# Patient Record
Sex: FEMALE | Race: WHITE | Employment: OTHER | URBAN - NONMETROPOLITAN AREA
[De-identification: names, ages, dates, MRNs, and addresses within clinical notes are randomized per-mention and may not be internally consistent; named-entity substitution may affect disease eponyms.]

---

## 2017-02-15 RX ORDER — GLIPIZIDE 5 MG/1
TABLET ORAL
Qty: 180 TABLET | Refills: 1 | Status: SHIPPED | OUTPATIENT
Start: 2017-02-15 | End: 2017-08-08 | Stop reason: SDUPTHER

## 2017-04-11 ENCOUNTER — OFFICE VISIT (OUTPATIENT)
Dept: FAMILY MEDICINE CLINIC | Age: 67
End: 2017-04-11

## 2017-04-11 VITALS
SYSTOLIC BLOOD PRESSURE: 132 MMHG | BODY MASS INDEX: 33.52 KG/M2 | WEIGHT: 189.2 LBS | DIASTOLIC BLOOD PRESSURE: 68 MMHG | HEART RATE: 66 BPM

## 2017-04-11 DIAGNOSIS — E11.9 WELL CONTROLLED TYPE 2 DIABETES MELLITUS (HCC): ICD-10-CM

## 2017-04-11 DIAGNOSIS — M15.9 PRIMARY OSTEOARTHRITIS INVOLVING MULTIPLE JOINTS: Primary | ICD-10-CM

## 2017-04-11 DIAGNOSIS — E78.00 PURE HYPERCHOLESTEROLEMIA: ICD-10-CM

## 2017-04-11 PROCEDURE — 99213 OFFICE O/P EST LOW 20 MIN: CPT | Performed by: FAMILY MEDICINE

## 2017-04-11 PROCEDURE — 3017F COLORECTAL CA SCREEN DOC REV: CPT | Performed by: FAMILY MEDICINE

## 2017-04-11 PROCEDURE — 1090F PRES/ABSN URINE INCON ASSESS: CPT | Performed by: FAMILY MEDICINE

## 2017-04-11 PROCEDURE — 3046F HEMOGLOBIN A1C LEVEL >9.0%: CPT | Performed by: FAMILY MEDICINE

## 2017-04-11 PROCEDURE — 4040F PNEUMOC VAC/ADMIN/RCVD: CPT | Performed by: FAMILY MEDICINE

## 2017-04-11 PROCEDURE — G8419 CALC BMI OUT NRM PARAM NOF/U: HCPCS | Performed by: FAMILY MEDICINE

## 2017-04-11 PROCEDURE — 1036F TOBACCO NON-USER: CPT | Performed by: FAMILY MEDICINE

## 2017-04-11 PROCEDURE — 1123F ACP DISCUSS/DSCN MKR DOCD: CPT | Performed by: FAMILY MEDICINE

## 2017-04-11 PROCEDURE — 3014F SCREEN MAMMO DOC REV: CPT | Performed by: FAMILY MEDICINE

## 2017-04-11 PROCEDURE — G8400 PT W/DXA NO RESULTS DOC: HCPCS | Performed by: FAMILY MEDICINE

## 2017-04-11 PROCEDURE — G8427 DOCREV CUR MEDS BY ELIG CLIN: HCPCS | Performed by: FAMILY MEDICINE

## 2017-04-11 RX ORDER — IBUPROFEN 600 MG/1
600 TABLET ORAL 2 TIMES DAILY PRN
Qty: 90 TABLET | Refills: 1 | Status: SHIPPED | OUTPATIENT
Start: 2017-04-11 | End: 2017-11-13 | Stop reason: SDUPTHER

## 2017-04-11 ASSESSMENT — PATIENT HEALTH QUESTIONNAIRE - PHQ9
SUM OF ALL RESPONSES TO PHQ9 QUESTIONS 1 & 2: 0
2. FEELING DOWN, DEPRESSED OR HOPELESS: 0
1. LITTLE INTEREST OR PLEASURE IN DOING THINGS: 0
SUM OF ALL RESPONSES TO PHQ QUESTIONS 1-9: 0

## 2017-08-08 ENCOUNTER — OFFICE VISIT (OUTPATIENT)
Dept: FAMILY MEDICINE CLINIC | Age: 67
End: 2017-08-08
Payer: MEDICARE

## 2017-08-08 VITALS
HEART RATE: 70 BPM | SYSTOLIC BLOOD PRESSURE: 138 MMHG | BODY MASS INDEX: 32.78 KG/M2 | WEIGHT: 185 LBS | DIASTOLIC BLOOD PRESSURE: 88 MMHG | HEIGHT: 63 IN

## 2017-08-08 DIAGNOSIS — I10 ESSENTIAL HYPERTENSION: ICD-10-CM

## 2017-08-08 DIAGNOSIS — E11.65 TYPE 2 DIABETES MELLITUS WITH HYPERGLYCEMIA, WITHOUT LONG-TERM CURRENT USE OF INSULIN (HCC): Primary | ICD-10-CM

## 2017-08-08 DIAGNOSIS — Z12.31 ENCOUNTER FOR SCREENING MAMMOGRAM FOR BREAST CANCER: ICD-10-CM

## 2017-08-08 LAB
CREATININE URINE POCT: 200
HBA1C MFR BLD: 11.5 %
MICROALBUMIN/CREAT 24H UR: 30 MG/G{CREAT}
MICROALBUMIN/CREAT UR-RTO: <30

## 2017-08-08 PROCEDURE — 99214 OFFICE O/P EST MOD 30 MIN: CPT | Performed by: FAMILY MEDICINE

## 2017-08-08 PROCEDURE — 3014F SCREEN MAMMO DOC REV: CPT | Performed by: FAMILY MEDICINE

## 2017-08-08 PROCEDURE — G8400 PT W/DXA NO RESULTS DOC: HCPCS | Performed by: FAMILY MEDICINE

## 2017-08-08 PROCEDURE — 3017F COLORECTAL CA SCREEN DOC REV: CPT | Performed by: FAMILY MEDICINE

## 2017-08-08 PROCEDURE — 83036 HEMOGLOBIN GLYCOSYLATED A1C: CPT | Performed by: FAMILY MEDICINE

## 2017-08-08 PROCEDURE — 1123F ACP DISCUSS/DSCN MKR DOCD: CPT | Performed by: FAMILY MEDICINE

## 2017-08-08 PROCEDURE — 82044 UR ALBUMIN SEMIQUANTITATIVE: CPT | Performed by: FAMILY MEDICINE

## 2017-08-08 PROCEDURE — 3046F HEMOGLOBIN A1C LEVEL >9.0%: CPT | Performed by: FAMILY MEDICINE

## 2017-08-08 PROCEDURE — G8417 CALC BMI ABV UP PARAM F/U: HCPCS | Performed by: FAMILY MEDICINE

## 2017-08-08 PROCEDURE — 1036F TOBACCO NON-USER: CPT | Performed by: FAMILY MEDICINE

## 2017-08-08 PROCEDURE — 4040F PNEUMOC VAC/ADMIN/RCVD: CPT | Performed by: FAMILY MEDICINE

## 2017-08-08 PROCEDURE — 1090F PRES/ABSN URINE INCON ASSESS: CPT | Performed by: FAMILY MEDICINE

## 2017-08-08 PROCEDURE — G8427 DOCREV CUR MEDS BY ELIG CLIN: HCPCS | Performed by: FAMILY MEDICINE

## 2017-08-08 RX ORDER — GLIPIZIDE 10 MG/1
10 TABLET ORAL
Qty: 60 TABLET | Refills: 0 | Status: SHIPPED | OUTPATIENT
Start: 2017-08-08 | End: 2017-08-15 | Stop reason: SDUPTHER

## 2017-08-08 RX ORDER — GLIPIZIDE 10 MG/1
10 TABLET ORAL
Qty: 60 TABLET | Refills: 2 | Status: SHIPPED | OUTPATIENT
Start: 2017-08-08 | End: 2017-08-08 | Stop reason: SDUPTHER

## 2017-08-08 RX ORDER — BENAZEPRIL HYDROCHLORIDE 10 MG/1
10 TABLET ORAL DAILY
Qty: 30 TABLET | Refills: 3 | Status: SHIPPED | OUTPATIENT
Start: 2017-08-08 | End: 2017-08-08 | Stop reason: SDUPTHER

## 2017-08-08 RX ORDER — PIOGLITAZONEHYDROCHLORIDE 30 MG/1
30 TABLET ORAL DAILY
Qty: 30 TABLET | Refills: 0 | Status: SHIPPED | OUTPATIENT
Start: 2017-08-08 | End: 2017-08-15

## 2017-08-08 RX ORDER — BENAZEPRIL HYDROCHLORIDE 10 MG/1
10 TABLET ORAL DAILY
Qty: 30 TABLET | Refills: 0 | Status: SHIPPED | OUTPATIENT
Start: 2017-08-08 | End: 2017-08-15 | Stop reason: SDUPTHER

## 2017-08-08 ASSESSMENT — ENCOUNTER SYMPTOMS
CHEST TIGHTNESS: 0
BLURRED VISION: 0
EYE REDNESS: 0
SHORTNESS OF BREATH: 0
NAUSEA: 0
COLOR CHANGE: 0
EYE DISCHARGE: 0
VOMITING: 0

## 2017-08-09 ENCOUNTER — TELEPHONE (OUTPATIENT)
Dept: FAMILY MEDICINE CLINIC | Age: 67
End: 2017-08-09

## 2017-08-15 ENCOUNTER — OFFICE VISIT (OUTPATIENT)
Dept: FAMILY MEDICINE CLINIC | Age: 67
End: 2017-08-15
Payer: MEDICARE

## 2017-08-15 VITALS
HEART RATE: 62 BPM | BODY MASS INDEX: 32.6 KG/M2 | WEIGHT: 184 LBS | HEIGHT: 63 IN | DIASTOLIC BLOOD PRESSURE: 70 MMHG | SYSTOLIC BLOOD PRESSURE: 126 MMHG

## 2017-08-15 DIAGNOSIS — G89.29 CHRONIC INTRACTABLE HEADACHE, UNSPECIFIED HEADACHE TYPE: ICD-10-CM

## 2017-08-15 DIAGNOSIS — M54.2 CHRONIC NECK PAIN: ICD-10-CM

## 2017-08-15 DIAGNOSIS — G89.29 CHRONIC NECK PAIN: ICD-10-CM

## 2017-08-15 DIAGNOSIS — E11.65 TYPE 2 DIABETES MELLITUS WITH HYPERGLYCEMIA, WITHOUT LONG-TERM CURRENT USE OF INSULIN (HCC): ICD-10-CM

## 2017-08-15 DIAGNOSIS — E11.9 TYPE 2 DIABETES MELLITUS WITHOUT COMPLICATION, WITHOUT LONG-TERM CURRENT USE OF INSULIN (HCC): Primary | ICD-10-CM

## 2017-08-15 DIAGNOSIS — R42 DIZZINESS: ICD-10-CM

## 2017-08-15 DIAGNOSIS — I10 ESSENTIAL HYPERTENSION: ICD-10-CM

## 2017-08-15 DIAGNOSIS — R51.9 CHRONIC INTRACTABLE HEADACHE, UNSPECIFIED HEADACHE TYPE: ICD-10-CM

## 2017-08-15 DIAGNOSIS — I42.9 CARDIOMYOPATHY, UNSPECIFIED TYPE (HCC): ICD-10-CM

## 2017-08-15 PROCEDURE — 1036F TOBACCO NON-USER: CPT | Performed by: FAMILY MEDICINE

## 2017-08-15 PROCEDURE — 99214 OFFICE O/P EST MOD 30 MIN: CPT | Performed by: FAMILY MEDICINE

## 2017-08-15 PROCEDURE — G8400 PT W/DXA NO RESULTS DOC: HCPCS | Performed by: FAMILY MEDICINE

## 2017-08-15 PROCEDURE — 1090F PRES/ABSN URINE INCON ASSESS: CPT | Performed by: FAMILY MEDICINE

## 2017-08-15 PROCEDURE — 3014F SCREEN MAMMO DOC REV: CPT | Performed by: FAMILY MEDICINE

## 2017-08-15 PROCEDURE — 4040F PNEUMOC VAC/ADMIN/RCVD: CPT | Performed by: FAMILY MEDICINE

## 2017-08-15 PROCEDURE — 1123F ACP DISCUSS/DSCN MKR DOCD: CPT | Performed by: FAMILY MEDICINE

## 2017-08-15 PROCEDURE — G8417 CALC BMI ABV UP PARAM F/U: HCPCS | Performed by: FAMILY MEDICINE

## 2017-08-15 PROCEDURE — 3046F HEMOGLOBIN A1C LEVEL >9.0%: CPT | Performed by: FAMILY MEDICINE

## 2017-08-15 PROCEDURE — G8427 DOCREV CUR MEDS BY ELIG CLIN: HCPCS | Performed by: FAMILY MEDICINE

## 2017-08-15 PROCEDURE — 3017F COLORECTAL CA SCREEN DOC REV: CPT | Performed by: FAMILY MEDICINE

## 2017-08-15 RX ORDER — ACARBOSE 25 MG/1
25 TABLET ORAL
Qty: 90 TABLET | Refills: 0 | Status: SHIPPED | OUTPATIENT
Start: 2017-08-15 | End: 2017-08-31 | Stop reason: SDUPTHER

## 2017-08-15 RX ORDER — GLIPIZIDE 10 MG/1
10 TABLET ORAL
Qty: 180 TABLET | Refills: 1 | Status: SHIPPED | OUTPATIENT
Start: 2017-08-15 | End: 2017-11-13 | Stop reason: SDUPTHER

## 2017-08-15 RX ORDER — BENAZEPRIL HYDROCHLORIDE 10 MG/1
10 TABLET ORAL DAILY
Qty: 90 TABLET | Refills: 1 | Status: SHIPPED | OUTPATIENT
Start: 2017-08-15 | End: 2017-11-13 | Stop reason: SDUPTHER

## 2017-08-15 ASSESSMENT — ENCOUNTER SYMPTOMS
CHEST TIGHTNESS: 0
SHORTNESS OF BREATH: 0
EYE REDNESS: 0
EYE DISCHARGE: 0
COLOR CHANGE: 0
TROUBLE SWALLOWING: 0
NAUSEA: 0
VOMITING: 0

## 2017-08-16 ENCOUNTER — HOSPITAL ENCOUNTER (OUTPATIENT)
Dept: CT IMAGING | Age: 67
Discharge: HOME | End: 2017-08-16
Payer: MEDICARE

## 2017-08-16 ENCOUNTER — HOSPITAL ENCOUNTER (OUTPATIENT)
Dept: MAMMOGRAPHY | Age: 67
Discharge: HOME | End: 2017-08-16
Payer: MEDICARE

## 2017-08-16 DIAGNOSIS — G89.29 CHRONIC NECK PAIN: ICD-10-CM

## 2017-08-16 DIAGNOSIS — G89.29 CHRONIC INTRACTABLE HEADACHE, UNSPECIFIED HEADACHE TYPE: ICD-10-CM

## 2017-08-16 DIAGNOSIS — M54.2 CHRONIC NECK PAIN: ICD-10-CM

## 2017-08-16 DIAGNOSIS — R51.9 CHRONIC INTRACTABLE HEADACHE, UNSPECIFIED HEADACHE TYPE: ICD-10-CM

## 2017-08-16 DIAGNOSIS — Z12.31 ENCOUNTER FOR SCREENING MAMMOGRAM FOR BREAST CANCER: ICD-10-CM

## 2017-08-16 PROCEDURE — G0202 SCR MAMMO BI INCL CAD: HCPCS

## 2017-08-16 PROCEDURE — 70450 CT HEAD/BRAIN W/O DYE: CPT

## 2017-08-17 ENCOUNTER — TELEPHONE (OUTPATIENT)
Dept: FAMILY MEDICINE CLINIC | Age: 67
End: 2017-08-17

## 2017-08-18 ENCOUNTER — HOSPITAL ENCOUNTER (OUTPATIENT)
Dept: MRI IMAGING | Age: 67
Discharge: HOME | End: 2017-08-18
Payer: MEDICARE

## 2017-08-18 DIAGNOSIS — G89.29 CHRONIC INTRACTABLE HEADACHE, UNSPECIFIED HEADACHE TYPE: ICD-10-CM

## 2017-08-18 DIAGNOSIS — G89.29 CHRONIC NECK PAIN: ICD-10-CM

## 2017-08-18 DIAGNOSIS — R51.9 CHRONIC INTRACTABLE HEADACHE, UNSPECIFIED HEADACHE TYPE: ICD-10-CM

## 2017-08-18 DIAGNOSIS — R42 DIZZINESS: ICD-10-CM

## 2017-08-18 DIAGNOSIS — M54.2 CHRONIC NECK PAIN: ICD-10-CM

## 2017-08-18 PROCEDURE — 72141 MRI NECK SPINE W/O DYE: CPT

## 2017-08-21 ENCOUNTER — TELEPHONE (OUTPATIENT)
Dept: FAMILY MEDICINE CLINIC | Age: 67
End: 2017-08-21

## 2017-08-22 ENCOUNTER — NURSE ONLY (OUTPATIENT)
Dept: FAMILY MEDICINE CLINIC | Age: 67
End: 2017-08-22

## 2017-08-22 VITALS — HEART RATE: 72 BPM | DIASTOLIC BLOOD PRESSURE: 78 MMHG | SYSTOLIC BLOOD PRESSURE: 132 MMHG

## 2017-08-22 DIAGNOSIS — Z01.30 BLOOD PRESSURE CHECK: Primary | ICD-10-CM

## 2017-08-31 RX ORDER — ACARBOSE 25 MG/1
25 TABLET ORAL
Qty: 90 TABLET | Refills: 1 | Status: SHIPPED | OUTPATIENT
Start: 2017-08-31 | End: 2017-11-13 | Stop reason: SDUPTHER

## 2017-11-13 ENCOUNTER — OFFICE VISIT (OUTPATIENT)
Dept: FAMILY MEDICINE CLINIC | Age: 67
End: 2017-11-13
Payer: MEDICARE

## 2017-11-13 VITALS
DIASTOLIC BLOOD PRESSURE: 68 MMHG | SYSTOLIC BLOOD PRESSURE: 114 MMHG | WEIGHT: 182 LBS | HEIGHT: 63 IN | HEART RATE: 62 BPM | BODY MASS INDEX: 32.25 KG/M2

## 2017-11-13 DIAGNOSIS — Z12.11 SCREENING FOR COLON CANCER: ICD-10-CM

## 2017-11-13 DIAGNOSIS — I10 ESSENTIAL HYPERTENSION: ICD-10-CM

## 2017-11-13 DIAGNOSIS — E11.65 TYPE 2 DIABETES MELLITUS WITH HYPERGLYCEMIA, WITHOUT LONG-TERM CURRENT USE OF INSULIN (HCC): Primary | ICD-10-CM

## 2017-11-13 PROCEDURE — G8484 FLU IMMUNIZE NO ADMIN: HCPCS | Performed by: FAMILY MEDICINE

## 2017-11-13 PROCEDURE — G8427 DOCREV CUR MEDS BY ELIG CLIN: HCPCS | Performed by: FAMILY MEDICINE

## 2017-11-13 PROCEDURE — 3017F COLORECTAL CA SCREEN DOC REV: CPT | Performed by: FAMILY MEDICINE

## 2017-11-13 PROCEDURE — 3045F PR MOST RECENT HEMOGLOBIN A1C LEVEL 7.0-9.0%: CPT | Performed by: FAMILY MEDICINE

## 2017-11-13 PROCEDURE — 1090F PRES/ABSN URINE INCON ASSESS: CPT | Performed by: FAMILY MEDICINE

## 2017-11-13 PROCEDURE — 3014F SCREEN MAMMO DOC REV: CPT | Performed by: FAMILY MEDICINE

## 2017-11-13 PROCEDURE — 1123F ACP DISCUSS/DSCN MKR DOCD: CPT | Performed by: FAMILY MEDICINE

## 2017-11-13 PROCEDURE — 4040F PNEUMOC VAC/ADMIN/RCVD: CPT | Performed by: FAMILY MEDICINE

## 2017-11-13 PROCEDURE — G8400 PT W/DXA NO RESULTS DOC: HCPCS | Performed by: FAMILY MEDICINE

## 2017-11-13 PROCEDURE — 99214 OFFICE O/P EST MOD 30 MIN: CPT | Performed by: FAMILY MEDICINE

## 2017-11-13 PROCEDURE — 1036F TOBACCO NON-USER: CPT | Performed by: FAMILY MEDICINE

## 2017-11-13 PROCEDURE — G8417 CALC BMI ABV UP PARAM F/U: HCPCS | Performed by: FAMILY MEDICINE

## 2017-11-13 RX ORDER — IBUPROFEN 600 MG/1
600 TABLET ORAL 2 TIMES DAILY PRN
Qty: 90 TABLET | Refills: 1 | Status: SHIPPED | OUTPATIENT
Start: 2017-11-13 | End: 2018-05-14

## 2017-11-13 RX ORDER — GLIPIZIDE 10 MG/1
10 TABLET ORAL
Qty: 180 TABLET | Refills: 1 | Status: SHIPPED | OUTPATIENT
Start: 2017-11-13 | End: 2018-05-14 | Stop reason: SDUPTHER

## 2017-11-13 RX ORDER — ACARBOSE 25 MG/1
25 TABLET ORAL
Qty: 90 TABLET | Refills: 1 | Status: SHIPPED | OUTPATIENT
Start: 2017-11-13 | End: 2018-01-03 | Stop reason: SDUPTHER

## 2017-11-13 RX ORDER — BENAZEPRIL HYDROCHLORIDE 10 MG/1
10 TABLET ORAL DAILY
Qty: 90 TABLET | Refills: 1 | Status: SHIPPED | OUTPATIENT
Start: 2017-11-13 | End: 2018-05-14 | Stop reason: SDUPTHER

## 2017-11-13 ASSESSMENT — ENCOUNTER SYMPTOMS
NAUSEA: 0
COLOR CHANGE: 0
BLURRED VISION: 0
SHORTNESS OF BREATH: 0
VOMITING: 0
EYE DISCHARGE: 0
CHEST TIGHTNESS: 0
EYE REDNESS: 0

## 2017-11-13 NOTE — PROGRESS NOTES
[address]  [address] [facility], [place]  Phone:  [phone]  Fax:  [phone]       Name: Anabel Delgado  : 1950    Chief Complaint   Patient presents with    6 Month Follow-Up    Diabetes    Hypertension    Health Maintenance     needs a FIT test       HPI:     Anabel Delgado is a 69 y.o. female who presents today for follow-up of T2DM and hypertension. Her last HbA1c was 7.9% on 17 which was improved from 11.5% in August with a healthier diet and increased physical activity. No medication side effects. Diabetes   She presents for her follow-up diabetic visit. She has type 2 diabetes mellitus. Her disease course has been improving. There are no hypoglycemic associated symptoms. Pertinent negatives for hypoglycemia include no pallor. Pertinent negatives for diabetes include no blurred vision, no chest pain, no foot ulcerations, no polydipsia, no polyphagia and no polyuria. Symptoms are stable. Risk factors for coronary artery disease include diabetes mellitus and obesity. Current diabetic treatment includes oral agent (triple therapy). She is compliant with treatment all of the time. She is following a generally healthy diet. An ACE inhibitor/angiotensin II receptor blocker is being taken. Eye exam is current. Hypertension   This is a chronic problem. The current episode started more than 1 year ago. The problem is unchanged. The problem is controlled. Pertinent negatives include no blurred vision, chest pain or shortness of breath. Risk factors for coronary artery disease include diabetes mellitus. Past treatments include ACE inhibitors. The current treatment provides moderate improvement. There are no compliance problems.         Current Outpatient Prescriptions:     acarbose (PRECOSE) 25 MG tablet, Take 1 tablet by mouth 3 times daily (with meals), Disp: 90 tablet, Rfl: 1    SITagliptin (JANUVIA) 100 MG tablet, Take 1 tablet by mouth daily, (before meals)  -     benazepril (LOTENSIN) 10 MG tablet; Take 1 tablet by mouth daily  -     HM DIABETES FOOT EXAM    Essential hypertension  -     benazepril (LOTENSIN) 10 MG tablet; Take 1 tablet by mouth daily    Screening for colon cancer  -     POCT Fecal Immunochemical Test (FIT); Future    Other orders  -     ibuprofen (ADVIL;MOTRIN) 600 MG tablet; Take 1 tablet by mouth 2 times daily as needed for Pain      Price Hardy received counseling on the following healthy behaviors: medication adherence, nutrition, exercise  Reviewed prior labs and health maintenance  Continue current medications, diet and exercise. Discussed use, benefit, and side effects of prescribed medications. Barriers to medication compliance addressed. Patient given educational materials - see patient instructions    Requested Prescriptions     Signed Prescriptions Disp Refills    acarbose (PRECOSE) 25 MG tablet 90 tablet 1     Sig: Take 1 tablet by mouth 3 times daily (with meals)    SITagliptin (JANUVIA) 100 MG tablet 90 tablet 1     Sig: Take 1 tablet by mouth daily    glipiZIDE (GLUCOTROL) 10 MG tablet 180 tablet 1     Sig: Take 1 tablet by mouth 2 times daily (before meals)    benazepril (LOTENSIN) 10 MG tablet 90 tablet 1     Sig: Take 1 tablet by mouth daily    ibuprofen (ADVIL;MOTRIN) 600 MG tablet 90 tablet 1     Sig: Take 1 tablet by mouth 2 times daily as needed for Pain       All patient questions answered. Patient voiced understanding. Quality Measures    BP: 114/68. Blood pressure is normal. Treatment plan consists of No treatment change needed. Fall Risk 4/11/2017 10/13/2015   2 or more falls in past year? no no   Fall with injury in past year? no no     The patient does not have a history of falls. I did not - not indicated , complete a risk assessment for falls.  A plan of care for falls No Treatment plan indicated    Lab Results   Component Value Date    LDLCALC 133 (H) 09/30/2017    (goal LDL reduction with dx

## 2017-11-13 NOTE — PATIENT INSTRUCTIONS
11.3  © 9825-0023 Healthwise, Incorporated. Care instructions adapted under license by [facility] ([facility]). If you have questions about a medical condition or this instruction, always ask your healthcare professional. [address] any warranty or liability for your use of this information.

## 2017-11-28 ENCOUNTER — HOSPITAL ENCOUNTER (EMERGENCY)
Age: 67
Discharge: HOME | End: 2017-11-28
Payer: MEDICARE

## 2017-11-28 VITALS
HEIGHT: 63 IN | RESPIRATION RATE: 20 BRPM | TEMPERATURE: 98.7 F | BODY MASS INDEX: 31.89 KG/M2 | DIASTOLIC BLOOD PRESSURE: 79 MMHG | OXYGEN SATURATION: 95 % | SYSTOLIC BLOOD PRESSURE: 124 MMHG | WEIGHT: 180 LBS | HEART RATE: 77 BPM

## 2017-11-28 DIAGNOSIS — J40 BRONCHITIS: Primary | ICD-10-CM

## 2017-11-28 LAB
EKG ATRIAL RATE: 66 BPM
EKG P AXIS: 47 DEGREES
EKG P-R INTERVAL: 190 MS
EKG Q-T INTERVAL: 388 MS
EKG QRS DURATION: 90 MS
EKG QTC CALCULATION (BAZETT): 406 MS
EKG R AXIS: 44 DEGREES
EKG T AXIS: 29 DEGREES
EKG VENTRICULAR RATE: 66 BPM

## 2017-11-28 PROCEDURE — 99214 OFFICE O/P EST MOD 30 MIN: CPT | Performed by: NURSE PRACTITIONER

## 2017-11-28 PROCEDURE — 6360000002 HC RX W HCPCS: Performed by: NURSE PRACTITIONER

## 2017-11-28 PROCEDURE — 94640 AIRWAY INHALATION TREATMENT: CPT

## 2017-11-28 PROCEDURE — 99213 OFFICE O/P EST LOW 20 MIN: CPT

## 2017-11-28 PROCEDURE — 93005 ELECTROCARDIOGRAM TRACING: CPT

## 2017-11-28 RX ORDER — BENZONATATE 200 MG/1
200 CAPSULE ORAL 3 TIMES DAILY PRN
Qty: 21 CAPSULE | Refills: 0 | Status: SHIPPED | OUTPATIENT
Start: 2017-11-28 | End: 2017-12-05

## 2017-11-28 RX ORDER — PREDNISONE 20 MG/1
20 TABLET ORAL 2 TIMES DAILY
Qty: 10 TABLET | Refills: 0 | Status: SHIPPED | OUTPATIENT
Start: 2017-11-28 | End: 2017-12-03

## 2017-11-28 RX ORDER — ALBUTEROL SULFATE 2.5 MG/3ML
2.5 SOLUTION RESPIRATORY (INHALATION) EVERY 4 HOURS PRN
Qty: 1 PACKAGE | Refills: 1 | Status: SHIPPED | OUTPATIENT
Start: 2017-11-28 | End: 2022-04-04

## 2017-11-28 RX ORDER — FLUTICASONE PROPIONATE 50 MCG
1 SPRAY, SUSPENSION (ML) NASAL DAILY
COMMUNITY
End: 2018-01-23 | Stop reason: SDUPTHER

## 2017-11-28 RX ORDER — DOXYCYCLINE HYCLATE 100 MG
100 TABLET ORAL 2 TIMES DAILY
Qty: 20 TABLET | Refills: 0 | Status: SHIPPED | OUTPATIENT
Start: 2017-11-28 | End: 2017-12-08

## 2017-11-28 RX ORDER — ALBUTEROL SULFATE 2.5 MG/3ML
2.5 SOLUTION RESPIRATORY (INHALATION) ONCE
Status: COMPLETED | OUTPATIENT
Start: 2017-11-28 | End: 2017-11-28

## 2017-11-28 RX ADMIN — ALBUTEROL SULFATE 2.5 MG: 2.5 SOLUTION RESPIRATORY (INHALATION) at 11:56

## 2017-11-28 ASSESSMENT — PAIN SCALES - GENERAL: PAINLEVEL_OUTOF10: 6

## 2017-11-28 ASSESSMENT — ENCOUNTER SYMPTOMS
CHEST TIGHTNESS: 1
SINUS CONGESTION: 1
SINUS PAIN: 0
BACK PAIN: 0
NAUSEA: 0
ABDOMINAL PAIN: 0
COUGH: 1
VOMITING: 0
TROUBLE SWALLOWING: 0
SINUS PRESSURE: 1
DIARRHEA: 0
SHORTNESS OF BREATH: 1
RHINORRHEA: 1
SORE THROAT: 0

## 2017-11-28 ASSESSMENT — PAIN DESCRIPTION - DESCRIPTORS: DESCRIPTORS: ACHING

## 2017-11-28 ASSESSMENT — PAIN DESCRIPTION - PAIN TYPE: TYPE: ACUTE PAIN

## 2017-11-28 ASSESSMENT — PAIN DESCRIPTION - LOCATION: LOCATION: HEAD

## 2017-11-28 ASSESSMENT — PAIN DESCRIPTION - FREQUENCY: FREQUENCY: CONTINUOUS

## 2017-11-28 NOTE — ED PROVIDER NOTES
[address]  Urgent Care Encounter       CHIEF COMPLAINT       Chief Complaint   Patient presents with    Cough     causing her to gag when she coughs    Sinusitis       Nurses Notes reviewed and I agree except as noted in the HPI. HISTORY OF PRESENT ILLNESS   Antonella Dial is a 69 y.o. female who presents The urgent care center complaining of coughing and sinus congestion. The history is provided by the patient. No  was used. Cough   Cough characteristics:  Non-productive  Severity:  Moderate  Onset quality:  Gradual  Duration:  5 days  Timing:  Intermittent  Progression:  Waxing and waning  Chronicity:  New  Smoker: no    Context: not sick contacts    Relieved by:  Nothing  Worsened by:  Lying down  Ineffective treatments:  Fluids and rest (salt water gargles, otc throat lozengers)  Associated symptoms: rhinorrhea, shortness of breath and sinus congestion    Associated symptoms: no chest pain, no chills, no diaphoresis, no ear pain, no fever, no headaches, no rash and no sore throat    Rhinorrhea:     Quality:  Clear and yellow    Severity:  Mild    Duration:  5 days    Timing:  Intermittent    Progression:  Waxing and waning  Shortness of breath:     Severity:  Mild    Onset quality:  Gradual    Duration:  5 days    Timing:  Intermittent    Progression:  Waxing and waning  Sinusitis   Associated symptoms: congestion, cough, rhinorrhea and shortness of breath    Associated symptoms: no chest pain, no chills, no ear pain, no fatigue, no fever, no headaches, no nausea, no sore throat and no vomiting        REVIEW OF SYSTEMS     Review of Systems   Constitutional: Positive for activity change and appetite change. Negative for chills, diaphoresis, fatigue and fever. HENT: Positive for congestion, rhinorrhea and sinus pressure. Negative for ear discharge, ear pain, nosebleeds, postnasal drip, sinus pain, sore throat and trouble swallowing.     Respiratory: Positive for cough, chest tightness and shortness of breath. Cardiovascular: Negative for chest pain. Gastrointestinal: Negative for abdominal pain, diarrhea, nausea and vomiting. Musculoskeletal: Negative for back pain, neck pain and neck stiffness. Skin: Negative for rash. Allergic/Immunologic: Negative for environmental allergies and food allergies. Neurological: Negative for dizziness, light-headedness and headaches. Hematological: Negative for adenopathy. PAST MEDICAL HISTORY         Diagnosis Date    Abdominal aortic aneurysm (AAA) >39 mm diameter (HCC)     found in 2015    Arthritis     Asthma     Brain cyst     benign    Chronic sinus complaints     Diverticulosis of colon     Elevated TSH     Polyneuropathy (HCC)     Type 2 diabetes mellitus ([facility] [address]        SURGICAL HISTORY     Patient  has a past surgical history that includes Tonsillectomy and adenoidectomy (age 4); Dilation and curettage of uterus; sinus surgery; Mandible fracture surgery; Cholecystectomy; Hysterectomy (1995); Knee arthroscopy (1967, 2001); joint replacement (Right, 2007); skin biopsy; eye surgery; bladder suspension; and other surgical history (Right, 12/30/2015).     CURRENT MEDICATIONS       Discharge Medication List as of 11/28/2017 12:32 PM      CONTINUE these medications which have NOT CHANGED    Details   fluticasone (FLONASE) 50 MCG/ACT nasal spray 1 spray by Nasal route dailyHistorical Med      dextromethorphan-guaiFENesin (MUCINEX DM)  MG per extended release tablet Take 1 tablet by mouth every 12 hours as neededHistorical Med      acarbose (PRECOSE) 25 MG tablet Take 1 tablet by mouth 3 times daily (with meals), Disp-90 tablet, R-1Normal      SITagliptin (JANUVIA) 100 MG tablet Take 1 tablet by mouth daily, Disp-90 tablet, R-1Normal      glipiZIDE (GLUCOTROL) 10 MG tablet Take 1 tablet by mouth 2 times daily (before meals), Disp-180 tablet, R-1Normal      benazepril (LOTENSIN) 10 MG tablet Take 1 tablet by diaphoretic. Nursing note and vitals reviewed. DIAGNOSTIC RESULTS   Labs:  Results for orders placed or performed during the hospital encounter of 11/28/17   EKG 12 Lead   Result Value Ref Range    Ventricular Rate 66 BPM    Atrial Rate 66 BPM    P-R Interval 190 ms    QRS Duration 90 ms    Q-T Interval 388 ms    QTc Calculation (Bazett) 406 ms    P Axis 47 degrees    R Axis 44 degrees    T Axis 29 degrees       IMAGING:    No orders to display         EKG:      URGENT CARE COURSE:     Vitals:    11/28/17 1116 11/28/17 1236   BP: (!) 160/70 124/79   Pulse: 77 77   Resp: 22 20   Temp: 98.7 °F (37.1 °C)    TempSrc: Oral    SpO2: 95% 95%   Weight: 180 lb (81.6 kg)    Height: 5' 3\" (1.6 m)        Medications   albuterol (PROVENTIL) nebulizer solution 2.5 mg (2.5 mg Nebulization Given 11/28/17 1156)     12:30 Patient digits states she did get some relief of her chest tightness after the albuterol aerosol treatment the patient did have increased breath sounds. Patient still has a cough present. She denies any chest pain or shortness of breath at the present time. ED Course        PROCEDURES:  None    FINAL IMPRESSION      1. Bronchitis          DISPOSITION/PLAN   DISPOSITION    The patient/Patient representative was advised to rest, drink lots of fluids, take Motrin and Tylenol for any fever, chills generalized body aches. The patient was also advised to monitor urine output for signs of dehydration. If the patient develops any chest pain, shortness of breath, neck pain or stiffness or abdominal pain or any other concerns, the patient is to call 911 or go to the emergency department for further evaluation. If the patient does not experience any of the above symptoms he is to follow-up with his primary care provider in 2-3 days for reevaluation. The patient/Patient representative are agreeable to the treatment plan at this time. The patient left the urgent care center in stable condition.     PATIENT REFERRED TO:  Carla Powell MD  [address] [place] [address]  [phone]    In 1 week  For recheck and further evaluation and management, If symptoms worsen go to the Emergency Department      DISCHARGE MEDICATIONS:  Discharge Medication List as of 11/28/2017 12:32 PM      START taking these medications    Details   doxycycline hyclate (VIBRA-TABS) 100 MG tablet Take 1 tablet by mouth 2 times daily for 10 days, Disp-20 tablet, R-0Normal      predniSONE (DELTASONE) 20 MG tablet Take 1 tablet by mouth 2 times daily for 5 days, Disp-10 tablet, R-0Normal      albuterol (PROVENTIL) (2.5 MG/3ML) 0.083% nebulizer solution Take 3 mLs by nebulization every 4 hours as needed for Wheezing, Disp-1 Package, R-1Normal      benzonatate (TESSALON) 200 MG capsule Take 1 capsule by mouth 3 times daily as needed for Cough, Disp-21 capsule, R-0Normal             Discharge Medication List as of 11/28/2017 12:32 PM          Discharge Medication List as of 11/28/2017 12:32 PM          Summer Nguyễn NP    (Please note that portions of this note were completed with a voice recognition program.  Efforts were made to edit the dictations but occasionally words are mis-transcribed.)            Summer Nguyễn NP  11/28/17 1556

## 2017-11-28 NOTE — ED TRIAGE NOTES
Discharge instructions and prescriptions reviewed with pt. Pt verbalized understanding. Pt ambulated out in stable condition. Pain unchanged upon discharge.

## 2017-11-28 NOTE — ED TRIAGE NOTES
Pt to urgent care with c/o cough that is making her gag and sinus issues. New onset of symptoms started on Thursday evening. Pt states she helped her  with some yard work and the wind was blowing and she feels this is the cause of her symptoms. Pt said her her left arm was hurting her last night and she is sob, so an EKG was completed.

## 2017-11-29 ENCOUNTER — CARE COORDINATION (OUTPATIENT)
Dept: CARE COORDINATION | Age: 67
End: 2017-11-29

## 2017-11-29 NOTE — CARE COORDINATION
Ambulatory Care Coordination ED Follow up Call       Reason for ED Visit:  Sinusitis  Care Management Risk Score: CMRS 3  How are you feeling? :     not changed  Patient Reports the following:  none             Contact RNCC regarding any worsening symptoms from above. Did you call your PCP prior to going to the ED? No          Post Discharge Status:  What health concerns since you left the Emergency Room?  none    Do you have wounds that you are caring for at home? No    Do you have a follow up appt scheduled?  no - declined    Review of Instructions:                                 Do you have any questions regarding your discharge instructions?:  No  Medications:    What questions do you have about your medications? N/A  Are you taking your medications as directed? If not - why? Yes   Can you afford your medications? yes  ADLS:  Do you need assistance of any kind at home? No   What assistance is needed? N/a    I spoke with the patient re: ed visit. Patient was seen and treated for sinusitis. Patient denies chest pain, palpations or SOB. Patient continues to have a non productive cough. No wheezing. No fever or chills today. Patient was advised to drink lots of fluids, take Motrin and Tylenol for any fever, chills generalized body aches. Instructed patient on importance of early symptom recognition to prevent hospitalization and ED visits. Patient is taking all medications as prescribed. Patient denies the need to f/u with PCP at this time. I advised patient to contact PCP office if needed. No further needs at this time.           FU appts/Provider:    Future Appointments  Date Time Provider Elsie Foley   5/14/2018 1:15 PM Pita Sullivan  [facility] Maintenance Due   Topic Date Due    DTaP/Tdap/Td vaccine (1 - Tdap) 03/12/1967    Colon cancer screen colonoscopy  03/12/1998    Pneumococcal low/med risk (2 of 2 - PPSV23) 10/01/2016    Flu vaccine (1) 09/01/2017     Patient advised to contact PCP office to have HM items/records faxed to PCP Office directly?   yes

## 2018-01-02 ENCOUNTER — TELEPHONE (OUTPATIENT)
Dept: FAMILY MEDICINE CLINIC | Age: 68
End: 2018-01-02

## 2018-01-03 ENCOUNTER — OFFICE VISIT (OUTPATIENT)
Dept: FAMILY MEDICINE CLINIC | Age: 68
End: 2018-01-03
Payer: MEDICARE

## 2018-01-03 VITALS
HEART RATE: 88 BPM | BODY MASS INDEX: 32.43 KG/M2 | WEIGHT: 183 LBS | HEIGHT: 63 IN | SYSTOLIC BLOOD PRESSURE: 124 MMHG | DIASTOLIC BLOOD PRESSURE: 72 MMHG | TEMPERATURE: 98.3 F

## 2018-01-03 DIAGNOSIS — J01.90 ACUTE BACTERIAL SINUSITIS: Primary | ICD-10-CM

## 2018-01-03 DIAGNOSIS — B96.89 ACUTE BACTERIAL SINUSITIS: Primary | ICD-10-CM

## 2018-01-03 DIAGNOSIS — Z12.11 SCREENING FOR COLON CANCER: ICD-10-CM

## 2018-01-03 DIAGNOSIS — E11.65 TYPE 2 DIABETES MELLITUS WITH HYPERGLYCEMIA, WITHOUT LONG-TERM CURRENT USE OF INSULIN (HCC): ICD-10-CM

## 2018-01-03 LAB
CONTROL: PRESENT
HEMOCCULT STL QL: NEGATIVE

## 2018-01-03 PROCEDURE — G8484 FLU IMMUNIZE NO ADMIN: HCPCS | Performed by: FAMILY MEDICINE

## 2018-01-03 PROCEDURE — 1036F TOBACCO NON-USER: CPT | Performed by: FAMILY MEDICINE

## 2018-01-03 PROCEDURE — G8427 DOCREV CUR MEDS BY ELIG CLIN: HCPCS | Performed by: FAMILY MEDICINE

## 2018-01-03 PROCEDURE — 82274 ASSAY TEST FOR BLOOD FECAL: CPT | Performed by: FAMILY MEDICINE

## 2018-01-03 PROCEDURE — 99213 OFFICE O/P EST LOW 20 MIN: CPT | Performed by: FAMILY MEDICINE

## 2018-01-03 PROCEDURE — G8417 CALC BMI ABV UP PARAM F/U: HCPCS | Performed by: FAMILY MEDICINE

## 2018-01-03 PROCEDURE — 1123F ACP DISCUSS/DSCN MKR DOCD: CPT | Performed by: FAMILY MEDICINE

## 2018-01-03 PROCEDURE — 3017F COLORECTAL CA SCREEN DOC REV: CPT | Performed by: FAMILY MEDICINE

## 2018-01-03 PROCEDURE — 1090F PRES/ABSN URINE INCON ASSESS: CPT | Performed by: FAMILY MEDICINE

## 2018-01-03 PROCEDURE — 3046F HEMOGLOBIN A1C LEVEL >9.0%: CPT | Performed by: FAMILY MEDICINE

## 2018-01-03 PROCEDURE — 3014F SCREEN MAMMO DOC REV: CPT | Performed by: FAMILY MEDICINE

## 2018-01-03 PROCEDURE — 4040F PNEUMOC VAC/ADMIN/RCVD: CPT | Performed by: FAMILY MEDICINE

## 2018-01-03 PROCEDURE — G8400 PT W/DXA NO RESULTS DOC: HCPCS | Performed by: FAMILY MEDICINE

## 2018-01-03 RX ORDER — SULFAMETHOXAZOLE AND TRIMETHOPRIM 800; 160 MG/1; MG/1
1 TABLET ORAL 2 TIMES DAILY
Qty: 20 TABLET | Refills: 0 | Status: SHIPPED | OUTPATIENT
Start: 2018-01-03 | End: 2018-01-13

## 2018-01-03 RX ORDER — ACARBOSE 25 MG/1
25 TABLET ORAL
Qty: 270 TABLET | Refills: 1 | Status: ON HOLD | OUTPATIENT
Start: 2018-01-03 | End: 2018-05-21 | Stop reason: HOSPADM

## 2018-01-03 ASSESSMENT — ENCOUNTER SYMPTOMS
SINUS PAIN: 1
SINUS PRESSURE: 1
SORE THROAT: 1
COUGH: 1
VOMITING: 0
RHINORRHEA: 1
DIARRHEA: 0

## 2018-01-03 NOTE — PATIENT INSTRUCTIONS
wet towel or a warm gel pack on your face 3 or 4 times a day for 5 to 10 minutes each time. · Try a decongestant nasal spray like oxymetazoline (Afrin). Do not use it for more than 3 days in a row. Using it for more than 3 days can make your congestion worse. When should you call for help? Call your doctor now or seek immediate medical care if:  ? · You have new or worse swelling or redness in your face or around your eyes. ? · You have a new or higher fever. ? Watch closely for changes in your health, and be sure to contact your doctor if:  ? · You have new or worse facial pain. ? · The mucus from your nose becomes thicker (like pus) or has new blood in it. ? · You are not getting better as expected. Where can you learn more? Go to https://KnokpeCemaphore Systems.[employer]. org and sign in to your Asker account. Enter E715 in the WorthPoint box to learn more about \"Sinusitis: Care Instructions. \"     If you do not have an account, please click on the \"Sign Up Now\" link. Current as of: May 12, 2017  Content Version: 11.4  © 2405-0289 Healthwise, [employer]. Care instructions adapted under license by [facility] ([facility]). If you have questions about a medical condition or this instruction, always ask your healthcare professional. [address] any warranty or liability for your use of this information.

## 2018-01-11 ENCOUNTER — TELEPHONE (OUTPATIENT)
Dept: FAMILY MEDICINE CLINIC | Age: 68
End: 2018-01-11

## 2018-01-11 DIAGNOSIS — J01.90 ACUTE BACTERIAL SINUSITIS: Primary | ICD-10-CM

## 2018-01-11 DIAGNOSIS — B96.89 ACUTE BACTERIAL SINUSITIS: Primary | ICD-10-CM

## 2018-01-11 RX ORDER — AZITHROMYCIN 250 MG/1
TABLET, FILM COATED ORAL
Qty: 6 TABLET | Refills: 0 | Status: SHIPPED | OUTPATIENT
Start: 2018-01-11 | End: 2018-01-23

## 2018-01-11 NOTE — TELEPHONE ENCOUNTER
I would recommend completing the course of antibiotics and continuing with OTC treatment. The cough and congestion can still persist after treatment but should be improving.

## 2018-01-23 ENCOUNTER — OFFICE VISIT (OUTPATIENT)
Dept: FAMILY MEDICINE CLINIC | Age: 68
End: 2018-01-23
Payer: MEDICARE

## 2018-01-23 VITALS
TEMPERATURE: 97.5 F | BODY MASS INDEX: 32.43 KG/M2 | HEART RATE: 78 BPM | DIASTOLIC BLOOD PRESSURE: 66 MMHG | HEIGHT: 63 IN | SYSTOLIC BLOOD PRESSURE: 130 MMHG | WEIGHT: 183 LBS

## 2018-01-23 DIAGNOSIS — J32.9 RECURRENT SINUSITIS: Primary | ICD-10-CM

## 2018-01-23 DIAGNOSIS — I42.9 CARDIOMYOPATHY, UNSPECIFIED TYPE (HCC): ICD-10-CM

## 2018-01-23 PROCEDURE — 3014F SCREEN MAMMO DOC REV: CPT | Performed by: FAMILY MEDICINE

## 2018-01-23 PROCEDURE — 4040F PNEUMOC VAC/ADMIN/RCVD: CPT | Performed by: FAMILY MEDICINE

## 2018-01-23 PROCEDURE — G8427 DOCREV CUR MEDS BY ELIG CLIN: HCPCS | Performed by: FAMILY MEDICINE

## 2018-01-23 PROCEDURE — 1036F TOBACCO NON-USER: CPT | Performed by: FAMILY MEDICINE

## 2018-01-23 PROCEDURE — G8417 CALC BMI ABV UP PARAM F/U: HCPCS | Performed by: FAMILY MEDICINE

## 2018-01-23 PROCEDURE — 3017F COLORECTAL CA SCREEN DOC REV: CPT | Performed by: FAMILY MEDICINE

## 2018-01-23 PROCEDURE — 1090F PRES/ABSN URINE INCON ASSESS: CPT | Performed by: FAMILY MEDICINE

## 2018-01-23 PROCEDURE — G8400 PT W/DXA NO RESULTS DOC: HCPCS | Performed by: FAMILY MEDICINE

## 2018-01-23 PROCEDURE — 1123F ACP DISCUSS/DSCN MKR DOCD: CPT | Performed by: FAMILY MEDICINE

## 2018-01-23 PROCEDURE — G8484 FLU IMMUNIZE NO ADMIN: HCPCS | Performed by: FAMILY MEDICINE

## 2018-01-23 PROCEDURE — 99213 OFFICE O/P EST LOW 20 MIN: CPT | Performed by: FAMILY MEDICINE

## 2018-01-23 RX ORDER — DOXYCYCLINE HYCLATE 100 MG
100 TABLET ORAL 2 TIMES DAILY
Qty: 20 TABLET | Refills: 0 | Status: SHIPPED | OUTPATIENT
Start: 2018-01-23 | End: 2018-02-02

## 2018-01-23 RX ORDER — AZITHROMYCIN 250 MG/1
TABLET, FILM COATED ORAL
Qty: 6 TABLET | Refills: 0 | Status: CANCELLED | OUTPATIENT
Start: 2018-01-23

## 2018-01-23 RX ORDER — PREDNISONE 20 MG/1
40 TABLET ORAL DAILY
Qty: 10 TABLET | Refills: 0 | Status: SHIPPED | OUTPATIENT
Start: 2018-01-23 | End: 2018-05-14

## 2018-01-23 RX ORDER — FLUTICASONE PROPIONATE 50 MCG
1 SPRAY, SUSPENSION (ML) NASAL DAILY
Qty: 1 BOTTLE | Refills: 0 | Status: SHIPPED | OUTPATIENT
Start: 2018-01-23 | End: 2018-05-14

## 2018-01-23 ASSESSMENT — ENCOUNTER SYMPTOMS
EYE DISCHARGE: 0
SHORTNESS OF BREATH: 0
SINUS PAIN: 1
DIARRHEA: 0
EYE REDNESS: 0
VOMITING: 0
COUGH: 1
SINUS PRESSURE: 1
RHINORRHEA: 1
WHEEZING: 0
SORE THROAT: 1

## 2018-01-23 NOTE — PROGRESS NOTES
[address]  [address] [facility] [address], [facility], [place]  Phone:  [phone]  Fax:  [phone]       Name: Zayda Baumann  : 1950    Chief Complaint   Patient presents with    Head Congestion     sinus drainage       Headache    Nasal Congestion       HPI:     Vicky Randhawa is a 68 yo female who presents today for follow-up of sinusitis. She was seen in office on 1/3/18 where she complained of nasal congestion and cough that began 10 days prior while in [place]. She was treated with Bactrim for acute sinusitis, but states that her symptoms did not improve so she took a z-rui which has not helped either. She's gone through a box of Claritin D and a box of decongestants, along with saline rinses and Flonase, none of which have provided significant relief. Sinusitis   This is a recurrent problem. The current episode started more than 1 month ago. The problem has been waxing and waning since onset. There has been no fever. Associated symptoms include congestion, coughing, headaches, sinus pressure and a sore throat. Pertinent negatives include no chills, ear pain or shortness of breath. Past treatments include oral decongestants, saline sprays, spray decongestants, antibiotics and acetaminophen. The treatment provided mild relief.        Current Outpatient Prescriptions:     predniSONE (DELTASONE) 20 MG tablet, Take 2 tablets by mouth daily, Disp: 10 tablet, Rfl: 0    fluticasone (FLONASE) 50 MCG/ACT nasal spray, 1 spray by Nasal route daily, Disp: 1 Bottle, Rfl: 0    doxycycline hyclate (VIBRA-TABS) 100 MG tablet, Take 1 tablet by mouth 2 times daily for 10 days, Disp: 20 tablet, Rfl: 0    acarbose (PRECOSE) 25 MG tablet, Take 1 tablet by mouth 3 times daily (with meals), Disp: 270 tablet, Rfl: 1    albuterol (PROVENTIL) (2.5 MG/3ML) 0.083% nebulizer solution, Take 3 mLs by nebulization every 4 hours as needed for Wheezing, Disp: 1 Package, Rfl: 1    SITagliptin (Jocelyn Zaina) by Melony Rice MD on 1/23/2018 at 9:12 AM

## 2018-05-14 ENCOUNTER — OFFICE VISIT (OUTPATIENT)
Dept: FAMILY MEDICINE CLINIC | Age: 68
End: 2018-05-14
Payer: MEDICARE

## 2018-05-14 VITALS
DIASTOLIC BLOOD PRESSURE: 72 MMHG | HEIGHT: 63 IN | HEART RATE: 72 BPM | WEIGHT: 185 LBS | SYSTOLIC BLOOD PRESSURE: 124 MMHG | BODY MASS INDEX: 32.78 KG/M2

## 2018-05-14 DIAGNOSIS — E11.65 TYPE 2 DIABETES MELLITUS WITH HYPERGLYCEMIA, WITHOUT LONG-TERM CURRENT USE OF INSULIN (HCC): Primary | ICD-10-CM

## 2018-05-14 DIAGNOSIS — E78.00 PURE HYPERCHOLESTEROLEMIA: ICD-10-CM

## 2018-05-14 DIAGNOSIS — E11.65 TYPE 2 DIABETES MELLITUS WITH HYPERGLYCEMIA, WITHOUT LONG-TERM CURRENT USE OF INSULIN (HCC): ICD-10-CM

## 2018-05-14 DIAGNOSIS — I10 ESSENTIAL HYPERTENSION: ICD-10-CM

## 2018-05-14 LAB
CREATININE URINE POCT: 50
HBA1C MFR BLD: 14 %
MICROALBUMIN/CREAT 24H UR: 30 MG/G{CREAT}
MICROALBUMIN/CREAT UR-RTO: NORMAL

## 2018-05-14 PROCEDURE — G8400 PT W/DXA NO RESULTS DOC: HCPCS | Performed by: FAMILY MEDICINE

## 2018-05-14 PROCEDURE — 3046F HEMOGLOBIN A1C LEVEL >9.0%: CPT | Performed by: FAMILY MEDICINE

## 2018-05-14 PROCEDURE — 83036 HEMOGLOBIN GLYCOSYLATED A1C: CPT | Performed by: FAMILY MEDICINE

## 2018-05-14 PROCEDURE — 1036F TOBACCO NON-USER: CPT | Performed by: FAMILY MEDICINE

## 2018-05-14 PROCEDURE — G8427 DOCREV CUR MEDS BY ELIG CLIN: HCPCS | Performed by: FAMILY MEDICINE

## 2018-05-14 PROCEDURE — 1090F PRES/ABSN URINE INCON ASSESS: CPT | Performed by: FAMILY MEDICINE

## 2018-05-14 PROCEDURE — 4040F PNEUMOC VAC/ADMIN/RCVD: CPT | Performed by: FAMILY MEDICINE

## 2018-05-14 PROCEDURE — 2022F DILAT RTA XM EVC RTNOPTHY: CPT | Performed by: FAMILY MEDICINE

## 2018-05-14 PROCEDURE — 1123F ACP DISCUSS/DSCN MKR DOCD: CPT | Performed by: FAMILY MEDICINE

## 2018-05-14 PROCEDURE — G8417 CALC BMI ABV UP PARAM F/U: HCPCS | Performed by: FAMILY MEDICINE

## 2018-05-14 PROCEDURE — 3017F COLORECTAL CA SCREEN DOC REV: CPT | Performed by: FAMILY MEDICINE

## 2018-05-14 PROCEDURE — 82044 UR ALBUMIN SEMIQUANTITATIVE: CPT | Performed by: FAMILY MEDICINE

## 2018-05-14 PROCEDURE — 99214 OFFICE O/P EST MOD 30 MIN: CPT | Performed by: FAMILY MEDICINE

## 2018-05-14 RX ORDER — BENAZEPRIL HYDROCHLORIDE 10 MG/1
10 TABLET ORAL DAILY
Qty: 90 TABLET | Refills: 1 | Status: SHIPPED | OUTPATIENT
Start: 2018-05-14 | End: 2018-11-05 | Stop reason: SDUPTHER

## 2018-05-14 RX ORDER — GLIPIZIDE 10 MG/1
10 TABLET ORAL
Qty: 180 TABLET | Refills: 1 | Status: ON HOLD | OUTPATIENT
Start: 2018-05-14 | End: 2018-05-21 | Stop reason: HOSPADM

## 2018-05-14 ASSESSMENT — ENCOUNTER SYMPTOMS
VOMITING: 0
SHORTNESS OF BREATH: 0
NAUSEA: 0
EYE REDNESS: 0
CHEST TIGHTNESS: 0
EYE DISCHARGE: 0
COLOR CHANGE: 0

## 2018-05-14 ASSESSMENT — PATIENT HEALTH QUESTIONNAIRE - PHQ9
1. LITTLE INTEREST OR PLEASURE IN DOING THINGS: 0
2. FEELING DOWN, DEPRESSED OR HOPELESS: 0
SUM OF ALL RESPONSES TO PHQ QUESTIONS 1-9: 0
SUM OF ALL RESPONSES TO PHQ9 QUESTIONS 1 & 2: 0

## 2018-05-18 ENCOUNTER — OFFICE VISIT (OUTPATIENT)
Dept: FAMILY MEDICINE CLINIC | Age: 68
End: 2018-05-18
Payer: MEDICARE

## 2018-05-18 ENCOUNTER — HOSPITAL ENCOUNTER (INPATIENT)
Age: 68
LOS: 3 days | Discharge: HOME | DRG: 639 | End: 2018-05-21
Attending: INTERNAL MEDICINE | Admitting: INTERNAL MEDICINE
Payer: MEDICARE

## 2018-05-18 VITALS
DIASTOLIC BLOOD PRESSURE: 62 MMHG | OXYGEN SATURATION: 98 % | WEIGHT: 178 LBS | BODY MASS INDEX: 31.54 KG/M2 | HEIGHT: 63 IN | SYSTOLIC BLOOD PRESSURE: 100 MMHG | HEART RATE: 60 BPM

## 2018-05-18 DIAGNOSIS — E11.65 TYPE 2 DIABETES MELLITUS WITH HYPERGLYCEMIA, WITHOUT LONG-TERM CURRENT USE OF INSULIN (HCC): ICD-10-CM

## 2018-05-18 DIAGNOSIS — R51.9 NEW ONSET HEADACHE: ICD-10-CM

## 2018-05-18 DIAGNOSIS — E11.10 DIABETIC KETOACIDOSIS WITHOUT COMA ASSOCIATED WITH TYPE 2 DIABETES MELLITUS (HCC): Primary | ICD-10-CM

## 2018-05-18 DIAGNOSIS — R53.83 OTHER FATIGUE: ICD-10-CM

## 2018-05-18 DIAGNOSIS — R06.02 SHORTNESS OF BREATH: ICD-10-CM

## 2018-05-18 DIAGNOSIS — R53.1 WEAKNESS GENERALIZED: Primary | ICD-10-CM

## 2018-05-18 LAB
ALBUMIN SERPL-MCNC: 3.8 G/DL (ref 3.5–5.1)
ALP BLD-CCNC: 59 U/L (ref 38–126)
ALT SERPL-CCNC: 24 U/L (ref 11–66)
ANION GAP SERPL CALCULATED.3IONS-SCNC: 13 MEQ/L (ref 8–16)
ANION GAP SERPL CALCULATED.3IONS-SCNC: 14 MEQ/L (ref 8–16)
AST SERPL-CCNC: 23 U/L (ref 5–40)
BACTERIA: ABNORMAL /HPF
BASE EXCESS MIXED: -1 MMOL/L (ref -2–3)
BASOPHILS # BLD: 0.4 %
BASOPHILS ABSOLUTE: 0 THOU/MM3 (ref 0–0.1)
BETA-HYDROXYBUTYRATE: 14.78 MG/DL (ref 0.2–2.81)
BILIRUB SERPL-MCNC: 1.4 MG/DL (ref 0.3–1.2)
BILIRUBIN DIRECT: < 0.2 MG/DL (ref 0–0.3)
BILIRUBIN URINE: NEGATIVE
BLOOD, URINE: NEGATIVE
BUN BLDV-MCNC: 21 MG/DL (ref 7–22)
BUN BLDV-MCNC: 24 MG/DL (ref 7–22)
CALCIUM SERPL-MCNC: 8.6 MG/DL (ref 8.5–10.5)
CALCIUM SERPL-MCNC: 8.8 MG/DL (ref 8.5–10.5)
CASTS 2: ABNORMAL /LPF
CASTS UA: ABNORMAL /LPF
CHARACTER, URINE: ABNORMAL
CHLORIDE BLD-SCNC: 100 MEQ/L (ref 98–111)
CHLORIDE BLD-SCNC: 98 MEQ/L (ref 98–111)
CO2: 22 MEQ/L (ref 23–33)
CO2: 24 MEQ/L (ref 23–33)
COLLECTED BY:: NORMAL
COLOR: YELLOW
CREAT SERPL-MCNC: 0.3 MG/DL (ref 0.4–1.2)
CREAT SERPL-MCNC: 0.3 MG/DL (ref 0.4–1.2)
CRYSTALS, UA: ABNORMAL
D-DIMER QUANTITATIVE: 302 NG/ML FEU (ref 0–500)
EKG ATRIAL RATE: 71 BPM
EKG ATRIAL RATE: 75 BPM
EKG P AXIS: 17 DEGREES
EKG P AXIS: 3 DEGREES
EKG P-R INTERVAL: 176 MS
EKG P-R INTERVAL: 190 MS
EKG Q-T INTERVAL: 382 MS
EKG Q-T INTERVAL: 394 MS
EKG QRS DURATION: 88 MS
EKG QRS DURATION: 92 MS
EKG QTC CALCULATION (BAZETT): 426 MS
EKG QTC CALCULATION (BAZETT): 428 MS
EKG R AXIS: 14 DEGREES
EKG R AXIS: 21 DEGREES
EKG T AXIS: 11 DEGREES
EKG T AXIS: 13 DEGREES
EKG VENTRICULAR RATE: 71 BPM
EKG VENTRICULAR RATE: 75 BPM
EOSINOPHIL # BLD: 2.5 %
EOSINOPHILS ABSOLUTE: 0.1 THOU/MM3 (ref 0–0.4)
EPITHELIAL CELLS, UA: ABNORMAL /HPF
GFR SERPL CREATININE-BSD FRML MDRD: > 90 ML/MIN/1.73M2
GFR SERPL CREATININE-BSD FRML MDRD: > 90 ML/MIN/1.73M2
GLUCOSE BLD-MCNC: 134 MG/DL (ref 70–108)
GLUCOSE BLD-MCNC: 165 MG/DL (ref 70–108)
GLUCOSE BLD-MCNC: 224 MG/DL (ref 70–108)
GLUCOSE BLD-MCNC: 228 MG/DL (ref 70–108)
GLUCOSE BLD-MCNC: 230 MG/DL (ref 70–108)
GLUCOSE BLD-MCNC: 237 MG/DL (ref 70–108)
GLUCOSE BLD-MCNC: 248 MG/DL (ref 70–108)
GLUCOSE BLD-MCNC: 259 MG/DL (ref 70–108)
GLUCOSE BLD-MCNC: 279 MG/DL (ref 70–108)
GLUCOSE BLD-MCNC: 356 MG/DL (ref 70–108)
GLUCOSE URINE: >= 1000 MG/DL
HCO3, MIXED: 26 MMOL/L (ref 23–28)
HCT VFR BLD CALC: 46.1 % (ref 37–47)
HEMOGLOBIN: 16.1 GM/DL (ref 12–16)
KETONES, URINE: 40
LEUKOCYTE ESTERASE, URINE: NEGATIVE
LIPASE: 28.9 U/L (ref 5.6–51.3)
LYMPHOCYTES # BLD: 32.5 %
LYMPHOCYTES ABSOLUTE: 1.7 THOU/MM3 (ref 1–4.8)
MAGNESIUM: 1.8 MG/DL (ref 1.6–2.4)
MCH RBC QN AUTO: 31.3 PG (ref 27–31)
MCHC RBC AUTO-ENTMCNC: 34.9 GM/DL (ref 33–37)
MCV RBC AUTO: 89.6 FL (ref 81–99)
MISCELLANEOUS 2: ABNORMAL
MONOCYTES # BLD: 8 %
MONOCYTES ABSOLUTE: 0.4 THOU/MM3 (ref 0.4–1.3)
NITRITE, URINE: NEGATIVE
NUCLEATED RED BLOOD CELLS: 0 /100 WBC
O2 SAT, MIXED: 49 %
OSMOLALITY CALCULATION: 286 MOSMOL/KG (ref 275–300)
PCO2, MIXED VENOUS: 49 MMHG (ref 41–51)
PDW BLD-RTO: 12.3 % (ref 11.5–14.5)
PH UA: 5.5
PH, MIXED: 7.33 (ref 7.31–7.41)
PHOSPHORUS: 3 MG/DL (ref 2.4–4.7)
PLATELET # BLD: 210 THOU/MM3 (ref 130–400)
PMV BLD AUTO: 7.7 FL (ref 7.4–10.4)
PO2 MIXED: 29 MMHG (ref 25–40)
POTASSIUM SERPL-SCNC: 4.2 MEQ/L (ref 3.5–5.2)
POTASSIUM SERPL-SCNC: 4.3 MEQ/L (ref 3.5–5.2)
PROTEIN UA: NEGATIVE
RBC # BLD: 5.15 MILL/MM3 (ref 4.2–5.4)
RBC URINE: ABNORMAL /HPF
RENAL EPITHELIAL, UA: ABNORMAL
SEG NEUTROPHILS: 56.6 %
SEGMENTED NEUTROPHILS ABSOLUTE COUNT: 2.9 THOU/MM3 (ref 1.8–7.7)
SODIUM BLD-SCNC: 135 MEQ/L (ref 135–145)
SODIUM BLD-SCNC: 136 MEQ/L (ref 135–145)
SPECIFIC GRAVITY, URINE: > 1.03 (ref 1–1.03)
TOTAL PROTEIN: 6.5 G/DL (ref 6.1–8)
TROPONIN T: < 0.01 NG/ML
TSH SERPL DL<=0.05 MIU/L-ACNC: 1.94 UIU/ML (ref 0.4–4.2)
UROBILINOGEN, URINE: 0.2 EU/DL
WBC # BLD: 5.1 THOU/MM3 (ref 4.8–10.8)
WBC UA: ABNORMAL /HPF
YEAST: ABNORMAL

## 2018-05-18 PROCEDURE — G8417 CALC BMI ABV UP PARAM F/U: HCPCS | Performed by: FAMILY MEDICINE

## 2018-05-18 PROCEDURE — 83690 ASSAY OF LIPASE: CPT

## 2018-05-18 PROCEDURE — 36415 COLL VENOUS BLD VENIPUNCTURE: CPT

## 2018-05-18 PROCEDURE — 1090F PRES/ABSN URINE INCON ASSESS: CPT | Performed by: FAMILY MEDICINE

## 2018-05-18 PROCEDURE — 2580000003 HC RX 258: Performed by: INTERNAL MEDICINE

## 2018-05-18 PROCEDURE — 1123F ACP DISCUSS/DSCN MKR DOCD: CPT | Performed by: FAMILY MEDICINE

## 2018-05-18 PROCEDURE — 80053 COMPREHEN METABOLIC PANEL: CPT

## 2018-05-18 PROCEDURE — 1200000003 HC TELEMETRY R&B

## 2018-05-18 PROCEDURE — 4040F PNEUMOC VAC/ADMIN/RCVD: CPT | Performed by: FAMILY MEDICINE

## 2018-05-18 PROCEDURE — 80048 BASIC METABOLIC PNL TOTAL CA: CPT

## 2018-05-18 PROCEDURE — 85025 COMPLETE CBC W/AUTO DIFF WBC: CPT

## 2018-05-18 PROCEDURE — G8428 CUR MEDS NOT DOCUMENT: HCPCS | Performed by: FAMILY MEDICINE

## 2018-05-18 PROCEDURE — 3046F HEMOGLOBIN A1C LEVEL >9.0%: CPT | Performed by: FAMILY MEDICINE

## 2018-05-18 PROCEDURE — 82948 REAGENT STRIP/BLOOD GLUCOSE: CPT

## 2018-05-18 PROCEDURE — 6370000000 HC RX 637 (ALT 250 FOR IP): Performed by: INTERNAL MEDICINE

## 2018-05-18 PROCEDURE — 82248 BILIRUBIN DIRECT: CPT

## 2018-05-18 PROCEDURE — 82010 KETONE BODYS QUAN: CPT

## 2018-05-18 PROCEDURE — 85379 FIBRIN DEGRADATION QUANT: CPT

## 2018-05-18 PROCEDURE — 6360000002 HC RX W HCPCS: Performed by: INTERNAL MEDICINE

## 2018-05-18 PROCEDURE — 84100 ASSAY OF PHOSPHORUS: CPT

## 2018-05-18 PROCEDURE — G8400 PT W/DXA NO RESULTS DOC: HCPCS | Performed by: FAMILY MEDICINE

## 2018-05-18 PROCEDURE — 93010 ELECTROCARDIOGRAM REPORT: CPT | Performed by: INTERNAL MEDICINE

## 2018-05-18 PROCEDURE — 93005 ELECTROCARDIOGRAM TRACING: CPT | Performed by: PHYSICIAN ASSISTANT

## 2018-05-18 PROCEDURE — 99285 EMERGENCY DEPT VISIT HI MDM: CPT

## 2018-05-18 PROCEDURE — 6370000000 HC RX 637 (ALT 250 FOR IP): Performed by: PHYSICIAN ASSISTANT

## 2018-05-18 PROCEDURE — 81001 URINALYSIS AUTO W/SCOPE: CPT

## 2018-05-18 PROCEDURE — 84443 ASSAY THYROID STIM HORMONE: CPT

## 2018-05-18 PROCEDURE — 84484 ASSAY OF TROPONIN QUANT: CPT

## 2018-05-18 PROCEDURE — 3017F COLORECTAL CA SCREEN DOC REV: CPT | Performed by: FAMILY MEDICINE

## 2018-05-18 PROCEDURE — 99214 OFFICE O/P EST MOD 30 MIN: CPT | Performed by: FAMILY MEDICINE

## 2018-05-18 PROCEDURE — 2022F DILAT RTA XM EVC RTNOPTHY: CPT | Performed by: FAMILY MEDICINE

## 2018-05-18 PROCEDURE — 1036F TOBACCO NON-USER: CPT | Performed by: FAMILY MEDICINE

## 2018-05-18 PROCEDURE — 83735 ASSAY OF MAGNESIUM: CPT

## 2018-05-18 PROCEDURE — 2580000003 HC RX 258: Performed by: PHYSICIAN ASSISTANT

## 2018-05-18 RX ORDER — LISINOPRIL 10 MG/1
10 TABLET ORAL DAILY
Status: DISCONTINUED | OUTPATIENT
Start: 2018-05-19 | End: 2018-05-21 | Stop reason: HOSPADM

## 2018-05-18 RX ORDER — DEXTROSE MONOHYDRATE 25 G/50ML
12.5 INJECTION, SOLUTION INTRAVENOUS PRN
Status: DISCONTINUED | OUTPATIENT
Start: 2018-05-18 | End: 2018-05-19 | Stop reason: SDUPTHER

## 2018-05-18 RX ORDER — POTASSIUM CHLORIDE 7.45 MG/ML
10 INJECTION INTRAVENOUS PRN
Status: DISCONTINUED | OUTPATIENT
Start: 2018-05-18 | End: 2018-05-21 | Stop reason: HOSPADM

## 2018-05-18 RX ORDER — IBUPROFEN 200 MG
400 TABLET ORAL ONCE
Status: COMPLETED | OUTPATIENT
Start: 2018-05-18 | End: 2018-05-18

## 2018-05-18 RX ORDER — SODIUM CHLORIDE 0.9 % (FLUSH) 0.9 %
10 SYRINGE (ML) INJECTION PRN
Status: DISCONTINUED | OUTPATIENT
Start: 2018-05-18 | End: 2018-05-21 | Stop reason: HOSPADM

## 2018-05-18 RX ORDER — 0.9 % SODIUM CHLORIDE 0.9 %
1000 INTRAVENOUS SOLUTION INTRAVENOUS ONCE
Status: COMPLETED | OUTPATIENT
Start: 2018-05-18 | End: 2018-05-18

## 2018-05-18 RX ORDER — LISINOPRIL 20 MG/1
20 TABLET ORAL DAILY
Status: DISCONTINUED | OUTPATIENT
Start: 2018-05-18 | End: 2018-05-18 | Stop reason: DRUGHIGH

## 2018-05-18 RX ORDER — SODIUM CHLORIDE 450 MG/100ML
INJECTION, SOLUTION INTRAVENOUS CONTINUOUS
Status: DISCONTINUED | OUTPATIENT
Start: 2018-05-18 | End: 2018-05-21

## 2018-05-18 RX ORDER — 0.9 % SODIUM CHLORIDE 0.9 %
500 INTRAVENOUS SOLUTION INTRAVENOUS ONCE
Status: COMPLETED | OUTPATIENT
Start: 2018-05-18 | End: 2018-05-18

## 2018-05-18 RX ORDER — DEXTROSE AND SODIUM CHLORIDE 5; .45 G/100ML; G/100ML
INJECTION, SOLUTION INTRAVENOUS CONTINUOUS PRN
Status: DISCONTINUED | OUTPATIENT
Start: 2018-05-18 | End: 2018-05-20

## 2018-05-18 RX ORDER — ACETAMINOPHEN 325 MG/1
650 TABLET ORAL EVERY 4 HOURS PRN
Status: DISCONTINUED | OUTPATIENT
Start: 2018-05-18 | End: 2018-05-21 | Stop reason: HOSPADM

## 2018-05-18 RX ORDER — SODIUM CHLORIDE 0.9 % (FLUSH) 0.9 %
10 SYRINGE (ML) INJECTION EVERY 12 HOURS SCHEDULED
Status: DISCONTINUED | OUTPATIENT
Start: 2018-05-18 | End: 2018-05-21 | Stop reason: HOSPADM

## 2018-05-18 RX ADMIN — ENOXAPARIN SODIUM 40 MG: 40 INJECTION SUBCUTANEOUS at 21:31

## 2018-05-18 RX ADMIN — SODIUM CHLORIDE 1000 ML: 9 INJECTION, SOLUTION INTRAVENOUS at 13:45

## 2018-05-18 RX ADMIN — SODIUM CHLORIDE 3.3 UNITS/HR: 9 INJECTION, SOLUTION INTRAVENOUS at 19:41

## 2018-05-18 RX ADMIN — SODIUM CHLORIDE 500 ML: 9 INJECTION, SOLUTION INTRAVENOUS at 17:13

## 2018-05-18 RX ADMIN — IBUPROFEN 400 MG: 200 TABLET, FILM COATED ORAL at 13:44

## 2018-05-18 RX ADMIN — DEXTROSE AND SODIUM CHLORIDE: 5; 450 INJECTION, SOLUTION INTRAVENOUS at 19:40

## 2018-05-18 ASSESSMENT — ENCOUNTER SYMPTOMS
NAUSEA: 0
RHINORRHEA: 0
CHEST TIGHTNESS: 0
SHORTNESS OF BREATH: 1
EYE REDNESS: 0
BACK PAIN: 0
SHORTNESS OF BREATH: 1
VOMITING: 0
ABDOMINAL PAIN: 0
COUGH: 0
DIARRHEA: 1
SORE THROAT: 0
COLOR CHANGE: 0
EYE PAIN: 0
NAUSEA: 0
EYE DISCHARGE: 0
EYE DISCHARGE: 0
WHEEZING: 0
VOMITING: 0

## 2018-05-18 ASSESSMENT — PAIN DESCRIPTION - DESCRIPTORS
DESCRIPTORS: ACHING
DESCRIPTORS: ACHING
DESCRIPTORS: HEADACHE

## 2018-05-18 ASSESSMENT — PAIN DESCRIPTION - PAIN TYPE
TYPE: ACUTE PAIN

## 2018-05-18 ASSESSMENT — PAIN SCALES - GENERAL
PAINLEVEL_OUTOF10: 4
PAINLEVEL_OUTOF10: 3
PAINLEVEL_OUTOF10: 1
PAINLEVEL_OUTOF10: 4
PAINLEVEL_OUTOF10: 4
PAINLEVEL_OUTOF10: 2

## 2018-05-18 ASSESSMENT — PAIN DESCRIPTION - LOCATION
LOCATION: HEAD

## 2018-05-18 ASSESSMENT — PAIN DESCRIPTION - PROGRESSION: CLINICAL_PROGRESSION: NOT CHANGED

## 2018-05-18 ASSESSMENT — PAIN DESCRIPTION - FREQUENCY
FREQUENCY: CONTINUOUS
FREQUENCY: CONTINUOUS

## 2018-05-18 ASSESSMENT — PAIN DESCRIPTION - ONSET: ONSET: ON-GOING

## 2018-05-19 LAB
ANION GAP SERPL CALCULATED.3IONS-SCNC: 11 MEQ/L (ref 8–16)
ANION GAP SERPL CALCULATED.3IONS-SCNC: 11 MEQ/L (ref 8–16)
ANION GAP SERPL CALCULATED.3IONS-SCNC: 12 MEQ/L (ref 8–16)
ANION GAP SERPL CALCULATED.3IONS-SCNC: 13 MEQ/L (ref 8–16)
ANION GAP SERPL CALCULATED.3IONS-SCNC: 13 MEQ/L (ref 8–16)
ANION GAP SERPL CALCULATED.3IONS-SCNC: 9 MEQ/L (ref 8–16)
BETA-HYDROXYBUTYRATE: 1.3 MG/DL (ref 0.2–2.81)
BUN BLDV-MCNC: 11 MG/DL (ref 7–22)
BUN BLDV-MCNC: 11 MG/DL (ref 7–22)
BUN BLDV-MCNC: 12 MG/DL (ref 7–22)
BUN BLDV-MCNC: 13 MG/DL (ref 7–22)
BUN BLDV-MCNC: 15 MG/DL (ref 7–22)
BUN BLDV-MCNC: 16 MG/DL (ref 7–22)
CALCIUM SERPL-MCNC: 8.2 MG/DL (ref 8.5–10.5)
CALCIUM SERPL-MCNC: 8.3 MG/DL (ref 8.5–10.5)
CALCIUM SERPL-MCNC: 8.4 MG/DL (ref 8.5–10.5)
CALCIUM SERPL-MCNC: 8.7 MG/DL (ref 8.5–10.5)
CALCIUM SERPL-MCNC: 8.8 MG/DL (ref 8.5–10.5)
CALCIUM SERPL-MCNC: 8.9 MG/DL (ref 8.5–10.5)
CHLORIDE BLD-SCNC: 101 MEQ/L (ref 98–111)
CHLORIDE BLD-SCNC: 102 MEQ/L (ref 98–111)
CHLORIDE BLD-SCNC: 104 MEQ/L (ref 98–111)
CHLORIDE BLD-SCNC: 104 MEQ/L (ref 98–111)
CHLORIDE BLD-SCNC: 105 MEQ/L (ref 98–111)
CHLORIDE BLD-SCNC: 106 MEQ/L (ref 98–111)
CO2: 21 MEQ/L (ref 23–33)
CO2: 23 MEQ/L (ref 23–33)
CO2: 24 MEQ/L (ref 23–33)
CO2: 25 MEQ/L (ref 23–33)
CREAT SERPL-MCNC: 0.3 MG/DL (ref 0.4–1.2)
CREAT SERPL-MCNC: 0.4 MG/DL (ref 0.4–1.2)
GFR SERPL CREATININE-BSD FRML MDRD: > 90 ML/MIN/1.73M2
GLUCOSE BLD-MCNC: 113 MG/DL (ref 70–108)
GLUCOSE BLD-MCNC: 121 MG/DL (ref 70–108)
GLUCOSE BLD-MCNC: 129 MG/DL (ref 70–108)
GLUCOSE BLD-MCNC: 131 MG/DL (ref 70–108)
GLUCOSE BLD-MCNC: 133 MG/DL (ref 70–108)
GLUCOSE BLD-MCNC: 137 MG/DL (ref 70–108)
GLUCOSE BLD-MCNC: 140 MG/DL (ref 70–108)
GLUCOSE BLD-MCNC: 150 MG/DL (ref 70–108)
GLUCOSE BLD-MCNC: 151 MG/DL (ref 70–108)
GLUCOSE BLD-MCNC: 153 MG/DL (ref 70–108)
GLUCOSE BLD-MCNC: 158 MG/DL (ref 70–108)
GLUCOSE BLD-MCNC: 159 MG/DL (ref 70–108)
GLUCOSE BLD-MCNC: 163 MG/DL (ref 70–108)
GLUCOSE BLD-MCNC: 166 MG/DL (ref 70–108)
GLUCOSE BLD-MCNC: 171 MG/DL (ref 70–108)
GLUCOSE BLD-MCNC: 174 MG/DL (ref 70–108)
GLUCOSE BLD-MCNC: 176 MG/DL (ref 70–108)
GLUCOSE BLD-MCNC: 178 MG/DL (ref 70–108)
GLUCOSE BLD-MCNC: 182 MG/DL (ref 70–108)
GLUCOSE BLD-MCNC: 186 MG/DL (ref 70–108)
GLUCOSE BLD-MCNC: 187 MG/DL (ref 70–108)
GLUCOSE BLD-MCNC: 200 MG/DL (ref 70–108)
GLUCOSE BLD-MCNC: 217 MG/DL (ref 70–108)
GLUCOSE BLD-MCNC: 237 MG/DL (ref 70–108)
GLUCOSE BLD-MCNC: 264 MG/DL (ref 70–108)
GLUCOSE BLD-MCNC: 285 MG/DL (ref 70–108)
MAGNESIUM: 1.7 MG/DL (ref 1.6–2.4)
MAGNESIUM: 1.8 MG/DL (ref 1.6–2.4)
MAGNESIUM: 1.9 MG/DL (ref 1.6–2.4)
PHOSPHORUS: 2.7 MG/DL (ref 2.4–4.7)
PHOSPHORUS: 2.9 MG/DL (ref 2.4–4.7)
PHOSPHORUS: 2.9 MG/DL (ref 2.4–4.7)
PHOSPHORUS: 3.1 MG/DL (ref 2.4–4.7)
PHOSPHORUS: 3.3 MG/DL (ref 2.4–4.7)
PHOSPHORUS: 3.4 MG/DL (ref 2.4–4.7)
POTASSIUM SERPL-SCNC: 3.3 MEQ/L (ref 3.5–5.2)
POTASSIUM SERPL-SCNC: 3.6 MEQ/L (ref 3.5–5.2)
POTASSIUM SERPL-SCNC: 3.7 MEQ/L (ref 3.5–5.2)
POTASSIUM SERPL-SCNC: 4 MEQ/L (ref 3.5–5.2)
POTASSIUM SERPL-SCNC: 4.2 MEQ/L (ref 3.5–5.2)
POTASSIUM SERPL-SCNC: 4.5 MEQ/L (ref 3.5–5.2)
SODIUM BLD-SCNC: 138 MEQ/L (ref 135–145)
SODIUM BLD-SCNC: 138 MEQ/L (ref 135–145)
SODIUM BLD-SCNC: 139 MEQ/L (ref 135–145)
SODIUM BLD-SCNC: 139 MEQ/L (ref 135–145)
SODIUM BLD-SCNC: 140 MEQ/L (ref 135–145)
SODIUM BLD-SCNC: 140 MEQ/L (ref 135–145)

## 2018-05-19 PROCEDURE — 6360000002 HC RX W HCPCS: Performed by: INTERNAL MEDICINE

## 2018-05-19 PROCEDURE — 82010 KETONE BODYS QUAN: CPT

## 2018-05-19 PROCEDURE — 1200000003 HC TELEMETRY R&B

## 2018-05-19 PROCEDURE — 82948 REAGENT STRIP/BLOOD GLUCOSE: CPT

## 2018-05-19 PROCEDURE — 6370000000 HC RX 637 (ALT 250 FOR IP): Performed by: INTERNAL MEDICINE

## 2018-05-19 PROCEDURE — 80048 BASIC METABOLIC PNL TOTAL CA: CPT

## 2018-05-19 PROCEDURE — 36415 COLL VENOUS BLD VENIPUNCTURE: CPT

## 2018-05-19 PROCEDURE — 84100 ASSAY OF PHOSPHORUS: CPT

## 2018-05-19 PROCEDURE — 2580000003 HC RX 258: Performed by: INTERNAL MEDICINE

## 2018-05-19 PROCEDURE — 83735 ASSAY OF MAGNESIUM: CPT

## 2018-05-19 RX ORDER — BUTALBITAL, ACETAMINOPHEN AND CAFFEINE 50; 325; 40 MG/1; MG/1; MG/1
1 TABLET ORAL EVERY 4 HOURS PRN
Status: DISCONTINUED | OUTPATIENT
Start: 2018-05-19 | End: 2018-05-21 | Stop reason: HOSPADM

## 2018-05-19 RX ORDER — DEXTROSE MONOHYDRATE 25 G/50ML
12.5 INJECTION, SOLUTION INTRAVENOUS PRN
Status: DISCONTINUED | OUTPATIENT
Start: 2018-05-19 | End: 2018-05-21 | Stop reason: HOSPADM

## 2018-05-19 RX ORDER — POTASSIUM CHLORIDE 7.45 MG/ML
10 INJECTION INTRAVENOUS
Status: COMPLETED | OUTPATIENT
Start: 2018-05-19 | End: 2018-05-19

## 2018-05-19 RX ORDER — DEXTROSE MONOHYDRATE 50 MG/ML
100 INJECTION, SOLUTION INTRAVENOUS PRN
Status: DISCONTINUED | OUTPATIENT
Start: 2018-05-19 | End: 2018-05-21 | Stop reason: HOSPADM

## 2018-05-19 RX ORDER — NICOTINE POLACRILEX 4 MG
15 LOZENGE BUCCAL PRN
Status: DISCONTINUED | OUTPATIENT
Start: 2018-05-19 | End: 2018-05-21 | Stop reason: HOSPADM

## 2018-05-19 RX ADMIN — ACETAMINOPHEN 650 MG: 325 TABLET ORAL at 08:27

## 2018-05-19 RX ADMIN — DEXTROSE AND SODIUM CHLORIDE: 5; 450 INJECTION, SOLUTION INTRAVENOUS at 16:40

## 2018-05-19 RX ADMIN — POTASSIUM CHLORIDE 10 MEQ: 10 INJECTION, SOLUTION INTRAVENOUS at 08:27

## 2018-05-19 RX ADMIN — BUTALBITAL, ACETAMINOPHEN, AND CAFFEINE 1 TABLET: 50; 325; 40 TABLET ORAL at 15:55

## 2018-05-19 RX ADMIN — ENOXAPARIN SODIUM 40 MG: 40 INJECTION SUBCUTANEOUS at 20:27

## 2018-05-19 RX ADMIN — ACETAMINOPHEN 650 MG: 325 TABLET ORAL at 03:22

## 2018-05-19 RX ADMIN — DEXTROSE AND SODIUM CHLORIDE: 5; 450 INJECTION, SOLUTION INTRAVENOUS at 23:41

## 2018-05-19 RX ADMIN — LISINOPRIL 10 MG: 10 TABLET ORAL at 08:27

## 2018-05-19 RX ADMIN — POTASSIUM CHLORIDE 10 MEQ: 10 INJECTION, SOLUTION INTRAVENOUS at 05:58

## 2018-05-19 RX ADMIN — DEXTROSE AND SODIUM CHLORIDE: 5; 450 INJECTION, SOLUTION INTRAVENOUS at 02:18

## 2018-05-19 RX ADMIN — POTASSIUM CHLORIDE 10 MEQ: 10 INJECTION, SOLUTION INTRAVENOUS at 03:19

## 2018-05-19 RX ADMIN — POTASSIUM CHLORIDE 10 MEQ: 10 INJECTION, SOLUTION INTRAVENOUS at 02:19

## 2018-05-19 ASSESSMENT — PAIN DESCRIPTION - PAIN TYPE: TYPE: ACUTE PAIN

## 2018-05-19 ASSESSMENT — PAIN SCALES - GENERAL
PAINLEVEL_OUTOF10: 4
PAINLEVEL_OUTOF10: 4
PAINLEVEL_OUTOF10: 2
PAINLEVEL_OUTOF10: 3
PAINLEVEL_OUTOF10: 3

## 2018-05-19 ASSESSMENT — PAIN DESCRIPTION - LOCATION: LOCATION: HEAD

## 2018-05-20 LAB
ANION GAP SERPL CALCULATED.3IONS-SCNC: 10 MEQ/L (ref 8–16)
ANION GAP SERPL CALCULATED.3IONS-SCNC: 12 MEQ/L (ref 8–16)
ANION GAP SERPL CALCULATED.3IONS-SCNC: 13 MEQ/L (ref 8–16)
ANION GAP SERPL CALCULATED.3IONS-SCNC: 14 MEQ/L (ref 8–16)
BUN BLDV-MCNC: 10 MG/DL (ref 7–22)
BUN BLDV-MCNC: 6 MG/DL (ref 7–22)
BUN BLDV-MCNC: 7 MG/DL (ref 7–22)
BUN BLDV-MCNC: 8 MG/DL (ref 7–22)
CALCIUM SERPL-MCNC: 8.4 MG/DL (ref 8.5–10.5)
CALCIUM SERPL-MCNC: 8.5 MG/DL (ref 8.5–10.5)
CALCIUM SERPL-MCNC: 8.5 MG/DL (ref 8.5–10.5)
CALCIUM SERPL-MCNC: 9.1 MG/DL (ref 8.5–10.5)
CHLORIDE BLD-SCNC: 101 MEQ/L (ref 98–111)
CHLORIDE BLD-SCNC: 104 MEQ/L (ref 98–111)
CHLORIDE BLD-SCNC: 107 MEQ/L (ref 98–111)
CHLORIDE BLD-SCNC: 109 MEQ/L (ref 98–111)
CO2: 20 MEQ/L (ref 23–33)
CO2: 22 MEQ/L (ref 23–33)
CO2: 23 MEQ/L (ref 23–33)
CO2: 24 MEQ/L (ref 23–33)
CREAT SERPL-MCNC: 0.3 MG/DL (ref 0.4–1.2)
CREAT SERPL-MCNC: 0.4 MG/DL (ref 0.4–1.2)
GFR SERPL CREATININE-BSD FRML MDRD: > 90 ML/MIN/1.73M2
GLUCOSE BLD-MCNC: 113 MG/DL (ref 70–108)
GLUCOSE BLD-MCNC: 114 MG/DL (ref 70–108)
GLUCOSE BLD-MCNC: 123 MG/DL (ref 70–108)
GLUCOSE BLD-MCNC: 131 MG/DL (ref 70–108)
GLUCOSE BLD-MCNC: 134 MG/DL (ref 70–108)
GLUCOSE BLD-MCNC: 141 MG/DL (ref 70–108)
GLUCOSE BLD-MCNC: 142 MG/DL (ref 70–108)
GLUCOSE BLD-MCNC: 155 MG/DL (ref 70–108)
GLUCOSE BLD-MCNC: 165 MG/DL (ref 70–108)
GLUCOSE BLD-MCNC: 165 MG/DL (ref 70–108)
GLUCOSE BLD-MCNC: 183 MG/DL (ref 70–108)
GLUCOSE BLD-MCNC: 187 MG/DL (ref 70–108)
GLUCOSE BLD-MCNC: 202 MG/DL (ref 70–108)
GLUCOSE BLD-MCNC: 213 MG/DL (ref 70–108)
GLUCOSE BLD-MCNC: 215 MG/DL (ref 70–108)
GLUCOSE BLD-MCNC: 293 MG/DL (ref 70–108)
GLUCOSE BLD-MCNC: 98 MG/DL (ref 70–108)
MAGNESIUM: 1.6 MG/DL (ref 1.6–2.4)
MAGNESIUM: 1.7 MG/DL (ref 1.6–2.4)
MAGNESIUM: 1.8 MG/DL (ref 1.6–2.4)
PHOSPHORUS: 2.6 MG/DL (ref 2.4–4.7)
PHOSPHORUS: 3.2 MG/DL (ref 2.4–4.7)
PHOSPHORUS: 3.2 MG/DL (ref 2.4–4.7)
PHOSPHORUS: 3.4 MG/DL (ref 2.4–4.7)
PHOSPHORUS: 3.6 MG/DL (ref 2.4–4.7)
POTASSIUM SERPL-SCNC: 3.6 MEQ/L (ref 3.5–5.2)
POTASSIUM SERPL-SCNC: 3.6 MEQ/L (ref 3.5–5.2)
POTASSIUM SERPL-SCNC: 3.8 MEQ/L (ref 3.5–5.2)
POTASSIUM SERPL-SCNC: 4.3 MEQ/L (ref 3.5–5.2)
SODIUM BLD-SCNC: 138 MEQ/L (ref 135–145)
SODIUM BLD-SCNC: 140 MEQ/L (ref 135–145)
SODIUM BLD-SCNC: 140 MEQ/L (ref 135–145)
SODIUM BLD-SCNC: 141 MEQ/L (ref 135–145)

## 2018-05-20 PROCEDURE — 83735 ASSAY OF MAGNESIUM: CPT

## 2018-05-20 PROCEDURE — 6370000000 HC RX 637 (ALT 250 FOR IP): Performed by: INTERNAL MEDICINE

## 2018-05-20 PROCEDURE — 2580000003 HC RX 258: Performed by: INTERNAL MEDICINE

## 2018-05-20 PROCEDURE — 1200000003 HC TELEMETRY R&B

## 2018-05-20 PROCEDURE — 84100 ASSAY OF PHOSPHORUS: CPT

## 2018-05-20 PROCEDURE — 80048 BASIC METABOLIC PNL TOTAL CA: CPT

## 2018-05-20 PROCEDURE — 6360000002 HC RX W HCPCS: Performed by: INTERNAL MEDICINE

## 2018-05-20 PROCEDURE — 82948 REAGENT STRIP/BLOOD GLUCOSE: CPT

## 2018-05-20 PROCEDURE — 36415 COLL VENOUS BLD VENIPUNCTURE: CPT

## 2018-05-20 RX ORDER — POTASSIUM CHLORIDE 7.45 MG/ML
10 INJECTION INTRAVENOUS
Status: COMPLETED | OUTPATIENT
Start: 2018-05-20 | End: 2018-05-20

## 2018-05-20 RX ORDER — INSULIN GLARGINE 100 [IU]/ML
25 INJECTION, SOLUTION SUBCUTANEOUS ONCE
Status: COMPLETED | OUTPATIENT
Start: 2018-05-20 | End: 2018-05-20

## 2018-05-20 RX ORDER — POTASSIUM CHLORIDE 7.45 MG/ML
10 INJECTION INTRAVENOUS
Status: DISCONTINUED | OUTPATIENT
Start: 2018-05-20 | End: 2018-05-20

## 2018-05-20 RX ORDER — MAGNESIUM SULFATE IN WATER 40 MG/ML
2 INJECTION, SOLUTION INTRAVENOUS ONCE
Status: DISCONTINUED | OUTPATIENT
Start: 2018-05-20 | End: 2018-05-21 | Stop reason: HOSPADM

## 2018-05-20 RX ADMIN — Medication 4 UNITS: at 17:52

## 2018-05-20 RX ADMIN — INSULIN GLARGINE 25 UNITS: 100 INJECTION, SOLUTION SUBCUTANEOUS at 13:24

## 2018-05-20 RX ADMIN — Medication 10 ML: at 20:20

## 2018-05-20 RX ADMIN — POTASSIUM CHLORIDE 10 MEQ: 7.46 INJECTION, SOLUTION INTRAVENOUS at 08:10

## 2018-05-20 RX ADMIN — BUTALBITAL, ACETAMINOPHEN, AND CAFFEINE 1 TABLET: 50; 325; 40 TABLET ORAL at 08:37

## 2018-05-20 RX ADMIN — BUTALBITAL, ACETAMINOPHEN, AND CAFFEINE 1 TABLET: 50; 325; 40 TABLET ORAL at 12:51

## 2018-05-20 RX ADMIN — POTASSIUM CHLORIDE 10 MEQ: 7.46 INJECTION, SOLUTION INTRAVENOUS at 05:26

## 2018-05-20 RX ADMIN — INSULIN LISPRO 3 UNITS: 100 INJECTION, SOLUTION INTRAVENOUS; SUBCUTANEOUS at 20:20

## 2018-05-20 RX ADMIN — ENOXAPARIN SODIUM 40 MG: 40 INJECTION SUBCUTANEOUS at 20:20

## 2018-05-20 RX ADMIN — DEXTROSE AND SODIUM CHLORIDE: 5; 450 INJECTION, SOLUTION INTRAVENOUS at 06:27

## 2018-05-20 RX ADMIN — SODIUM CHLORIDE 4.1 UNITS/HR: 9 INJECTION, SOLUTION INTRAVENOUS at 02:53

## 2018-05-20 RX ADMIN — POTASSIUM CHLORIDE 10 MEQ: 10 INJECTION, SOLUTION INTRAVENOUS at 02:53

## 2018-05-20 RX ADMIN — LISINOPRIL 10 MG: 10 TABLET ORAL at 08:37

## 2018-05-20 ASSESSMENT — PAIN DESCRIPTION - LOCATION: LOCATION: HEAD

## 2018-05-20 ASSESSMENT — PAIN SCALES - GENERAL
PAINLEVEL_OUTOF10: 3
PAINLEVEL_OUTOF10: 3
PAINLEVEL_OUTOF10: 4

## 2018-05-20 ASSESSMENT — PAIN DESCRIPTION - DESCRIPTORS: DESCRIPTORS: HEADACHE

## 2018-05-20 ASSESSMENT — PAIN DESCRIPTION - PAIN TYPE: TYPE: ACUTE PAIN

## 2018-05-21 ENCOUNTER — TELEPHONE (OUTPATIENT)
Dept: FAMILY MEDICINE CLINIC | Age: 68
End: 2018-05-21

## 2018-05-21 VITALS
TEMPERATURE: 97.9 F | BODY MASS INDEX: 31.91 KG/M2 | HEIGHT: 63 IN | SYSTOLIC BLOOD PRESSURE: 145 MMHG | HEART RATE: 74 BPM | RESPIRATION RATE: 16 BRPM | DIASTOLIC BLOOD PRESSURE: 88 MMHG | WEIGHT: 180.1 LBS | OXYGEN SATURATION: 98 %

## 2018-05-21 LAB
ANION GAP SERPL CALCULATED.3IONS-SCNC: 11 MEQ/L (ref 8–16)
BUN BLDV-MCNC: 12 MG/DL (ref 7–22)
CALCIUM SERPL-MCNC: 8.7 MG/DL (ref 8.5–10.5)
CHLORIDE BLD-SCNC: 107 MEQ/L (ref 98–111)
CO2: 22 MEQ/L (ref 23–33)
CREAT SERPL-MCNC: 0.4 MG/DL (ref 0.4–1.2)
GFR SERPL CREATININE-BSD FRML MDRD: > 90 ML/MIN/1.73M2
GLUCOSE BLD-MCNC: 214 MG/DL (ref 70–108)
GLUCOSE BLD-MCNC: 217 MG/DL (ref 70–108)
GLUCOSE BLD-MCNC: 219 MG/DL (ref 70–108)
GLUCOSE BLD-MCNC: 230 MG/DL (ref 70–108)
MAGNESIUM: 1.8 MG/DL (ref 1.6–2.4)
PHOSPHORUS: 3.9 MG/DL (ref 2.4–4.7)
POTASSIUM SERPL-SCNC: 4.1 MEQ/L (ref 3.5–5.2)
SODIUM BLD-SCNC: 140 MEQ/L (ref 135–145)

## 2018-05-21 PROCEDURE — 83735 ASSAY OF MAGNESIUM: CPT

## 2018-05-21 PROCEDURE — 80048 BASIC METABOLIC PNL TOTAL CA: CPT

## 2018-05-21 PROCEDURE — 2580000003 HC RX 258: Performed by: INTERNAL MEDICINE

## 2018-05-21 PROCEDURE — 36415 COLL VENOUS BLD VENIPUNCTURE: CPT

## 2018-05-21 PROCEDURE — 84100 ASSAY OF PHOSPHORUS: CPT

## 2018-05-21 PROCEDURE — 6370000000 HC RX 637 (ALT 250 FOR IP): Performed by: INTERNAL MEDICINE

## 2018-05-21 PROCEDURE — 82948 REAGENT STRIP/BLOOD GLUCOSE: CPT

## 2018-05-21 RX ORDER — INSULIN GLARGINE 100 [IU]/ML
30 INJECTION, SOLUTION SUBCUTANEOUS ONCE
Status: COMPLETED | OUTPATIENT
Start: 2018-05-21 | End: 2018-05-21

## 2018-05-21 RX ADMIN — Medication 4 UNITS: at 09:40

## 2018-05-21 RX ADMIN — Medication 4 UNITS: at 16:21

## 2018-05-21 RX ADMIN — Medication 4 UNITS: at 11:38

## 2018-05-21 RX ADMIN — INSULIN GLARGINE 30 UNITS: 100 INJECTION, SOLUTION SUBCUTANEOUS at 11:37

## 2018-05-21 RX ADMIN — LISINOPRIL 10 MG: 10 TABLET ORAL at 09:45

## 2018-05-21 RX ADMIN — Medication 10 ML: at 09:46

## 2018-05-21 ASSESSMENT — PAIN SCALES - GENERAL: PAINLEVEL_OUTOF10: 0

## 2018-05-22 ENCOUNTER — TELEPHONE (OUTPATIENT)
Dept: FAMILY MEDICINE CLINIC | Age: 68
End: 2018-05-22

## 2018-05-31 ENCOUNTER — OFFICE VISIT (OUTPATIENT)
Dept: INTERNAL MEDICINE CLINIC | Age: 68
End: 2018-05-31
Payer: MEDICARE

## 2018-05-31 VITALS
WEIGHT: 184.8 LBS | HEIGHT: 63 IN | SYSTOLIC BLOOD PRESSURE: 128 MMHG | BODY MASS INDEX: 32.74 KG/M2 | DIASTOLIC BLOOD PRESSURE: 78 MMHG

## 2018-05-31 DIAGNOSIS — Z79.4 TYPE 2 DIABETES MELLITUS WITHOUT COMPLICATION, WITH LONG-TERM CURRENT USE OF INSULIN (HCC): ICD-10-CM

## 2018-05-31 DIAGNOSIS — E11.9 TYPE 2 DIABETES MELLITUS WITHOUT COMPLICATION, WITH LONG-TERM CURRENT USE OF INSULIN (HCC): ICD-10-CM

## 2018-05-31 PROCEDURE — 99999 PR OFFICE/OUTPT VISIT,PROCEDURE ONLY: CPT | Performed by: NURSE PRACTITIONER

## 2018-05-31 PROCEDURE — G0108 DIAB MANAGE TRN  PER INDIV: HCPCS | Performed by: NURSE PRACTITIONER

## 2018-06-09 RX ORDER — PEN NEEDLE, DIABETIC 31 GX5/16"
1 NEEDLE, DISPOSABLE MISCELLANEOUS DAILY
Qty: 100 EACH | Refills: 3 | Status: SHIPPED | OUTPATIENT
Start: 2018-06-09 | End: 2020-12-16

## 2018-06-11 ENCOUNTER — OFFICE VISIT (OUTPATIENT)
Dept: INTERNAL MEDICINE CLINIC | Age: 68
End: 2018-06-11
Payer: MEDICARE

## 2018-06-11 DIAGNOSIS — E11.9 TYPE 2 DIABETES MELLITUS WITHOUT COMPLICATION, WITHOUT LONG-TERM CURRENT USE OF INSULIN (HCC): ICD-10-CM

## 2018-06-11 PROCEDURE — 99999 PR OFFICE/OUTPT VISIT,PROCEDURE ONLY: CPT | Performed by: DIETITIAN, REGISTERED

## 2018-06-11 PROCEDURE — 97804 MEDICAL NUTRITION GROUP: CPT | Performed by: DIETITIAN, REGISTERED

## 2018-06-28 ENCOUNTER — HOSPITAL ENCOUNTER (OUTPATIENT)
Dept: MRI IMAGING | Age: 68
Discharge: HOME | End: 2018-06-28
Payer: MEDICARE

## 2018-06-28 DIAGNOSIS — H53.2 DIPLOPIA: ICD-10-CM

## 2018-06-28 PROCEDURE — 70553 MRI BRAIN STEM W/O & W/DYE: CPT

## 2018-06-28 PROCEDURE — 6360000004 HC RX CONTRAST MEDICATION: Performed by: OPHTHALMOLOGY

## 2018-06-28 PROCEDURE — A9579 GAD-BASE MR CONTRAST NOS,1ML: HCPCS | Performed by: OPHTHALMOLOGY

## 2018-06-28 RX ADMIN — GADOTERIDOL 15 ML: 279.3 INJECTION, SOLUTION INTRAVENOUS at 08:57

## 2018-07-03 ENCOUNTER — OFFICE VISIT (OUTPATIENT)
Dept: FAMILY MEDICINE CLINIC | Age: 68
End: 2018-07-03
Payer: MEDICARE

## 2018-07-03 VITALS
WEIGHT: 183 LBS | TEMPERATURE: 98.5 F | DIASTOLIC BLOOD PRESSURE: 68 MMHG | SYSTOLIC BLOOD PRESSURE: 124 MMHG | BODY MASS INDEX: 32.43 KG/M2 | HEART RATE: 70 BPM | HEIGHT: 63 IN

## 2018-07-03 DIAGNOSIS — N30.01 ACUTE CYSTITIS WITH HEMATURIA: Primary | ICD-10-CM

## 2018-07-03 DIAGNOSIS — R30.0 DYSURIA: ICD-10-CM

## 2018-07-03 LAB
BILIRUBIN, POC: NEGATIVE
BLOOD URINE, POC: NORMAL
CLARITY, POC: NORMAL
COLOR, POC: YELLOW
GLUCOSE URINE, POC: NEGATIVE
KETONES, POC: NORMAL
LEUKOCYTE EST, POC: NORMAL
NITRITE, POC: NEGATIVE
PH, POC: 5
PROTEIN, POC: NEGATIVE
SPECIFIC GRAVITY, POC: 1.02
UROBILINOGEN, POC: NORMAL

## 2018-07-03 PROCEDURE — 81003 URINALYSIS AUTO W/O SCOPE: CPT | Performed by: FAMILY MEDICINE

## 2018-07-03 PROCEDURE — 1123F ACP DISCUSS/DSCN MKR DOCD: CPT | Performed by: FAMILY MEDICINE

## 2018-07-03 PROCEDURE — G8417 CALC BMI ABV UP PARAM F/U: HCPCS | Performed by: FAMILY MEDICINE

## 2018-07-03 PROCEDURE — 1036F TOBACCO NON-USER: CPT | Performed by: FAMILY MEDICINE

## 2018-07-03 PROCEDURE — G8400 PT W/DXA NO RESULTS DOC: HCPCS | Performed by: FAMILY MEDICINE

## 2018-07-03 PROCEDURE — 4040F PNEUMOC VAC/ADMIN/RCVD: CPT | Performed by: FAMILY MEDICINE

## 2018-07-03 PROCEDURE — 99213 OFFICE O/P EST LOW 20 MIN: CPT | Performed by: FAMILY MEDICINE

## 2018-07-03 PROCEDURE — 3017F COLORECTAL CA SCREEN DOC REV: CPT | Performed by: FAMILY MEDICINE

## 2018-07-03 PROCEDURE — 1090F PRES/ABSN URINE INCON ASSESS: CPT | Performed by: FAMILY MEDICINE

## 2018-07-03 PROCEDURE — G8427 DOCREV CUR MEDS BY ELIG CLIN: HCPCS | Performed by: FAMILY MEDICINE

## 2018-07-03 RX ORDER — CEFDINIR 300 MG/1
300 CAPSULE ORAL 2 TIMES DAILY
Qty: 14 CAPSULE | Refills: 0 | Status: SHIPPED | OUTPATIENT
Start: 2018-07-03 | End: 2018-07-05 | Stop reason: ALTCHOICE

## 2018-07-03 ASSESSMENT — ENCOUNTER SYMPTOMS
DIARRHEA: 1
SHORTNESS OF BREATH: 0
COUGH: 0
NAUSEA: 0
ABDOMINAL PAIN: 0

## 2018-07-03 NOTE — PATIENT INSTRUCTIONS
Patient Education        Urinary Tract Infection in Women: Care Instructions  Your Care Instructions    A urinary tract infection, or UTI, is a general term for an infection anywhere between the kidneys and the urethra (where urine comes out). Most UTIs are bladder infections. They often cause pain or burning when you urinate. UTIs are caused by bacteria and can be cured with antibiotics. Be sure to complete your treatment so that the infection goes away. Follow-up care is a key part of your treatment and safety. Be sure to make and go to all appointments, and call your doctor if you are having problems. It's also a good idea to know your test results and keep a list of the medicines you take. How can you care for yourself at home? · Take your antibiotics as directed. Do not stop taking them just because you feel better. You need to take the full course of antibiotics. · Drink extra water and other fluids for the next day or two. This may help wash out the bacteria that are causing the infection. (If you have kidney, heart, or liver disease and have to limit fluids, talk with your doctor before you increase your fluid intake.)  · Avoid drinks that are carbonated or have caffeine. They can irritate the bladder. · Urinate often. Try to empty your bladder each time. · To relieve pain, take a hot bath or lay a heating pad set on low over your lower belly or genital area. Never go to sleep with a heating pad in place. To prevent UTIs  · Drink plenty of water each day. This helps you urinate often, which clears bacteria from your system. (If you have kidney, heart, or liver disease and have to limit fluids, talk with your doctor before you increase your fluid intake.)  · Urinate when you need to. · Urinate right after you have sex. · Change sanitary pads often. · Avoid douches, bubble baths, feminine hygiene sprays, and other feminine hygiene products that have deodorants.   · After going to the bathroom, wipe from front to back. When should you call for help? Call your doctor now or seek immediate medical care if:    · Symptoms such as fever, chills, nausea, or vomiting get worse or appear for the first time.     · You have new pain in your back just below your rib cage. This is called flank pain.     · There is new blood or pus in your urine.     · You have any problems with your antibiotic medicine.    Watch closely for changes in your health, and be sure to contact your doctor if:    · You are not getting better after taking an antibiotic for 2 days.     · Your symptoms go away but then come back. Where can you learn more? Go to https://HeyCrowdpeLet's Tal[employer]b.Hippo Manager Software. org and sign in to your Retail Solutions account. Enter W700 in the Virtual Fairground box to learn more about \"Urinary Tract Infection in Women: Care Instructions. \"     If you do not have an account, please click on the \"Sign Up Now\" link. Current as of: May 12, 2017  Content Version: 11.6  © 1034-1029 [employer], Incorporated. Care instructions adapted under license by [facility] ([facility]). If you have questions about a medical condition or this instruction, always ask your healthcare professional. [place] [address] any warranty or liability for your use of this information.

## 2018-07-03 NOTE — PROGRESS NOTES
[facility] PROFESSIONAL SERVS  [facility]  [address] [address]  Dept: [phone]  Dept Fax: [phone]  Loc: [phone]  PROGRESS NOTE      Visit Date: 7/3/2018    Sheree Meraz is a 77 y.o. female who presents today for:  Chief Complaint   Patient presents with    Urinary Tract Infection       Subjective:  HPI    Dysuria and urinary frequency. Has urgency or urination. Symptoms started last week and then improved and then worsened yesterday. Hx of multiple UTIs. Denies hematuria. She had recent contrast dye and subsequent diarrhea. Review of Systems   Constitutional: Negative for chills and fever. Past Medical History:   Diagnosis Date    Abdominal aortic aneurysm (AAA) >39 mm diameter (HCC)     found in 2015    Arthritis     Asthma     Brain cyst     benign    Chronic sinus complaints     Diverticulosis of colon     Elevated TSH     Polyneuropathy (HCC)     Type 2 diabetes mellitus (HCC) 1990's      Current Outpatient Prescriptions   Medication Sig Dispense Refill    Insulin Pen Needle (PEN NEEDLES 31GX5/16\") 31G X 8 MM MISC 1 each by Does not apply route daily 100 each 3    insulin glargine (LANTUS) 100 UNIT/ML injection pen Inject 30 Units into the skin nightly 5 pen 3    insulin aspart (NOVOLOG FLEXPEN) 100 UNIT/ML injection pen Inject 0-12 Units into the skin 3 times daily (before meals) Per Medium Sliding Scale (Patient taking differently: Inject 0-12 Units into the skin 3 times daily (before meals) Per group 2 Sliding Scale) 5 pen 3    Mag Aspart-Potassium Aspart (RA POTASSIUM/MAGNESIUM PO) Take by mouth 2 times daily      glucose blood VI test strips (ASCENSIA AUTODISC VI;ONE TOUCH ULTRA TEST VI) strip Advocate Redi-Code test strips. Tests once daily.  100 each 3    benazepril (LOTENSIN) 10 MG tablet Take 1 tablet by mouth daily 90 tablet 1    albuterol (PROVENTIL) (2.5 MG/3ML) 0.083% nebulizer solution Take 3 mLs by nebulization every 4 hours as needed for Wheezing 1 Package 1    NONFORMULARY Long citrate/mg/zinc      NONFORMULARY isagenix product b      NONFORMULARY Take 1 tablet by mouth daily isagenix natural accelerator dietary supplement      therapeutic multivitamin-minerals (THERAGRAN-M) tablet Take 1 tablet by mouth daily. No current facility-administered medications for this visit. Allergies   Allergen Reactions    Contrast [Iodides] Hives, Diarrhea, Nausea And Vomiting and Other (See Comments)     Had  MRI with contrast also caused headaches    Amoxicillin     Donnatal [Pb-Hyoscy-Atropine-Scopol Er] Other (See Comments)     palpitations    Lovastatin Other (See Comments)     myalgia    Metformin And Related Diarrhea    Vioxx     Dilaudid [Hydromorphone Hcl] Nausea And Vomiting    Fentanyl Nausea And Vomiting     Severe Nausea and vomitting       Objective:     /68 (Site: Left Arm, Position: Sitting, Cuff Size: Large Adult)   Pulse 70   Temp 98.5 °F (36.9 °C) (Oral)   Ht 5' 3\" (1.6 m)   Wt 183 lb (83 kg)   BMI 32.42 kg/m²   Physical Exam   Constitutional: She is oriented to person, place, and time. She appears well-developed and well-nourished. Pulmonary/Chest: Effort normal and breath sounds normal. No respiratory distress. She has no wheezes. Abdominal: Soft. There is no tenderness. There is no CVA tenderness. Neurological: She is alert and oriented to person, place, and time. Psychiatric: She has a normal mood and affect. Her behavior is normal.   Vitals reviewed. Results for Joceline DASH (MRN [id number]) as of 7/3/2018 10:04   Ref.  Range 7/3/2018 09:54   Protein, UA Unknown negative   Color, UA Unknown yellow   Clarity, UA Unknown sl cloudy   Glucose, UA POC Unknown negative   Bilirubin, UA Unknown negative   Ketones, UA Unknown trace   Spec Grav, UA Unknown 1.025   pH, UA Unknown 5.0   Urobilinogen, UA Unknown 0.2 EU/dl   Nitrite, UA Unknown negative   Leukocytes,

## 2018-07-03 NOTE — PROGRESS NOTES
Subjective:      Patient ID: Fanny Brito is a 77 y.o. female. Physician Assistant Student Note     Urinary Tract Infection     Associated symptoms include frequency and urgency. Pertinent negatives include no chills, hematuria or nausea. HPI:   Urinary: dysuria and increased frequency and urgency of urination. Reports having it last week and symptoms went away. Then yesterday the symptoms returned. She reports having multiple episodes of diarrhea after brain MRI with contrast and believes that is the cause of the symptoms. The diarrhea is resolved. Reports having headache on Friday which is now resolved. History of multiple UTIs. Review of Systems   Constitutional: Negative for chills and fever. Respiratory: Negative for cough and shortness of breath. Gastrointestinal: Positive for diarrhea. Negative for abdominal pain and nausea. Genitourinary: Positive for dysuria, frequency and urgency. Negative for hematuria and vaginal discharge. Objective:   Physical Exam   Constitutional: She is oriented to person, place, and time. She appears well-developed and well-nourished. Cardiovascular: Normal rate and regular rhythm. Abdominal: Soft. There is no tenderness. Neurological: She is alert and oriented to person, place, and time. No CVA tenderness      Results for RAEANN DASH (MRN [id number]) as of 7/3/2018 10:17   Ref.  Range 7/3/2018 09:54   Protein, UA Unknown negative   Color, UA Unknown yellow   Clarity, UA Unknown sl cloudy   Glucose, UA POC Unknown negative   Bilirubin, UA Unknown negative   Ketones, UA Unknown trace   Spec Grav, UA Unknown 1.025   pH, UA Unknown 5.0   Urobilinogen, UA Unknown 0.2 EU/dl   Nitrite, UA Unknown negative   Leukocytes, UA Unknown small   Blood, UA POC Unknown small     Assessment:      Acute cystitis with hematuria  -will culture urine and consider antibiotic change if culture shows resistance       Plan:      -Begin Omnicef 300 mg twice daily   -Obtain urine culture  -Return if symptoms worsen or do not improve

## 2018-07-05 ENCOUNTER — TELEPHONE (OUTPATIENT)
Dept: FAMILY MEDICINE CLINIC | Age: 68
End: 2018-07-05

## 2018-07-05 DIAGNOSIS — N30.01 ACUTE CYSTITIS WITH HEMATURIA: Primary | ICD-10-CM

## 2018-07-05 LAB
ORGANISM: ABNORMAL
URINE CULTURE, ROUTINE: ABNORMAL

## 2018-07-05 RX ORDER — SULFAMETHOXAZOLE AND TRIMETHOPRIM 800; 160 MG/1; MG/1
1 TABLET ORAL 2 TIMES DAILY
Qty: 14 TABLET | Refills: 0 | Status: SHIPPED | OUTPATIENT
Start: 2018-07-05 | End: 2018-07-12

## 2018-07-23 ENCOUNTER — OFFICE VISIT (OUTPATIENT)
Dept: INTERNAL MEDICINE CLINIC | Age: 68
End: 2018-07-23
Payer: MEDICARE

## 2018-07-23 VITALS — BODY MASS INDEX: 33.49 KG/M2 | WEIGHT: 189 LBS | HEIGHT: 63 IN

## 2018-07-23 DIAGNOSIS — E11.9 TYPE 2 DIABETES MELLITUS WITHOUT COMPLICATION, WITHOUT LONG-TERM CURRENT USE OF INSULIN (HCC): Primary | ICD-10-CM

## 2018-07-23 PROCEDURE — 97802 MEDICAL NUTRITION INDIV IN: CPT | Performed by: DIETITIAN, REGISTERED

## 2018-07-23 PROCEDURE — 99999 PR OFFICE/OUTPT VISIT,PROCEDURE ONLY: CPT | Performed by: DIETITIAN, REGISTERED

## 2018-07-23 NOTE — PROGRESS NOTES
verified using teachback method. Monitoring/Evaluation:   -Followup visit: 12 weeks with RN/Diabetes Educator.   -Receptiveness to education/goals: Agreeable.  -Evaluation of education: Indicates understanding.  -Readiness to change: action - ready to set action plan and implement diet and exercise changes. -Expected compliance: good. Thank you for your referral of this patient. Total time involved in direct patient education: 60 minutes for initial MNT visit.

## 2018-09-07 ENCOUNTER — HOSPITAL ENCOUNTER (OUTPATIENT)
Dept: MAMMOGRAPHY | Age: 68
Discharge: HOME | End: 2018-09-07
Payer: MEDICARE

## 2018-09-07 DIAGNOSIS — Z12.39 SCREENING BREAST EXAMINATION: ICD-10-CM

## 2018-09-07 LAB
AVERAGE GLUCOSE: 174 MG/DL (ref 70–126)
HBA1C MFR BLD: 7.8 % (ref 4.4–6.4)

## 2018-09-07 PROCEDURE — 83036 HEMOGLOBIN GLYCOSYLATED A1C: CPT

## 2018-09-07 PROCEDURE — 36415 COLL VENOUS BLD VENIPUNCTURE: CPT

## 2018-09-07 PROCEDURE — 77067 SCR MAMMO BI INCL CAD: CPT

## 2018-09-10 ENCOUNTER — HOSPITAL ENCOUNTER (OUTPATIENT)
Dept: WOMENS IMAGING | Age: 68
Discharge: HOME | End: 2018-09-10
Payer: MEDICARE

## 2018-09-10 DIAGNOSIS — R92.1 BREAST CALCIFICATION, LEFT: ICD-10-CM

## 2018-09-10 PROCEDURE — G0279 TOMOSYNTHESIS, MAMMO: HCPCS

## 2018-09-17 ENCOUNTER — HOSPITAL ENCOUNTER (OUTPATIENT)
Dept: NON INVASIVE DIAGNOSTICS | Age: 68
Discharge: HOME | End: 2018-09-17
Payer: MEDICARE

## 2018-09-17 LAB
LV EF: 60 %
LVEF MODALITY: NORMAL

## 2018-09-17 PROCEDURE — 93306 TTE W/DOPPLER COMPLETE: CPT

## 2018-09-19 ENCOUNTER — HOSPITAL ENCOUNTER (OUTPATIENT)
Dept: WOMENS IMAGING | Age: 68
Discharge: HOME | End: 2018-09-19
Payer: MEDICARE

## 2018-09-19 VITALS
RESPIRATION RATE: 18 BRPM | DIASTOLIC BLOOD PRESSURE: 70 MMHG | TEMPERATURE: 98.5 F | SYSTOLIC BLOOD PRESSURE: 141 MMHG | HEART RATE: 70 BPM

## 2018-09-19 DIAGNOSIS — R92.1 BREAST CALCIFICATIONS: ICD-10-CM

## 2018-09-19 PROCEDURE — 88360 TUMOR IMMUNOHISTOCHEM/MANUAL: CPT

## 2018-09-19 PROCEDURE — 77065 DX MAMMO INCL CAD UNI: CPT

## 2018-09-19 PROCEDURE — A4648 IMPLANTABLE TISSUE MARKER: HCPCS

## 2018-09-19 PROCEDURE — 88342 IMHCHEM/IMCYTCHM 1ST ANTB: CPT

## 2018-09-19 PROCEDURE — 2709999900 HC NON-CHARGEABLE SUPPLY

## 2018-09-19 PROCEDURE — 88305 TISSUE EXAM BY PATHOLOGIST: CPT

## 2018-09-19 PROCEDURE — 88377 M/PHMTRC ALYS ISHQUANT/SEMIQ: CPT

## 2018-09-19 PROCEDURE — 19081 BX BREAST 1ST LESION STRTCTC: CPT

## 2018-09-19 PROCEDURE — 2720000010 HC SURG SUPPLY STERILE

## 2018-09-19 RX ORDER — ASPIRIN 81 MG/1
81 TABLET, CHEWABLE ORAL DAILY
COMMUNITY

## 2018-09-19 NOTE — PROGRESS NOTES
[facility] [address]  Pre-Biopsy Assessment      Patient Education    Written information about procedure Yes   [x] Left        [] Right       [] Bilateral   Procedural steps explained Yes   [x] Stereotactic Biopsy      [] Ultrasound Biopsy   Post-op potential: bruising, hematoma, pain Yes    Self-care: activity, care of dressing Yes    Patient verbalized understanding Yes    Consent signed and witnessed Yes      Hormone Therapy Status: \"years ago\"    Recent Medication: [x] Aspirin [] Ibuprofen  [] Coumadin [] N/A   Last Dose: 9/16/18    Emotional Status: [] Calm   [x] Nervous   [] Emotionally Upset    Language or Physical Barriers: no  Comments: no        Electronically signed by Sukh Mott RN on 9/19/2018 at 8:04 AM

## 2018-09-19 NOTE — PROGRESS NOTES
Breast Biopsy Flowsheet/Post-Operative Care    Date of Procedure: 9/19/2018  Physician: Dr. Porfirio Maciel  Technologist: VERO Peck RT(R)(M)    Biopsy:stereotactic breast biopsy  Lesion type: [] Palpable     [x] Non-palpable  Breast: left    Clock face position: Site #1:  Upper outer aspect middle depth       Primary Method of Detection: [] Palpation    [x] Mammogram    [] Ultrasound   [] Mass:      [x] Microcalcifications:   [] Pleomorphic   [] Increasing Number   Distribution:  [x] Grouped [] Linear [] Regional    Asymmetry: NA    Biopsy Method:   Mammotome:    Site #1     Gauge:  10     # of Passes: 6     Clip:   [] \"Ribbon\"   [] \"O\"     [] \"M\"      [] \"X\"      [] \"Barbell\"       [] \"Bowtie\"  [x] \"Coil\"      Pre-Op Assessment: (BI-RADS)   [] 3. Probably Benign   [x] 4. Suspicious Abnormality   [] 5. Highly Suggestive of Malignancy      Patient Tolerated Procedure: good  Complications: none  Comments: none    Post Operative Care  Steri strips: Yes  Dressing: [] Pressure Dressing   [x] Gauze. Tape   Ice Applied to Site:  Yes  Evidence of Bleeding:  No    Pain Verbalized: No      Written Discharge Instructions: Yes  Condition at Discharge: good  Time of Discharge: [address]    Electronically signed by Clifford Velázquez RN on 9/19/2018 at 9:00 AM

## 2018-09-20 ENCOUNTER — CLINICAL DOCUMENTATION (OUTPATIENT)
Dept: WOMENS IMAGING | Age: 68
End: 2018-09-20

## 2018-09-21 ENCOUNTER — CLINICAL DOCUMENTATION (OUTPATIENT)
Dept: PHYSICAL THERAPY | Age: 68
End: 2018-09-21

## 2018-09-21 ENCOUNTER — CLINICAL DOCUMENTATION (OUTPATIENT)
Dept: ONCOLOGY | Age: 68
End: 2018-09-21

## 2018-09-21 NOTE — PROGRESS NOTES
Name: Annika Izquierdo  : 1950  MRN: [id number]    Oncology Navigation Follow-Up Note    Contact Type: [facility]    Interventions-   General Interventions: Patient and  arrived to breast clinic. Treatment plan discussed with breast team, Howard Stephen, Reanna Loya. Questions addressed. Emotional support given. Breast Clinic Recommendation form completed, discussed, and copy given to patient. Education/Screenings: Navigation Welcome packet given to patient and program explained. Contact information provided. Referrals: Oncology Rehab, Naun Butler, for baseline arm measurements, consult with Melly Ramirez for patient request. Patient requested referral to Dr. Stephanie Lozano, office closed today. Will make referral next week. Patient aware. Continuum of Care: Diagnosis/Active Treatment    Note: Will continue to follow through continuum of care. Refer to Recommendation form under media tab for further information.

## 2018-09-24 ENCOUNTER — TELEPHONE (OUTPATIENT)
Dept: WOMENS IMAGING | Age: 68
End: 2018-09-24

## 2018-09-24 ENCOUNTER — CLINICAL DOCUMENTATION (OUTPATIENT)
Dept: ONCOLOGY | Age: 68
End: 2018-09-24

## 2018-09-25 ENCOUNTER — OFFICE VISIT (OUTPATIENT)
Dept: SURGERY | Age: 68
End: 2018-09-25
Payer: MEDICARE

## 2018-09-25 VITALS
SYSTOLIC BLOOD PRESSURE: 130 MMHG | DIASTOLIC BLOOD PRESSURE: 74 MMHG | BODY MASS INDEX: 33.89 KG/M2 | HEART RATE: 70 BPM | OXYGEN SATURATION: 95 % | TEMPERATURE: 98.5 F | HEIGHT: 63 IN | RESPIRATION RATE: 16 BRPM | WEIGHT: 191.3 LBS

## 2018-09-25 DIAGNOSIS — Z85.3 HISTORY OF LEFT BREAST CANCER: Primary | ICD-10-CM

## 2018-09-25 DIAGNOSIS — C50.912 INVASIVE DUCTAL CARCINOMA OF BREAST, FEMALE, LEFT (HCC): ICD-10-CM

## 2018-09-25 DIAGNOSIS — Z12.11 SCREENING FOR COLON CANCER: ICD-10-CM

## 2018-09-25 PROCEDURE — G8417 CALC BMI ABV UP PARAM F/U: HCPCS | Performed by: SURGERY

## 2018-09-25 PROCEDURE — 4040F PNEUMOC VAC/ADMIN/RCVD: CPT | Performed by: SURGERY

## 2018-09-25 PROCEDURE — G8428 CUR MEDS NOT DOCUMENT: HCPCS | Performed by: SURGERY

## 2018-09-25 PROCEDURE — 99214 OFFICE O/P EST MOD 30 MIN: CPT | Performed by: SURGERY

## 2018-09-25 PROCEDURE — 1101F PT FALLS ASSESS-DOCD LE1/YR: CPT | Performed by: SURGERY

## 2018-09-25 PROCEDURE — 1090F PRES/ABSN URINE INCON ASSESS: CPT | Performed by: SURGERY

## 2018-09-25 PROCEDURE — 1123F ACP DISCUSS/DSCN MKR DOCD: CPT | Performed by: SURGERY

## 2018-09-25 PROCEDURE — 1036F TOBACCO NON-USER: CPT | Performed by: SURGERY

## 2018-09-25 PROCEDURE — 3017F COLORECTAL CA SCREEN DOC REV: CPT | Performed by: SURGERY

## 2018-09-25 PROCEDURE — G8400 PT W/DXA NO RESULTS DOC: HCPCS | Performed by: SURGERY

## 2018-09-26 ENCOUNTER — HOSPITAL ENCOUNTER (OUTPATIENT)
Age: 68
Discharge: HOME | End: 2018-09-26
Payer: MEDICARE

## 2018-09-26 ENCOUNTER — TELEPHONE (OUTPATIENT)
Dept: SURGERY | Age: 68
End: 2018-09-26

## 2018-09-26 LAB
ANION GAP SERPL CALCULATED.3IONS-SCNC: 16 MEQ/L (ref 8–16)
BUN BLDV-MCNC: 21 MG/DL (ref 7–22)
CALCIUM SERPL-MCNC: 9.2 MG/DL (ref 8.5–10.5)
CHLORIDE BLD-SCNC: 101 MEQ/L (ref 98–111)
CO2: 22 MEQ/L (ref 23–33)
CREAT SERPL-MCNC: 0.4 MG/DL (ref 0.4–1.2)
GFR SERPL CREATININE-BSD FRML MDRD: > 90 ML/MIN/1.73M2
GLUCOSE BLD-MCNC: 234 MG/DL (ref 70–108)
HCT VFR BLD CALC: 43.7 % (ref 37–47)
HEMOGLOBIN: 14.9 GM/DL (ref 12–16)
POTASSIUM SERPL-SCNC: 4.2 MEQ/L (ref 3.5–5.2)
SODIUM BLD-SCNC: 139 MEQ/L (ref 135–145)

## 2018-09-26 PROCEDURE — 80048 BASIC METABOLIC PNL TOTAL CA: CPT

## 2018-09-26 PROCEDURE — 85018 HEMOGLOBIN: CPT

## 2018-09-26 PROCEDURE — 85014 HEMATOCRIT: CPT

## 2018-09-26 PROCEDURE — 36415 COLL VENOUS BLD VENIPUNCTURE: CPT

## 2018-09-26 NOTE — TELEPHONE ENCOUNTER
Scheduled patient for a left lumpectomy with sentinel lymph node biopsy on Wed 10/10 at 12 noon. She will have a needle loc at 8am & a sentinel injection at 9:30. Ida Dillon will arrive to [facility] at 7:30. She will be npo after midnight & consent was signed. She will stop the aspirin 5 days prior to surgery. All info was given to patient over the phone & she voiced understanding. A paper with the info was also mailed to her.

## 2018-09-27 ENCOUNTER — CLINICAL DOCUMENTATION (OUTPATIENT)
Dept: CASE MANAGEMENT | Age: 68
End: 2018-09-27

## 2018-09-27 ASSESSMENT — ENCOUNTER SYMPTOMS
SHORTNESS OF BREATH: 0
COLOR CHANGE: 0
RHINORRHEA: 0
SORE THROAT: 0
EYE ITCHING: 0
ANAL BLEEDING: 0
EYE PAIN: 0
APNEA: 0
FACIAL SWELLING: 0
SINUS PRESSURE: 0
ABDOMINAL PAIN: 0
WHEEZING: 0
EYES NEGATIVE: 1
COUGH: 0
CHOKING: 0
CHEST TIGHTNESS: 0
ALLERGIC/IMMUNOLOGIC NEGATIVE: 1
RESPIRATORY NEGATIVE: 1
TROUBLE SWALLOWING: 0
ABDOMINAL DISTENTION: 0
STRIDOR: 0
SINUS PAIN: 0
EYE REDNESS: 0
EYE DISCHARGE: 0
GASTROINTESTINAL NEGATIVE: 1
BACK PAIN: 0
VOICE CHANGE: 0
PHOTOPHOBIA: 0

## 2018-09-28 NOTE — PROGRESS NOTES
[address] [place] [address] [address]  [address] [address]  Dept: [phone]  Dept Fax: [phone]  Loc: [phone]    Visit Date: 9/25/2018    Fede Hammer is a 77 y.o. female who presents today for:  Chief Complaint   Patient presents with    Surgical Consult     est Pt- Invasive ductal carcinoma left breast       HPI:     HPI 23-year-old white female who has been having yearly mammograms and on recent mammogram was found to have some suspicious calcifications of the left breast on screening mammogram she underwent further diagnostic mammograms which confirmed the findings and a stereotactic biopsy was performed pathology came back as invasive ductal carcinoma she was ER/NC positive HER-2/oleg negative.   Patient has no family history of breast cancer she was seen in the breast clinic I did perform some surgery on her in the past and she requested to see me she's had no nipple discharge denies any trauma to the breast    Past Medical History:   Diagnosis Date    Abdominal aortic aneurysm (AAA) >39 mm diameter (HCC)     found in 2015    Arthritis     Asthma     Brain cyst     benign    Chronic sinus complaints     Diverticulosis of colon     Elevated TSH     Polyneuropathy     Type 2 diabetes mellitus ([address] 1990's      Past Surgical History:   Procedure Laterality Date    BLADDER SUSPENSION      times 2    CHOLECYSTECTOMY      DILATION AND CURETTAGE OF UTERUS      x2    EYE SURGERY      HYSTERECTOMY  1995    JOINT REPLACEMENT Right 2007    Rt total knee    KNEE ARTHROSCOPY  1967, 2001    MANDIBLE FRACTURE SURGERY      OTHER SURGICAL HISTORY Right 12/30/2015    Excision of Sebaceous Cyst Right Ear     SINUS SURGERY      SKIN BIOPSY      TONSILLECTOMY AND ADENOIDECTOMY  age 4     Family History   Problem Relation Age of Onset    Diabetes Mother     Heart Disease Mother     Heart Disease Father     Diabetes Maternal Hematological: Negative. Negative for adenopathy. Does not bruise/bleed easily. Psychiatric/Behavioral: Negative. Negative for agitation, behavioral problems, confusion, decreased concentration, dysphoric mood, hallucinations, self-injury, sleep disturbance and suicidal ideas. The patient is not nervous/anxious and is not hyperactive. Objective:   /74 (Site: Right Upper Arm, Position: Sitting, Cuff Size: Medium Adult)   Pulse 70   Temp 98.5 °F (36.9 °C) (Tympanic)   Resp 16   Ht 5' 3\" (1.6 m)   Wt 191 lb 4.8 oz (86.8 kg)   SpO2 95%   Breastfeeding? No   BMI 33.89 kg/m²     Physical Exam   Constitutional: She is oriented to person, place, and time. She appears well-developed and well-nourished. HENT:   Head: Normocephalic and atraumatic. Eyes: Pupils are equal, round, and reactive to light. Conjunctivae and EOM are normal. Right eye exhibits no discharge. Left eye exhibits no discharge. No scleral icterus. Neck: Normal range of motion. Neck supple. No JVD present. No thyromegaly present. Cardiovascular: Normal rate and regular rhythm. Exam reveals no gallop and no friction rub. No murmur heard. Pulmonary/Chest: Effort normal and breath sounds normal. No stridor. No respiratory distress. She has no wheezes. She exhibits no tenderness. Abdominal: She exhibits no distension. There is no tenderness. There is no rebound. Musculoskeletal: Normal range of motion. Neurological: She is alert and oriented to person, place, and time. Skin: Skin is warm and dry. No rash noted. No erythema. No pallor. Psychiatric: She has a normal mood and affect.  Her behavior is normal. Judgment and thought content normal.       Results for orders placed or performed during the hospital encounter of 09/07/18   Hemoglobin A1C   Result Value Ref Range    Hemoglobin A1C 7.8 (H) 4.4 - 6.4 %    AVERAGE GLUCOSE 174 (H) 70 - 126 mg/dL       Assessment:     Invasive ductal carcinoma of the left

## 2018-10-04 ENCOUNTER — CARE COORDINATION (OUTPATIENT)
Dept: CASE MANAGEMENT | Age: 68
End: 2018-10-04

## 2018-10-04 ENCOUNTER — HOSPITAL ENCOUNTER (OUTPATIENT)
Dept: PHYSICAL THERAPY | Age: 68
Setting detail: THERAPIES SERIES
Discharge: HOME | End: 2018-10-04
Payer: MEDICARE

## 2018-10-04 PROCEDURE — G8984 CARRY CURRENT STATUS: HCPCS

## 2018-10-04 PROCEDURE — 97161 PT EVAL LOW COMPLEX 20 MIN: CPT

## 2018-10-04 PROCEDURE — G8985 CARRY GOAL STATUS: HCPCS

## 2018-10-04 PROCEDURE — 97530 THERAPEUTIC ACTIVITIES: CPT

## 2018-10-04 PROCEDURE — 97110 THERAPEUTIC EXERCISES: CPT

## 2018-10-04 NOTE — FLOWSHEET NOTE
** PLEASE SIGN, DATE AND TIME CERTIFICATION BELOW AND RETURN TO [facility] OUTPATIENT REHABILITATION (FAX #: [phone]). ATTEST/CO-SIGN IF ACCESSING VIA [employer]. THANK YOU.**    I certify that I have examined the patient below and determined that Physical Medicine and Rehabilitation service is necessary and that I approve the established plan of care for up to 90 days or as specifically noted. Attestation, signature or co-signature of physician indicates approval of certification requirements.    ________________________ ____________ __________  Physician Signature   Date   Time    [facility]  OUTPATIENT REHABILITATION  PHYSICAL THERAPY   ONCOLOGY REHABILITATION EVALUATION  Time In: 1500  Time Out: 1600  Total timed code minutes: 25  Total treatment minutes: 60    Date: 10/4/2018  Patient Name: Cornelio Beach        CSN: [id number]   : 1950  (77 y.o.)  Gender: female   Referring Practitioner: Dr. Lawton with Dr. Ashu Duff  Diagnosis: history of L breast cancer Z85.3, C50.912  Treatment Diagnosis: Left shoulder stiffness  Additional Pertinent Hx: Diagnosed with L IDC ER/IA +, HER 2- stage I A on 18, plan for L lumpectomy with SLNB on 10/10/18 with history of arthritis, astma, DM with peripheral neuropathy especially in R foot, R TKR , cardiomyopathy with EF of 45-50%, small vessel disease       Insurance:  Medicare - g-codes, no visit limit, no ionto, hot or cold packs, modalities and aquatics approved   Total # Visits Approved: Total # Visits to Date: 1   Certification Date:  Progress Note Counter:1/10 for PN   Date of Physician Follow-Up: 10/10/18 L breast lumpectomy Chart Reviewed: Yes     Subjective: Subjective: Pt reports has been going every other week for medical massage and states she is without pain currently as just attended last week. 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  Mild expectations for measurable functional outcomes. Evaluation Complexity: Based on the findings of patient history, examination, clinical presentation, and decision making during this evaluation, the evaluation of Aba Sawant  is of low complexity. Goals:  Patient goals : Full recovery after surgery, no lymphedema    Short term goals  Time Frame for Short term goals: 6 weeks  Short term goal 1: See LTGs    Long term goals  Time Frame for Long term goals : 12 weeks  Long term goal 1: Pt to demo left shoulder PROM flexion improved from 156 deg to 160 deg for reaching overhead. Long term goal 2: Pt to demo left shoulder PROM abduction improved from 172 deg to 178 deg for grooming tasks. Long term goal 3: Pt to demo Quick Dash score returns to 16% impaired following lumpectomy and axillary lymph node biopsy for return to previous level of functioning. Long term goal 4: Pt to demo sustained L UE lymphedema measures of 171.1 cm with progressive strengthing activities to reduce risk of lymphedema. PT G-Codes  Functional Assessment Tool Used: Quick Dash  Score: 16% impaired  Functional Limitation: Carrying, moving and handling objects  Carrying, Moving and Handling Objects Current Status (): At least 1 percent but less than 20 percent impaired, limited or restricted  Carrying, Moving and Handling Objects Goal Status ():  At least 1 percent but less than 20 percent impaired, limited or restricted    Shyam Marquez PT, DPT, CORS [id number] 10/4/2018

## 2018-10-05 ENCOUNTER — OFFICE VISIT (OUTPATIENT)
Dept: FAMILY MEDICINE CLINIC | Age: 68
End: 2018-10-05
Payer: MEDICARE

## 2018-10-05 VITALS
SYSTOLIC BLOOD PRESSURE: 132 MMHG | BODY MASS INDEX: 34.16 KG/M2 | TEMPERATURE: 97.6 F | WEIGHT: 192.8 LBS | HEART RATE: 80 BPM | HEIGHT: 63 IN | DIASTOLIC BLOOD PRESSURE: 80 MMHG

## 2018-10-05 DIAGNOSIS — R39.9 URINARY TRACT INFECTION SYMPTOMS: Primary | ICD-10-CM

## 2018-10-05 DIAGNOSIS — I35.0 NONRHEUMATIC AORTIC VALVE STENOSIS: ICD-10-CM

## 2018-10-05 LAB
BILIRUBIN, POC: NEGATIVE
BLOOD URINE, POC: NEGATIVE
CLARITY, POC: CLEAR
COLOR, POC: YELLOW
GLUCOSE URINE, POC: NEGATIVE
KETONES, POC: NEGATIVE
LEUKOCYTE EST, POC: NEGATIVE
NITRITE, POC: NEGATIVE
PH, POC: 5.5
PROTEIN, POC: NEGATIVE
SPECIFIC GRAVITY, POC: >=1.03
UROBILINOGEN, POC: 0.2

## 2018-10-05 PROCEDURE — 1123F ACP DISCUSS/DSCN MKR DOCD: CPT | Performed by: FAMILY MEDICINE

## 2018-10-05 PROCEDURE — 99213 OFFICE O/P EST LOW 20 MIN: CPT | Performed by: FAMILY MEDICINE

## 2018-10-05 PROCEDURE — 1101F PT FALLS ASSESS-DOCD LE1/YR: CPT | Performed by: FAMILY MEDICINE

## 2018-10-05 PROCEDURE — 4040F PNEUMOC VAC/ADMIN/RCVD: CPT | Performed by: FAMILY MEDICINE

## 2018-10-05 PROCEDURE — G8427 DOCREV CUR MEDS BY ELIG CLIN: HCPCS | Performed by: FAMILY MEDICINE

## 2018-10-05 PROCEDURE — 1036F TOBACCO NON-USER: CPT | Performed by: FAMILY MEDICINE

## 2018-10-05 PROCEDURE — G8417 CALC BMI ABV UP PARAM F/U: HCPCS | Performed by: FAMILY MEDICINE

## 2018-10-05 PROCEDURE — 3017F COLORECTAL CA SCREEN DOC REV: CPT | Performed by: FAMILY MEDICINE

## 2018-10-05 PROCEDURE — G8400 PT W/DXA NO RESULTS DOC: HCPCS | Performed by: FAMILY MEDICINE

## 2018-10-05 PROCEDURE — 1090F PRES/ABSN URINE INCON ASSESS: CPT | Performed by: FAMILY MEDICINE

## 2018-10-05 PROCEDURE — 81003 URINALYSIS AUTO W/O SCOPE: CPT | Performed by: FAMILY MEDICINE

## 2018-10-05 PROCEDURE — G8484 FLU IMMUNIZE NO ADMIN: HCPCS | Performed by: FAMILY MEDICINE

## 2018-10-05 RX ORDER — SULFAMETHOXAZOLE AND TRIMETHOPRIM 800; 160 MG/1; MG/1
1 TABLET ORAL 2 TIMES DAILY
Qty: 10 TABLET | Refills: 0 | Status: SHIPPED | OUTPATIENT
Start: 2018-10-05 | End: 2018-10-24 | Stop reason: ALTCHOICE

## 2018-10-05 NOTE — PROGRESS NOTES
10/5/2018     Arvind Erickson (:  1950) is a 77 y.o. female, with a known hx of breast ca scheduled for surgery next week presents with c/o of sx of a UTI x2days. Pt c/o dysuria, frequency, urgency. Pt denies hematuria, fever, chills or back pain. Pt sts sx are similar to UTIs she's had in the past and tx herself with two doses of bactrim yesterday (which she had leftover in the past). Pt sts the bactrim provided some relief for her sx. Pt does express concern over the aortic stenosis and echo done recently and would like to see a cardiologist in the future. HPI      Review of Systems    Prior to Visit Medications    Medication Sig Taking? Authorizing Provider   aspirin 81 MG chewable tablet Take 81 mg by mouth daily Yes Historical Provider, MD   Insulin Pen Needle (PEN NEEDLES 31GX5/16\") 31G X 8 MM MISC 1 each by Does not apply route daily Yes Fabienne Mcpherson MD   insulin glargine (LANTUS) 100 UNIT/ML injection pen Inject 30 Units into the skin nightly Yes Feliciano Ellington MD   insulin aspart (NOVOLOG FLEXPEN) 100 UNIT/ML injection pen Inject 0-12 Units into the skin 3 times daily (before meals) Per Medium Sliding Scale  Patient taking differently: Inject 0-12 Units into the skin 3 times daily (before meals) Per group 2 Sliding Scale Yes Feliciano Ellington MD   glucose blood VI test strips (ASCENSIA AUTODISC VI;ONE TOUCH ULTRA TEST VI) strip Advocate Redi-Code test strips. Tests once daily.  Yes Fabienne Mcpherson MD   benazepril (LOTENSIN) 10 MG tablet Take 1 tablet by mouth daily Yes Fabienne Mcpherson MD   albuterol (PROVENTIL) (2.5 MG/3ML) 0.083% nebulizer solution Take 3 mLs by nebulization every 4 hours as needed for Wheezing Yes NAV Trimble - CNP   NONFORMULARY Long citrate/mg/zinc Yes Historical Provider, MD   NONFORMULARY isagenix product b Yes Historical Provider, MD   NONFORMULARY Take 1 tablet by mouth daily isagenix natural accelerator dietary supplement Yes

## 2018-10-10 ENCOUNTER — HOSPITAL ENCOUNTER (OUTPATIENT)
Dept: WOMENS IMAGING | Age: 68
Discharge: HOME | End: 2018-10-10
Attending: SURGERY
Payer: MEDICARE

## 2018-10-10 ENCOUNTER — ANESTHESIA (OUTPATIENT)
Dept: OPERATING ROOM | Age: 68
End: 2018-10-10
Payer: MEDICARE

## 2018-10-10 ENCOUNTER — HOSPITAL ENCOUNTER (OUTPATIENT)
Dept: NUCLEAR MEDICINE | Age: 68
Discharge: HOME | End: 2018-10-10
Payer: MEDICARE

## 2018-10-10 ENCOUNTER — HOSPITAL ENCOUNTER (OUTPATIENT)
Age: 68
Setting detail: OUTPATIENT SURGERY
Discharge: HOME | End: 2018-10-10
Attending: SURGERY | Admitting: SURGERY
Payer: MEDICARE

## 2018-10-10 ENCOUNTER — ANESTHESIA EVENT (OUTPATIENT)
Dept: OPERATING ROOM | Age: 68
End: 2018-10-10
Payer: MEDICARE

## 2018-10-10 ENCOUNTER — HOSPITAL ENCOUNTER (OUTPATIENT)
Dept: WOMENS IMAGING | Age: 68
Discharge: HOME | End: 2018-10-10
Payer: MEDICARE

## 2018-10-10 VITALS
RESPIRATION RATE: 14 BRPM | TEMPERATURE: 98.6 F | DIASTOLIC BLOOD PRESSURE: 58 MMHG | SYSTOLIC BLOOD PRESSURE: 122 MMHG | OXYGEN SATURATION: 99 %

## 2018-10-10 VITALS
SYSTOLIC BLOOD PRESSURE: 114 MMHG | HEIGHT: 63 IN | BODY MASS INDEX: 33.95 KG/M2 | HEART RATE: 95 BPM | OXYGEN SATURATION: 100 % | WEIGHT: 191.6 LBS | TEMPERATURE: 97.7 F | DIASTOLIC BLOOD PRESSURE: 57 MMHG | RESPIRATION RATE: 16 BRPM

## 2018-10-10 DIAGNOSIS — C50.912 INVASIVE DUCTAL CARCINOMA OF BREAST, FEMALE, LEFT (HCC): ICD-10-CM

## 2018-10-10 DIAGNOSIS — R92.1 BREAST CALCIFICATIONS: ICD-10-CM

## 2018-10-10 DIAGNOSIS — Z85.3 HISTORY OF LEFT BREAST CANCER: ICD-10-CM

## 2018-10-10 DIAGNOSIS — D05.12 DUCTAL CARCINOMA IN SITU (DCIS) OF LEFT BREAST: ICD-10-CM

## 2018-10-10 DIAGNOSIS — Z17.0 MALIGNANT NEOPLASM OF UPPER-OUTER QUADRANT OF LEFT BREAST IN FEMALE, ESTROGEN RECEPTOR POSITIVE (HCC): Primary | ICD-10-CM

## 2018-10-10 DIAGNOSIS — C50.412 MALIGNANT NEOPLASM OF UPPER-OUTER QUADRANT OF LEFT BREAST IN FEMALE, ESTROGEN RECEPTOR POSITIVE (HCC): Primary | ICD-10-CM

## 2018-10-10 LAB
GLUCOSE BLD-MCNC: 173 MG/DL (ref 70–108)
GLUCOSE BLD-MCNC: 205 MG/DL (ref 70–108)
POTASSIUM SERPL-SCNC: 4.8 MEQ/L (ref 3.5–5.2)

## 2018-10-10 PROCEDURE — 82948 REAGENT STRIP/BLOOD GLUCOSE: CPT

## 2018-10-10 PROCEDURE — 6370000000 HC RX 637 (ALT 250 FOR IP)

## 2018-10-10 PROCEDURE — 6370000000 HC RX 637 (ALT 250 FOR IP): Performed by: ANESTHESIOLOGY

## 2018-10-10 PROCEDURE — 2500000003 HC RX 250 WO HCPCS: Performed by: SURGERY

## 2018-10-10 PROCEDURE — C1769 GUIDE WIRE: HCPCS

## 2018-10-10 PROCEDURE — 7100000011 HC PHASE II RECOVERY - ADDTL 15 MIN: Performed by: SURGERY

## 2018-10-10 PROCEDURE — 2580000003 HC RX 258: Performed by: ANESTHESIOLOGY

## 2018-10-10 PROCEDURE — 3600000013 HC SURGERY LEVEL 3 ADDTL 15MIN: Performed by: SURGERY

## 2018-10-10 PROCEDURE — 38792 RA TRACER ID OF SENTINL NODE: CPT

## 2018-10-10 PROCEDURE — 3700000000 HC ANESTHESIA ATTENDED CARE: Performed by: SURGERY

## 2018-10-10 PROCEDURE — 88341 IMHCHEM/IMCYTCHM EA ADD ANTB: CPT

## 2018-10-10 PROCEDURE — 2780000010 HC IMPLANT OTHER: Performed by: SURGERY

## 2018-10-10 PROCEDURE — 3600000003 HC SURGERY LEVEL 3 BASE: Performed by: SURGERY

## 2018-10-10 PROCEDURE — 36415 COLL VENOUS BLD VENIPUNCTURE: CPT

## 2018-10-10 PROCEDURE — 6360000002 HC RX W HCPCS: Performed by: ANESTHESIOLOGY

## 2018-10-10 PROCEDURE — 77065 DX MAMMO INCL CAD UNI: CPT

## 2018-10-10 PROCEDURE — 88305 TISSUE EXAM BY PATHOLOGIST: CPT

## 2018-10-10 PROCEDURE — 2500000003 HC RX 250 WO HCPCS: Performed by: ANESTHESIOLOGY

## 2018-10-10 PROCEDURE — 7100000001 HC PACU RECOVERY - ADDTL 15 MIN: Performed by: SURGERY

## 2018-10-10 PROCEDURE — 19285 PERQ DEV BREAST 1ST US IMAG: CPT

## 2018-10-10 PROCEDURE — 2709999900 HC NON-CHARGEABLE SUPPLY: Performed by: SURGERY

## 2018-10-10 PROCEDURE — A9541 TC99M SULFUR COLLOID: HCPCS | Performed by: SURGERY

## 2018-10-10 PROCEDURE — 2580000003 HC RX 258: Performed by: SURGERY

## 2018-10-10 PROCEDURE — 7100000000 HC PACU RECOVERY - FIRST 15 MIN: Performed by: SURGERY

## 2018-10-10 PROCEDURE — 19302 P-MASTECTOMY W/LN REMOVAL: CPT | Performed by: SURGERY

## 2018-10-10 PROCEDURE — 2709999900 HC NON-CHARGEABLE SUPPLY

## 2018-10-10 PROCEDURE — 6360000002 HC RX W HCPCS: Performed by: SURGERY

## 2018-10-10 PROCEDURE — 88307 TISSUE EXAM BY PATHOLOGIST: CPT

## 2018-10-10 PROCEDURE — 84132 ASSAY OF SERUM POTASSIUM: CPT

## 2018-10-10 PROCEDURE — 3700000001 HC ADD 15 MINUTES (ANESTHESIA): Performed by: SURGERY

## 2018-10-10 PROCEDURE — 76098 X-RAY EXAM SURGICAL SPECIMEN: CPT

## 2018-10-10 PROCEDURE — 7100000010 HC PHASE II RECOVERY - FIRST 15 MIN: Performed by: SURGERY

## 2018-10-10 PROCEDURE — 3430000000 HC RX DIAGNOSTIC RADIOPHARMACEUTICAL: Performed by: SURGERY

## 2018-10-10 PROCEDURE — 88342 IMHCHEM/IMCYTCHM 1ST ANTB: CPT

## 2018-10-10 RX ORDER — LIDOCAINE HYDROCHLORIDE 20 MG/ML
INJECTION, SOLUTION INFILTRATION; PERINEURAL PRN
Status: DISCONTINUED | OUTPATIENT
Start: 2018-10-10 | End: 2018-10-10 | Stop reason: SDUPTHER

## 2018-10-10 RX ORDER — LORAZEPAM 2 MG/ML
0.5 INJECTION INTRAMUSCULAR ONCE
Status: COMPLETED | OUTPATIENT
Start: 2018-10-10 | End: 2018-10-10

## 2018-10-10 RX ORDER — MIDAZOLAM HYDROCHLORIDE 1 MG/ML
INJECTION INTRAMUSCULAR; INTRAVENOUS PRN
Status: DISCONTINUED | OUTPATIENT
Start: 2018-10-10 | End: 2018-10-10 | Stop reason: SDUPTHER

## 2018-10-10 RX ORDER — HYDRALAZINE HYDROCHLORIDE 20 MG/ML
5 INJECTION INTRAMUSCULAR; INTRAVENOUS EVERY 10 MIN PRN
Status: DISCONTINUED | OUTPATIENT
Start: 2018-10-10 | End: 2018-10-10 | Stop reason: HOSPADM

## 2018-10-10 RX ORDER — OXYCODONE HYDROCHLORIDE AND ACETAMINOPHEN 5; 325 MG/1; MG/1
1 TABLET ORAL EVERY 6 HOURS PRN
Qty: 20 TABLET | Refills: 0 | Status: SHIPPED | OUTPATIENT
Start: 2018-10-10 | End: 2018-10-17

## 2018-10-10 RX ORDER — KETOROLAC TROMETHAMINE 30 MG/ML
INJECTION, SOLUTION INTRAMUSCULAR; INTRAVENOUS PRN
Status: DISCONTINUED | OUTPATIENT
Start: 2018-10-10 | End: 2018-10-10 | Stop reason: SDUPTHER

## 2018-10-10 RX ORDER — SCOLOPAMINE TRANSDERMAL SYSTEM 1 MG/1
1 PATCH, EXTENDED RELEASE TRANSDERMAL ONCE
Status: DISCONTINUED | OUTPATIENT
Start: 2018-10-10 | End: 2018-10-10 | Stop reason: HOSPADM

## 2018-10-10 RX ORDER — SODIUM CHLORIDE 9 MG/ML
INJECTION, SOLUTION INTRAVENOUS CONTINUOUS
Status: DISCONTINUED | OUTPATIENT
Start: 2018-10-10 | End: 2018-10-10 | Stop reason: HOSPADM

## 2018-10-10 RX ORDER — PROMETHAZINE HYDROCHLORIDE 25 MG/ML
6.25 INJECTION, SOLUTION INTRAMUSCULAR; INTRAVENOUS
Status: DISCONTINUED | OUTPATIENT
Start: 2018-10-10 | End: 2018-10-10 | Stop reason: HOSPADM

## 2018-10-10 RX ORDER — FENTANYL CITRATE 50 UG/ML
50 INJECTION, SOLUTION INTRAMUSCULAR; INTRAVENOUS EVERY 5 MIN PRN
Status: DISCONTINUED | OUTPATIENT
Start: 2018-10-10 | End: 2018-10-10 | Stop reason: HOSPADM

## 2018-10-10 RX ORDER — FENTANYL CITRATE 50 UG/ML
25 INJECTION, SOLUTION INTRAMUSCULAR; INTRAVENOUS EVERY 5 MIN PRN
Status: DISCONTINUED | OUTPATIENT
Start: 2018-10-10 | End: 2018-10-10 | Stop reason: HOSPADM

## 2018-10-10 RX ORDER — PROPOFOL 10 MG/ML
INJECTION, EMULSION INTRAVENOUS PRN
Status: DISCONTINUED | OUTPATIENT
Start: 2018-10-10 | End: 2018-10-10 | Stop reason: SDUPTHER

## 2018-10-10 RX ORDER — IPRATROPIUM BROMIDE AND ALBUTEROL SULFATE 2.5; .5 MG/3ML; MG/3ML
SOLUTION RESPIRATORY (INHALATION)
Status: COMPLETED
Start: 2018-10-10 | End: 2018-10-10

## 2018-10-10 RX ORDER — SODIUM CHLORIDE, SODIUM LACTATE, POTASSIUM CHLORIDE, CALCIUM CHLORIDE 600; 310; 30; 20 MG/100ML; MG/100ML; MG/100ML; MG/100ML
INJECTION, SOLUTION INTRAVENOUS CONTINUOUS PRN
Status: DISCONTINUED | OUTPATIENT
Start: 2018-10-10 | End: 2018-10-10 | Stop reason: SDUPTHER

## 2018-10-10 RX ORDER — DEXAMETHASONE SODIUM PHOSPHATE 4 MG/ML
INJECTION, SOLUTION INTRA-ARTICULAR; INTRALESIONAL; INTRAMUSCULAR; INTRAVENOUS; SOFT TISSUE PRN
Status: DISCONTINUED | OUTPATIENT
Start: 2018-10-10 | End: 2018-10-10 | Stop reason: SDUPTHER

## 2018-10-10 RX ORDER — MEPERIDINE HYDROCHLORIDE 25 MG/ML
12.5 INJECTION INTRAMUSCULAR; INTRAVENOUS; SUBCUTANEOUS EVERY 5 MIN PRN
Status: DISCONTINUED | OUTPATIENT
Start: 2018-10-10 | End: 2018-10-10 | Stop reason: HOSPADM

## 2018-10-10 RX ORDER — ONDANSETRON 2 MG/ML
INJECTION INTRAMUSCULAR; INTRAVENOUS PRN
Status: DISCONTINUED | OUTPATIENT
Start: 2018-10-10 | End: 2018-10-10 | Stop reason: SDUPTHER

## 2018-10-10 RX ORDER — ONDANSETRON 2 MG/ML
4 INJECTION INTRAMUSCULAR; INTRAVENOUS
Status: DISCONTINUED | OUTPATIENT
Start: 2018-10-10 | End: 2018-10-10 | Stop reason: HOSPADM

## 2018-10-10 RX ORDER — LIDOCAINE HYDROCHLORIDE AND EPINEPHRINE 10; 10 MG/ML; UG/ML
INJECTION, SOLUTION INFILTRATION; PERINEURAL PRN
Status: DISCONTINUED | OUTPATIENT
Start: 2018-10-10 | End: 2018-10-10 | Stop reason: HOSPADM

## 2018-10-10 RX ORDER — IPRATROPIUM BROMIDE AND ALBUTEROL SULFATE 2.5; .5 MG/3ML; MG/3ML
1 SOLUTION RESPIRATORY (INHALATION) ONCE
Status: COMPLETED | OUTPATIENT
Start: 2018-10-10 | End: 2018-10-10

## 2018-10-10 RX ORDER — LABETALOL HYDROCHLORIDE 5 MG/ML
5 INJECTION, SOLUTION INTRAVENOUS EVERY 10 MIN PRN
Status: DISCONTINUED | OUTPATIENT
Start: 2018-10-10 | End: 2018-10-10 | Stop reason: HOSPADM

## 2018-10-10 RX ORDER — PROPOFOL 10 MG/ML
INJECTION, EMULSION INTRAVENOUS CONTINUOUS PRN
Status: DISCONTINUED | OUTPATIENT
Start: 2018-10-10 | End: 2018-10-10 | Stop reason: SDUPTHER

## 2018-10-10 RX ORDER — ISOSULFAN BLUE 50 MG/5ML
INJECTION, SOLUTION SUBCUTANEOUS PRN
Status: DISCONTINUED | OUTPATIENT
Start: 2018-10-10 | End: 2018-10-10 | Stop reason: HOSPADM

## 2018-10-10 RX ADMIN — MEPERIDINE HYDROCHLORIDE 12.5 MG: 25 INJECTION INTRAMUSCULAR; INTRAVENOUS; SUBCUTANEOUS at 14:29

## 2018-10-10 RX ADMIN — IPRATROPIUM BROMIDE AND ALBUTEROL SULFATE 1 AMPULE: 2.5; .5 SOLUTION RESPIRATORY (INHALATION) at 15:15

## 2018-10-10 RX ADMIN — SODIUM CHLORIDE, POTASSIUM CHLORIDE, SODIUM LACTATE AND CALCIUM CHLORIDE: 600; 310; 30; 20 INJECTION, SOLUTION INTRAVENOUS at 13:19

## 2018-10-10 RX ADMIN — PROPOFOL 50 MG: 10 INJECTION, EMULSION INTRAVENOUS at 13:17

## 2018-10-10 RX ADMIN — PROPOFOL 50 MG: 10 INJECTION, EMULSION INTRAVENOUS at 13:20

## 2018-10-10 RX ADMIN — MIDAZOLAM HYDROCHLORIDE 2 MG: 1 INJECTION, SOLUTION INTRAMUSCULAR; INTRAVENOUS at 12:28

## 2018-10-10 RX ADMIN — MIDAZOLAM HYDROCHLORIDE 1 MG: 1 INJECTION, SOLUTION INTRAMUSCULAR; INTRAVENOUS at 13:09

## 2018-10-10 RX ADMIN — IPRATROPIUM BROMIDE AND ALBUTEROL SULFATE 1 AMPULE: .5; 3 SOLUTION RESPIRATORY (INHALATION) at 15:15

## 2018-10-10 RX ADMIN — Medication 1.08 MILLICURIE: at 09:25

## 2018-10-10 RX ADMIN — KETOROLAC TROMETHAMINE 30 MG: 30 INJECTION, SOLUTION INTRAMUSCULAR; INTRAVENOUS at 12:48

## 2018-10-10 RX ADMIN — MEPERIDINE HYDROCHLORIDE 12.5 MG: 25 INJECTION INTRAMUSCULAR; INTRAVENOUS; SUBCUTANEOUS at 14:44

## 2018-10-10 RX ADMIN — SODIUM CHLORIDE: 9 INJECTION, SOLUTION INTRAVENOUS at 10:40

## 2018-10-10 RX ADMIN — LIDOCAINE HYDROCHLORIDE 100 MG: 20 INJECTION, SOLUTION INFILTRATION; PERINEURAL at 12:33

## 2018-10-10 RX ADMIN — ONDANSETRON HYDROCHLORIDE 4 MG: 4 INJECTION, SOLUTION INTRAMUSCULAR; INTRAVENOUS at 12:48

## 2018-10-10 RX ADMIN — LORAZEPAM 0.5 MG: 2 INJECTION INTRAMUSCULAR; INTRAVENOUS at 15:00

## 2018-10-10 RX ADMIN — PROPOFOL 100 MG: 10 INJECTION, EMULSION INTRAVENOUS at 12:33

## 2018-10-10 RX ADMIN — PROPOFOL 100 MCG/KG/MIN: 10 INJECTION, EMULSION INTRAVENOUS at 12:33

## 2018-10-10 RX ADMIN — DEXAMETHASONE SODIUM PHOSPHATE 8 MG: 4 INJECTION, SOLUTION INTRAMUSCULAR; INTRAVENOUS at 12:48

## 2018-10-10 RX ADMIN — MEPERIDINE HYDROCHLORIDE 12.5 MG: 25 INJECTION INTRAMUSCULAR; INTRAVENOUS; SUBCUTANEOUS at 14:51

## 2018-10-10 RX ADMIN — LORAZEPAM 0.5 MG: 2 INJECTION INTRAMUSCULAR; INTRAVENOUS at 15:20

## 2018-10-10 RX ADMIN — PROPOFOL 100 MG: 10 INJECTION, EMULSION INTRAVENOUS at 12:45

## 2018-10-10 ASSESSMENT — PULMONARY FUNCTION TESTS
PIF_VALUE: 4
PIF_VALUE: 3
PIF_VALUE: 4
PIF_VALUE: 4
PIF_VALUE: 2
PIF_VALUE: 2
PIF_VALUE: 12
PIF_VALUE: 4
PIF_VALUE: 3
PIF_VALUE: 1
PIF_VALUE: 4
PIF_VALUE: 5
PIF_VALUE: 5
PIF_VALUE: 4
PIF_VALUE: 2
PIF_VALUE: 2
PIF_VALUE: 3
PIF_VALUE: 1
PIF_VALUE: 2
PIF_VALUE: 4
PIF_VALUE: 2
PIF_VALUE: 1
PIF_VALUE: 3
PIF_VALUE: 23
PIF_VALUE: 5
PIF_VALUE: 3
PIF_VALUE: 4
PIF_VALUE: 2
PIF_VALUE: 2
PIF_VALUE: 3
PIF_VALUE: 2
PIF_VALUE: 5
PIF_VALUE: 4
PIF_VALUE: 9
PIF_VALUE: 2
PIF_VALUE: 3
PIF_VALUE: 2
PIF_VALUE: 4
PIF_VALUE: 2
PIF_VALUE: 3
PIF_VALUE: 4
PIF_VALUE: 5
PIF_VALUE: 4
PIF_VALUE: 3
PIF_VALUE: 3
PIF_VALUE: 4
PIF_VALUE: 2
PIF_VALUE: 5
PIF_VALUE: 2
PIF_VALUE: 4
PIF_VALUE: 2
PIF_VALUE: 5
PIF_VALUE: 4
PIF_VALUE: 1
PIF_VALUE: 3
PIF_VALUE: 2
PIF_VALUE: 4
PIF_VALUE: 6
PIF_VALUE: 17
PIF_VALUE: 4
PIF_VALUE: 4
PIF_VALUE: 17
PIF_VALUE: 3
PIF_VALUE: 2
PIF_VALUE: 16
PIF_VALUE: 2
PIF_VALUE: 27
PIF_VALUE: 2
PIF_VALUE: 2
PIF_VALUE: 4
PIF_VALUE: 2
PIF_VALUE: 13
PIF_VALUE: 4
PIF_VALUE: 3
PIF_VALUE: 5
PIF_VALUE: 4
PIF_VALUE: 2
PIF_VALUE: 6
PIF_VALUE: 19
PIF_VALUE: 4
PIF_VALUE: 1
PIF_VALUE: 2
PIF_VALUE: 4
PIF_VALUE: 2

## 2018-10-10 ASSESSMENT — PAIN SCALES - GENERAL
PAINLEVEL_OUTOF10: 2
PAINLEVEL_OUTOF10: 2
PAINLEVEL_OUTOF10: 0

## 2018-10-10 NOTE — PROGRESS NOTES
Formulation and discussion of sedation / procedure plans, risks, benefits, side effects and alternatives with patient and/or responsible adult completed.     Electronically signed by Celestino Norris MD on 10/10/2018 at 8:28 AM

## 2018-10-10 NOTE — ANESTHESIA PRE PROCEDURE
Laterality Date    BLADDER SUSPENSION      times 2    CHOLECYSTECTOMY      DILATION AND CURETTAGE OF UTERUS      x2    EYE SURGERY      HYSTERECTOMY  1995    JOINT REPLACEMENT Right 2007    Rt total knee    KNEE ARTHROSCOPY  1967, 2001    MANDIBLE FRACTURE SURGERY      OTHER SURGICAL HISTORY Right 12/30/2015    Excision of Sebaceous Cyst Right Ear     SINUS SURGERY      SKIN BIOPSY      TONSILLECTOMY AND ADENOIDECTOMY  age 4       Social History:    Social History   Substance Use Topics    Smoking status: Never Smoker    Smokeless tobacco: Never Used    Alcohol use No                                Counseling given: Not Answered      Vital Signs (Current):   Vitals:    10/10/18 0952 10/10/18 1002   BP: 137/82    Pulse: 71    Resp: 16    Temp: 98.9 °F (37.2 °C)    TempSrc: Temporal    SpO2: 97%    Weight:  191 lb 9.6 oz (86.9 kg)   Height:  5' 3\" (1.6 m)                                              BP Readings from Last 3 Encounters:   10/10/18 137/82   10/05/18 132/80   09/25/18 130/74       NPO Status: Time of last liquid consumption: 2130                        Time of last solid consumption: 2130                        Date of last liquid consumption: 10/09/18                        Date of last solid food consumption: 10/09/18    BMI:   Wt Readings from Last 3 Encounters:   10/10/18 191 lb 9.6 oz (86.9 kg)   10/05/18 192 lb 12.8 oz (87.5 kg)   09/25/18 191 lb 4.8 oz (86.8 kg)     Body mass index is 33.94 kg/m².     CBC:   Lab Results   Component Value Date    WBC 6.1 09/29/2018    RBC 4.97 09/29/2018    RBC 4.00 05/23/2012    HGB 15.1 09/29/2018    HCT 44.9 09/29/2018    MCV 90.3 09/29/2018    RDW 12.4 09/29/2018     09/29/2018       CMP:   Lab Results   Component Value Date     09/29/2018    K 3.8 09/29/2018     09/29/2018    CO2 28 09/29/2018    BUN 20 09/29/2018    CREATININE 0.56 09/29/2018    AGRATIO 1.5 09/29/2018    LABGLOM >90 09/26/2018    GLUCOSE 188 09/29/2018    PROT 7.6 09/29/2018    CALCIUM 9.2 09/29/2018    BILITOT 1.9 09/29/2018    ALKPHOS 56 09/29/2018    AST 17 09/29/2018    ALT 22 09/29/2018       POC Tests: No results for input(s): POCGLU, POCNA, POCK, POCCL, POCBUN, POCHEMO, POCHCT in the last 72 hours. Coags:   Lab Results   Component Value Date    INR 0.95 05/21/2012       HCG (If Applicable): No results found for: PREGTESTUR, PREGSERUM, HCG, HCGQUANT     ABGs: No results found for: PHART, PO2ART, MOT6IQU, AHJ1IKZ, BEART, O8LHWZEA     Type & Screen (If Applicable):  No results found for: LABABO, [address]    Anesthesia Evaluation     history of anesthetic complications: PONV. Airway: Mallampati: II  TM distance: >3 FB   Neck ROM: full  Mouth opening: > = 3 FB Dental:          Pulmonary:normal exam    (+) asthma:           Patient did not smoke on day of surgery. Cardiovascular:Negative CV ROS  Exercise tolerance: good (>4 METS),                     Neuro/Psych:   (+) headaches:,             GI/Hepatic/Renal:             Endo/Other:    (+) Diabetes, hypothyroidism::., .          Pt had no PAT visit       Abdominal:           Vascular: negative vascular ROS. Anesthesia Plan      general and TIVA     ASA 3       Induction: intravenous. MIPS: Postoperative opioids intended and Prophylactic antiemetics administered. Anesthetic plan and risks discussed with patient. Plan discussed with CRNA.                   Adelso Bernard MD   10/10/2018

## 2018-10-10 NOTE — BRIEF OP NOTE
Brief Postoperative Note    Senthil Olivarez  YOB: 1950  [id number]    Pre-operative Diagnosis: Invasive Ductal Ca L Breast    Post-operative Diagnosis: Same    Procedure: 1. SLN BX L Breast  2. Lumpectomy L Breast    Anesthesia: General    Surgeons/Assistants:  Eliana Bean    Estimated Blood Loss: less than 50     Complications: None    Specimens: Was Obtained:     Findings: see op note    Electronically signed by Yray Florence MD on 10/10/2018 at 2:10 PM

## 2018-10-11 ENCOUNTER — TELEPHONE (OUTPATIENT)
Dept: SURGERY | Age: 68
End: 2018-10-11

## 2018-10-12 ENCOUNTER — TELEPHONE (OUTPATIENT)
Dept: SURGERY | Age: 68
End: 2018-10-12

## 2018-10-18 ENCOUNTER — OFFICE VISIT (OUTPATIENT)
Dept: SURGERY | Age: 68
End: 2018-10-18

## 2018-10-18 VITALS
OXYGEN SATURATION: 98 % | DIASTOLIC BLOOD PRESSURE: 64 MMHG | WEIGHT: 193 LBS | HEART RATE: 83 BPM | BODY MASS INDEX: 34.19 KG/M2 | TEMPERATURE: 99.9 F | RESPIRATION RATE: 18 BRPM | SYSTOLIC BLOOD PRESSURE: 138 MMHG

## 2018-10-18 DIAGNOSIS — Z98.890 S/P LUMPECTOMY, LEFT BREAST: Primary | ICD-10-CM

## 2018-10-18 PROCEDURE — 99024 POSTOP FOLLOW-UP VISIT: CPT | Performed by: SURGERY

## 2018-10-23 ENCOUNTER — HOSPITAL ENCOUNTER (OUTPATIENT)
Dept: PHYSICAL THERAPY | Age: 68
Setting detail: THERAPIES SERIES
Discharge: HOME | End: 2018-10-23
Payer: MEDICARE

## 2018-10-23 ENCOUNTER — OFFICE VISIT (OUTPATIENT)
Dept: SURGERY | Age: 68
End: 2018-10-23

## 2018-10-23 DIAGNOSIS — Z98.890 S/P LUMPECTOMY, LEFT BREAST: Primary | ICD-10-CM

## 2018-10-23 PROCEDURE — 97140 MANUAL THERAPY 1/> REGIONS: CPT

## 2018-10-23 PROCEDURE — 97110 THERAPEUTIC EXERCISES: CPT

## 2018-10-23 PROCEDURE — 97530 THERAPEUTIC ACTIVITIES: CPT

## 2018-10-24 ENCOUNTER — OFFICE VISIT (OUTPATIENT)
Dept: INTERNAL MEDICINE CLINIC | Age: 68
End: 2018-10-24
Payer: MEDICARE

## 2018-10-24 VITALS
SYSTOLIC BLOOD PRESSURE: 118 MMHG | HEIGHT: 63 IN | WEIGHT: 195.2 LBS | BODY MASS INDEX: 34.59 KG/M2 | DIASTOLIC BLOOD PRESSURE: 70 MMHG

## 2018-10-24 DIAGNOSIS — E11.9 TYPE 2 DIABETES MELLITUS WITHOUT COMPLICATION, WITHOUT LONG-TERM CURRENT USE OF INSULIN (HCC): ICD-10-CM

## 2018-10-24 PROCEDURE — G0108 DIAB MANAGE TRN  PER INDIV: HCPCS | Performed by: INTERNAL MEDICINE

## 2018-10-24 PROCEDURE — 99999 PR OFFICE/OUTPT VISIT,PROCEDURE ONLY: CPT | Performed by: REGISTERED NURSE

## 2018-10-24 NOTE — PROGRESS NOTES
[facility]  [address] [facility] Liz[facility]agaV[facility], [facility]  [phone] (phone)  [phone] (fax)    Patient ID: Michelet Mansfield [id number]  Referring Provider: Dr. Talha Noble  Cc: Dr. Kemar Leonard     Patient's name and  were verified. Subjective:    She presents for Her follow-up diabetic visit. She has type 2 diabetes mellitus. Home regimen includes: insulin She is noncompliant some of the time. Assessment:     Lab Results   Component Value Date    LABA1C 7.8 2018    BUN 20 2018    CREATININE 0.56 2018     Vitals:    10/24/18 1009   Weight: 195 lb 3.2 oz (88.5 kg)   Height: 5' 3\" (1.6 m)     Wt Readings from Last 3 Encounters:   10/24/18 195 lb 3.2 oz (88.5 kg)   10/18/18 193 lb (87.5 kg)   10/10/18 191 lb 9.6 oz (86.9 kg)     Ht Readings from Last 3 Encounters:   10/24/18 5' 3\" (1.6 m)   10/10/18 5' 3\" (1.6 m)   10/05/18 5' 3\" (1.6 m)       Current monitoring regimen: home blood tests - 4 times daily  Home blood sugar trends: FBS's 146-206. Noon 417-584. Dinner 142, 174- 289. - 272  Any episodes of hypoglycemia? no  Previous visit with dietician: yes -   Current diet: B- egg/ reza/ yogurt or milk and fruit                       L- chicken breast/ green beans/ salad/ roll                       D- grilled chicken salad                       BT 3 c. Popcorn                       Drinking water and unsw tea   Current exercise: waling dogs daily 10-20  Minutes and PT 10 mins  Eye exam current (within one year): yes 2018 Dr. Mauricio Harris for double vision. Any history of foot problems? no  Last foot exam: 2018 Dr. Chapis Chirinos  Immunizations up to date: yes - 2018  Taking ASA:  Yes   Appropriate for use of MyChart Glucose Grid:  Yes    Focus: Follow up visit. Blood sugars above goal. [facility] reports having appointment with Dr. Kemar Leonard 10/215/18 and expects him to increase her insulin dosing.  She has been through breast cancer with lumpectomy, eval for aortic valve stenosis, MRI of brain for double vision, UTI and a twisted knee, since last seen at the clinic. She has been having pain and anxiety and she reports stress eating. Her  has had health issues as well. Discussed getting meal plan back in line as well as relating the dose of Novolog with meals according to the amount of carbohydrate she is eating. Much encouragement given. Follow up visit 3 months. DSME PLAN:   Discussed general issues about diabetes pathophysiology and management. Counseling at today's visit: carb counting-matching to Novolog dosing; exercise; managing stress; BG goals; insulin action. 1. Keep checking blood sugars before meals and bedtime                 Goal for your blood sugar 100-140  2. Eat 3 meals per day--continue with approximately 30-40 grams carbohydrate per meal                *remember that if 5 units Novolog works for 30-40 grams carb--it won't work as well              For 40-50 grams carb (you may want to add units for increased carbohydrates for certain meals)  3. Good job with exercise efforts--stay active. 4. Talk with Dr. Jose Younger about insulin doses tomorrow                    And Freestyle Barron monitoring system    [employer], medications, PMH and nursing assessment reviewed. Chadwick Salcido states She is willing to participate in this plan of care and verbalized understanding of all instructions provided. Teach back used to verify comprehension. Total time involved in direct patient education: 60 minutes.

## 2018-10-25 ENCOUNTER — APPOINTMENT (OUTPATIENT)
Dept: PHYSICAL THERAPY | Age: 68
End: 2018-10-25
Payer: MEDICARE

## 2018-11-02 ENCOUNTER — HOSPITAL ENCOUNTER (OUTPATIENT)
Dept: INTERVENTIONAL RADIOLOGY/VASCULAR | Age: 68
Discharge: HOME | End: 2018-11-02
Payer: MEDICARE

## 2018-11-02 ENCOUNTER — HOSPITAL ENCOUNTER (OUTPATIENT)
Dept: WOMENS IMAGING | Age: 68
Discharge: HOME | End: 2018-11-02
Payer: MEDICARE

## 2018-11-02 DIAGNOSIS — Z78.0 POSTMENOPAUSE: ICD-10-CM

## 2018-11-02 DIAGNOSIS — R60.9 SWELLING: ICD-10-CM

## 2018-11-02 PROCEDURE — 93970 EXTREMITY STUDY: CPT

## 2018-11-02 PROCEDURE — 77080 DXA BONE DENSITY AXIAL: CPT

## 2018-11-05 ENCOUNTER — HOSPITAL ENCOUNTER (OUTPATIENT)
Age: 68
Discharge: HOME | End: 2018-11-05
Payer: MEDICARE

## 2018-11-05 ENCOUNTER — HOSPITAL ENCOUNTER (OUTPATIENT)
Dept: GENERAL RADIOLOGY | Age: 68
Discharge: HOME | End: 2018-11-05
Payer: MEDICARE

## 2018-11-05 ENCOUNTER — OFFICE VISIT (OUTPATIENT)
Dept: FAMILY MEDICINE CLINIC | Age: 68
End: 2018-11-05
Payer: MEDICARE

## 2018-11-05 VITALS
HEART RATE: 66 BPM | SYSTOLIC BLOOD PRESSURE: 126 MMHG | WEIGHT: 194.8 LBS | DIASTOLIC BLOOD PRESSURE: 62 MMHG | BODY MASS INDEX: 34.52 KG/M2 | HEIGHT: 63 IN

## 2018-11-05 DIAGNOSIS — M25.562 CHRONIC PAIN OF LEFT KNEE: ICD-10-CM

## 2018-11-05 DIAGNOSIS — E11.65 TYPE 2 DIABETES MELLITUS WITH HYPERGLYCEMIA, WITHOUT LONG-TERM CURRENT USE OF INSULIN (HCC): ICD-10-CM

## 2018-11-05 DIAGNOSIS — R39.9 UTI SYMPTOMS: ICD-10-CM

## 2018-11-05 DIAGNOSIS — G89.29 CHRONIC PAIN OF LEFT KNEE: ICD-10-CM

## 2018-11-05 DIAGNOSIS — I10 ESSENTIAL HYPERTENSION: Primary | ICD-10-CM

## 2018-11-05 PROCEDURE — G8417 CALC BMI ABV UP PARAM F/U: HCPCS | Performed by: FAMILY MEDICINE

## 2018-11-05 PROCEDURE — 1101F PT FALLS ASSESS-DOCD LE1/YR: CPT | Performed by: FAMILY MEDICINE

## 2018-11-05 PROCEDURE — 73564 X-RAY EXAM KNEE 4 OR MORE: CPT

## 2018-11-05 PROCEDURE — 4040F PNEUMOC VAC/ADMIN/RCVD: CPT | Performed by: FAMILY MEDICINE

## 2018-11-05 PROCEDURE — 3014F SCREEN MAMMO DOC REV: CPT | Performed by: FAMILY MEDICINE

## 2018-11-05 PROCEDURE — G8484 FLU IMMUNIZE NO ADMIN: HCPCS | Performed by: FAMILY MEDICINE

## 2018-11-05 PROCEDURE — 1090F PRES/ABSN URINE INCON ASSESS: CPT | Performed by: FAMILY MEDICINE

## 2018-11-05 PROCEDURE — 1036F TOBACCO NON-USER: CPT | Performed by: FAMILY MEDICINE

## 2018-11-05 PROCEDURE — 2022F DILAT RTA XM EVC RTNOPTHY: CPT | Performed by: FAMILY MEDICINE

## 2018-11-05 PROCEDURE — G8399 PT W/DXA RESULTS DOCUMENT: HCPCS | Performed by: FAMILY MEDICINE

## 2018-11-05 PROCEDURE — 3045F PR MOST RECENT HEMOGLOBIN A1C LEVEL 7.0-9.0%: CPT | Performed by: FAMILY MEDICINE

## 2018-11-05 PROCEDURE — 99214 OFFICE O/P EST MOD 30 MIN: CPT | Performed by: FAMILY MEDICINE

## 2018-11-05 PROCEDURE — 1123F ACP DISCUSS/DSCN MKR DOCD: CPT | Performed by: FAMILY MEDICINE

## 2018-11-05 PROCEDURE — 3017F COLORECTAL CA SCREEN DOC REV: CPT | Performed by: FAMILY MEDICINE

## 2018-11-05 PROCEDURE — G8427 DOCREV CUR MEDS BY ELIG CLIN: HCPCS | Performed by: FAMILY MEDICINE

## 2018-11-05 RX ORDER — BENAZEPRIL HYDROCHLORIDE 10 MG/1
10 TABLET ORAL DAILY
Qty: 90 TABLET | Refills: 1 | Status: SHIPPED | OUTPATIENT
Start: 2018-11-05 | End: 2019-05-06 | Stop reason: SDUPTHER

## 2018-11-05 RX ORDER — IBUPROFEN 600 MG/1
600 TABLET ORAL 2 TIMES DAILY PRN
Qty: 180 TABLET | Refills: 0 | Status: SHIPPED | OUTPATIENT
Start: 2018-11-05 | End: 2019-01-03 | Stop reason: SDUPTHER

## 2018-11-05 RX ORDER — SULFAMETHOXAZOLE AND TRIMETHOPRIM 800; 160 MG/1; MG/1
1 TABLET ORAL 2 TIMES DAILY
Qty: 14 TABLET | Refills: 0 | Status: SHIPPED | OUTPATIENT
Start: 2018-11-05 | End: 2019-01-03

## 2018-11-05 RX ORDER — ANASTROZOLE 1 MG/1
1 TABLET ORAL DAILY
COMMUNITY
End: 2019-10-24 | Stop reason: SDUPTHER

## 2018-11-05 ASSESSMENT — ENCOUNTER SYMPTOMS
WHEEZING: 0
SHORTNESS OF BREATH: 0

## 2018-11-05 NOTE — PROGRESS NOTES
[facility] PROFESSIONAL SERVS  [facility]  [address] [facility] [address]  Dept: [phone]  Dept Fax: [phone]  Loc: [phone]  PROGRESS NOTE      VisitDate: 11/5/2018    Kirke Hatchet is a 77 y.o. female who presents today for:  Chief Complaint   Patient presents with    6 Month Follow-Up    Hyperlipidemia    Diabetes       Subjective:  HPI    6 month f/u    HTN:  On benazepril. No chest pain. DM:  She goes to an internal med physician (dr. Clemente Montalvo) for care. On insulin. Hyperlipidemia:  Not on meds    Left Knee pain:  Intermittent. She wants refill on ibuprofen. She is asking about her med options. She is going to [place] in 2 weeks. She requests antibiotic to take with her incase she gets a UTI. Breast cancer:  She had lumpectomy about 1 month. No arm swelling. She is going to PT.  is present    Review of Systems   Constitutional: Negative for chills and fever. Respiratory: Negative for shortness of breath and wheezing. Cardiovascular: Positive for leg swelling. Negative for chest pain. Musculoskeletal: Positive for arthralgias and joint swelling.      Past Medical History:   Diagnosis Date    Abdominal aortic aneurysm (AAA) >39 mm diameter (HCC)     found in 2015    Arthritis     Asthma     Brain cyst     benign    Chronic sinus complaints     Diverticulosis of colon     Elevated TSH     Polyneuropathy     PONV (postoperative nausea and vomiting)     Spinal headache     Type 2 diabetes mellitus ([address] 1990's      Past Surgical History:   Procedure Laterality Date    BLADDER SUSPENSION      times 2    CHOLECYSTECTOMY      DILATION AND CURETTAGE OF UTERUS      x2    EYE SURGERY      HYSTERECTOMY  1995    JOINT REPLACEMENT Right 2007    Rt total knee    KNEE ARTHROSCOPY  1967, 2001    MANDIBLE FRACTURE SURGERY      OTHER SURGICAL HISTORY Right 12/30/2015    Excision of Sebaceous Cyst Right Ear

## 2018-11-07 ENCOUNTER — PROCEDURE VISIT (OUTPATIENT)
Dept: FAMILY MEDICINE CLINIC | Age: 68
End: 2018-11-07
Payer: MEDICARE

## 2018-11-07 VITALS
WEIGHT: 194 LBS | DIASTOLIC BLOOD PRESSURE: 70 MMHG | HEART RATE: 72 BPM | HEIGHT: 63 IN | SYSTOLIC BLOOD PRESSURE: 130 MMHG | BODY MASS INDEX: 34.38 KG/M2

## 2018-11-07 DIAGNOSIS — G89.29 CHRONIC PAIN OF LEFT KNEE: Primary | ICD-10-CM

## 2018-11-07 DIAGNOSIS — M70.50 PES ANSERINE BURSITIS: ICD-10-CM

## 2018-11-07 DIAGNOSIS — M25.562 CHRONIC PAIN OF LEFT KNEE: Primary | ICD-10-CM

## 2018-11-07 PROBLEM — M17.12 LOCALIZED OSTEOARTHRITIS OF LEFT KNEE: Status: ACTIVE | Noted: 2018-11-07

## 2018-11-07 PROCEDURE — 20610 DRAIN/INJ JOINT/BURSA W/O US: CPT | Performed by: FAMILY MEDICINE

## 2018-11-07 RX ORDER — ZOLEDRONIC ACID 4 MG/5ML
4 INJECTION INTRAVENOUS ONCE
COMMUNITY
End: 2021-06-14

## 2018-11-07 RX ORDER — TRIAMCINOLONE ACETONIDE 40 MG/ML
40 INJECTION, SUSPENSION INTRA-ARTICULAR; INTRAMUSCULAR ONCE
Status: COMPLETED | OUTPATIENT
Start: 2018-11-07 | End: 2018-11-07

## 2018-11-07 RX ORDER — LIDOCAINE HYDROCHLORIDE 10 MG/ML
4 INJECTION, SOLUTION EPIDURAL; INFILTRATION; INTRACAUDAL; PERINEURAL ONCE
Status: COMPLETED | OUTPATIENT
Start: 2018-11-07 | End: 2018-11-07

## 2018-11-07 RX ADMIN — LIDOCAINE HYDROCHLORIDE 4 ML: 10 INJECTION, SOLUTION EPIDURAL; INFILTRATION; INTRACAUDAL; PERINEURAL at 10:24

## 2018-11-07 RX ADMIN — TRIAMCINOLONE ACETONIDE 40 MG: 40 INJECTION, SUSPENSION INTRA-ARTICULAR; INTRAMUSCULAR at 10:25

## 2018-11-13 ENCOUNTER — OFFICE VISIT (OUTPATIENT)
Dept: SURGERY | Age: 68
End: 2018-11-13

## 2018-11-13 ENCOUNTER — HOSPITAL ENCOUNTER (OUTPATIENT)
Dept: PHYSICAL THERAPY | Age: 68
Setting detail: THERAPIES SERIES
Discharge: HOME | End: 2018-11-13
Payer: MEDICARE

## 2018-11-13 VITALS
RESPIRATION RATE: 16 BRPM | OXYGEN SATURATION: 96 % | WEIGHT: 192 LBS | DIASTOLIC BLOOD PRESSURE: 84 MMHG | BODY MASS INDEX: 34.02 KG/M2 | HEIGHT: 63 IN | HEART RATE: 71 BPM | SYSTOLIC BLOOD PRESSURE: 166 MMHG | TEMPERATURE: 98.9 F

## 2018-11-13 DIAGNOSIS — Z98.890 S/P LUMPECTOMY, LEFT BREAST: Primary | ICD-10-CM

## 2018-11-13 PROCEDURE — G8986 CARRY D/C STATUS: HCPCS

## 2018-11-13 PROCEDURE — 97140 MANUAL THERAPY 1/> REGIONS: CPT

## 2018-11-13 PROCEDURE — 97530 THERAPEUTIC ACTIVITIES: CPT

## 2018-11-13 PROCEDURE — 99024 POSTOP FOLLOW-UP VISIT: CPT | Performed by: SURGERY

## 2018-11-13 PROCEDURE — 97110 THERAPEUTIC EXERCISES: CPT

## 2018-11-13 PROCEDURE — G8985 CARRY GOAL STATUS: HCPCS

## 2018-12-11 ENCOUNTER — OFFICE VISIT (OUTPATIENT)
Dept: SURGERY | Age: 68
End: 2018-12-11

## 2018-12-11 DIAGNOSIS — Z98.890 S/P LUMPECTOMY, LEFT BREAST: Primary | ICD-10-CM

## 2018-12-28 ENCOUNTER — TELEPHONE (OUTPATIENT)
Dept: FAMILY MEDICINE CLINIC | Age: 68
End: 2018-12-28

## 2018-12-28 NOTE — TELEPHONE ENCOUNTER
We can repeat an injection. Please schedule a 30 min office visit. Please advise patient.   Chichi Grove MD

## 2019-01-03 ENCOUNTER — OFFICE VISIT (OUTPATIENT)
Dept: FAMILY MEDICINE CLINIC | Age: 69
End: 2019-01-03
Payer: MEDICARE

## 2019-01-03 VITALS
HEART RATE: 72 BPM | DIASTOLIC BLOOD PRESSURE: 70 MMHG | SYSTOLIC BLOOD PRESSURE: 148 MMHG | BODY MASS INDEX: 36.16 KG/M2 | WEIGHT: 204.1 LBS | HEIGHT: 63 IN

## 2019-01-03 DIAGNOSIS — M70.50 PES ANSERINE BURSITIS: ICD-10-CM

## 2019-01-03 DIAGNOSIS — G89.29 CHRONIC PAIN OF LEFT KNEE: Primary | ICD-10-CM

## 2019-01-03 DIAGNOSIS — M25.562 CHRONIC PAIN OF LEFT KNEE: Primary | ICD-10-CM

## 2019-01-03 PROCEDURE — 1123F ACP DISCUSS/DSCN MKR DOCD: CPT | Performed by: FAMILY MEDICINE

## 2019-01-03 PROCEDURE — 1101F PT FALLS ASSESS-DOCD LE1/YR: CPT | Performed by: FAMILY MEDICINE

## 2019-01-03 PROCEDURE — 3014F SCREEN MAMMO DOC REV: CPT | Performed by: FAMILY MEDICINE

## 2019-01-03 PROCEDURE — 1036F TOBACCO NON-USER: CPT | Performed by: FAMILY MEDICINE

## 2019-01-03 PROCEDURE — G8484 FLU IMMUNIZE NO ADMIN: HCPCS | Performed by: FAMILY MEDICINE

## 2019-01-03 PROCEDURE — 20610 DRAIN/INJ JOINT/BURSA W/O US: CPT | Performed by: FAMILY MEDICINE

## 2019-01-03 PROCEDURE — 4040F PNEUMOC VAC/ADMIN/RCVD: CPT | Performed by: FAMILY MEDICINE

## 2019-01-03 PROCEDURE — 1090F PRES/ABSN URINE INCON ASSESS: CPT | Performed by: FAMILY MEDICINE

## 2019-01-03 PROCEDURE — G8417 CALC BMI ABV UP PARAM F/U: HCPCS | Performed by: FAMILY MEDICINE

## 2019-01-03 PROCEDURE — G8427 DOCREV CUR MEDS BY ELIG CLIN: HCPCS | Performed by: FAMILY MEDICINE

## 2019-01-03 PROCEDURE — 3017F COLORECTAL CA SCREEN DOC REV: CPT | Performed by: FAMILY MEDICINE

## 2019-01-03 PROCEDURE — G8399 PT W/DXA RESULTS DOCUMENT: HCPCS | Performed by: FAMILY MEDICINE

## 2019-01-03 PROCEDURE — 99213 OFFICE O/P EST LOW 20 MIN: CPT | Performed by: FAMILY MEDICINE

## 2019-01-03 RX ORDER — LIDOCAINE HYDROCHLORIDE 10 MG/ML
2 INJECTION, SOLUTION EPIDURAL; INFILTRATION; INTRACAUDAL; PERINEURAL ONCE
Status: COMPLETED | OUTPATIENT
Start: 2019-01-03 | End: 2019-01-03

## 2019-01-03 RX ORDER — IBUPROFEN 600 MG/1
600 TABLET ORAL 2 TIMES DAILY PRN
Qty: 180 TABLET | Refills: 0 | Status: SHIPPED | OUTPATIENT
Start: 2019-01-03 | End: 2019-05-03 | Stop reason: SDUPTHER

## 2019-01-03 RX ORDER — TRIAMCINOLONE ACETONIDE 40 MG/ML
40 INJECTION, SUSPENSION INTRA-ARTICULAR; INTRAMUSCULAR ONCE
Status: COMPLETED | OUTPATIENT
Start: 2019-01-03 | End: 2019-01-03

## 2019-01-03 RX ORDER — BUPIVACAINE HYDROCHLORIDE 5 MG/ML
2 INJECTION, SOLUTION PERINEURAL ONCE
Status: COMPLETED | OUTPATIENT
Start: 2019-01-03 | End: 2019-01-03

## 2019-01-03 RX ADMIN — TRIAMCINOLONE ACETONIDE 40 MG: 40 INJECTION, SUSPENSION INTRA-ARTICULAR; INTRAMUSCULAR at 15:29

## 2019-01-03 RX ADMIN — LIDOCAINE HYDROCHLORIDE 2 ML: 10 INJECTION, SOLUTION EPIDURAL; INFILTRATION; INTRACAUDAL; PERINEURAL at 15:28

## 2019-01-03 RX ADMIN — BUPIVACAINE HYDROCHLORIDE 10 MG: 5 INJECTION, SOLUTION PERINEURAL at 15:27

## 2019-02-05 ENCOUNTER — OFFICE VISIT (OUTPATIENT)
Dept: INTERNAL MEDICINE CLINIC | Age: 69
End: 2019-02-05
Payer: MEDICARE

## 2019-02-05 VITALS
HEIGHT: 63 IN | DIASTOLIC BLOOD PRESSURE: 64 MMHG | SYSTOLIC BLOOD PRESSURE: 128 MMHG | WEIGHT: 205.2 LBS | BODY MASS INDEX: 36.36 KG/M2

## 2019-02-05 DIAGNOSIS — E11.9 TYPE 2 DIABETES MELLITUS WITHOUT COMPLICATION, WITHOUT LONG-TERM CURRENT USE OF INSULIN (HCC): ICD-10-CM

## 2019-02-05 PROCEDURE — G0108 DIAB MANAGE TRN  PER INDIV: HCPCS | Performed by: INTERNAL MEDICINE

## 2019-03-18 ENCOUNTER — HOSPITAL ENCOUNTER (OUTPATIENT)
Dept: WOMENS IMAGING | Age: 69
Discharge: HOME | End: 2019-03-18
Payer: MEDICARE

## 2019-03-18 DIAGNOSIS — C50.919 MALIGNANT NEOPLASM OF FEMALE BREAST, UNSPECIFIED ESTROGEN RECEPTOR STATUS, UNSPECIFIED LATERALITY, UNSPECIFIED SITE OF BREAST (HCC): ICD-10-CM

## 2019-03-18 PROCEDURE — G0279 TOMOSYNTHESIS, MAMMO: HCPCS

## 2019-03-27 ENCOUNTER — OFFICE VISIT (OUTPATIENT)
Dept: INTERNAL MEDICINE CLINIC | Age: 69
End: 2019-03-27
Payer: MEDICARE

## 2019-03-27 VITALS
BODY MASS INDEX: 36.41 KG/M2 | HEART RATE: 78 BPM | HEIGHT: 63 IN | DIASTOLIC BLOOD PRESSURE: 76 MMHG | SYSTOLIC BLOOD PRESSURE: 132 MMHG | WEIGHT: 205.5 LBS

## 2019-03-27 DIAGNOSIS — E11.9 TYPE 2 DIABETES MELLITUS WITHOUT COMPLICATION, WITHOUT LONG-TERM CURRENT USE OF INSULIN (HCC): Primary | ICD-10-CM

## 2019-03-27 LAB — HBA1C MFR BLD: 7.7 % (ref 4.3–5.7)

## 2019-03-27 PROCEDURE — 83036 HEMOGLOBIN GLYCOSYLATED A1C: CPT | Performed by: INTERNAL MEDICINE

## 2019-03-27 PROCEDURE — G0108 DIAB MANAGE TRN  PER INDIV: HCPCS | Performed by: INTERNAL MEDICINE

## 2019-03-27 RX ORDER — PANTOPRAZOLE SODIUM 40 MG/1
40 TABLET, DELAYED RELEASE ORAL DAILY
COMMUNITY
Start: 2019-03-20 | End: 2021-06-14

## 2019-04-04 ENCOUNTER — OFFICE VISIT (OUTPATIENT)
Dept: SURGERY | Age: 69
End: 2019-04-04
Payer: MEDICARE

## 2019-04-04 VITALS
HEART RATE: 74 BPM | WEIGHT: 200 LBS | HEIGHT: 64 IN | TEMPERATURE: 97.6 F | OXYGEN SATURATION: 96 % | BODY MASS INDEX: 34.15 KG/M2 | DIASTOLIC BLOOD PRESSURE: 78 MMHG | SYSTOLIC BLOOD PRESSURE: 130 MMHG | RESPIRATION RATE: 18 BRPM

## 2019-04-04 DIAGNOSIS — Z98.890 S/P LUMPECTOMY, LEFT BREAST: Primary | ICD-10-CM

## 2019-04-04 PROCEDURE — 1123F ACP DISCUSS/DSCN MKR DOCD: CPT | Performed by: SURGERY

## 2019-04-04 PROCEDURE — 99213 OFFICE O/P EST LOW 20 MIN: CPT | Performed by: SURGERY

## 2019-04-04 PROCEDURE — 3017F COLORECTAL CA SCREEN DOC REV: CPT | Performed by: SURGERY

## 2019-04-04 PROCEDURE — 1036F TOBACCO NON-USER: CPT | Performed by: SURGERY

## 2019-04-04 PROCEDURE — G8427 DOCREV CUR MEDS BY ELIG CLIN: HCPCS | Performed by: SURGERY

## 2019-04-04 PROCEDURE — G8417 CALC BMI ABV UP PARAM F/U: HCPCS | Performed by: SURGERY

## 2019-04-04 PROCEDURE — 3014F SCREEN MAMMO DOC REV: CPT | Performed by: SURGERY

## 2019-04-04 PROCEDURE — 4040F PNEUMOC VAC/ADMIN/RCVD: CPT | Performed by: SURGERY

## 2019-04-04 PROCEDURE — G8399 PT W/DXA RESULTS DOCUMENT: HCPCS | Performed by: SURGERY

## 2019-04-04 PROCEDURE — 1090F PRES/ABSN URINE INCON ASSESS: CPT | Performed by: SURGERY

## 2019-04-04 ASSESSMENT — ENCOUNTER SYMPTOMS
RESPIRATORY NEGATIVE: 1
STRIDOR: 0
COLOR CHANGE: 0
CHEST TIGHTNESS: 0
EYE PAIN: 0
FACIAL SWELLING: 0
EYE ITCHING: 0
SINUS PAIN: 0
BACK PAIN: 0
WHEEZING: 0
PHOTOPHOBIA: 0
VOICE CHANGE: 0
SORE THROAT: 0
TROUBLE SWALLOWING: 0
COUGH: 0
EYE DISCHARGE: 0
APNEA: 0
SHORTNESS OF BREATH: 0
CHOKING: 0
SINUS PRESSURE: 0
RHINORRHEA: 0
EYE REDNESS: 0
EYES NEGATIVE: 1
GASTROINTESTINAL NEGATIVE: 1

## 2019-04-04 NOTE — PROGRESS NOTES
[address]  [address] [address]  Dept: [phone]  Dept Fax: [phone]  Loc: [phone]    Visit Date: 4/4/2019    Emmy Marquez is a 68 y.o. female who presentstoday for:  Chief Complaint   Patient presents with    Surgical Consult     s/p left breast lumpectomy 10/10/18-has seroma       HPI:     HPI as above patient had a lumpectomy and left axillary sentinel lymph node biopsy in October of last year the patient was recently seen by her oncologist Martha Cerda who felt that she might have a persistent seroma and was referred back for my evaluation.   She states she has felt a fullness some mild discomfort but wanted my opinion wounds have apparently healed well    Past Medical History:   Diagnosis Date    Abdominal aortic aneurysm (AAA) >39 mm diameter (HCC)     found in 2015    Arthritis     Asthma     Brain cyst     benign    Chronic sinus complaints     Diverticulosis of colon     Elevated TSH     Polyneuropathy     PONV (postoperative nausea and vomiting)     Spinal headache     Type 2 diabetes mellitus ([address] 1990's      Past Surgical History:   Procedure Laterality Date    BLADDER SUSPENSION      times 2    CHOLECYSTECTOMY      DILATION AND CURETTAGE OF UTERUS      x2    EYE SURGERY      HYSTERECTOMY  1995    JOINT REPLACEMENT Right 2007    Rt total knee    KNEE ARTHROSCOPY  1967, 2001    MANDIBLE FRACTURE SURGERY      OTHER SURGICAL HISTORY Right 12/30/2015    Excision of Sebaceous Cyst Right Ear     MN OFFICE/OUTPT VISIT,PROCEDURE ONLY Left 10/10/2018    LEFT BREAST LUMPECTOMY WITH SENTINEL LYMPH NODE BIOPSY performed by Ann Marin MD at [address] [facility], [facility] SKIN BIOPSY      TONSILLECTOMY AND ADENOIDECTOMY  age 4        Family History   Problem Relation Age of Onset    Diabetes Mother     Heart Disease Mother     Heart Disease Father     Diabetes Maternal Grandmother     Heart Disease Maternal Grandfather     Cancer Paternal Aunt          multiple myeloma    Cancer Paternal Uncle         leukemia    Breast Cancer Maternal Cousin         Social History     Tobacco Use    Smoking status: Never Smoker    Smokeless tobacco: Never Used   Substance Use Topics    Alcohol use: No     Alcohol/week: 0.0 oz          Current Outpatient Medications   Medication Sig Dispense Refill    pantoprazole (PROTONIX) 40 MG tablet Take 40 mg by mouth daily      ibuprofen (ADVIL;MOTRIN) 600 MG tablet Take 1 tablet by mouth 2 times daily as needed for Pain 180 tablet 0    Calcium Carb-Cholecalciferol 600-500 MG-UNIT CAPS Take by mouth daily      zoledronic acid (ZOMETA) 4 MG/5ML injection Infuse 4 mg intravenously once      anastrozole (ARIMIDEX) 1 MG tablet Take 1 mg by mouth daily      benazepril (LOTENSIN) 10 MG tablet Take 1 tablet by mouth daily 90 tablet 1    aspirin 81 MG chewable tablet Take 81 mg by mouth daily      Insulin Pen Needle (PEN NEEDLES 31GX5/16\") 31G X 8 MM MISC 1 each by Does not apply route daily 100 each 3    insulin glargine (LANTUS) 100 UNIT/ML injection pen Inject 30 Units into the skin nightly (Patient taking differently: Inject 35 Units into the skin nightly ) 5 pen 3    insulin aspart (NOVOLOG FLEXPEN) 100 UNIT/ML injection pen Inject 0-12 Units into the skin 3 times daily (before meals) Per Medium Sliding Scale (Patient taking differently: Inject 0-12 Units into the skin 3 times daily (before meals) 5 units with each meal plus group 2 Sliding Scale) 5 pen 3    glucose blood VI test strips (ASCENSIA AUTODISC VI;ONE TOUCH ULTRA TEST VI) strip Advocate Redi-Code test strips. Tests once daily. 100 each 3    albuterol (PROVENTIL) (2.5 MG/3ML) 0.083% nebulizer solution Take 3 mLs by nebulization every 4 hours as needed for Wheezing 1 Package 1    therapeutic multivitamin-minerals (THERAGRAN-M) tablet Take 1 tablet by mouth daily.          No current wound. Allergic/Immunologic: Positive for environmental allergies. Negative for food allergies and immunocompromised state. Neurological: Negative. Negative for dizziness, tremors, seizures, syncope, facial asymmetry, speech difficulty, weakness, light-headedness, numbness and headaches. Hematological: Negative. Negative for adenopathy. Does not bruise/bleed easily. Psychiatric/Behavioral: Negative. Negative for agitation, behavioral problems, confusion, decreased concentration, dysphoric mood, hallucinations, self-injury, sleep disturbance and suicidal ideas. The patient is not nervous/anxious and is not hyperactive. Objective:   /78 (Site: Right Lower Arm, Position: Sitting, Cuff Size: Medium Adult)   Pulse 74   Temp 97.6 °F (36.4 °C) (Tympanic)   Resp 18   Ht 5' 4\" (1.626 m)   Wt 200 lb (90.7 kg)   SpO2 96%   Breastfeeding? No   BMI 34.33 kg/m²     Physical Exam   Constitutional: She is oriented to person, place, and time. She appears well-developed and well-nourished. HENT:   Head: Normocephalic and atraumatic. Eyes: Pupils are equal, round, and reactive to light. Conjunctivae and EOM are normal. Right eye exhibits no discharge. Left eye exhibits no discharge. No scleral icterus. Neck: Normal range of motion. Neck supple. No JVD present. No thyromegaly present. Cardiovascular: Normal rate and regular rhythm. Exam reveals no gallop and no friction rub. No murmur heard. Pulmonary/Chest: Effort normal and breath sounds normal. No stridor. No respiratory distress. She has no wheezes. She exhibits no tenderness. Musculoskeletal: Normal range of motion. Neurological: She is alert and oriented to person, place, and time. Skin: Skin is warm and dry. No rash noted. No erythema. No pallor. Psychiatric: She has a normal mood and affect.  Her behavior is normal. Judgment and thought content normal.          Results for orders placed or performed in visit on 03/27/19 POCT glycosylated hemoglobin (Hb A1C)   Result Value Ref Range    Hemoglobin A1C 7.7 (H) 4.3 - 5.7 %       Assessment:     No evidence of recurrent seroma no evidence of infection discussed with patient scar tissue    Plan:     Return to office as needed      Electronicallysigned by Angle Tellez MD on 4/4/2019 at 2:18 PM

## 2019-04-09 ENCOUNTER — HOSPITAL ENCOUNTER (OUTPATIENT)
Age: 69
Discharge: HOME | End: 2019-04-09
Payer: MEDICARE

## 2019-04-09 LAB — GLUCOSE BLD-MCNC: 213 MG/DL (ref 70–108)

## 2019-04-09 PROCEDURE — 36415 COLL VENOUS BLD VENIPUNCTURE: CPT

## 2019-04-09 PROCEDURE — 82947 ASSAY GLUCOSE BLOOD QUANT: CPT

## 2019-04-09 PROCEDURE — 84681 ASSAY OF C-PEPTIDE: CPT

## 2019-04-10 ENCOUNTER — OFFICE VISIT (OUTPATIENT)
Dept: ONCOLOGY | Age: 69
End: 2019-04-10
Payer: MEDICARE

## 2019-04-10 ENCOUNTER — HOSPITAL ENCOUNTER (OUTPATIENT)
Dept: INFUSION THERAPY | Age: 69
Discharge: HOME | End: 2019-04-10
Payer: MEDICARE

## 2019-04-10 VITALS
OXYGEN SATURATION: 96 % | SYSTOLIC BLOOD PRESSURE: 149 MMHG | WEIGHT: 202.4 LBS | HEART RATE: 68 BPM | BODY MASS INDEX: 34.56 KG/M2 | RESPIRATION RATE: 16 BRPM | DIASTOLIC BLOOD PRESSURE: 62 MMHG | TEMPERATURE: 98.1 F | HEIGHT: 64 IN

## 2019-04-10 DIAGNOSIS — Z79.811 PROPHYLACTIC USE OF ANASTROZOLE (ARIMIDEX): ICD-10-CM

## 2019-04-10 DIAGNOSIS — M81.8 OTHER OSTEOPOROSIS WITHOUT CURRENT PATHOLOGICAL FRACTURE: ICD-10-CM

## 2019-04-10 DIAGNOSIS — Z98.890 STATUS POST LEFT BREAST LUMPECTOMY: ICD-10-CM

## 2019-04-10 DIAGNOSIS — C50.512 MALIGNANT NEOPLASM OF LOWER-OUTER QUADRANT OF LEFT BREAST OF FEMALE, ESTROGEN RECEPTOR POSITIVE (HCC): Primary | ICD-10-CM

## 2019-04-10 DIAGNOSIS — Z17.0 MALIGNANT NEOPLASM OF LOWER-OUTER QUADRANT OF LEFT BREAST OF FEMALE, ESTROGEN RECEPTOR POSITIVE (HCC): Primary | ICD-10-CM

## 2019-04-10 LAB — C-PEPTIDE: 2.9 NG/ML (ref 1.1–4.4)

## 2019-04-10 PROCEDURE — 1090F PRES/ABSN URINE INCON ASSESS: CPT | Performed by: INTERNAL MEDICINE

## 2019-04-10 PROCEDURE — 3014F SCREEN MAMMO DOC REV: CPT | Performed by: INTERNAL MEDICINE

## 2019-04-10 PROCEDURE — G8399 PT W/DXA RESULTS DOCUMENT: HCPCS | Performed by: INTERNAL MEDICINE

## 2019-04-10 PROCEDURE — 99211 OFF/OP EST MAY X REQ PHY/QHP: CPT

## 2019-04-10 PROCEDURE — 1123F ACP DISCUSS/DSCN MKR DOCD: CPT | Performed by: INTERNAL MEDICINE

## 2019-04-10 PROCEDURE — 1036F TOBACCO NON-USER: CPT | Performed by: INTERNAL MEDICINE

## 2019-04-10 PROCEDURE — 3017F COLORECTAL CA SCREEN DOC REV: CPT | Performed by: INTERNAL MEDICINE

## 2019-04-10 PROCEDURE — 4040F PNEUMOC VAC/ADMIN/RCVD: CPT | Performed by: INTERNAL MEDICINE

## 2019-04-10 PROCEDURE — G8417 CALC BMI ABV UP PARAM F/U: HCPCS | Performed by: INTERNAL MEDICINE

## 2019-04-10 PROCEDURE — G8427 DOCREV CUR MEDS BY ELIG CLIN: HCPCS | Performed by: INTERNAL MEDICINE

## 2019-04-10 PROCEDURE — 99204 OFFICE O/P NEW MOD 45 MIN: CPT | Performed by: INTERNAL MEDICINE

## 2019-04-10 NOTE — PATIENT INSTRUCTIONS
1. Zometa infusion on May 6, 2019  2. Bilateral diagnostic mammogram in September 2019  3.   RTC in 6 months

## 2019-04-10 NOTE — PROGRESS NOTES
This is a 68-year-old patient who was diagnosed with left breast cancer in September 2018. [facility] [address] [place] [address]  [address], [address]  [address]  Dept: [phone]  Dept Fax: [phone]: [phone]    Visit Date:4/10/2019     Rahul Francis is a 68 y.o. female who presents today for:   Chief Complaint   Patient presents with    [facility]     Breast Cancer        HPI:   This is a 68-year-old patient who was diagnosed with left breast cancer in September 2018. She underwent routine screening mammogram on September 7, 2018 that revealed clustered calcifications in the left breast.  She had a needle biopsy on September 19, 2018 showing invasive ductal carcinoma, measuring 7 mm in size, grade 1, % positive, NM 70% positive and HER-2/oleg negative. The patient met with the surgeon Dr. Sesar Casas and after discussing her surgical treatment options she decided to proceed with left lumpectomy and sentinel lymph node biopsy. She had her surgery on October 10, 2018 at the lumpectomy specimen did not reveal evidence of residual malignancy. All 3 sentinel lymph nodes were cancer free. Subsequently the patient met with a radiation oncologist who based on and evidence from CALGB study I didn't recommend postlumpectomy radiation treatment. Subsequently the patient met with Dr. Roz Barrow who placed her on adjuvant hormonal therapy with Arimidex, which she started in October 2018. The patient underwent a bone density study in November 2018 that revealed osteoporosis. The lowest T score was -2.8. The patient initiated treatment with Zometa on November 7, 2018. Since Dr. Shaun Brooke is retiring the patient wants to establish care with new medical oncologist at [address] [place] [address].       HPI   Past Medical History:   Diagnosis Date    Abdominal aortic aneurysm (AAA) >39 mm diameter ([facility]     found in 2015    Arthritis     Asthma     Brain cyst     benign    Cancer ([facility] 09/1918    Left Breast    Chronic sinus complaints     Diverticulosis of colon     DKA (diabetic ketoacidoses) ([facility] 05/2018    Elevated TSH     Hypertension     Osteoarthritis     Polyneuropathy     PONV (postoperative nausea and vomiting)     Spinal headache     Type 2 diabetes mellitus ([facility] 1990's      Past Surgical History:   Procedure Laterality Date    BLADDER SUSPENSION      times 2    CHOLECYSTECTOMY      DILATION AND CURETTAGE OF UTERUS      x2    EYE SURGERY      HYSTERECTOMY  1995    JOINT REPLACEMENT Right 2007    Rt total knee    KNEE ARTHROSCOPY  1967, 2001    MANDIBLE FRACTURE SURGERY      OTHER SURGICAL HISTORY Right 12/30/2015    Excision of Sebaceous Cyst Right Ear     ND OFFICE/OUTPT VISIT,PROCEDURE ONLY Left 10/10/2018    LEFT BREAST LUMPECTOMY WITH SENTINEL LYMPH NODE BIOPSY performed by [address], MD at [facility], [facility] SKIN BIOPSY      TONSILLECTOMY AND ADENOIDECTOMY  age 4      Family History   Problem Relation Age of Onset    Diabetes Mother     Heart Disease Mother     Lupus Mother     Heart Disease Father     Cancer Father         Squamous cell - face & scalp    Diabetes Maternal Grandmother     Heart Disease Maternal Grandfather     Cancer Paternal Aunt          multiple myeloma    Cancer Paternal Uncle         Lymphatic Leukemia    Breast Cancer Maternal Cousin     Heart Disease Maternal Uncle     Lupus Maternal Uncle     Other Paternal Grandfather         Brain aneurysm      Social History     Tobacco Use    Smoking status: Never Smoker    Smokeless tobacco: Never Used   Substance Use Topics    Alcohol use:  Yes     Alcohol/week: 0.0 oz     Comment: Socially      Current Outpatient Medications   Medication Sig Dispense Refill    pantoprazole (PROTONIX) 40 MG tablet Take 40 mg by mouth daily      ibuprofen (ADVIL;MOTRIN) 600 MG tablet Take 1 tablet by mouth 2 times daily as needed for Pain 180 tablet 0    Calcium Carb-Cholecalciferol 600-500 MG-UNIT CAPS Take by mouth daily      zoledronic acid (ZOMETA) 4 MG/5ML injection Infuse 4 mg intravenously once      anastrozole (ARIMIDEX) 1 MG tablet Take 1 mg by mouth daily      benazepril (LOTENSIN) 10 MG tablet Take 1 tablet by mouth daily 90 tablet 1    aspirin 81 MG chewable tablet Take 81 mg by mouth daily      Insulin Pen Needle (PEN NEEDLES 31GX5/16\") 31G X 8 MM MISC 1 each by Does not apply route daily 100 each 3    insulin glargine (LANTUS) 100 UNIT/ML injection pen Inject 30 Units into the skin nightly (Patient taking differently: Inject 35 Units into the skin nightly ) 5 pen 3    insulin aspart (NOVOLOG FLEXPEN) 100 UNIT/ML injection pen Inject 0-12 Units into the skin 3 times daily (before meals) Per Medium Sliding Scale (Patient taking differently: Inject 0-12 Units into the skin 3 times daily (before meals) 5 units with each meal plus group 2 Sliding Scale) 5 pen 3    glucose blood VI test strips (ASCENSIA AUTODISC VI;ONE TOUCH ULTRA TEST VI) strip Advocate Redi-Code test strips. Tests once daily. 100 each 3    therapeutic multivitamin-minerals (THERAGRAN-M) tablet Take 1 tablet by mouth daily.  albuterol (PROVENTIL) (2.5 MG/3ML) 0.083% nebulizer solution Take 3 mLs by nebulization every 4 hours as needed for Wheezing 1 Package 1     No current facility-administered medications for this visit.        Allergies   Allergen Reactions    Contrast [Iodides] Hives, Diarrhea, Nausea And Vomiting and Other (See Comments)     Had  MRI with contrast also caused headaches    Amoxicillin     Donnatal [Pb-Hyoscy-Atropine-Scopol Er] Other (See Comments)     palpitations    Lovastatin Other (See Comments)     myalgia    Metformin And Related Diarrhea    Vioxx Other (See Comments)     Liver problems    Dilaudid [Hydromorphone Hcl] Nausea And Vomiting    Fentanyl Nausea And Vomiting     Severe Nausea and vomitting      Health Maintenance   Topic Date Due    DTaP/Tdap/Td vaccine (1 - Tdap) 03/12/1967    Shingles Vaccine (1 of 2) 03/12/1998    Pneumococcal 65+ years Vaccine (2 of 2 - PPSV23) 10/01/2016    Diabetic foot exam  11/13/2018    Diabetic microalbuminuria test  05/14/2019    TSH testing  05/18/2019    Diabetic retinal exam  06/21/2019    Lipid screen  09/29/2019    Creatinine monitoring  09/29/2019    Potassium monitoring  10/10/2019    Breast cancer screen  03/18/2020    A1C test (Diabetic or Prediabetic)  03/27/2020    Colon cancer screen colonoscopy  06/26/2022    Flu vaccine  Completed    Hepatitis C screen  Completed    DEXA (modify frequency per FRAX score)  Addressed        Subjective:   Review of Systems   Constitutional: Negative for activity change, appetite change, fatigue and fever. HENT: Negative for congestion, dental problem, facial swelling, hearing loss, mouth sores, nosebleeds, sore throat, tinnitus and trouble swallowing. Eyes: Negative for discharge and visual disturbance. Respiratory: Negative for cough, chest tightness, shortness of breath and wheezing. Cardiovascular: Negative for chest pain, palpitations and leg swelling. Gastrointestinal: Negative for abdominal distention, abdominal pain, blood in stool, constipation, diarrhea, nausea, rectal pain and vomiting. Endocrine: Negative for cold intolerance, polydipsia and polyuria. Genitourinary: Negative for decreased urine volume, difficulty urinating, dysuria, flank pain, hematuria and urgency. Musculoskeletal: Negative for arthralgias, back pain, gait problem, joint swelling, myalgias and neck stiffness. Skin: Negative for color change, rash and wound. Neurological: Negative for dizziness, tremors, seizures, speech difficulty, weakness, light-headedness, numbness and headaches. Hematological: Negative for adenopathy. Does not bruise/bleed easily. Psychiatric/Behavioral: Negative for confusion and sleep disturbance.  The patient is not nervous/anxious. Objective:   Physical Exam   Constitutional: She is oriented to person, place, and time. She appears well-developed and well-nourished. No distress. HENT:   Head: Normocephalic. Mouth/Throat: Oropharynx is clear and moist. No oropharyngeal exudate. Eyes: Pupils are equal, round, and reactive to light. EOM are normal. Right eye exhibits no discharge. Left eye exhibits no discharge. No scleral icterus. Neck: Normal range of motion. Neck supple. No JVD present. No tracheal deviation present. No thyromegaly present. Cardiovascular: Normal rate and normal heart sounds. Exam reveals no gallop and no friction rub. No murmur heard. Pulmonary/Chest: Effort normal and breath sounds normal. No stridor. No respiratory distress. She has no wheezes. She has no rales. She exhibits no tenderness. Left breast exhibits mass (Status post left lumpectomy. Lumpectomy incision nicely healed. No palpable masses or nodules). Abdominal: Soft. Bowel sounds are normal. She exhibits no distension and no mass. There is no tenderness. There is no rebound. Musculoskeletal: Normal range of motion. She exhibits no edema. Good range of motion in all four extremities. Lymphadenopathy:     She has no cervical adenopathy. Neurological: She is alert and oriented to person, place, and time. She has normal reflexes. No cranial nerve deficit. She exhibits normal muscle tone. Skin: Skin is warm. No rash noted. No erythema. Psychiatric: She has a normal mood and affect. Her behavior is normal. Judgment and thought content normal.   Vitals reviewed. BP (!) 149/62 (Site: Right Lower Arm, Position: Sitting, Cuff Size: Small Adult)   Pulse 68   Temp 98.1 °F (36.7 °C) (Oral)   Resp 16   Ht 5' 4\" (1.626 m)   Wt 202 lb 6.4 oz (91.8 kg)   SpO2 96%   BMI 34.74 kg/m²      ECOG status is 0. Imaging studies and labs:    [facility] W Or Wo Cad Left  Result Date: 3/18/2019  IMPRESSION: Mammogram BI-RADS: 2: Benign 1. There is no mammographic evidence of malignancy. 2. A 1 year screening mammogram is recommended. 3. The patient has been entered into our database and they will receive a notification when they are due for their next exam.  4. The patient was notified of the results. #[id number] - [facility] DIGITAL DIAGNOSTIC W OR WO CAD LEFT UNILATERAL LEFT DIGITAL DIAGNOSTIC MAMMOGRAM 3D/2D WITH CAD: 3/18/2019 CLINICAL: Tomosynthesis. Breast Cancer left breast.  Comparison is made to exams dated:  9/7/2018 mammogram, 8/16/2017  mammogram, and 12/1/2014 mammogram - [facility]. The tissue of the left breast is heterogeneously dense. This may lower the sensitivity of mammography. Current study was also evaluated with a Computer Aided Detection (CAD) system. There are benign vascular calcifications left breast.  There also  are benign scattered calcifications left breast.  Additionally, there are post operative findings left breast.  No significant masses, calcifications, or other findings are seen  in the breast.  There has been no significant interval change. Jacqueline Colón M.D.          pgk/:3/18/2019 11:08:53  copy to: Cortes Espinal M.D., [facility], ph: [phone], fax: [phone] Imaging Technologist: Emma SIMMONS)(NICKIE), [facility] letter sent: Normal-All Doctor Names         CBC: No results for input(s): WBC, HGB, HCT, MCV, PLT in the last 72 hours. BMP:  Recent Labs     04/09/19  0736   GLUCOSE 213*     PT/INR: No results for input(s): PROTIME, INR in the last 72 hours. APTT: No results for input(s): APTT in the last 72 hours. Assessment/Plan: 1. Stage IA (pT1b, pN0, cM0, G1, ER+, ID+, HER2-) left breast cancer. The patient is status post lumpectomy with sentinel lymph node biopsy. The patient did not have postlumpectomy radiation treatment. The [facility] 9343 study enrolled patients 79years of age and older.  The results showed 10% risk of local disease recurrence at 10 years in women treated with tamoxifen only ( no radiation therapy) in contrast to 2% risk of local disease recurrence at 10 years in women treated with tamoxifen and radiation therapy. However overall survival was the same for both groups. PRIME 2 study, confirmed a modest reduction in recurrence rate with RT that did not translate into a survival benefit. After a median follow-up of five years, ipsilateral breast tumor recurrences were lower in women assigned to RT (1.3 versus 4.1 percent). No differences in overall survival, regional recurrence, distant metastases, contralateral breast cancers, or new breast cancers were noted between the two groups. 2.  Adjuvant hormonal therapy with Arimidex. Adjuvant endocrine therapy reduces breast cancer recurrence and breast cancer mortality in postmenopausal women. AIs result in a more substantial reduction in recurrence rates during treatment and lower breast cancer mortality both during and after treatment. Overall the patient tolerates Arimidex well, minimal arthralgia occasional hot flashes. There is no evidence of disease recurrence on today's physical examination. She was instructed to continue Arimidex. She is due to have annual bilateral mammogram in September 2019  3. Osteoporosis. The patient received first dose of Zometa in November 2019 she is due to have her next dose in May 2019. Diagnosis Orders   1. Malignant neoplasm of lower-outer quadrant of left breast of female, estrogen receptor positive ([facility]  [facility] DIGITAL DIAGNOSTIC W OR WO CAD BILATERAL   2. Other osteoporosis without current pathological fracture     3. Prophylactic use of anastrozole (Arimidex)     4. Status post left breast lumpectomy          Plan:   Return in about 6 months (around 10/10/2019).      Orders Placed:   Orders Placed This Encounter   Procedures    RUDY DIGITAL DIAGNOSTIC W OR WO CAD BILATERAL     Standing Status: Future     Standing Expiration Date:   6/10/2020        Medications Prescribed:   No orders of the defined types were placed in this encounter. Discussed use, benefit, and side effectsof prescribed medications. All patient questions answered. Pt voiced understanding. Instructed to continue current medications, diet and exercise. Patient agreed with treatment plan. Follow up as directed.     Electronically signed by Stephanie Grider MD on 4/10/19 at 2:35 PM

## 2019-04-11 ASSESSMENT — ENCOUNTER SYMPTOMS
TROUBLE SWALLOWING: 0
FACIAL SWELLING: 0
VOMITING: 0
BACK PAIN: 0
ABDOMINAL PAIN: 0
ABDOMINAL DISTENTION: 0
CHEST TIGHTNESS: 0
BLOOD IN STOOL: 0
COLOR CHANGE: 0
WHEEZING: 0
CONSTIPATION: 0
SHORTNESS OF BREATH: 0
EYE DISCHARGE: 0
COUGH: 0
NAUSEA: 0
DIARRHEA: 0
SORE THROAT: 0
RECTAL PAIN: 0

## 2019-04-19 RX ORDER — HEPARIN SODIUM (PORCINE) LOCK FLUSH IV SOLN 100 UNIT/ML 100 UNIT/ML
500 SOLUTION INTRAVENOUS PRN
Status: CANCELLED | OUTPATIENT
Start: 2019-05-07

## 2019-04-19 RX ORDER — SODIUM CHLORIDE 0.9 % (FLUSH) 0.9 %
10 SYRINGE (ML) INJECTION PRN
Status: CANCELLED | OUTPATIENT
Start: 2019-05-07

## 2019-04-19 RX ORDER — SODIUM CHLORIDE 0.9 % (FLUSH) 0.9 %
5 SYRINGE (ML) INJECTION PRN
Status: CANCELLED | OUTPATIENT
Start: 2019-05-07

## 2019-04-19 RX ORDER — ZOLEDRONIC ACID 5 MG/100ML
5 INJECTION, SOLUTION INTRAVENOUS ONCE
Status: CANCELLED | OUTPATIENT
Start: 2019-05-07

## 2019-04-19 RX ORDER — SODIUM CHLORIDE 9 MG/ML
INJECTION, SOLUTION INTRAVENOUS CONTINUOUS
Status: CANCELLED | OUTPATIENT
Start: 2019-05-07

## 2019-04-19 RX ORDER — 0.9 % SODIUM CHLORIDE 0.9 %
10 VIAL (ML) INJECTION ONCE
Status: CANCELLED | OUTPATIENT
Start: 2019-05-07

## 2019-04-19 RX ORDER — METHYLPREDNISOLONE SODIUM SUCCINATE 125 MG/2ML
125 INJECTION, POWDER, LYOPHILIZED, FOR SOLUTION INTRAMUSCULAR; INTRAVENOUS ONCE
Status: CANCELLED | OUTPATIENT
Start: 2019-05-07

## 2019-04-19 RX ORDER — DIPHENHYDRAMINE HYDROCHLORIDE 50 MG/ML
50 INJECTION INTRAMUSCULAR; INTRAVENOUS ONCE
Status: CANCELLED | OUTPATIENT
Start: 2019-05-07

## 2019-04-25 ENCOUNTER — HOSPITAL ENCOUNTER (EMERGENCY)
Dept: GENERAL RADIOLOGY | Age: 69
Discharge: HOME | End: 2019-04-25
Payer: MEDICARE

## 2019-04-25 ENCOUNTER — HOSPITAL ENCOUNTER (EMERGENCY)
Age: 69
Discharge: HOME | End: 2019-04-25
Payer: MEDICARE

## 2019-04-25 VITALS
DIASTOLIC BLOOD PRESSURE: 70 MMHG | RESPIRATION RATE: 16 BRPM | TEMPERATURE: 98.5 F | OXYGEN SATURATION: 97 % | HEART RATE: 71 BPM | SYSTOLIC BLOOD PRESSURE: 147 MMHG

## 2019-04-25 DIAGNOSIS — S62.521B OPEN FRACTURE OF TUFT OF DISTAL PHALANX OF RIGHT THUMB: Primary | ICD-10-CM

## 2019-04-25 DIAGNOSIS — S67.01XA CRUSH INJURY TO THUMB, RIGHT, INITIAL ENCOUNTER: ICD-10-CM

## 2019-04-25 DIAGNOSIS — S60.111A SUBUNGUAL HEMATOMA OF RIGHT THUMB, INITIAL ENCOUNTER: ICD-10-CM

## 2019-04-25 PROCEDURE — 29130 APPL FINGER SPLINT STATIC: CPT | Performed by: NURSE PRACTITIONER

## 2019-04-25 PROCEDURE — 90471 IMMUNIZATION ADMIN: CPT | Performed by: NURSE PRACTITIONER

## 2019-04-25 PROCEDURE — 6370000000 HC RX 637 (ALT 250 FOR IP): Performed by: NURSE PRACTITIONER

## 2019-04-25 PROCEDURE — 99213 OFFICE O/P EST LOW 20 MIN: CPT

## 2019-04-25 PROCEDURE — L3933 FO W/O JOINTS CF: HCPCS

## 2019-04-25 PROCEDURE — 6360000002 HC RX W HCPCS: Performed by: NURSE PRACTITIONER

## 2019-04-25 PROCEDURE — 73130 X-RAY EXAM OF HAND: CPT

## 2019-04-25 PROCEDURE — 29130 APPL FINGER SPLINT STATIC: CPT

## 2019-04-25 PROCEDURE — 90715 TDAP VACCINE 7 YRS/> IM: CPT | Performed by: NURSE PRACTITIONER

## 2019-04-25 PROCEDURE — 2709999900 HC NON-CHARGEABLE SUPPLY

## 2019-04-25 PROCEDURE — 11740 EVACUATION SUBUNGUAL HMTMA: CPT

## 2019-04-25 PROCEDURE — 99214 OFFICE O/P EST MOD 30 MIN: CPT | Performed by: NURSE PRACTITIONER

## 2019-04-25 PROCEDURE — 11740 EVACUATION SUBUNGUAL HMTMA: CPT | Performed by: NURSE PRACTITIONER

## 2019-04-25 RX ORDER — CLINDAMYCIN HYDROCHLORIDE 150 MG/1
600 CAPSULE ORAL ONCE
Status: COMPLETED | OUTPATIENT
Start: 2019-04-25 | End: 2019-04-25

## 2019-04-25 RX ORDER — CLINDAMYCIN HYDROCHLORIDE 300 MG/1
300 CAPSULE ORAL 3 TIMES DAILY
Qty: 15 CAPSULE | Refills: 0 | Status: SHIPPED | OUTPATIENT
Start: 2019-04-25 | End: 2019-04-30

## 2019-04-25 RX ADMIN — CLINDAMYCIN HYDROCHLORIDE 600 MG: 150 CAPSULE ORAL at 13:56

## 2019-04-25 RX ADMIN — TETANUS TOXOID, REDUCED DIPHTHERIA TOXOID AND ACELLULAR PERTUSSIS VACCINE, ADSORBED 0.5 ML: 5; 2.5; 8; 8; 2.5 SUSPENSION INTRAMUSCULAR at 13:52

## 2019-04-25 ASSESSMENT — ENCOUNTER SYMPTOMS
NAUSEA: 0
VOMITING: 0

## 2019-04-25 ASSESSMENT — PAIN - FUNCTIONAL ASSESSMENT: PAIN_FUNCTIONAL_ASSESSMENT: PREVENTS OR INTERFERES SOME ACTIVE ACTIVITIES AND ADLS

## 2019-04-25 ASSESSMENT — PAIN SCALES - GENERAL: PAINLEVEL_OUTOF10: 1

## 2019-04-25 ASSESSMENT — PAIN DESCRIPTION - PROGRESSION: CLINICAL_PROGRESSION: NOT CHANGED

## 2019-04-25 ASSESSMENT — PAIN DESCRIPTION - ORIENTATION: ORIENTATION: RIGHT

## 2019-04-25 ASSESSMENT — PAIN DESCRIPTION - PAIN TYPE: TYPE: ACUTE PAIN

## 2019-04-25 ASSESSMENT — PAIN DESCRIPTION - DESCRIPTORS: DESCRIPTORS: ACHING

## 2019-04-25 ASSESSMENT — PAIN DESCRIPTION - FREQUENCY: FREQUENCY: CONTINUOUS

## 2019-04-25 NOTE — ED PROVIDER NOTES
[address]  Urgent Care Encounter       CHIEF COMPLAINT       Chief Complaint   Patient presents with    Hand Injury     right thumb       Nurses Notes reviewed and I agree except as noted in the HPI. HISTORY OF PRESENT ILLNESS   Chasidy Peña is a 68 y.o. female who presents with a right thumb injury. The patient accidentally closed her thumb in the hinge side of the door this morning. She had reached her hand back to hold on as well at the same time closing the door. The incident occurred around 8:00 this morning. She continues to ooze blood from her the fingernail. She states there is no throbbing pain at this time and pain is rated a one out of 10. She reports normal movement of the thumb. The history is provided by the patient. REVIEW OF SYSTEMS     Review of Systems   Constitutional: Negative for fatigue and fever. Gastrointestinal: Negative for nausea and vomiting. Musculoskeletal:        See HPI   Skin: Positive for wound. Neurological: Negative for dizziness and numbness. PAST MEDICAL HISTORY         Diagnosis Date    Abdominal aortic aneurysm (AAA) >39 mm diameter ([facility])     found in 2015    Arthritis     Asthma     Brain cyst     benign    Cancer ([facility] 09/1918    Left Breast    Chronic sinus complaints     Diverticulosis of colon     DKA (diabetic ketoacidoses) ([address] 05/2018    Elevated TSH     Hypertension     Osteoarthritis     Polyneuropathy     PONV (postoperative nausea and vomiting)     Spinal headache     Type 2 diabetes mellitus ([address] 1990's       SURGICALHISTORY     Patient  has a past surgical history that includes Tonsillectomy and adenoidectomy (age 4); Dilation and curettage of uterus; sinus surgery; Mandible fracture surgery; Cholecystectomy; Hysterectomy (1995);  Knee arthroscopy (1967, 2001); joint replacement (Right, 2007); skin biopsy; eye surgery; bladder suspension; other surgical history (Right, 12/30/2015); and pr office/outpt visit,procedure only (Left, 10/10/2018). CURRENT MEDICATIONS       Discharge Medication List as of 4/25/2019  2:02 PM      CONTINUE these medications which have NOT CHANGED    Details   pantoprazole (PROTONIX) 40 MG tablet Take 40 mg by mouth dailyHistorical Med      ibuprofen (ADVIL;MOTRIN) 600 MG tablet Take 1 tablet by mouth 2 times daily as needed for Pain, Disp-180 tablet, R-0Normal      Calcium Carb-Cholecalciferol 600-500 MG-UNIT CAPS Take by mouth dailyHistorical Med      anastrozole (ARIMIDEX) 1 MG tablet Take 1 mg by mouth dailyHistorical Med      benazepril (LOTENSIN) 10 MG tablet Take 1 tablet by mouth daily, Disp-90 tablet, R-1Normal      aspirin 81 MG chewable tablet Take 81 mg by mouth dailyHistorical Med      insulin glargine (LANTUS) 100 UNIT/ML injection pen Inject 30 Units into the skin nightly, Disp-5 pen, R-3Normal      insulin aspart (NOVOLOG FLEXPEN) 100 UNIT/ML injection pen Inject 0-12 Units into the skin 3 times daily (before meals) Per Medium Sliding Scale, Disp-5 pen, R-3Normal      therapeutic multivitamin-minerals (THERAGRAN-M) tablet Take 1 tablet by mouth daily. zoledronic acid (ZOMETA) 4 MG/5ML injection Infuse 4 mg intravenously onceHistorical Med      Insulin Pen Needle (PEN NEEDLES 31GX5/16\") 31G X 8 MM MISC DAILY Starting Sat 6/9/2018, Disp-100 each, R-3, Normal      glucose blood VI test strips (ASCENSIA AUTODISC VI;ONE TOUCH ULTRA TEST VI) strip Disp-100 each, R-3, NormalAdvocate Redi-Code test strips. Tests once daily. albuterol (PROVENTIL) (2.5 MG/3ML) 0.083% nebulizer solution Take 3 mLs by nebulization every 4 hours as needed for Wheezing, Disp-1 Package, R-1Normal             ALLERGIES     Patient is is allergic to contrast [iodides]; amoxicillin; donnatal [pb-hyoscy-atropine-scopolamine]; lovastatin; metformin and related; vioxx; dilaudid [hydromorphone hcl]; and fentanyl.     Patients   Immunization History   Administered Date(s) Administered

## 2019-04-25 NOTE — ED TRIAGE NOTES
T room with c/o right thumb injury this am in her home. She pinched it in a door early this am. Wound has continue to bleed throughout the day. She is only on ASA to thin blood.

## 2019-04-25 NOTE — ED NOTES
2 by 2 gauze with kerlix applied to thumb. 4 prong Splint on tip of finger.      Lavonne Solo RN  04/25/19 [id number]

## 2019-04-27 ENCOUNTER — HOSPITAL ENCOUNTER (OUTPATIENT)
Age: 69
Discharge: HOME | End: 2019-04-27
Payer: MEDICARE

## 2019-04-27 LAB
CREAT SERPL-MCNC: 0.6 MG/DL (ref 0.4–1.2)
GFR SERPL CREATININE-BSD FRML MDRD: > 90 ML/MIN/1.73M2

## 2019-04-27 PROCEDURE — 82565 ASSAY OF CREATININE: CPT

## 2019-04-27 PROCEDURE — 36415 COLL VENOUS BLD VENIPUNCTURE: CPT

## 2019-04-29 ENCOUNTER — OFFICE VISIT (OUTPATIENT)
Dept: INTERNAL MEDICINE CLINIC | Age: 69
End: 2019-04-29
Payer: MEDICARE

## 2019-04-29 VITALS
HEIGHT: 63 IN | WEIGHT: 203 LBS | BODY MASS INDEX: 35.97 KG/M2 | DIASTOLIC BLOOD PRESSURE: 70 MMHG | SYSTOLIC BLOOD PRESSURE: 138 MMHG

## 2019-04-29 DIAGNOSIS — E11.9 TYPE 2 DIABETES MELLITUS WITHOUT COMPLICATION, WITHOUT LONG-TERM CURRENT USE OF INSULIN (HCC): ICD-10-CM

## 2019-04-29 PROCEDURE — G0108 DIAB MANAGE TRN  PER INDIV: HCPCS | Performed by: INTERNAL MEDICINE

## 2019-04-29 NOTE — PROGRESS NOTES
[facility]  [address] [place] [address] [facility], [address]  [phone] (phone)  [phone] (fax)    Patient ID: Samuel Major [id number]  Referring Provider: Raeann Stanley CNP    Patient's name and  were verified. Subjective:    She presents for Her follow-up diabetic visit. She has type 2 diabetes mellitus. Home regimen includes: insulin She is compliant most of the time. Assessment:     Lab Results   Component Value Date    LABA1C 7.7 2019    LABA1C 7.8 2018    BUN 20 2018    CREATININE 0.6 2019     Vitals:    19 0935   Weight: 203 lb (92.1 kg)   Height: 5' 3\" (1.6 m)     Wt Readings from Last 3 Encounters:   19 203 lb (92.1 kg)   04/10/19 202 lb 6.4 oz (91.8 kg)   19 200 lb (90.7 kg)     Ht Readings from Last 3 Encounters:   19 5' 3\" (1.6 m)   04/10/19 5' 4\" (1.626 m)   19 5' 4\" (1.626 m)       Glucose at 1 hrs PPD today resulted at 219mg/dl  -less than 5 grams carb  Current monitoring regimen: home blood tests - 4 times daily  Home blood sugar trends: FBS's 187-275. Noon 716-439. Dinner 183-104. -274  Any episodes of hypoglycemia? no  Previous visit with dietician: yes -   Current diet: B-6am sc egg/ fruit- orange or pineapple                       L- single serving mac n cheese; yogurt; meat                       D- [place]-sirloin tips/ gr beans/ dinner roll                       Snacking - very rare                      Current exercise: walking a lot--walking the dog/ running errands. has a bike to ride                                Chair aerobics 10 minutes  Eye exam current (within one year): yes Dr. Homa Pack 2018  Any history of foot problems? no  Last foot exam: 2018  Immunizations up to date: yes -   Taking ASA:  Yes  Appropriate for use of MyChart Glucose Grid:  Yes    Focus: Follow up visit. Siddharth Montero is working with Raeann Stanley CNP to manage her Diabetes.  They have been increasing her Lantus dose-up to [address] has had little improvement in blood sugar control with 12 unit increase in Lantus insulin. She has priced the GLP1 and DPP4 therapy and is interested in starting soon vs further increases in Lantus dose. DDP4 has less impact on glucose improvement over GLP1 therapy and she is agreeable to the cost for [address] [facility], especially with potential to omit cost of Lantus. If you agree, please consider initiating this therapy for Pat. Thank you.

## 2019-05-03 ENCOUNTER — OFFICE VISIT (OUTPATIENT)
Dept: FAMILY MEDICINE CLINIC | Age: 69
End: 2019-05-03
Payer: MEDICARE

## 2019-05-03 VITALS
DIASTOLIC BLOOD PRESSURE: 72 MMHG | WEIGHT: 197 LBS | HEART RATE: 80 BPM | BODY MASS INDEX: 34.91 KG/M2 | HEIGHT: 63 IN | SYSTOLIC BLOOD PRESSURE: 122 MMHG

## 2019-05-03 DIAGNOSIS — S60.10XA SUBUNGUAL HEMATOMA OF DIGIT OF HAND, INITIAL ENCOUNTER: ICD-10-CM

## 2019-05-03 DIAGNOSIS — G89.29 CHRONIC PAIN OF LEFT KNEE: ICD-10-CM

## 2019-05-03 DIAGNOSIS — S62.524B OPEN NONDISPLACED FRACTURE OF DISTAL PHALANX OF RIGHT THUMB, INITIAL ENCOUNTER: Primary | ICD-10-CM

## 2019-05-03 DIAGNOSIS — M25.562 CHRONIC PAIN OF LEFT KNEE: ICD-10-CM

## 2019-05-03 PROCEDURE — 1090F PRES/ABSN URINE INCON ASSESS: CPT | Performed by: FAMILY MEDICINE

## 2019-05-03 PROCEDURE — 1123F ACP DISCUSS/DSCN MKR DOCD: CPT | Performed by: FAMILY MEDICINE

## 2019-05-03 PROCEDURE — 4040F PNEUMOC VAC/ADMIN/RCVD: CPT | Performed by: FAMILY MEDICINE

## 2019-05-03 PROCEDURE — G8417 CALC BMI ABV UP PARAM F/U: HCPCS | Performed by: FAMILY MEDICINE

## 2019-05-03 PROCEDURE — 3014F SCREEN MAMMO DOC REV: CPT | Performed by: FAMILY MEDICINE

## 2019-05-03 PROCEDURE — 99213 OFFICE O/P EST LOW 20 MIN: CPT | Performed by: FAMILY MEDICINE

## 2019-05-03 PROCEDURE — 1036F TOBACCO NON-USER: CPT | Performed by: FAMILY MEDICINE

## 2019-05-03 PROCEDURE — 3017F COLORECTAL CA SCREEN DOC REV: CPT | Performed by: FAMILY MEDICINE

## 2019-05-03 PROCEDURE — G8427 DOCREV CUR MEDS BY ELIG CLIN: HCPCS | Performed by: FAMILY MEDICINE

## 2019-05-03 PROCEDURE — G8399 PT W/DXA RESULTS DOCUMENT: HCPCS | Performed by: FAMILY MEDICINE

## 2019-05-03 RX ORDER — IBUPROFEN 600 MG/1
600 TABLET ORAL 2 TIMES DAILY PRN
Qty: 180 TABLET | Refills: 0 | Status: SHIPPED | OUTPATIENT
Start: 2019-05-03 | End: 2019-07-30 | Stop reason: SDUPTHER

## 2019-05-03 ASSESSMENT — ENCOUNTER SYMPTOMS: COLOR CHANGE: 1

## 2019-05-03 NOTE — PROGRESS NOTES
[facility] SERV[facility]  [address] [address]  Dept: [phone]  Dept Fax: [phone]  Loc: [phone]  PROGRESS NOTE      Visit Date: 5/3/2019    Edwina Michel is a 68 y.o. female who presents today for:  Chief Complaint   Patient presents with    ED Follow-up     [facility] Right Thumb Injury- X-rays taken, \"crush injury\"       Subjective:  HPI     Right thumb pain and suspected tuft fracture of the distal phalanx. Injured 1 week ago. She also had a subungual hematoma which they treated with electrocautery trephination. Patient was placed on clindamycin to treat the fracture as being open. Has been out of motrin. Having increasing joint pains. Review of Systems   Constitutional: Negative for chills and fever. Musculoskeletal: Positive for arthralgias. Skin: Positive for color change.        Past Medical History:   Diagnosis Date    Abdominal aortic aneurysm (AAA) >39 mm diameter ([facility])     found in 2015    Arthritis     Asthma     Brain cyst     benign    Cancer ([facility] [address] 09/1918    Left Breast    Chronic sinus complaints     Diverticulosis of colon     DKA (diabetic ketoacidoses) ([facility]) 05/2018    Elevated TSH     Hypertension     Osteoarthritis     Polyneuropathy     PONV (postoperative nausea and vomiting)     Spinal headache     Type 2 diabetes mellitus ([facility]) 1990's      Current Outpatient Medications   Medication Sig Dispense Refill    pantoprazole (PROTONIX) 40 MG tablet Take 40 mg by mouth daily      ibuprofen (ADVIL;MOTRIN) 600 MG tablet Take 1 tablet by mouth 2 times daily as needed for Pain 180 tablet 0    Calcium Carb-Cholecalciferol 600-500 MG-UNIT CAPS Take by mouth daily      zoledronic acid (ZOMETA) 4 MG/5ML injection Infuse 4 mg intravenously once      anastrozole (ARIMIDEX) 1 MG tablet Take 1 mg by mouth daily      benazepril (LOTENSIN) 10 MG tablet Take 1 tablet by mouth daily 90 tablet 1    place, and time. She appears well-developed and well-nourished. Neurological: She is alert and oriented to person, place, and time. Psychiatric: She has a normal mood and affect. Her behavior is normal.   Vitals reviewed. Right thumb: Ecchymosis and swelling of the distal phalanx. Subungual hematoma is present. Tenderness to palpation of the distal phalanx. She is actively able to flex and extend the IP and MCP joint of the thumb. Imaging:   Right hand x-ray from 4/25/19: Was reviewed. Soft tissue swelling distal first digit. Mild irregularity of the distal  tuft of the first digit concerning for fracture. Impression/Plan:  1. Open nondisplaced fracture of distal phalanx of right thumb, initial encounter   Status post 1 weeks since injury. No evidence of ongoing infection. She has completed clindamycin. Monitor for infection. Continue to wear the splint on her finger for the next 3-5 weeks. Ice as needed. Tylenol as needed. Obtain x-ray in 3 weeks. - XR FINGER RIGHT (MIN 2 VIEWS); Future    2. Subungual hematoma of digit of hand, initial encounter  Improved. - XR FINGER RIGHT (MIN 2 VIEWS); Future    3. Chronic pain of left knee  Chronic. Uncontrolled multiple joint pains. Restart motrin. - ibuprofen (ADVIL;MOTRIN) 600 MG tablet; Take 1 tablet by mouth 2 times daily as needed for Pain  Dispense: 180 tablet; Refill: 0      They voiced understanding. All questions answered. They agreed with treatment plan. See patient instructions for any educational materials that may have been given. Discussed use, benefit, and side effects of prescribed medications. (Please note that portions of this note may have been completed with a voice recognition program.  Efforts were made to edit the dictation but occasionally words are mis-transcribed.)    Return if symptoms worsen or fail to improve.        Electronically signed by Ayala Gray MD on 5/3/2019 at 11:13 AM

## 2019-05-03 NOTE — PATIENT INSTRUCTIONS
Thank you for trusting us with your healthcare needs. You may receive a survey regarding today's visit. It would help us out if you would take a few moments to provide your feedback. We value your input.

## 2019-05-06 ENCOUNTER — OFFICE VISIT (OUTPATIENT)
Dept: FAMILY MEDICINE CLINIC | Age: 69
End: 2019-05-06
Payer: MEDICARE

## 2019-05-06 VITALS
WEIGHT: 203 LBS | BODY MASS INDEX: 35.97 KG/M2 | HEIGHT: 63 IN | SYSTOLIC BLOOD PRESSURE: 132 MMHG | HEART RATE: 66 BPM | DIASTOLIC BLOOD PRESSURE: 78 MMHG

## 2019-05-06 DIAGNOSIS — Z00.00 ROUTINE GENERAL MEDICAL EXAMINATION AT A HEALTH CARE FACILITY: Primary | ICD-10-CM

## 2019-05-06 DIAGNOSIS — Z79.4 TYPE 2 DIABETES MELLITUS WITH DIABETIC POLYNEUROPATHY, WITH LONG-TERM CURRENT USE OF INSULIN (HCC): ICD-10-CM

## 2019-05-06 DIAGNOSIS — I10 ESSENTIAL HYPERTENSION: ICD-10-CM

## 2019-05-06 DIAGNOSIS — E11.42 TYPE 2 DIABETES MELLITUS WITH DIABETIC POLYNEUROPATHY, WITH LONG-TERM CURRENT USE OF INSULIN (HCC): ICD-10-CM

## 2019-05-06 DIAGNOSIS — E11.65 TYPE 2 DIABETES MELLITUS WITH HYPERGLYCEMIA, WITHOUT LONG-TERM CURRENT USE OF INSULIN (HCC): ICD-10-CM

## 2019-05-06 DIAGNOSIS — I35.0 NONRHEUMATIC AORTIC VALVE STENOSIS: ICD-10-CM

## 2019-05-06 DIAGNOSIS — C50.512 MALIGNANT NEOPLASM OF LOWER-OUTER QUADRANT OF LEFT BREAST OF FEMALE, ESTROGEN RECEPTOR POSITIVE (HCC): Primary | ICD-10-CM

## 2019-05-06 DIAGNOSIS — Z23 NEED FOR PNEUMOCOCCAL VACCINATION: ICD-10-CM

## 2019-05-06 DIAGNOSIS — Z17.0 MALIGNANT NEOPLASM OF LOWER-OUTER QUADRANT OF LEFT BREAST OF FEMALE, ESTROGEN RECEPTOR POSITIVE (HCC): Primary | ICD-10-CM

## 2019-05-06 PROCEDURE — G8399 PT W/DXA RESULTS DOCUMENT: HCPCS | Performed by: FAMILY MEDICINE

## 2019-05-06 PROCEDURE — 1036F TOBACCO NON-USER: CPT | Performed by: FAMILY MEDICINE

## 2019-05-06 PROCEDURE — 4040F PNEUMOC VAC/ADMIN/RCVD: CPT | Performed by: FAMILY MEDICINE

## 2019-05-06 PROCEDURE — 90732 PPSV23 VACC 2 YRS+ SUBQ/IM: CPT | Performed by: FAMILY MEDICINE

## 2019-05-06 PROCEDURE — G0009 ADMIN PNEUMOCOCCAL VACCINE: HCPCS | Performed by: FAMILY MEDICINE

## 2019-05-06 PROCEDURE — 3045F PR MOST RECENT HEMOGLOBIN A1C LEVEL 7.0-9.0%: CPT | Performed by: FAMILY MEDICINE

## 2019-05-06 PROCEDURE — 3017F COLORECTAL CA SCREEN DOC REV: CPT | Performed by: FAMILY MEDICINE

## 2019-05-06 PROCEDURE — G8427 DOCREV CUR MEDS BY ELIG CLIN: HCPCS | Performed by: FAMILY MEDICINE

## 2019-05-06 PROCEDURE — 3014F SCREEN MAMMO DOC REV: CPT | Performed by: FAMILY MEDICINE

## 2019-05-06 PROCEDURE — G0438 PPPS, INITIAL VISIT: HCPCS | Performed by: FAMILY MEDICINE

## 2019-05-06 PROCEDURE — 1090F PRES/ABSN URINE INCON ASSESS: CPT | Performed by: FAMILY MEDICINE

## 2019-05-06 PROCEDURE — 2022F DILAT RTA XM EVC RTNOPTHY: CPT | Performed by: FAMILY MEDICINE

## 2019-05-06 PROCEDURE — G8417 CALC BMI ABV UP PARAM F/U: HCPCS | Performed by: FAMILY MEDICINE

## 2019-05-06 PROCEDURE — 99213 OFFICE O/P EST LOW 20 MIN: CPT | Performed by: FAMILY MEDICINE

## 2019-05-06 PROCEDURE — 1123F ACP DISCUSS/DSCN MKR DOCD: CPT | Performed by: FAMILY MEDICINE

## 2019-05-06 RX ORDER — BENAZEPRIL HYDROCHLORIDE 10 MG/1
10 TABLET ORAL DAILY
Qty: 90 TABLET | Refills: 1 | Status: SHIPPED | OUTPATIENT
Start: 2019-05-06 | End: 2019-07-23 | Stop reason: CLARIF

## 2019-05-06 ASSESSMENT — LIFESTYLE VARIABLES
AUDIT-C TOTAL SCORE: 1
HAS A RELATIVE, FRIEND, DOCTOR, OR ANOTHER HEALTH PROFESSIONAL EXPRESSED CONCERN ABOUT YOUR DRINKING OR SUGGESTED YOU CUT DOWN: 0
HOW OFTEN DO YOU HAVE A DRINK CONTAINING ALCOHOL: 1
HAVE YOU OR SOMEONE ELSE BEEN INJURED AS A RESULT OF YOUR DRINKING: 0
HOW OFTEN DURING THE LAST YEAR HAVE YOU HAD A FEELING OF GUILT OR REMORSE AFTER DRINKING: 0
HOW OFTEN DO YOU HAVE SIX OR MORE DRINKS ON ONE OCCASION: 0
HOW OFTEN DURING THE LAST YEAR HAVE YOU FOUND THAT YOU WERE NOT ABLE TO STOP DRINKING ONCE YOU HAD STARTED: 0
HOW MANY STANDARD DRINKS CONTAINING ALCOHOL DO YOU HAVE ON A TYPICAL DAY: 0
HOW OFTEN DURING THE LAST YEAR HAVE YOU FAILED TO DO WHAT WAS NORMALLY EXPECTED FROM YOU BECAUSE OF DRINKING: 0
HOW OFTEN DURING THE LAST YEAR HAVE YOU NEEDED AN ALCOHOLIC DRINK FIRST THING IN THE MORNING TO GET YOURSELF GOING AFTER A NIGHT OF HEAVY DRINKING: 0
HOW OFTEN DURING THE LAST YEAR HAVE YOU BEEN UNABLE TO REMEMBER WHAT HAPPENED THE NIGHT BEFORE BECAUSE YOU HAD BEEN DRINKING: 0
AUDIT TOTAL SCORE: 1

## 2019-05-06 ASSESSMENT — PATIENT HEALTH QUESTIONNAIRE - PHQ9
SUM OF ALL RESPONSES TO PHQ QUESTIONS 1-9: 0
SUM OF ALL RESPONSES TO PHQ QUESTIONS 1-9: 0

## 2019-05-06 ASSESSMENT — ANXIETY QUESTIONNAIRES: GAD7 TOTAL SCORE: 0

## 2019-05-06 ASSESSMENT — ENCOUNTER SYMPTOMS
WHEEZING: 0
SHORTNESS OF BREATH: 0

## 2019-05-06 NOTE — PROGRESS NOTES
[facility]  [facility]  [facility]  [address] [id number]  Dept: [phone]  Dept Fax: [phone]  Loc: [phone]  PROGRESS NOTE      VisitDate: 5/6/2019    Rahul Francis is a 68 y.o. female who presents today for:  Chief Complaint   Patient presents with    6 Month Follow-Up    Hypertension    Medicare AWV    Health Maintenance     wants to wait until fall to get the [id number]       Subjective:  Hypertension   Pertinent negatives include no chest pain or shortness of breath. 6 month f/u    HTN:  On benazepril. No chest pain. DM:  She goes to an internal med physician (dr. Jonas Botello) for care. On insulin. Aortic stenosis:  No symptoms. No SOB      Review of Systems   Constitutional: Negative for chills and fever. Respiratory: Negative for shortness of breath and wheezing. Cardiovascular: Positive for leg swelling. Negative for chest pain. Musculoskeletal: Positive for arthralgias and joint swelling.      Past Medical History:   Diagnosis Date    Abdominal aortic aneurysm (AAA) >39 mm diameter (HCC)     found in 2015    Arthritis     Asthma     Brain cyst     benign    Cancer ([address] 09/1918    Left Breast    Chronic sinus complaints     Diverticulosis of colon     DKA (diabetic ketoacidoses) ([address] 05/2018    Elevated TSH     Hypertension     Osteoarthritis     Polyneuropathy     PONV (postoperative nausea and vomiting)     Spinal headache     Type 2 diabetes mellitus ([address] 1990's      Past Surgical History:   Procedure Laterality Date    BLADDER SUSPENSION      times 2    CHOLECYSTECTOMY      DILATION AND CURETTAGE OF UTERUS      x2    EYE SURGERY      HYSTERECTOMY  1995    JOINT REPLACEMENT Right 2007    Rt total knee    KNEE ARTHROSCOPY  1967, 2001    MANDIBLE FRACTURE SURGERY      OTHER SURGICAL HISTORY Right 12/30/2015    Excision of Sebaceous Cyst Right Ear     [place] OFFICE/OUTPT VISIT,PROCEDURE ONLY Left 10/10/2018    LEFT BREAST LUMPECTOMY WITH SENTINEL LYMPH NODE BIOPSY performed by Emerson Rubinstein, MD at [address] [facility], [facility] SKIN BIOPSY      TONSILLECTOMY AND ADENOIDECTOMY  age 4     Family History   Problem Relation Age of Onset    Diabetes Mother     Heart Disease Mother     Lupus Mother     Heart Disease Father     Cancer Father         Squamous cell - face & scalp    Diabetes Maternal Grandmother     Heart Disease Maternal Grandfather     Cancer Paternal Aunt          multiple myeloma    Cancer Paternal Uncle         Lymphatic Leukemia    Breast Cancer Maternal Cousin     Heart Disease Maternal Uncle     Lupus Maternal Uncle     Other Paternal Grandfather         Brain aneurysm     Social History     Tobacco Use    Smoking status: Never Smoker    Smokeless tobacco: Never Used   Substance Use Topics    Alcohol use:  Yes     Alcohol/week: 0.0 oz     Comment: Socially      Current Outpatient Medications   Medication Sig Dispense Refill    ibuprofen (ADVIL;MOTRIN) 600 MG tablet Take 1 tablet by mouth 2 times daily as needed for Pain 180 tablet 0    pantoprazole (PROTONIX) 40 MG tablet Take 40 mg by mouth daily      Calcium Carb-Cholecalciferol 600-500 MG-UNIT CAPS Take by mouth daily      zoledronic acid (ZOMETA) 4 MG/5ML injection Infuse 4 mg intravenously once      anastrozole (ARIMIDEX) 1 MG tablet Take 1 mg by mouth daily      benazepril (LOTENSIN) 10 MG tablet Take 1 tablet by mouth daily 90 tablet 1    aspirin 81 MG chewable tablet Take 81 mg by mouth daily      Insulin Pen Needle (PEN NEEDLES 31GX5/16\") 31G X 8 MM MISC 1 each by Does not apply route daily 100 each 3    insulin glargine (LANTUS) 100 UNIT/ML injection pen Inject 30 Units into the skin nightly (Patient taking differently: Inject 47 Units into the skin nightly ) 5 pen 3    insulin aspart (NOVOLOG FLEXPEN) 100 UNIT/ML injection pen Inject 0-12 Units into the skin 3 times daily (before meals) Per Objective:  /78 (Site: Left Upper Arm, Position: Sitting, Cuff Size: Medium Adult)   Pulse 66   Ht 5' 3\" (1.6 m)   Wt 203 lb (92.1 kg)   BMI 35.96 kg/m²   Physical Exam   Constitutional: She is oriented to person, place, and time. She appears well-developed and well-nourished. Cardiovascular: Normal rate and regular rhythm. Murmur heard. Systolic murmur is present with a grade of 1/6. Pulmonary/Chest: Effort normal and breath sounds normal. No respiratory distress. She has no wheezes. Neurological: She is alert and oriented to person, place, and time. Psychiatric: She has a normal mood and affect. Her behavior is normal.   Vitals reviewed. Visual inspection:  Deformity/amputation: absent  Skin lesions/pre-ulcerative calluses: absent  Edema: right- trace, left- trace    Sensory exam:  Monofilament sensation: abnormal - in right foot and normal in left foot  (minimum of 5 random plantar locations tested, avoiding callused areas - > 1 area with absence of sensation is + for neuropathy)    Plus at least one of the following:  Pulses: normal,     Lab Results   Component Value Date    WBC 6.1 09/29/2018    HGB 15.1 (H) 09/29/2018    HCT 44.9 (H) 09/29/2018     09/29/2018    CHOL 217 (H) 09/29/2018    TRIG 152 (H) 09/29/2018    HDL 50 09/29/2018    ALT 22 09/29/2018    AST 17 09/29/2018     09/29/2018    K 4.8 10/10/2018     09/29/2018    CREATININE 0.6 04/27/2019    BUN 20 09/29/2018    CO2 28 09/29/2018    TSH 1.940 05/18/2018    INR 0.95 05/21/2012    LABA1C 7.7 (H) 03/27/2019       Impression/Plan:  1. Essential hypertension  Well-controlled. Chronic condition. Refill medication(s). - benazepril (LOTENSIN) 10 MG tablet; Take 1 tablet by mouth daily  Dispense: 90 tablet; Refill: 1  - [place] OFFICE OUTPATIENT VISIT 15 MINUTES [00145]    2. Type 2 diabetes mellitus with hyperglycemia, without long-term current use of insulin (HCC)  Chronic.   Follows with DM clinic  - benazepril (LOTENSIN) 10 MG tablet; Take 1 tablet by mouth daily  Dispense: 90 tablet; Refill: 1  - [facility] DIABETES FOOT EXAM  - [place] OFFICE OUTPATIENT VISIT 15 MINUTES [98006]    3. Nonrheumatic aortic valve stenosis  Chronic. Monitor. Last echo in 2018 showed moderate  - [place] OFFICE OUTPATIENT VISIT 15 MINUTES [71102]    Quality & Risk Score Accuracy    Visit Dx:  E11.42, Z79.4 - Type 2 diabetes mellitus with diabetic polyneuropathy, with long-term current use of insulin ([facility])  Assessment and plan:  Stable based upon symptoms and exam. Continue current treatment plan and follow up at least yearly. Last edited 05/06/19 09:47 EDT by Audrey Pagan MD             Diet and exercise. They voiced understanding. All questions answered. They agreed with treatment plan. See patient instructions for any educational materials that may have been given. Discussed use, benefit, and side effects of prescribed medications. Reviewed health maintenance. (Please note that portions of this note may have been completed with a voice recognition program.  Efforts were made to edit the dictation but occasionally words are mis-transcribed.)    Return in about 6 months (around 11/6/2019) for HTN, DM. Marcia Mota received counseling on the following healthy behaviors: nutrition and exercise  Reviewed prior labs and health maintenance  Continue current medications, diet and exercise. Discussed use, benefit, and side effects of prescribed medications. Barriers to medication compliance addressed. Patient given educational materials - see patient instructions  Was a self-tracking handout given in paper form or via [employer]? No:     Requested Prescriptions     Signed Prescriptions Disp Refills    benazepril (LOTENSIN) 10 MG tablet 90 tablet 1     Sig: Take 1 tablet by mouth daily       All patient questions answered. Patient voiced understanding. Quality Measures    Body mass index is 35.96 kg/m². Elevated.  Weight control planned discussed Healthy diet and regular exercise. BP: 132/78. Blood pressure is normal. Treatment plan consists of No treatment change needed. Fall Risk 5/6/2019 5/14/2018 4/11/2017 10/13/2015   2 or more falls in past year? no no no no   Fall with injury in past year? no no no no     The patient does not have a history of falls. I did , complete a risk assessment for falls.  A plan of care for falls No Treatment plan indicated    Lab Results   Component Value Date    LDLCALC 137 (H) 09/29/2018    (goal LDL reduction with dx if diabetes is 50% LDL reduction)    PHQ Scores 5/6/2019 5/14/2018 4/11/2017 10/13/2015   PHQ2 Score 0 0 0 0   PHQ9 Score 0 0 0 0     Interpretation of Total Score Depression Severity: 1-4 = Minimal depression, 5-9 = Mild depression, 10-14 = Moderate depression, 15-19 = Moderately severe depression, 20-27 = Severe depression      Electronically signed by Dalbert Skiff, MD on 5/6/2019 at 9:01 AM

## 2019-05-06 NOTE — PROGRESS NOTES
8 MM MISC 1 each by Does not apply route daily Yes Chato Linn MD   insulin glargine (LANTUS) 100 UNIT/ML injection pen Inject 30 Units into the skin nightly  Patient taking differently: Inject 47 Units into the skin nightly  Yes Sona Guerin MD   insulin aspart (NOVOLOG FLEXPEN) 100 UNIT/ML injection pen Inject 0-12 Units into the skin 3 times daily (before meals) Per Medium Sliding Scale  Patient taking differently: Inject 0-12 Units into the skin 3 times daily (before meals) 3 times per day AC meals. BG less than 110 = 5 units, 110-150=7 units, 151-200=10units, 201-250 12 units, 251-300=14 unit, 301-350=16units, 351-400=18 unit Yes Sona Guerin MD   glucose blood VI test strips (ASCENSIA AUTODISC VI;ONE TOUCH ULTRA TEST VI) strip [employer]-Code test strips. Tests once daily. Yes Chato Linn MD   albuterol (PROVENTIL) (2.5 MG/3ML) 0.083% nebulizer solution Take 3 mLs by nebulization every 4 hours as needed for Wheezing Yes NAV Sarmiento - CNP   therapeutic multivitamin-minerals [facility]) tablet Take 1 tablet by mouth daily.    Yes Historical Provider, MD     Past Medical History:   Diagnosis Date    Abdominal aortic aneurysm (AAA) >39 mm diameter ([address]     found in 2015    Arthritis     Asthma     Brain cyst     benign    Cancer ([address] 09/1918    Left Breast    Chronic sinus complaints     Diverticulosis of colon     DKA (diabetic ketoacidoses) ([address] 05/2018    Elevated TSH     Hypertension     Osteoarthritis     Polyneuropathy     PONV (postoperative nausea and vomiting)     Spinal headache     Type 2 diabetes mellitus ([address] 1990's     Past Surgical History:   Procedure Laterality Date    BLADDER SUSPENSION      times 2    CHOLECYSTECTOMY      DILATION AND CURETTAGE OF UTERUS      x2    EYE SURGERY      HYSTERECTOMY  1995    JOINT REPLACEMENT Right 2007    Rt total knee    KNEE ARTHROSCOPY  1967, 2001    MANDIBLE FRACTURE SURGERY      OTHER SURGICAL HISTORY Right 12/30/2015    Excision of Sebaceous Cyst Right Ear     [place] OFFICE/OUTPT VISIT,PROCEDURE ONLY Left 10/10/2018    LEFT BREAST LUMPECTOMY WITH SENTINEL LYMPH NODE BIOPSY performed by Dhaval Mast MD at [facility] [address], [facility] SKIN BIOPSY      TONSILLECTOMY AND ADENOIDECTOMY  age 4     Family History   Problem Relation Age of Onset    Diabetes Mother     Heart Disease Mother     Lupus Mother     Heart Disease Father     Cancer Father         Squamous cell - face & scalp    Diabetes Maternal Grandmother     Heart Disease Maternal Grandfather     Cancer Paternal Aunt          multiple myeloma    Cancer Paternal Uncle         Lymphatic Leukemia    Breast Cancer Maternal Cousin     Heart Disease Maternal Uncle     Lupus Maternal Uncle     Other Paternal Grandfather         Brain aneurysm       CareTeam (Including outside providers/suppliers regularly involved in providing care):   Patient Care Team:  Radha Mix MD as PCP - General (Family Medicine)  Radha Mix MD as PCP - [facility] Attributed Provider  Lauryn Carney RN as Nurse Navigator  Stephanie Grider MD as Consulting Physician (Oncology)    Wt Readings from Last 3 Encounters:   05/06/19 203 lb (92.1 kg)   05/03/19 197 lb (89.4 kg)   04/29/19 203 lb (92.1 kg)     Vitals:    05/06/19 0849   BP: 132/78   Site: Left Upper Arm   Position: Sitting   Cuff Size: Medium Adult   Pulse: 66   Weight: 203 lb (92.1 kg)   Height: 5' 3\" (1.6 m)     Body mass index is 35.96 kg/m². Based upon direct observation of the patient, evaluation of cognition reveals recent and remote memory intact. No exam    Patient's complete Health Risk Assessment and screening values have been reviewed and are found in Flowsheets. The following problems were reviewed today and where indicated follow up appointments were made and/or referrals ordered.     Positive Risk Factor Screenings with Interventions:     General Health:  General  In general, how would you say your health is?: Good  In the past 7 days, have you experienced any of the following? New or Increased Pain, New or Increased Fatigue, Loneliness, Social Isolation, Stress or Anger?: (!) New or Increased Pain(crushed thumb 04/25/2019, hinge of door)  Do you get the social and emotional support that you need?: Yes  Do you have a Living Will?: (!) No  General Health Risk Interventions:  · Pain issues: patient declines any further evaluation/treatment for this issue  · No Living Will: additional information provided brochure on living will    Health Habits/Nutrition:  Health Habits/Nutrition  Do you exercise for at least 20 minutes 2-3 times per week?: Yes  Have you lost any weight without trying in the past 3 months?: No  Do you eat fewer than 2 meals per day?: No  Have you seen a dentist within the past year?: Yes  Body mass index is 35.96 kg/m². Health Habits/Nutrition Interventions:  · increase exercise/walking and riding bike    Safety:  Safety  Do you have working smoke detectors?: Yes  Have all throw rugs been removed or fastened?: (!) No  Do you have non-slip mats in all bathtubs?: (!) No  Do all of your stairways have a railing or banister?: (!) No  Are your doorways, halls and stairs free of clutter?: Yes  Do you always fasten your seatbelt when you are in a car?: Yes  Safety Interventions:  · Home safety tips provided    ADL:  ADLs  In the past 7 days, did you need help from others to perform any of the following everyday activities? Eating, dressing, grooming, bathing, toileting, or walking/balance?: (!) Walking/Balance(because of lt knee)  In the past 7 days, did you need help from others to take care of any of the following?  Laundry, housekeeping, banking/finances, shopping, telephone use, food preparation, transportation, or taking medications?: None  ADL Interventions:  · Patient declines any further evaluation/treatment for this issue    Personalized Preventive Plan   Current Health Maintenance Status  Immunization History   Administered Date(s) Administered    Influenza Virus Vaccine 10/01/2015, 09/28/2018    Influenza, Rosina Eisenmenger, 3 Years and older, IM (Fluzone 3 yrs and older or Afluria 5 yrs and older) 09/27/2017    Pneumococcal 13-valent Conjugate (Charlotte Medal) 10/01/2015    Tdap (Boostrix, Adacel) 04/25/2019        Health Maintenance   Topic Date Due    Shingles Vaccine (1 of 2) 03/12/1998    Pneumococcal 65+ years Vaccine (2 of 2 - PPSV23) 10/01/2016    Diabetic foot exam  11/13/2018    Diabetic microalbuminuria test  05/14/2019    TSH testing  05/18/2019    Diabetic retinal exam  06/21/2019    Lipid screen  09/29/2019    Potassium monitoring  10/10/2019    Breast cancer screen  03/18/2020    A1C test (Diabetic or Prediabetic)  03/27/2020    Creatinine monitoring  04/27/2020    Colon cancer screen colonoscopy  06/26/2022    DTaP/Tdap/Td vaccine (2 - Td) 04/25/2029    Flu vaccine  Completed    Hepatitis C screen  Completed    DEXA (modify frequency per FRAX score)  Addressed     Recommendations for Preventive Services Due: see orders and patient instructions/AVS.  .   Recommended screening schedule for the next 5-10 years is provided to the patient in written form: see Patient Lisbeth Johansen MD

## 2019-05-06 NOTE — PATIENT INSTRUCTIONS
that are labeled \"unsalted,\" \"sodium-free,\" or \"low-sodium. \" Foods labeled \"reduced-sodium\" and \"light sodium\" may still have too much sodium. ? Flavor your food with garlic, lemon juice, onion, vinegar, herbs, and spices instead of salt. Do not use soy sauce, steak sauce, onion salt, garlic salt, mustard, or ketchup on your food. ? Use less salt (or none) when recipes call for it. You can often use half the salt a recipe calls for without losing flavor. · Be physically active. Get at least 30 minutes of exercise on most days of the week. Walking is a good choice. You also may want to do other activities, such as running, swimming, cycling, or playing tennis or team sports. · Limit alcohol to 2 drinks a day for men and 1 drink a day for women. · Eat plenty of fruits, vegetables, and low-fat dairy products. Eat less saturated and total fats. How is high blood pressure treated? · Your doctor will suggest making lifestyle changes. For example, your doctor may ask you to eat healthy foods, quit smoking, lose extra weight, and be more active. · If lifestyle changes don't help enough, your doctor may recommend that you take medicine. · When blood pressure is very high, medicines are needed to lower it. Follow-up care is a key part of your treatment and safety. Be sure to make and go to all appointments, and call your doctor if you are having problems. It's also a good idea to know your test results and keep a list of the medicines you take. Where can you learn more? Go to [email or web address] org and sign in to your [employer] account. Enter P501 in the Shopography box to learn more about \"Learning About High Blood Pressure. \"     If you do not have an account, please click on the \"Sign Up Now\" link. Current as of: July 22, 2018  Content Version: 11.9  © 3407-7642 [employer]. Care instructions adapted under license by [facility] [place] [facility] [facility]  If you have questions about a medical condition or this instruction, always ask your healthcare professional. [place] [address] any warranty or liability for your use of this information. Personalized Preventive Plan for Chasidy Mariana - 5/6/2019  Medicare offers a range of preventive health benefits. Some of the tests and screenings are paid in full while other may be subject to a deductible, co-insurance, and/or copay. Some of these benefits include a comprehensive review of your medical history including lifestyle, illnesses that may run in your family, and various assessments and screenings as appropriate. After reviewing your medical record and screening and assessments performed today your provider may have ordered immunizations, labs, imaging, and/or referrals for you. A list of these orders (if applicable) as well as your Preventive Care list are included within your After Visit Summary for your review. Other Preventive Recommendations:    · A preventive eye exam performed by an eye specialist is recommended every 1-2 years to screen for glaucoma; cataracts, macular degeneration, and other eye disorders. · A preventive dental visit is recommended every 6 months. · Try to get at least 150 minutes of exercise per week or 10,000 steps per day on a pedometer . · Order or download the FREE \"Exercise & Physical Activity: Your Everyday Guide\" from The [employer]. Call 1-570.738.3754 or search [employer] online. · You need 5342-7789 mg of calcium and 5077-0223 IU of vitamin D per day. It is possible to meet your calcium requirement with diet alone, but a vitamin D supplement is usually necessary to meet this goal.  · When exposed to the sun, use a sunscreen that protects against both UVA and UVB radiation with an SPF of 30 or greater. Reapply every 2 to 3 hours or after sweating, drying off with a towel, or swimming. · Always wear a seat belt when traveling in a car.  Always wear a helmet when riding a bicycle or motorcycle. Keeping Home a [facility]       As we get older, changes in balance, gait, strength, vision, hearing, and cognition make even the most youthful senior more prone to accidents. Falls are one of the leading health risks for older people. This increased risk of falling is related to:   Aging process (eg, decreased muscle strength, slowed reflexes)   Higher incidence of chronic health problems (eg, arthritis, diabetes) that may limit mobility, agility or sensory awareness   Side effects of medicine (eg, dizziness, blurred vision)especially medicines like prescription pain medicines and drugs used to treat mental health conditions   Depending on the brittleness of your bones, the consequences of a fall can be serious and long lasting. Home Life   Research by the Association of Aging [facility]) shows that some home accidents among older adults can be prevented by making simple lifestyle changes and basic modifications and repairs to the home environment. Here are some lifestyle changes that experts recommend:   Have your hearing and vision checked regularly. Be sure to wear prescription glasses that are right for you. Speak to your doctor or pharmacist about the possible side effects of your medicines. A number of medicines can cause dizziness. If you have problems with sleep, talk to your doctor. Limit your intake of alcohol. If necessary, use a cane or walker to help maintain your balance. Wear supportive, rubber-soled shoes, even at home. If you live in a region that gets wintry weather, you may want to put special cleats on your shoes to prevent you from slipping on the snow and ice. Exercise regularly to help maintain muscle tone, agility, and balance. Always hold the banister when going up or down stairs. Also, use  bars when getting in or out of the bath or shower, or using the toilet.    To avoid dizziness, get up slowly from a lying down position. Sit up first, dangling your legs for a minute or two before rising to a standing position. Overall Home Safety Check   According to the Consumer Product Safety Commision's \"Older Consumer Home Safety Checklist,\" it is important to check for potential hazards in each room. And remember, proper lighting is an essential factor in home safety. If you cannot see clearly, you are more likely to fall. Important questions to ask yourself include:   Are lamp, electric, extension, and telephone cords placed out of the flow of traffic and maintained in good condition? Have frayed cords been replaced? Are all small rugs and runners slip resistant? If not, you can secure them to the floor with a special double-sided carpet tape. Are smoke detectors properly locatedone on every floor of your home and one outside of every sleeping area? Are they in good working order? Are batteries replaced at least once a year? Do you have a well-maintained carbon monoxide detector outside every sleeping are in your home? Does your furniture layout leave plenty of space to maneuver between and around chairs, tables, beds, and sofas? Are hallways, stairs and passages between rooms well lit? Can you reach a lamp without getting out of bed? Are floor surfaces well maintained? Shag rugs, high-pile carpeting, tile floors, and polished wood floors can be particularly slippery. Stairs should always have handrails and be carpeted or fitted with a non-skid tread. Is your telephone easily reachable. Is the cord safely tucked away? Room by Room   According to the Association of Aging, bathrooms and heena are the two most potentially hazardous rooms in your home. In the Kitchen    Be sure your stove is in proper working order and always make sure burners and the oven are off before you go out or go to sleep.     Keep pots on the back burners, turn handles away from the front of the stove, and keep stove clean and free of grease build-up. Kitchen ventilation systems and range exhausts should be working properly. Keep flammable objects such as towels and pot holders away from the cooking area except when in use. Make sure kitchen curtains are tied back. Move cords and appliances away from the sink and hot surfaces. If extension cords are needed, install wiring guides so they do not hang over the sink, range, or working areas. Look for coffee pots, kettles and toaster ovens with automatic shut-offs. Keep a mop handy in the kitchen so you can wipe up spills instantly. You should also have a small fire extinguisher. Arrange your kitchen with frequently used items on lower shelves to avoid the need to stand on a stepstool to reach them. Make sure countertops are well-lit to avoid injuries while cutting and preparing food. In the Bathroom    Use a non-slip mat or decals in the tub and shower, since wet, soapy tile or porcelain surfaces are extremely slippery. Make sure bathroom rugs are non-skid or tape them firmly to the floor. Bathtubs should have at least one, preferably two, grab bars, firmly attached to structural supports in the wall. (Do not use built-in soap holders or glass shower doors as grab bars.)    Tub seats fitted with non-slip material on the legs allow you to wash sitting down. For people with limited mobility, bathtub transfer benches allow you to slide safely into the tub. Raised toilet seats and toilet safety rails are helpful for those with knee or hip problems. In the [facility]    Make sure you use a nightlight and that the area around your bed is clear of potential obstacles. Be careful with electric blankets and never go to sleep with a heating pad, which can cause serious burns even if on a low setting. Use fire-resistant mattress covers and pillows, and NEVER smoke in bed. Keep a phone next to the bed that is programmed to dial 911 at the push of a button.       If you have a chronic condition, you may want to sign on with an automatic call-in service. Typically the system includes a small pendant that connects directly to an emergency medical voice-response system. You should also make arrangements to stay in contact with someonefriend, neighbor, family memberon a regular schedule. Fire Prevention   According to the [employer]. (Smoke Alarms for Every) [address] [place] [address], senior citizens are one of the two highest risk groups for death and serious injuries due to residential fires. When cooking, wear short-sleeved items, never a bulky long-sleeved robe. The [facility]'s Safety Checklist for Older Consumers emphasizes the importance of checking basements, garages, workshops and storage areas for fire hazards, such as volatile liquids, piles of old rags or clothing and overloaded circuits. Never smoke in bed or when lying down on a couch or recliner chair. Small portable electric or kerosene heaters are responsible for many home fires and should be used cautiously if at all. If you do use one, be sure to keep them away from flammable materials. In case of fire, make sure you have a pre-established emergency exit plan. Have a professional check your fireplace and other fuel-burning appliances yearly. Helping [employer]   Baby boomers entering the mendoza years will continue to see the development of new products to help older adults live safely and independently in spite of age-related changes. Making Life More Livable  , by [employer], lists over 1,000 products for \"living well in the mature years,\" such as bathing and mobility aids, household security devices, ergonomically designed knives and peelers, and faucet valves and knobs for temperature control. Medical supply stores and organizations are good sources of information about products that improve your quality of life and insure your safety.      Last Reviewed: November 2009 Benitez Engle MD   Updated: 3/7/2011 ·

## 2019-05-06 NOTE — PROGRESS NOTES
After obtaining consent, and per orders of Inessa Au MD, Immunization(s) given during visit:    Immunizations     Name Date Dose Route    Pneumococcal Polysaccharide ([id number]) 5/6/2019 0.5 mL Intramuscular    Site: Deltoid- Left    Lot: [id number]    NDC: 7982-3721-13

## 2019-05-07 ENCOUNTER — HOSPITAL ENCOUNTER (OUTPATIENT)
Dept: INFUSION THERAPY | Age: 69
Discharge: HOME | End: 2019-05-07
Payer: MEDICARE

## 2019-05-07 VITALS
HEART RATE: 69 BPM | BODY MASS INDEX: 30.87 KG/M2 | SYSTOLIC BLOOD PRESSURE: 157 MMHG | HEIGHT: 68 IN | OXYGEN SATURATION: 93 % | DIASTOLIC BLOOD PRESSURE: 70 MMHG | TEMPERATURE: 98.2 F | RESPIRATION RATE: 16 BRPM

## 2019-05-07 DIAGNOSIS — Z17.0 MALIGNANT NEOPLASM OF LOWER-OUTER QUADRANT OF LEFT BREAST OF FEMALE, ESTROGEN RECEPTOR POSITIVE (HCC): Primary | ICD-10-CM

## 2019-05-07 DIAGNOSIS — M81.8 OTHER OSTEOPOROSIS WITHOUT CURRENT PATHOLOGICAL FRACTURE: ICD-10-CM

## 2019-05-07 DIAGNOSIS — C50.512 MALIGNANT NEOPLASM OF LOWER-OUTER QUADRANT OF LEFT BREAST OF FEMALE, ESTROGEN RECEPTOR POSITIVE (HCC): Primary | ICD-10-CM

## 2019-05-07 LAB
ALBUMIN SERPL-MCNC: 4 G/DL (ref 3.5–5.1)
ALP BLD-CCNC: 63 U/L (ref 38–126)
ALT SERPL-CCNC: 17 U/L (ref 11–66)
AST SERPL-CCNC: 17 U/L (ref 5–40)
BILIRUB SERPL-MCNC: 0.7 MG/DL (ref 0.3–1.2)
BILIRUBIN DIRECT: < 0.2 MG/DL (ref 0–0.3)
BUN, WHOLE BLOOD: 18 MG/DL (ref 8–26)
CHLORIDE, WHOLE BLOOD: 105 MEQ/L (ref 98–109)
CREATININE, WHOLE BLOOD: 0.6 MG/DL (ref 0.5–1.2)
GFR, ESTIMATED: > 90 ML/MIN/1.73M2
GLUCOSE, WHOLE BLOOD: 162 MG/DL (ref 70–108)
HCT VFR BLD CALC: 39.9 % (ref 37–47)
HEMOGLOBIN: 13.7 GM/DL (ref 12–16)
IONIZED CALCIUM, WHOLE BLOOD: 1.19 MMOL/L (ref 1.12–1.32)
MCH RBC QN AUTO: 30.1 PG (ref 27–31)
MCHC RBC AUTO-ENTMCNC: 34.5 GM/DL (ref 33–37)
MCV RBC AUTO: 87 FL (ref 81–99)
PDW BLD-RTO: 10.6 % (ref 11.5–14.5)
PLATELET # BLD: 194 THOU/MM3 (ref 130–400)
PMV BLD AUTO: 7.3 FL (ref 7.4–10.4)
POTASSIUM, WHOLE BLOOD: 3.9 MEQ/L (ref 3.5–4.9)
RBC # BLD: 4.56 MILL/MM3 (ref 4.2–5.4)
SEG NEUTROPHILS: 60 % (ref 43–75)
SEGMENTED NEUTROPHILS ABSOLUTE COUNT: 3.3 THOU/MM3 (ref 1.8–7.7)
SODIUM, WHOLE BLOOD: 143 MEQ/L (ref 138–146)
TOTAL CO2, WHOLE BLOOD: 25 MEQ/L (ref 23–33)
TOTAL PROTEIN: 6.8 G/DL (ref 6.1–8)
WBC # BLD: 5.5 THOU/MM3 (ref 4.8–10.8)

## 2019-05-07 PROCEDURE — 2709999900 HC NON-CHARGEABLE SUPPLY

## 2019-05-07 PROCEDURE — 6360000002 HC RX W HCPCS: Performed by: INTERNAL MEDICINE

## 2019-05-07 PROCEDURE — 80047 BASIC METABLC PNL IONIZED CA: CPT

## 2019-05-07 PROCEDURE — 2580000003 HC RX 258: Performed by: INTERNAL MEDICINE

## 2019-05-07 PROCEDURE — 85027 COMPLETE CBC AUTOMATED: CPT

## 2019-05-07 PROCEDURE — 80076 HEPATIC FUNCTION PANEL: CPT

## 2019-05-07 PROCEDURE — 96365 THER/PROPH/DIAG IV INF INIT: CPT

## 2019-05-07 PROCEDURE — 36415 COLL VENOUS BLD VENIPUNCTURE: CPT

## 2019-05-07 RX ORDER — METHYLPREDNISOLONE SODIUM SUCCINATE 125 MG/2ML
125 INJECTION, POWDER, LYOPHILIZED, FOR SOLUTION INTRAMUSCULAR; INTRAVENOUS ONCE
Status: CANCELLED | OUTPATIENT
Start: 2019-05-08

## 2019-05-07 RX ORDER — SODIUM CHLORIDE 9 MG/ML
INJECTION, SOLUTION INTRAVENOUS CONTINUOUS
Status: CANCELLED | OUTPATIENT
Start: 2019-05-08

## 2019-05-07 RX ORDER — 0.9 % SODIUM CHLORIDE 0.9 %
250 INTRAVENOUS SOLUTION INTRAVENOUS ONCE
Status: COMPLETED | OUTPATIENT
Start: 2019-05-07 | End: 2019-05-07

## 2019-05-07 RX ORDER — DIPHENHYDRAMINE HYDROCHLORIDE 50 MG/ML
50 INJECTION INTRAMUSCULAR; INTRAVENOUS ONCE
Status: CANCELLED | OUTPATIENT
Start: 2019-05-08

## 2019-05-07 RX ORDER — HEPARIN SODIUM (PORCINE) LOCK FLUSH IV SOLN 100 UNIT/ML 100 UNIT/ML
500 SOLUTION INTRAVENOUS PRN
Status: CANCELLED | OUTPATIENT
Start: 2019-05-08

## 2019-05-07 RX ORDER — SODIUM CHLORIDE 0.9 % (FLUSH) 0.9 %
10 SYRINGE (ML) INJECTION PRN
Status: CANCELLED | OUTPATIENT
Start: 2019-05-08

## 2019-05-07 RX ORDER — 0.9 % SODIUM CHLORIDE 0.9 %
250 INTRAVENOUS SOLUTION INTRAVENOUS ONCE
Status: CANCELLED | OUTPATIENT
Start: 2019-05-08

## 2019-05-07 RX ORDER — 0.9 % SODIUM CHLORIDE 0.9 %
10 VIAL (ML) INJECTION ONCE
Status: CANCELLED | OUTPATIENT
Start: 2019-05-08

## 2019-05-07 RX ORDER — SODIUM CHLORIDE 0.9 % (FLUSH) 0.9 %
5 SYRINGE (ML) INJECTION PRN
Status: CANCELLED | OUTPATIENT
Start: 2019-05-08

## 2019-05-07 RX ADMIN — ZOLEDRONIC ACID 4 MG: 0.8 INJECTION, SOLUTION, CONCENTRATE INTRAVENOUS at 14:30

## 2019-05-07 RX ADMIN — SODIUM CHLORIDE 250 ML: 9 INJECTION, SOLUTION INTRAVENOUS at 14:20

## 2019-05-07 NOTE — PLAN OF CARE
Problem: Intellectual/Education/Knowledge Deficit  Goal: Teaching initiated upon admission  Outcome: Met This Shift  Note:   Patient verbalizes understanding to verbal information given on zometa,action and possible side effects. Aware to call MD if develop complications. Goal: Written Disposition Instruction form completed  Outcome: Met This Shift  Intervention: Verbal/written education provided  Note:   Discuss understanding to verbal information given on zometa infusion. .      Problem: Discharge Planning  Goal: Knowledge of discharge instructions  Description  Knowledge of discharge instructions     Outcome: Met This Shift  Note:   Verbalize understanding of discharge instructions, follow up appointments, and when to call Physician. Intervention: Interaction with patient/family and care team  Note:   Provide discharge instructions. Care plan reviewed with patient. Patient verbalize understanding of the plan of care and contribute to goal setting.

## 2019-05-07 NOTE — PROGRESS NOTES
Patient assessed for the following post zometa:    Dizziness   No  Lightheadedness  No     Acute nausea/vomiting No  Headache   No  Chest pain/pressure  No  Rash/itching              No  Shortness of breath  No    Patient tolerated zometa infusion without any complications. Last vital signs:   BP (!) 157/70   Pulse 69   Temp 98.2 °F (36.8 °C) (Oral)   Resp 16   Ht 5' 8\" (1.727 m)   SpO2 93%   BMI 30.87 kg/m²     Patient instructed if experience any of the above symptoms following today's infusion,he/she is to notify MD immediately or go to the emergency department. Discharge instructions given to patient. Verbalizes understanding. Ambulated off unit per self with belongings.

## 2019-05-10 ENCOUNTER — HOSPITAL ENCOUNTER (OUTPATIENT)
Dept: CT IMAGING | Age: 69
Discharge: HOME | End: 2019-05-10
Payer: MEDICARE

## 2019-05-10 DIAGNOSIS — K43.9 ABDOMINAL WALL HERNIA: ICD-10-CM

## 2019-05-10 PROCEDURE — 74177 CT ABD & PELVIS W/CONTRAST: CPT

## 2019-05-10 PROCEDURE — 6360000004 HC RX CONTRAST MEDICATION: Performed by: NURSE PRACTITIONER

## 2019-05-10 RX ADMIN — IOPAMIDOL 85 ML: 755 INJECTION, SOLUTION INTRAVENOUS at 14:51

## 2019-05-10 RX ADMIN — IOHEXOL 50 ML: 240 INJECTION, SOLUTION INTRATHECAL; INTRAVASCULAR; INTRAVENOUS; ORAL at 14:52

## 2019-05-21 ENCOUNTER — TELEPHONE (OUTPATIENT)
Dept: SURGERY | Age: 69
End: 2019-05-21

## 2019-05-21 ENCOUNTER — OFFICE VISIT (OUTPATIENT)
Dept: SURGERY | Age: 69
End: 2019-05-21
Payer: MEDICARE

## 2019-05-21 VITALS
RESPIRATION RATE: 20 BRPM | OXYGEN SATURATION: 96 % | DIASTOLIC BLOOD PRESSURE: 80 MMHG | BODY MASS INDEX: 35.79 KG/M2 | HEART RATE: 80 BPM | HEIGHT: 63 IN | SYSTOLIC BLOOD PRESSURE: 138 MMHG | TEMPERATURE: 97.9 F | WEIGHT: 202 LBS

## 2019-05-21 DIAGNOSIS — K43.9 VENTRAL HERNIA WITHOUT OBSTRUCTION OR GANGRENE: Primary | ICD-10-CM

## 2019-05-21 DIAGNOSIS — I10 ESSENTIAL HYPERTENSION: ICD-10-CM

## 2019-05-21 DIAGNOSIS — Z01.818 PRE-OP TESTING: ICD-10-CM

## 2019-05-21 PROCEDURE — 99214 OFFICE O/P EST MOD 30 MIN: CPT | Performed by: SURGERY

## 2019-05-21 PROCEDURE — 3017F COLORECTAL CA SCREEN DOC REV: CPT | Performed by: SURGERY

## 2019-05-21 PROCEDURE — 1123F ACP DISCUSS/DSCN MKR DOCD: CPT | Performed by: SURGERY

## 2019-05-21 PROCEDURE — G8417 CALC BMI ABV UP PARAM F/U: HCPCS | Performed by: SURGERY

## 2019-05-21 PROCEDURE — G8427 DOCREV CUR MEDS BY ELIG CLIN: HCPCS | Performed by: SURGERY

## 2019-05-21 PROCEDURE — G8399 PT W/DXA RESULTS DOCUMENT: HCPCS | Performed by: SURGERY

## 2019-05-21 PROCEDURE — 3014F SCREEN MAMMO DOC REV: CPT | Performed by: SURGERY

## 2019-05-21 PROCEDURE — 1036F TOBACCO NON-USER: CPT | Performed by: SURGERY

## 2019-05-21 PROCEDURE — 4040F PNEUMOC VAC/ADMIN/RCVD: CPT | Performed by: SURGERY

## 2019-05-21 PROCEDURE — 1090F PRES/ABSN URINE INCON ASSESS: CPT | Performed by: SURGERY

## 2019-05-21 ASSESSMENT — ENCOUNTER SYMPTOMS
EYE DISCHARGE: 0
EYE ITCHING: 0
SINUS PRESSURE: 0
COUGH: 0
PHOTOPHOBIA: 0
RHINORRHEA: 0
SORE THROAT: 0
VOICE CHANGE: 0
EYE REDNESS: 0
BACK PAIN: 0
SHORTNESS OF BREATH: 0
EYE PAIN: 0
CHEST TIGHTNESS: 0
ALLERGIC/IMMUNOLOGIC NEGATIVE: 1
ABDOMINAL PAIN: 1
FACIAL SWELLING: 0
WHEEZING: 0
SINUS PAIN: 0
CHOKING: 0
COLOR CHANGE: 0
RESPIRATORY NEGATIVE: 1
TROUBLE SWALLOWING: 0
STRIDOR: 0
APNEA: 0
EYES NEGATIVE: 1

## 2019-05-21 NOTE — PROGRESS NOTES
[address] [facility] [address] [address]  [place] [address]  Dept: [phone]  Dept Fax: [phone]  Loc: [phone]    Visit Date: 5/21/2019    Emmy Marquez is a 68 y.o. female who presentstoday for:  Chief Complaint   Patient presents with    Surgical Consult     est patient- last seen -4/4/2019- Ref JAMARCUS Celestinicle- Ventral Hernia- CT at [facility] 5/10/19       HPI:     HPI 75-year-old white female who I have recently cared for last fall with carcinoma the left breast with a lumpectomy and sentinel lymph node biopsy. Patient is had a long history of a ventral hernia that initially she downplayed much in the way of symptoms. However in 2012 she had an attempt at a colonoscopy per Mat Braun and his report was reviewed and he was unable to get the scope past the hepatic flexure as the hernia apparently involves the transverse colon. Because of the breast cancer and the fact that a small polyp was removed in 2012 it was recommended she have a virtual colonoscopy at a hospital in [facility] i.e. a CT image of the colon she states she took a three-day prep but when she went over there and they saw the hernia they said her colon could possibly rupture if they attempted the virtual colonoscopy and recommended she see a surgeon she was seen by Yaneth Gonzales CNP per the GI service and referred for consideration of repair of this ventral hernia.   On further questioning the patient does state she is having mild symptoms although she denies any constipation the hernia again is been present for several years    Past Medical History:   Diagnosis Date    Abdominal aortic aneurysm (AAA) >39 mm diameter (HCC)     found in 2015    Arthritis     Asthma     Brain cyst     benign    Cancer ([facility] 09/1918    Left Breast    Chronic sinus complaints     Diverticulosis of colon     DKA (diabetic ketoacidoses) ([facility] [address] 05/2018    Elevated TSH     Hypertension     Osteoarthritis     Polyneuropathy     PONV (postoperative nausea and vomiting)     Spinal headache     Type 2 diabetes mellitus ([address] 1990's      Past Surgical History:   Procedure Laterality Date    BLADDER SUSPENSION      times 2    CHOLECYSTECTOMY      DILATION AND CURETTAGE OF UTERUS      x2    EYE SURGERY      HYSTERECTOMY  1995    JOINT REPLACEMENT Right 2007    Rt total knee    KNEE ARTHROSCOPY  1967, 2001    MANDIBLE FRACTURE SURGERY      OTHER SURGICAL HISTORY Right 12/30/2015    Excision of Sebaceous Cyst Right Ear     PA OFFICE/OUTPT VISIT,PROCEDURE ONLY Left 10/10/2018    LEFT BREAST LUMPECTOMY WITH SENTINEL LYMPH NODE BIOPSY performed by Salome Harry MD at [address] [place] [address]      TONSILLECTOMY AND ADENOIDECTOMY  age 4        Family History   Problem Relation Age of Onset    Diabetes Mother     Heart Disease Mother     Lupus Mother     Heart Disease Father     Cancer Father         Squamous cell - face & scalp    Diabetes Maternal Grandmother     Heart Disease Maternal Grandfather     Cancer Paternal Aunt          multiple myeloma    Cancer Paternal Uncle         Lymphatic Leukemia    Breast Cancer Maternal Cousin     Heart Disease Maternal Uncle     Lupus Maternal Uncle     Other Paternal Grandfather         Brain aneurysm        Social History     Tobacco Use    Smoking status: Never Smoker    Smokeless tobacco: Never Used   Substance Use Topics    Alcohol use:  Yes     Alcohol/week: 0.0 oz     Comment: Socially          Current Outpatient Medications   Medication Sig Dispense Refill    Insulin Glargine-Lixisenatide (SOLIQUA) 100-33 UNT-MCG/ML SOPN Inject 40 Units into the skin daily      benazepril (LOTENSIN) 10 MG tablet Take 1 tablet by mouth daily 90 tablet 1    pantoprazole (PROTONIX) 40 MG tablet Take 40 mg by mouth daily      Calcium Carb-Cholecalciferol 600-500 MG-UNIT CAPS Take by mouth daily      zoledronic acid (ZOMETA) 4 mouth sores, nosebleeds, postnasal drip, rhinorrhea, sinus pressure, sinus pain, sneezing, sore throat, tinnitus, trouble swallowing and voice change. Eyes: Negative. Negative for photophobia, pain, discharge, redness, itching and visual disturbance. Respiratory: Negative. Negative for apnea, cough, choking, chest tightness, shortness of breath, wheezing and stridor. Cardiovascular: Negative. Negative for chest pain, palpitations and leg swelling. Gastrointestinal: Positive for abdominal pain. Endocrine: Negative. Negative for cold intolerance, heat intolerance, polydipsia, polyphagia and polyuria. Genitourinary: Negative. Negative for decreased urine volume, difficulty urinating, dyspareunia, dysuria, enuresis, flank pain, frequency, genital sores, hematuria, menstrual problem, pelvic pain, urgency, vaginal bleeding, vaginal discharge and vaginal pain. Musculoskeletal: Positive for joint swelling. Negative for arthralgias, back pain, gait problem, myalgias, neck pain and neck stiffness. Skin: Negative. Negative for color change, pallor, rash and wound. Allergic/Immunologic: Negative. Negative for environmental allergies, food allergies and immunocompromised state. Neurological: Negative. Negative for dizziness, tremors, seizures, syncope, facial asymmetry, speech difficulty, weakness, light-headedness, numbness and headaches. Hematological: Negative. Negative for adenopathy. Does not bruise/bleed easily. Psychiatric/Behavioral: Negative. Negative for agitation, behavioral problems, confusion, decreased concentration, dysphoric mood, hallucinations, self-injury, sleep disturbance and suicidal ideas. The patient is not nervous/anxious and is not hyperactive.         Objective:   /80 (Site: Right Upper Arm, Position: Sitting, Cuff Size: Medium Adult)   Pulse 80   Temp 97.9 °F (36.6 °C) (Tympanic)   Resp 20   Ht 5' 3\" (1.6 m)   Wt 202 lb (91.6 kg)   SpO2 96%   BMI 35.78 kg/m²     Physical Exam   Constitutional: She is oriented to person, place, and time. She appears well-developed and well-nourished. HENT:   Head: Normocephalic and atraumatic. Eyes: Pupils are equal, round, and reactive to light. Conjunctivae and EOM are normal. Right eye exhibits no discharge. Left eye exhibits no discharge. No scleral icterus. Neck: Normal range of motion. Neck supple. No JVD present. No thyromegaly present. Cardiovascular: Normal rate and regular rhythm. Exam reveals no gallop and no friction rub. No murmur heard. Pulmonary/Chest: Effort normal and breath sounds normal. No stridor. No respiratory distress. She has no wheezes. She exhibits no tenderness. Abdominal: There is tenderness. A hernia is present. Musculoskeletal: Normal range of motion. Neurological: She is alert and oriented to person, place, and time. Skin: Skin is warm and dry. No rash noted. No erythema. No pallor. Psychiatric: She has a normal mood and affect.  Her behavior is normal. Judgment and thought content normal.          Results for orders placed or performed during the hospital encounter of 05/07/19   POC PANEL BMP W/IOCA   Result Value Ref Range    Sodium, Whole Blood 143 138 - 146 meq/l    Potassium, Whole Blood 3.9 3.5 - 4.9 meq/l    Chloride, Whole Blood 105 98 - 109 meq/l    TOTAL CO2, WHOLE BLOOD 25 23 - 33 meq/l    Glucose, Whole Blood 162 (H) 70 - 108 mg/dl    BUN, WHOLE BLOOD 18 8 - 26 mg/dl    CREATININE, WHOLE BLOOD 0.6 0.5 - 1.2 mg/dl    Ionized Calcium, WB 1.19 1.12 - 1.32 mmol/L   Hepatic Function Panel   Result Value Ref Range    Alb 4.0 3.5 - 5.1 g/dL    Total Bilirubin 0.7 0.3 - 1.2 mg/dL    Bilirubin, Direct <0.2 0.0 - 0.3 mg/dL    Alkaline Phosphatase 63 38 - 126 U/L    AST 17 5 - 40 U/L    ALT 17 11 - 66 U/L    Total Protein 6.8 6.1 - 8.0 g/dL   Absolute Neutrophil   Result Value Ref Range    Segs Absolute 3.3 1.8 - 7.7 thou/mm3   CBC   Result Value Ref Range    WBC 5.5 4.8 - 10.8 thou/mm3    RBC 4.56 4.20 - 5.40 mill/mm3    Hemoglobin 13.7 12.0 - 16.0 gm/dl    Hematocrit 39.9 37.0 - 47.0 %    MCV 87 81 - 99 fL    MCH 30.1 27.0 - 31.0 pg    MCHC 34.5 33.0 - 37.0 gm/dl    RDW 10.6 (L) 11.5 - 14.5 %    Platelets 600 644 - 221 thou/mm3    MPV 7.3 (L) 7.4 - 10.4 fL   Neutrophils, Automated   Result Value Ref Range    Seg Neutrophils 60 43 - 75 %   Glomerular Filtration Rate, Estimated   Result Value Ref Range    GFR, Estimated > 90 ml/min/1.73m2       Assessment:     Patient has a large nonreducible ventral hernia we reviewed the CT scan with the patient and is possible that the actual defect may be relatively small however she does have apparently on CT scan part of the transverse colon and some mesentery into the hernia sac. On further questioning she did admit that she has more discomfort more than she would like to admit. We discussed the fact that given her breast cancer colonoscopy would be a reasonable thing to do and that as long she has a hernia is unlikely it could be completed completely. She did have a colon regard test that was negative after likely discussion we elected that it would be in her best interest to proceed with attempted repair he did discuss the laparoscopic approach and the concern for possible bowel injury and the need for conversion to open we did discuss use of mesh at this time she voices understanding and would like to proceed with attempted laparoscopic ventral hernia repair    Plan:     1. Schedule Veronika Noonan for laparoscopic ventral hernia repair possible open  2. The risks, benefits and alternatives were discussed with Veronika Noonan. She understands and wishes to proceed with surgical intervention. 3. Restrictions discussed with Veronika Noonan and she expresses understanding. 4. She is advised to call back directly if there are further questions/concerns, or if her symptoms worsen prior to surgery.             Electronicallysigned by Jesús Fleming MD on 5/21/2019 at 7:50 PM

## 2019-05-21 NOTE — TELEPHONE ENCOUNTER
Scheduled Santy Augustine for a laparoscopic ventral hernia repair possible open on Fri 6/14 at 8am & she will arrive at 6:30. She will be npo after midnight, consent was signed & an order was given for an ekg. Labs are in epic & antibacterial soap was also given. Pre-op clearance form was faxed to Nan [facility] as Santy Augustine has an appt next Tues for follow up.

## 2019-05-23 ENCOUNTER — HOSPITAL ENCOUNTER (OUTPATIENT)
Age: 69
Discharge: HOME | End: 2019-05-23
Payer: MEDICARE

## 2019-05-23 ENCOUNTER — HOSPITAL ENCOUNTER (OUTPATIENT)
Dept: GENERAL RADIOLOGY | Age: 69
Discharge: HOME | End: 2019-05-23
Payer: MEDICARE

## 2019-05-23 DIAGNOSIS — Z01.818 PRE-OP TESTING: ICD-10-CM

## 2019-05-23 DIAGNOSIS — S60.10XA SUBUNGUAL HEMATOMA OF DIGIT OF HAND, INITIAL ENCOUNTER: ICD-10-CM

## 2019-05-23 DIAGNOSIS — S62.524B OPEN NONDISPLACED FRACTURE OF DISTAL PHALANX OF RIGHT THUMB, INITIAL ENCOUNTER: ICD-10-CM

## 2019-05-23 DIAGNOSIS — I10 ESSENTIAL HYPERTENSION: ICD-10-CM

## 2019-05-23 DIAGNOSIS — K43.9 VENTRAL HERNIA WITHOUT OBSTRUCTION OR GANGRENE: ICD-10-CM

## 2019-05-23 LAB
EKG ATRIAL RATE: 71 BPM
EKG P AXIS: 25 DEGREES
EKG P-R INTERVAL: 180 MS
EKG Q-T INTERVAL: 410 MS
EKG QRS DURATION: 92 MS
EKG QTC CALCULATION (BAZETT): 445 MS
EKG R AXIS: 44 DEGREES
EKG T AXIS: 37 DEGREES
EKG VENTRICULAR RATE: 71 BPM

## 2019-05-23 PROCEDURE — 93005 ELECTROCARDIOGRAM TRACING: CPT

## 2019-05-23 PROCEDURE — 73140 X-RAY EXAM OF FINGER(S): CPT

## 2019-05-23 PROCEDURE — 93010 ELECTROCARDIOGRAM REPORT: CPT | Performed by: INTERNAL MEDICINE

## 2019-05-29 ENCOUNTER — OFFICE VISIT (OUTPATIENT)
Dept: INTERNAL MEDICINE CLINIC | Age: 69
End: 2019-05-29
Payer: MEDICARE

## 2019-05-29 VITALS
BODY MASS INDEX: 34.49 KG/M2 | HEIGHT: 64 IN | SYSTOLIC BLOOD PRESSURE: 138 MMHG | WEIGHT: 202 LBS | DIASTOLIC BLOOD PRESSURE: 78 MMHG

## 2019-05-29 DIAGNOSIS — E11.9 TYPE 2 DIABETES MELLITUS WITHOUT COMPLICATION, WITHOUT LONG-TERM CURRENT USE OF INSULIN (HCC): ICD-10-CM

## 2019-05-29 PROCEDURE — G0108 DIAB MANAGE TRN  PER INDIV: HCPCS | Performed by: INTERNAL MEDICINE

## 2019-05-29 RX ORDER — INSULIN GLARGINE 100 [IU]/ML
39 INJECTION, SOLUTION SUBCUTANEOUS NIGHTLY
COMMUNITY
Start: 2019-05-24

## 2019-05-29 NOTE — PROGRESS NOTES
[facility]  [address] [address] [address] [facility] [address]  [phone] (phone)  [phone] (fax)    Patient ID: Kirk Sewell [id number]  Referring Provider: Courtney Wills CNP     Patient's name and  were verified. Subjective:    She presents for Her follow-up diabetic visit. She has type 2 diabetes mellitus. Home regimen includes: insulin  GLP-1 agonist She is compliant most of the time. Assessment:     Lab Results   Component Value Date    LABA1C 7.7 2019    LABA1C 7.8 2018    BUN 20 2018    CREATININE 0.6 2019    CREATININE 0.6 2019     Vitals:    19 1002   BP: (!) 148/63   Site: Right Upper Arm   Position: Sitting   Weight: 202 lb (91.6 kg)   Height: 5' 4\" (1.626 m)     Wt Readings from Last 3 Encounters:   19 202 lb (91.6 kg)   19 202 lb (91.6 kg)   19 203 lb (92.1 kg)     Ht Readings from Last 3 Encounters:   19 5' 4\" (1.626 m)   19 5' 3\" (1.6 m)   19 5' 8\" (1.727 m)   Current monitoring regimen: home blood tests - 4 times daily  Home blood sugar trends: FBS's 132-178. Noon . Dinner . BT 74, 100-174  Any episodes of hypoglycemia? yes - 1-2 per week while on [place]  Previous visit with dietician: yes -   Current diet:  B- sc egg/ green/pepper/ onion/ ham/ small orange                        L- chicken salad with greek yogurt 18gm; 1-2 crackers/ yogurt                        D- hamburger mesfin/ tossed salad                        Snacking --none between  Current exercise: riding bike or chair exercises or walking--30 minutes most days  Eye exam current (within one year): yes Dr. Kennis Nageotte 2018. Appt 19  Any history of foot problems? no  Last foot exam: 19 Pedal pulses:   peripheral pulses symmetrical   Results of monofilament test: 10/10-better sensation in Lt foot              Skin noted to be warm and hair is absent.   Immunizations up to date: yes -   Taking ASA:  Yes   Appropriate for use of MyChart Glucose Grid:  Yes    Focus: Follow up visit. Bea Winn reports today that she has an upcoming surgery to repair an umbilical hernia 3/63/51. She was given [place] sample in an attempt to improve BS control. Bea Winn reports that it made her too nauseated/ stomach cramping and it was stopped. She was started this week on Ozempic 0.25mg which she is tolerating well at this time. Glucose levels are running the upper limits/ above goal, but we will wait to evaluate impact of Ozempic. Rosanne desires and is working towards weight loss and hopefully this therapy will support her efforts. Encouragement given. Follow up 3 months. DSME PLAN:   Discussed general issues about diabetes pathophysiology and management. Counseling at today's visit: physiology of diabetes; carbs; exercise; action of GLP1; medication dosing prior to surgery. .   Medication assistance if needed. Layla Desai [phone] for Ozempic  The night before you surgery            -consider taking only 70% of bedtime Lantus dose = 32 units  Consider getting a blood pressure cuff for at home. Check with your pharmacist from recommendations. Omron is a reliable brand                 * test blood pressure different times of the day--waking up or middle of the day or before bed  Blood sugar goal . Let's see how the [address] works for weight loss and blood sugar control  Meter download, medications, PMH and nursing assessment reviewed. Zaina Medley states She is willing to participate in this plan of care and verbalized understanding of all instructions provided. Teach back used to verify comprehension. Total time involved in direct patient education: 60 minutes.

## 2019-06-10 ENCOUNTER — TELEPHONE (OUTPATIENT)
Dept: SURGERY | Age: 69
End: 2019-06-10

## 2019-06-10 NOTE — TELEPHONE ENCOUNTER
Notified Nevin Villa that the time of her surgery Friday has been changed to 9am & she needs to arrive at 7:30. She voiced understanding.

## 2019-06-13 NOTE — H&P
[address] [facility] [address] [address]  [address] [address]  Dept: [phone]  Dept Fax: [phone]  Loc: [phone]    Visit Date: 5/21/2019    Selene Montano is a 68 y.o. female who presentstoday for:  Chief Complaint   Patient presents with    Surgical Consult     est patient- last seen -4/4/2019- Ref TCesar Celestinicle- Ventral Hernia- CT at [address] 5/10/19       HPI:     HPI 68-year-old white female who I have recently cared for last fall with carcinoma the left breast with a lumpectomy and sentinel lymph node biopsy. Patient is had a long history of a ventral hernia that initially she downplayed much in the way of symptoms. However in 2012 she had an attempt at a colonoscopy per [place] and his report was reviewed and he was unable to get the scope past the hepatic flexure as the hernia apparently involves the transverse colon. Because of the breast cancer and the fact that a small polyp was removed in 2012 it was recommended she have a virtual colonoscopy at a hospital in [facility] i.e. a CT image of the colon she states she took a three-day prep but when she went over there and they saw the hernia they said her colon could possibly rupture if they attempted the virtual colonoscopy and recommended she see a surgeon she was seen by Allen David CNP per the GI service and referred for consideration of repair of this ventral hernia.   On further questioning the patient does state she is having mild symptoms although she denies any constipation the hernia again is been present for several years    Past Medical History:   Diagnosis Date    Abdominal aortic aneurysm (AAA) >39 mm diameter (HCC)     found in 2015    Arthritis     Asthma     Brain cyst     benign    Cancer ([facility] 09/1918    Left Breast    Chronic sinus complaints     Diverticulosis of colon     DKA (diabetic ketoacidoses) ([facility] [address] 05/2018    Elevated TSH     Hypertension     Osteoarthritis     Polyneuropathy     PONV (postoperative nausea and vomiting)     Spinal headache     Type 2 diabetes mellitus ([address] 1990's      Past Surgical History:   Procedure Laterality Date    BLADDER SUSPENSION      times 2    CHOLECYSTECTOMY      DILATION AND CURETTAGE OF UTERUS      x2    EYE SURGERY      HYSTERECTOMY  1995    JOINT REPLACEMENT Right 2007    Rt total knee    KNEE ARTHROSCOPY  1967, 2001    MANDIBLE FRACTURE SURGERY      OTHER SURGICAL HISTORY Right 12/30/2015    Excision of Sebaceous Cyst Right Ear     [place] OFFICE/OUTPT VISIT,PROCEDURE ONLY Left 10/10/2018    LEFT BREAST LUMPECTOMY WITH SENTINEL LYMPH NODE BIOPSY performed by Tahmina Florence MD at [address] [place] [address]      TONSILLECTOMY AND ADENOIDECTOMY  age 4        Family History   Problem Relation Age of Onset    Diabetes Mother     Heart Disease Mother     Lupus Mother     Heart Disease Father     Cancer Father         Squamous cell - face & scalp    Diabetes Maternal Grandmother     Heart Disease Maternal Grandfather     Cancer Paternal Aunt          multiple myeloma    Cancer Paternal Uncle         Lymphatic Leukemia    Breast Cancer Maternal Cousin     Heart Disease Maternal Uncle     Lupus Maternal Uncle     Other Paternal Grandfather         Brain aneurysm        Social History     Tobacco Use    Smoking status: Never Smoker    Smokeless tobacco: Never Used   Substance Use Topics    Alcohol use:  Yes     Alcohol/week: 0.0 oz     Comment: Socially          Current Outpatient Medications   Medication Sig Dispense Refill    Insulin Glargine-Lixisenatide (SOLIQUA) 100-33 UNT-MCG/ML SOPN Inject 40 Units into the skin daily      benazepril (LOTENSIN) 10 MG tablet Take 1 tablet by mouth daily 90 tablet 1    pantoprazole (PROTONIX) 40 MG tablet Take 40 mg by mouth daily      Calcium Carb-Cholecalciferol 600-500 MG-UNIT CAPS Take by mouth daily      zoledronic acid (ZOMETA) 4 MG/5ML injection Infuse 4 mg intravenously once      anastrozole (ARIMIDEX) 1 MG tablet Take 1 mg by mouth daily      aspirin 81 MG chewable tablet Take 81 mg by mouth daily      Insulin Pen Needle (PEN NEEDLES 31GX5/16\") 31G X 8 MM MISC 1 each by Does not apply route daily 100 each 3    insulin aspart (NOVOLOG FLEXPEN) 100 UNIT/ML injection pen Inject 0-12 Units into the skin 3 times daily (before meals) Per Medium Sliding Scale (Patient taking differently: Inject 0-12 Units into the skin 3 times daily (before meals) 3 times per day AC meals. BG less than 110 = 5 units, 110-150=7 units, 151-200=10units, 201-250 12 units, 251-300=14 unit, 301-350=16units, 351-400=18 unit) 5 pen 3    glucose blood VI test strips (ASCENSIA AUTODISC VI;ONE TOUCH ULTRA TEST VI) strip Advocate Redi-Code test strips. Tests once daily. 100 each 3    therapeutic multivitamin-minerals (THERAGRAN-M) tablet Take 1 tablet by mouth daily.  ibuprofen (ADVIL;MOTRIN) 600 MG tablet Take 1 tablet by mouth 2 times daily as needed for Pain 180 tablet 0    albuterol (PROVENTIL) (2.5 MG/3ML) 0.083% nebulizer solution Take 3 mLs by nebulization every 4 hours as needed for Wheezing 1 Package 1     No current facility-administered medications for this visit. Allergies   Allergen Reactions    Amoxicillin     Donnatal [Pb-Hyoscy-Atropine-Scopolamine] Other (See Comments)     palpitations    Lovastatin Other (See Comments)     myalgia    Metformin And Related Diarrhea    Vioxx Other (See Comments)     Liver problems    Dilaudid [Hydromorphone Hcl] Nausea And Vomiting    Fentanyl Nausea And Vomiting     Severe Nausea and vomitting       Subjective:      Review of Systems   Constitutional: Negative. Negative for activity change, appetite change, chills, diaphoresis, fatigue, fever and unexpected weight change. HENT: Negative.   Negative for congestion, dental problem, drooling, ear discharge, ear pain, facial swelling, hearing loss, mouth sores, nosebleeds, postnasal drip, rhinorrhea, sinus pressure, sinus pain, sneezing, sore throat, tinnitus, trouble swallowing and voice change. Eyes: Negative. Negative for photophobia, pain, discharge, redness, itching and visual disturbance. Respiratory: Negative. Negative for apnea, cough, choking, chest tightness, shortness of breath, wheezing and stridor. Cardiovascular: Negative. Negative for chest pain, palpitations and leg swelling. Gastrointestinal: Positive for abdominal pain. Endocrine: Negative. Negative for cold intolerance, heat intolerance, polydipsia, polyphagia and polyuria. Genitourinary: Negative. Negative for decreased urine volume, difficulty urinating, dyspareunia, dysuria, enuresis, flank pain, frequency, genital sores, hematuria, menstrual problem, pelvic pain, urgency, vaginal bleeding, vaginal discharge and vaginal pain. Musculoskeletal: Positive for joint swelling. Negative for arthralgias, back pain, gait problem, myalgias, neck pain and neck stiffness. Skin: Negative. Negative for color change, pallor, rash and wound. Allergic/Immunologic: Negative. Negative for environmental allergies, food allergies and immunocompromised state. Neurological: Negative. Negative for dizziness, tremors, seizures, syncope, facial asymmetry, speech difficulty, weakness, light-headedness, numbness and headaches. Hematological: Negative. Negative for adenopathy. Does not bruise/bleed easily. Psychiatric/Behavioral: Negative. Negative for agitation, behavioral problems, confusion, decreased concentration, dysphoric mood, hallucinations, self-injury, sleep disturbance and suicidal ideas. The patient is not nervous/anxious and is not hyperactive.         Objective:   /80 (Site: Right Upper Arm, Position: Sitting, Cuff Size: Medium Adult)   Pulse 80   Temp 97.9 °F (36.6 °C) (Tympanic)   Resp 20   Ht 5' 3\" (1.6 m)   Wt 202 lb (91.6 kg)   SpO2 96%   BMI 35.78 10.8 thou/mm3    RBC 4.56 4.20 - 5.40 mill/mm3    Hemoglobin 13.7 12.0 - 16.0 gm/dl    Hematocrit 39.9 37.0 - 47.0 %    MCV 87 81 - 99 fL    MCH 30.1 27.0 - 31.0 pg    MCHC 34.5 33.0 - 37.0 gm/dl    RDW 10.6 (L) 11.5 - 14.5 %    Platelets 517 087 - 657 thou/mm3    MPV 7.3 (L) 7.4 - 10.4 fL   Neutrophils, Automated   Result Value Ref Range    Seg Neutrophils 60 43 - 75 %   Glomerular Filtration Rate, Estimated   Result Value Ref Range    GFR, Estimated > 90 ml/min/1.73m2       Assessment:     Patient has a large nonreducible ventral hernia we reviewed the CT scan with the patient and is possible that the actual defect may be relatively small however she does have apparently on CT scan part of the transverse colon and some mesentery into the hernia sac. On further questioning she did admit that she has more discomfort more than she would like to admit. We discussed the fact that given her breast cancer colonoscopy would be a reasonable thing to do and that as long she has a hernia is unlikely it could be completed completely. She did have a colon regard test that was negative after likely discussion we elected that it would be in her best interest to proceed with attempted repair he did discuss the laparoscopic approach and the concern for possible bowel injury and the need for conversion to open we did discuss use of mesh at this time she voices understanding and would like to proceed with attempted laparoscopic ventral hernia repair    Plan:     1. Schedule Contreras Loera for laparoscopic ventral hernia repair possible open  2. The risks, benefits and alternatives were discussed with Contreras Loera. She understands and wishes to proceed with surgical intervention. 3. Restrictions discussed with Contreras Loera and she expresses understanding. 4. She is advised to call back directly if there are further questions/concerns, or if her symptoms worsen prior to surgery.             Electronicallysigned by Ann Marin MD on

## 2019-06-13 NOTE — H&P
[facility]  History and Physical Update      Pt Name: Zan Larios  MRN: [id number]  YOB: 1950  Date of evaluation: 6/13/2019    [x] I have examined the patient and reviewed the H&P/Consult and there are no changes to the patient or plans. [] I have examined the patient and reviewed the H&P/Consult and have noted the following changes:         Edson Bean  Electronically signed 6/13/2019 at 7:44 PM

## 2019-06-14 ENCOUNTER — ANESTHESIA EVENT (OUTPATIENT)
Dept: OPERATING ROOM | Age: 69
DRG: 355 | End: 2019-06-14
Payer: MEDICARE

## 2019-06-14 ENCOUNTER — ANESTHESIA (OUTPATIENT)
Dept: OPERATING ROOM | Age: 69
DRG: 355 | End: 2019-06-14
Payer: MEDICARE

## 2019-06-14 ENCOUNTER — HOSPITAL ENCOUNTER (INPATIENT)
Age: 69
LOS: 4 days | Discharge: HOME | DRG: 355 | End: 2019-06-18
Attending: SURGERY | Admitting: SURGERY
Payer: MEDICARE

## 2019-06-14 VITALS
DIASTOLIC BLOOD PRESSURE: 54 MMHG | TEMPERATURE: 97 F | RESPIRATION RATE: 4 BRPM | OXYGEN SATURATION: 100 % | SYSTOLIC BLOOD PRESSURE: 93 MMHG

## 2019-06-14 DIAGNOSIS — Z98.890 S/P REPAIR OF VENTRAL HERNIA: Primary | ICD-10-CM

## 2019-06-14 DIAGNOSIS — Z87.19 S/P REPAIR OF VENTRAL HERNIA: Primary | ICD-10-CM

## 2019-06-14 LAB
GLUCOSE BLD-MCNC: 125 MG/DL (ref 70–108)
GLUCOSE BLD-MCNC: 152 MG/DL (ref 70–108)
GLUCOSE BLD-MCNC: 172 MG/DL (ref 70–108)
POTASSIUM SERPL-SCNC: 4 MEQ/L (ref 3.5–5.2)

## 2019-06-14 PROCEDURE — 6370000000 HC RX 637 (ALT 250 FOR IP): Performed by: NURSE ANESTHETIST, CERTIFIED REGISTERED

## 2019-06-14 PROCEDURE — 2580000003 HC RX 258

## 2019-06-14 PROCEDURE — 3700000000 HC ANESTHESIA ATTENDED CARE: Performed by: SURGERY

## 2019-06-14 PROCEDURE — 3600000014 HC SURGERY LEVEL 4 ADDTL 15MIN: Performed by: SURGERY

## 2019-06-14 PROCEDURE — 2580000003 HC RX 258: Performed by: SURGERY

## 2019-06-14 PROCEDURE — 7100000000 HC PACU RECOVERY - FIRST 15 MIN: Performed by: SURGERY

## 2019-06-14 PROCEDURE — 1200000000 HC SEMI PRIVATE

## 2019-06-14 PROCEDURE — C1781 MESH (IMPLANTABLE): HCPCS | Performed by: SURGERY

## 2019-06-14 PROCEDURE — 2500000003 HC RX 250 WO HCPCS: Performed by: SURGERY

## 2019-06-14 PROCEDURE — 6360000002 HC RX W HCPCS: Performed by: NURSE PRACTITIONER

## 2019-06-14 PROCEDURE — 36415 COLL VENOUS BLD VENIPUNCTURE: CPT

## 2019-06-14 PROCEDURE — 0WUF0JZ SUPPLEMENT ABDOMINAL WALL WITH SYNTHETIC SUBSTITUTE, OPEN APPROACH: ICD-10-PCS | Performed by: SURGERY

## 2019-06-14 PROCEDURE — 82948 REAGENT STRIP/BLOOD GLUCOSE: CPT

## 2019-06-14 PROCEDURE — 6370000000 HC RX 637 (ALT 250 FOR IP): Performed by: SURGERY

## 2019-06-14 PROCEDURE — 6360000002 HC RX W HCPCS: Performed by: NURSE ANESTHETIST, CERTIFIED REGISTERED

## 2019-06-14 PROCEDURE — 2500000003 HC RX 250 WO HCPCS: Performed by: NURSE ANESTHETIST, CERTIFIED REGISTERED

## 2019-06-14 PROCEDURE — 6360000002 HC RX W HCPCS: Performed by: ANESTHESIOLOGY

## 2019-06-14 PROCEDURE — 2709999900 HC NON-CHARGEABLE SUPPLY: Performed by: SURGERY

## 2019-06-14 PROCEDURE — 3600000004 HC SURGERY LEVEL 4 BASE: Performed by: SURGERY

## 2019-06-14 PROCEDURE — 3700000001 HC ADD 15 MINUTES (ANESTHESIA): Performed by: SURGERY

## 2019-06-14 PROCEDURE — 49653 PR LAP, VENTRAL HERNIA REPAIR,INCARCERATED: CPT | Performed by: SURGERY

## 2019-06-14 PROCEDURE — 2720000010 HC SURG SUPPLY STERILE: Performed by: SURGERY

## 2019-06-14 PROCEDURE — 6360000002 HC RX W HCPCS

## 2019-06-14 PROCEDURE — 7100000001 HC PACU RECOVERY - ADDTL 15 MIN: Performed by: SURGERY

## 2019-06-14 PROCEDURE — 84132 ASSAY OF SERUM POTASSIUM: CPT

## 2019-06-14 DEVICE — MESH HERN DIA8IN CIR W/ PRE ATTCH LO PROF BLLN AND ECHO PS: Type: IMPLANTABLE DEVICE | Site: ABDOMEN | Status: FUNCTIONAL

## 2019-06-14 RX ORDER — ONDANSETRON 2 MG/ML
4 INJECTION INTRAMUSCULAR; INTRAVENOUS
Status: DISCONTINUED | OUTPATIENT
Start: 2019-06-14 | End: 2019-06-14 | Stop reason: HOSPADM

## 2019-06-14 RX ORDER — ALBUTEROL SULFATE 2.5 MG/3ML
2.5 SOLUTION RESPIRATORY (INHALATION) EVERY 4 HOURS PRN
Status: DISCONTINUED | OUTPATIENT
Start: 2019-06-14 | End: 2019-06-18 | Stop reason: HOSPADM

## 2019-06-14 RX ORDER — PROPOFOL 10 MG/ML
INJECTION, EMULSION INTRAVENOUS CONTINUOUS PRN
Status: DISCONTINUED | OUTPATIENT
Start: 2019-06-14 | End: 2019-06-14 | Stop reason: SDUPTHER

## 2019-06-14 RX ORDER — ZOLEDRONIC ACID 4 MG/5ML
4 INJECTION INTRAVENOUS ONCE
Status: DISCONTINUED | OUTPATIENT
Start: 2019-06-14 | End: 2019-06-14 | Stop reason: CLARIF

## 2019-06-14 RX ORDER — SCOLOPAMINE TRANSDERMAL SYSTEM 1 MG/1
PATCH, EXTENDED RELEASE TRANSDERMAL PRN
Status: DISCONTINUED | OUTPATIENT
Start: 2019-06-14 | End: 2019-06-14 | Stop reason: SDUPTHER

## 2019-06-14 RX ORDER — OXYCODONE HYDROCHLORIDE AND ACETAMINOPHEN 5; 325 MG/1; MG/1
1 TABLET ORAL EVERY 4 HOURS PRN
Status: DISCONTINUED | OUTPATIENT
Start: 2019-06-14 | End: 2019-06-18 | Stop reason: HOSPADM

## 2019-06-14 RX ORDER — PROPOFOL 10 MG/ML
INJECTION, EMULSION INTRAVENOUS PRN
Status: DISCONTINUED | OUTPATIENT
Start: 2019-06-14 | End: 2019-06-14 | Stop reason: SDUPTHER

## 2019-06-14 RX ORDER — SODIUM CHLORIDE 9 MG/ML
INJECTION, SOLUTION INTRAVENOUS CONTINUOUS
Status: DISCONTINUED | OUTPATIENT
Start: 2019-06-14 | End: 2019-06-14

## 2019-06-14 RX ORDER — MIDAZOLAM HYDROCHLORIDE 1 MG/ML
INJECTION INTRAMUSCULAR; INTRAVENOUS PRN
Status: DISCONTINUED | OUTPATIENT
Start: 2019-06-14 | End: 2019-06-14 | Stop reason: SDUPTHER

## 2019-06-14 RX ORDER — ROCURONIUM BROMIDE 10 MG/ML
INJECTION, SOLUTION INTRAVENOUS PRN
Status: DISCONTINUED | OUTPATIENT
Start: 2019-06-14 | End: 2019-06-14 | Stop reason: SDUPTHER

## 2019-06-14 RX ORDER — KETOROLAC TROMETHAMINE 30 MG/ML
INJECTION, SOLUTION INTRAMUSCULAR; INTRAVENOUS PRN
Status: DISCONTINUED | OUTPATIENT
Start: 2019-06-14 | End: 2019-06-14 | Stop reason: SDUPTHER

## 2019-06-14 RX ORDER — ONDANSETRON 2 MG/ML
4 INJECTION INTRAMUSCULAR; INTRAVENOUS EVERY 6 HOURS PRN
Status: DISCONTINUED | OUTPATIENT
Start: 2019-06-14 | End: 2019-06-18 | Stop reason: HOSPADM

## 2019-06-14 RX ORDER — MORPHINE SULFATE 2 MG/ML
1 INJECTION, SOLUTION INTRAMUSCULAR; INTRAVENOUS EVERY 5 MIN PRN
Status: DISCONTINUED | OUTPATIENT
Start: 2019-06-14 | End: 2019-06-14 | Stop reason: HOSPADM

## 2019-06-14 RX ORDER — PROMETHAZINE HYDROCHLORIDE 25 MG/ML
6.25 INJECTION, SOLUTION INTRAMUSCULAR; INTRAVENOUS
Status: DISCONTINUED | OUTPATIENT
Start: 2019-06-14 | End: 2019-06-14 | Stop reason: HOSPADM

## 2019-06-14 RX ORDER — MORPHINE SULFATE 2 MG/ML
2 INJECTION, SOLUTION INTRAMUSCULAR; INTRAVENOUS EVERY 4 HOURS PRN
Status: DISCONTINUED | OUTPATIENT
Start: 2019-06-14 | End: 2019-06-18 | Stop reason: HOSPADM

## 2019-06-14 RX ORDER — ANASTROZOLE 1 MG/1
1 TABLET ORAL DAILY
Status: DISCONTINUED | OUTPATIENT
Start: 2019-06-15 | End: 2019-06-18 | Stop reason: HOSPADM

## 2019-06-14 RX ORDER — MORPHINE SULFATE 2 MG/ML
2 INJECTION, SOLUTION INTRAMUSCULAR; INTRAVENOUS EVERY 5 MIN PRN
Status: DISCONTINUED | OUTPATIENT
Start: 2019-06-14 | End: 2019-06-14 | Stop reason: HOSPADM

## 2019-06-14 RX ORDER — LIDOCAINE HYDROCHLORIDE 20 MG/ML
INJECTION, SOLUTION INTRAVENOUS PRN
Status: DISCONTINUED | OUTPATIENT
Start: 2019-06-14 | End: 2019-06-14 | Stop reason: SDUPTHER

## 2019-06-14 RX ORDER — SODIUM CHLORIDE 9 MG/ML
INJECTION, SOLUTION INTRAVENOUS CONTINUOUS
Status: DISCONTINUED | OUTPATIENT
Start: 2019-06-14 | End: 2019-06-18 | Stop reason: HOSPADM

## 2019-06-14 RX ORDER — ASPIRIN 81 MG/1
81 TABLET, CHEWABLE ORAL DAILY
Status: DISCONTINUED | OUTPATIENT
Start: 2019-06-15 | End: 2019-06-18 | Stop reason: HOSPADM

## 2019-06-14 RX ORDER — DEXAMETHASONE SODIUM PHOSPHATE 4 MG/ML
INJECTION, SOLUTION INTRA-ARTICULAR; INTRALESIONAL; INTRAMUSCULAR; INTRAVENOUS; SOFT TISSUE PRN
Status: DISCONTINUED | OUTPATIENT
Start: 2019-06-14 | End: 2019-06-14 | Stop reason: SDUPTHER

## 2019-06-14 RX ORDER — GLYCOPYRROLATE 1 MG/5 ML
SYRINGE (ML) INTRAVENOUS PRN
Status: DISCONTINUED | OUTPATIENT
Start: 2019-06-14 | End: 2019-06-14 | Stop reason: SDUPTHER

## 2019-06-14 RX ORDER — SODIUM CHLORIDE 0.9 % (FLUSH) 0.9 %
10 SYRINGE (ML) INJECTION PRN
Status: DISCONTINUED | OUTPATIENT
Start: 2019-06-14 | End: 2019-06-18 | Stop reason: HOSPADM

## 2019-06-14 RX ORDER — LABETALOL HYDROCHLORIDE 5 MG/ML
5 INJECTION, SOLUTION INTRAVENOUS EVERY 10 MIN PRN
Status: DISCONTINUED | OUTPATIENT
Start: 2019-06-14 | End: 2019-06-14 | Stop reason: HOSPADM

## 2019-06-14 RX ORDER — LISINOPRIL 10 MG/1
10 TABLET ORAL DAILY
Status: DISCONTINUED | OUTPATIENT
Start: 2019-06-15 | End: 2019-06-16

## 2019-06-14 RX ORDER — PANTOPRAZOLE SODIUM 40 MG/1
40 TABLET, DELAYED RELEASE ORAL DAILY
Status: DISCONTINUED | OUTPATIENT
Start: 2019-06-14 | End: 2019-06-18 | Stop reason: HOSPADM

## 2019-06-14 RX ORDER — SODIUM CHLORIDE 0.9 % (FLUSH) 0.9 %
10 SYRINGE (ML) INJECTION EVERY 12 HOURS SCHEDULED
Status: DISCONTINUED | OUTPATIENT
Start: 2019-06-14 | End: 2019-06-18 | Stop reason: HOSPADM

## 2019-06-14 RX ORDER — BUPIVACAINE HYDROCHLORIDE AND EPINEPHRINE 5; 5 MG/ML; UG/ML
INJECTION, SOLUTION EPIDURAL; INTRACAUDAL; PERINEURAL PRN
Status: DISCONTINUED | OUTPATIENT
Start: 2019-06-14 | End: 2019-06-14 | Stop reason: ALTCHOICE

## 2019-06-14 RX ORDER — MEPERIDINE HYDROCHLORIDE 25 MG/ML
12.5 INJECTION INTRAMUSCULAR; INTRAVENOUS; SUBCUTANEOUS EVERY 5 MIN PRN
Status: DISCONTINUED | OUTPATIENT
Start: 2019-06-14 | End: 2019-06-14 | Stop reason: HOSPADM

## 2019-06-14 RX ORDER — NEOSTIGMINE METHYLSULFATE 5 MG/5 ML
SYRINGE (ML) INTRAVENOUS PRN
Status: DISCONTINUED | OUTPATIENT
Start: 2019-06-14 | End: 2019-06-14 | Stop reason: SDUPTHER

## 2019-06-14 RX ORDER — ONDANSETRON 2 MG/ML
INJECTION INTRAMUSCULAR; INTRAVENOUS PRN
Status: DISCONTINUED | OUTPATIENT
Start: 2019-06-14 | End: 2019-06-14 | Stop reason: SDUPTHER

## 2019-06-14 RX ADMIN — MORPHINE SULFATE 2.5 MG: 2 INJECTION, SOLUTION INTRAMUSCULAR; INTRAVENOUS at 09:21

## 2019-06-14 RX ADMIN — PROPOFOL 100 MG: 10 INJECTION, EMULSION INTRAVENOUS at 09:14

## 2019-06-14 RX ADMIN — MORPHINE SULFATE 2 MG: 2 INJECTION, SOLUTION INTRAMUSCULAR; INTRAVENOUS at 14:19

## 2019-06-14 RX ADMIN — Medication 2 G: at 09:25

## 2019-06-14 RX ADMIN — PROPOFOL 100 MCG/KG/MIN: 10 INJECTION, EMULSION INTRAVENOUS at 09:17

## 2019-06-14 RX ADMIN — ROCURONIUM BROMIDE 10 MG: 10 INJECTION INTRAVENOUS at 10:01

## 2019-06-14 RX ADMIN — SODIUM CHLORIDE: 9 INJECTION, SOLUTION INTRAVENOUS at 09:05

## 2019-06-14 RX ADMIN — Medication 0.4 MG: at 09:29

## 2019-06-14 RX ADMIN — DEXAMETHASONE SODIUM PHOSPHATE 10 MG: 4 INJECTION, SOLUTION INTRAMUSCULAR; INTRAVENOUS at 09:23

## 2019-06-14 RX ADMIN — MORPHINE SULFATE 2 MG: 2 INJECTION, SOLUTION INTRAMUSCULAR; INTRAVENOUS at 11:20

## 2019-06-14 RX ADMIN — MORPHINE SULFATE 2 MG: 2 INJECTION, SOLUTION INTRAMUSCULAR; INTRAVENOUS at 18:26

## 2019-06-14 RX ADMIN — Medication 3 MG: at 10:23

## 2019-06-14 RX ADMIN — MORPHINE SULFATE 2 MG: 2 INJECTION, SOLUTION INTRAMUSCULAR; INTRAVENOUS at 23:58

## 2019-06-14 RX ADMIN — SODIUM CHLORIDE: 9 INJECTION, SOLUTION INTRAVENOUS at 08:40

## 2019-06-14 RX ADMIN — MIDAZOLAM HYDROCHLORIDE 2 MG: 1 INJECTION, SOLUTION INTRAMUSCULAR; INTRAVENOUS at 09:06

## 2019-06-14 RX ADMIN — SODIUM CHLORIDE: 9 INJECTION, SOLUTION INTRAVENOUS at 18:24

## 2019-06-14 RX ADMIN — MORPHINE SULFATE 2 MG: 2 INJECTION, SOLUTION INTRAMUSCULAR; INTRAVENOUS at 11:15

## 2019-06-14 RX ADMIN — MEPERIDINE HYDROCHLORIDE 12.5 MG: 25 INJECTION INTRAMUSCULAR; INTRAVENOUS; SUBCUTANEOUS at 11:05

## 2019-06-14 RX ADMIN — MORPHINE SULFATE 2.5 MG: 2 INJECTION, SOLUTION INTRAMUSCULAR; INTRAVENOUS at 09:14

## 2019-06-14 RX ADMIN — KETOROLAC TROMETHAMINE 30 MG: 30 INJECTION, SOLUTION INTRAMUSCULAR; INTRAVENOUS at 10:23

## 2019-06-14 RX ADMIN — MEPERIDINE HYDROCHLORIDE 12.5 MG: 25 INJECTION INTRAMUSCULAR; INTRAVENOUS; SUBCUTANEOUS at 11:26

## 2019-06-14 RX ADMIN — MORPHINE SULFATE 2 MG: 2 INJECTION, SOLUTION INTRAMUSCULAR; INTRAVENOUS at 11:30

## 2019-06-14 RX ADMIN — SCOPALAMINE 1 PATCH: 1 PATCH, EXTENDED RELEASE TRANSDERMAL at 09:00

## 2019-06-14 RX ADMIN — OXYCODONE HYDROCHLORIDE AND ACETAMINOPHEN 1 TABLET: 5; 325 TABLET ORAL at 17:12

## 2019-06-14 RX ADMIN — ROCURONIUM BROMIDE 40 MG: 10 INJECTION INTRAVENOUS at 09:14

## 2019-06-14 RX ADMIN — Medication 0.6 MG: at 10:23

## 2019-06-14 RX ADMIN — LIDOCAINE HYDROCHLORIDE 100 MG: 20 INJECTION, SOLUTION INTRAVENOUS at 09:14

## 2019-06-14 RX ADMIN — ONDANSETRON HYDROCHLORIDE 4 MG: 4 INJECTION, SOLUTION INTRAMUSCULAR; INTRAVENOUS at 10:22

## 2019-06-14 ASSESSMENT — PULMONARY FUNCTION TESTS
PIF_VALUE: 2
PIF_VALUE: 2
PIF_VALUE: 22
PIF_VALUE: 0
PIF_VALUE: 21
PIF_VALUE: 1
PIF_VALUE: 23
PIF_VALUE: 24
PIF_VALUE: 22
PIF_VALUE: 22
PIF_VALUE: 24
PIF_VALUE: 19
PIF_VALUE: 20
PIF_VALUE: 19
PIF_VALUE: 19
PIF_VALUE: 24
PIF_VALUE: 1
PIF_VALUE: 22
PIF_VALUE: 24
PIF_VALUE: 1
PIF_VALUE: 13
PIF_VALUE: 4
PIF_VALUE: 18
PIF_VALUE: 23
PIF_VALUE: 19
PIF_VALUE: 19
PIF_VALUE: 3
PIF_VALUE: 23
PIF_VALUE: 24
PIF_VALUE: 13
PIF_VALUE: 19
PIF_VALUE: 24
PIF_VALUE: 1
PIF_VALUE: 24
PIF_VALUE: 22
PIF_VALUE: 21
PIF_VALUE: 22
PIF_VALUE: 13
PIF_VALUE: 25
PIF_VALUE: 20
PIF_VALUE: 22
PIF_VALUE: 22
PIF_VALUE: 24
PIF_VALUE: 23
PIF_VALUE: 24
PIF_VALUE: 22
PIF_VALUE: 24
PIF_VALUE: 15
PIF_VALUE: 24
PIF_VALUE: 23
PIF_VALUE: 24
PIF_VALUE: 22
PIF_VALUE: 38
PIF_VALUE: 22
PIF_VALUE: 13
PIF_VALUE: 2
PIF_VALUE: 18
PIF_VALUE: 24
PIF_VALUE: 2
PIF_VALUE: 18
PIF_VALUE: 21
PIF_VALUE: 2
PIF_VALUE: 22
PIF_VALUE: 24
PIF_VALUE: 24
PIF_VALUE: 19
PIF_VALUE: 20
PIF_VALUE: 20
PIF_VALUE: 2
PIF_VALUE: 24
PIF_VALUE: 2
PIF_VALUE: 2
PIF_VALUE: 19
PIF_VALUE: 19
PIF_VALUE: 15
PIF_VALUE: 19
PIF_VALUE: 24
PIF_VALUE: 22
PIF_VALUE: 25
PIF_VALUE: 19
PIF_VALUE: 20
PIF_VALUE: 25
PIF_VALUE: 18
PIF_VALUE: 24
PIF_VALUE: 2
PIF_VALUE: 19
PIF_VALUE: 24
PIF_VALUE: 22
PIF_VALUE: 24
PIF_VALUE: 18
PIF_VALUE: 4
PIF_VALUE: 19
PIF_VALUE: 2
PIF_VALUE: 2
PIF_VALUE: 25

## 2019-06-14 ASSESSMENT — PAIN SCALES - GENERAL
PAINLEVEL_OUTOF10: 0
PAINLEVEL_OUTOF10: 8
PAINLEVEL_OUTOF10: 6
PAINLEVEL_OUTOF10: 6
PAINLEVEL_OUTOF10: 4
PAINLEVEL_OUTOF10: 9
PAINLEVEL_OUTOF10: 7
PAINLEVEL_OUTOF10: 9
PAINLEVEL_OUTOF10: 0
PAINLEVEL_OUTOF10: 7
PAINLEVEL_OUTOF10: 5
PAINLEVEL_OUTOF10: 6
PAINLEVEL_OUTOF10: 8

## 2019-06-14 ASSESSMENT — PAIN DESCRIPTION - FREQUENCY: FREQUENCY: INTERMITTENT

## 2019-06-14 ASSESSMENT — PAIN DESCRIPTION - DESCRIPTORS
DESCRIPTORS: DISCOMFORT;ACHING;TENDER
DESCRIPTORS: ACHING

## 2019-06-14 ASSESSMENT — PAIN DESCRIPTION - LOCATION
LOCATION: ABDOMEN

## 2019-06-14 ASSESSMENT — PAIN DESCRIPTION - PAIN TYPE
TYPE: SURGICAL PAIN

## 2019-06-14 ASSESSMENT — PAIN - FUNCTIONAL ASSESSMENT
PAIN_FUNCTIONAL_ASSESSMENT: 0-10
PAIN_FUNCTIONAL_ASSESSMENT: ACTIVITIES ARE NOT PREVENTED

## 2019-06-14 ASSESSMENT — PAIN DESCRIPTION - ONSET: ONSET: AWAKENED FROM SLEEP

## 2019-06-14 ASSESSMENT — PAIN DESCRIPTION - ORIENTATION
ORIENTATION: MID

## 2019-06-14 ASSESSMENT — PAIN DESCRIPTION - PROGRESSION: CLINICAL_PROGRESSION: GRADUALLY IMPROVING

## 2019-06-14 NOTE — BRIEF OP NOTE
Brief Postoperative Note  ______________________________________________________________    Patient: Selene Montano  YOB: 1950  MRN: [id number]  Date of Procedure: 6/14/2019    Pre-Op Diagnosis: VENTRAL HERNIA    Post-Op Diagnosis: Same       Procedure(s):  COMBINED LAPAROSCOPIC AND OPEN VENTRAL HERNIA REPAIR WITH MESH    Anesthesia: General    Surgeon(s):  Solmon Dakins, MD    Assistant:     Estimated Blood Loss (mL): 10 ml    Complications:none    Specimens:       Implants:  Implant Name Type Inv.  Item Serial No.  Lot No. LRB No. Used   MESH VENTRALIGHT ST W/ ECHO PS CIR 8IN Mesh MESH VENTRALIGHT ST W/ ECHO PS CIR 8IN  CR [employer] [id number] N/A 1         Drains:     Findings: see op note  Solmon Dakins, MD  Date: 6/14/2019  Time: 10:31 AM

## 2019-06-14 NOTE — PROGRESS NOTES
0825:  Patient admitted to [facility] room 5. Raymon, spouse at bedside. Arm bands applied. Bilat. Socks, SCD's applied. Call light in reach.

## 2019-06-14 NOTE — PROGRESS NOTES
Received in [phone] from PACU per bed. Alert, oriented. Skin pink, warm, dry. Respirations easy. Lungs clear. O2 on at 3 liters/nasal cannula. Hand grasp moderate, pedal push and pull equal, weak. Abdomen rounded, soft, no bowel sounds. Abdominal dressing dry and intact. Abdominal binder in place. Knee high SCDs on. IV 0.9NS infusing/RFA/pump at 75ml/hour without signs of infiltration (600ml to count). Rails up x 2. Call light within reach. Bed alarm zone 2 set. Family at bedside.

## 2019-06-14 NOTE — PROGRESS NOTES
1050: pt arrived to pacu. Pt drowsy. Abdominal binder replaced. 5 lap sites and 1 open site. Cdi. Ice applied. Pt linens changed as she is incontinent post op. Warm blankets applied blood sugar 124  1105: pt still resting. 12.5 mg of demerol given for shivering   1107: No shivering observed. Pt waking up. Asking for ice chips. Tolerated well   1115: pt c/o pain. 2mg of morphine given   1120: pt continues to moan in pain. 2mg of morphine given   1122: RN called report to [facility] 97. on 5E  1127: Pt shivering. C/o pain 8/10. 12.5mg of demerol given   1130: 2mg of morphine given   Family updated   1134: pt resting with eyes closed. Stated pain was improved    1140: Pt doing well. No needs expressed.

## 2019-06-14 NOTE — OP NOTE
[address] [place] [address]                                OPERATIVE REPORT    PATIENT NAME: RAEANN DASH                    :        1950  MED REC NO:   [id number]                           ROOM:       7158  ACCOUNT NO:   [de-identified]                           ADMIT DATE: 2019  PROVIDER:     Tracy Subramanian MD    DATE OF PROCEDURE:  2019    PREOPERATIVE DIAGNOSIS:  Chronically incarcerated ventral hernia with  transverse colon. 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 OPERATION:  Hybrid/combined laparoscopic and open ventral hernia repair  with 8-inch circular Echo Positioning System Ventralight mesh. SURGEON:  Tracy Bean MD    ANESTHESIA:  General.    COMPLICATIONS:  None. INDICATIONS FOR PROCEDURE:  The patient is a 64-year-old white female  who has a chronically incarcerated ventral hernia. CT scan shows that  there to be a transverse colon loop. The patient had had an attempt at  a colonoscopy unsuccessful due to this incarcerated hernia and she is  here for fixation. FINDINGS:  We started out laparoscopically and you could see the  transverse colon going up into this hernia sac. It was obviously very  attached and it was our feeling that to attempt to reduce this  laparoscopically would create great danger to injuring the bowel. So we  converted to an open procedure to free up the transverse colon, which we  did; closed the hole, and then went back laparoscopically to place an  8-inch Ventralight mesh for good coverage. DESCRIPTION OF PROCEDURE:  The patient was brought to the operating  suite, placed supine on the operating room table. After adequate  inhalational anesthesia was administered, the patient's abdomen was  prepped and draped in usual sterile fashion. Incision was made above  the umbilicus.   A Veress needle was placed abdominal cavity. Interrupted  3-0 Vicryl was used to close the subcutaneous, a running 4-0 Vicryl was  used to close the skin on all the ports and the midline wound. Steri-Strips were applied. The patient tolerated the procedure well. PRANAV MARTIN [facility] TREATMENT [facility], MD    D: 06/14/2019 10:54:57       T: 06/14/2019 11:04:54     [email or web address]  Job#: [id number]     Doc#: [id number]    CC:

## 2019-06-14 NOTE — ANESTHESIA PRE PROCEDURE
glucose blood VI test strips (ASCENSIA AUTODISC VI;ONE TOUCH ULTRA TEST VI) strip [employer] [employer] test strips. Tests once daily. 5/15/18   Tia Doherty MD   albuterol (PROVENTIL) (2.5 MG/3ML) 0.083% nebulizer solution Take 3 mLs by nebulization every 4 hours as needed for Wheezing 11/28/17   NAV Licona - CNP   therapeutic multivitamin-minerals [facility]) tablet Take 1 tablet by mouth daily. Historical Provider, MD       Current medications:    No current facility-administered medications for this visit. No current outpatient medications on file. Facility-Administered Medications Ordered in Other Visits   Medication Dose Route Frequency Provider Last Rate Last Dose    0.9 % sodium chloride infusion   Intravenous Continuous Danelle Padilla LPN        ceFAZolin (ANCEF) 2 g in dextrose 5 % 50 mL IVPB  2 g Intravenous 30 Min Pre-Op Danelle Padilla LPN           Allergies:     Allergies   Allergen Reactions    Amoxicillin     Donnatal [Pb-Hyoscy-Atropine-Scopolamine] Other (See Comments)     palpitations    Lovastatin Other (See Comments)     myalgia    Metformin And Related Diarrhea    Vioxx Other (See Comments)     Liver problems    Dilaudid [Hydromorphone Hcl] Nausea And Vomiting    Fentanyl Nausea And Vomiting     Severe Nausea and vomitting       Problem List:    Patient Active Problem List   Diagnosis Code    Hyperlipidemia E78.5    Asthma J45.909    Polyneuropathy G62.9    AS (aortic stenosis) I35.0    Cardiomyopathy eF 39 TO 50% PER ECHO MAY 2012 I42.9    Hiatal hernia K44.9    Hypothyroid E03.9    Type 2 diabetes mellitus without complication, without long-term current use of insulin (HCC) E11.9    Osteoarthritis of multiple joints M15.9    Ear canal mass H93.8X9    Diabetic ketoacidosis without coma associated with type 2 diabetes mellitus ([address] E11.10    Malignant neoplasm of left breast in female, estrogen receptor positive ([address] C50.912, Z17.0  Localized osteoarthritis of left knee M17.12    Other osteoporosis without current pathological fracture M81.8       Past Medical History:        Diagnosis Date    Abdominal aortic aneurysm (AAA) >39 mm diameter (HCC)     found in 2015    Arthritis     Asthma     Brain cyst     benign    Cancer [facility] [facility] 09/1918    Left Breast    Chronic sinus complaints     Diverticulosis of colon     DKA (diabetic ketoacidoses) ([facility] [address] 05/2018    Elevated TSH     Hypertension     Osteoarthritis     Polyneuropathy     PONV (postoperative nausea and vomiting)     Spinal headache     Type 2 diabetes mellitus ([facility] [address] 1990's       Past Surgical History:        Procedure Laterality Date    BLADDER SUSPENSION      times 2    CHOLECYSTECTOMY      DILATION AND CURETTAGE OF UTERUS      x2    EYE SURGERY      HYSTERECTOMY  1995    JOINT REPLACEMENT Right 2007    Rt total knee    KNEE ARTHROSCOPY  1967, 2001    MANDIBLE FRACTURE SURGERY      OTHER SURGICAL HISTORY Right 12/30/2015    Excision of Sebaceous Cyst Right Ear     DC OFFICE/OUTPT VISIT,PROCEDURE ONLY Left 10/10/2018    LEFT BREAST LUMPECTOMY WITH SENTINEL LYMPH NODE BIOPSY performed by Sherwin De La Rosa MD at [address] [place] [address],[address], [facility] SKIN BIOPSY      TONSILLECTOMY AND ADENOIDECTOMY  age 4       Social History:    Social History     Tobacco Use    Smoking status: Never Smoker    Smokeless tobacco: Never Used   Substance Use Topics    Alcohol use: Yes     Alcohol/week: 0.0 oz     Comment: Socially                                Counseling given: Not Answered      Vital Signs (Current): There were no vitals filed for this visit.                                            BP Readings from Last 3 Encounters:   06/14/19 138/63   05/29/19 138/78   05/21/19 138/80       NPO Status:                                                                                 BMI:   Wt Readings from Last 3 Encounters:   06/14/19 199 lb 9.6 oz (90.5 kg)   05/29/19 202 lb (91.6 kg)   05/21/19 202 lb (91.6 kg)     There is no height or weight on file to calculate BMI.    CBC:   Lab Results   Component Value Date    WBC 5.5 05/07/2019    RBC 4.56 05/07/2019    RBC 4.00 05/23/2012    HGB 13.7 05/07/2019    HCT 39.9 05/07/2019    MCV 87 05/07/2019    RDW 10.6 05/07/2019     05/07/2019       CMP:   Lab Results   Component Value Date     05/07/2019     09/29/2018    K 3.9 05/07/2019    K 4.8 10/10/2018     09/29/2018    CO2 28 09/29/2018    BUN 20 09/29/2018    CREATININE 0.6 05/07/2019    CREATININE 0.6 04/27/2019    AGRATIO 1.5 09/29/2018    LABGLOM >90 04/27/2019    GLUCOSE 213 04/09/2019    GLUCOSE 188 09/29/2018    PROT 6.8 05/07/2019    CALCIUM 9.2 09/29/2018    BILITOT 0.7 05/07/2019    ALKPHOS 63 05/07/2019    AST 17 05/07/2019    ALT 17 05/07/2019       POC Tests:   Recent Labs     06/14/19  0805   POCGLU 152*       Coags:   Lab Results   Component Value Date    INR 0.95 05/21/2012       HCG (If Applicable): No results found for: PREGTESTUR, PREGSERUM, HCG, HCGQUANT     ABGs: No results found for: PHART, PO2ART, YCT9AJS, MSO6SKM, BEART, P4IWJOMC     Type & Screen (If Applicable):  No results found for: LABABO, LABRH    Anesthesia Evaluation     history of anesthetic complications: PONV. Airway: Mallampati: II  TM distance: >3 FB   Neck ROM: full  Mouth opening: > = 3 FB Dental:          Pulmonary:normal exam    (+) asthma:           Patient did not smoke on day of surgery. Cardiovascular:Negative CV ROS  Exercise tolerance: good (>4 METS),                     Neuro/Psych:   (+) headaches:,             GI/Hepatic/Renal:             Endo/Other:    (+) Diabetes, hypothyroidism::., .          Pt had no PAT visit       Abdominal:           Vascular: negative vascular ROS. Anesthesia Plan      general     ASA 3       Induction: intravenous.     MIPS: Postoperative opioids intended and Prophylactic antiemetics administered. Anesthetic plan and risks discussed with patient. Plan discussed with CRNA.                   Erlin May MD   6/14/2019

## 2019-06-14 NOTE — FLOWSHEET NOTE
[phone] From: [facility] [address] RE: Alpa Ashraf Ventral hernia repair of Dr. Aysha Ya. Dilaudid ordered, patient allergic to Dilaudid. Tolerates Morphine.

## 2019-06-15 LAB
ANION GAP SERPL CALCULATED.3IONS-SCNC: 13 MEQ/L (ref 8–16)
BASOPHILS # BLD: 0.1 %
BASOPHILS ABSOLUTE: 0 THOU/MM3 (ref 0–0.1)
BUN BLDV-MCNC: 16 MG/DL (ref 7–22)
CALCIUM SERPL-MCNC: 8.4 MG/DL (ref 8.5–10.5)
CHLORIDE BLD-SCNC: 105 MEQ/L (ref 98–111)
CO2: 22 MEQ/L (ref 23–33)
CREAT SERPL-MCNC: 0.5 MG/DL (ref 0.4–1.2)
EOSINOPHIL # BLD: 0 %
EOSINOPHILS ABSOLUTE: 0 THOU/MM3 (ref 0–0.4)
ERYTHROCYTE [DISTWIDTH] IN BLOOD BY AUTOMATED COUNT: 12.6 % (ref 11.5–14.5)
ERYTHROCYTE [DISTWIDTH] IN BLOOD BY AUTOMATED COUNT: 42.5 FL (ref 35–45)
GFR SERPL CREATININE-BSD FRML MDRD: > 90 ML/MIN/1.73M2
GLUCOSE BLD-MCNC: 117 MG/DL (ref 70–108)
GLUCOSE BLD-MCNC: 126 MG/DL (ref 70–108)
GLUCOSE BLD-MCNC: 148 MG/DL (ref 70–108)
GLUCOSE BLD-MCNC: 150 MG/DL (ref 70–108)
GLUCOSE BLD-MCNC: 173 MG/DL (ref 70–108)
HCT VFR BLD CALC: 36.5 % (ref 37–47)
HEMOGLOBIN: 11.7 GM/DL (ref 12–16)
IMMATURE GRANS (ABS): 0.05 THOU/MM3 (ref 0–0.07)
IMMATURE GRANULOCYTES: 0.5 %
LYMPHOCYTES # BLD: 9.2 %
LYMPHOCYTES ABSOLUTE: 0.9 THOU/MM3 (ref 1–4.8)
MCH RBC QN AUTO: 29.8 PG (ref 26–33)
MCHC RBC AUTO-ENTMCNC: 32.1 GM/DL (ref 32.2–35.5)
MCV RBC AUTO: 92.9 FL (ref 81–99)
MONOCYTES # BLD: 9.4 %
MONOCYTES ABSOLUTE: 0.9 THOU/MM3 (ref 0.4–1.3)
NUCLEATED RED BLOOD CELLS: 0 /100 WBC
PLATELET # BLD: 158 THOU/MM3 (ref 130–400)
PMV BLD AUTO: 9.6 FL (ref 9.4–12.4)
POTASSIUM SERPL-SCNC: 4.2 MEQ/L (ref 3.5–5.2)
RBC # BLD: 3.93 MILL/MM3 (ref 4.2–5.4)
SEG NEUTROPHILS: 80.8 %
SEGMENTED NEUTROPHILS ABSOLUTE COUNT: 7.9 THOU/MM3 (ref 1.8–7.7)
SODIUM BLD-SCNC: 140 MEQ/L (ref 135–145)
WBC # BLD: 9.8 THOU/MM3 (ref 4.8–10.8)

## 2019-06-15 PROCEDURE — 36415 COLL VENOUS BLD VENIPUNCTURE: CPT

## 2019-06-15 PROCEDURE — 80048 BASIC METABOLIC PNL TOTAL CA: CPT

## 2019-06-15 PROCEDURE — 99024 POSTOP FOLLOW-UP VISIT: CPT | Performed by: SURGERY

## 2019-06-15 PROCEDURE — 2580000003 HC RX 258: Performed by: SURGERY

## 2019-06-15 PROCEDURE — 94760 N-INVAS EAR/PLS OXIMETRY 1: CPT

## 2019-06-15 PROCEDURE — 85025 COMPLETE CBC W/AUTO DIFF WBC: CPT

## 2019-06-15 PROCEDURE — 6370000000 HC RX 637 (ALT 250 FOR IP): Performed by: SURGERY

## 2019-06-15 PROCEDURE — 6360000002 HC RX W HCPCS: Performed by: SURGERY

## 2019-06-15 PROCEDURE — 82948 REAGENT STRIP/BLOOD GLUCOSE: CPT

## 2019-06-15 PROCEDURE — 2700000000 HC OXYGEN THERAPY PER DAY

## 2019-06-15 PROCEDURE — 1200000000 HC SEMI PRIVATE

## 2019-06-15 PROCEDURE — 6360000002 HC RX W HCPCS: Performed by: NURSE PRACTITIONER

## 2019-06-15 PROCEDURE — 2709999900 HC NON-CHARGEABLE SUPPLY

## 2019-06-15 RX ORDER — POLYETHYLENE GLYCOL 3350 17 G/17G
17 POWDER, FOR SOLUTION ORAL DAILY
Status: DISCONTINUED | OUTPATIENT
Start: 2019-06-15 | End: 2019-06-18 | Stop reason: HOSPADM

## 2019-06-15 RX ORDER — KETOROLAC TROMETHAMINE 30 MG/ML
15 INJECTION, SOLUTION INTRAMUSCULAR; INTRAVENOUS EVERY 6 HOURS PRN
Status: DISCONTINUED | OUTPATIENT
Start: 2019-06-15 | End: 2019-06-18 | Stop reason: HOSPADM

## 2019-06-15 RX ORDER — DOCUSATE SODIUM 100 MG/1
100 CAPSULE, LIQUID FILLED ORAL DAILY
Status: DISCONTINUED | OUTPATIENT
Start: 2019-06-15 | End: 2019-06-18 | Stop reason: HOSPADM

## 2019-06-15 RX ADMIN — SODIUM CHLORIDE: 9 INJECTION, SOLUTION INTRAVENOUS at 22:19

## 2019-06-15 RX ADMIN — ENOXAPARIN SODIUM 40 MG: 40 INJECTION SUBCUTANEOUS at 08:34

## 2019-06-15 RX ADMIN — POLYETHYLENE GLYCOL 3350 17 G: 17 POWDER, FOR SOLUTION ORAL at 12:52

## 2019-06-15 RX ADMIN — SODIUM CHLORIDE: 9 INJECTION, SOLUTION INTRAVENOUS at 08:38

## 2019-06-15 RX ADMIN — MORPHINE SULFATE 2 MG: 2 INJECTION, SOLUTION INTRAMUSCULAR; INTRAVENOUS at 14:16

## 2019-06-15 RX ADMIN — ASPIRIN 81 MG 81 MG: 81 TABLET ORAL at 08:34

## 2019-06-15 RX ADMIN — LISINOPRIL 10 MG: 10 TABLET ORAL at 08:32

## 2019-06-15 RX ADMIN — OXYCODONE HYDROCHLORIDE AND ACETAMINOPHEN 1 TABLET: 5; 325 TABLET ORAL at 18:03

## 2019-06-15 RX ADMIN — DOCUSATE SODIUM 100 MG: 100 CAPSULE, LIQUID FILLED ORAL at 12:51

## 2019-06-15 RX ADMIN — ANASTROZOLE 1 MG: 1 TABLET ORAL at 08:32

## 2019-06-15 RX ADMIN — PANTOPRAZOLE SODIUM 40 MG: 40 TABLET, DELAYED RELEASE ORAL at 05:56

## 2019-06-15 RX ADMIN — OXYCODONE HYDROCHLORIDE AND ACETAMINOPHEN 1 TABLET: 5; 325 TABLET ORAL at 02:32

## 2019-06-15 RX ADMIN — OXYCODONE HYDROCHLORIDE AND ACETAMINOPHEN 1 TABLET: 5; 325 TABLET ORAL at 12:51

## 2019-06-15 RX ADMIN — MORPHINE SULFATE 2 MG: 2 INJECTION, SOLUTION INTRAMUSCULAR; INTRAVENOUS at 22:21

## 2019-06-15 ASSESSMENT — PAIN DESCRIPTION - LOCATION
LOCATION: ABDOMEN

## 2019-06-15 ASSESSMENT — PAIN SCALES - GENERAL
PAINLEVEL_OUTOF10: 6
PAINLEVEL_OUTOF10: 5
PAINLEVEL_OUTOF10: 4
PAINLEVEL_OUTOF10: 5
PAINLEVEL_OUTOF10: 7
PAINLEVEL_OUTOF10: 5
PAINLEVEL_OUTOF10: 3
PAINLEVEL_OUTOF10: 2
PAINLEVEL_OUTOF10: 3

## 2019-06-15 ASSESSMENT — PAIN DESCRIPTION - PAIN TYPE
TYPE: SURGICAL PAIN

## 2019-06-15 ASSESSMENT — PAIN DESCRIPTION - PROGRESSION
CLINICAL_PROGRESSION: GRADUALLY IMPROVING

## 2019-06-15 ASSESSMENT — PAIN DESCRIPTION - DESCRIPTORS
DESCRIPTORS: ACHING;CRAMPING
DESCRIPTORS: STABBING
DESCRIPTORS: SORE
DESCRIPTORS: SORE
DESCRIPTORS: STABBING

## 2019-06-15 ASSESSMENT — PAIN - FUNCTIONAL ASSESSMENT: PAIN_FUNCTIONAL_ASSESSMENT: ACTIVITIES ARE NOT PREVENTED

## 2019-06-15 ASSESSMENT — PAIN DESCRIPTION - ORIENTATION
ORIENTATION: RIGHT
ORIENTATION: RIGHT

## 2019-06-15 ASSESSMENT — PAIN DESCRIPTION - ONSET
ONSET: GRADUAL
ONSET: ON-GOING

## 2019-06-15 ASSESSMENT — PAIN DESCRIPTION - DIRECTION: RADIATING_TOWARDS: LOWER

## 2019-06-15 ASSESSMENT — PAIN DESCRIPTION - FREQUENCY
FREQUENCY: INTERMITTENT
FREQUENCY: CONTINUOUS

## 2019-06-15 NOTE — PROGRESS NOTES
Dr Zeina Pittman notified of stabbing, sharp pain to right sided abdomen.  Patient states pain is 6/10 after receiving morphine and norco. Order for Toradol given

## 2019-06-15 NOTE — PROGRESS NOTES
Patient took 9 units home novolog for blood sugar 148 at lunch. patient has a insulin pump. Patient only took a few bites of meal however.  Instructed to possibly wait until after eating at supper to medicate self with insulin

## 2019-06-15 NOTE — PLAN OF CARE
Problem: Pain:  Goal: Pain level will decrease  Description  Pain level will decrease  Outcome: Met This Shift  Note:   Pt is on oral medication which has allowed her to rest with eyes closed with no distress noted. Problem: SAFETY  Goal: Free from accidental physical injury  Outcome: Met This Shift  Note:   Patient free from falls this shift. Patient fall risk r/t. Continues on falling star program, siderails upx2-3, bed alarm on, call light in reach, bed in low and locked position. Hourly checks preformed, potty, position, pain, pathway and possessions assessed. Continuing to monitor. Problem: SAFETY  Goal: Free from intentional harm  Outcome: Met This Shift  Note:   Patient free from injury/harm this shift. Complying well with medical devices and following safety precautions. Fall risk continues to be assessed. Fall precautions in place, 5 P's used to provide safe environment. Bed in low and locked position, call light in reach, side rails upx2-3. Needs being met. Continuing to monitor. Problem: DAILY CARE  Goal: Daily care needs are met  Outcome: Met This Shift  Note:   Patient assessed as independent/ 1 rashad  to accomplish ADLs. Patient voicing needs appropriately. Encouraging and promoting as much  independence as possible per patient ability and assisting with ADLS and hygiene needs as needed. Skin and mucous membranes appropriate. Continuing to assess daily care needs.

## 2019-06-16 LAB
GLUCOSE BLD-MCNC: 111 MG/DL (ref 70–108)
GLUCOSE BLD-MCNC: 147 MG/DL (ref 70–108)
GLUCOSE BLD-MCNC: 161 MG/DL (ref 70–108)
GLUCOSE BLD-MCNC: 193 MG/DL (ref 70–108)

## 2019-06-16 PROCEDURE — 82948 REAGENT STRIP/BLOOD GLUCOSE: CPT

## 2019-06-16 PROCEDURE — 6370000000 HC RX 637 (ALT 250 FOR IP): Performed by: SURGERY

## 2019-06-16 PROCEDURE — 2580000003 HC RX 258: Performed by: SURGERY

## 2019-06-16 PROCEDURE — 6370000000 HC RX 637 (ALT 250 FOR IP): Performed by: PHYSICIAN ASSISTANT

## 2019-06-16 PROCEDURE — 1200000000 HC SEMI PRIVATE

## 2019-06-16 PROCEDURE — 6360000002 HC RX W HCPCS: Performed by: SURGERY

## 2019-06-16 PROCEDURE — 99024 POSTOP FOLLOW-UP VISIT: CPT | Performed by: SURGERY

## 2019-06-16 RX ORDER — SODIUM PHOSPHATE, DIBASIC AND SODIUM PHOSPHATE, MONOBASIC 7; 19 G/133ML; G/133ML
1 ENEMA RECTAL
Status: ACTIVE | OUTPATIENT
Start: 2019-06-16 | End: 2019-06-16

## 2019-06-16 RX ORDER — BISACODYL 10 MG
10 SUPPOSITORY, RECTAL RECTAL DAILY PRN
Status: DISCONTINUED | OUTPATIENT
Start: 2019-06-16 | End: 2019-06-18 | Stop reason: HOSPADM

## 2019-06-16 RX ORDER — LISINOPRIL 10 MG/1
10 TABLET ORAL 2 TIMES DAILY
Status: DISCONTINUED | OUTPATIENT
Start: 2019-06-16 | End: 2019-06-18 | Stop reason: HOSPADM

## 2019-06-16 RX ADMIN — PANTOPRAZOLE SODIUM 40 MG: 40 TABLET, DELAYED RELEASE ORAL at 06:34

## 2019-06-16 RX ADMIN — ASPIRIN 81 MG 81 MG: 81 TABLET ORAL at 08:46

## 2019-06-16 RX ADMIN — ANASTROZOLE 1 MG: 1 TABLET ORAL at 08:46

## 2019-06-16 RX ADMIN — POLYETHYLENE GLYCOL 3350 17 G: 17 POWDER, FOR SOLUTION ORAL at 08:46

## 2019-06-16 RX ADMIN — OXYCODONE HYDROCHLORIDE AND ACETAMINOPHEN 1 TABLET: 5; 325 TABLET ORAL at 08:46

## 2019-06-16 RX ADMIN — LISINOPRIL 10 MG: 10 TABLET ORAL at 08:46

## 2019-06-16 RX ADMIN — Medication 10 ML: at 22:51

## 2019-06-16 RX ADMIN — DOCUSATE SODIUM 100 MG: 100 CAPSULE, LIQUID FILLED ORAL at 08:46

## 2019-06-16 RX ADMIN — BISACODYL 10 MG: 10 SUPPOSITORY RECTAL at 11:19

## 2019-06-16 RX ADMIN — ENOXAPARIN SODIUM 40 MG: 40 INJECTION SUBCUTANEOUS at 08:48

## 2019-06-16 RX ADMIN — OXYCODONE HYDROCHLORIDE AND ACETAMINOPHEN 1 TABLET: 5; 325 TABLET ORAL at 01:21

## 2019-06-16 RX ADMIN — LISINOPRIL 10 MG: 10 TABLET ORAL at 22:49

## 2019-06-16 RX ADMIN — OXYCODONE HYDROCHLORIDE AND ACETAMINOPHEN 1 TABLET: 5; 325 TABLET ORAL at 14:11

## 2019-06-16 ASSESSMENT — PAIN SCALES - GENERAL
PAINLEVEL_OUTOF10: 3
PAINLEVEL_OUTOF10: 3
PAINLEVEL_OUTOF10: 4
PAINLEVEL_OUTOF10: 4
PAINLEVEL_OUTOF10: 5

## 2019-06-16 ASSESSMENT — PAIN DESCRIPTION - PROGRESSION
CLINICAL_PROGRESSION: GRADUALLY IMPROVING
CLINICAL_PROGRESSION: NOT CHANGED
CLINICAL_PROGRESSION: GRADUALLY IMPROVING

## 2019-06-16 ASSESSMENT — PAIN - FUNCTIONAL ASSESSMENT: PAIN_FUNCTIONAL_ASSESSMENT: ACTIVITIES ARE NOT PREVENTED

## 2019-06-16 ASSESSMENT — PAIN DESCRIPTION - PAIN TYPE
TYPE: SURGICAL PAIN
TYPE: SURGICAL PAIN

## 2019-06-16 ASSESSMENT — PAIN DESCRIPTION - ONSET: ONSET: ON-GOING

## 2019-06-16 ASSESSMENT — PAIN DESCRIPTION - ORIENTATION: ORIENTATION: RIGHT

## 2019-06-16 ASSESSMENT — PAIN DESCRIPTION - FREQUENCY: FREQUENCY: INTERMITTENT

## 2019-06-16 ASSESSMENT — PAIN DESCRIPTION - DIRECTION: RADIATING_TOWARDS: LOWER

## 2019-06-16 ASSESSMENT — PAIN DESCRIPTION - LOCATION
LOCATION: ABDOMEN
LOCATION: ABDOMEN

## 2019-06-16 ASSESSMENT — PAIN DESCRIPTION - DESCRIPTORS
DESCRIPTORS: ACHING
DESCRIPTORS: ACHING

## 2019-06-16 NOTE — PLAN OF CARE
Problem: Pain:  Goal: Pain level will decrease  Description  Pain level will decrease  6/15/2019 2342 by Kiran Reich RN  Outcome: Ongoing  Note:   Pt is having bouts of right sided pain that is requiring additional iv medication to control here pain she is resting comfortably with eyes closed with no distress noted. 6/15/2019 1324 by George Urena RN  Outcome: Ongoing  Note:   Patient has a stated pain goal of 5/10. Patient co's soreness, tenderness to abdomen and rates 3-5/10. Patient is receiving oral pain medication and is utilizing ice pack to abdomen     Problem: SAFETY  Goal: Free from accidental physical injury  6/15/2019 2342 by Kiran Reich RN  Outcome: Met This Shift  Note:   Patient free from falls this shift. Patient fall risk r/t. Continues on falling star program, siderails upx2-3, bed alarm on, call light in reach, bed in low and locked position. Hourly checks preformed, potty, position, pain, pathway and possessions assessed. Continuing to monitor. 6/15/2019 1324 by George Urena RN  Outcome: Met This Shift  Note:   Patient is alert and out of bed with standby assist x1. No falls noted. Patient's tolerates ambulating fairly well with a steady gait. Utilizing bed alarm     Problem: SAFETY  Goal: Free from intentional harm  Note:   Patient free from injury/harm this shift. Complying well with medical devices and following safety precautions. Fall risk continues to be assessed. Fall precautions in place, 5 P's used to provide safe environment. Bed in low and locked position, call light in reach, side rails upx2-3. Needs being met. Continuing to monitor. Problem: DAILY CARE  Goal: Daily care needs are met  Outcome: Met This Shift  Note:   Patient assessed as  1 assist to accomplish ADLs. Patient voicing needs appropriately. Encouraging and promoting as much  independence as possible per patient ability and assisting with ADLS and hygiene needs as needed.  Skin and mucous membranes appropriate. Continuing to assess daily care needs. Problem: SKIN INTEGRITY  Goal: Skin integrity is maintained or improved  6/15/2019 2342 by Gildardo Ruano RN  Outcome: Met This Shift  Note:   Pt has surgical incisions that are clean dry and intact there is no breakdown noted to coccyx area  6/15/2019 1324 by Clayton Xiao RN  Outcome: Ongoing  Note:   Surgical sites x5 to abdomen are clean, dry and intact with steri strips. Midline dressing removed and chlorhexadine shower taken     Problem: DISCHARGE BARRIERS  Goal: Patient's continuum of care needs are met  6/15/2019 2342 by Gildardo Ruano RN  Outcome: Met This Shift  Note:   None voiced at this time  6/15/2019 1324 by Clayton Xiao RN  Note:   Patient plans on being discharged to home with spouse when able   Care plan reviewed with patient. Patient does verbalize understanding of the plan of care and contribute to goal setting.

## 2019-06-16 NOTE — PROGRESS NOTES
Surg POD#2  r sided abd pain yest gone but pt states she feels weak no appetite  No bm yet  abd soft wound good few BS  Try supp

## 2019-06-17 PROBLEM — Z87.19 S/P REPAIR OF VENTRAL HERNIA: Status: ACTIVE | Noted: 2019-06-17

## 2019-06-17 PROBLEM — Z98.890 S/P REPAIR OF VENTRAL HERNIA: Status: ACTIVE | Noted: 2019-06-17

## 2019-06-17 LAB
ERYTHROCYTE [DISTWIDTH] IN BLOOD BY AUTOMATED COUNT: 12.8 % (ref 11.5–14.5)
ERYTHROCYTE [DISTWIDTH] IN BLOOD BY AUTOMATED COUNT: 45.9 FL (ref 35–45)
GLUCOSE BLD-MCNC: 148 MG/DL (ref 70–108)
GLUCOSE BLD-MCNC: 155 MG/DL (ref 70–108)
GLUCOSE BLD-MCNC: 156 MG/DL (ref 70–108)
GLUCOSE BLD-MCNC: 181 MG/DL (ref 70–108)
HCT VFR BLD CALC: 35.3 % (ref 37–47)
HEMOGLOBIN: 10.9 GM/DL (ref 12–16)
MCH RBC QN AUTO: 30.4 PG (ref 26–33)
MCHC RBC AUTO-ENTMCNC: 30.9 GM/DL (ref 32.2–35.5)
MCV RBC AUTO: 98.6 FL (ref 81–99)
PLATELET # BLD: 169 THOU/MM3 (ref 130–400)
PMV BLD AUTO: 10 FL (ref 9.4–12.4)
RBC # BLD: 3.58 MILL/MM3 (ref 4.2–5.4)
WBC # BLD: 6.2 THOU/MM3 (ref 4.8–10.8)

## 2019-06-17 PROCEDURE — APPSS30 APP SPLIT SHARED TIME 16-30 MINUTES: Performed by: NURSE PRACTITIONER

## 2019-06-17 PROCEDURE — 36415 COLL VENOUS BLD VENIPUNCTURE: CPT

## 2019-06-17 PROCEDURE — 6370000000 HC RX 637 (ALT 250 FOR IP): Performed by: SURGERY

## 2019-06-17 PROCEDURE — 99024 POSTOP FOLLOW-UP VISIT: CPT | Performed by: SURGERY

## 2019-06-17 PROCEDURE — 82948 REAGENT STRIP/BLOOD GLUCOSE: CPT

## 2019-06-17 PROCEDURE — 1200000000 HC SEMI PRIVATE

## 2019-06-17 PROCEDURE — 6370000000 HC RX 637 (ALT 250 FOR IP): Performed by: PHYSICIAN ASSISTANT

## 2019-06-17 PROCEDURE — 85027 COMPLETE CBC AUTOMATED: CPT

## 2019-06-17 PROCEDURE — 99024 POSTOP FOLLOW-UP VISIT: CPT | Performed by: NURSE PRACTITIONER

## 2019-06-17 PROCEDURE — 6360000002 HC RX W HCPCS: Performed by: SURGERY

## 2019-06-17 PROCEDURE — 2580000003 HC RX 258: Performed by: SURGERY

## 2019-06-17 RX ORDER — OXYCODONE HYDROCHLORIDE AND ACETAMINOPHEN 5; 325 MG/1; MG/1
1 TABLET ORAL EVERY 6 HOURS PRN
Qty: 20 TABLET | Refills: 0 | Status: SHIPPED | OUTPATIENT
Start: 2019-06-17 | End: 2019-06-24

## 2019-06-17 RX ORDER — SODIUM PHOSPHATE,MONO-DIBASIC 19G-7G/118
1 ENEMA (ML) RECTAL ONCE
Status: COMPLETED | OUTPATIENT
Start: 2019-06-17 | End: 2019-06-17

## 2019-06-17 RX ADMIN — SODIUM PHOSPHATE 1 ENEMA: 7; 19 ENEMA RECTAL at 10:18

## 2019-06-17 RX ADMIN — Medication 10 ML: at 21:31

## 2019-06-17 RX ADMIN — POLYETHYLENE GLYCOL 3350 17 G: 17 POWDER, FOR SOLUTION ORAL at 09:04

## 2019-06-17 RX ADMIN — PANTOPRAZOLE SODIUM 40 MG: 40 TABLET, DELAYED RELEASE ORAL at 05:29

## 2019-06-17 RX ADMIN — OXYCODONE HYDROCHLORIDE AND ACETAMINOPHEN 1 TABLET: 5; 325 TABLET ORAL at 09:04

## 2019-06-17 RX ADMIN — ENOXAPARIN SODIUM 40 MG: 40 INJECTION SUBCUTANEOUS at 09:04

## 2019-06-17 RX ADMIN — ANASTROZOLE 1 MG: 1 TABLET ORAL at 09:04

## 2019-06-17 RX ADMIN — LISINOPRIL 10 MG: 10 TABLET ORAL at 09:05

## 2019-06-17 RX ADMIN — LISINOPRIL 10 MG: 10 TABLET ORAL at 21:30

## 2019-06-17 RX ADMIN — DOCUSATE SODIUM 100 MG: 100 CAPSULE, LIQUID FILLED ORAL at 09:04

## 2019-06-17 RX ADMIN — ASPIRIN 81 MG 81 MG: 81 TABLET ORAL at 09:05

## 2019-06-17 ASSESSMENT — PAIN DESCRIPTION - PROGRESSION
CLINICAL_PROGRESSION: GRADUALLY IMPROVING
CLINICAL_PROGRESSION: NOT CHANGED
CLINICAL_PROGRESSION: GRADUALLY IMPROVING
CLINICAL_PROGRESSION: GRADUALLY IMPROVING

## 2019-06-17 ASSESSMENT — PAIN DESCRIPTION - FREQUENCY: FREQUENCY: INTERMITTENT

## 2019-06-17 ASSESSMENT — PAIN SCALES - GENERAL
PAINLEVEL_OUTOF10: 3
PAINLEVEL_OUTOF10: 5
PAINLEVEL_OUTOF10: 5
PAINLEVEL_OUTOF10: 4
PAINLEVEL_OUTOF10: 2
PAINLEVEL_OUTOF10: 3

## 2019-06-17 ASSESSMENT — PAIN DESCRIPTION - PAIN TYPE
TYPE: SURGICAL PAIN

## 2019-06-17 ASSESSMENT — PAIN DESCRIPTION - ORIENTATION
ORIENTATION: LOWER
ORIENTATION: LOWER

## 2019-06-17 ASSESSMENT — PAIN DESCRIPTION - LOCATION
LOCATION: ABDOMEN

## 2019-06-17 ASSESSMENT — PAIN DESCRIPTION - DESCRIPTORS
DESCRIPTORS: ACHING

## 2019-06-17 ASSESSMENT — PAIN DESCRIPTION - ONSET: ONSET: ON-GOING

## 2019-06-17 ASSESSMENT — PAIN - FUNCTIONAL ASSESSMENT: PAIN_FUNCTIONAL_ASSESSMENT: ACTIVITIES ARE NOT PREVENTED

## 2019-06-17 NOTE — PROGRESS NOTES
[facility]  Post Operative Progress Note  Dr Elsa Archibald    Pt Name: Roland Echeverria Record Number: [id number]  Date of Birth 1950   Today's Date: 6/17/2019    Hospital day # 3   POD # 3  S/p  Hybrid/ laparoscopic/open ventral hernia repair with mesh  involving transverse colon    Chief complaint: weakness, No BM     Patient was stable overnight. Chart reviewed. Updated by nursing staff. Denies chest discomfort or dyspnea. No N/V; (+) belching, flatus and negative BM. Has had suppository and fleets enema. Tolerating DIET CARB CONTROL; diet. Pain controlled with analgesia. Up with assistance     Past, Family, Social History unchanged from admission. Diet:  DIET CARB CONTROL;    Medications:  Scheduled Meds:   lisinopril  10 mg Oral BID    polyethylene glycol  17 g Oral Daily    docusate sodium  100 mg Oral Daily    anastrozole  1 mg Oral Daily    aspirin  81 mg Oral Daily    pantoprazole  40 mg Oral Daily    [START ON 6/18/2019] Semaglutide  0.25 mg Subcutaneous Weekly    sodium chloride flush  10 mL Intravenous 2 times per day    enoxaparin  40 mg Subcutaneous Daily     Continuous Infusions:   sodium chloride Stopped (06/16/19 1118)     PRN Meds:bisacodyl, ketorolac, albuterol, sodium chloride flush, ondansetron, oxyCODONE-acetaminophen, morphine    Objective:    CBC:   Recent Labs     06/15/19  0503 06/17/19  0512   WBC 9.8 6.2   HGB 11.7* 10.9*    169     BMP:    Recent Labs     06/15/19  0503      K 4.2      CO2 22*   BUN 16   CREATININE 0.5   GLUCOSE 173*     Calcium:  Recent Labs     06/15/19  0503   CALCIUM 8.4*     Ionized Calcium:No results for input(s): IONCA in the last 72 hours. Magnesium:No results for input(s): MG in the last 72 hours. Phosphorus:No results for input(s): PHOS in the last 72 hours. BNP:No results for input(s): BNP in the last 72 hours.   Glucose:  Recent Labs     06/16/19  2243 06/17/19  0744 06/17/19  1158   POCGLU 161* 155* 181*     HgbA1C: No results for input(s): LABA1C in the last 72 hours. INR: No results for input(s): INR in the last 72 hours. Hepatic: No results for input(s): ALKPHOS, ALT, AST, PROT, BILITOT, BILIDIR, LABALBU in the last 72 hours. Amylase and Lipase:No results for input(s): LACTA, AMYLASE in the last 72 hours. Lactic Acid: No results for input(s): LACTA in the last 72 hours. Troponin: No results for input(s): CKTOTAL, CKMB, TROPONINT in the last 72 hours. BNP: No results for input(s): BNP in the last 72 hours. Lipids: No results for input(s): CHOL, TRIG, HDL, LDLCALC in the last 72 hours. Invalid input(s): LDL  ABGs:   Lab Results   Component Value Date    PH 7.37 05/22/2012    PH 5.5 05/22/2012    PCO2 41 05/22/2012    PO2 78 05/22/2012    HCO3 23 05/22/2012    O2SAT 95 05/22/2012       Radiology reports as per the Radiologist  Radiology: Xr Finger Right (min 2 Views)    Result Date: 5/23/2019  PROCEDURE: XR FINGER RIGHT (MIN 2 VIEWS) CLINICAL INFORMATION: 75-year-old female with right thumb fracture after slamming in door . COMPARISON: No prior study. TECHNIQUE: An AP view of the right hand and 2 views of the first digit were obtained. FINDINGS: There is redemonstration of a fracture of the tip of the distal phalanx of the first digit. The bony fragments remain appropriately aligned. There is some soft tissue swelling in the first digit. The remaining bones of the hand are normal. The remaining soft tissues are normal.     Redemonstration of a fracture near the tip of the distal phalanx of the first digit. **This report has been created using voice recognition software. It may contain minor errors which are inherent in voice recognition technology. ** Final report electronically signed by Dr Jose Fuller on 5/23/2019 8:14 AM       Physical Exam:  Vitals: /60   Pulse 89   Temp 98.7 °F (37.1 °C) (Oral)   Resp 17   Ht 5' 3\" (1.6 m)   Wt 199 lb 9.6 oz (90.5 kg)   SpO2 93%   BMI 35.36 kg/m²   24 hour intake/output:    Intake/Output Summary (Last 24 hours) at 6/17/2019 1503  Last data filed at 6/17/2019 1437  Gross per 24 hour   Intake 820 ml   Output 1701 ml   Net -881 ml     Last 3 weights: Wt Readings from Last 3 Encounters:   06/14/19 199 lb 9.6 oz (90.5 kg)   05/29/19 202 lb (91.6 kg)   05/21/19 202 lb (91.6 kg)       General appearance - oriented to person, place, and time and overweight  HEENT: Normocephalic and Atraumatic  Chest - clear to auscultation, no wheezes, rales or rhonchi, symmetric air entry  Cardiovascular - normal rate and regular rhythm  Abdomen - tenderness noted as expected, active bowel sounds, rounded   Neurological - Alert and oriented and Normal speech  Integumentary - Skin color, texture, turgor normal. No Rashes or lesions  Musculoskeletal -Full ROM times 4 extremities  Surgical Incision: midline incision well approximated, laparoscope incisions look good     DVT prophylaxis: [x] Lovenox                                 [] SCDs                                 [] SQ Heparin                                 [] Encourage ambulation           [] Already on Anticoagulation                 ASSESSMENT:  S/p hybride/laparoscopic open ventral hernia repair with mesh   POD# 1  1. Constipation, + flatus   2. Acute postoperative pain  3. weakness     has a past medical history of Abdominal aortic aneurysm (AAA) >39 mm diameter ([facility]), Arthritis, Asthma, Brain cyst, Cancer ([facility]), Chronic sinus complaints, Diverticulosis of colon, DKA (diabetic ketoacidoses) ([facility]), Elevated TSH, Hypertension, Osteoarthritis, Polyneuropathy, PONV (postoperative nausea and vomiting), Spinal headache, and Type 2 diabetes mellitus ([facility] [address] PLAN:  1. Wound care daily  2. Labs prn   3. Stool softeners - fleets enema x 1, suppository   4. Diet carb control   5. Iv hydration  6. DVT and GI Prophylaxis  7. Activity - ambulate, OOB to chair, C&DB,  IS  8. Analgesia and antiemetics as needed  9.  PT/OT to assist with weakness   10. Plan discharge tomorrow       Electronically signed by NAV Doshi CNP on 6/17/2019 at 3:03 PM Patient seen and examined independently by me. Above discussed and I agree with CNP. Labs, cultures, and radiographs where available were reviewed. See orders for the updated patient care plan.     Samantha Davis MD,  NO bm YET  ABD SOFT WOUND GOOD  6/17/2019   4:01 PM

## 2019-06-17 NOTE — CARE COORDINATION
6/17/19, 10:01 AM  Sean Perkins       Admitted from: [facility] 6/14/2019/ 0723 Hospital day: 3   Location: [address] [place] [address]A Reason for admit: Ventral hernia without obstruction or gangrene [K43.9] Status: inpt  Admit order signed?: yes  PMH:  has a past medical history of Abdominal aortic aneurysm (AAA) >39 mm diameter ([facility] [address] Arthritis, Asthma, Brain cyst, Cancer ([facility] [address] Chronic sinus complaints, Diverticulosis of colon, DKA (diabetic ketoacidoses) ([facility] [address] Elevated TSH, Hypertension, Osteoarthritis, Polyneuropathy, PONV (postoperative nausea and vomiting), Spinal headache, and Type 2 diabetes mellitus ([facility] [address] Medications:  Scheduled Meds:   lisinopril  10 mg Oral BID    polyethylene glycol  17 g Oral Daily    docusate sodium  100 mg Oral Daily    anastrozole  1 mg Oral Daily    aspirin  81 mg Oral Daily    pantoprazole  40 mg Oral Daily    [START ON 6/18/2019] Semaglutide  0.25 mg Subcutaneous Weekly    sodium chloride flush  10 mL Intravenous 2 times per day    enoxaparin  40 mg Subcutaneous Daily     Continuous Infusions:   sodium chloride Stopped (06/16/19 1118)      Pertinent Info/Orders/Treatment Plan: S/P open ventral hernia repair with mesh by Dr. Pablo Martin.  Oral pain meds, ice pack to abdomen, suppository ordered, pt out of bed with standby assist.   Diet: DIET CARB CONTROL;   Vital Signs: BP (!) 115/55   Pulse 104   Temp 98 °F (36.7 °C) (Oral)   Resp 18   Ht 5' 3\" (1.6 m)   Wt 199 lb 9.6 oz (90.5 kg)   SpO2 94%   BMI 35.36 kg/m²   PCP: Mili Marte MD  Readmission: no  Readmission Risk Score: 11%    Discharge Planning  Current Residence:  Private Residence  Living Arrangements:  Spouse/Significant Other   Support Systems:  Spouse/Significant Other, Friends/Neighbors  Current Services PTA:     Potential Assistance Needed:  N/A  Potential Assistance Purchasing Medications:  No  Does patient want to participate in local refill/ meds to beds program?  No  Type of Home Care Services:     Patient

## 2019-06-18 ENCOUNTER — TELEPHONE (OUTPATIENT)
Dept: FAMILY MEDICINE CLINIC | Age: 69
End: 2019-06-18

## 2019-06-18 VITALS
BODY MASS INDEX: 35.37 KG/M2 | OXYGEN SATURATION: 95 % | SYSTOLIC BLOOD PRESSURE: 136 MMHG | HEIGHT: 63 IN | HEART RATE: 106 BPM | DIASTOLIC BLOOD PRESSURE: 64 MMHG | RESPIRATION RATE: 18 BRPM | WEIGHT: 199.6 LBS | TEMPERATURE: 98 F

## 2019-06-18 LAB
GLUCOSE BLD-MCNC: 166 MG/DL (ref 70–108)
GLUCOSE BLD-MCNC: 170 MG/DL (ref 70–108)

## 2019-06-18 PROCEDURE — 82948 REAGENT STRIP/BLOOD GLUCOSE: CPT

## 2019-06-18 PROCEDURE — 6360000002 HC RX W HCPCS: Performed by: SURGERY

## 2019-06-18 PROCEDURE — 6370000000 HC RX 637 (ALT 250 FOR IP): Performed by: SURGERY

## 2019-06-18 PROCEDURE — 97166 OT EVAL MOD COMPLEX 45 MIN: CPT

## 2019-06-18 PROCEDURE — 99024 POSTOP FOLLOW-UP VISIT: CPT | Performed by: NURSE PRACTITIONER

## 2019-06-18 PROCEDURE — 97116 GAIT TRAINING THERAPY: CPT

## 2019-06-18 PROCEDURE — APPSS30 APP SPLIT SHARED TIME 16-30 MINUTES: Performed by: NURSE PRACTITIONER

## 2019-06-18 PROCEDURE — 97535 SELF CARE MNGMENT TRAINING: CPT

## 2019-06-18 PROCEDURE — 6370000000 HC RX 637 (ALT 250 FOR IP): Performed by: PHYSICIAN ASSISTANT

## 2019-06-18 PROCEDURE — 97161 PT EVAL LOW COMPLEX 20 MIN: CPT

## 2019-06-18 RX ADMIN — PANTOPRAZOLE SODIUM 40 MG: 40 TABLET, DELAYED RELEASE ORAL at 05:31

## 2019-06-18 RX ADMIN — DOCUSATE SODIUM 100 MG: 100 CAPSULE, LIQUID FILLED ORAL at 09:25

## 2019-06-18 RX ADMIN — LISINOPRIL 10 MG: 10 TABLET ORAL at 09:25

## 2019-06-18 RX ADMIN — ASPIRIN 81 MG 81 MG: 81 TABLET ORAL at 09:25

## 2019-06-18 RX ADMIN — ENOXAPARIN SODIUM 40 MG: 40 INJECTION SUBCUTANEOUS at 09:25

## 2019-06-18 RX ADMIN — ANASTROZOLE 1 MG: 1 TABLET ORAL at 09:25

## 2019-06-18 ASSESSMENT — PAIN SCALES - GENERAL
PAINLEVEL_OUTOF10: 2

## 2019-06-18 ASSESSMENT — PAIN - FUNCTIONAL ASSESSMENT: PAIN_FUNCTIONAL_ASSESSMENT: ACTIVITIES ARE NOT PREVENTED

## 2019-06-18 ASSESSMENT — PAIN DESCRIPTION - LOCATION
LOCATION: ABDOMEN
LOCATION: ABDOMEN

## 2019-06-18 ASSESSMENT — PAIN DESCRIPTION - PAIN TYPE
TYPE: SURGICAL PAIN
TYPE: SURGICAL PAIN

## 2019-06-18 ASSESSMENT — PAIN DESCRIPTION - PROGRESSION: CLINICAL_PROGRESSION: NOT CHANGED

## 2019-06-18 ASSESSMENT — PAIN DESCRIPTION - DESCRIPTORS: DESCRIPTORS: ACHING

## 2019-06-18 ASSESSMENT — PAIN DESCRIPTION - FREQUENCY: FREQUENCY: INTERMITTENT

## 2019-06-18 ASSESSMENT — PAIN DESCRIPTION - ORIENTATION: ORIENTATION: LOWER

## 2019-06-18 ASSESSMENT — PAIN DESCRIPTION - ONSET: ONSET: ON-GOING

## 2019-06-18 NOTE — PLAN OF CARE
Problem: Pain:  Goal: Pain level will decrease  Description  Pain level will decrease  Outcome: Ongoing  Note:   Patient stated pain goal 2. Patient rating pain 2/10 on 0-10 pain scale, and currently reaching pain goal. Nothing given for pain. Problem: SAFETY  Goal: Free from accidental physical injury  Outcome: Ongoing  Note:   Pt free from falls this shift, non skid socks on when up, ambulates with steady gait, does not use ambulatory devices, uses call light for assistance, bed alarm zone 2, A&Ox4. Problem: DAILY CARE  Goal: Daily care needs are met  Outcome: Ongoing  Note:   Pt able to do ADL's independently. Problem: SKIN INTEGRITY  Goal: Skin integrity is maintained or improved  Outcome: Ongoing  Note:   X4 surgical lap sites with bandaids and x1 midline incision with steri strips, clean, dry, and intact, no drainage, no odor. Problem: DISCHARGE BARRIERS  Goal: Patient's continuum of care needs are met  Outcome: Ongoing  Note:   Pt plans to return home with support of  at discharge. Problem: Serum Glucose Level - Abnormal:  Goal: Ability to maintain appropriate glucose levels has stabilized  Description  Ability to maintain appropriate glucose levels has stabilized  Outcome: Ongoing  Note:    this shift, pt using own medication and sliding scale per Dr. Adrien Rojas. Pt states she did not take any insulin for her BG this shift. Care plan reviewed with patient. Patient verbalizes understanding of the plan of care and contribute to goal setting.

## 2019-06-18 NOTE — PROGRESS NOTES
Discharge teaching and instructions for diagnosis/procedure of open ventral hernia repair   completed with patient using teachback method. AVS reviewed. Reviewed prescriptions given to patient. Patient voiced understanding regarding prescriptions, follow up appointments, and care of self at home. Discharged in a wheelchair to  independent living per family. CHG soap sent home with patient for showers & incision bathing.

## 2019-06-18 NOTE — PROGRESS NOTES
Roni Salcedo 58  INPATIENT OCCUPATIONAL THERAPY  [facility] SURGICAL 5E  EVALUATION    Time:   Time In: 8072  Time Out: 0908  Timed Code Treatment Minutes: 8 Minutes  Minutes: 18          Date: 2019  Patient Name: Shamar Rios,   Gender: female      MRN: [id number]  : 1950  (68 y.o.)  Referring Practitioner: NAV Castelan CNP  Diagnosis: Ventral Hernia without obstruction or gangrene  Additional Pertinent Hx: POD #4 S/p  Hybrid/ laparoscopic/open ventral hernia repair with mesh  involving transverse colon    Restrictions/Precautions:  Restrictions/Precautions: General Precautions  Required Braces or Orthoses  Other: Abdominal Binder  Other: Abdominal Binder    Subjective  Chart Reviewed: Yes, Operative Notes, Orders, Progress Notes, History and Physical  Patient assessed for rehabilitation services?: Yes  Family / Caregiver Present: Yes()    Subjective: \"I am definitely weaker than normal. I took a shower this morning and afterwards couldn't stand long enough to brush my teeth so I had to sit out here and do it. \"  Comments: RN ok'd OT    Pain:  Pain Assessment  Patient Currently in Pain: Yes  Pain Assessment: 0-10  Pain Level: 2  Pain Type: Surgical pain  Pain Location: Abdomen    Social/Functional History:  Lives With: Spouse(and her [place])  Type of Home: House  Home Layout: One level, Laundry in basement(5 FATUMA)  Home Equipment: Cane   Bathroom Shower/Tub: Tub/Shower unit  Bathroom Toilet: Handicap height  Bathroom Equipment: Tub transfer bench(currently in basement)    Receives Help From: Family  ADL Assistance: Independent  Homemaking Assistance: Independent(typically does all cooking, cleaning and laundry)  Ambulation Assistance: Independent(without device, reports holding onto her  often)  Transfer Assistance: Independent    Active : Yes  Leisure & Hobbies: go out to eat, go to granddaughter's sporting events, spend time with family Cognition/Orientation:  Overall Orientation Status: Within Normal Limits  Overall Cognitive Status: WFL    ADL;s:  LE Dressing: Stand by assistance, Increased time to complete       Functional Mobility:       Functional Mobility  Functional - Mobility Device: Rolling Walker(short distance with 1UE supported on hand rail only)  Activity: Other  Assist Level: Stand by assistance  Functional Mobility Comments: pt ambulated around unit with fair pace, steady with SBA; did note min to mod SOB following mobility, cues for RW safety, mid way pt resting forearms on RW d/t fatigue     Balance:  Balance  Sitting Balance: Supervision  Standing Balance: Stand by assistance    Transfers:  Sit to stand: Supervision  Stand to sit: Supervision       Upper Extremity Assessment:   LUE AROM : WFL  RUE AROM : WFL    LUE Strength  Gross LUE Strength: WFL  RUE Strength  Gross RUE Strength: WFL            Activity Tolerance: Patient Tolerated treatment well, Patient limited by fatigue  SOB    Assessment:  Assessment: Pt would continue to benefit from skilled OT intervention to maximize pt safety and independence with performing self care tasks and functional mobility and to ensure safe transition to the next level of care and return to OF. Performance deficits / Impairments: Decreased functional mobility , Decreased balance, Decreased endurance, Decreased high-level IADLs, Decreased ADL status  Prognosis: Good  REQUIRES OT FOLLOW UP: Yes    Treatment Initiated: Treatment and education initiated within context of evaluation. Evaluation time included review of current medical information, gathering information related to past medical, social and functional history, completion of standardized testing, formal and informal observation of tasks, assessment of data and development of plan of care and goals.   Treatment time included skilled education and facilitation of tasks to increase safety and independence with ADL's for improved functional independence and quality of life. Discharge Recommendations:  Continue to assess pending progress, Home with assist PRN    Patient Education:  Patient Education: OT role, POC, goals, safety, ECT, progression of therapy, use and purpose of bathroom DME specifically tub bench    Equipment Recommendations:  Equipment Needed: No  Other: pt has tub bench, edu on role and purpose with suggested use upon d/c spouse and pt verbalize understanding    Plan:  Times per week: 3-5x  Current Treatment Recommendations: Self-Care / ADL, Functional Mobility Training, Endurance Training, Balance Training, Home Management Training, Equipment Evaluation, Education, & procurement, Patient/Caregiver Education & Training, Safety Education & Training, Strengthening    Goals:  Patient goals : To go home as soon as possible  Short term goals  Time Frame for Short term goals: 2 weeks  Short term goal 1: Pt to demonstrate increased activity tolerance as evidenced by no SOB with functional mobility or ADL tasks  Short term goal 2: Pt to identify 3 ECT without cueing to ensure safe return to PLOF  Short term goal 3: Pt to demonstrate standing tolerance greater than 6 mins at mod I for inc indep and safety with preferred occupations  Long term goals  Time Frame for Long term goals : NA d/t ELOS    Following session, patient left in safe position with all fall risk precautions in place.

## 2019-06-18 NOTE — CARE COORDINATION
6/18/19, 1:37 PM    Discharge plan discussed by  and . Discharge plan reviewed with patient/ family. Patient/ family verbalize understanding of discharge plan and are in agreement with plan. Understanding was demonstrated using the teach back method. IMM Letter  IMM Letter date given[de-identified] 06/16/19  IMM Letter time given[de-identified] 6162     Pt to be discharged to home with spouse. Denies needs or services.

## 2019-06-18 NOTE — PROGRESS NOTES
[facility]  INPATIENT PHYSICAL THERAPY  EVALUATION  [facility] SURGICAL 5E - 5E-69/069-A    Time In: 0332  Time Out: 1205  Timed Code Treatment Minutes: 8 Minutes  Minutes: 23        Date: 2019  Patient Name: Livan Iglesias,  Gender:  female        MRN: [id number]  : 1950  (68 y.o.)  Referral Date : 19   Referring Practitioner: Talon WADE  Diagnosis: Ventral Hernia without obstruction or gangrene  Additional Pertinent Hx: POD #4 S/p  Hybrid/ laparoscopic/open ventral hernia repair with mesh  involving transverse colon     Past Medical History:   Diagnosis Date    Abdominal aortic aneurysm (AAA) >39 mm diameter ([address]     found in     Arthritis     Asthma     Brain cyst     benign    Cancer ([address] 1918    Left Breast    Chronic sinus complaints     Diverticulosis of colon     DKA (diabetic ketoacidoses) ([address] 2018    Elevated TSH     Hypertension     Osteoarthritis     Polyneuropathy     PONV (postoperative nausea and vomiting)     Spinal headache     Type 2 diabetes mellitus ([address]      Past Surgical History:   Procedure Laterality Date    BLADDER SUSPENSION      times 2    CHOLECYSTECTOMY      DILATION AND CURETTAGE OF UTERUS      x2    EYE SURGERY      HYSTERECTOMY  1995    JOINT REPLACEMENT Right     Rt total knee    KNEE ARTHROSCOPY  ,     MANDIBLE FRACTURE SURGERY      OTHER SURGICAL HISTORY Right 2015    Excision of Sebaceous Cyst Right Ear     MI OFFICE/OUTPT VISIT,PROCEDURE ONLY Left 10/10/2018    LEFT BREAST LUMPECTOMY WITH SENTINEL LYMPH NODE BIOPSY performed by Salome Harry MD at [address] [place] [address],[address] [place] [address], [facility] SKIN BIOPSY      TONSILLECTOMY AND ADENOIDECTOMY  age 4   [facility] VENTRAL HERNIA REPAIR N/A 2019    COMBINED LAPAROSCOPIC AND OPEN VENTRAL HERNIA REPAIR WITH MESH performed by Salome Harry MD at [place] [facility]       Restrictions/Precautions:  General Precautions  Other: Abdominal Binder    Subjective:  Chart Reviewed: Yes  Patient assessed for rehabilitation services?: Yes  Family / Caregiver Present: No  Subjective: RN approved session, patienr in recliner chair. Very pleasant and agreeable to session. Did assist patient with putting on abdominal binder. General:  Overall Orientation Status: Within Functional Limits  Follows Commands: Within Functional Limits  Vision: Impaired  Vision Exceptions: Wears glasses at all times  Hearing: Within functional limits    Pain:  Yes. Pain Assessment  Pain Assessment: 0-10  Pain Level: 2(abdomen, reported discomfort only)     Social/Functional History:    Lives With: Spouse(and her [place])  Type of Home: House  Home Layout: One level, Laundry in basement(5 FATUMA)  Home Access: Stairs to enter without rails  Entrance Stairs - Number of Steps: 4 FATUMA without handrails  Home Equipment: Cane(has access to RW from sister in law)     Bathroom Shower/Tub: Tub/Shower unit  Bathroom Toilet: Handicap height  Bathroom Equipment: Tub transfer bench(currently in basement)    Receives Help From: Family  ADL Assistance: Independent  Homemaking Assistance: Independent(typically does all cooking, cleaning and laundry)  Ambulation Assistance: Independent  Transfer Assistance: Independent    Active : Yes  Leisure & Hobbies: go out to eat, go to granddaughter's sporting events, spend time with family  Additional Comments: Pt reports she was independent PTA without use of an AD. Reports not trusting her L knee and is afraid of it giving out on her. Reports  took a week off work to assist her at home as needed.      Objective:  ROM RLE: AROM WFL  ROM LLE: AROM WFL    Strength RLE: WFL  Strength LLE: WFL    Sensation  Overall Sensation Status: WFL    Balance  Posture: Good  Sitting - Static: Good  Sitting - Dynamic: Good  Standing - Static: Fair  Standing - Dynamic: Fair    Scooting: Modified independent(to scoot forward and back in sitting position)    Transfers  Sit to Stand: Stand by assistance(from bedside chair, gaurded)  Stand to sit: Stand by assistance     Ambulation 1  Surface: level tile  Device: No Device  Assistance: Stand by assistance  Quality of Gait: Pt with slowed manuel, min forward flexed posture and pt with antalgic gait pattern when in weightbearing on the L. Often reaching out for handrail in hallway. Educated patient on safety risks of walking without RW/cane when depending on walls and furniture to walk. Distance: 15 feet x 1 + 200 feet x 1    Exercises:  Comments: Seated Alfonso LE heel raises, toe raises, long arc quads, and hip flexion marches x 15 reps in order to increase strength/endurance for improved functional mobiltiy. Activity Tolerance:  Activity Tolerance: Patient Tolerated treatment well    Treatment Initiated: See exercises and ambulation as listed above. Assessment: Body structures, Functions, Activity limitations: Decreased functional mobility , Decreased balance, Decreased endurance, Decreased strength  Assessment: Patient tolerated session well, limited by pain and decreased endurance. Suggested patient use the cane or RW for safe ambulation while at home, especially while  goes back to work.    Prognosis: Good    Clinical Presentation: Low - Stable and Uncomplicated: based on PMH, current diagnosis, and levels of assist on eval    Decision Making: Low Complexitybased on patient assessment and decision making process of determining plan of care and establishing reasonable expectations for measurable functional outcomes    REQUIRES PT FOLLOW UP: Yes    Discharge Recommendations:  Discharge Recommendations: Continue to assess pending progress, Home with assist PRN    Patient Education:  Patient Education: POC  Barriers to Learning: None    Equipment Recommendations:  Equipment Needed: No    Safety:  Type of devices: Call light within reach, Left in chair, Nurse notified    Plan:  Times per week: 3-5xGM  Times per day: Daily  Specific instructions for Next Treatment: advance per POC  Current Treatment Recommendations: Strengthening, Transfer Training, Patient/Caregiver Education & Training, Equipment Evaluation, Education, & procurement, Balance Training, Gait Training, Home Exercise Program, Functional Mobility Training, Stair training    Goals:  Patient goals : Pt goal to return home  Short term goals  Time Frame for Short term goals: 1 week   Short term goal 1: supine to/from sit at Mod I in order to get in/out of bed  Short term goal 2: sit to/from stand at Mod I to get up to walk  Short term goal 3: ambulate 200 feet without an AD at Mod I in order to walk safely in home  Short term goal 4: ascend/descend 4 steps with HHA at [address] in order to enter/exit home  Long term goals  Time Frame for Long term goals : No LTGs due to short ELOS    Evaluation Complexity: Based on the findings of patient history, examination, clinical presentation, and decision making during this evaluation, the evaluation of Shamar Rios  is of low complexity.

## 2019-06-19 ENCOUNTER — TELEPHONE (OUTPATIENT)
Dept: FAMILY MEDICINE CLINIC | Age: 69
End: 2019-06-19

## 2019-06-25 NOTE — DISCHARGE SUMMARY
Discharge Summary     Patient Identification:  Lesia Khan  : 1950  MRN: [id number]   Account: [de-identified]     Admit date: 2019  Discharge date: 19  Attending provider: No att. providers found        Primary care provider: Mili Marte MD     Discharge Diagnoses: Active Problems:    S/P repair of ventral hernia  Resolved Problems:    Ventral hernia without obstruction or gangrene       Hospital Course:   Lesia Khan is a 68 y.o. female admitted to [facility] on 2019 for ventral hernia repair. she was taken to the operative suite per Nikolai Helm MD and the planned procedure was performed as noted above. She was admitted to [address] [place] [address] for postoperative care and was initially managed with analgesics for pain control, IV fluid hydration, GI and DVT prophylaxis. Over the hospital stay she readily improved in ability to tolerate increasing levels of activity and to take po fluids, solid foods her  bowel function had not returned prior to discharge, and she was spontaneously voiding. At the time of discharge she was able to tolerate pain with oral analgesic and was medically stable. 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     OPERATION:  Hybrid/combined laparoscopic and open ventral hernia repair  with 8-inch circular Echo Positioning System Ventralight mesh.     SURGEON:  Chelsey Seth. MD [place]     ANESTHESIA:  General.     COMPLICATIONS:  None.     INDICATIONS FOR PROCEDURE:  The patient is a 78-year-old white female  who has a chronically incarcerated ventral hernia. CT scan shows that  there to be a transverse colon loop.   The patient had had an attempt at  a colonoscopy unsuccessful due to this incarcerated hernia and she is  here for fixation.     FINDINGS:  We started out peritoneum  was all free at this point in time. We did view down to the bowel. It  appeared to be normal.  It was elected to close this with an 8-inch  circular Echo Positioning System mesh. We inserted the mesh into the  abdominal cavity, inserted a grasper through the center of the hernia  closure, grabbed the catheter and brought it out externally, insufflated  it with air using the flotation system on the mesh, brought right up to  the peritoneum. We then used an AbsorbaTack to tack it into place. We  removed the flotation device off the mesh and pictures were taken. There appeared to be good fixation and good coverage of this hernia  defect that had been closed again primarily externally. Trocars were  removed as air was aspirated out of the abdominal cavity. Interrupted  3-0 Vicryl was used to close the subcutaneous, a running 4-0 Vicryl was  used to close the skin on all the ports and the midline wound. Steri-Strips were applied. The patient tolerated the procedure         Code Status: Prior     Consults:   none    Examination:  Vitals:  Vitals:    06/17/19 2122 06/18/19 0400 06/18/19 0915 06/18/19 1205   BP: 139/63 131/64 (!) 131/59 136/64   Pulse: 91 97 98 106   Resp: 16 16 16 18   Temp: 98.7 °F (37.1 °C) 98.3 °F (36.8 °C) 97.8 °F (36.6 °C) 98 °F (36.7 °C)   TempSrc: Oral Oral Oral Oral   SpO2: 93% 92% 94% 95%   Weight:       Height:         Weight: Weight: 199 lb 9.6 oz (90.5 kg)     24 hour intake/output:No intake or output data in the 24 hours ending 07/01/19 1246      Significant Diagnostics:   Radiology: Xr Finger Right (min 2 Views)    Result Date: 5/23/2019  PROCEDURE: XR FINGER RIGHT (MIN 2 VIEWS) CLINICAL INFORMATION: 68-year-old female with right thumb fracture after slamming in door . COMPARISON: No prior study. TECHNIQUE: An AP view of the right hand and 2 views of the first digit were obtained.  FINDINGS: There is redemonstration of a fracture of the tip of the distal phalanx of the first digit. The bony fragments remain appropriately aligned. There is some soft tissue swelling in the first digit. The remaining bones of the hand are normal. The remaining soft tissues are normal.     Redemonstration of a fracture near the tip of the distal phalanx of the first digit. **This report has been created using voice recognition software. It may contain minor errors which are inherent in voice recognition technology. ** Final report electronically signed by Dr Telma Lauren on 5/23/2019 8:14 AM      Labs: No results found for this or any previous visit (from the past 67 hour(s)). Patient Instructions:    DR. Maicol Pagan    Pt Name: Hailey Wilson  Medical Record Number: [id number]  Today's Date: 6/17/2019    GENERAL ANESTHESIA OR SEDATION  1. Do not drive or operate hazardous machinery for 24 hours. 2. Do not make important business or personal decisions for 24 hours. 3. Do not drink alcoholic beverages or use tobacco for 24 hours. ACTIVITY INSTRUCTIONS:  Ambulate 4 times a day for approximately 10-15  minutes each time     You may resume normal activity tomorrow. Do not engage in strenuous activity that may place stress on your incision. You may drive when no longer taking pain medication and you are able to comfortably use the gas/brake pedal. Do not drive long distance, in town driving recommended    avoid heavy lifting, tugging, pullings greater than 10-15 lbs for 6 weeks postoperatively, or until released by Physician/CNP      DIET INSTRUCTIONS:  Normal at home diet    MEDICATIONS  You may resume your daily prescription medication schedule unless otherwise specified. Pain medication at discharge - use only as prescribed- refills may be available to you at your follow up appointments if needed and warranted.   Narcotics should be used for only short term and we highly encouraged our patients to wean off appropriately and use other means for pain such as non pharmacologic measures and over the counter tylenol or ibuprofen if no restrictions apply. We do  know that surgical pain is real and will not hesitate to help eliminate some of your discomfort. However we will not be able to completely make you pain free and it is important to determine what pain level is tolerable for you     Narcotics cause constipation and we recommend taking a colace daily and Miralax if needed to help reduce the risk of constipation    Increasing water intake if no restrictions will also help eliminate constipation    Ambulation 4 times a day 15 minute each time will help reduce pain each day and help relieve constipation      WOUND/DRESSING INSTRUCTIONS:  Always ensure you and your care giver clean hands before and after caring for the  wound. Keep dressing clean and dry, Change when soiled or wet. Leave incision open to air if not draining   Allow steri-strips to fall off on their own. Ice operative site for 20 minutes 4 times a day as needed     May wash over incision in shower daily,  but do not soak in a bath. Keep the abdominal binder in place during the day. May remove to shower and at night. ABDOMINAL/LAPAROSCOPIC SURGERY  [x]You are encouraged to get up and move around as this helps with the circulation and speeds up the healing process. [x]Breath deeply and cough from time to time. This helps to clear your lungs and helps prevent pneumonia. [x]Supporting your incision with a pillow or your hand helps to minimize discomfort and pain. [x]Laparoscopic patients may develop shoulder pain in the first 48 hours from the gas used during the procedure. FOLLOW-UP CARE. SPECIFICALLY WATCH FOR:   Fever over 101 degrees by mouth   Increased redness, warmth, hardness at operative site.    Blood soaked dressing (small amounts of oozing may be normal.)   Increased or progressive drainage from the surgical area   Inability to urinate or blood in the urine   Pain not relieved by the medications ordered   Persistent nausea and/or vomiting, unable to retain fluids. FOLLOW-UP APPOINTMENT:  As scheduled     Call my office if you have any problem that concerns you [phone]. After hours, you can reach the answering service via the office phone number. IF YOU NEED IMMEDIATE ATTENTION, GO TO THE EMERGENCY ROOM AND YOUR DOCTOR WILL BE CONTACTED. Prepared By:  Eros Key CNP  For Sarah Stone MD    Electronically signed 6/17/2019 at 12:15 PM    Diet: No diet orders on file      Follow-up visits:   Cooper Eli MD  [address] [facility]  [phone]    On 7/1/2019  Follow up at [address] [facility], [address]  [phone]    On 6/27/2019  1:15 PM       Discharge condition: fair  Disposition: Home  Time spent on discharge: post op     Discharge Medications:   Nick Perkins Medication Instructions [id number]    Printed on:07/01/19 1246   Medication Information                      albuterol (PROVENTIL) (2.5 MG/3ML) 0.083% nebulizer solution  Take 3 mLs by nebulization every 4 hours as needed for Wheezing             anastrozole (ARIMIDEX) 1 MG tablet  Take 1 mg by mouth daily             aspirin 81 MG chewable tablet  Take 81 mg by mouth daily             benazepril (LOTENSIN) 10 MG tablet  Take 1 tablet by mouth daily             Calcium Carb-Cholecalciferol 600-500 MG-UNIT CAPS  Take by mouth daily             glucose blood VI test strips (ASCENSIA AUTODISC VI;ONE TOUCH ULTRA TEST VI) strip  [employer] Redi-Code test strips. Tests once daily.              ibuprofen (ADVIL;MOTRIN) 600 MG tablet  Take 1 tablet by mouth 2 times daily as needed for Pain             insulin aspart (NOVOLOG FLEXPEN) 100 UNIT/ML injection pen  Inject 0-12 Units into the skin 3 times daily (before meals) Per Medium Sliding Scale             Insulin Pen Needle (PEN NEEDLES 31GX5/16\") 31G X 8 MM MISC  1 each by Does not apply route daily             LANTUS SOLOSTAR 100 UNIT/ML injection pen  Inject 47 Units into the skin nightly             pantoprazole (PROTONIX) 40 MG tablet  Take 40 mg by mouth daily             Semaglutide (OZEMPIC) 0.25 or 0.5 MG/DOSE SOPN  Inject 0.25 mg into the skin once a week             therapeutic multivitamin-minerals (THERAGRAN-M) tablet  Take 1 tablet by mouth daily.                zoledronic acid (ZOMETA) 4 MG/5ML injection  Infuse 4 mg intravenously once               Discharge Summary completed on behalf of Dr Senthil Haddad   Electronically signed by NAV Cooper - CNP on 7/1/2019 at 12:46 PM

## 2019-06-27 ENCOUNTER — OFFICE VISIT (OUTPATIENT)
Dept: SURGERY | Age: 69
End: 2019-06-27

## 2019-06-27 VITALS
RESPIRATION RATE: 18 BRPM | HEIGHT: 63 IN | DIASTOLIC BLOOD PRESSURE: 60 MMHG | OXYGEN SATURATION: 96 % | WEIGHT: 194.5 LBS | TEMPERATURE: 97.3 F | SYSTOLIC BLOOD PRESSURE: 118 MMHG | BODY MASS INDEX: 34.46 KG/M2 | HEART RATE: 84 BPM

## 2019-06-27 DIAGNOSIS — Z87.19 S/P REPAIR OF RECURRENT VENTRAL HERNIA: Primary | ICD-10-CM

## 2019-06-27 DIAGNOSIS — Z98.890 S/P REPAIR OF RECURRENT VENTRAL HERNIA: Primary | ICD-10-CM

## 2019-06-27 DIAGNOSIS — Z51.89 VISIT FOR WOUND CHECK: ICD-10-CM

## 2019-06-27 PROCEDURE — 99024 POSTOP FOLLOW-UP VISIT: CPT | Performed by: NURSE PRACTITIONER

## 2019-06-27 RX ORDER — SULFAMETHOXAZOLE AND TRIMETHOPRIM 800; 160 MG/1; MG/1
1 TABLET ORAL 2 TIMES DAILY
Qty: 14 TABLET | Refills: 0 | Status: SHIPPED | OUTPATIENT
Start: 2019-06-27 | End: 2019-07-04

## 2019-06-27 ASSESSMENT — ENCOUNTER SYMPTOMS
VOMITING: 1
RECTAL PAIN: 0
SORE THROAT: 0
NAUSEA: 1
APNEA: 0
VOICE CHANGE: 0
RHINORRHEA: 0
BLOOD IN STOOL: 0
WHEEZING: 0
SINUS PRESSURE: 0
PHOTOPHOBIA: 0
DIARRHEA: 0
TROUBLE SWALLOWING: 0
FACIAL SWELLING: 0
EYE ITCHING: 0
SHORTNESS OF BREATH: 0
ANAL BLEEDING: 0
EYE REDNESS: 0
CHEST TIGHTNESS: 0
CONSTIPATION: 1
BACK PAIN: 0
COLOR CHANGE: 0
EYE PAIN: 0
CHOKING: 0
EYE DISCHARGE: 0
STRIDOR: 0
COUGH: 0

## 2019-06-27 NOTE — PROGRESS NOTES
[address] Jameson Medley [address]  [address] [address]  Dept: [phone]  Dept Fax: [phone]  Loc: [phone]    Visit Date: 6/27/2019       Sigrid Elizalde is a 68 y.o. female who presents today for:  Chief Complaint   Patient presents with    Post-Op Check     s/p Hybrid/combined laparoscopic and open ventral hernia repair with 8-inch circular Echo Positioning System Ventralight mesh 6/14/19       HPI:     Juan Phelps presents today for a post op wound check. Today She complains of weakness, fatigue and poor appetite, she states this is a result of new diabetic medications and not surgically related. The midline incision looks good, small moist spot at umbilicus, the post sites look good. There is one port site that is moist and concerned with infection. Started antibiotics today, use triple antibiotic ointment on the 2 concerning spots. Cultures obtained. Follow up in office in 1 week.         Past Medical History:   Diagnosis Date    Abdominal aortic aneurysm (AAA) >39 mm diameter (HCC)     found in 2015    Arthritis     Asthma     Brain cyst     benign    Cancer ([address] 09/1918    Left Breast    Chronic sinus complaints     Diverticulosis of colon     DKA (diabetic ketoacidoses) ([address] 05/2018    Elevated TSH     Hypertension     Osteoarthritis     Polyneuropathy     PONV (postoperative nausea and vomiting)     Spinal headache     Type 2 diabetes mellitus ([address] 1990's      Past Surgical History:   Procedure Laterality Date    BLADDER SUSPENSION      times 2    CHOLECYSTECTOMY      DILATION AND CURETTAGE OF UTERUS      x2    EYE SURGERY      HYSTERECTOMY  1995    JOINT REPLACEMENT Right 2007    Rt total knee    KNEE ARTHROSCOPY  1967, 2001    MANDIBLE FRACTURE SURGERY      OTHER SURGICAL HISTORY Right 12/30/2015    Excision of Sebaceous Cyst Right Ear     [place] OFFICE/OUTPT VISIT,PROCEDURE ONLY Left 10/10/2018    LEFT BREAST LUMPECTOMY WITH SENTINEL LYMPH NODE BIOPSY performed by Dhaval Mast MD at [address] [facility], [facility] SKIN BIOPSY      TONSILLECTOMY AND ADENOIDECTOMY  age 4   [facility] VENTRAL HERNIA REPAIR N/A 6/14/2019    COMBINED LAPAROSCOPIC AND OPEN VENTRAL HERNIA REPAIR WITH MESH performed by Dhaval Mast MD at [facility] History   Problem Relation Age of Onset    Diabetes Mother     Heart Disease Mother     Lupus Mother     Heart Disease Father     Cancer Father         Squamous cell - face & scalp    Diabetes Maternal Grandmother     Heart Disease Maternal Grandfather     Cancer Paternal Aunt          multiple myeloma    Cancer Paternal Uncle         Lymphatic Leukemia    Breast Cancer Maternal Cousin     Heart Disease Maternal Uncle     Lupus Maternal Uncle     Other Paternal Grandfather         Brain aneurysm     Social History     Tobacco Use    Smoking status: Never Smoker    Smokeless tobacco: Never Used   Substance Use Topics    Alcohol use:  Yes     Alcohol/week: 0.0 oz     Comment: Socially        Current Outpatient Medications   Medication Sig Dispense Refill    sulfamethoxazole-trimethoprim (BACTRIM DS) 800-160 MG per tablet Take 1 tablet by mouth 2 times daily for 7 days 14 tablet 0    LANTUS SOLOSTAR 100 UNIT/ML injection pen Inject 47 Units into the skin nightly      Semaglutide (OZEMPIC) 0.25 or 0.5 MG/DOSE SOPN Inject 0.25 mg into the skin once a week      benazepril (LOTENSIN) 10 MG tablet Take 1 tablet by mouth daily (Patient taking differently: Take 10 mg by mouth 2 times daily ) 90 tablet 1    ibuprofen (ADVIL;MOTRIN) 600 MG tablet Take 1 tablet by mouth 2 times daily as needed for Pain 180 tablet 0    pantoprazole (PROTONIX) 40 MG tablet Take 40 mg by mouth daily      Calcium Carb-Cholecalciferol 600-500 MG-UNIT CAPS Take by mouth daily      zoledronic acid (ZOMETA) 4 MG/5ML injection Infuse 4 mg intravenously once      anastrozole (ARIMIDEX) 1 MG tablet Take 1 mg by mouth daily      aspirin 81 MG chewable tablet Take 81 mg by mouth daily      Insulin Pen Needle (PEN NEEDLES 31GX5/16\") 31G X 8 MM MISC 1 each by Does not apply route daily 100 each 3    insulin aspart (NOVOLOG FLEXPEN) 100 UNIT/ML injection pen Inject 0-12 Units into the skin 3 times daily (before meals) Per Medium Sliding Scale (Patient taking differently: Inject 0-12 Units into the skin 3 times daily (before meals) 3 times per day AC meals. BG less than 110 = 5 units, 110-150=7 units, 151-200=10units, 201-250 12 units, 251-300=14 unit, 301-350=16units, 351-400=18 unit) 5 pen 3    glucose blood VI test strips (ASCENSIA AUTODISC VI;ONE TOUCH ULTRA TEST VI) strip Advocate Redi-Code test strips. Tests once daily. 100 each 3    albuterol (PROVENTIL) (2.5 MG/3ML) 0.083% nebulizer solution Take 3 mLs by nebulization every 4 hours as needed for Wheezing 1 Package 1    therapeutic multivitamin-minerals (THERAGRAN-M) tablet Take 1 tablet by mouth daily. No current facility-administered medications for this visit. Allergies   Allergen Reactions    Amoxicillin     Donnatal [Pb-Hyoscy-Atropine-Scopolamine] Other (See Comments)     palpitations    Lovastatin Other (See Comments)     myalgia    Metformin And Related Diarrhea    Vioxx Other (See Comments)     Liver problems    Dilaudid [Hydromorphone Hcl] Nausea And Vomiting    Fentanyl Nausea And Vomiting     Severe Nausea and vomitting       Subjective:      Review of Systems   Constitutional: Positive for appetite change. Negative for activity change, chills, diaphoresis, fatigue, fever and unexpected weight change. HENT: Negative for congestion, dental problem, drooling, ear discharge, ear pain, facial swelling, hearing loss, mouth sores, nosebleeds, postnasal drip, rhinorrhea, sinus pressure, sneezing, sore throat, tinnitus, trouble swallowing and voice change.     Eyes: Negative for photophobia, pain, discharge, redness, itching and visual disturbance. Respiratory: Negative for apnea, cough, choking, chest tightness, shortness of breath, wheezing and stridor. Cardiovascular: Negative for chest pain, palpitations and leg swelling. Gastrointestinal: Positive for constipation, nausea and vomiting. Negative for anal bleeding, blood in stool, diarrhea and rectal pain. Endocrine: Negative for cold intolerance, heat intolerance, polydipsia, polyphagia and polyuria. Genitourinary: Negative for decreased urine volume, difficulty urinating, dysuria, enuresis, flank pain, frequency, genital sores, hematuria and urgency. Musculoskeletal: Negative for arthralgias, back pain, gait problem, joint swelling, myalgias, neck pain and neck stiffness. Skin: Positive for wound (abdomen). Negative for color change, pallor and rash. Allergic/Immunologic: Negative for environmental allergies, food allergies and immunocompromised state. Neurological: Negative for dizziness, tremors, seizures, syncope, facial asymmetry, speech difficulty, weakness, light-headedness, numbness and headaches. Hematological: Negative for adenopathy. Does not bruise/bleed easily. Psychiatric/Behavioral: Negative for agitation, behavioral problems, confusion, decreased concentration, dysphoric mood, hallucinations, self-injury, sleep disturbance and suicidal ideas. The patient is not nervous/anxious and is not hyperactive. Objective:     /60 (Site: Right Upper Arm, Position: Sitting, Cuff Size: Medium Adult)   Pulse 84   Temp 97.3 °F (36.3 °C) (Tympanic)   Resp 18   Ht 5' 3\" (1.6 m)   Wt 194 lb 8 oz (88.2 kg)   SpO2 96%   BMI 34.45 kg/m²     Wt Readings from Last 3 Encounters:   06/27/19 194 lb 8 oz (88.2 kg)   06/14/19 199 lb 9.6 oz (90.5 kg)   05/29/19 202 lb (91.6 kg)       Physical Exam   Constitutional: She is oriented to person, place, and time. obese   Pulmonary/Chest: Effort normal.   Abdominal: Soft.        Musculoskeletal: Normal range of motion. Neurological: She is alert and oriented to person, place, and time. Skin: Skin is warm and dry. Vitals reviewed. Assessment/Plan:     Siddharth Montero was seen today for post-op check. Diagnoses and all orders for this visit:    S/P repair of recurrent ventral hernia  -     Anaerobic and Aerobic Culture    Visit for wound check    Other orders  -     sulfamethoxazole-trimethoprim (BACTRIM DS) 800-160 MG per tablet; Take 1 tablet by mouth 2 times daily for 7 days        Return in about 1 week (around 7/4/2019). Patient Instructions   Weight restrictions x 4 weeks no more 10 lbs       Discusseduse, benefit, and side effects of prescribed medications. All patient questionsanswered. Pt voiced understanding.      Electronically signed by NAV Kidd CNP on 6/27/2019 at 11:41 AM

## 2019-07-01 ENCOUNTER — TELEPHONE (OUTPATIENT)
Dept: SURGERY | Age: 69
End: 2019-07-01

## 2019-07-01 ENCOUNTER — OFFICE VISIT (OUTPATIENT)
Dept: FAMILY MEDICINE CLINIC | Age: 69
End: 2019-07-01
Payer: MEDICARE

## 2019-07-01 VITALS
TEMPERATURE: 98.1 F | DIASTOLIC BLOOD PRESSURE: 72 MMHG | HEIGHT: 63 IN | WEIGHT: 192 LBS | HEART RATE: 86 BPM | OXYGEN SATURATION: 97 % | BODY MASS INDEX: 34.02 KG/M2 | SYSTOLIC BLOOD PRESSURE: 124 MMHG

## 2019-07-01 DIAGNOSIS — Z98.890 S/P REPAIR OF RECURRENT VENTRAL HERNIA: ICD-10-CM

## 2019-07-01 DIAGNOSIS — T81.49XA WOUND INFECTION AFTER SURGERY: ICD-10-CM

## 2019-07-01 DIAGNOSIS — E11.42 TYPE 2 DIABETES MELLITUS WITH DIABETIC POLYNEUROPATHY, WITH LONG-TERM CURRENT USE OF INSULIN (HCC): Primary | ICD-10-CM

## 2019-07-01 DIAGNOSIS — R53.83 OTHER FATIGUE: ICD-10-CM

## 2019-07-01 DIAGNOSIS — Z79.4 TYPE 2 DIABETES MELLITUS WITH DIABETIC POLYNEUROPATHY, WITH LONG-TERM CURRENT USE OF INSULIN (HCC): Primary | ICD-10-CM

## 2019-07-01 DIAGNOSIS — Z87.19 S/P REPAIR OF RECURRENT VENTRAL HERNIA: ICD-10-CM

## 2019-07-01 DIAGNOSIS — R07.89 OTHER CHEST PAIN: ICD-10-CM

## 2019-07-01 DIAGNOSIS — R10.84 GENERALIZED ABDOMINAL PAIN: ICD-10-CM

## 2019-07-01 LAB
ALBUMIN SERPL-MCNC: 4.1 G/DL (ref 3.5–5.1)
ALP BLD-CCNC: 70 U/L (ref 38–126)
ALT SERPL-CCNC: 18 U/L (ref 11–66)
ANION GAP SERPL CALCULATED.3IONS-SCNC: 19 MEQ/L (ref 8–16)
AST SERPL-CCNC: 16 U/L (ref 5–40)
BASOPHILS # BLD: 0.6 %
BASOPHILS ABSOLUTE: 0 THOU/MM3 (ref 0–0.1)
BILIRUB SERPL-MCNC: 0.8 MG/DL (ref 0.3–1.2)
BUN BLDV-MCNC: 25 MG/DL (ref 7–22)
CALCIUM SERPL-MCNC: 9.9 MG/DL (ref 8.5–10.5)
CHLORIDE BLD-SCNC: 103 MEQ/L (ref 98–111)
CO2: 19 MEQ/L (ref 23–33)
CREAT SERPL-MCNC: 0.7 MG/DL (ref 0.4–1.2)
EOSINOPHIL # BLD: 4.2 %
EOSINOPHILS ABSOLUTE: 0.3 THOU/MM3 (ref 0–0.4)
ERYTHROCYTE [DISTWIDTH] IN BLOOD BY AUTOMATED COUNT: 13.7 % (ref 11.5–14.5)
ERYTHROCYTE [DISTWIDTH] IN BLOOD BY AUTOMATED COUNT: 46.1 FL (ref 35–45)
GFR SERPL CREATININE-BSD FRML MDRD: 82 ML/MIN/1.73M2
GLUCOSE BLD-MCNC: 130 MG/DL (ref 70–108)
HCT VFR BLD CALC: 38.9 % (ref 37–47)
HEMOGLOBIN: 12.5 GM/DL (ref 12–16)
IMMATURE GRANS (ABS): 0.02 THOU/MM3 (ref 0–0.07)
IMMATURE GRANULOCYTES: 0.3 %
LIPASE: 25.2 U/L (ref 5.6–51.3)
LYMPHOCYTES # BLD: 18.9 %
LYMPHOCYTES ABSOLUTE: 1.3 THOU/MM3 (ref 1–4.8)
MCH RBC QN AUTO: 29.9 PG (ref 26–33)
MCHC RBC AUTO-ENTMCNC: 32.1 GM/DL (ref 32.2–35.5)
MCV RBC AUTO: 93.1 FL (ref 81–99)
MONOCYTES # BLD: 9.6 %
MONOCYTES ABSOLUTE: 0.7 THOU/MM3 (ref 0.4–1.3)
NUCLEATED RED BLOOD CELLS: 0 /100 WBC
PLATELET # BLD: 359 THOU/MM3 (ref 130–400)
PMV BLD AUTO: 9.5 FL (ref 9.4–12.4)
POTASSIUM SERPL-SCNC: 4.6 MEQ/L (ref 3.5–5.2)
RBC # BLD: 4.18 MILL/MM3 (ref 4.2–5.4)
SEG NEUTROPHILS: 66.4 %
SEGMENTED NEUTROPHILS ABSOLUTE COUNT: 4.6 THOU/MM3 (ref 1.8–7.7)
SODIUM BLD-SCNC: 141 MEQ/L (ref 135–145)
TOTAL PROTEIN: 7.6 G/DL (ref 6.1–8)
TROPONIN T: < 0.01 NG/ML
WBC # BLD: 7 THOU/MM3 (ref 4.8–10.8)

## 2019-07-01 PROCEDURE — 36415 COLL VENOUS BLD VENIPUNCTURE: CPT | Performed by: FAMILY MEDICINE

## 2019-07-01 PROCEDURE — 99495 TRANSJ CARE MGMT MOD F2F 14D: CPT | Performed by: FAMILY MEDICINE

## 2019-07-01 PROCEDURE — 1111F DSCHRG MED/CURRENT MED MERGE: CPT | Performed by: FAMILY MEDICINE

## 2019-07-01 PROCEDURE — 93000 ELECTROCARDIOGRAM COMPLETE: CPT | Performed by: FAMILY MEDICINE

## 2019-07-01 RX ORDER — CIPROFLOXACIN 500 MG/1
500 TABLET, FILM COATED ORAL 2 TIMES DAILY
Qty: 20 TABLET | Refills: 0 | Status: SHIPPED | OUTPATIENT
Start: 2019-07-01 | End: 2019-07-11

## 2019-07-01 RX ORDER — PEN NEEDLE, DIABETIC 31 G X1/4"
NEEDLE, DISPOSABLE MISCELLANEOUS
COMMUNITY
Start: 2019-06-27

## 2019-07-01 ASSESSMENT — ENCOUNTER SYMPTOMS
WHEEZING: 0
COUGH: 0
SHORTNESS OF BREATH: 1

## 2019-07-01 NOTE — PROGRESS NOTES
Post-Discharge Transitional Care Management Services or Hospital Follow Up      Aline Headings   YOB: 1950    Date of Office Visit:  7/1/2019  Date of Hospital Admission: 6/14/19  Date of Hospital Discharge: 6/18/19  Readmission Risk Score(high >=14%.  Medium >=10%):Readmission Risk Score: 10      Care management risk score Rising risk (score 2-5) and Complex Care (Scores >=6): 3     Non face to face  following discharge, date last encounter closed (first attempt may have been earlier): 6/19/2019  4:27 PM 6/19/2019  4:27 PM    Call initiated 2 business days of discharge: Yes     Patient Active Problem List   Diagnosis    Hyperlipidemia    Asthma    Polyneuropathy    AS (aortic stenosis)    Cardiomyopathy eF 39 TO 50% PER ECHO MAY 2012    Hiatal hernia    Hypothyroid    Type 2 diabetes mellitus without complication, without long-term current use of insulin (HCC)    Osteoarthritis of multiple joints    Ear canal mass    Diabetic ketoacidosis without coma associated with type 2 diabetes mellitus [facility]    Malignant neoplasm of left breast in female, estrogen receptor positive ([facility]    Localized osteoarthritis of left knee    Other osteoporosis without current pathological fracture    S/P repair of ventral hernia       Allergies   Allergen Reactions    Amoxicillin     Donnatal [Pb-Hyoscy-Atropine-Scopolamine] Other (See Comments)     palpitations    Lovastatin Other (See Comments)     myalgia    Metformin And Related Diarrhea    Vioxx Other (See Comments)     Liver problems    Dilaudid [Hydromorphone Hcl] Nausea And Vomiting    Fentanyl Nausea And Vomiting     Severe Nausea and vomitting       Medications listed as ordered at the time of discharge from hospital   [facility] Medication Instructions MADDIE:    Printed on:07/01/19 2592   Medication Information                      albuterol (PROVENTIL) (2.5 MG/3ML) 0.083% nebulizer solution  Take 3 mLs by nebulization every 4 history/Current status: seen last week by gen surgery and she has 2 port infections. She has been on bactrim. Having less drainage. Wound culture grew pseudomonas. Has f/u appt on 7/3 with gen surgery. Has lots of tiredness. Reports LLQ abd pain. Last BM was 2 days ago. Has stomach pain after eating. Having intermittent chest pain. No leg swelling. No calf pain. She started ozempic about 5 weeks ago. Glucose was 138 this morning. Having occasional hypoglycemic issues. She is eating and drinking.  is present    Review of Systems   Constitutional: Positive for appetite change, chills and fatigue. Negative for fever. Respiratory: Positive for shortness of breath. Negative for cough and wheezing. Cardiovascular: Positive for chest pain. Negative for leg swelling. Neurological: Positive for tremors. Negative for syncope. Vitals:    07/01/19 0804   BP: 124/72   Site: Left Upper Arm   Position: Sitting   Cuff Size: Medium Adult   Pulse: 86   Temp: 98.1 °F (36.7 °C)   TempSrc: Oral   SpO2: 97%   Weight: 192 lb (87.1 kg)   Height: 5' 3\" (1.6 m)     Body mass index is 34.01 kg/m². Wt Readings from Last 3 Encounters:   07/01/19 192 lb (87.1 kg)   06/27/19 194 lb 8 oz (88.2 kg)   06/14/19 199 lb 9.6 oz (90.5 kg)     BP Readings from Last 3 Encounters:   07/01/19 124/72   06/27/19 118/60   06/18/19 136/64       Physical Exam   Constitutional: She is oriented to person, place, and time. She appears well-developed and well-nourished. She appears ill. No distress. Cardiovascular: Normal rate, S1 normal and S2 normal.   Murmur heard. Systolic murmur is present with a grade of 2/6. Pulmonary/Chest: Effort normal and breath sounds normal. No respiratory distress. She has no wheezes. Abdominal: Soft. There is tenderness. Inferior portal and umbilical portal infections. Superior portal has a crater type wound about 1.5 cm in diameter. There is serous drainage from it.   No

## 2019-07-02 ENCOUNTER — OFFICE VISIT (OUTPATIENT)
Dept: SURGERY | Age: 69
End: 2019-07-02

## 2019-07-02 VITALS
TEMPERATURE: 99 F | OXYGEN SATURATION: 96 % | RESPIRATION RATE: 18 BRPM | HEART RATE: 94 BPM | SYSTOLIC BLOOD PRESSURE: 122 MMHG | HEIGHT: 63 IN | DIASTOLIC BLOOD PRESSURE: 64 MMHG | BODY MASS INDEX: 33.84 KG/M2 | WEIGHT: 191 LBS

## 2019-07-02 DIAGNOSIS — Z51.89 VISIT FOR WOUND CHECK: Primary | ICD-10-CM

## 2019-07-02 LAB
AEROBIC CULTURE: ABNORMAL
AEROBIC CULTURE: ABNORMAL
ANAEROBIC CULTURE: ABNORMAL
GRAM STAIN RESULT: ABNORMAL
ORGANISM: ABNORMAL

## 2019-07-02 PROCEDURE — 99024 POSTOP FOLLOW-UP VISIT: CPT | Performed by: NURSE PRACTITIONER

## 2019-07-02 ASSESSMENT — ENCOUNTER SYMPTOMS
SHORTNESS OF BREATH: 0
CHOKING: 0
BACK PAIN: 0
VOMITING: 0
VOICE CHANGE: 0
EYE REDNESS: 0
RECTAL PAIN: 0
WHEEZING: 0
ABDOMINAL PAIN: 1
RHINORRHEA: 0
EYE ITCHING: 0
TROUBLE SWALLOWING: 0
COLOR CHANGE: 0
APNEA: 0
STRIDOR: 0
SINUS PRESSURE: 0
BLOOD IN STOOL: 0
CONSTIPATION: 1
PHOTOPHOBIA: 0
FACIAL SWELLING: 0
DIARRHEA: 0
SORE THROAT: 0
NAUSEA: 1
ABDOMINAL DISTENTION: 0
COUGH: 0
EYE PAIN: 0
ANAL BLEEDING: 0
EYE DISCHARGE: 0
CHEST TIGHTNESS: 0

## 2019-07-02 NOTE — PROGRESS NOTES
Constitutional: Positive for activity change, appetite change, fatigue and unexpected weight change. Negative for chills, diaphoresis and fever. HENT: Negative for congestion, dental problem, drooling, ear discharge, ear pain, facial swelling, hearing loss, mouth sores, nosebleeds, postnasal drip, rhinorrhea, sinus pressure, sneezing, sore throat, tinnitus, trouble swallowing and voice change. Eyes: Negative for photophobia, pain, discharge, redness, itching and visual disturbance. Respiratory: Negative for apnea, cough, choking, chest tightness, shortness of breath, wheezing and stridor. Cardiovascular: Negative for chest pain, palpitations and leg swelling. Gastrointestinal: Positive for abdominal pain, constipation and nausea. Negative for abdominal distention, anal bleeding, blood in stool, diarrhea, rectal pain and vomiting. Genitourinary: Negative for decreased urine volume, difficulty urinating, dyspareunia, dysuria, enuresis, flank pain, frequency, genital sores, hematuria, menstrual problem, pelvic pain, urgency, vaginal bleeding, vaginal discharge and vaginal pain. Musculoskeletal: Negative for arthralgias, back pain, gait problem, joint swelling, myalgias, neck pain and neck stiffness. Skin: Negative for color change, pallor, rash and wound. Neurological: Positive for weakness. Negative for dizziness, tremors, seizures, syncope, facial asymmetry, speech difficulty, light-headedness, numbness and headaches. Hematological: Negative for adenopathy. Does not bruise/bleed easily. Psychiatric/Behavioral: Negative for agitation, behavioral problems, confusion, decreased concentration, dysphoric mood, hallucinations, self-injury, sleep disturbance and suicidal ideas. The patient is not nervous/anxious and is not hyperactive.         Objective:     /64 (Site: Right Upper Arm, Position: Sitting, Cuff Size: Medium Adult)   Pulse 94   Temp 99 °F (37.2 °C) (Tympanic)   Resp 18   Ht

## 2019-07-08 ENCOUNTER — OFFICE VISIT (OUTPATIENT)
Dept: FAMILY MEDICINE CLINIC | Age: 69
End: 2019-07-08
Payer: MEDICARE

## 2019-07-08 VITALS
BODY MASS INDEX: 34.98 KG/M2 | HEIGHT: 63 IN | DIASTOLIC BLOOD PRESSURE: 80 MMHG | SYSTOLIC BLOOD PRESSURE: 122 MMHG | OXYGEN SATURATION: 96 % | HEART RATE: 63 BPM | WEIGHT: 197.4 LBS

## 2019-07-08 DIAGNOSIS — I35.0 NONRHEUMATIC AORTIC VALVE STENOSIS: Primary | ICD-10-CM

## 2019-07-08 DIAGNOSIS — I42.8 OTHER CARDIOMYOPATHY (HCC): ICD-10-CM

## 2019-07-08 DIAGNOSIS — R53.83 OTHER FATIGUE: ICD-10-CM

## 2019-07-08 DIAGNOSIS — R06.02 SOB (SHORTNESS OF BREATH) ON EXERTION: ICD-10-CM

## 2019-07-08 PROCEDURE — G8399 PT W/DXA RESULTS DOCUMENT: HCPCS | Performed by: FAMILY MEDICINE

## 2019-07-08 PROCEDURE — 1036F TOBACCO NON-USER: CPT | Performed by: FAMILY MEDICINE

## 2019-07-08 PROCEDURE — 3017F COLORECTAL CA SCREEN DOC REV: CPT | Performed by: FAMILY MEDICINE

## 2019-07-08 PROCEDURE — 3014F SCREEN MAMMO DOC REV: CPT | Performed by: FAMILY MEDICINE

## 2019-07-08 PROCEDURE — G8417 CALC BMI ABV UP PARAM F/U: HCPCS | Performed by: FAMILY MEDICINE

## 2019-07-08 PROCEDURE — 99213 OFFICE O/P EST LOW 20 MIN: CPT | Performed by: FAMILY MEDICINE

## 2019-07-08 PROCEDURE — G8427 DOCREV CUR MEDS BY ELIG CLIN: HCPCS | Performed by: FAMILY MEDICINE

## 2019-07-08 PROCEDURE — 1090F PRES/ABSN URINE INCON ASSESS: CPT | Performed by: FAMILY MEDICINE

## 2019-07-08 PROCEDURE — 1111F DSCHRG MED/CURRENT MED MERGE: CPT | Performed by: FAMILY MEDICINE

## 2019-07-08 PROCEDURE — 4040F PNEUMOC VAC/ADMIN/RCVD: CPT | Performed by: FAMILY MEDICINE

## 2019-07-08 PROCEDURE — 1123F ACP DISCUSS/DSCN MKR DOCD: CPT | Performed by: FAMILY MEDICINE

## 2019-07-08 ASSESSMENT — ENCOUNTER SYMPTOMS
SHORTNESS OF BREATH: 1
COUGH: 0

## 2019-07-08 NOTE — PROGRESS NOTES
[facility] PROFESSIONAL SERVS  [facility] MEDICINE  [address] [address]  Dept: [phone]  Dept Fax: [phone]  Loc: [phone]  PROGRESS NOTE      Visit Date: 7/8/2019    Luciano Devine is a 68 y.o. female who presents today for:  Chief Complaint   Patient presents with   [facility] Other     would like to see a cardiologist repeat an echo? Subjective:  HPI     Aortic stenosis:  Asks about seeing a cardiologist.  She asks about obtaining a new echo. She has moderate aortic stenosis and cardiomyopathy on echo in sept 2018. Gets SOB with walking into the office from parking lot. She can climb flight of stairs but has SOB when done. No previous heart cath. Review of Systems   Respiratory: Positive for shortness of breath. Negative for cough. Cardiovascular: Negative for chest pain and leg swelling.      Patient Active Problem List   Diagnosis    Hyperlipidemia    Asthma    Polyneuropathy    AS (aortic stenosis)    Cardiomyopathy eF 39 TO 50% PER ECHO MAY 2012    Hiatal hernia    Hypothyroid    Type 2 diabetes mellitus without complication, without long-term current use of insulin (HCC)    Osteoarthritis of multiple joints    Ear canal mass    Diabetic ketoacidosis without coma associated with type 2 diabetes mellitus ([address]    Malignant neoplasm of left breast in female, estrogen receptor positive (HCC)    Localized osteoarthritis of left knee    Other osteoporosis without current pathological fracture    S/P repair of ventral hernia     Past Medical History:   Diagnosis Date    Abdominal aortic aneurysm (AAA) >39 mm diameter (HCC)     found in 2015    Arthritis     Asthma     Brain cyst     benign    Cancer ([address] 09/1918    Left Breast    Chronic sinus complaints     Diverticulosis of colon     DKA (diabetic ketoacidoses) ([address] 05/2018    Elevated TSH     Hypertension     Osteoarthritis     Polyneuropathy     PONV Semaglutide (OZEMPIC) 0.25 or 0.5 MG/DOSE SOPN Inject 0.25 mg into the skin once a week      benazepril (LOTENSIN) 10 MG tablet Take 1 tablet by mouth daily (Patient taking differently: Take 10 mg by mouth 2 times daily ) 90 tablet 1    ibuprofen (ADVIL;MOTRIN) 600 MG tablet Take 1 tablet by mouth 2 times daily as needed for Pain 180 tablet 0    pantoprazole (PROTONIX) 40 MG tablet Take 40 mg by mouth daily      Calcium Carb-Cholecalciferol 600-500 MG-UNIT CAPS Take by mouth daily      zoledronic acid (ZOMETA) 4 MG/5ML injection Infuse 4 mg intravenously once      anastrozole (ARIMIDEX) 1 MG tablet Take 1 mg by mouth daily      aspirin 81 MG chewable tablet Take 81 mg by mouth daily      Insulin Pen Needle (PEN NEEDLES 31GX5/16\") 31G X 8 MM MISC 1 each by Does not apply route daily 100 each 3    insulin aspart (NOVOLOG FLEXPEN) 100 UNIT/ML injection pen Inject 0-12 Units into the skin 3 times daily (before meals) Per Medium Sliding Scale (Patient taking differently: Inject 0-12 Units into the skin 3 times daily (before meals) 3 times per day AC meals. BG less than 110 = 5 units, 110-150=7 units, 151-200=10units, 201-250 12 units, 251-300=14 unit, 301-350=16units, 351-400=18 unit) 5 pen 3    glucose blood VI test strips (ASCENSIA AUTODISC VI;ONE TOUCH ULTRA TEST VI) strip Advocate Redi-Code test strips. Tests once daily. 100 each 3    albuterol (PROVENTIL) (2.5 MG/3ML) 0.083% nebulizer solution Take 3 mLs by nebulization every 4 hours as needed for Wheezing 1 Package 1    therapeutic multivitamin-minerals (THERAGRAN-M) tablet Take 1 tablet by mouth daily. No current facility-administered medications for this visit.       Allergies   Allergen Reactions    Amoxicillin     Donnatal [Pb-Hyoscy-Atropine-Scopolamine] Other (See Comments)     palpitations    Lovastatin Other (See Comments)     myalgia    Metformin And Related Diarrhea    Vioxx Other (See Comments)     Liver problems    Dilaudid [Hydromorphone Hcl] Nausea And Vomiting    Fentanyl Nausea And Vomiting     Severe Nausea and vomitting     Health Maintenance   Topic Date Due    Shingles Vaccine (1 of 2) 03/12/1998    Diabetic microalbuminuria test  05/14/2019    TSH testing  05/18/2019    Diabetic retinal exam  06/21/2019    Flu vaccine (1) 09/01/2019    Lipid screen  09/29/2019    Breast cancer screen  03/18/2020    A1C test (Diabetic or Prediabetic)  03/27/2020    Annual Wellness Visit (AWV)  05/05/2020    Diabetic foot exam  05/06/2020    Potassium monitoring  07/01/2020    Creatinine monitoring  07/01/2020    Colon cancer screen colonoscopy  06/26/2022    DTaP/Tdap/Td vaccine (2 - Td) 04/25/2029    Pneumococcal 65+ years Vaccine  Completed    Hepatitis C screen  Completed    DEXA (modify frequency per FRAX score)  Addressed       Objective:  /80 (Site: Left Upper Arm, Position: Sitting, Cuff Size: Medium Adult)   Pulse 63   Ht 5' 3\" (1.6 m)   Wt 197 lb 6.4 oz (89.5 kg)   SpO2 96%   BMI 34.97 kg/m²   Physical Exam   Constitutional: She is oriented to person, place, and time. She appears well-developed and well-nourished. Cardiovascular: Normal rate and regular rhythm. Murmur heard. Systolic murmur is present with a grade of 2/6. Pulmonary/Chest: Effort normal and breath sounds normal. No respiratory distress. She has no wheezes. Neurological: She is alert and oriented to person, place, and time. Psychiatric: She has a normal mood and affect. Her behavior is normal.   Vitals reviewed. Impression/Plan:  1. Nonrheumatic aortic valve stenosis  Patient with moderate aortic valve stenosis and having increasing shortness of breath and fatigue. She also has a history of cardiomyopathy in the past.  Will obtain repeat echo to evaluate for progression of the valvular stenosis or cardiomyopathy. Referral to cardiology, she prefers [facility] cardiology. - Echocardiogram complete;  Future  - External Referral To

## 2019-07-09 ENCOUNTER — OFFICE VISIT (OUTPATIENT)
Dept: SURGERY | Age: 69
End: 2019-07-09

## 2019-07-09 VITALS
BODY MASS INDEX: 34.87 KG/M2 | OXYGEN SATURATION: 97 % | WEIGHT: 196.8 LBS | HEIGHT: 63 IN | DIASTOLIC BLOOD PRESSURE: 60 MMHG | TEMPERATURE: 97.7 F | SYSTOLIC BLOOD PRESSURE: 120 MMHG | HEART RATE: 88 BPM | RESPIRATION RATE: 18 BRPM

## 2019-07-09 DIAGNOSIS — Z98.890 S/P REPAIR OF RECURRENT VENTRAL HERNIA: Primary | ICD-10-CM

## 2019-07-09 DIAGNOSIS — Z87.19 S/P REPAIR OF RECURRENT VENTRAL HERNIA: Primary | ICD-10-CM

## 2019-07-09 PROCEDURE — 99024 POSTOP FOLLOW-UP VISIT: CPT | Performed by: NURSE PRACTITIONER

## 2019-07-09 ASSESSMENT — ENCOUNTER SYMPTOMS
SINUS PRESSURE: 0
FACIAL SWELLING: 0
EYE DISCHARGE: 0
BLOOD IN STOOL: 0
DIARRHEA: 0
RHINORRHEA: 0
RECTAL PAIN: 0
EYE REDNESS: 0
STRIDOR: 0
SORE THROAT: 0
COUGH: 0
VOMITING: 0
ANAL BLEEDING: 0
TROUBLE SWALLOWING: 0
EYE ITCHING: 0
WHEEZING: 0
APNEA: 0
ABDOMINAL PAIN: 1
SHORTNESS OF BREATH: 1
BACK PAIN: 0
CHOKING: 0
COLOR CHANGE: 0
CHEST TIGHTNESS: 0
VOICE CHANGE: 0
EYE PAIN: 0
PHOTOPHOBIA: 0

## 2019-07-09 NOTE — PROGRESS NOTES
[address] Jameson Medley [address]  [address] [address]  Dept: [phone]  Dept Fax: [phone]  Loc: [phone]    Visit Date: 7/9/2019       Levi Xiao is a 68 y.o. female who presents today for:  Chief Complaint   Patient presents with    Post-Op Check     1 week f/u- s/p Hybrid/combined laparoscopic and open ventral hernia repair with 8-inch circular Echo Positioning System Ventralight mesh-6/14/19       HPI:     Ziyad Cantu presents today for a 3 week post op check. Today She looks much better clinically and states she feels better. She states that she seen her PCP yesterday who ordered her echo and did a referral for [facility] for valve issues, complaints of increased SOB. She states she feels better overall, appetite is still poor but improving. The seroma fluid has returned, the incision was prpeed with betadine a 18g needle was inserted with a 60ml syringe, a total of 80ml of dark seroma fluid was removed. Tolerated well. The wound looks much better, stay on antibiotics as prescribed.   Follow up in one week   80ml of clear dark seroma fluid    Past Medical History:   Diagnosis Date    Abdominal aortic aneurysm (AAA) >39 mm diameter (HCC)     found in 2015    Arthritis     Asthma     Brain cyst     benign    Cancer ([address] 09/1918    Left Breast    Chronic sinus complaints     Diverticulosis of colon     DKA (diabetic ketoacidoses) ([address] 05/2018    Elevated TSH     Hypertension     Osteoarthritis     Polyneuropathy     PONV (postoperative nausea and vomiting)     Spinal headache     Type 2 diabetes mellitus ([address] 1990's      Past Surgical History:   Procedure Laterality Date    BLADDER SUSPENSION      times 2    CHOLECYSTECTOMY      DILATION AND CURETTAGE OF UTERUS      x2    EYE SURGERY      HERNIA REPAIR  06/18/2019    HYSTERECTOMY  1995    JOINT REPLACEMENT Right 2007    Rt total knee    KNEE ARTHROSCOPY  1967, 2001  MANDIBLE FRACTURE SURGERY      OTHER SURGICAL HISTORY Right 12/30/2015    Excision of Sebaceous Cyst Right Ear     IN OFFICE/OUTPT VISIT,PROCEDURE ONLY Left 10/10/2018    LEFT BREAST LUMPECTOMY WITH SENTINEL LYMPH NODE BIOPSY performed by Dauna Homans, MD at [address] [facility], [facility] SKIN BIOPSY      TONSILLECTOMY AND ADENOIDECTOMY  age 4   Catha Sprout VENTRAL HERNIA REPAIR N/A 6/14/2019    COMBINED LAPAROSCOPIC AND OPEN VENTRAL HERNIA REPAIR WITH MESH performed by Dauna Homans, MD at [facility] History   Problem Relation Age of Onset    Diabetes Mother     Heart Disease Mother     Lupus Mother     Heart Disease Father     Cancer Father         Squamous cell - face & scalp    Diabetes Maternal Grandmother     Heart Disease Maternal Grandfather     Cancer Paternal Aunt          multiple myeloma    Cancer Paternal Uncle         Lymphatic Leukemia    Breast Cancer Maternal Cousin     Heart Disease Maternal Uncle     Lupus Maternal Uncle     Other Paternal Grandfather         Brain aneurysm     Social History     Tobacco Use    Smoking status: Never Smoker    Smokeless tobacco: Never Used   Substance Use Topics    Alcohol use:  Yes     Alcohol/week: 0.0 oz     Comment: Socially        Current Outpatient Medications   Medication Sig Dispense Refill    EASY TOUCH PEN NEEDLES 31G X 6 MM MISC       ciprofloxacin (CIPRO) 500 MG tablet Take 1 tablet by mouth 2 times daily for 10 days 20 tablet 0    LANTUS SOLOSTAR 100 UNIT/ML injection pen Inject 47 Units into the skin nightly      Semaglutide (OZEMPIC) 0.25 or 0.5 MG/DOSE SOPN Inject 0.25 mg into the skin once a week      benazepril (LOTENSIN) 10 MG tablet Take 1 tablet by mouth daily (Patient taking differently: Take 10 mg by mouth 2 times daily ) 90 tablet 1    ibuprofen (ADVIL;MOTRIN) 600 MG tablet Take 1 tablet by mouth 2 times daily as needed for Pain 180 tablet 0    pantoprazole (PROTONIX) 40 MG tablet Take 40 mg by mouth daily      Calcium Carb-Cholecalciferol 600-500 MG-UNIT CAPS Take by mouth daily      zoledronic acid (ZOMETA) 4 MG/5ML injection Infuse 4 mg intravenously once      anastrozole (ARIMIDEX) 1 MG tablet Take 1 mg by mouth daily      aspirin 81 MG chewable tablet Take 81 mg by mouth daily      Insulin Pen Needle (PEN NEEDLES 31GX5/16\") 31G X 8 MM MISC 1 each by Does not apply route daily 100 each 3    insulin aspart (NOVOLOG FLEXPEN) 100 UNIT/ML injection pen Inject 0-12 Units into the skin 3 times daily (before meals) Per Medium Sliding Scale (Patient taking differently: Inject 0-12 Units into the skin 3 times daily (before meals) 3 times per day AC meals. BG less than 110 = 5 units, 110-150=7 units, 151-200=10units, 201-250 12 units, 251-300=14 unit, 301-350=16units, 351-400=18 unit) 5 pen 3    glucose blood VI test strips (ASCENSIA AUTODISC VI;ONE TOUCH ULTRA TEST VI) strip Advocate Redi-Code test strips. Tests once daily. 100 each 3    albuterol (PROVENTIL) (2.5 MG/3ML) 0.083% nebulizer solution Take 3 mLs by nebulization every 4 hours as needed for Wheezing 1 Package 1    therapeutic multivitamin-minerals (THERAGRAN-M) tablet Take 1 tablet by mouth daily. No current facility-administered medications for this visit. Allergies   Allergen Reactions    Amoxicillin     Donnatal [Pb-Hyoscy-Atropine-Scopolamine] Other (See Comments)     palpitations    Lovastatin Other (See Comments)     myalgia    Metformin And Related Diarrhea    Vioxx Other (See Comments)     Liver problems    Dilaudid [Hydromorphone Hcl] Nausea And Vomiting    Fentanyl Nausea And Vomiting     Severe Nausea and vomitting       Subjective:      Review of Systems   Constitutional: Positive for activity change and appetite change. Negative for chills, diaphoresis, fatigue, fever and unexpected weight change.    HENT: Negative for congestion, dental problem, drooling, ear discharge, ear pain, facial swelling, hearing loss,

## 2019-07-11 ENCOUNTER — TELEPHONE (OUTPATIENT)
Dept: SURGERY | Age: 69
End: 2019-07-11

## 2019-07-12 ENCOUNTER — OFFICE VISIT (OUTPATIENT)
Dept: SURGERY | Age: 69
End: 2019-07-12

## 2019-07-12 VITALS
RESPIRATION RATE: 18 BRPM | HEART RATE: 67 BPM | WEIGHT: 196 LBS | DIASTOLIC BLOOD PRESSURE: 76 MMHG | OXYGEN SATURATION: 97 % | SYSTOLIC BLOOD PRESSURE: 124 MMHG | BODY MASS INDEX: 34.72 KG/M2 | TEMPERATURE: 97.5 F

## 2019-07-12 DIAGNOSIS — Z51.89 VISIT FOR WOUND CHECK: Primary | ICD-10-CM

## 2019-07-12 PROCEDURE — 99024 POSTOP FOLLOW-UP VISIT: CPT | Performed by: NURSE PRACTITIONER

## 2019-07-12 ASSESSMENT — ENCOUNTER SYMPTOMS
WHEEZING: 0
APNEA: 0
DIARRHEA: 0
TROUBLE SWALLOWING: 0
CONSTIPATION: 0
PHOTOPHOBIA: 0
SINUS PRESSURE: 0
EYE REDNESS: 0
COUGH: 0
COLOR CHANGE: 0
CHOKING: 0
BACK PAIN: 0
STRIDOR: 0
BLOOD IN STOOL: 0
ABDOMINAL DISTENTION: 0
CHEST TIGHTNESS: 0
FACIAL SWELLING: 0
VOICE CHANGE: 0
SHORTNESS OF BREATH: 0
SORE THROAT: 0
EYE DISCHARGE: 0
RHINORRHEA: 0
EYE PAIN: 0
ANAL BLEEDING: 0
VOMITING: 0
ABDOMINAL PAIN: 0
RECTAL PAIN: 0
EYE ITCHING: 0
NAUSEA: 0

## 2019-07-16 ENCOUNTER — HOSPITAL ENCOUNTER (OUTPATIENT)
Dept: NON INVASIVE DIAGNOSTICS | Age: 69
Discharge: HOME | End: 2019-07-16
Payer: MEDICARE

## 2019-07-16 DIAGNOSIS — I42.8 OTHER CARDIOMYOPATHY (HCC): ICD-10-CM

## 2019-07-16 DIAGNOSIS — I35.0 NONRHEUMATIC AORTIC VALVE STENOSIS: ICD-10-CM

## 2019-07-16 DIAGNOSIS — R06.02 SOB (SHORTNESS OF BREATH) ON EXERTION: ICD-10-CM

## 2019-07-16 DIAGNOSIS — R53.83 OTHER FATIGUE: ICD-10-CM

## 2019-07-16 LAB
LV EF: 63 %
LVEF MODALITY: NORMAL

## 2019-07-16 PROCEDURE — 93306 TTE W/DOPPLER COMPLETE: CPT

## 2019-07-17 ENCOUNTER — TELEPHONE (OUTPATIENT)
Dept: FAMILY MEDICINE CLINIC | Age: 69
End: 2019-07-17

## 2019-07-17 ENCOUNTER — OFFICE VISIT (OUTPATIENT)
Dept: SURGERY | Age: 69
End: 2019-07-17

## 2019-07-17 VITALS
HEART RATE: 88 BPM | DIASTOLIC BLOOD PRESSURE: 80 MMHG | OXYGEN SATURATION: 93 % | HEIGHT: 63 IN | SYSTOLIC BLOOD PRESSURE: 136 MMHG | BODY MASS INDEX: 34.98 KG/M2 | TEMPERATURE: 98.1 F | RESPIRATION RATE: 18 BRPM | WEIGHT: 197.4 LBS

## 2019-07-17 DIAGNOSIS — Z87.19 S/P REPAIR OF RECURRENT VENTRAL HERNIA: Primary | ICD-10-CM

## 2019-07-17 DIAGNOSIS — Z98.890 S/P REPAIR OF RECURRENT VENTRAL HERNIA: Primary | ICD-10-CM

## 2019-07-17 PROCEDURE — 99024 POSTOP FOLLOW-UP VISIT: CPT | Performed by: NURSE PRACTITIONER

## 2019-07-17 ASSESSMENT — ENCOUNTER SYMPTOMS
TROUBLE SWALLOWING: 0
CHEST TIGHTNESS: 0
DIARRHEA: 0
SORE THROAT: 0
EYE ITCHING: 0
BLOOD IN STOOL: 0
COUGH: 0
EYE REDNESS: 0
EYE PAIN: 0
ANAL BLEEDING: 0
EYE DISCHARGE: 0
WHEEZING: 0
VOICE CHANGE: 0
COLOR CHANGE: 0
VOMITING: 0
CONSTIPATION: 0
RHINORRHEA: 0
STRIDOR: 0
RECTAL PAIN: 0
SINUS PRESSURE: 0
ABDOMINAL PAIN: 0
PHOTOPHOBIA: 0
FACIAL SWELLING: 0
APNEA: 0
BACK PAIN: 0
NAUSEA: 0
CHOKING: 0
SHORTNESS OF BREATH: 0

## 2019-07-17 NOTE — PROGRESS NOTES
 MANDIBLE FRACTURE SURGERY      OTHER SURGICAL HISTORY Right 12/30/2015    Excision of Sebaceous Cyst Right Ear     NV OFFICE/OUTPT VISIT,PROCEDURE ONLY Left 10/10/2018    LEFT BREAST LUMPECTOMY WITH SENTINEL LYMPH NODE BIOPSY performed by Carlos Patel MD at [address] [facility],[address], [facility] SKIN BIOPSY      TONSILLECTOMY AND ADENOIDECTOMY  age 4   [employer] VENTRAL HERNIA REPAIR N/A 6/14/2019    COMBINED LAPAROSCOPIC AND OPEN VENTRAL HERNIA REPAIR WITH MESH performed by Carlos Patel MD at [address] [place] [address] History   Problem Relation Age of Onset    Diabetes Mother     Heart Disease Mother     Lupus Mother     Heart Disease Father     Cancer Father         Squamous cell - face & scalp    Diabetes Maternal Grandmother     Heart Disease Maternal Grandfather     Cancer Paternal Aunt          multiple myeloma    Cancer Paternal Uncle         Lymphatic Leukemia    Breast Cancer Maternal Cousin     Heart Disease Maternal Uncle     Lupus Maternal Uncle     Other Paternal Grandfather         Brain aneurysm     Social History     Tobacco Use    Smoking status: Never Smoker    Smokeless tobacco: Never Used   Substance Use Topics    Alcohol use:  Yes     Alcohol/week: 0.0 standard drinks     Comment: Socially        Current Outpatient Medications   Medication Sig Dispense Refill    EASY TOUCH PEN NEEDLES 31G X 6 MM MISC       LANTUS SOLOSTAR 100 UNIT/ML injection pen Inject 47 Units into the skin nightly      benazepril (LOTENSIN) 10 MG tablet Take 1 tablet by mouth daily (Patient taking differently: Take 10 mg by mouth 2 times daily ) 90 tablet 1    ibuprofen (ADVIL;MOTRIN) 600 MG tablet Take 1 tablet by mouth 2 times daily as needed for Pain 180 tablet 0    pantoprazole (PROTONIX) 40 MG tablet Take 40 mg by mouth daily      Calcium Carb-Cholecalciferol 600-500 MG-UNIT CAPS Take by mouth daily      zoledronic acid (ZOMETA) 4 MG/5ML injection Infuse 4 mg intravenously once      anastrozole (ARIMIDEX) 1 MG tablet Take 1 mg by mouth daily      aspirin 81 MG chewable tablet Take 81 mg by mouth daily      Insulin Pen Needle (PEN NEEDLES 31GX5/16\") 31G X 8 MM MISC 1 each by Does not apply route daily 100 each 3    insulin aspart (NOVOLOG FLEXPEN) 100 UNIT/ML injection pen Inject 0-12 Units into the skin 3 times daily (before meals) Per Medium Sliding Scale (Patient taking differently: Inject 0-12 Units into the skin 3 times daily (before meals) 3 times per day AC meals. BG less than 110 = 5 units, 110-150=7 units, 151-200=10units, 201-250 12 units, 251-300=14 unit, 301-350=16units, 351-400=18 unit) 5 pen 3    glucose blood VI test strips (ASCENSIA AUTODISC VI;ONE TOUCH ULTRA TEST VI) strip Advocate Redi-Code test strips. Tests once daily. 100 each 3    albuterol (PROVENTIL) (2.5 MG/3ML) 0.083% nebulizer solution Take 3 mLs by nebulization every 4 hours as needed for Wheezing 1 Package 1    therapeutic multivitamin-minerals (THERAGRAN-M) tablet Take 1 tablet by mouth daily.  Semaglutide (OZEMPIC) 0.25 or 0.5 MG/DOSE SOPN Inject 0.25 mg into the skin once a week       No current facility-administered medications for this visit. Allergies   Allergen Reactions    Amoxicillin     Donnatal [Pb-Hyoscy-Atropine-Scopolamine] Other (See Comments)     palpitations    Lovastatin Other (See Comments)     myalgia    Metformin And Related Diarrhea    Vioxx Other (See Comments)     Liver problems    Dilaudid [Hydromorphone Hcl] Nausea And Vomiting    Fentanyl Nausea And Vomiting     Severe Nausea and vomitting       Subjective:      Review of Systems   Constitutional: Negative for activity change, appetite change, chills, diaphoresis, fatigue, fever and unexpected weight change.    HENT: Negative for congestion, dental problem, drooling, ear discharge, ear pain, facial swelling, hearing loss, mouth sores, nosebleeds, postnasal drip, rhinorrhea, sinus pressure, sneezing, sore throat, tinnitus, oriented to person, place, and time. obese   Pulmonary/Chest: Effort normal.   Abdominal: Soft. Musculoskeletal: Normal range of motion. Neurological: She is alert and oriented to person, place, and time. Skin: Skin is warm and dry. Vitals reviewed. Assessment/Plan:     Consuelo Pandya was seen today for post-op check. Diagnoses and all orders for this visit:    S/P repair of recurrent ventral hernia        Return in about 10 days (around 7/27/2019). There are no Patient Instructions on file for this visit. Discusseduse, benefit, and side effects of prescribed medications. All patient questionsanswered. Pt voiced understanding.      Electronically signed by NAV Romero CNP on 7/23/2019 at 6:59 AM

## 2019-07-23 ENCOUNTER — NURSE ONLY (OUTPATIENT)
Dept: FAMILY MEDICINE CLINIC | Age: 69
End: 2019-07-23

## 2019-07-23 VITALS — DIASTOLIC BLOOD PRESSURE: 88 MMHG | SYSTOLIC BLOOD PRESSURE: 164 MMHG | HEART RATE: 68 BPM

## 2019-07-23 DIAGNOSIS — Z01.30 BLOOD PRESSURE CHECK: Primary | ICD-10-CM

## 2019-07-23 DIAGNOSIS — E11.65 TYPE 2 DIABETES MELLITUS WITH HYPERGLYCEMIA, WITHOUT LONG-TERM CURRENT USE OF INSULIN (HCC): ICD-10-CM

## 2019-07-23 DIAGNOSIS — I10 ESSENTIAL HYPERTENSION: ICD-10-CM

## 2019-07-23 RX ORDER — BENAZEPRIL HYDROCHLORIDE 20 MG/1
20 TABLET ORAL 2 TIMES DAILY
COMMUNITY
End: 2019-07-23 | Stop reason: SDUPTHER

## 2019-07-23 RX ORDER — BENAZEPRIL HYDROCHLORIDE 20 MG/1
20 TABLET ORAL 2 TIMES DAILY
Qty: 60 TABLET | Refills: 0 | Status: SHIPPED | OUTPATIENT
Start: 2019-07-23 | End: 2019-08-27 | Stop reason: SDUPTHER

## 2019-07-30 ENCOUNTER — OFFICE VISIT (OUTPATIENT)
Dept: SURGERY | Age: 69
End: 2019-07-30

## 2019-07-30 ENCOUNTER — NURSE ONLY (OUTPATIENT)
Dept: FAMILY MEDICINE CLINIC | Age: 69
End: 2019-07-30

## 2019-07-30 VITALS
HEART RATE: 74 BPM | TEMPERATURE: 98.9 F | HEIGHT: 63 IN | RESPIRATION RATE: 16 BRPM | DIASTOLIC BLOOD PRESSURE: 68 MMHG | SYSTOLIC BLOOD PRESSURE: 122 MMHG | OXYGEN SATURATION: 96 % | WEIGHT: 193 LBS | BODY MASS INDEX: 34.2 KG/M2

## 2019-07-30 VITALS — DIASTOLIC BLOOD PRESSURE: 76 MMHG | HEART RATE: 76 BPM | SYSTOLIC BLOOD PRESSURE: 134 MMHG

## 2019-07-30 DIAGNOSIS — G89.29 CHRONIC PAIN OF LEFT KNEE: ICD-10-CM

## 2019-07-30 DIAGNOSIS — Z51.89 VISIT FOR WOUND CHECK: Primary | ICD-10-CM

## 2019-07-30 DIAGNOSIS — M25.562 CHRONIC PAIN OF LEFT KNEE: ICD-10-CM

## 2019-07-30 DIAGNOSIS — Z01.30 BLOOD PRESSURE CHECK: Primary | ICD-10-CM

## 2019-07-30 PROCEDURE — 99024 POSTOP FOLLOW-UP VISIT: CPT | Performed by: NURSE PRACTITIONER

## 2019-07-30 RX ORDER — IBUPROFEN 600 MG/1
600 TABLET ORAL 2 TIMES DAILY PRN
Qty: 180 TABLET | Refills: 1 | Status: SHIPPED | OUTPATIENT
Start: 2019-07-30 | End: 2019-11-04 | Stop reason: SDUPTHER

## 2019-07-30 ASSESSMENT — ENCOUNTER SYMPTOMS
CHEST TIGHTNESS: 0
FACIAL SWELLING: 0
VOICE CHANGE: 0
TROUBLE SWALLOWING: 0
NAUSEA: 0
CONSTIPATION: 0
COLOR CHANGE: 0
APNEA: 0
VOMITING: 0
BACK PAIN: 0
CHOKING: 0
EYE ITCHING: 0
PHOTOPHOBIA: 0
ABDOMINAL PAIN: 0
SORE THROAT: 0
EYE DISCHARGE: 0
ABDOMINAL DISTENTION: 0
WHEEZING: 0
SINUS PRESSURE: 0
EYE REDNESS: 0
EYE PAIN: 0
ANAL BLEEDING: 0
DIARRHEA: 0
RHINORRHEA: 0
RECTAL PAIN: 0
COUGH: 0
BLOOD IN STOOL: 0
STRIDOR: 0
SHORTNESS OF BREATH: 0

## 2019-07-30 NOTE — TELEPHONE ENCOUNTER
Telly Soto called requesting a refill on the following medications:  Requested Prescriptions     Pending Prescriptions Disp Refills    ibuprofen (ADVIL;MOTRIN) 600 MG tablet 180 tablet 1     Sig: Take 1 tablet by mouth 2 times daily as needed for Pain       Date of last visit: 7/8/2019  Date of next visit (if applicable):11/4/2019  Date of last refill: #180 x 0 on 5/3/2019  Pharmacy Name: Juju Jimenes RN

## 2019-07-30 NOTE — PROGRESS NOTES
Jose Peter came in for a no charge BP check. She was increased on the Lotensin and is tolerated it well. Today it was 137/76- HR- 76. After we had checked this, Jose Peter told me about an episode she had the other day where she became aphasic and couldn't remember simple tasks. It lasted about 1/2 hour then she was fine. She states that this is what happened in the past as an aura before she had a migraine. This time it was not accompanied with a headache. I was very concerned about this, but she seemed almost matter of fact. I spoke with Dr Claudia Rao and we scheduled an appointment for her Friday.

## 2019-08-02 ENCOUNTER — OFFICE VISIT (OUTPATIENT)
Dept: FAMILY MEDICINE CLINIC | Age: 69
End: 2019-08-02
Payer: MEDICARE

## 2019-08-02 VITALS
DIASTOLIC BLOOD PRESSURE: 72 MMHG | HEART RATE: 76 BPM | SYSTOLIC BLOOD PRESSURE: 134 MMHG | WEIGHT: 195.6 LBS | HEIGHT: 63 IN | BODY MASS INDEX: 34.66 KG/M2

## 2019-08-02 DIAGNOSIS — R41.0 EPISODE OF CONFUSION: Primary | ICD-10-CM

## 2019-08-02 DIAGNOSIS — E11.65 TYPE 2 DIABETES MELLITUS WITH HYPERGLYCEMIA, WITHOUT LONG-TERM CURRENT USE OF INSULIN (HCC): ICD-10-CM

## 2019-08-02 DIAGNOSIS — I25.10 ASCVD (ARTERIOSCLEROTIC CARDIOVASCULAR DISEASE): ICD-10-CM

## 2019-08-02 DIAGNOSIS — R47.89 OTHER SPEECH DISTURBANCES: ICD-10-CM

## 2019-08-02 PROCEDURE — G8598 ASA/ANTIPLAT THER USED: HCPCS | Performed by: FAMILY MEDICINE

## 2019-08-02 PROCEDURE — 1123F ACP DISCUSS/DSCN MKR DOCD: CPT | Performed by: FAMILY MEDICINE

## 2019-08-02 PROCEDURE — 4040F PNEUMOC VAC/ADMIN/RCVD: CPT | Performed by: FAMILY MEDICINE

## 2019-08-02 PROCEDURE — 1090F PRES/ABSN URINE INCON ASSESS: CPT | Performed by: FAMILY MEDICINE

## 2019-08-02 PROCEDURE — 99214 OFFICE O/P EST MOD 30 MIN: CPT | Performed by: FAMILY MEDICINE

## 2019-08-02 PROCEDURE — G8417 CALC BMI ABV UP PARAM F/U: HCPCS | Performed by: FAMILY MEDICINE

## 2019-08-02 PROCEDURE — 3017F COLORECTAL CA SCREEN DOC REV: CPT | Performed by: FAMILY MEDICINE

## 2019-08-02 PROCEDURE — 3045F PR MOST RECENT HEMOGLOBIN A1C LEVEL 7.0-9.0%: CPT | Performed by: FAMILY MEDICINE

## 2019-08-02 PROCEDURE — 1036F TOBACCO NON-USER: CPT | Performed by: FAMILY MEDICINE

## 2019-08-02 PROCEDURE — 2022F DILAT RTA XM EVC RTNOPTHY: CPT | Performed by: FAMILY MEDICINE

## 2019-08-02 PROCEDURE — G8427 DOCREV CUR MEDS BY ELIG CLIN: HCPCS | Performed by: FAMILY MEDICINE

## 2019-08-02 PROCEDURE — 3014F SCREEN MAMMO DOC REV: CPT | Performed by: FAMILY MEDICINE

## 2019-08-02 PROCEDURE — G8399 PT W/DXA RESULTS DOCUMENT: HCPCS | Performed by: FAMILY MEDICINE

## 2019-08-02 RX ORDER — ATORVASTATIN CALCIUM 20 MG/1
20 TABLET, FILM COATED ORAL NIGHTLY
Qty: 90 TABLET | Refills: 0 | Status: SHIPPED | OUTPATIENT
Start: 2019-08-02 | End: 2019-10-24 | Stop reason: SDUPTHER

## 2019-08-02 ASSESSMENT — ENCOUNTER SYMPTOMS
VOMITING: 0
COUGH: 0
EYE PAIN: 0
NAUSEA: 0
DIARRHEA: 0
WHEEZING: 0
SHORTNESS OF BREATH: 0
CONSTIPATION: 0

## 2019-08-02 NOTE — PROGRESS NOTES
[facility]  [facility]  [address] [facility]  Dept: [phone]  Dept Fax: [phone]  Loc: [phone]  PROGRESS NOTE      Visit Date: 8/2/2019    Jose Hylton is a 68 y.o. female who presents today for:  Chief Complaint   Patient presents with   [facility] Other     This past Monday and Tuesday had 2 episodes of concentration and memory. None since, has seen chiro on Monday and medical massage yesterday. Subjective:  HPI     New problem:    2 episodes of concentration and memory problem about 4 days ago and 3 days ago. She could not recall how to start the washer 4 days ago and then she had problems with telling her  what happened right after that. On Tuesday, she could not recall how to put the shower head back in the zhao. Momentarily got lost in [place] on this past Tuesday as well. No weakness in arms and legs. 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 No headaches with the above episodes. Has seen chiropractor and had medical massage this week. Sees [facility] cardiology next week. Review of Systems   Constitutional: Negative for chills and fever. Respiratory: Negative for shortness of breath and wheezing. Cardiovascular: Positive for chest pain and palpitations. Negative for leg swelling. Neurological: Positive for speech difficulty. Negative for syncope, facial asymmetry, weakness, numbness and headaches.      Patient Active Problem List   Diagnosis    Hyperlipidemia    Asthma    Polyneuropathy    AS (aortic stenosis)    Cardiomyopathy eF 39 TO 50% PER ECHO MAY 2012    Hiatal hernia    Hypothyroid    Type 2 diabetes mellitus without complication, without long-term current use of insulin (HCC)    Osteoarthritis of multiple joints    Ear canal mass    Diabetic ketoacidosis without coma associated with type 2 diabetes mellitus ([address]    Malignant neoplasm of left breast in female, estrogen receptor positive ([address]    Localized osteoarthritis of left knee    Other osteoporosis without current pathological fracture    S/P repair of ventral hernia     Past Medical History:   Diagnosis Date    Abdominal aortic aneurysm (AAA) >39 mm diameter (HCC)     found in 2015    Arthritis     Asthma     Brain cyst     benign    Cancer ([address] 09/1918    Left Breast    Chronic sinus complaints     Diverticulosis of colon     DKA (diabetic ketoacidoses) ([address] 05/2018    Elevated TSH     Hypertension     Osteoarthritis     Polyneuropathy     PONV (postoperative nausea and vomiting)     Spinal headache     Type 2 diabetes mellitus ([address] 1990's      Past Surgical History:   Procedure Laterality Date    BLADDER SUSPENSION      times 2    CHOLECYSTECTOMY      DILATION AND CURETTAGE OF UTERUS      x2    EYE SURGERY      HERNIA REPAIR  06/18/2019    HYSTERECTOMY  1995    JOINT REPLACEMENT Right 2007    Rt total knee    KNEE ARTHROSCOPY  1967, 2001    MANDIBLE FRACTURE SURGERY      OTHER SURGICAL HISTORY Right 12/30/2015    Excision of Sebaceous Cyst Right Ear     CT OFFICE/OUTPT VISIT,PROCEDURE ONLY Left 10/10/2018    LEFT BREAST LUMPECTOMY WITH SENTINEL LYMPH NODE BIOPSY performed by Andrey Briggs MD at [address] [facility]      SKIN BIOPSY      TONSILLECTOMY AND ADENOIDECTOMY  age 4   [facility] VENTRAL HERNIA REPAIR N/A 6/14/2019    COMBINED LAPAROSCOPIC AND OPEN VENTRAL HERNIA REPAIR WITH MESH performed by Andrey Briggs MD at [facility] History   Problem Relation Age of Onset    Diabetes Mother     Heart Disease Mother     Lupus Mother     Heart Disease Father     Cancer Father         Squamous cell - face & scalp    Diabetes Maternal Grandmother     Heart Disease Maternal Grandfather     Cancer Paternal Aunt          multiple myeloma    Cancer Paternal Uncle         Lymphatic Leukemia    Breast Cancer Maternal Cousin     Heart Disease Maternal Uncle     Lupus Maternal Uncle     Other Paternal Grandfather         Brain aneurysm     Social History     Tobacco Use    Smoking status: Never Smoker    Smokeless tobacco: Never Used   Substance Use Topics    Alcohol use: Yes     Alcohol/week: 0.0 standard drinks     Comment: Socially      Current Outpatient Medications   Medication Sig Dispense Refill    ibuprofen (ADVIL;MOTRIN) 600 MG tablet Take 1 tablet by mouth 2 times daily as needed for Pain 180 tablet 1    benazepril (LOTENSIN) 20 MG tablet Take 1 tablet by mouth 2 times daily 60 tablet 0    EASY TOUCH PEN NEEDLES 31G X 6 MM MISC       LANTUS SOLOSTAR 100 UNIT/ML injection pen Inject 47 Units into the skin nightly      pantoprazole (PROTONIX) 40 MG tablet Take 40 mg by mouth daily      Calcium Carb-Cholecalciferol 600-500 MG-UNIT CAPS Take by mouth daily      zoledronic acid (ZOMETA) 4 MG/5ML injection Infuse 4 mg intravenously once      anastrozole (ARIMIDEX) 1 MG tablet Take 1 mg by mouth daily      aspirin 81 MG chewable tablet Take 81 mg by mouth daily      Insulin Pen Needle (PEN NEEDLES 31GX5/16\") 31G X 8 MM MISC 1 each by Does not apply route daily 100 each 3    insulin aspart (NOVOLOG FLEXPEN) 100 UNIT/ML injection pen Inject 0-12 Units into the skin 3 times daily (before meals) Per Medium Sliding Scale (Patient taking differently: Inject 0-12 Units into the skin 3 times daily (before meals) 3 times per day AC meals. BG less than 110 = 5 units, 110-150=7 units, 151-200=10units, 201-250 12 units, 251-300=14 unit, 301-350=16units, 351-400=18 unit) 5 pen 3    glucose blood VI test strips (ASCENSIA AUTODISC VI;ONE TOUCH ULTRA TEST VI) strip Advocate Redi-Code test strips. Tests once daily. 100 each 3    therapeutic multivitamin-minerals (THERAGRAN-M) tablet Take 1 tablet by mouth daily.         Semaglutide (OZEMPIC) 0.25 or 0.5 MG/DOSE SOPN Inject 0.25 mg into the skin once a week      albuterol (PROVENTIL) (2.5 MG/3ML) 0.083% nebulizer solution Take 3 mLs by Dispense: 90 tablet; Refill: 0    4. Other speech disturbances   Speech disturbance episode earlier this week that is now resolved. Obtain carotid Doppler ultrasound as well as MRI brain. - VL DUP CAROTID BILATERAL; Future    She is being evaluated next week by cardiology at [facility]. They voiced understanding. All questions answered. They agreed with treatment plan. See patient instructions for any educational materials that may have been given. Discussed use, benefit, and side effects of prescribed medications. Reviewed health maintenance. (Please note that portions of this note may have been completed with a voice recognition program.  Efforts were made to edit the dictation but occasionally words are mis-transcribed.)    Return in about 4 weeks (around 8/30/2019) for cognitive episodes. .      Electronically signed by Todd Galvan MD on 8/2/2019 at 10:07 AM

## 2019-08-07 ENCOUNTER — TELEPHONE (OUTPATIENT)
Dept: FAMILY MEDICINE CLINIC | Age: 69
End: 2019-08-07

## 2019-08-07 DIAGNOSIS — R06.02 SOB (SHORTNESS OF BREATH) ON EXERTION: Primary | ICD-10-CM

## 2019-08-07 DIAGNOSIS — I25.10 ASCVD (ARTERIOSCLEROTIC CARDIOVASCULAR DISEASE): ICD-10-CM

## 2019-08-09 ENCOUNTER — HOSPITAL ENCOUNTER (OUTPATIENT)
Age: 69
Discharge: HOME | End: 2019-08-09
Payer: MEDICARE

## 2019-08-09 DIAGNOSIS — E11.65 TYPE 2 DIABETES MELLITUS WITH HYPERGLYCEMIA, WITHOUT LONG-TERM CURRENT USE OF INSULIN (HCC): ICD-10-CM

## 2019-08-09 DIAGNOSIS — I25.10 ASCVD (ARTERIOSCLEROTIC CARDIOVASCULAR DISEASE): ICD-10-CM

## 2019-08-09 LAB
CREAT SERPL-MCNC: 0.5 MG/DL (ref 0.4–1.2)
GFR SERPL CREATININE-BSD FRML MDRD: > 90 ML/MIN/1.73M2

## 2019-08-09 PROCEDURE — 36415 COLL VENOUS BLD VENIPUNCTURE: CPT

## 2019-08-09 PROCEDURE — 82565 ASSAY OF CREATININE: CPT

## 2019-08-12 ENCOUNTER — HOSPITAL ENCOUNTER (OUTPATIENT)
Dept: MRI IMAGING | Age: 69
Discharge: HOME | End: 2019-08-12
Payer: MEDICARE

## 2019-08-12 ENCOUNTER — TELEPHONE (OUTPATIENT)
Dept: FAMILY MEDICINE CLINIC | Age: 69
End: 2019-08-12

## 2019-08-12 ENCOUNTER — HOSPITAL ENCOUNTER (OUTPATIENT)
Dept: INTERVENTIONAL RADIOLOGY/VASCULAR | Age: 69
Discharge: HOME | End: 2019-08-12
Payer: MEDICARE

## 2019-08-12 DIAGNOSIS — E11.65 TYPE 2 DIABETES MELLITUS WITH HYPERGLYCEMIA, WITHOUT LONG-TERM CURRENT USE OF INSULIN (HCC): ICD-10-CM

## 2019-08-12 DIAGNOSIS — I25.10 ASCVD (ARTERIOSCLEROTIC CARDIOVASCULAR DISEASE): ICD-10-CM

## 2019-08-12 DIAGNOSIS — R47.89 OTHER SPEECH DISTURBANCES: ICD-10-CM

## 2019-08-12 PROCEDURE — 93880 EXTRACRANIAL BILAT STUDY: CPT

## 2019-08-12 PROCEDURE — 6360000004 HC RX CONTRAST MEDICATION: Performed by: FAMILY MEDICINE

## 2019-08-12 PROCEDURE — A9579 GAD-BASE MR CONTRAST NOS,1ML: HCPCS | Performed by: FAMILY MEDICINE

## 2019-08-12 PROCEDURE — 70553 MRI BRAIN STEM W/O & W/DYE: CPT

## 2019-08-12 RX ORDER — HYDROCHLOROTHIAZIDE 25 MG/1
25 TABLET ORAL
COMMUNITY
Start: 2019-08-06 | End: 2019-08-13 | Stop reason: ALTCHOICE

## 2019-08-12 RX ADMIN — GADOTERIDOL 15 ML: 279.3 INJECTION, SOLUTION INTRAVENOUS at 10:03

## 2019-08-13 ENCOUNTER — HOSPITAL ENCOUNTER (OUTPATIENT)
Age: 69
Discharge: HOME | End: 2019-08-13
Payer: MEDICARE

## 2019-08-13 ENCOUNTER — OFFICE VISIT (OUTPATIENT)
Dept: SURGERY | Age: 69
End: 2019-08-13

## 2019-08-13 VITALS
TEMPERATURE: 99.1 F | OXYGEN SATURATION: 98 % | RESPIRATION RATE: 20 BRPM | WEIGHT: 191.9 LBS | DIASTOLIC BLOOD PRESSURE: 84 MMHG | SYSTOLIC BLOOD PRESSURE: 128 MMHG | HEIGHT: 63 IN | HEART RATE: 98 BPM | BODY MASS INDEX: 34 KG/M2

## 2019-08-13 DIAGNOSIS — Z51.89 VISIT FOR WOUND CHECK: Primary | ICD-10-CM

## 2019-08-13 LAB
ANION GAP SERPL CALCULATED.3IONS-SCNC: 15 MEQ/L (ref 8–16)
APTT: 34.5 SECONDS (ref 22–38)
BASOPHILS # BLD: 0.4 %
BASOPHILS ABSOLUTE: 0 THOU/MM3 (ref 0–0.1)
BUN BLDV-MCNC: 25 MG/DL (ref 7–22)
CALCIUM SERPL-MCNC: 10.2 MG/DL (ref 8.5–10.5)
CHLORIDE BLD-SCNC: 100 MEQ/L (ref 98–111)
CO2: 26 MEQ/L (ref 23–33)
CREAT SERPL-MCNC: 0.5 MG/DL (ref 0.4–1.2)
EOSINOPHIL # BLD: 2.8 %
EOSINOPHILS ABSOLUTE: 0.2 THOU/MM3 (ref 0–0.4)
ERYTHROCYTE [DISTWIDTH] IN BLOOD BY AUTOMATED COUNT: 12.8 % (ref 11.5–14.5)
ERYTHROCYTE [DISTWIDTH] IN BLOOD BY AUTOMATED COUNT: 41.9 FL (ref 35–45)
GFR SERPL CREATININE-BSD FRML MDRD: > 90 ML/MIN/1.73M2
GLUCOSE BLD-MCNC: 182 MG/DL (ref 70–108)
HCT VFR BLD CALC: 42.5 % (ref 37–47)
HEMOGLOBIN: 13.7 GM/DL (ref 12–16)
IMMATURE GRANS (ABS): 0.02 THOU/MM3 (ref 0–0.07)
IMMATURE GRANULOCYTES: 0 %
INR BLD: 1.02 (ref 0.85–1.13)
LYMPHOCYTES # BLD: 27.1 %
LYMPHOCYTES ABSOLUTE: 1.5 THOU/MM3 (ref 1–4.8)
MCH RBC QN AUTO: 29.2 PG (ref 26–33)
MCHC RBC AUTO-ENTMCNC: 32.2 GM/DL (ref 32.2–35.5)
MCV RBC AUTO: 90.6 FL (ref 81–99)
MONOCYTES # BLD: 9 %
MONOCYTES ABSOLUTE: 0.5 THOU/MM3 (ref 0.4–1.3)
NUCLEATED RED BLOOD CELLS: 0 /100 WBC
PLATELET # BLD: 199 THOU/MM3 (ref 130–400)
PMV BLD AUTO: 9.9 FL (ref 9.4–12.4)
POTASSIUM SERPL-SCNC: 4.6 MEQ/L (ref 3.5–5.2)
RBC # BLD: 4.69 MILL/MM3 (ref 4.2–5.4)
SEG NEUTROPHILS: 60.3 %
SEGMENTED NEUTROPHILS ABSOLUTE COUNT: 3.3 THOU/MM3 (ref 1.8–7.7)
SODIUM BLD-SCNC: 141 MEQ/L (ref 135–145)
WBC # BLD: 5.4 THOU/MM3 (ref 4.8–10.8)

## 2019-08-13 PROCEDURE — 99024 POSTOP FOLLOW-UP VISIT: CPT | Performed by: NURSE PRACTITIONER

## 2019-08-13 PROCEDURE — 85610 PROTHROMBIN TIME: CPT

## 2019-08-13 PROCEDURE — 36415 COLL VENOUS BLD VENIPUNCTURE: CPT

## 2019-08-13 PROCEDURE — 85730 THROMBOPLASTIN TIME PARTIAL: CPT

## 2019-08-13 PROCEDURE — 85025 COMPLETE CBC W/AUTO DIFF WBC: CPT

## 2019-08-13 PROCEDURE — 80048 BASIC METABOLIC PNL TOTAL CA: CPT

## 2019-08-13 RX ORDER — AMLODIPINE BESYLATE 10 MG/1
10 TABLET ORAL
COMMUNITY
Start: 2019-08-13 | End: 2019-08-30 | Stop reason: DRUGHIGH

## 2019-08-13 ASSESSMENT — ENCOUNTER SYMPTOMS
BLOOD IN STOOL: 0
COUGH: 0
COLOR CHANGE: 0
SINUS PRESSURE: 0
RECTAL PAIN: 0
VOICE CHANGE: 0
ABDOMINAL DISTENTION: 0
NAUSEA: 0
SORE THROAT: 0
ANAL BLEEDING: 0
SHORTNESS OF BREATH: 0
ABDOMINAL PAIN: 0
BACK PAIN: 0
WHEEZING: 0
EYE ITCHING: 0
FACIAL SWELLING: 0
EYE REDNESS: 0
CHEST TIGHTNESS: 0
CHOKING: 0
EYE PAIN: 0
DIARRHEA: 0
TROUBLE SWALLOWING: 0
VOMITING: 0
RHINORRHEA: 0
STRIDOR: 0
PHOTOPHOBIA: 0
APNEA: 0
CONSTIPATION: 0
EYE DISCHARGE: 0

## 2019-08-27 DIAGNOSIS — I10 ESSENTIAL HYPERTENSION: ICD-10-CM

## 2019-08-27 DIAGNOSIS — E11.65 TYPE 2 DIABETES MELLITUS WITH HYPERGLYCEMIA, WITHOUT LONG-TERM CURRENT USE OF INSULIN (HCC): ICD-10-CM

## 2019-08-27 RX ORDER — BENAZEPRIL HYDROCHLORIDE 20 MG/1
20 TABLET ORAL 2 TIMES DAILY
Qty: 60 TABLET | Refills: 0 | Status: SHIPPED | OUTPATIENT
Start: 2019-08-27 | End: 2019-09-19 | Stop reason: SDUPTHER

## 2019-08-27 NOTE — TELEPHONE ENCOUNTER
Bina Feng called requesting a refill on the following medications:  Requested Prescriptions     Pending Prescriptions Disp Refills    benazepril (LOTENSIN) 20 MG tablet 60 tablet 0     Sig: Take 1 tablet by mouth 2 times daily   pt will take a 40mg tab, or 2 20mg doesn't matter to her, took her last pill today    Pharmacy verified:  [facility]      Date of last visit: 08-2-19  Date of next visit (if applicable): 2/70/7835

## 2019-08-30 ENCOUNTER — OFFICE VISIT (OUTPATIENT)
Dept: FAMILY MEDICINE CLINIC | Age: 69
End: 2019-08-30
Payer: MEDICARE

## 2019-08-30 VITALS
HEIGHT: 63 IN | BODY MASS INDEX: 35.19 KG/M2 | DIASTOLIC BLOOD PRESSURE: 70 MMHG | OXYGEN SATURATION: 97 % | SYSTOLIC BLOOD PRESSURE: 120 MMHG | HEART RATE: 80 BPM | WEIGHT: 198.6 LBS

## 2019-08-30 DIAGNOSIS — R22.43 LOCALIZED SWELLING OF BOTH LOWER LEGS: ICD-10-CM

## 2019-08-30 DIAGNOSIS — R06.02 SHORTNESS OF BREATH ON EXERTION: Primary | ICD-10-CM

## 2019-08-30 DIAGNOSIS — I10 ESSENTIAL HYPERTENSION: ICD-10-CM

## 2019-08-30 DIAGNOSIS — R41.0 EPISODE OF CONFUSION: ICD-10-CM

## 2019-08-30 PROCEDURE — G8417 CALC BMI ABV UP PARAM F/U: HCPCS | Performed by: FAMILY MEDICINE

## 2019-08-30 PROCEDURE — 1123F ACP DISCUSS/DSCN MKR DOCD: CPT | Performed by: FAMILY MEDICINE

## 2019-08-30 PROCEDURE — 3014F SCREEN MAMMO DOC REV: CPT | Performed by: FAMILY MEDICINE

## 2019-08-30 PROCEDURE — 4040F PNEUMOC VAC/ADMIN/RCVD: CPT | Performed by: FAMILY MEDICINE

## 2019-08-30 PROCEDURE — 1036F TOBACCO NON-USER: CPT | Performed by: FAMILY MEDICINE

## 2019-08-30 PROCEDURE — G8427 DOCREV CUR MEDS BY ELIG CLIN: HCPCS | Performed by: FAMILY MEDICINE

## 2019-08-30 PROCEDURE — G8399 PT W/DXA RESULTS DOCUMENT: HCPCS | Performed by: FAMILY MEDICINE

## 2019-08-30 PROCEDURE — 3017F COLORECTAL CA SCREEN DOC REV: CPT | Performed by: FAMILY MEDICINE

## 2019-08-30 PROCEDURE — 99214 OFFICE O/P EST MOD 30 MIN: CPT | Performed by: FAMILY MEDICINE

## 2019-08-30 PROCEDURE — 1090F PRES/ABSN URINE INCON ASSESS: CPT | Performed by: FAMILY MEDICINE

## 2019-08-30 PROCEDURE — G8598 ASA/ANTIPLAT THER USED: HCPCS | Performed by: FAMILY MEDICINE

## 2019-08-30 RX ORDER — AMLODIPINE BESYLATE 10 MG/1
5 TABLET ORAL DAILY
Qty: 30 TABLET | Refills: 0 | Status: SHIPPED
Start: 2019-08-30 | End: 2019-11-04

## 2019-08-30 ASSESSMENT — ENCOUNTER SYMPTOMS
WHEEZING: 0
SHORTNESS OF BREATH: 1

## 2019-09-10 ENCOUNTER — HOSPITAL ENCOUNTER (OUTPATIENT)
Dept: PULMONOLOGY | Age: 69
Discharge: HOME | End: 2019-09-10
Payer: MEDICARE

## 2019-09-10 DIAGNOSIS — R06.02 SHORTNESS OF BREATH ON EXERTION: ICD-10-CM

## 2019-09-10 PROCEDURE — 94060 EVALUATION OF WHEEZING: CPT

## 2019-09-10 PROCEDURE — 2709999900 HC NON-CHARGEABLE SUPPLY

## 2019-09-10 PROCEDURE — 94729 DIFFUSING CAPACITY: CPT

## 2019-09-10 PROCEDURE — 94726 PLETHYSMOGRAPHY LUNG VOLUMES: CPT

## 2019-09-17 ENCOUNTER — HOSPITAL ENCOUNTER (OUTPATIENT)
Dept: WOMENS IMAGING | Age: 69
Discharge: HOME | End: 2019-09-17
Payer: MEDICARE

## 2019-09-17 DIAGNOSIS — C50.512 MALIGNANT NEOPLASM OF LOWER-OUTER QUADRANT OF LEFT BREAST OF FEMALE, ESTROGEN RECEPTOR POSITIVE (HCC): ICD-10-CM

## 2019-09-17 DIAGNOSIS — Z17.0 MALIGNANT NEOPLASM OF LOWER-OUTER QUADRANT OF LEFT BREAST OF FEMALE, ESTROGEN RECEPTOR POSITIVE (HCC): ICD-10-CM

## 2019-09-17 PROCEDURE — G0279 TOMOSYNTHESIS, MAMMO: HCPCS

## 2019-09-19 DIAGNOSIS — I10 ESSENTIAL HYPERTENSION: ICD-10-CM

## 2019-09-19 DIAGNOSIS — E11.65 TYPE 2 DIABETES MELLITUS WITH HYPERGLYCEMIA, WITHOUT LONG-TERM CURRENT USE OF INSULIN (HCC): ICD-10-CM

## 2019-09-19 RX ORDER — BENAZEPRIL HYDROCHLORIDE 20 MG/1
20 TABLET ORAL 2 TIMES DAILY
Qty: 60 TABLET | Refills: 0 | Status: SHIPPED | OUTPATIENT
Start: 2019-09-19 | End: 2019-10-24 | Stop reason: SDUPTHER

## 2019-10-24 DIAGNOSIS — I10 ESSENTIAL HYPERTENSION: ICD-10-CM

## 2019-10-24 DIAGNOSIS — E11.65 TYPE 2 DIABETES MELLITUS WITH HYPERGLYCEMIA, WITHOUT LONG-TERM CURRENT USE OF INSULIN (HCC): ICD-10-CM

## 2019-10-24 DIAGNOSIS — I25.10 ASCVD (ARTERIOSCLEROTIC CARDIOVASCULAR DISEASE): ICD-10-CM

## 2019-10-24 RX ORDER — ATORVASTATIN CALCIUM 20 MG/1
20 TABLET, FILM COATED ORAL NIGHTLY
Qty: 90 TABLET | Refills: 0 | Status: SHIPPED | OUTPATIENT
Start: 2019-10-24 | End: 2019-11-04 | Stop reason: SDUPTHER

## 2019-10-24 RX ORDER — BENAZEPRIL HYDROCHLORIDE 20 MG/1
20 TABLET ORAL 2 TIMES DAILY
Qty: 60 TABLET | Refills: 0 | Status: SHIPPED | OUTPATIENT
Start: 2019-10-24 | End: 2019-11-04 | Stop reason: SDUPTHER

## 2019-10-25 RX ORDER — ANASTROZOLE 1 MG/1
1 TABLET ORAL DAILY
Qty: 30 TABLET | Refills: 3 | Status: SHIPPED | OUTPATIENT
Start: 2019-10-25 | End: 2019-10-30 | Stop reason: SDUPTHER

## 2019-10-30 ENCOUNTER — HOSPITAL ENCOUNTER (OUTPATIENT)
Dept: INFUSION THERAPY | Age: 69
Discharge: HOME | End: 2019-10-30
Payer: MEDICARE

## 2019-10-30 ENCOUNTER — OFFICE VISIT (OUTPATIENT)
Dept: ONCOLOGY | Age: 69
End: 2019-10-30
Payer: MEDICARE

## 2019-10-30 VITALS
WEIGHT: 198.4 LBS | OXYGEN SATURATION: 94 % | HEIGHT: 63 IN | RESPIRATION RATE: 18 BRPM | TEMPERATURE: 99.1 F | SYSTOLIC BLOOD PRESSURE: 125 MMHG | HEART RATE: 82 BPM | DIASTOLIC BLOOD PRESSURE: 70 MMHG | BODY MASS INDEX: 35.15 KG/M2

## 2019-10-30 VITALS
HEIGHT: 62 IN | WEIGHT: 198.4 LBS | OXYGEN SATURATION: 94 % | SYSTOLIC BLOOD PRESSURE: 125 MMHG | HEART RATE: 82 BPM | RESPIRATION RATE: 18 BRPM | TEMPERATURE: 99.1 F | BODY MASS INDEX: 36.51 KG/M2 | DIASTOLIC BLOOD PRESSURE: 70 MMHG

## 2019-10-30 DIAGNOSIS — M81.8 OTHER OSTEOPOROSIS WITHOUT CURRENT PATHOLOGICAL FRACTURE: ICD-10-CM

## 2019-10-30 DIAGNOSIS — Z98.890 STATUS POST LEFT BREAST LUMPECTOMY: ICD-10-CM

## 2019-10-30 DIAGNOSIS — C50.512 MALIGNANT NEOPLASM OF LOWER-OUTER QUADRANT OF LEFT BREAST OF FEMALE, ESTROGEN RECEPTOR POSITIVE (HCC): Primary | ICD-10-CM

## 2019-10-30 DIAGNOSIS — Z79.811 PROPHYLACTIC USE OF ANASTROZOLE (ARIMIDEX): ICD-10-CM

## 2019-10-30 DIAGNOSIS — C50.412 MALIGNANT NEOPLASM OF UPPER-OUTER QUADRANT OF LEFT BREAST IN FEMALE, ESTROGEN RECEPTOR POSITIVE (HCC): ICD-10-CM

## 2019-10-30 DIAGNOSIS — Z08 ENCOUNTER FOR FOLLOW-UP SURVEILLANCE OF BREAST CANCER: ICD-10-CM

## 2019-10-30 DIAGNOSIS — Z17.0 MALIGNANT NEOPLASM OF UPPER-OUTER QUADRANT OF LEFT BREAST IN FEMALE, ESTROGEN RECEPTOR POSITIVE (HCC): ICD-10-CM

## 2019-10-30 DIAGNOSIS — Z17.0 MALIGNANT NEOPLASM OF LOWER-OUTER QUADRANT OF LEFT BREAST OF FEMALE, ESTROGEN RECEPTOR POSITIVE (HCC): Primary | ICD-10-CM

## 2019-10-30 DIAGNOSIS — M81.8 OTHER OSTEOPOROSIS WITHOUT CURRENT PATHOLOGICAL FRACTURE: Primary | ICD-10-CM

## 2019-10-30 DIAGNOSIS — Z85.3 ENCOUNTER FOR FOLLOW-UP SURVEILLANCE OF BREAST CANCER: ICD-10-CM

## 2019-10-30 PROCEDURE — 99211 OFF/OP EST MAY X REQ PHY/QHP: CPT

## 2019-10-30 PROCEDURE — 1090F PRES/ABSN URINE INCON ASSESS: CPT | Performed by: INTERNAL MEDICINE

## 2019-10-30 PROCEDURE — 1036F TOBACCO NON-USER: CPT | Performed by: INTERNAL MEDICINE

## 2019-10-30 PROCEDURE — 1123F ACP DISCUSS/DSCN MKR DOCD: CPT | Performed by: INTERNAL MEDICINE

## 2019-10-30 PROCEDURE — G8427 DOCREV CUR MEDS BY ELIG CLIN: HCPCS | Performed by: INTERNAL MEDICINE

## 2019-10-30 PROCEDURE — G8598 ASA/ANTIPLAT THER USED: HCPCS | Performed by: INTERNAL MEDICINE

## 2019-10-30 PROCEDURE — G8399 PT W/DXA RESULTS DOCUMENT: HCPCS | Performed by: INTERNAL MEDICINE

## 2019-10-30 PROCEDURE — 3017F COLORECTAL CA SCREEN DOC REV: CPT | Performed by: INTERNAL MEDICINE

## 2019-10-30 PROCEDURE — 96365 THER/PROPH/DIAG IV INF INIT: CPT

## 2019-10-30 PROCEDURE — G9899 SCRN MAM PERF RSLTS DOC: HCPCS | Performed by: INTERNAL MEDICINE

## 2019-10-30 PROCEDURE — 4040F PNEUMOC VAC/ADMIN/RCVD: CPT | Performed by: INTERNAL MEDICINE

## 2019-10-30 PROCEDURE — G8484 FLU IMMUNIZE NO ADMIN: HCPCS | Performed by: INTERNAL MEDICINE

## 2019-10-30 PROCEDURE — 6360000002 HC RX W HCPCS: Performed by: INTERNAL MEDICINE

## 2019-10-30 PROCEDURE — G8417 CALC BMI ABV UP PARAM F/U: HCPCS | Performed by: INTERNAL MEDICINE

## 2019-10-30 PROCEDURE — 2580000003 HC RX 258: Performed by: INTERNAL MEDICINE

## 2019-10-30 PROCEDURE — 99214 OFFICE O/P EST MOD 30 MIN: CPT | Performed by: INTERNAL MEDICINE

## 2019-10-30 RX ORDER — HEPARIN SODIUM (PORCINE) LOCK FLUSH IV SOLN 100 UNIT/ML 100 UNIT/ML
500 SOLUTION INTRAVENOUS PRN
Status: CANCELLED | OUTPATIENT
Start: 2019-10-30

## 2019-10-30 RX ORDER — 0.9 % SODIUM CHLORIDE 0.9 %
250 INTRAVENOUS SOLUTION INTRAVENOUS ONCE
Status: COMPLETED | OUTPATIENT
Start: 2019-10-30 | End: 2019-10-30

## 2019-10-30 RX ORDER — ZOLEDRONIC ACID 5 MG/100ML
5 INJECTION, SOLUTION INTRAVENOUS ONCE
Status: CANCELLED | OUTPATIENT
Start: 2019-10-30

## 2019-10-30 RX ORDER — HEPARIN SODIUM (PORCINE) LOCK FLUSH IV SOLN 100 UNIT/ML 100 UNIT/ML
500 SOLUTION INTRAVENOUS PRN
Status: CANCELLED | OUTPATIENT
Start: 2019-11-01

## 2019-10-30 RX ORDER — SODIUM CHLORIDE 0.9 % (FLUSH) 0.9 %
10 SYRINGE (ML) INJECTION PRN
Status: CANCELLED | OUTPATIENT
Start: 2019-10-30

## 2019-10-30 RX ORDER — ZOLEDRONIC ACID 5 MG/100ML
5 INJECTION, SOLUTION INTRAVENOUS ONCE
Status: COMPLETED | OUTPATIENT
Start: 2019-10-30 | End: 2019-10-30

## 2019-10-30 RX ORDER — METHYLPREDNISOLONE SODIUM SUCCINATE 125 MG/2ML
125 INJECTION, POWDER, LYOPHILIZED, FOR SOLUTION INTRAMUSCULAR; INTRAVENOUS ONCE
Status: CANCELLED | OUTPATIENT
Start: 2019-11-01

## 2019-10-30 RX ORDER — SODIUM CHLORIDE 9 MG/ML
INJECTION, SOLUTION INTRAVENOUS CONTINUOUS
Status: CANCELLED | OUTPATIENT
Start: 2019-10-30

## 2019-10-30 RX ORDER — SODIUM CHLORIDE 0.9 % (FLUSH) 0.9 %
5 SYRINGE (ML) INJECTION PRN
Status: CANCELLED | OUTPATIENT
Start: 2019-10-30

## 2019-10-30 RX ORDER — METHYLPREDNISOLONE SODIUM SUCCINATE 125 MG/2ML
125 INJECTION, POWDER, LYOPHILIZED, FOR SOLUTION INTRAMUSCULAR; INTRAVENOUS ONCE
Status: CANCELLED | OUTPATIENT
Start: 2019-10-30

## 2019-10-30 RX ORDER — SODIUM CHLORIDE 9 MG/ML
INJECTION, SOLUTION INTRAVENOUS CONTINUOUS
Status: CANCELLED | OUTPATIENT
Start: 2019-11-01

## 2019-10-30 RX ORDER — ANASTROZOLE 1 MG/1
1 TABLET ORAL DAILY
Qty: 90 TABLET | Refills: 3 | Status: SHIPPED | OUTPATIENT
Start: 2019-10-30 | End: 2020-05-27 | Stop reason: SDUPTHER

## 2019-10-30 RX ORDER — EPINEPHRINE 1 MG/ML
0.3 INJECTION, SOLUTION, CONCENTRATE INTRAVENOUS PRN
Status: CANCELLED | OUTPATIENT
Start: 2019-10-30

## 2019-10-30 RX ORDER — 0.9 % SODIUM CHLORIDE 0.9 %
250 INTRAVENOUS SOLUTION INTRAVENOUS ONCE
Status: CANCELLED
Start: 2019-11-01

## 2019-10-30 RX ORDER — 0.9 % SODIUM CHLORIDE 0.9 %
250 INTRAVENOUS SOLUTION INTRAVENOUS ONCE
Status: CANCELLED
Start: 2019-10-30

## 2019-10-30 RX ORDER — ZOLEDRONIC ACID 5 MG/100ML
5 INJECTION, SOLUTION INTRAVENOUS ONCE
Status: CANCELLED | OUTPATIENT
Start: 2019-11-01

## 2019-10-30 RX ORDER — DIPHENHYDRAMINE HYDROCHLORIDE 50 MG/ML
50 INJECTION INTRAMUSCULAR; INTRAVENOUS ONCE
Status: CANCELLED | OUTPATIENT
Start: 2019-10-30

## 2019-10-30 RX ORDER — SODIUM CHLORIDE 0.9 % (FLUSH) 0.9 %
5 SYRINGE (ML) INJECTION PRN
Status: CANCELLED | OUTPATIENT
Start: 2019-11-01

## 2019-10-30 RX ORDER — SODIUM CHLORIDE 0.9 % (FLUSH) 0.9 %
10 SYRINGE (ML) INJECTION PRN
Status: CANCELLED | OUTPATIENT
Start: 2019-11-01

## 2019-10-30 RX ORDER — DIPHENHYDRAMINE HYDROCHLORIDE 50 MG/ML
50 INJECTION INTRAMUSCULAR; INTRAVENOUS ONCE
Status: CANCELLED | OUTPATIENT
Start: 2019-11-01

## 2019-10-30 RX ADMIN — SODIUM CHLORIDE 250 ML: 9 INJECTION, SOLUTION INTRAVENOUS at 09:24

## 2019-10-30 RX ADMIN — ZOLEDRONIC ACID 5 MG: 5 INJECTION, SOLUTION INTRAVENOUS at 09:37

## 2019-10-30 ASSESSMENT — ENCOUNTER SYMPTOMS
BLOOD IN STOOL: 0
BACK PAIN: 0
COLOR CHANGE: 0
DIARRHEA: 0
WHEEZING: 0
CHEST TIGHTNESS: 0
COUGH: 0
SORE THROAT: 0
RECTAL PAIN: 0
SHORTNESS OF BREATH: 0
ABDOMINAL PAIN: 0
ABDOMINAL DISTENTION: 0
FACIAL SWELLING: 0
VOMITING: 0
EYE DISCHARGE: 0
NAUSEA: 0
CONSTIPATION: 0
TROUBLE SWALLOWING: 0

## 2019-10-30 NOTE — PROGRESS NOTES
Patient tolerated Reclast IV infusion without any complications. Denies dizziness, lightheadedness, acute nausea or vomiting, headache, chest pain/pressure, rash/itching, or an increase in SOB. Last vital signs:   /70   Pulse 82   Temp 99.1 °F (37.3 °C) (Oral)   Resp 18   Ht 5' 2\" (1.575 m)   Wt 198 lb 6.4 oz (90 kg)   SpO2 94%   BMI 36.29 kg/m²     Patient instructed if they experience any of the above symptoms following today's visit she is to notify the physician immediately or go to the emergency department. Discharge instructions given to patient. Verbalizes understanding. Ambulated off unit per self in stable condition with all belongings.

## 2019-10-30 NOTE — PLAN OF CARE
Problem: Intellectual/Education/Knowledge Deficit  Goal: Teaching initiated upon admission  Outcome: Met This Shift  Note:   Patient verbalizes understanding to verbal information given on Reclast IV infusion,action and possible side effects. Aware to call MD if develop complications. Goal: Written Disposition Instruction form completed  Outcome: Completed  Intervention: Verbal/written education provided  Note:   Discuss understanding to verbal information given on Reclast IV infusion. Problem: Discharge Planning  Goal: Knowledge of discharge instructions  Description  Knowledge of discharge instructions     Outcome: Met This Shift  Note:   Verbalize understanding of discharge instructions, follow up appointments, and when to call Physician. Intervention: Interaction with patient/family and care team  Note:   Discuss understanding of discharge instructions, follow up appointments and when to call Physician. Problem: Musculor/Skeletal Functional Status  Goal: Absence of falls  Outcome: Met This Shift  Note:   Free from falls while in [facility] Oncology. Intervention: Fall precautions  Note:   Discussed the need to use the call light for assistance when getting up to ambulate. Care plan reviewed with patient. Patient verbalize understanding of the plan of care and contribute to goal setting.

## 2019-11-04 ENCOUNTER — OFFICE VISIT (OUTPATIENT)
Dept: FAMILY MEDICINE CLINIC | Age: 69
End: 2019-11-04
Payer: MEDICARE

## 2019-11-04 VITALS
OXYGEN SATURATION: 96 % | BODY MASS INDEX: 35.15 KG/M2 | HEART RATE: 74 BPM | DIASTOLIC BLOOD PRESSURE: 82 MMHG | WEIGHT: 198.4 LBS | HEIGHT: 63 IN | SYSTOLIC BLOOD PRESSURE: 138 MMHG

## 2019-11-04 DIAGNOSIS — E78.00 PURE HYPERCHOLESTEROLEMIA: ICD-10-CM

## 2019-11-04 DIAGNOSIS — E11.65 TYPE 2 DIABETES MELLITUS WITH HYPERGLYCEMIA, WITHOUT LONG-TERM CURRENT USE OF INSULIN (HCC): ICD-10-CM

## 2019-11-04 DIAGNOSIS — M25.562 CHRONIC PAIN OF LEFT KNEE: ICD-10-CM

## 2019-11-04 DIAGNOSIS — I25.10 ASCVD (ARTERIOSCLEROTIC CARDIOVASCULAR DISEASE): ICD-10-CM

## 2019-11-04 DIAGNOSIS — I10 ESSENTIAL HYPERTENSION: Primary | ICD-10-CM

## 2019-11-04 DIAGNOSIS — G89.29 CHRONIC PAIN OF LEFT KNEE: ICD-10-CM

## 2019-11-04 DIAGNOSIS — M17.12 ARTHRITIS OF KNEE, LEFT: ICD-10-CM

## 2019-11-04 DIAGNOSIS — Z23 VARICELLA VACCINE: ICD-10-CM

## 2019-11-04 PROCEDURE — G8484 FLU IMMUNIZE NO ADMIN: HCPCS | Performed by: FAMILY MEDICINE

## 2019-11-04 PROCEDURE — 3017F COLORECTAL CA SCREEN DOC REV: CPT | Performed by: FAMILY MEDICINE

## 2019-11-04 PROCEDURE — G8399 PT W/DXA RESULTS DOCUMENT: HCPCS | Performed by: FAMILY MEDICINE

## 2019-11-04 PROCEDURE — G9899 SCRN MAM PERF RSLTS DOC: HCPCS | Performed by: FAMILY MEDICINE

## 2019-11-04 PROCEDURE — G8598 ASA/ANTIPLAT THER USED: HCPCS | Performed by: FAMILY MEDICINE

## 2019-11-04 PROCEDURE — G8427 DOCREV CUR MEDS BY ELIG CLIN: HCPCS | Performed by: FAMILY MEDICINE

## 2019-11-04 PROCEDURE — G8417 CALC BMI ABV UP PARAM F/U: HCPCS | Performed by: FAMILY MEDICINE

## 2019-11-04 PROCEDURE — 99214 OFFICE O/P EST MOD 30 MIN: CPT | Performed by: FAMILY MEDICINE

## 2019-11-04 PROCEDURE — 2022F DILAT RTA XM EVC RTNOPTHY: CPT | Performed by: FAMILY MEDICINE

## 2019-11-04 PROCEDURE — 4040F PNEUMOC VAC/ADMIN/RCVD: CPT | Performed by: FAMILY MEDICINE

## 2019-11-04 PROCEDURE — 1090F PRES/ABSN URINE INCON ASSESS: CPT | Performed by: FAMILY MEDICINE

## 2019-11-04 PROCEDURE — 1123F ACP DISCUSS/DSCN MKR DOCD: CPT | Performed by: FAMILY MEDICINE

## 2019-11-04 PROCEDURE — 1036F TOBACCO NON-USER: CPT | Performed by: FAMILY MEDICINE

## 2019-11-04 RX ORDER — FUROSEMIDE 20 MG/1
TABLET ORAL
COMMUNITY
Start: 2019-10-24

## 2019-11-04 RX ORDER — BENAZEPRIL HYDROCHLORIDE 20 MG/1
20 TABLET ORAL 2 TIMES DAILY
Qty: 180 TABLET | Refills: 1 | Status: SHIPPED | OUTPATIENT
Start: 2019-11-04 | End: 2020-05-05 | Stop reason: SDUPTHER

## 2019-11-04 RX ORDER — IBUPROFEN 600 MG/1
600 TABLET ORAL 2 TIMES DAILY PRN
Qty: 180 TABLET | Refills: 1 | Status: SHIPPED | OUTPATIENT
Start: 2019-11-04 | End: 2020-01-16 | Stop reason: SDUPTHER

## 2019-11-04 RX ORDER — ATORVASTATIN CALCIUM 20 MG/1
20 TABLET, FILM COATED ORAL NIGHTLY
Qty: 90 TABLET | Refills: 1 | Status: SHIPPED | OUTPATIENT
Start: 2019-11-04 | End: 2020-01-16 | Stop reason: SDUPTHER

## 2019-11-04 ASSESSMENT — ENCOUNTER SYMPTOMS
WHEEZING: 0
SHORTNESS OF BREATH: 0

## 2019-11-13 ENCOUNTER — HOSPITAL ENCOUNTER (OUTPATIENT)
Age: 69
Discharge: HOME | End: 2019-11-13
Payer: MEDICARE

## 2019-11-13 DIAGNOSIS — E78.00 PURE HYPERCHOLESTEROLEMIA: ICD-10-CM

## 2019-11-13 DIAGNOSIS — Z23 VARICELLA VACCINE: ICD-10-CM

## 2019-11-13 LAB
CHOLESTEROL, TOTAL: 134 MG/DL (ref 100–199)
HDLC SERPL-MCNC: 44 MG/DL
LDL CHOLESTEROL CALCULATED: 65 MG/DL
TRIGL SERPL-MCNC: 125 MG/DL (ref 0–199)

## 2019-11-13 PROCEDURE — 36415 COLL VENOUS BLD VENIPUNCTURE: CPT

## 2019-11-13 PROCEDURE — 86787 VARICELLA-ZOSTER ANTIBODY: CPT

## 2019-11-13 PROCEDURE — 80061 LIPID PANEL: CPT

## 2019-11-15 LAB — VZV IGG SER QL IA: 3.21

## 2019-11-18 ENCOUNTER — PROCEDURE VISIT (OUTPATIENT)
Dept: FAMILY MEDICINE CLINIC | Age: 69
End: 2019-11-18
Payer: MEDICARE

## 2019-11-18 VITALS
DIASTOLIC BLOOD PRESSURE: 82 MMHG | SYSTOLIC BLOOD PRESSURE: 128 MMHG | WEIGHT: 201 LBS | BODY MASS INDEX: 35.61 KG/M2 | HEART RATE: 83 BPM | HEIGHT: 63 IN

## 2019-11-18 DIAGNOSIS — M25.562 CHRONIC PAIN OF LEFT KNEE: ICD-10-CM

## 2019-11-18 DIAGNOSIS — G89.29 CHRONIC PAIN OF LEFT KNEE: ICD-10-CM

## 2019-11-18 DIAGNOSIS — M17.12 LOCALIZED OSTEOARTHRITIS OF LEFT KNEE: Primary | ICD-10-CM

## 2019-11-18 DIAGNOSIS — E11.9 TYPE 2 DIABETES MELLITUS WITHOUT COMPLICATION, WITHOUT LONG-TERM CURRENT USE OF INSULIN (HCC): ICD-10-CM

## 2019-11-18 LAB
CREATININE URINE POCT: NORMAL
MICROALBUMIN/CREAT 24H UR: NORMAL MG/G{CREAT}
MICROALBUMIN/CREAT UR-RTO: NORMAL

## 2019-11-18 PROCEDURE — 20610 DRAIN/INJ JOINT/BURSA W/O US: CPT | Performed by: FAMILY MEDICINE

## 2019-11-18 PROCEDURE — 82044 UR ALBUMIN SEMIQUANTITATIVE: CPT | Performed by: FAMILY MEDICINE

## 2019-11-18 RX ORDER — BUPIVACAINE HYDROCHLORIDE 5 MG/ML
4 INJECTION, SOLUTION PERINEURAL ONCE
Status: COMPLETED | OUTPATIENT
Start: 2019-11-18 | End: 2019-11-18

## 2019-11-18 RX ORDER — LIDOCAINE HYDROCHLORIDE 10 MG/ML
4 INJECTION, SOLUTION INFILTRATION; PERINEURAL ONCE
Status: COMPLETED | OUTPATIENT
Start: 2019-11-18 | End: 2019-11-18

## 2019-11-18 RX ORDER — METHYLPREDNISOLONE ACETATE 80 MG/ML
80 INJECTION, SUSPENSION INTRA-ARTICULAR; INTRALESIONAL; INTRAMUSCULAR; SOFT TISSUE ONCE
Status: COMPLETED | OUTPATIENT
Start: 2019-11-18 | End: 2019-11-18

## 2019-11-18 RX ADMIN — METHYLPREDNISOLONE ACETATE 80 MG: 80 INJECTION, SUSPENSION INTRA-ARTICULAR; INTRALESIONAL; INTRAMUSCULAR; SOFT TISSUE at 09:06

## 2019-11-18 RX ADMIN — BUPIVACAINE HYDROCHLORIDE 20 MG: 5 INJECTION, SOLUTION PERINEURAL at 09:05

## 2019-11-18 RX ADMIN — LIDOCAINE HYDROCHLORIDE 4 ML: 10 INJECTION, SOLUTION INFILTRATION; PERINEURAL at 09:06

## 2019-11-19 ENCOUNTER — TELEPHONE (OUTPATIENT)
Dept: FAMILY MEDICINE CLINIC | Age: 69
End: 2019-11-19

## 2020-01-16 RX ORDER — ATORVASTATIN CALCIUM 20 MG/1
20 TABLET, FILM COATED ORAL NIGHTLY
Qty: 90 TABLET | Refills: 1 | Status: SHIPPED | OUTPATIENT
Start: 2020-01-16 | End: 2020-06-16 | Stop reason: SDUPTHER

## 2020-01-16 RX ORDER — IBUPROFEN 600 MG/1
600 TABLET ORAL 2 TIMES DAILY PRN
Qty: 180 TABLET | Refills: 1 | Status: SHIPPED | OUTPATIENT
Start: 2020-01-16 | End: 2020-06-16 | Stop reason: SDUPTHER

## 2020-01-21 ENCOUNTER — TELEPHONE (OUTPATIENT)
Dept: SURGERY | Age: 70
End: 2020-01-21

## 2020-01-21 NOTE — TELEPHONE ENCOUNTER
Patient s/p- Left sentinel lymph node biopsy, Lumpectomy, left breast 10/10/2018 stated she had 3 lymph nodes removed. She is calling because she is having carpal tunnel surgery on 2/11/20 on her left side and she is wondering if having this done could trigger lymphedema in that arm. Please advise.

## 2020-01-23 ENCOUNTER — HOSPITAL ENCOUNTER (OUTPATIENT)
Dept: MRI IMAGING | Age: 70
Discharge: HOME | End: 2020-01-23
Payer: MEDICARE

## 2020-01-23 ENCOUNTER — HOSPITAL ENCOUNTER (OUTPATIENT)
Dept: MRI IMAGING | Age: 70
End: 2020-01-23
Payer: MEDICARE

## 2020-01-23 PROCEDURE — 73221 MRI JOINT UPR EXTREM W/O DYE: CPT

## 2020-01-23 PROCEDURE — 72141 MRI NECK SPINE W/O DYE: CPT

## 2020-02-04 ENCOUNTER — HOSPITAL ENCOUNTER (OUTPATIENT)
Dept: OCCUPATIONAL THERAPY | Age: 70
Setting detail: THERAPIES SERIES
Discharge: HOME | End: 2020-02-04
Payer: MEDICARE

## 2020-02-04 PROCEDURE — 97165 OT EVAL LOW COMPLEX 30 MIN: CPT

## 2020-02-04 PROCEDURE — 97110 THERAPEUTIC EXERCISES: CPT

## 2020-02-04 ASSESSMENT — PAIN SCALES - GENERAL: PAINLEVEL_OUTOF10: 4

## 2020-02-04 ASSESSMENT — PAIN DESCRIPTION - ORIENTATION: ORIENTATION: LEFT

## 2020-02-04 ASSESSMENT — PAIN DESCRIPTION - LOCATION: LOCATION: SHOULDER

## 2020-02-04 NOTE — FLOWSHEET NOTE
** PLEASE SIGN, DATE AND TIME CERTIFICATION BELOW AND RETURN TO [facility] OUTPATIENT REHABILITATION (FAX #: [phone]). ATTEST/CO-SIGN IF ACCESSING VIA INPage[employer]zer. THANK YOU.**    I certify that I have examined the patient below and determined that Physical Medicine and Rehabilitation service is necessary and that I approve the established plan of care for up to 90 days or as specifically noted. Attestation, signature or co-signature of physician indicates approval of certification requirements.    ________________________ ____________ __________  Physician Signature   Date   Time    El 53    Time In: 7973  Time Out:  [place]  Minutes: 60  Timed Code Treatment Minutes: 30 Minutes        UEFS Score: 43.75  UEFS = 35    Date: 2020  Patient Name: Ashley Caal        CSN: [id number]     : 1950  (68 y.o.)  Gender: female   Referring Practitioner: Dr. Kun Juarez  Diagnosis: full thickness rotator cuff tear - left M75.122  Treatment Diagnosis: left shoulder pain  Additional Pertinent Hx: Patient relates that she has had pain in her left shoulder for \"years. \" States that she has had \"crunching\" in her left shoulder. States that she has had issues with her upper back for a while as well. Was told that she has a pinched nerve in her neck. WIll be having left CTR on  and right CTR on . Patient reports that ortho CNP ordered the MRI of her left shoulder due to patient complaining of left shoulder pain. Patient has been seeing Dr. Manny Fox for her nerve testing and he told her that she has a full thickness rotator cuff tear of her left shoulder (supraspinatus) and possible labral tear. Patient has not had injection in her left shoulder, but Dr. Manny Fox did mention it to her. Reviewed allergies and medications with patient and they are correct in EMR.  Comorbidities include hx of breast cancer, DM, HTN, adn osteoporosis which could interfere with rehab process. Colin Recinos  has a past medical history of Arthritis, Asthma, Brain cyst, Cancer ([facility] [address] (09/1918), Chronic sinus complaints, Diverticulosis of colon, DKA (diabetic ketoacidoses) ([facility] [address] (05/2018), Elevated TSH, Hypertension, Osteoarthritis, Polyneuropathy, PONV (postoperative nausea and vomiting), Spinal headache, and Type 2 diabetes mellitus ([facility] [address] (1990's). Colin Recinos  has a past surgical history that includes Tonsillectomy and adenoidectomy (age 4); Dilation and curettage of uterus; sinus surgery; Mandible fracture surgery; Cholecystectomy; Hysterectomy (1995); Knee arthroscopy (1967, 2001); joint replacement (Right, 2007); skin biopsy; eye surgery; bladder suspension; other surgical history (Right, 12/30/2015); pr office/outpt visit,procedure only (Left, 10/10/2018); ventral hernia repair (N/A, 6/14/2019); and hernia repair (06/18/2019). See Medical History Questionnaire for information related to allergies and medications. General:  OT Visit Information  Onset Date: 02/04/20  OT Insurance Information: Medicare - no visit limit; no hot/cold packs, no ionto  Total # of Visits to Date: 1  Certification Period Expiration Date: 03/31/20  Progress Note Counter: 1/10 for PN  Comments: Returns to office in March  Chart Reviewed: Yes    Restrictions/Precautions:       Position Activity Restriction  Other position/activity restrictions: history of left breast cancer, HTN, DM, osteoporosis, arthritis; left CTR on 2/11/20, right CTR 2/25/20         Subjective:  Subjective: Patient reports that she will be having bilateral CTR later this month.           Pain:  Pain Assessment  Patient Currently in Pain: Yes  Pain Assessment: 0-10  Pain Level: 4(4/10 at time of evaluation; relates that her pain goes close to 10/10 at times)  Pain Location: Shoulder  Pain Orientation: Left    Social/Functional History:    Lives With: Spouse             ADL Assistance: allow her to participate in normal daily tasks. Prognosis: Good  Clinical Decision Making: Clinical Decision making was of Low Complexity as the result of analysis of data from a problem focused assessment, a consideration of a limited number of treatment options, no significant comorbidities affecting the plan of care and no modification or assistance required to complete the evaluation. Patient Education:  Patient Education: OT POC, HEP - scapular retraction, backward shoulder circles, supine dowel for shoulder flexion and chest press; education regarding need to not guard left shoulder and posture improvement  Barriers to Learning: none          Treatment Initiated : Supine PROM to left shoulder in all planes; Completed 10 reps of following exercises: supine dowel for shoulder flexion and chest press, scapular retraction, backward shoulder circles; Education provided regarding need to not guard left shoulder and to allow left shoulder and scapula to relax. STM completed to left upper trapezius and rhomboid regions with tightness present and patient c/o pain. Plan:  Times per week: 2 x week  Plan weeks: 8 weeks  Current Treatment Recommendations: Strengthening, ROM, Pain Management, Self-Care / ADL, Patient/Caregiver Education & Training, Other (comment)(taping)  Plan Comment: POC established and discussed with patient  Specific instructions for Next Treatment: ROM, possible taping, STM, IASTM    Goals:  Patient goals : Less pain, Sleep more. Be as active as I used to be. Be able to paint    Short term goals  Time Frame for Short term goals: 4 weeks  Short term goal 1: Be independent with HEP as instructed to increase her ability to complete hair care. Short term goal 2: Increase active left shoulder flexion to 150, abduction to 145, and ER to 85 to increase ability to complete overhead tasks. Short term goal 3:  Increase passive left shoulder flexion to 165 and abduciton to 175 to increase flexibility to

## 2020-02-05 ENCOUNTER — HOSPITAL ENCOUNTER (OUTPATIENT)
Dept: OCCUPATIONAL THERAPY | Age: 70
Setting detail: THERAPIES SERIES
Discharge: HOME | End: 2020-02-05
Payer: MEDICARE

## 2020-02-05 PROCEDURE — 97110 THERAPEUTIC EXERCISES: CPT

## 2020-02-05 PROCEDURE — 97140 MANUAL THERAPY 1/> REGIONS: CPT

## 2020-02-05 ASSESSMENT — PAIN DESCRIPTION - LOCATION: LOCATION: SHOULDER

## 2020-02-05 ASSESSMENT — PAIN DESCRIPTION - ORIENTATION: ORIENTATION: LEFT

## 2020-02-05 ASSESSMENT — PAIN SCALES - GENERAL: PAINLEVEL_OUTOF10: 4

## 2020-02-10 ENCOUNTER — HOSPITAL ENCOUNTER (OUTPATIENT)
Dept: OCCUPATIONAL THERAPY | Age: 70
Setting detail: THERAPIES SERIES
Discharge: HOME | End: 2020-02-10
Payer: MEDICARE

## 2020-02-10 PROCEDURE — 97110 THERAPEUTIC EXERCISES: CPT

## 2020-02-10 ASSESSMENT — PAIN DESCRIPTION - LOCATION: LOCATION: SHOULDER

## 2020-02-10 ASSESSMENT — PAIN DESCRIPTION - ORIENTATION: ORIENTATION: LEFT

## 2020-02-10 ASSESSMENT — PAIN SCALES - GENERAL: PAINLEVEL_OUTOF10: 3

## 2020-02-10 NOTE — PROGRESS NOTES
[address] [address]  OUTPATIENT OCCUPATIONAL THERAPY  Daily Note  [facility].    Time In: 1491  Time Out: 1059  Minutes: 31  Timed Code Treatment Minutes: 31 Minutes                Date: 2/10/2020  Patient Name: Mayra Carvajal        CSN: [id number]   : 1950  (68 y.o.)  Gender: female   Referring Practitioner: Dr. Starr Grace  Diagnosis: full thickness rotator cuff tear - left M75.122          General:  OT Visit Information  Onset Date: 20  OT Insurance Information: Medicare - no visit limit; no hot/cold packs, no ionto  Total # of Visits to Date: 3  Certification Period Expiration Date: 20  Progress Note Counter: 3/10 for PN  Comments: Returns to office in March       Restrictions/Precautions:       Position Activity Restriction  Other position/activity restrictions: history of left breast cancer, HTN, DM, osteoporosis, arthritis; left CTR on 20, right CTR 20         Subjective:  Subjective: Pt. states her pain currently is \"always there\". Pt. states laying on her back and R side will cause discomfort and \"crunching\" while she is sleeping and trying to avoid laying on L side. Pt. states cleaning task around the house will increase discomfort. Pt. states she is really focusing on improving her posture and notes has her  keep an eye out on her. Pain:  Patient Currently in Pain: Yes  Pain Assessment: 0-10  Pain Level: 3  Pain Location: Shoulder  Pain Orientation: Left       Objective:     Upper Extremity Function  UE AROM: scapular retraction with hold x10 reps, backward circles x10 reps.  seated table slides with discussion of good posture with each rep x10 reps   UE Stretching: IASTM to left upper trapezius and scapular region with tightness present; relief was found and tightness decreased                              Additional Activities Comment  Additional Activities: Rock tape applied to left shoulder - Y strip on upper trapezius, Y strip

## 2020-02-18 ENCOUNTER — HOSPITAL ENCOUNTER (OUTPATIENT)
Dept: OCCUPATIONAL THERAPY | Age: 70
Setting detail: THERAPIES SERIES
Discharge: HOME | End: 2020-02-18
Payer: MEDICARE

## 2020-02-18 PROCEDURE — 97110 THERAPEUTIC EXERCISES: CPT

## 2020-02-18 PROCEDURE — 97140 MANUAL THERAPY 1/> REGIONS: CPT

## 2020-02-18 ASSESSMENT — PAIN DESCRIPTION - LOCATION: LOCATION: SHOULDER;NECK

## 2020-02-18 ASSESSMENT — PAIN DESCRIPTION - ORIENTATION: ORIENTATION: LEFT

## 2020-02-18 ASSESSMENT — PAIN SCALES - GENERAL: PAINLEVEL_OUTOF10: 3

## 2020-02-18 NOTE — PROGRESS NOTES
[address] [address]  OUTPATIENT OCCUPATIONAL THERAPY  Daily Note  [facility].    Time In: 0900  Time Out: 0930  Minutes: 30  Timed Code Treatment Minutes: 30 Minutes                Date: 2020  Patient Name: Kaiden Short        CSN: [id number]   : 1950  (68 y.o.)  Gender: female   Referring Practitioner: Dr. Dl Gerber  Diagnosis: full thickness rotator cuff tear - left M75.122          General:  OT Visit Information  Onset Date: 20  OT Insurance Information: Medicare - no visit limit; no hot/cold packs, no ionto  Certification Period Expiration Date: 20  Progress Note Counter: /10 for PN  Comments: Returns to office in March       Restrictions/Precautions:       Position Activity Restriction  Other position/activity restrictions: history of left breast cancer, HTN, DM, osteoporosis, arthritis; left CTR on 20, right CTR 20         Subjective:  Subjective: States that she did well with her left CTR and is having her right CTR done next Tuesday. States that she have noticed that she has more \"crunching\" in her left shoulder when she is moving her left shoulder. Patient asking about cortisone injection in her left shoulder. Pain:  Patient Currently in Pain: Yes  Pain Assessment: 0-10  Pain Level:  3(Reports pain at 3/10 at time of therapy; pain was at 5/10 in neck and left shoulder over the weeknd)  Pain Location: Shoulder, Neck  Pain Orientation: Left       Objective:     Upper Extremity Function  UE AROM: scapular retraction x 10 reps; backward shoulder circles x 10 reps  UE PROM: pulleys for shoulder flexion x 15 reps - reports no crepitus with pulleys  UE Stretching: STM to left rhomboid region and left scalene area - redness occurring indicating myofascial release and relates that her neck felt \"looser\" after the STM                             Additional Activities Comment  Additional Activities: Rock tape applied to left shoulder - Y strip on upper trapezius, Y strip around deltoid for pain management, I strip with 100% tension from anterior left shoulder to inferior angle of left scapula for postural improvement and GH alignment; Activity Tolerance: Additional Comments:  Tolerated treatment well    Assessment:  Assessment: Progressing toward goals slowly - did report decreased tightness in neck after STM completed    Patient Education:  Patient Education: to attempt to use towel roll in pillow to help with neck pain            Plan:  Plan Comment: Continue with POC as established  Specific instructions for Next Treatment: ROM, possible taping, STM, IASTM                   Willy Mcnulty, OTR/L #[id number]

## 2020-02-20 ENCOUNTER — HOSPITAL ENCOUNTER (OUTPATIENT)
Dept: OCCUPATIONAL THERAPY | Age: 70
Setting detail: THERAPIES SERIES
Discharge: HOME | End: 2020-02-20
Payer: MEDICARE

## 2020-02-20 PROCEDURE — 97140 MANUAL THERAPY 1/> REGIONS: CPT

## 2020-02-20 ASSESSMENT — PAIN SCALES - GENERAL: PAINLEVEL_OUTOF10: 3

## 2020-02-20 ASSESSMENT — PAIN DESCRIPTION - LOCATION: LOCATION: SHOULDER

## 2020-02-20 ASSESSMENT — PAIN DESCRIPTION - ORIENTATION: ORIENTATION: LEFT

## 2020-02-24 ENCOUNTER — HOSPITAL ENCOUNTER (OUTPATIENT)
Dept: OCCUPATIONAL THERAPY | Age: 70
Setting detail: THERAPIES SERIES
Discharge: HOME | End: 2020-02-24
Payer: MEDICARE

## 2020-02-24 PROCEDURE — 97110 THERAPEUTIC EXERCISES: CPT

## 2020-02-24 ASSESSMENT — PAIN SCALES - GENERAL: PAINLEVEL_OUTOF10: 3

## 2020-02-24 ASSESSMENT — PAIN DESCRIPTION - LOCATION: LOCATION: SHOULDER

## 2020-02-24 ASSESSMENT — PAIN DESCRIPTION - ORIENTATION: ORIENTATION: LEFT

## 2020-02-24 NOTE — PROGRESS NOTES
[facility]  OUTPATIENT OCCUPATIONAL THERAPY  Daily Note  [facility]    Time In: 0830  Time Out: 0900  Minutes: 30  Timed Code Treatment Minutes: 30 Minutes                Date: 2020  Patient Name: Madina Pérez        CSN: [id number]   : 1950  (68 y.o.)  Gender: female   Referring Practitioner: Dr. Damian Right  Diagnosis: full thickness rotator cuff tear - left M75.122          General:  OT Visit Information  Onset Date: 20  OT Insurance Information: Medicare - no visit limit; no hot/cold packs, no ionto  Total # of Visits to Date: 5  Certification Period Expiration Date: 20  Progress Note Counter: 5/10 for PN  Comments: Returns to office in March       Restrictions/Precautions:       Position Activity Restriction  Other position/activity restrictions: history of left breast cancer, HTN, DM, osteoporosis, arthritis; left CTR on 20, right CTR 20. Subjective:  Subjective: Palmer Cabot reports she is still waking up at night due to increased pain in the left shoulder traveling up the neck. Exercises are also significantly painful. Pain:  Patient Currently in Pain: Yes  Pain Assessment: 0-10  Pain Level: 3  Pain Location: Shoulder  Pain Orientation: Left       Objective:     Upper Extremity Function  UE AROM: Seated scapular retraction x 10 reps; backward shoulder circles x 10 reps. Chin tucks in supine x10 reps, shoulder depressors x5 reps (difficulty with technique). UE PROM: Pulleys for shoulder flexion 5 second hold x10 reps to warm up. Sidelying IASTM and manual massage to L shoulder girdle: upper trap, levator scap, AC joint, pec minor, lateral deltoid to decrease tightness. GH mobilizations in supine. UE Stretching: Upper trap stretch 10 second hold x5 reps. Activity Tolerance: Additional Comments:  Tolerated treatment well    Assessment:  Assessment: Progressing toward goals slowly - neck ROM did improve at end of session. Declined kinesiotape today due to having surgery tomorrow and slight skin irritation. Patient Education:  Patient Education: No changes to HEP.              Plan:  Plan Comment: Continue with POC as established                   CASEY Serrano COLVIN/L #[id number]

## 2020-03-03 ENCOUNTER — APPOINTMENT (OUTPATIENT)
Dept: OCCUPATIONAL THERAPY | Age: 70
End: 2020-03-03
Payer: MEDICARE

## 2020-03-05 ENCOUNTER — OFFICE VISIT (OUTPATIENT)
Dept: FAMILY MEDICINE CLINIC | Age: 70
End: 2020-03-05
Payer: MEDICARE

## 2020-03-05 ENCOUNTER — HOSPITAL ENCOUNTER (OUTPATIENT)
Dept: OCCUPATIONAL THERAPY | Age: 70
Setting detail: THERAPIES SERIES
Discharge: HOME | End: 2020-03-05
Payer: MEDICARE

## 2020-03-05 VITALS
HEART RATE: 80 BPM | HEIGHT: 63 IN | WEIGHT: 204.6 LBS | BODY MASS INDEX: 36.25 KG/M2 | SYSTOLIC BLOOD PRESSURE: 138 MMHG | DIASTOLIC BLOOD PRESSURE: 72 MMHG

## 2020-03-05 PROBLEM — M75.102 TEAR OF LEFT SUPRASPINATUS TENDON: Status: ACTIVE | Noted: 2020-03-05

## 2020-03-05 PROBLEM — M48.02 CERVICAL STENOSIS OF SPINAL CANAL: Status: ACTIVE | Noted: 2020-03-05

## 2020-03-05 PROCEDURE — 1090F PRES/ABSN URINE INCON ASSESS: CPT | Performed by: FAMILY MEDICINE

## 2020-03-05 PROCEDURE — 4040F PNEUMOC VAC/ADMIN/RCVD: CPT | Performed by: FAMILY MEDICINE

## 2020-03-05 PROCEDURE — G8427 DOCREV CUR MEDS BY ELIG CLIN: HCPCS | Performed by: FAMILY MEDICINE

## 2020-03-05 PROCEDURE — 97140 MANUAL THERAPY 1/> REGIONS: CPT

## 2020-03-05 PROCEDURE — G9899 SCRN MAM PERF RSLTS DOC: HCPCS | Performed by: FAMILY MEDICINE

## 2020-03-05 PROCEDURE — G8399 PT W/DXA RESULTS DOCUMENT: HCPCS | Performed by: FAMILY MEDICINE

## 2020-03-05 PROCEDURE — G8417 CALC BMI ABV UP PARAM F/U: HCPCS | Performed by: FAMILY MEDICINE

## 2020-03-05 PROCEDURE — 3017F COLORECTAL CA SCREEN DOC REV: CPT | Performed by: FAMILY MEDICINE

## 2020-03-05 PROCEDURE — 1036F TOBACCO NON-USER: CPT | Performed by: FAMILY MEDICINE

## 2020-03-05 PROCEDURE — G8484 FLU IMMUNIZE NO ADMIN: HCPCS | Performed by: FAMILY MEDICINE

## 2020-03-05 PROCEDURE — 1123F ACP DISCUSS/DSCN MKR DOCD: CPT | Performed by: FAMILY MEDICINE

## 2020-03-05 PROCEDURE — G8510 SCR DEP NEG, NO PLAN REQD: HCPCS | Performed by: FAMILY MEDICINE

## 2020-03-05 PROCEDURE — 20610 DRAIN/INJ JOINT/BURSA W/O US: CPT | Performed by: FAMILY MEDICINE

## 2020-03-05 PROCEDURE — 99214 OFFICE O/P EST MOD 30 MIN: CPT | Performed by: FAMILY MEDICINE

## 2020-03-05 PROCEDURE — G0283 ELEC STIM OTHER THAN WOUND: HCPCS

## 2020-03-05 RX ORDER — TRIAMCINOLONE ACETONIDE 40 MG/ML
40 INJECTION, SUSPENSION INTRA-ARTICULAR; INTRAMUSCULAR ONCE
Status: COMPLETED | OUTPATIENT
Start: 2020-03-05 | End: 2020-03-05

## 2020-03-05 RX ORDER — BUPIVACAINE HYDROCHLORIDE 5 MG/ML
2 INJECTION, SOLUTION PERINEURAL ONCE
Status: COMPLETED | OUTPATIENT
Start: 2020-03-05 | End: 2020-03-05

## 2020-03-05 RX ORDER — LIDOCAINE HYDROCHLORIDE 10 MG/ML
2 INJECTION, SOLUTION INFILTRATION; PERINEURAL ONCE
Status: COMPLETED | OUTPATIENT
Start: 2020-03-05 | End: 2020-03-05

## 2020-03-05 RX ADMIN — TRIAMCINOLONE ACETONIDE 40 MG: 40 INJECTION, SUSPENSION INTRA-ARTICULAR; INTRAMUSCULAR at 17:00

## 2020-03-05 RX ADMIN — BUPIVACAINE HYDROCHLORIDE 10 MG: 5 INJECTION, SOLUTION PERINEURAL at 16:58

## 2020-03-05 RX ADMIN — LIDOCAINE HYDROCHLORIDE 2 ML: 10 INJECTION, SOLUTION INFILTRATION; PERINEURAL at 16:59

## 2020-03-05 ASSESSMENT — PATIENT HEALTH QUESTIONNAIRE - PHQ9
SUM OF ALL RESPONSES TO PHQ9 QUESTIONS 1 & 2: 0
2. FEELING DOWN, DEPRESSED OR HOPELESS: 0
SUM OF ALL RESPONSES TO PHQ QUESTIONS 1-9: 0
1. LITTLE INTEREST OR PLEASURE IN DOING THINGS: 0
SUM OF ALL RESPONSES TO PHQ QUESTIONS 1-9: 0

## 2020-03-05 ASSESSMENT — PAIN DESCRIPTION - ORIENTATION: ORIENTATION: LEFT

## 2020-03-05 ASSESSMENT — PAIN DESCRIPTION - LOCATION: LOCATION: SHOULDER

## 2020-03-05 ASSESSMENT — PAIN SCALES - GENERAL: PAINLEVEL_OUTOF10: 3

## 2020-03-05 NOTE — PROGRESS NOTES
Topics    Alcohol use: Yes     Alcohol/week: 0.0 standard drinks     Comment: Socially      Current Outpatient Medications   Medication Sig Dispense Refill    atorvastatin (LIPITOR) 20 MG tablet Take 1 tablet by mouth nightly 90 tablet 1    ibuprofen (ADVIL;MOTRIN) 600 MG tablet Take 1 tablet by mouth 2 times daily as needed for Pain 180 tablet 1    furosemide (LASIX) 20 MG tablet       benazepril (LOTENSIN) 20 MG tablet Take 1 tablet by mouth 2 times daily 180 tablet 1    anastrozole (ARIMIDEX) 1 MG tablet Take 1 tablet by mouth daily 90 tablet 3    Handicap Placard MISC by Does not apply route Dx:  Shortness of breath on exertion; duration 3 years: exp date: 8/7/2022 1 each 0    EASY TOUCH PEN NEEDLES 31G X 6 MM MISC       LANTUS SOLOSTAR 100 UNIT/ML injection pen Inject 47 Units into the skin nightly      pantoprazole (PROTONIX) 40 MG tablet Take 40 mg by mouth daily      Calcium Carb-Cholecalciferol 600-500 MG-UNIT CAPS Take by mouth daily      zoledronic acid (ZOMETA) 4 MG/5ML injection Infuse 4 mg intravenously once      aspirin 81 MG chewable tablet Take 81 mg by mouth daily      Insulin Pen Needle (PEN NEEDLES 31GX5/16\") 31G X 8 MM MISC 1 each by Does not apply route daily 100 each 3    insulin aspart (NOVOLOG FLEXPEN) 100 UNIT/ML injection pen Inject 0-12 Units into the skin 3 times daily (before meals) Per Medium Sliding Scale (Patient taking differently: Inject 0-12 Units into the skin 3 times daily (before meals) 3 times per day AC meals. BG less than 110 = 5 units, 110-150=7 units, 151-200=10units, 201-250 12 units, 251-300=14 unit, 301-350=16units, 351-400=18 unit) 5 pen 3    albuterol (PROVENTIL) (2.5 MG/3ML) 0.083% nebulizer solution Take 3 mLs by nebulization every 4 hours as needed for Wheezing 1 Package 1    therapeutic multivitamin-minerals (THERAGRAN-M) tablet Take 1 tablet by mouth daily.         glucose blood VI test strips (ASCENSIA AUTODISC VI;ONE TOUCH ULTRA TEST VI) strip [employer] Redi-Code test strips. Tests once daily. 100 each 3     No current facility-administered medications for this visit. Allergies   Allergen Reactions    Amoxicillin     Donnatal [Pb-Hyoscy-Atropine-Scopolamine] Other (See Comments)     palpitations    Lovastatin Other (See Comments)     myalgia    Metformin And Related Diarrhea    Vioxx Other (See Comments)     Liver problems    Dilaudid [Hydromorphone Hcl] Nausea And Vomiting    Fentanyl Nausea And Vomiting     Severe Nausea and vomitting     Health Maintenance   Topic Date Due    Hepatitis B vaccine (1 of 3 - Risk 3-dose series) 03/12/1967    Shingles Vaccine (1 of 2) 03/12/1998    TSH testing  05/18/2019    Diabetic retinal exam  06/21/2019    A1C test (Diabetic or Prediabetic)  03/27/2020    Diabetic foot exam  05/06/2020    Annual Wellness Visit (AWV)  05/06/2020    Potassium monitoring  08/13/2020    Creatinine monitoring  08/13/2020    Breast cancer screen  09/17/2020    Lipid screen  11/13/2020    Diabetic microalbuminuria test  11/18/2020    Colon cancer screen colonoscopy  06/26/2022    DTaP/Tdap/Td vaccine (2 - Td) 04/25/2029    Flu vaccine  Completed    Pneumococcal 65+ years Vaccine  Completed    Hepatitis C screen  Completed    DEXA (modify frequency per FRAX score)  Addressed    Hepatitis A vaccine  Aged Out    Hib vaccine  Aged Out    Meningococcal (ACWY) vaccine  Aged Out       Objective:  /72 (Site: Right Upper Arm, Position: Sitting, Cuff Size: Large Adult)   Pulse 80   Ht 5' 3\" (1.6 m)   Wt 204 lb 9.6 oz (92.8 kg)   BMI 36.24 kg/m²   Physical Exam  Vitals signs reviewed. Constitutional:       General: She is not in acute distress. Appearance: She is not ill-appearing. Comments: Appears uncomfortable   Neurological:      Mental Status: She is alert and oriented to person, place, and time. Mental status is at baseline.    Psychiatric:         Mood and Affect: Mood normal.         Behavior: Behavior normal.       Left Shoulder   Active ROM:   forward flexion WNL, external rotation WNL, internal rotation WNL, abduction WNL. Tenderness:   scapula, trapezius muscle, subacromial area   Inspection:  No ecchymosis. No swelling. Strength:  forward flexion 4+/5, external rotation 5-/5, internal rotation 5-/5, abduction 3+/5. Neer:   negative   Snow:   negative   Drop-arm test:   negative   Belly-press test:   negative   Empty Can:  positive   Full Can:  positive   Speed's test:   negative   Flood's test:   positive   Sulcus sign:   negative   Atrophy:   none noted      Neck exam:  No swelling or ecchymosis. tenderness to palpation of midline C3 and left paraspinal muscles at C3-C6. Left upper trapezius with spasms. Full AROM in flexion, extension. Decreased AROM for lateral flexion bilaterally and rotation bilaterally. Distal sensation and pulses are intact in the bilateral upper extremities  Healing bilateral carpal tunnel incisions without erythema or drainage      MRI left shoulder 1/23/20:    1. There is a full-thickness tear along the anterior margin of the distal aspect of the supraspinatus tendon measuring 0.8 x 0.9 cm in transverse and AP dimensions. There is tendinosis throughout the supraspinatus tendon. There is a 1.1 cm ganglion cyst    at the musculotendinous junction of the supraspinatus. There is no retraction of the musculotendinous junction of the supraspinatus. There is no fatty infiltration or atrophy of the supraspinatus muscle.       2. There is intermediate increased T2-weighted signal within the superior glenoid labrum (series 11 image 9) and an abnormal morphologic appearance of the superior glenoid labrum posterior to the 12:00 position (series 11 image 8) consistent with a tear.    Degeneration could also contribute to this.  There is also a 0.1 cm focus of fluid between the anterior aspect of the superior glenoid labrum and the superior aspect of the glenoid Surgery      They voiced understanding. All questions answered. They agreed with treatment plan. See patient instructions for any educational materials that may have been given. Discussed use, benefit, and side effects of prescribed medications. Reviewed health maintenance. (Please note that portions of this note may have been completed with a voice recognition program.  Efforts were made to edit the dictation but occasionally words are mis-transcribed.)    Return if symptoms worsen or fail to improve.       Electronically signed by Danna Ro MD on 3/5/2020 at 4:01 PM

## 2020-03-05 NOTE — PROGRESS NOTES
Stimulation Action: muscle fatigue  Electrical Stimulation Parameters: IFC to left upper trapezius for 15 minutes at 20 mA intensity (started at 14 intensity and was able to increase to 20). Had patient rest her arms on pillow on table to assist in relaxing her shoulders. Activity Tolerance: Additional Comments: Tolerated treatment fairly    Assessment:  Assessment: Progressing toward goals very slowly. Patient continues to c/o significant pain in her left shoulder and upper trapezius regions. Patient continues to hold her shoulders in an elevated position due to pain level. Patient does relate that her pain level decreases slightly when she is able to relax her shoulders. Patient Education:  Patient Education: Suggested for patient to speak with physician about MRI reports.  Reminded patient of importance of improving posture            Plan:  Plan Comment: Continue with POC as established  Specific instructions for Next Treatment: ROM, possible taping, STM, IASTM; IFC to left upper trapezius                   Marlyn Echeverria OTR/L #[id number]

## 2020-03-09 ENCOUNTER — HOSPITAL ENCOUNTER (OUTPATIENT)
Dept: OCCUPATIONAL THERAPY | Age: 70
Setting detail: THERAPIES SERIES
Discharge: HOME | End: 2020-03-09
Payer: MEDICARE

## 2020-03-09 PROCEDURE — G0283 ELEC STIM OTHER THAN WOUND: HCPCS

## 2020-03-09 PROCEDURE — 97110 THERAPEUTIC EXERCISES: CPT

## 2020-03-09 ASSESSMENT — PAIN SCALES - GENERAL: PAINLEVEL_OUTOF10: 3

## 2020-03-09 ASSESSMENT — PAIN DESCRIPTION - ORIENTATION: ORIENTATION: LEFT

## 2020-03-09 ASSESSMENT — PAIN DESCRIPTION - LOCATION: LOCATION: SHOULDER

## 2020-03-09 NOTE — PROGRESS NOTES
L upper trap to decrease tightness. L upper trap stretch with extended pull on head 10 sec hold x 5 reps                                     Electrical Stimulation  Electrical Stimulation Location: Left, Shoulder  Electrical Stimulation Action: muscle fatigue  Electrical Stimulation Parameters: IFC to left upper trapezius for 15 minutes at 23 mA intensity. Had patient rest her arms on pillow on table to assist in relaxing her shoulders. Activity Tolerance: Additional Comments: Pt. tolerated treatment fair    Assessment:  Assessment: Pt. is progressing slowly towards goals. Continues to remind herself to relax, while holding her L shoulder in elevated position.     Patient Education:  Patient Education: educated pt. to hold on household activities, especially ones reaching above the head, as pt. asking to wash windows and continue spring cleaning            Plan:  Plan Comment: Continue with POC as established                   Milagros CHAIREZ #[id number]

## 2020-03-12 ENCOUNTER — HOSPITAL ENCOUNTER (OUTPATIENT)
Dept: OCCUPATIONAL THERAPY | Age: 70
Setting detail: THERAPIES SERIES
Discharge: HOME | End: 2020-03-12
Payer: MEDICARE

## 2020-03-12 PROCEDURE — G0283 ELEC STIM OTHER THAN WOUND: HCPCS

## 2020-03-12 PROCEDURE — 97535 SELF CARE MNGMENT TRAINING: CPT

## 2020-03-12 ASSESSMENT — PAIN DESCRIPTION - ORIENTATION: ORIENTATION: LEFT

## 2020-03-12 ASSESSMENT — PAIN DESCRIPTION - LOCATION: LOCATION: SHOULDER

## 2020-03-12 ASSESSMENT — PAIN SCALES - GENERAL: PAINLEVEL_OUTOF10: 4

## 2020-03-13 ENCOUNTER — OFFICE VISIT (OUTPATIENT)
Dept: FAMILY MEDICINE CLINIC | Age: 70
End: 2020-03-13
Payer: MEDICARE

## 2020-03-13 VITALS
SYSTOLIC BLOOD PRESSURE: 130 MMHG | BODY MASS INDEX: 35.72 KG/M2 | HEART RATE: 88 BPM | HEIGHT: 63 IN | DIASTOLIC BLOOD PRESSURE: 80 MMHG | WEIGHT: 201.6 LBS

## 2020-03-13 PROCEDURE — G8399 PT W/DXA RESULTS DOCUMENT: HCPCS | Performed by: FAMILY MEDICINE

## 2020-03-13 PROCEDURE — G8427 DOCREV CUR MEDS BY ELIG CLIN: HCPCS | Performed by: FAMILY MEDICINE

## 2020-03-13 PROCEDURE — 1090F PRES/ABSN URINE INCON ASSESS: CPT | Performed by: FAMILY MEDICINE

## 2020-03-13 PROCEDURE — G9899 SCRN MAM PERF RSLTS DOC: HCPCS | Performed by: FAMILY MEDICINE

## 2020-03-13 PROCEDURE — 99213 OFFICE O/P EST LOW 20 MIN: CPT | Performed by: FAMILY MEDICINE

## 2020-03-13 PROCEDURE — G8417 CALC BMI ABV UP PARAM F/U: HCPCS | Performed by: FAMILY MEDICINE

## 2020-03-13 PROCEDURE — G8484 FLU IMMUNIZE NO ADMIN: HCPCS | Performed by: FAMILY MEDICINE

## 2020-03-13 PROCEDURE — 4040F PNEUMOC VAC/ADMIN/RCVD: CPT | Performed by: FAMILY MEDICINE

## 2020-03-13 PROCEDURE — 3017F COLORECTAL CA SCREEN DOC REV: CPT | Performed by: FAMILY MEDICINE

## 2020-03-13 PROCEDURE — 1036F TOBACCO NON-USER: CPT | Performed by: FAMILY MEDICINE

## 2020-03-13 PROCEDURE — 1123F ACP DISCUSS/DSCN MKR DOCD: CPT | Performed by: FAMILY MEDICINE

## 2020-03-13 PROCEDURE — 20610 DRAIN/INJ JOINT/BURSA W/O US: CPT | Performed by: FAMILY MEDICINE

## 2020-03-13 RX ORDER — INSULIN LISPRO 100 [IU]/ML
INJECTION, SOLUTION INTRAVENOUS; SUBCUTANEOUS 3 TIMES DAILY
COMMUNITY

## 2020-03-13 RX ORDER — BUPIVACAINE HYDROCHLORIDE 5 MG/ML
4 INJECTION, SOLUTION PERINEURAL ONCE
Status: COMPLETED | OUTPATIENT
Start: 2020-03-13 | End: 2020-03-13

## 2020-03-13 RX ORDER — METHYLPREDNISOLONE ACETATE 40 MG/ML
80 INJECTION, SUSPENSION INTRA-ARTICULAR; INTRALESIONAL; INTRAMUSCULAR; SOFT TISSUE ONCE
Status: COMPLETED | OUTPATIENT
Start: 2020-03-13 | End: 2020-03-13

## 2020-03-13 RX ORDER — LIDOCAINE HYDROCHLORIDE 10 MG/ML
4 INJECTION, SOLUTION INFILTRATION; PERINEURAL ONCE
Status: COMPLETED | OUTPATIENT
Start: 2020-03-13 | End: 2020-03-13

## 2020-03-13 RX ADMIN — LIDOCAINE HYDROCHLORIDE 4 ML: 10 INJECTION, SOLUTION INFILTRATION; PERINEURAL at 10:53

## 2020-03-13 RX ADMIN — METHYLPREDNISOLONE ACETATE 80 MG: 40 INJECTION, SUSPENSION INTRA-ARTICULAR; INTRALESIONAL; INTRAMUSCULAR; SOFT TISSUE at 10:54

## 2020-03-13 RX ADMIN — BUPIVACAINE HYDROCHLORIDE 20 MG: 5 INJECTION, SOLUTION PERINEURAL at 10:52

## 2020-03-13 NOTE — PROGRESS NOTES
Administrations This Visit     bupivacaine (MARCAINE) 0.5 % injection 20 mg     Admin Date  03/13/2020  10:52 Action  Given Dose  20 mg Route  Intra-articular Site  Knee Left Administered By  Anoop Escobar LPN    Ordering Provider:  Tracee Zamudio MD    NDC:  2472-6427-66    Lot#:  [id number]    :  Corine Adrian    Patient Supplied?:  No          lidocaine 1 % injection 4 mL     Admin Date  03/13/2020  10:53 Action  Given Dose  4 mL Route  Other Site  Knee Left Administered By  Anoop Escobar LPN    Ordering Provider:  Tracee Zamudio MD    NDC:  1202-8562-75    Lot#:  [id number]    :  Barron Pereyra    Patient Supplied?:  No          methylPREDNISolone acetate (DEPO-MEDROL) injection 80 mg     Admin Date  03/13/2020  10:54 Action  Given Dose  80 mg Route  Intramuscular Site  Knee Left Administered By  Anoop Escobar LPN    Ordering Provider:  Tracee Zamudio MD    [address]:  6235-4525-16    Lot#:  [id number]    :  [address] [facility]. Patient Supplied?:  No                Patient instructed to remain in clinic for 20 minutes after injection and was advised to report any adverse reaction to me immediately.

## 2020-03-13 NOTE — PROGRESS NOTES
[facility]  [facility] MEDICINE  [address] [facility]  Dept: [phone]  Dept Fax: [phone]  Loc: [phone]  PROGRESS NOTE      Visit Date: 3/13/2020    Julee Lim is a 65 y.o. female who presents today for:  Chief Complaint   Patient presents with    Other     recheck left shoulder-pain no better-had injection 3/5/2020-is in therapy    Insomnia       Subjective:  HPI    8 day f/u  Left shoulder pain:  Seen by Dr. Jocelin Whitfield at [facility] in past.  Had MRI in January. Had steroid injection of left shoulder about 8 days ago and she had improvement in pain for 3 days. She cleaned the house a lot on this past Saturday. She is left hand dominant. She went to OT yesterday. Trouble sleeping due to pain in left knee, feet tingling and muscle spasms,  Neck/ left shoulder pain, and low back pain. She uses biofreeze on her neck and shoulder. She is not taking tylenol. Left knee:  She has OA. Last injection was nov 2019. It is giving out. she reports being frustrated. She takes ibuprofen 600 mg every 4 hours. She tried gabapentin and felt sedated. She has [place] [facility] Rl Alvarado for her neck as she has spinal stenosis and foraminal stenosis. Review of Systems   Constitutional: Negative for chills and fever. Musculoskeletal: Positive for arthralgias and neck pain. Skin: Negative for rash and wound. Neurological: Positive for weakness and numbness.      Patient Active Problem List   Diagnosis    Hyperlipidemia    Asthma    Polyneuropathy    AS (aortic stenosis)    Cardiomyopathy eF 39 TO 50% PER ECHO MAY 2012    Hiatal hernia    Hypothyroid    Type 2 diabetes mellitus without complication, without long-term current use of insulin (HCC)    Osteoarthritis of multiple joints    Ear canal mass    Diabetic ketoacidosis without coma associated with type 2 diabetes mellitus ([address]    Malignant neoplasm of left breast in female, estrogen receptor positive ([address]    Localized osteoarthritis of left knee    Other osteoporosis without current pathological fracture    S/P repair of ventral hernia    Cervical stenosis of spinal canal    Tear of left supraspinatus tendon     Past Medical History:   Diagnosis Date    Arthritis     Asthma     Brain cyst     benign    Cancer ([address] 09/1918    Left Breast    Chronic sinus complaints     Diverticulosis of colon     DKA (diabetic ketoacidoses) ([address] 05/2018    Elevated TSH     Hypertension     Osteoarthritis     Polyneuropathy     PONV (postoperative nausea and vomiting)     Spinal headache     Type 2 diabetes mellitus ([address] 1990's      Past Surgical History:   Procedure Laterality Date    BLADDER SUSPENSION      times 2    CARPAL TUNNEL RELEASE Bilateral 02/2020    CHOLECYSTECTOMY      DILATION AND CURETTAGE OF UTERUS      x2    EYE SURGERY      HERNIA REPAIR  06/18/2019    HYSTERECTOMY  1995    JOINT REPLACEMENT Right 2007    Rt total knee    KNEE ARTHROSCOPY  1967, 2001    MANDIBLE FRACTURE SURGERY      OTHER SURGICAL HISTORY Right 12/30/2015    Excision of Sebaceous Cyst Right Ear     MA OFFICE/OUTPT VISIT,PROCEDURE ONLY Left 10/10/2018    LEFT BREAST LUMPECTOMY WITH SENTINEL LYMPH NODE BIOPSY performed by Khushboo Lester MD at [address] [place] [address]      SKIN BIOPSY      TONSILLECTOMY AND ADENOIDECTOMY  age 4   [place] VENTRAL HERNIA REPAIR N/A 6/14/2019    COMBINED LAPAROSCOPIC AND OPEN VENTRAL HERNIA REPAIR WITH MESH performed by Khushboo Lester MD at [address] [place] [address] History   Problem Relation Age of Onset    Diabetes Mother     Heart Disease Mother     Lupus Mother     Heart Disease Father     Cancer Father         Squamous cell - face & scalp    Diabetes Maternal Grandmother     Heart Disease Maternal Grandfather     Cancer Paternal Aunt          multiple myeloma    Cancer Paternal Uncle         Lymphatic Leukemia

## 2020-03-17 ENCOUNTER — HOSPITAL ENCOUNTER (OUTPATIENT)
Dept: OCCUPATIONAL THERAPY | Age: 70
Setting detail: THERAPIES SERIES
Discharge: HOME | End: 2020-03-17
Payer: MEDICARE

## 2020-03-17 PROCEDURE — 97110 THERAPEUTIC EXERCISES: CPT

## 2020-03-17 ASSESSMENT — PAIN DESCRIPTION - ORIENTATION: ORIENTATION: LEFT

## 2020-03-17 ASSESSMENT — PAIN SCALES - GENERAL: PAINLEVEL_OUTOF10: 6

## 2020-03-17 ASSESSMENT — PAIN DESCRIPTION - LOCATION: LOCATION: SHOULDER;ARM

## 2020-03-17 NOTE — DISCHARGE SUMMARY
Additional Activities Comment  Additional Activities: Discussion held with patient regarding adaptive techniques such as using an electric toothbrush versus regular toothbrush due to patient c/o her \"bones moving when I brush my teeth. \" Also recommended for patient to try to pace herself and to ask for assistance if needed for household tasks. Activity Tolerance: Additional Comments: Pt. tolerated treatment fairly    Assessment:  Assessment: Patient has made very minimal progress toward goals. Patient's pain levels continue to be high and patient continues to have difficulty with sleeping. Patient continues to have difficulty for her to relax and maintain good posture. Patient relates that her pain is present in left upper trapezius and anterior left neck as well as left clavicle area. Recommended for patient to continue to HEP after discharge and to use heat, ice, or pain relieving lotion for pain relief prior to appointment with Dr. Cece Yeung. Patient Education:  Patient Education: to continue HEP after discharge, to use pain relief as needed            Plan:  Plan Comment: Discharge from therapy as patient wishes to hold off on therapy until she sees Dr. Cece Yeung    Patient goals : Less pain, Sleep more. Be as active as I used to be. Be able to paint    Short term goals  Time Frame for Short term goals: 4 weeks  Short term goal 1: Be independent with HEP as instructed to increase her ability to complete hair care. GOAL MET  Short term goal 2: Increase active left shoulder flexion to 150, abduction to 145, and ER to 85 to increase ability to complete overhead tasks. GOAL MET FOR ABDUCTION, GOAL NOT MET FOR FLEXION AND ER - SEE OBJECTIVE SECTION OF NOTE FOR DETAILS  Short term goal 3: Increase passive left shoulder flexion to 165 and abduciton to 175 to increase flexibility to allow patient to eventually use left arm for hair care.  GOAL NOT MET - SEE OBJECTIVE SECTION OF NOTE FOR DETAILS  Short

## 2020-05-06 RX ORDER — BENAZEPRIL HYDROCHLORIDE 20 MG/1
20 TABLET ORAL 2 TIMES DAILY
Qty: 180 TABLET | Refills: 1 | Status: SHIPPED | OUTPATIENT
Start: 2020-05-06 | End: 2020-06-16 | Stop reason: SDUPTHER

## 2020-05-12 ENCOUNTER — TELEPHONE (OUTPATIENT)
Dept: FAMILY MEDICINE CLINIC | Age: 70
End: 2020-05-12

## 2020-05-12 NOTE — TELEPHONE ENCOUNTER
5/12/20 FYI - pt is using [facility] thru Dr Froy Walton who ordered 2 yrs ago and he is no longer at office, therefore seeing Marco García CNP for her diabetes. Jennifer Burton will not allow her to use CNP for her supplies, therefore suggested using her PCP. She wanted Omar to be aware of this, in case any correspondence is received.   Clare/pradeep  Dolv: 3/13/20  Donv: 6/16/20

## 2020-05-27 ENCOUNTER — HOSPITAL ENCOUNTER (OUTPATIENT)
Dept: INFUSION THERAPY | Age: 70
Discharge: HOME | End: 2020-05-27
Payer: MEDICARE

## 2020-05-27 ENCOUNTER — OFFICE VISIT (OUTPATIENT)
Dept: ONCOLOGY | Age: 70
End: 2020-05-27
Payer: MEDICARE

## 2020-05-27 VITALS
HEART RATE: 74 BPM | HEIGHT: 63 IN | SYSTOLIC BLOOD PRESSURE: 136 MMHG | RESPIRATION RATE: 18 BRPM | WEIGHT: 216.8 LBS | BODY MASS INDEX: 38.41 KG/M2 | OXYGEN SATURATION: 95 % | DIASTOLIC BLOOD PRESSURE: 66 MMHG | TEMPERATURE: 98.4 F

## 2020-05-27 VITALS
HEART RATE: 74 BPM | DIASTOLIC BLOOD PRESSURE: 66 MMHG | BODY MASS INDEX: 38.26 KG/M2 | WEIGHT: 216 LBS | RESPIRATION RATE: 18 BRPM | OXYGEN SATURATION: 95 % | SYSTOLIC BLOOD PRESSURE: 136 MMHG | TEMPERATURE: 98.4 F

## 2020-05-27 DIAGNOSIS — C50.412 MALIGNANT NEOPLASM OF UPPER-OUTER QUADRANT OF LEFT BREAST IN FEMALE, ESTROGEN RECEPTOR POSITIVE (HCC): ICD-10-CM

## 2020-05-27 DIAGNOSIS — M81.8 OTHER OSTEOPOROSIS WITHOUT CURRENT PATHOLOGICAL FRACTURE: Primary | ICD-10-CM

## 2020-05-27 DIAGNOSIS — Z17.0 MALIGNANT NEOPLASM OF UPPER-OUTER QUADRANT OF LEFT BREAST IN FEMALE, ESTROGEN RECEPTOR POSITIVE (HCC): ICD-10-CM

## 2020-05-27 PROCEDURE — 96365 THER/PROPH/DIAG IV INF INIT: CPT

## 2020-05-27 PROCEDURE — 2580000003 HC RX 258: Performed by: INTERNAL MEDICINE

## 2020-05-27 PROCEDURE — 1036F TOBACCO NON-USER: CPT | Performed by: INTERNAL MEDICINE

## 2020-05-27 PROCEDURE — G8427 DOCREV CUR MEDS BY ELIG CLIN: HCPCS | Performed by: INTERNAL MEDICINE

## 2020-05-27 PROCEDURE — 1090F PRES/ABSN URINE INCON ASSESS: CPT | Performed by: INTERNAL MEDICINE

## 2020-05-27 PROCEDURE — G8399 PT W/DXA RESULTS DOCUMENT: HCPCS | Performed by: INTERNAL MEDICINE

## 2020-05-27 PROCEDURE — 3017F COLORECTAL CA SCREEN DOC REV: CPT | Performed by: INTERNAL MEDICINE

## 2020-05-27 PROCEDURE — 6360000002 HC RX W HCPCS: Performed by: INTERNAL MEDICINE

## 2020-05-27 PROCEDURE — 1123F ACP DISCUSS/DSCN MKR DOCD: CPT | Performed by: INTERNAL MEDICINE

## 2020-05-27 PROCEDURE — 4040F PNEUMOC VAC/ADMIN/RCVD: CPT | Performed by: INTERNAL MEDICINE

## 2020-05-27 PROCEDURE — G9899 SCRN MAM PERF RSLTS DOC: HCPCS | Performed by: INTERNAL MEDICINE

## 2020-05-27 PROCEDURE — G8417 CALC BMI ABV UP PARAM F/U: HCPCS | Performed by: INTERNAL MEDICINE

## 2020-05-27 PROCEDURE — 99214 OFFICE O/P EST MOD 30 MIN: CPT | Performed by: INTERNAL MEDICINE

## 2020-05-27 PROCEDURE — 99211 OFF/OP EST MAY X REQ PHY/QHP: CPT

## 2020-05-27 RX ORDER — SODIUM CHLORIDE 0.9 % (FLUSH) 0.9 %
10 SYRINGE (ML) INJECTION PRN
Status: CANCELLED | OUTPATIENT
Start: 2020-05-27

## 2020-05-27 RX ORDER — DIPHENHYDRAMINE HYDROCHLORIDE 50 MG/ML
50 INJECTION INTRAMUSCULAR; INTRAVENOUS ONCE
Status: CANCELLED | OUTPATIENT
Start: 2020-05-27

## 2020-05-27 RX ORDER — ZOLEDRONIC ACID 5 MG/100ML
5 INJECTION, SOLUTION INTRAVENOUS ONCE
Status: CANCELLED | OUTPATIENT
Start: 2020-05-27

## 2020-05-27 RX ORDER — METHYLPREDNISOLONE SODIUM SUCCINATE 125 MG/2ML
125 INJECTION, POWDER, LYOPHILIZED, FOR SOLUTION INTRAMUSCULAR; INTRAVENOUS ONCE
Status: CANCELLED | OUTPATIENT
Start: 2020-05-27

## 2020-05-27 RX ORDER — 0.9 % SODIUM CHLORIDE 0.9 %
250 INTRAVENOUS SOLUTION INTRAVENOUS ONCE
Status: CANCELLED
Start: 2020-05-27

## 2020-05-27 RX ORDER — ZOLEDRONIC ACID 5 MG/100ML
5 INJECTION, SOLUTION INTRAVENOUS ONCE
Status: COMPLETED | OUTPATIENT
Start: 2020-05-27 | End: 2020-05-27

## 2020-05-27 RX ORDER — SODIUM CHLORIDE 0.9 % (FLUSH) 0.9 %
5 SYRINGE (ML) INJECTION PRN
Status: CANCELLED | OUTPATIENT
Start: 2020-05-27

## 2020-05-27 RX ORDER — SODIUM CHLORIDE 9 MG/ML
INJECTION, SOLUTION INTRAVENOUS CONTINUOUS
Status: CANCELLED | OUTPATIENT
Start: 2020-05-27

## 2020-05-27 RX ORDER — ANASTROZOLE 1 MG/1
1 TABLET ORAL DAILY
Qty: 90 TABLET | Refills: 3 | Status: SHIPPED | OUTPATIENT
Start: 2020-05-27 | End: 2021-03-15 | Stop reason: SDUPTHER

## 2020-05-27 RX ORDER — 0.9 % SODIUM CHLORIDE 0.9 %
250 INTRAVENOUS SOLUTION INTRAVENOUS ONCE
Status: COMPLETED | OUTPATIENT
Start: 2020-05-27 | End: 2020-05-27

## 2020-05-27 RX ORDER — HEPARIN SODIUM (PORCINE) LOCK FLUSH IV SOLN 100 UNIT/ML 100 UNIT/ML
500 SOLUTION INTRAVENOUS PRN
Status: CANCELLED | OUTPATIENT
Start: 2020-05-27

## 2020-05-27 RX ADMIN — SODIUM CHLORIDE 250 ML: 9 INJECTION, SOLUTION INTRAVENOUS at 11:21

## 2020-05-27 RX ADMIN — ZOLEDRONIC ACID 5 MG: 5 INJECTION, SOLUTION INTRAVENOUS at 11:21

## 2020-05-27 ASSESSMENT — ENCOUNTER SYMPTOMS
COUGH: 0
NAUSEA: 0
SHORTNESS OF BREATH: 0
BACK PAIN: 0
ABDOMINAL PAIN: 0
WHEEZING: 0
BLOOD IN STOOL: 0
TROUBLE SWALLOWING: 0
ABDOMINAL DISTENTION: 0
EYE DISCHARGE: 0
DIARRHEA: 0
SORE THROAT: 0
FACIAL SWELLING: 0
VOMITING: 0
CONSTIPATION: 0
CHEST TIGHTNESS: 0
COLOR CHANGE: 0
RECTAL PAIN: 0

## 2020-05-27 NOTE — PROGRESS NOTES
MISC 1 each by Does not apply route daily 100 each 3    glucose blood VI test strips (ASCENSIA AUTODISC VI;ONE TOUCH ULTRA TEST VI) strip [employer] test strips. Tests once daily. 100 each 3     No current facility-administered medications for this visit. Allergies   Allergen Reactions    Amoxicillin     Donnatal [Pb-Hyoscy-Atropine-Scopolamine] Other (See Comments)     palpitations    Lovastatin Other (See Comments)     myalgia    Metformin And Related Diarrhea    Vioxx Other (See Comments)     Liver problems    Dilaudid [Hydromorphone Hcl] Nausea And Vomiting    Fentanyl Nausea And Vomiting     Severe Nausea and vomitting      Health Maintenance   Topic Date Due    Shingles Vaccine (1 of 2) 03/12/1998    TSH testing  05/18/2019    Annual Wellness Visit (AWV)  05/29/2019    Diabetic retinal exam  06/21/2019    A1C test (Diabetic or Prediabetic)  03/27/2020    Diabetic foot exam  05/06/2020    Potassium monitoring  08/13/2020    Creatinine monitoring  08/13/2020    Breast cancer screen  09/17/2020    Lipid screen  11/13/2020    Diabetic microalbuminuria test  11/18/2020    Colon cancer screen colonoscopy  06/26/2022    DTaP/Tdap/Td vaccine (2 - Td) 04/25/2029    Flu vaccine  Completed    Pneumococcal 65+ years Vaccine  Completed    Hepatitis C screen  Completed    DEXA (modify frequency per FRAX score)  Addressed    Hepatitis A vaccine  Aged Out    Hib vaccine  Aged Out    Meningococcal (ACWY) vaccine  Aged Out        Subjective:   Review of Systems   Constitutional: Negative for activity change, appetite change, fatigue and fever. HENT: Negative for congestion, dental problem, facial swelling, hearing loss, mouth sores, nosebleeds, sore throat, tinnitus and trouble swallowing. Eyes: Negative for discharge and visual disturbance. Respiratory: Negative for cough, chest tightness, shortness of breath and wheezing.     Cardiovascular: Negative for chest pain, palpitations distension. Palpations: Abdomen is soft. There is no mass. Tenderness: There is no abdominal tenderness. There is no rebound. Musculoskeletal: Normal range of motion. Comments: Good range of motion in all four extremities. Lymphadenopathy:      Cervical: No cervical adenopathy. Skin:     General: Skin is warm. Findings: No erythema or rash. Neurological:      Mental Status: She is alert and oriented to person, place, and time. Cranial Nerves: No cranial nerve deficit. Motor: No abnormal muscle tone. Deep Tendon Reflexes: Reflexes are normal and symmetric. Psychiatric:         Behavior: Behavior normal.         Thought Content: Thought content normal.         Judgment: Judgment normal.         /66 (Site: Right Upper Arm, Position: Sitting, Cuff Size: Large Adult)   Pulse 74   Temp 98.4 °F (36.9 °C) (Oral)   Resp 18   Ht 5' 3\" (1.6 m)   Wt 216 lb 12.8 oz (98.3 kg)   SpO2 95%   BMI 38.40 kg/m²      ECOG status is 0. Imaging studies and labs: [facility] Digital Diagnostic W Or Wo Cad Left  Result Date: 3/18/2019  IMPRESSION: Mammogram BI-RADS: 2: Benign 1. There is no mammographic evidence of malignancy. 2. A 1 year screening mammogram is recommended. 3. The patient has been entered into our database and they will receive a notification when they are due for their next exam.  4. The patient was notified of the results. #[id number] - [facility] DIGITAL DIAGNOSTIC W OR WO CAD LEFT UNILATERAL LEFT DIGITAL DIAGNOSTIC MAMMOGRAM 3D/2D WITH CAD: 3/18/2019 CLINICAL: Tomosynthesis. Breast Cancer left breast.  Comparison is made to exams dated:  9/7/2018 mammogram, 8/16/2017  mammogram, and 12/1/2014 mammogram - [facility]. The tissue of the left breast is heterogeneously dense. This may lower the sensitivity of mammography. Current study was also evaluated with a Computer Aided Detection (CAD) system.   There are benign vascular calcifications left breast. female, estrogen receptor positive ([facility]  [facility] DIGITAL DIAGNOSTIC W OR WO CAD BILATERAL   2. Other osteoporosis without current pathological fracture     3. Prophylactic use of anastrozole (Arimidex)     4. Encounter for follow-up surveillance of breast cancer     5. Status post left breast lumpectomy          Plan:   Return in about 6 months (around 11/27/2020). Orders Placed:   Orders Placed This Encounter   Procedures    RUDY DIGITAL DIAGNOSTIC W OR WO CAD BILATERAL     Standing Status:   Future     Standing Expiration Date:   7/27/2021        Medications Prescribed:   Orders Placed This Encounter   Medications    anastrozole (ARIMIDEX) 1 MG tablet     Sig: Take 1 tablet by mouth daily     Dispense:  90 tablet     Refill:  3            Discussed use, benefit, and side effectsof prescribed medications. All patient questions answered. Pt voiced understanding. Instructed to continue current medications, diet and exercise. Patient agreed with treatment plan. Follow up as directed.     Electronically signed by Eunice Yeager MD on 4/10/19 at 2:35 PM

## 2020-05-27 NOTE — PROGRESS NOTES
Patient assessed for the following post zoledronic acid:    Dizziness   No  Lightheadedness  No      Acute nausea/vomiting No  Headache   No  Chest pain/pressure  No  Rash/itching   No  Shortness of breath  No       Patient tolerated zoledronic acid without any complications. Last vital signs:   /66   Pulse 74   Temp 98.4 °F (36.9 °C) (Oral)   Resp 18   Wt 216 lb (98 kg)   SpO2 95%   BMI 38.26 kg/m²     Patient instructed if experience any of the above symptoms following today's infusion she is to notify MD immediately or go to the emergency department. Discharge instructions given to patient. Verbalizes understanding. Ambulated off unit per self  with belongings.

## 2020-06-16 ENCOUNTER — HOSPITAL ENCOUNTER (OUTPATIENT)
Age: 70
Discharge: HOME | End: 2020-06-16
Payer: MEDICARE

## 2020-06-16 ENCOUNTER — OFFICE VISIT (OUTPATIENT)
Dept: FAMILY MEDICINE CLINIC | Age: 70
End: 2020-06-16
Payer: MEDICARE

## 2020-06-16 VITALS
BODY MASS INDEX: 38.45 KG/M2 | SYSTOLIC BLOOD PRESSURE: 122 MMHG | HEIGHT: 63 IN | WEIGHT: 217 LBS | HEART RATE: 72 BPM | TEMPERATURE: 97.3 F | DIASTOLIC BLOOD PRESSURE: 78 MMHG

## 2020-06-16 DIAGNOSIS — I10 ESSENTIAL HYPERTENSION: ICD-10-CM

## 2020-06-16 DIAGNOSIS — E11.65 TYPE 2 DIABETES MELLITUS WITH HYPERGLYCEMIA, WITHOUT LONG-TERM CURRENT USE OF INSULIN (HCC): ICD-10-CM

## 2020-06-16 PROBLEM — E66.01 MORBIDLY OBESE (HCC): Status: ACTIVE | Noted: 2020-06-16

## 2020-06-16 LAB
ALBUMIN SERPL-MCNC: 4.1 G/DL (ref 3.5–5.1)
ALP BLD-CCNC: 66 U/L (ref 38–126)
ALT SERPL-CCNC: 29 U/L (ref 11–66)
ANION GAP SERPL CALCULATED.3IONS-SCNC: 11 MEQ/L (ref 8–16)
AST SERPL-CCNC: 25 U/L (ref 5–40)
BILIRUB SERPL-MCNC: 1.2 MG/DL (ref 0.3–1.2)
BUN BLDV-MCNC: 18 MG/DL (ref 7–22)
CALCIUM SERPL-MCNC: 9.7 MG/DL (ref 8.5–10.5)
CHLORIDE BLD-SCNC: 103 MEQ/L (ref 98–111)
CO2: 25 MEQ/L (ref 23–33)
CREAT SERPL-MCNC: 0.7 MG/DL (ref 0.4–1.2)
ERYTHROCYTE [DISTWIDTH] IN BLOOD BY AUTOMATED COUNT: 12.5 % (ref 11.5–14.5)
ERYTHROCYTE [DISTWIDTH] IN BLOOD BY AUTOMATED COUNT: 43.5 FL (ref 35–45)
GFR SERPL CREATININE-BSD FRML MDRD: 82 ML/MIN/1.73M2
GLUCOSE BLD-MCNC: 202 MG/DL (ref 70–108)
HCT VFR BLD CALC: 39.4 % (ref 37–47)
HEMOGLOBIN: 12.8 GM/DL (ref 12–16)
MCH RBC QN AUTO: 30.8 PG (ref 26–33)
MCHC RBC AUTO-ENTMCNC: 32.5 GM/DL (ref 32.2–35.5)
MCV RBC AUTO: 94.7 FL (ref 81–99)
PLATELET # BLD: 202 THOU/MM3 (ref 130–400)
PMV BLD AUTO: 10.2 FL (ref 9.4–12.4)
POTASSIUM SERPL-SCNC: 4.4 MEQ/L (ref 3.5–5.2)
RBC # BLD: 4.16 MILL/MM3 (ref 4.2–5.4)
SODIUM BLD-SCNC: 139 MEQ/L (ref 135–145)
TOTAL PROTEIN: 6.4 G/DL (ref 6.1–8)
TSH SERPL DL<=0.05 MIU/L-ACNC: 2.3 UIU/ML (ref 0.4–4.2)
WBC # BLD: 5 THOU/MM3 (ref 4.8–10.8)

## 2020-06-16 PROCEDURE — 85027 COMPLETE CBC AUTOMATED: CPT

## 2020-06-16 PROCEDURE — 1036F TOBACCO NON-USER: CPT | Performed by: FAMILY MEDICINE

## 2020-06-16 PROCEDURE — 36415 COLL VENOUS BLD VENIPUNCTURE: CPT

## 2020-06-16 PROCEDURE — 1123F ACP DISCUSS/DSCN MKR DOCD: CPT | Performed by: FAMILY MEDICINE

## 2020-06-16 PROCEDURE — 2022F DILAT RTA XM EVC RTNOPTHY: CPT | Performed by: FAMILY MEDICINE

## 2020-06-16 PROCEDURE — G8427 DOCREV CUR MEDS BY ELIG CLIN: HCPCS | Performed by: FAMILY MEDICINE

## 2020-06-16 PROCEDURE — 80053 COMPREHEN METABOLIC PANEL: CPT

## 2020-06-16 PROCEDURE — G8399 PT W/DXA RESULTS DOCUMENT: HCPCS | Performed by: FAMILY MEDICINE

## 2020-06-16 PROCEDURE — 3017F COLORECTAL CA SCREEN DOC REV: CPT | Performed by: FAMILY MEDICINE

## 2020-06-16 PROCEDURE — 99214 OFFICE O/P EST MOD 30 MIN: CPT | Performed by: FAMILY MEDICINE

## 2020-06-16 PROCEDURE — 4040F PNEUMOC VAC/ADMIN/RCVD: CPT | Performed by: FAMILY MEDICINE

## 2020-06-16 PROCEDURE — 3046F HEMOGLOBIN A1C LEVEL >9.0%: CPT | Performed by: FAMILY MEDICINE

## 2020-06-16 PROCEDURE — 1090F PRES/ABSN URINE INCON ASSESS: CPT | Performed by: FAMILY MEDICINE

## 2020-06-16 PROCEDURE — G9899 SCRN MAM PERF RSLTS DOC: HCPCS | Performed by: FAMILY MEDICINE

## 2020-06-16 PROCEDURE — 84443 ASSAY THYROID STIM HORMONE: CPT

## 2020-06-16 PROCEDURE — G8417 CALC BMI ABV UP PARAM F/U: HCPCS | Performed by: FAMILY MEDICINE

## 2020-06-16 RX ORDER — BENAZEPRIL HYDROCHLORIDE 20 MG/1
20 TABLET ORAL 2 TIMES DAILY
Qty: 180 TABLET | Refills: 1 | Status: SHIPPED | OUTPATIENT
Start: 2020-06-16 | End: 2020-12-16 | Stop reason: SDUPTHER

## 2020-06-16 RX ORDER — IBUPROFEN 600 MG/1
600 TABLET ORAL 2 TIMES DAILY PRN
Qty: 180 TABLET | Refills: 1 | Status: SHIPPED | OUTPATIENT
Start: 2020-06-16 | End: 2020-12-16 | Stop reason: SDUPTHER

## 2020-06-16 RX ORDER — ATORVASTATIN CALCIUM 20 MG/1
20 TABLET, FILM COATED ORAL NIGHTLY
Qty: 90 TABLET | Refills: 1 | Status: SHIPPED | OUTPATIENT
Start: 2020-06-16 | End: 2020-12-16 | Stop reason: SDUPTHER

## 2020-06-16 RX ORDER — PREDNISONE 20 MG/1
20 TABLET ORAL 2 TIMES DAILY
Qty: 10 TABLET | Refills: 0 | Status: SHIPPED | OUTPATIENT
Start: 2020-06-16 | End: 2020-06-21

## 2020-06-16 ASSESSMENT — ENCOUNTER SYMPTOMS
WHEEZING: 0
SHORTNESS OF BREATH: 0
COUGH: 1

## 2020-06-16 NOTE — PROGRESS NOTES
Other (See Comments)     myalgia    Metformin And Related Diarrhea    Vioxx Other (See Comments)     Liver problems    Dilaudid [Hydromorphone Hcl] Nausea And Vomiting    Fentanyl Nausea And Vomiting     Severe Nausea and vomitting     Health Maintenance   Topic Date Due    Shingles Vaccine (1 of 2) 03/12/1998    TSH testing  05/18/2019    Annual Wellness Visit (AWV)  05/29/2019    Diabetic retinal exam  06/21/2019    A1C test (Diabetic or Prediabetic)  03/27/2020    Diabetic foot exam  05/06/2020    Potassium monitoring  08/13/2020    Creatinine monitoring  08/13/2020    Breast cancer screen  09/17/2020    Lipid screen  11/13/2020    Diabetic microalbuminuria test  11/18/2020    Colon cancer screen colonoscopy  06/26/2022    DTaP/Tdap/Td vaccine (2 - Td) 04/25/2029    Flu vaccine  Completed    Pneumococcal 65+ years Vaccine  Completed    Hepatitis C screen  Completed    DEXA (modify frequency per FRAX score)  Addressed    Hepatitis A vaccine  Aged Out    Hib vaccine  Aged Out    Meningococcal (ACWY) vaccine  Aged Out       Objective:  /78 (Site: Right Upper Arm, Position: Sitting, Cuff Size: Large Adult)   Pulse 72   Temp 97.3 °F (36.3 °C) (Temporal)   Ht 5' 3\" (1.6 m)   Wt 217 lb (98.4 kg)   BMI 38.44 kg/m²   Physical Exam  Vitals signs reviewed. Constitutional:       Appearance: She is well-developed. Cardiovascular:      Rate and Rhythm: Normal rate and regular rhythm. Heart sounds: Murmur present. Systolic murmur present with a grade of 2/6. Pulmonary:      Effort: Pulmonary effort is normal. No respiratory distress. Breath sounds: Normal breath sounds. No wheezing. Neurological:      Mental Status: She is alert and oriented to person, place, and time.    Psychiatric:         Behavior: Behavior normal.       Visual inspection:  Deformity/amputation: absent  Skin lesions/pre-ulcerative calluses: absent  Edema: right- negative, left- negative    Sensory months. Unlikely infectious. Will treat with prednisone. Hold ibuprofen while taking prednisone. We discussed doing chest x-ray but will hold as she likely will have a chest xray as she will be preop soon for her left shoulder surgery  - predniSONE (DELTASONE) 20 MG tablet; Take 1 tablet by mouth 2 times daily for 5 days  Dispense: 10 tablet; Refill: 0    6. Other cardiomyopathy ([address]  Chronic. s table. Continue lasix. 7. Morbidly obese (HCC)  Chronic. Stable. Diet and exercise      Recommend she talk to the shoulder surgeon first before proceeding with anything for her neck including epidural injection    They voiced understanding. All questions answered. They agreed with treatment plan. See patient instructions for any educational materials that may have been given. Discussed use, benefit, and side effects of prescribed medications. Reviewed health maintenance. (Please note that portions of this note may have been completed with a voice recognition program.  Efforts were made to edit the dictation but occasionally words are mis-transcribed.)    Return in about 6 months (around 12/16/2020) for HTN, DM.     Electronically signed by Yonis Hutchinson MD on 6/16/2020 at 10:25 AM

## 2020-06-19 ENCOUNTER — TELEPHONE (OUTPATIENT)
Dept: FAMILY MEDICINE CLINIC | Age: 70
End: 2020-06-19

## 2020-06-24 ENCOUNTER — TELEPHONE (OUTPATIENT)
Dept: FAMILY MEDICINE CLINIC | Age: 70
End: 2020-06-24

## 2020-06-25 ENCOUNTER — TELEPHONE (OUTPATIENT)
Dept: FAMILY MEDICINE CLINIC | Age: 70
End: 2020-06-25

## 2020-06-25 ENCOUNTER — HOSPITAL ENCOUNTER (OUTPATIENT)
Dept: GENERAL RADIOLOGY | Age: 70
Discharge: HOME | End: 2020-06-25
Payer: MEDICARE

## 2020-06-25 ENCOUNTER — HOSPITAL ENCOUNTER (OUTPATIENT)
Age: 70
Discharge: HOME | End: 2020-06-25
Payer: MEDICARE

## 2020-06-25 DIAGNOSIS — E11.29 TYPE 2 DIABETES MELLITUS WITH OTHER DIABETIC KIDNEY COMPLICATION (HCC): ICD-10-CM

## 2020-06-25 DIAGNOSIS — Z01.818 PREOP EXAMINATION: ICD-10-CM

## 2020-06-25 DIAGNOSIS — E13.29: ICD-10-CM

## 2020-06-25 LAB
APTT: 30.1 SECONDS (ref 22–38)
BACTERIA: ABNORMAL
BILIRUBIN URINE: NEGATIVE
BLOOD, URINE: NEGATIVE
CASTS: ABNORMAL /LPF
CASTS: ABNORMAL /LPF
CHARACTER, URINE: CLEAR
COLOR: YELLOW
CRYSTALS: ABNORMAL
EKG ATRIAL RATE: 81 BPM
EKG P AXIS: 19 DEGREES
EKG P-R INTERVAL: 188 MS
EKG Q-T INTERVAL: 352 MS
EKG QRS DURATION: 90 MS
EKG QTC CALCULATION (BAZETT): 408 MS
EKG R AXIS: 19 DEGREES
EKG T AXIS: 20 DEGREES
EKG VENTRICULAR RATE: 81 BPM
EPITHELIAL CELLS, UA: ABNORMAL /HPF
GLUCOSE, URINE: NEGATIVE MG/DL
INR BLD: 1.03 (ref 0.85–1.13)
KETONES, URINE: NEGATIVE
LEUKOCYTE EST, POC: ABNORMAL
MISCELLANEOUS LAB TEST RESULT: ABNORMAL
NITRITE, URINE: NEGATIVE
PH UA: 5 (ref 5–9)
PROTEIN UA: NEGATIVE MG/DL
RBC URINE: ABNORMAL /HPF
RENAL EPITHELIAL, UA: ABNORMAL
SPECIFIC GRAVITY UA: 1.01 (ref 1–1.03)
UROBILINOGEN, URINE: 0.2 EU/DL (ref 0–1)
WBC UA: ABNORMAL /HPF
YEAST: ABNORMAL

## 2020-06-25 PROCEDURE — 93005 ELECTROCARDIOGRAM TRACING: CPT

## 2020-06-25 PROCEDURE — 71046 X-RAY EXAM CHEST 2 VIEWS: CPT

## 2020-06-25 PROCEDURE — 87077 CULTURE AEROBIC IDENTIFY: CPT

## 2020-06-25 PROCEDURE — 85730 THROMBOPLASTIN TIME PARTIAL: CPT

## 2020-06-25 PROCEDURE — 36415 COLL VENOUS BLD VENIPUNCTURE: CPT

## 2020-06-25 PROCEDURE — 87186 SC STD MICRODIL/AGAR DIL: CPT

## 2020-06-25 PROCEDURE — 81001 URINALYSIS AUTO W/SCOPE: CPT

## 2020-06-25 PROCEDURE — 87086 URINE CULTURE/COLONY COUNT: CPT

## 2020-06-25 PROCEDURE — 85610 PROTHROMBIN TIME: CPT

## 2020-06-25 PROCEDURE — 93010 ELECTROCARDIOGRAM REPORT: CPT | Performed by: INTERNAL MEDICINE

## 2020-06-25 NOTE — TELEPHONE ENCOUNTER
Normal PTT and INR. Urinalysis shows small number of leukocytes. Please call lab and have them run a urine culture on the urine. Please advise patient.   Lauren Hodge MD

## 2020-06-27 ENCOUNTER — TELEPHONE (OUTPATIENT)
Dept: FAMILY MEDICINE CLINIC | Age: 70
End: 2020-06-27

## 2020-06-27 LAB
ORGANISM: ABNORMAL
URINE CULTURE, ROUTINE: ABNORMAL

## 2020-06-27 RX ORDER — SULFAMETHOXAZOLE AND TRIMETHOPRIM 800; 160 MG/1; MG/1
1 TABLET ORAL 2 TIMES DAILY
Qty: 10 TABLET | Refills: 0 | Status: SHIPPED | OUTPATIENT
Start: 2020-06-27 | End: 2020-07-02

## 2020-06-29 ENCOUNTER — TELEPHONE (OUTPATIENT)
Dept: ONCOLOGY | Age: 70
End: 2020-06-29

## 2020-06-29 NOTE — TELEPHONE ENCOUNTER
Patient called and stated that she has noticed a big difference while off of Anastrozole and feeling better.

## 2020-07-01 NOTE — TELEPHONE ENCOUNTER
Dr. Shila Barrientos talked to the patient.   She will stay of aromatase inhibitor till follow-up with me in 1 month

## 2020-08-04 ENCOUNTER — HOSPITAL ENCOUNTER (OUTPATIENT)
Dept: INFUSION THERAPY | Age: 70
Discharge: HOME | End: 2020-08-04
Payer: MEDICARE

## 2020-08-04 ENCOUNTER — OFFICE VISIT (OUTPATIENT)
Dept: ONCOLOGY | Age: 70
End: 2020-08-04
Payer: MEDICARE

## 2020-08-04 VITALS
HEART RATE: 61 BPM | TEMPERATURE: 98.4 F | RESPIRATION RATE: 16 BRPM | WEIGHT: 215.4 LBS | HEIGHT: 63 IN | DIASTOLIC BLOOD PRESSURE: 70 MMHG | SYSTOLIC BLOOD PRESSURE: 151 MMHG | BODY MASS INDEX: 38.16 KG/M2 | OXYGEN SATURATION: 96 %

## 2020-08-04 PROCEDURE — 99211 OFF/OP EST MAY X REQ PHY/QHP: CPT

## 2020-08-04 PROCEDURE — 3017F COLORECTAL CA SCREEN DOC REV: CPT | Performed by: INTERNAL MEDICINE

## 2020-08-04 PROCEDURE — G8417 CALC BMI ABV UP PARAM F/U: HCPCS | Performed by: INTERNAL MEDICINE

## 2020-08-04 PROCEDURE — 1090F PRES/ABSN URINE INCON ASSESS: CPT | Performed by: INTERNAL MEDICINE

## 2020-08-04 PROCEDURE — 1036F TOBACCO NON-USER: CPT | Performed by: INTERNAL MEDICINE

## 2020-08-04 PROCEDURE — 99214 OFFICE O/P EST MOD 30 MIN: CPT | Performed by: INTERNAL MEDICINE

## 2020-08-04 PROCEDURE — 1123F ACP DISCUSS/DSCN MKR DOCD: CPT | Performed by: INTERNAL MEDICINE

## 2020-08-04 PROCEDURE — 4040F PNEUMOC VAC/ADMIN/RCVD: CPT | Performed by: INTERNAL MEDICINE

## 2020-08-04 PROCEDURE — G9899 SCRN MAM PERF RSLTS DOC: HCPCS | Performed by: INTERNAL MEDICINE

## 2020-08-04 PROCEDURE — G8399 PT W/DXA RESULTS DOCUMENT: HCPCS | Performed by: INTERNAL MEDICINE

## 2020-08-04 PROCEDURE — G8427 DOCREV CUR MEDS BY ELIG CLIN: HCPCS | Performed by: INTERNAL MEDICINE

## 2020-08-04 ASSESSMENT — ENCOUNTER SYMPTOMS
CHEST TIGHTNESS: 0
DIARRHEA: 0
VOMITING: 0
BACK PAIN: 0
RECTAL PAIN: 0
COLOR CHANGE: 0
TROUBLE SWALLOWING: 0
SORE THROAT: 0
CONSTIPATION: 0
NAUSEA: 0
EYE DISCHARGE: 0
ABDOMINAL PAIN: 0
SHORTNESS OF BREATH: 0
COUGH: 0
WHEEZING: 0
BLOOD IN STOOL: 0
FACIAL SWELLING: 0
ABDOMINAL DISTENTION: 0

## 2020-08-04 NOTE — PROGRESS NOTES
This is a 68-year-old patient who was diagnosed with left breast cancer in September 2018. [facility]  [address] [facility] [address] [facility] [address]  [facility]  Dept: [phone]  Dept Fax: [phone]: [phone]    Visit Date:8/4/2020     Heriberto Sheffield is a 65 y.o. female who presents today for:   Chief Complaint   Patient presents with    Follow-up     breast CA        HPI:   This is a 68-year-old patient diagnosed with left breast cancer in September 2018. She underwent routine screening mammogram on September 7, 2018 that revealed clustered calcifications in the left breast.  She had a needle biopsy on September 19, 2018 showing invasive ductal carcinoma, measuring 7 mm in size, grade 1, % positive, MS 70% positive and HER-2/oleg negative. The patient met with the surgeon Dr. Sue Irizarry and after discussing her surgical treatment options she decided to proceed with left lumpectomy and sentinel lymph node biopsy. She had her surgery on October 10, 2018 at the lumpectomy specimen did not reveal evidence of residual malignancy. All 3 sentinel lymph nodes were cancer free. Subsequently the patient met with a radiation oncologist who based on and evidence from CALGB study didn't recommend postlumpectomy radiation treatment. Subsequently the patient met with Dr. Vivienne Zheng who placed her on adjuvant hormonal therapy with Arimidex, which she started in October 2018. The patient underwent a bone density study in November 2018 that revealed osteoporosis. The lowest T score was -2.8. The patient initiated treatment with Zometa on November 7, 2018. Since Dr. Elio Harding is retiring the patient established care with new medical oncologist at [facility]. Interim history on 08/04/2020:  Patient presents to the medical oncology clinic for 2 months follow-up visit. End of May 2020 the patient stopped taking Arimidex due to side effects.   She feels much better being off Arimidex for 2 months.   Denies having any arthralgia, no hot flashes, no difficulty with concentration    HPI   Past Medical History:   Diagnosis Date    Arthritis     Asthma     Brain cyst     benign    Cancer ([facility] [address] 09/1918    Left Breast    Chronic sinus complaints     Diverticulosis of colon     DKA (diabetic ketoacidoses) ([facility] [address] 05/2018    Elevated TSH     Hypertension     Osteoarthritis     Polyneuropathy     PONV (postoperative nausea and vomiting)     Spinal headache     Type 2 diabetes mellitus ([facility] [address] 1990's      Past Surgical History:   Procedure Laterality Date    BLADDER SUSPENSION      times 2    CARPAL TUNNEL RELEASE Bilateral 02/2020    CHOLECYSTECTOMY      DILATION AND CURETTAGE OF UTERUS      x2    EYE SURGERY      HERNIA REPAIR  06/18/2019    HYSTERECTOMY  1995    JOINT REPLACEMENT Right 2007    Rt total knee    KNEE ARTHROSCOPY  1967, 2001    MANDIBLE FRACTURE SURGERY      OTHER SURGICAL HISTORY Right 12/30/2015    Excision of Sebaceous Cyst Right Ear     [place] OFFICE/OUTPT VISIT,PROCEDURE ONLY Left 10/10/2018    LEFT BREAST LUMPECTOMY WITH SENTINEL LYMPH NODE BIOPSY performed by Nancy Espinoza MD at [address] [facility], [facility] SKIN BIOPSY      TONSILLECTOMY AND ADENOIDECTOMY  age 4   Margret Martinez VENTRAL HERNIA REPAIR N/A 6/14/2019    COMBINED LAPAROSCOPIC AND OPEN VENTRAL HERNIA REPAIR WITH MESH performed by Nancy Espinoza MD at [address] [facility] History   Problem Relation Age of Onset    Diabetes Mother     Heart Disease Mother     Lupus Mother     Heart Disease Father     Cancer Father         Squamous cell - face & scalp    Diabetes Maternal Grandmother     Heart Disease Maternal Grandfather     Cancer Paternal Aunt          multiple myeloma    Cancer Paternal Uncle         Lymphatic Leukemia    Breast Cancer Maternal Cousin     Heart Disease Maternal Uncle     Lupus Maternal Uncle     Other Paternal Grandfather         Brain aneurysm      Social History Tobacco Use    Smoking status: Never Smoker    Smokeless tobacco: Never Used   Substance Use Topics    Alcohol use: Yes     Alcohol/week: 0.0 standard drinks     Comment: Socially      Current Outpatient Medications   Medication Sig Dispense Refill    benazepril (LOTENSIN) 20 MG tablet Take 1 tablet by mouth 2 times daily 180 tablet 1    atorvastatin (LIPITOR) 20 MG tablet Take 1 tablet by mouth nightly 90 tablet 1    ibuprofen (ADVIL;MOTRIN) 600 MG tablet Take 1 tablet by mouth 2 times daily as needed for Pain 180 tablet 1    albuterol sulfate HFA (PROAIR HFA) 108 (90 Base) MCG/ACT inhaler Inhale 2 puffs into the lungs every 6 hours as needed for Wheezing 1 Inhaler 1    insulin lispro, 1 Unit Dial, (HUMALOG KWIKPEN) 100 UNIT/ML SOPN Inject into the skin 3 times daily Indications: inject 0-12 unitsinto the skin 3 times daily-per sliding scale      furosemide (LASIX) 20 MG tablet       Handicap Placard MISC by Does not apply route Dx:  Shortness of breath on exertion; duration 3 years: exp date: 8/7/2022 1 each 0    EASY TOUCH PEN NEEDLES 31G X 6 MM MISC       LANTUS SOLOSTAR 100 UNIT/ML injection pen Inject 47 Units into the skin nightly      pantoprazole (PROTONIX) 40 MG tablet Take 40 mg by mouth daily      Calcium Carb-Cholecalciferol 600-500 MG-UNIT CAPS Take by mouth daily      zoledronic acid (ZOMETA) 4 MG/5ML injection Infuse 4 mg intravenously once      aspirin 81 MG chewable tablet Take 81 mg by mouth daily      albuterol (PROVENTIL) (2.5 MG/3ML) 0.083% nebulizer solution Take 3 mLs by nebulization every 4 hours as needed for Wheezing 1 Package 1    therapeutic multivitamin-minerals (THERAGRAN-M) tablet Take 1 tablet by mouth daily.         anastrozole (ARIMIDEX) 1 MG tablet Take 1 tablet by mouth daily (Patient not taking: Reported on 8/4/2020) 90 tablet 3    Insulin Pen Needle (PEN NEEDLES 31GX5/16\") 31G X 8 MM MISC 1 each by Does not apply route daily 100 each 3    glucose blood VI test strips (ASCENSIA AUTODISC VI;ONE TOUCH ULTRA TEST VI) strip [employer] Redi-Code test strips. Tests once daily. 100 each 3     No current facility-administered medications for this visit. Allergies   Allergen Reactions    Amoxicillin     Donnatal [Pb-Hyoscy-Atropine-Scopolamine] Other (See Comments)     palpitations    Lovastatin Other (See Comments)     myalgia    Metformin And Related Diarrhea    Vioxx Other (See Comments)     Liver problems    Dilaudid [Hydromorphone Hcl] Nausea And Vomiting    Fentanyl Nausea And Vomiting     Severe Nausea and vomitting      Health Maintenance   Topic Date Due    Shingles Vaccine (1 of 2) 03/12/1998    Annual Wellness Visit (AWV)  05/29/2019    Diabetic retinal exam  06/21/2019    A1C test (Diabetic or Prediabetic)  03/27/2020    Flu vaccine (1) 09/01/2020    Breast cancer screen  09/17/2020    Lipid screen  11/13/2020    Diabetic microalbuminuria test  11/18/2020    Diabetic foot exam  06/16/2021    TSH testing  06/16/2021    Potassium monitoring  06/16/2021    Creatinine monitoring  06/16/2021    Colon cancer screen colonoscopy  06/26/2022    DTaP/Tdap/Td vaccine (2 - Td) 04/25/2029    Pneumococcal 65+ years Vaccine  Completed    Hepatitis C screen  Completed    DEXA (modify frequency per FRAX score)  Addressed    Hepatitis A vaccine  Aged Out    Hib vaccine  Aged Out    Meningococcal (ACWY) vaccine  Aged Out        Subjective:   Review of Systems   Constitutional: Negative for activity change, appetite change, fatigue and fever. HENT: Negative for congestion, dental problem, facial swelling, hearing loss, mouth sores, nosebleeds, sore throat, tinnitus and trouble swallowing. Eyes: Negative for discharge and visual disturbance. Respiratory: Negative for cough, chest tightness, shortness of breath and wheezing. Cardiovascular: Negative for chest pain, palpitations and leg swelling.    Gastrointestinal: Negative for abdominal distention, abdominal pain, blood in stool, constipation, diarrhea, nausea, rectal pain and vomiting. Endocrine: Negative for cold intolerance, polydipsia and polyuria. Genitourinary: Negative for decreased urine volume, difficulty urinating, dysuria, flank pain, hematuria and urgency. Musculoskeletal: Negative for arthralgias, back pain, gait problem, joint swelling, myalgias and neck stiffness. Skin: Negative for color change, rash and wound. Neurological: Negative for dizziness, tremors, seizures, speech difficulty, weakness, light-headedness, numbness and headaches. Hematological: Negative for adenopathy. Does not bruise/bleed easily. Psychiatric/Behavioral: Negative for confusion and sleep disturbance. The patient is not nervous/anxious. Objective:   Physical Exam  Vitals signs reviewed. Constitutional:       General: She is not in acute distress. Appearance: She is well-developed. HENT:      Head: Normocephalic. Mouth/Throat:      Pharynx: No oropharyngeal exudate. Eyes:      General: No scleral icterus. Right eye: No discharge. Left eye: No discharge. Pupils: Pupils are equal, round, and reactive to light. Neck:      Musculoskeletal: Normal range of motion and neck supple. Thyroid: No thyromegaly. Vascular: No JVD. Trachea: No tracheal deviation. Cardiovascular:      Rate and Rhythm: Normal rate. Heart sounds: Normal heart sounds. No murmur. No friction rub. No gallop. Pulmonary:      Effort: Pulmonary effort is normal. No respiratory distress. Breath sounds: Normal breath sounds. No stridor. No wheezing or rales. Chest:      Chest wall: No tenderness. Breasts:         Left: Mass (Status post left lumpectomy. Lumpectomy incision nicely healed. No palpable masses or nodules) present. Abdominal:      General: Bowel sounds are normal. There is no distension. Palpations: Abdomen is soft. There is no mass. Tenderness: There is no abdominal tenderness. There is no rebound. Musculoskeletal: Normal range of motion. Comments: Good range of motion in all four extremities. Lymphadenopathy:      Cervical: No cervical adenopathy. Skin:     General: Skin is warm. Findings: No erythema or rash. Neurological:      Mental Status: She is alert and oriented to person, place, and time. Cranial Nerves: No cranial nerve deficit. Motor: No abnormal muscle tone. Deep Tendon Reflexes: Reflexes are normal and symmetric. Psychiatric:         Behavior: Behavior normal.         Thought Content: Thought content normal.         Judgment: Judgment normal.         BP (!) 151/70 (Site: Right Upper Arm, Position: Sitting, Cuff Size: Medium Adult)   Pulse 61   Temp 98.4 °F (36.9 °C) (Oral)   Resp 16   Ht 5' 3\" (1.6 m)   Wt 215 lb 6.4 oz (97.7 kg)   SpO2 96%   BMI 38.16 kg/m²      ECOG status is 0. Imaging studies and labs: [facility] Digital Diagnostic W Or Wo Cad Left  Result Date: 3/18/2019  IMPRESSION: Mammogram BI-RADS: 2: Benign 1. There is no mammographic evidence of malignancy. 2. A 1 year screening mammogram is recommended. 3. The patient has been entered into our database and they will receive a notification when they are due for their next exam.  4. The patient was notified of the results. #[id number] - [facility] DIGITAL DIAGNOSTIC W OR WO CAD LEFT UNILATERAL LEFT DIGITAL DIAGNOSTIC MAMMOGRAM 3D/2D WITH CAD: 3/18/2019 CLINICAL: Tomosynthesis. Breast Cancer left breast.  Comparison is made to exams dated:  9/7/2018 mammogram, 8/16/2017  mammogram, and 12/1/2014 mammogram - [facility]. The tissue of the left breast is heterogeneously dense. This may lower the sensitivity of mammography. Current study was also evaluated with a Computer Aided Detection (CAD) system.   There are benign vascular calcifications left breast.  There also  are benign scattered calcifications left breast. Additionally, there are post operative findings left breast.  No significant masses, calcifications, or other findings are seen  in the breast.  There has been no significant interval change. Nas Stoddard M.D.          pgk/:3/18/2019 11:08:53  copy to: Ned Lynch M.D., [facility], ph: [phone], fax: [phone] Imaging Technologist: Courtney MEAD(R)(NICKIE), [facility] letter sent: [facility] Doctor Names       Lab Results   Component Value Date    WBC 5.0 06/16/2020    HGB 12.8 06/16/2020    HCT 39.4 06/16/2020    MCV 94.7 06/16/2020     06/16/2020       Chemistry        Component Value Date/Time     06/16/2020 1110    K 4.4 06/16/2020 1110     06/16/2020 1110    CO2 25 06/16/2020 1110    BUN 18 06/16/2020 1110    CREATININE 0.7 06/16/2020 1110        Component Value Date/Time    CALCIUM 9.7 06/16/2020 1110    ALKPHOS 66 06/16/2020 1110    AST 25 06/16/2020 1110    ALT 29 06/16/2020 1110    BILITOT 1.2 06/16/2020 1110            Assessment/Plan: 1. Stage IA (pT1b, pN0, cM0, G1, ER+, NM+, HER2-) left breast cancer. The patient is status post lumpectomy with sentinel lymph node biopsy. The patient did not have postlumpectomy radiation treatment. The CALGB 9343 study enrolled patients 79years of age and older. The results showed 10% risk of local disease recurrence at 10 years in women treated with tamoxifen only ( no radiation therapy) in contrast to 2% risk of local disease recurrence at 10 years in women treated with tamoxifen and radiation therapy. However overall survival was the same for both groups. PRIME 2 study, confirmed a modest reduction in recurrence rate with RT that did not translate into a survival benefit. After a median follow-up of five years, ipsilateral breast tumor recurrences were lower in women assigned to RT (1.3 versus 4.1 percent).  No differences in overall survival, regional recurrence, distant metastases, contralateral breast cancers, or new breast cancers were noted between the two groups. 2.  Adjuvant hormonal therapy with Arimidex. Adjuvant endocrine therapy reduces breast cancer recurrence and breast cancer mortality in postmenopausal women. AIs result in a more substantial reduction in recurrence rates during treatment and lower breast cancer mortality both during and after treatment. End of May 2020 the patient stopped taking Arimidex due to arthralgia and hot flashes and cloudiness. She feels much better after being off AI for 2 months. She feels that she is ready to restart Arimidex. There is no evidence of disease recurrence on today's physical examination. Her annual bilateral mammogram in September 2019 showed benign findings. Order for mammogram in September 2020 is placed. 3.  Osteoporosis. The patient received first dose of Zometa in November 2019, then in May 2019 and November 2019. .  Tolerated  Zometa infusions well, next week she will proceed with 4th dose. Diagnosis Orders   1. Malignant neoplasm of lower-outer quadrant of left breast of female, estrogen receptor positive ([facility] [address]     2. Prophylactic use of anastrozole (Arimidex)     3. Encounter for follow-up surveillance of breast cancer     4. Status post left breast lumpectomy          Plan:   Return in about 6 months (around 2/5/2021). Orders Placed:   No orders of the defined types were placed in this encounter. Medications Prescribed:   No orders of the defined types were placed in this encounter. Discussed use, benefit, and side effectsof prescribed medications. All patient questions answered. Pt voiced understanding. Instructed to continue current medications, diet and exercise. Patient agreed with treatment plan. Follow up as directed.     Electronically signed by Ramsey Arellano MD on 4/10/19 at 2:35 PM

## 2020-08-12 ENCOUNTER — HOSPITAL ENCOUNTER (EMERGENCY)
Age: 70
Discharge: HOME | End: 2020-08-12
Payer: MEDICARE

## 2020-08-12 VITALS
TEMPERATURE: 97.6 F | RESPIRATION RATE: 18 BRPM | HEIGHT: 63 IN | WEIGHT: 205 LBS | OXYGEN SATURATION: 96 % | DIASTOLIC BLOOD PRESSURE: 61 MMHG | HEART RATE: 68 BPM | SYSTOLIC BLOOD PRESSURE: 148 MMHG | BODY MASS INDEX: 36.32 KG/M2

## 2020-08-12 PROCEDURE — 99213 OFFICE O/P EST LOW 20 MIN: CPT | Performed by: NURSE PRACTITIONER

## 2020-08-12 PROCEDURE — 99212 OFFICE O/P EST SF 10 MIN: CPT

## 2020-08-12 RX ORDER — PREDNISONE 20 MG/1
TABLET ORAL
Qty: 21 TABLET | Refills: 0 | Status: SHIPPED | OUTPATIENT
Start: 2020-08-12 | End: 2020-12-16

## 2020-08-12 ASSESSMENT — ENCOUNTER SYMPTOMS
STRIDOR: 0
SHORTNESS OF BREATH: 0
DIARRHEA: 0
WHEEZING: 0
CHEST TIGHTNESS: 0
COUGH: 0
VOMITING: 0
NAUSEA: 0

## 2020-08-12 NOTE — ED NOTES
PT GIVEN DISCHARGE INSTRUCTIONS, VERBALIZES UNDERSTANDING. PT ASSESSMENT UNCHANGED, DISCHARGED IN STABLE CONDITION.         Sobeida Samuels RN  08/12/20 [id number]

## 2020-08-12 NOTE — ED PROVIDER NOTES
[address]  Urgent Care Encounter       CHIEF COMPLAINT       Chief Complaint   Patient presents with    Rash     possible poison ivy on both arms       Nurses Notes reviewed and I agree except as noted in the HPI. HISTORY OF PRESENT ILLNESS   Brad Dumont is a 65 y.o. female who presents     Patient is present in the urgent care with complaints of rash to bilateral arms that she has had for approximately week with no improvement. She states that she was pulling weeds about a week ago, and believes that she was exposed to poison ivy. She has not noticed any rash to face. Denies any recent fevers. She states that she was unable to be seen by her primary care provider today, and has been losing sleep due to itch intensity. REVIEW OF SYSTEMS     Review of Systems   Constitutional: Negative for chills, fatigue and fever. Respiratory: Negative for cough, chest tightness, shortness of breath, wheezing and stridor. Cardiovascular: Negative for chest pain. Gastrointestinal: Negative for diarrhea, nausea and vomiting. Skin: Positive for rash. PAST MEDICAL HISTORY         Diagnosis Date    Arthritis     Asthma     Brain cyst     benign    Cancer ([address] 09/1918    Left Breast    Chronic sinus complaints     Diverticulosis of colon     DKA (diabetic ketoacidoses) ([address] 05/2018    Elevated TSH     Hypertension     Osteoarthritis     Polyneuropathy     PONV (postoperative nausea and vomiting)     Spinal headache     Type 2 diabetes mellitus ([address] 1990's       SURGICALHISTORY     Patient  has a past surgical history that includes Tonsillectomy and adenoidectomy (age 4); Dilation and curettage of uterus; sinus surgery; Mandible fracture surgery; Cholecystectomy; Hysterectomy (1995);  Knee arthroscopy (1967, 2001); joint replacement (Right, 2007); skin biopsy; eye surgery; bladder suspension; other surgical history (Right, 12/30/2015); pr office/outpt visit,procedure only (Left, 10/10/2018); ventral hernia repair (N/A, 6/14/2019); hernia repair (06/18/2019); and Carpal tunnel release (Bilateral, 02/2020). CURRENT MEDICATIONS       Discharge Medication List as of 8/12/2020 12:09 PM      CONTINUE these medications which have NOT CHANGED    Details   benazepril (LOTENSIN) 20 MG tablet Take 1 tablet by mouth 2 times daily, Disp-180 tablet, R-1Normal      atorvastatin (LIPITOR) 20 MG tablet Take 1 tablet by mouth nightly, Disp-90 tablet, R-1Normal      ibuprofen (ADVIL;MOTRIN) 600 MG tablet Take 1 tablet by mouth 2 times daily as needed for Pain, Disp-180 tablet, R-1Normal      insulin lispro, 1 Unit Dial, (HUMALOG KWIKPEN) 100 UNIT/ML SOPN Inject into the skin 3 times daily Indications: inject 0-12 unitsinto the skin 3 times daily-per sliding scaleHistorical Med      furosemide (LASIX) 20 MG tablet Historical Med      Handicap Placard MISC Starting Wed 8/7/2019, Disp-1 each, R-0, PrintDx:  Shortness of breath on exertion; duration 3 years: exp date: 8/7/2022      !! EASY TOUCH PEN NEEDLES 31G X 6 MM MISC Starting Thu 6/27/2019, MASHA, Historical Med      LANTUS SOLOSTAR 100 UNIT/ML injection pen Inject 47 Units into the skin nightly, DAWHistorical Med      pantoprazole (PROTONIX) 40 MG tablet Take 40 mg by mouth dailyHistorical Med      Calcium Carb-Cholecalciferol 600-500 MG-UNIT CAPS Take by mouth dailyHistorical Med      aspirin 81 MG chewable tablet Take 81 mg by mouth dailyHistorical Med      therapeutic multivitamin-minerals (THERAGRAN-M) tablet Take 1 tablet by mouth daily. anastrozole (ARIMIDEX) 1 MG tablet Take 1 tablet by mouth daily, Disp-90 tablet, R-3Normal      albuterol sulfate HFA (PROAIR HFA) 108 (90 Base) MCG/ACT inhaler Inhale 2 puffs into the lungs every 6 hours as needed for Wheezing, Disp-1 Inhaler, R-1Normal      zoledronic acid (ZOMETA) 4 MG/5ML injection Infuse 4 mg intravenously onceHistorical Med      !!  Insulin Pen Needle (PEN NEEDLES 31GX5/16\") Exam  Constitutional:       General: She is not in acute distress. Appearance: Normal appearance. She is not ill-appearing, toxic-appearing or diaphoretic. Pulmonary:      Effort: Pulmonary effort is normal. No respiratory distress. Musculoskeletal: Normal range of motion. Skin:     Capillary Refill: Capillary refill takes less than 2 seconds. Findings: Rash present. Rash is vesicular. Neurological:      General: No focal deficit present. Mental Status: She is alert and oriented to person, place, and time. Sensory: No sensory deficit. Psychiatric:         Mood and Affect: Mood normal.         Behavior: Behavior normal.         Thought Content: Thought content normal.         Judgment: Judgment normal.         DIAGNOSTIC RESULTS     Labs:No results found for this visit on 08/12/20. IMAGING:    No orders to display     URGENT CARE COURSE:     Vitals:    08/12/20 1147   BP: (!) 148/61   Pulse: 68   Resp: 18   Temp: 97.6 °F (36.4 °C)   TempSrc: Temporal   SpO2: 96%   Weight: 205 lb (93 kg)   Height: 5' 3\" (1.6 m)       Medications - No data to display         PROCEDURES:  None    FINAL IMPRESSION      1. Poison ivy dermatitis    2. Dermatitis          DISPOSITION/ PLAN   Patient is discharged home with prescription for prednisone tapering dose for the next 2 weeks for management of dermatitis, due to poison ivy. Discussed with patient that she may use Benadryl at bedtime to help with itch relief. Had extensive discussion with patient that at this time do not wish to use steroid injection, as there is a history of diabetes, and this is more potent than oral steroids. If rash does not seem to be improving within the next 3 to 4 days she should follow-up with her primary care provider, and discussed with him if injection would be beneficial.  Should avoid excessive itching as this could cause secondary infection. PATIENT REFERRED TO:  Frank Lora MD  [address] [facility] /

## 2020-09-01 ENCOUNTER — HOSPITAL ENCOUNTER (OUTPATIENT)
Dept: WOMENS IMAGING | Age: 70
Discharge: HOME | End: 2020-09-01
Payer: MEDICARE

## 2020-09-01 PROCEDURE — G0279 TOMOSYNTHESIS, MAMMO: HCPCS

## 2020-09-05 ENCOUNTER — HOSPITAL ENCOUNTER (OUTPATIENT)
Age: 70
Discharge: HOME | End: 2020-09-05
Payer: MEDICARE

## 2020-09-05 LAB
ALBUMIN SERPL-MCNC: 4 G/DL (ref 3.5–5.1)
ALP BLD-CCNC: 52 U/L (ref 38–126)
ALT SERPL-CCNC: 31 U/L (ref 11–66)
ANION GAP SERPL CALCULATED.3IONS-SCNC: 14 MEQ/L (ref 8–16)
AST SERPL-CCNC: 22 U/L (ref 5–40)
BASOPHILS # BLD: 0.4 %
BASOPHILS ABSOLUTE: 0 THOU/MM3 (ref 0–0.1)
BILIRUB SERPL-MCNC: 1.4 MG/DL (ref 0.3–1.2)
BILIRUBIN URINE: NEGATIVE
BLOOD, URINE: NEGATIVE
BUN BLDV-MCNC: 21 MG/DL (ref 7–22)
CALCIUM SERPL-MCNC: 9.2 MG/DL (ref 8.5–10.5)
CHARACTER, URINE: CLEAR
CHLORIDE BLD-SCNC: 106 MEQ/L (ref 98–111)
CHOLESTEROL, TOTAL: 112 MG/DL (ref 100–199)
CO2: 24 MEQ/L (ref 23–33)
COLOR: YELLOW
CREAT SERPL-MCNC: 0.6 MG/DL (ref 0.4–1.2)
CREATININE, URINE: 143.1 MG/DL
EOSINOPHIL # BLD: 2.9 %
EOSINOPHILS ABSOLUTE: 0.1 THOU/MM3 (ref 0–0.4)
ERYTHROCYTE [DISTWIDTH] IN BLOOD BY AUTOMATED COUNT: 12.5 % (ref 11.5–14.5)
ERYTHROCYTE [DISTWIDTH] IN BLOOD BY AUTOMATED COUNT: 43.3 FL (ref 35–45)
GFR SERPL CREATININE-BSD FRML MDRD: > 90 ML/MIN/1.73M2
GLUCOSE BLD-MCNC: 168 MG/DL (ref 70–108)
GLUCOSE, URINE: NEGATIVE MG/DL
HCT VFR BLD CALC: 40.3 % (ref 37–47)
HDLC SERPL-MCNC: 36 MG/DL
HEMOGLOBIN: 13.2 GM/DL (ref 12–16)
IMMATURE GRANS (ABS): 0.01 THOU/MM3 (ref 0–0.07)
IMMATURE GRANULOCYTES: 0.2 %
KETONES, URINE: NEGATIVE
LDL CHOLESTEROL CALCULATED: 52 MG/DL
LEUKOCYTE EST, POC: NEGATIVE
LYMPHOCYTES # BLD: 34.8 %
LYMPHOCYTES ABSOLUTE: 1.6 THOU/MM3 (ref 1–4.8)
MCH RBC QN AUTO: 30.9 PG (ref 26–33)
MCHC RBC AUTO-ENTMCNC: 32.8 GM/DL (ref 32.2–35.5)
MCV RBC AUTO: 94.4 FL (ref 81–99)
MICROALBUMIN UR-MCNC: 1.22 MG/DL
MICROALBUMIN/CREAT UR-RTO: 9 MG/G (ref 0–30)
MONOCYTES # BLD: 8.8 %
MONOCYTES ABSOLUTE: 0.4 THOU/MM3 (ref 0.4–1.3)
NITRITE, URINE: NEGATIVE
NUCLEATED RED BLOOD CELLS: 0 /100 WBC
PH UA: 5 (ref 5–9)
PLATELET # BLD: 154 THOU/MM3 (ref 130–400)
PMV BLD AUTO: 10.7 FL (ref 9.4–12.4)
POTASSIUM SERPL-SCNC: 3.9 MEQ/L (ref 3.5–5.2)
PROTEIN UA: NEGATIVE MG/DL
RBC # BLD: 4.27 MILL/MM3 (ref 4.2–5.4)
SEG NEUTROPHILS: 52.9 %
SEGMENTED NEUTROPHILS ABSOLUTE COUNT: 2.4 THOU/MM3 (ref 1.8–7.7)
SODIUM BLD-SCNC: 144 MEQ/L (ref 135–145)
SPECIFIC GRAVITY UA: 1.02 (ref 1–1.03)
TOTAL PROTEIN: 6.6 G/DL (ref 6.1–8)
TRIGL SERPL-MCNC: 122 MG/DL (ref 0–199)
UROBILINOGEN, URINE: 0.2 EU/DL (ref 0–1)
WBC # BLD: 4.5 THOU/MM3 (ref 4.8–10.8)

## 2020-09-05 PROCEDURE — 36415 COLL VENOUS BLD VENIPUNCTURE: CPT

## 2020-09-05 PROCEDURE — 80061 LIPID PANEL: CPT

## 2020-09-05 PROCEDURE — 80053 COMPREHEN METABOLIC PANEL: CPT

## 2020-09-05 PROCEDURE — 82043 UR ALBUMIN QUANTITATIVE: CPT

## 2020-09-05 PROCEDURE — 85025 COMPLETE CBC W/AUTO DIFF WBC: CPT

## 2020-09-05 PROCEDURE — 81003 URINALYSIS AUTO W/O SCOPE: CPT

## 2020-12-16 ENCOUNTER — OFFICE VISIT (OUTPATIENT)
Dept: FAMILY MEDICINE CLINIC | Age: 70
End: 2020-12-16
Payer: MEDICARE

## 2020-12-16 VITALS
BODY MASS INDEX: 36.68 KG/M2 | WEIGHT: 207 LBS | SYSTOLIC BLOOD PRESSURE: 116 MMHG | DIASTOLIC BLOOD PRESSURE: 70 MMHG | HEART RATE: 56 BPM | TEMPERATURE: 97.1 F | HEIGHT: 63 IN

## 2020-12-16 LAB
BILIRUBIN, POC: NEGATIVE
BLOOD URINE, POC: NORMAL
CLARITY, POC: CLEAR
COLOR, POC: YELLOW
GLUCOSE URINE, POC: NORMAL
KETONES, POC: NEGATIVE
LEUKOCYTE EST, POC: NEGATIVE
NITRITE, POC: NEGATIVE
PH, POC: 5.5
PROTEIN, POC: NEGATIVE
SPECIFIC GRAVITY, POC: 1.02
UROBILINOGEN, POC: 0.2

## 2020-12-16 PROCEDURE — G8399 PT W/DXA RESULTS DOCUMENT: HCPCS | Performed by: FAMILY MEDICINE

## 2020-12-16 PROCEDURE — 4040F PNEUMOC VAC/ADMIN/RCVD: CPT | Performed by: FAMILY MEDICINE

## 2020-12-16 PROCEDURE — 3046F HEMOGLOBIN A1C LEVEL >9.0%: CPT | Performed by: FAMILY MEDICINE

## 2020-12-16 PROCEDURE — 81003 URINALYSIS AUTO W/O SCOPE: CPT | Performed by: FAMILY MEDICINE

## 2020-12-16 PROCEDURE — G0439 PPPS, SUBSEQ VISIT: HCPCS | Performed by: FAMILY MEDICINE

## 2020-12-16 PROCEDURE — 2022F DILAT RTA XM EVC RTNOPTHY: CPT | Performed by: FAMILY MEDICINE

## 2020-12-16 PROCEDURE — 1036F TOBACCO NON-USER: CPT | Performed by: FAMILY MEDICINE

## 2020-12-16 PROCEDURE — G8417 CALC BMI ABV UP PARAM F/U: HCPCS | Performed by: FAMILY MEDICINE

## 2020-12-16 PROCEDURE — 3017F COLORECTAL CA SCREEN DOC REV: CPT | Performed by: FAMILY MEDICINE

## 2020-12-16 PROCEDURE — 99213 OFFICE O/P EST LOW 20 MIN: CPT | Performed by: FAMILY MEDICINE

## 2020-12-16 PROCEDURE — G9899 SCRN MAM PERF RSLTS DOC: HCPCS | Performed by: FAMILY MEDICINE

## 2020-12-16 PROCEDURE — G8427 DOCREV CUR MEDS BY ELIG CLIN: HCPCS | Performed by: FAMILY MEDICINE

## 2020-12-16 PROCEDURE — G8484 FLU IMMUNIZE NO ADMIN: HCPCS | Performed by: FAMILY MEDICINE

## 2020-12-16 PROCEDURE — 1090F PRES/ABSN URINE INCON ASSESS: CPT | Performed by: FAMILY MEDICINE

## 2020-12-16 PROCEDURE — 1123F ACP DISCUSS/DSCN MKR DOCD: CPT | Performed by: FAMILY MEDICINE

## 2020-12-16 RX ORDER — IBUPROFEN 600 MG/1
600 TABLET ORAL 2 TIMES DAILY PRN
Qty: 180 TABLET | Refills: 1 | Status: SHIPPED | OUTPATIENT
Start: 2020-12-16 | End: 2021-06-14 | Stop reason: SDUPTHER

## 2020-12-16 RX ORDER — LEVETIRACETAM 500 MG/1
500 TABLET ORAL DAILY
COMMUNITY
End: 2021-02-18

## 2020-12-16 RX ORDER — ATORVASTATIN CALCIUM 20 MG/1
20 TABLET, FILM COATED ORAL NIGHTLY
Qty: 90 TABLET | Refills: 1 | Status: SHIPPED | OUTPATIENT
Start: 2020-12-16 | End: 2021-06-14 | Stop reason: SDUPTHER

## 2020-12-16 RX ORDER — ALBUTEROL SULFATE 90 UG/1
2 AEROSOL, METERED RESPIRATORY (INHALATION) EVERY 6 HOURS PRN
Qty: 1 INHALER | Refills: 1 | Status: SHIPPED | OUTPATIENT
Start: 2020-12-16 | End: 2021-06-14 | Stop reason: SDUPTHER

## 2020-12-16 RX ORDER — BENAZEPRIL HYDROCHLORIDE 20 MG/1
20 TABLET ORAL 2 TIMES DAILY
Qty: 180 TABLET | Refills: 1 | Status: SHIPPED | OUTPATIENT
Start: 2020-12-16 | End: 2021-06-14 | Stop reason: SDUPTHER

## 2020-12-16 ASSESSMENT — LIFESTYLE VARIABLES
HOW OFTEN DO YOU HAVE SIX OR MORE DRINKS ON ONE OCCASION: 0
HAVE YOU OR SOMEONE ELSE BEEN INJURED AS A RESULT OF YOUR DRINKING: 0
HOW OFTEN DO YOU HAVE A DRINK CONTAINING ALCOHOL: 2
HOW OFTEN DURING THE LAST YEAR HAVE YOU FOUND THAT YOU WERE NOT ABLE TO STOP DRINKING ONCE YOU HAD STARTED: 0
HOW OFTEN DURING THE LAST YEAR HAVE YOU NEEDED AN ALCOHOLIC DRINK FIRST THING IN THE MORNING TO GET YOURSELF GOING AFTER A NIGHT OF HEAVY DRINKING: 0
HOW OFTEN DURING THE LAST YEAR HAVE YOU FAILED TO DO WHAT WAS NORMALLY EXPECTED FROM YOU BECAUSE OF DRINKING: 0
HOW OFTEN DURING THE LAST YEAR HAVE YOU HAD A FEELING OF GUILT OR REMORSE AFTER DRINKING: 0
HOW MANY STANDARD DRINKS CONTAINING ALCOHOL DO YOU HAVE ON A TYPICAL DAY: 0
AUDIT TOTAL SCORE: 2
HOW OFTEN DURING THE LAST YEAR HAVE YOU BEEN UNABLE TO REMEMBER WHAT HAPPENED THE NIGHT BEFORE BECAUSE YOU HAD BEEN DRINKING: 0
AUDIT-C TOTAL SCORE: 2
HAS A RELATIVE, FRIEND, DOCTOR, OR ANOTHER HEALTH PROFESSIONAL EXPRESSED CONCERN ABOUT YOUR DRINKING OR SUGGESTED YOU CUT DOWN: 0

## 2020-12-16 ASSESSMENT — PATIENT HEALTH QUESTIONNAIRE - PHQ9
SUM OF ALL RESPONSES TO PHQ QUESTIONS 1-9: 0
SUM OF ALL RESPONSES TO PHQ QUESTIONS 1-9: 0
SUM OF ALL RESPONSES TO PHQ9 QUESTIONS 1 & 2: 0
2. FEELING DOWN, DEPRESSED OR HOPELESS: 0
SUM OF ALL RESPONSES TO PHQ QUESTIONS 1-9: 0
1. LITTLE INTEREST OR PLEASURE IN DOING THINGS: 0

## 2020-12-16 NOTE — PROGRESS NOTES
Medicare Annual Wellness Visit  Name: Kendy Calvo Date: 2020   MRN: [id number] Sex: Female   Age: 65 y.o. Ethnicity: Non-/Non    : 1950 Race: Jacky Perkins is here for 6 Month Follow-Up, Hypertension, and Diabetes    Screenings for behavioral, psychosocial and functional/safety risks, and cognitive dysfunction are all negative except as indicated below. These results, as well as other patient data from the [address] form, are documented in Flowsheets linked to this Encounter. Allergies   Allergen Reactions    Amoxicillin     Donnatal [Phenobarbital-Belladonna Alk] Other (See Comments)     palpitations    Lovastatin Other (See Comments)     myalgia    Metformin And Related Diarrhea    Vioxx Other (See Comments)     Liver problems    Dilaudid [Hydromorphone Hcl] Nausea And Vomiting    Fentanyl Nausea And Vomiting     Severe Nausea and vomitting       Prior to Visit Medications    Medication Sig Taking?  Authorizing Provider   dapagliflozin (FARXIGA) 5 MG tablet Take 5 mg by mouth every morning Yes Historical Provider, MD   levETIRAcetam (KEPPRA) 500 MG tablet Take 500 mg by mouth daily Yes Historical Provider, MD   benazepril (LOTENSIN) 20 MG tablet Take 1 tablet by mouth 2 times daily Yes Jeanine Sanford MD   atorvastatin (LIPITOR) 20 MG tablet Take 1 tablet by mouth nightly Yes Jeanine Sanford MD   ibuprofen (ADVIL;MOTRIN) 600 MG tablet Take 1 tablet by mouth 2 times daily as needed for Pain Yes Jeanine Sanford MD   anastrozole (ARIMIDEX) 1 MG tablet Take 1 tablet by mouth daily Yes Tanner Celeste MD   albuterol sulfate HFA (PROAIR HFA) 108 (90 Base) MCG/ACT inhaler Inhale 2 puffs into the lungs every 6 hours as needed for Wheezing Yes Jeanine Sanford MD   insulin lispro, 1 Unit Dial, (HUMALOG KWIKPEN) 100 UNIT/ML SOPN Inject into the skin 3 times daily Indications: inject 0-12 unitsinto the skin 3 times daily-per sliding scale Yes Historical Provider, MD   furosemide (LASIX) 20 MG tablet  Yes Historical Provider, MD   Handicap Placard MISC by Does not apply route Dx:  Shortness of breath on exertion; duration 3 years: exp date: 8/7/2022 Yes Renetta Ribera MD   EASY TOUCH PEN NEEDLES 31G X 6 MM [address] [facility]  Yes Historical Provider, MD   LANANYA SOLOSTAR 100 UNIT/ML injection pen Inject 47 Units into the skin nightly Yes Historical Provider, MD   pantoprazole (PROTONIX) 40 MG tablet Take 40 mg by mouth daily Yes Historical Provider, MD   Calcium Carb-Cholecalciferol 600-500 MG-UNIT CAPS Take by mouth daily Yes Historical Provider, MD   zoledronic acid (ZOMETA) 4 MG/5ML injection Infuse 4 mg intravenously once Yes Historical Provider, MD   aspirin 81 MG chewable tablet Take 81 mg by mouth daily Yes Historical Provider, MD   albuterol (PROVENTIL) (2.5 MG/3ML) 0.083% nebulizer solution Take 3 mLs by nebulization every 4 hours as needed for Wheezing Yes Carlita Alvarez APRN - CNP   therapeutic multivitamin-minerals (THERAGRAN-M) tablet Take 1 tablet by mouth daily. Yes Historical Provider, MD   Insulin Pen Needle (PEN NEEDLES 31GX5/16\") 31G X 8 MM MISC 1 each by Does not apply route daily  Aris Chavira MD   glucose blood VI test strips (ASCENSIA AUTODISC VI;ONE TOUCH ULTRA TEST VI) strip [employer] test strips. Tests once daily.   Aris Chavira MD       Past Medical History:   Diagnosis Date    Arthritis     Asthma     Brain cyst     benign    Cancer [facility]) 09/1918    Left Breast    Chronic sinus complaints     Diverticulosis of colon     DKA (diabetic ketoacidoses) ([address] 05/2018    Elevated TSH     Hypertension     Osteoarthritis     Polyneuropathy     PONV (postoperative nausea and vomiting)     Spinal headache     Type 2 diabetes mellitus ([facility] [address] 1990's       Past Surgical History:   Procedure Laterality Date    BLADDER SUSPENSION      times 2    CARPAL TUNNEL RELEASE Bilateral 02/2020    CHOLECYSTECTOMY      DILATION Quadv, IM, (6 mo and older Fluzone, Flulaval, Fluarix and 3 yrs and older Afluria) 09/27/2017    Pneumococcal Conjugate 13-valent ([id number]) 10/01/2015    Pneumococcal Polysaccharide ([id number]) 05/06/2019    Tdap (Boostrix, Adacel) 04/25/2019        Health Maintenance   Topic Date Due    Annual Wellness Visit (AWV)  05/29/2019    A1C test (Diabetic or Prediabetic)  03/27/2020    Shingles Vaccine (2 of 2) 11/07/2020    Diabetic foot exam  06/16/2021    TSH testing  06/16/2021    Diabetic retinal exam  08/31/2021    Breast cancer screen  09/01/2021    Diabetic microalbuminuria test  09/05/2021    Lipid screen  09/05/2021    Potassium monitoring  09/05/2021    Creatinine monitoring  09/05/2021    Colon cancer screen colonoscopy  06/26/2022    DTaP/Tdap/Td vaccine (2 - Td) 04/25/2029    DEXA (modify frequency per FRAX score)  Completed    Flu vaccine  Completed    Pneumococcal 65+ years Vaccine  Completed    Hepatitis C screen  Completed    Hepatitis A vaccine  Aged Out    Hib vaccine  Aged Out    Meningococcal (ACWY) vaccine  Aged Out     Recommendations for [email or web address] Due: see orders and patient instructions/AVS.  . Recommended screening schedule for the next 5-10 years is provided to the patient in written form: see Patient Instructions/AVS.    Avril Thurman was seen today for 6 month follow-up, hypertension and diabetes. Diagnoses and all orders for this visit:    Routine general medical examination at a health care facility    ASCVD (arteriosclerotic cardiovascular disease)  -     atorvastatin (LIPITOR) 20 MG tablet; Take 1 tablet by mouth nightly    Type 2 diabetes mellitus with hyperglycemia, without long-term current use of insulin (HCC)  -     benazepril (LOTENSIN) 20 MG tablet; Take 1 tablet by mouth 2 times daily  -     atorvastatin (LIPITOR) 20 MG tablet; Take 1 tablet by mouth nightly    Essential hypertension  -     benazepril (LOTENSIN) 20 MG tablet;  Take 1 tablet by mouth 2 times daily    Chronic pain of left knee  -     ibuprofen (ADVIL;MOTRIN) 600 MG tablet; Take 1 tablet by mouth 2 times daily as needed for Pain    Mild intermittent asthma without complication  -     albuterol sulfate HFA (PROAIR HFA) 108 (90 Base) MCG/ACT inhaler;  Inhale 2 puffs into the lungs every 6 hours as needed for Wheezing           Had a1c at [facility] office  Above problems are deemed controlled and medications are refilled      F/u in 6 months HTN and DM    Cori May MD

## 2020-12-16 NOTE — PATIENT INSTRUCTIONS
Personalized Preventive Plan for Adam Harper - 12/16/2020  Medicare offers a range of preventive health benefits. Some of the tests and screenings are paid in full while other may be subject to a deductible, co-insurance, and/or copay. Some of these benefits include a comprehensive review of your medical history including lifestyle, illnesses that may run in your family, and various assessments and screenings as appropriate. After reviewing your medical record and screening and assessments performed today your provider may have ordered immunizations, labs, imaging, and/or referrals for you. A list of these orders (if applicable) as well as your Preventive Care list are included within your After Visit Summary for your review. Other Preventive Recommendations:    · A preventive eye exam performed by an eye specialist is recommended every 1-2 years to screen for glaucoma; cataracts, macular degeneration, and other eye disorders. · A preventive dental visit is recommended every 6 months. · Try to get at least 150 minutes of exercise per week or 10,000 steps per day on a pedometer . · Order or download the FREE \"Exercise & Physical Activity: Your Everyday Guide\" from The [employer]. Call 8-212.249.9458 or search [employer] online. · You need 5522-4208 mg of calcium and 7326-2084 IU of vitamin D per day. It is possible to meet your calcium requirement with diet alone, but a vitamin D supplement is usually necessary to meet this goal.  · When exposed to the sun, use a sunscreen that protects against both UVA and UVB radiation with an SPF of 30 or greater. Reapply every 2 to 3 hours or after sweating, drying off with a towel, or swimming. · Always wear a seat belt when traveling in a car. Always wear a helmet when riding a bicycle or motorcycle.     Keeping Home a [facility] To avoid dizziness, get up slowly from a lying down position. Sit up first, dangling your legs for a minute or two before rising to a standing position. Overall Home Safety Check   According to the Consumer [employer] Safety Commision's \"Older Consumer Home Safety Checklist,\" it is important to check for potential hazards in each room. And remember, proper lighting is an essential factor in home safety. If you cannot see clearly, you are more likely to fall. Important questions to ask yourself include:   Are lamp, electric, extension, and telephone cords placed out of the flow of traffic and maintained in good condition? Have frayed cords been replaced? Are all small rugs and runners slip resistant? If not, you can secure them to the floor with a special double-sided carpet tape. Are smoke detectors properly locatedone on every floor of your home and one outside of every sleeping area? Are they in good working order? Are batteries replaced at least once a year? Do you have a well-maintained carbon monoxide detector outside every sleeping are in your home? Does your furniture layout leave plenty of space to maneuver between and around chairs, tables, beds, and sofas? Are hallways, stairs and passages between rooms well lit? Can you reach a lamp without getting out of bed? Are floor surfaces well maintained? Shag rugs, high-pile carpeting, tile floors, and polished wood floors can be particularly slippery. Stairs should always have handrails and be carpeted or fitted with a non-skid tread. Is your telephone easily reachable. Is the cord safely tucked away? Room by Room   According to the Association of Aging, bathrooms and heena are the two most potentially hazardous rooms in your home. In the Kitchen    Be sure your stove is in proper working order and always make sure burners and the oven are off before you go out or go to sleep. Keep pots on the back burners, turn handles away from the front of the stove, and keep stove clean and free of grease build-up. Kitchen ventilation systems and range exhausts should be working properly. Keep flammable objects such as towels and pot holders away from the cooking area except when in use. Make sure kitchen curtains are tied back. Move cords and appliances away from the sink and hot surfaces. If extension cords are needed, install wiring guides so they do not hang over the sink, range, or working areas. Look for coffee pots, kettles and toaster ovens with automatic shut-offs. Keep a mop handy in the kitchen so you can wipe up spills instantly. You should also have a small fire extinguisher. Arrange your kitchen with frequently used items on lower shelves to avoid the need to stand on a stepstool to reach them. Make sure countertops are well-lit to avoid injuries while cutting and preparing food. In the Bathroom    Use a non-slip mat or decals in the tub and shower, since wet, soapy tile or porcelain surfaces are extremely slippery. Make sure bathroom rugs are non-skid or tape them firmly to the floor. Bathtubs should have at least one, preferably two, grab bars, firmly attached to structural supports in the wall. (Do not use built-in soap holders or glass shower doors as grab bars.)    Tub seats fitted with non-slip material on the legs allow you to wash sitting down. For people with limited mobility, bathtub transfer benches allow you to slide safely into the tub. Raised toilet seats and toilet safety rails are helpful for those with knee or hip problems. In the [facility]    Make sure you use a nightlight and that the area around your bed is clear of potential obstacles. Be careful with electric blankets and never go to sleep with a heating pad, which can cause serious burns even if on a low setting. Use fire-resistant mattress covers and pillows, and NEVER smoke in bed. Keep a phone next to the bed that is programmed to dial 911 at the push of a button. If you have a chronic condition, you may want to sign on with an automatic call-in service. Typically the system includes a small pendant that connects directly to an emergency medical voice-response system. You should also make arrangements to stay in contact with someonefriend, neighbor, family memberon a regular schedule. Fire Prevention   According to the [employer]. (Smoke Alarms for Every) [address] [place] [address], senior citizens are one of the two highest risk groups for death and serious injuries due to residential fires. When cooking, wear short-sleeved items, never a bulky long-sleeved robe. The [facility]'s Safety Checklist for Older Consumers emphasizes the importance of checking basements, garages, workshops and storage areas for fire hazards, such as volatile liquids, piles of old rags or clothing and overloaded circuits. Never smoke in bed or when lying down on a couch or recliner chair. Small portable electric or kerosene heaters are responsible for many home fires and should be used cautiously if at all. If you do use one, be sure to keep them away from flammable materials. In case of fire, make sure you have a pre-established emergency exit plan. Have a professional check your fireplace and other fuel-burning appliances yearly.     [employer] Baby boomers entering the mendoza years will continue to see the development of new products to help older adults live safely and independently in spite of age-related changes. Making Life More Livable  , by [employer], lists over 1,000 products for \"living well in the mature years,\" such as bathing and mobility aids, household security devices, ergonomically designed knives and peelers, and faucet valves and knobs for temperature control. Medical supply stores and organizations are good sources of information about products that improve your quality of life and insure your safety.      Last Reviewed: November 2009 Peggy Cruz MD   Updated: 3/7/2011     ·

## 2020-12-16 NOTE — PROGRESS NOTES
[facility]  [facility]  [address] Located within [facility]  Dept: [phone]  Dept Fax: [phone]  Loc: [phone]  PROGRESS NOTE      Visit Date: 12/16/2020    Leopold Rei is a 65 y.o. female who presents today for:  Chief Complaint   Patient presents with    6 Month Follow-Up    Hypertension    Diabetes       Subjective:  HPI     New problem:  Dysuria:  For 1 month. No other symptoms. Bilateral hands. Had carpal tunnel surgery in feb. Reports lumps in both hands. Reports pain with placing pressure through wrists when going to stand up. She felt like she was discouraged on making a f/u appt. Seeing neurologist at [address] [place] [address] now for unsteady gait and numbness in feet. Review of Systems   Constitutional: Negative for chills and fever. Genitourinary: Positive for dysuria. Negative for frequency and urgency. Musculoskeletal: Positive for arthralgias. Neurological: Positive for numbness.      Patient Active Problem List   Diagnosis    Hyperlipidemia    Asthma    Polyneuropathy    AS (aortic stenosis)    Cardiomyopathy eF 39 TO 50% PER ECHO MAY 2012    Hiatal hernia    Hypothyroid    Type 2 diabetes mellitus without complication, without long-term current use of insulin (HCC)    Osteoarthritis of multiple joints    Ear canal mass    Diabetic ketoacidosis without coma associated with type 2 diabetes mellitus ([address]    Malignant neoplasm of left breast in female, estrogen receptor positive (HCC)    Localized osteoarthritis of left knee    Other osteoporosis without current pathological fracture    S/P repair of ventral hernia    Cervical stenosis of spinal canal    Tear of left supraspinatus tendon    Morbidly obese (HCC)     Past Medical History:   Diagnosis Date    Arthritis     Asthma     Brain cyst     benign    Cancer ([address] 09/1918    Left Breast    Chronic sinus complaints     Diverticulosis of colon     DKA (diabetic ketoacidoses) [facility] 05/2018    Elevated TSH     Hypertension     Osteoarthritis     Polyneuropathy     PONV (postoperative nausea and vomiting)     Spinal headache     Type 2 diabetes mellitus ([facility] 1990's      Past Surgical History:   Procedure Laterality Date    BLADDER SUSPENSION      times 2    CARPAL TUNNEL RELEASE Bilateral 02/2020    CHOLECYSTECTOMY      DILATION AND CURETTAGE OF UTERUS      x2    EYE SURGERY      HERNIA REPAIR  06/18/2019    HYSTERECTOMY  1995    JOINT REPLACEMENT Right 2007    Rt total knee    KNEE ARTHROSCOPY  1967, 2001    MANDIBLE FRACTURE SURGERY      OTHER SURGICAL HISTORY Right 12/30/2015    Excision of Sebaceous Cyst Right Ear     RI OFFICE/OUTPT VISIT,PROCEDURE ONLY Left 10/10/2018    LEFT BREAST LUMPECTOMY WITH SENTINEL LYMPH NODE BIOPSY performed by Kaila Kraus MD at [address] [facility] [facility], [facility] SKIN BIOPSY      TONSILLECTOMY AND ADENOIDECTOMY  age 4   Aleyda Curl VENTRAL HERNIA REPAIR N/A 6/14/2019    COMBINED LAPAROSCOPIC AND OPEN VENTRAL HERNIA REPAIR WITH MESH performed by Kaila Kraus MD at [facility] History   Problem Relation Age of Onset    Diabetes Mother     Heart Disease Mother     Lupus Mother     Heart Disease Father     Cancer Father         Squamous cell - face & scalp    Diabetes Maternal Grandmother     Heart Disease Maternal Grandfather     Cancer Paternal Aunt          multiple myeloma    Cancer Paternal Uncle         Lymphatic Leukemia    Breast Cancer Maternal Cousin     Heart Disease Maternal Uncle     Lupus Maternal Uncle     Other Paternal Grandfather         Brain aneurysm     Social History     Tobacco Use    Smoking status: Never Smoker    Smokeless tobacco: Never Used   Substance Use Topics    Alcohol use:  Yes     Alcohol/week: 0.0 standard drinks     Comment: Socially      Current Outpatient Medications   Medication Sig Dispense Refill    dapagliflozin (FARXIGA) 5 MG tablet Take 5 mg by mouth every morning      levETIRAcetam (KEPPRA) 500 MG tablet Take 500 mg by mouth daily      benazepril (LOTENSIN) 20 MG tablet Take 1 tablet by mouth 2 times daily 180 tablet 1    atorvastatin (LIPITOR) 20 MG tablet Take 1 tablet by mouth nightly 90 tablet 1    ibuprofen (ADVIL;MOTRIN) 600 MG tablet Take 1 tablet by mouth 2 times daily as needed for Pain 180 tablet 1    anastrozole (ARIMIDEX) 1 MG tablet Take 1 tablet by mouth daily 90 tablet 3    albuterol sulfate HFA (PROAIR HFA) 108 (90 Base) MCG/ACT inhaler Inhale 2 puffs into the lungs every 6 hours as needed for Wheezing 1 Inhaler 1    insulin lispro, 1 Unit Dial, (HUMALOG KWIKPEN) 100 UNIT/ML SOPN Inject into the skin 3 times daily Indications: inject 0-12 unitsinto the skin 3 times daily-per sliding scale      furosemide (LASIX) 20 MG tablet       Handicap Placard MISC by Does not apply route Dx:  Shortness of breath on exertion; duration 3 years: exp date: 8/7/2022 1 each 0    EASY TOUCH PEN NEEDLES 31G X 6 MM MISC       LANTUS SOLOSTAR 100 UNIT/ML injection pen Inject 47 Units into the skin nightly      pantoprazole (PROTONIX) 40 MG tablet Take 40 mg by mouth daily      Calcium Carb-Cholecalciferol 600-500 MG-UNIT CAPS Take by mouth daily      zoledronic acid (ZOMETA) 4 MG/5ML injection Infuse 4 mg intravenously once      aspirin 81 MG chewable tablet Take 81 mg by mouth daily      albuterol (PROVENTIL) (2.5 MG/3ML) 0.083% nebulizer solution Take 3 mLs by nebulization every 4 hours as needed for Wheezing 1 Package 1    therapeutic multivitamin-minerals (THERAGRAN-M) tablet Take 1 tablet by mouth daily.  Insulin Pen Needle (PEN NEEDLES 31GX5/16\") 31G X 8 MM MISC 1 each by Does not apply route daily 100 each 3    glucose blood VI test strips (ASCENSIA AUTODISC VI;ONE TOUCH ULTRA TEST VI) strip [employer] test strips. Tests once daily. 100 each 3     No current facility-administered medications for this visit.       Allergies Allergen Reactions    Amoxicillin     Donnatal [Phenobarbital-Belladonna Alk] Other (See Comments)     palpitations    Lovastatin Other (See Comments)     myalgia    Metformin And Related Diarrhea    Vioxx Other (See Comments)     Liver problems    Dilaudid [Hydromorphone Hcl] Nausea And Vomiting    Fentanyl Nausea And Vomiting     Severe Nausea and vomitting     Health Maintenance   Topic Date Due    Annual Wellness Visit (AWV)  05/29/2019    A1C test (Diabetic or Prediabetic)  03/27/2020    Shingles Vaccine (2 of 2) 11/07/2020    Diabetic foot exam  06/16/2021    TSH testing  06/16/2021    Diabetic retinal exam  08/31/2021    Breast cancer screen  09/01/2021    Diabetic microalbuminuria test  09/05/2021    Lipid screen  09/05/2021    Potassium monitoring  09/05/2021    Creatinine monitoring  09/05/2021    Colon cancer screen colonoscopy  06/26/2022    DTaP/Tdap/Td vaccine (2 - Td) 04/25/2029    DEXA (modify frequency per FRAX score)  Completed    Flu vaccine  Completed    Pneumococcal 65+ years Vaccine  Completed    Hepatitis C screen  Completed    Hepatitis A vaccine  Aged Out    Hib vaccine  Aged Out    Meningococcal (ACWY) vaccine  Aged Out       Objective:  /70 (Site: Right Upper Arm, Position: Sitting, Cuff Size: Large Adult)   Pulse 56   Temp 97.1 °F (36.2 °C)   Ht 5' 3\" (1.6 m)   Wt 207 lb (93.9 kg)   BMI 36.67 kg/m²   Physical Exam  Vitals signs reviewed. Constitutional:       Appearance: She is well-developed. Cardiovascular:      Rate and Rhythm: Normal rate and regular rhythm. Heart sounds: Murmur present. Systolic murmur present with a grade of 2/6. Pulmonary:      Effort: Pulmonary effort is normal. No respiratory distress. Breath sounds: Normal breath sounds. No wheezing. Abdominal:      Palpations: Abdomen is soft. Tenderness: There is no abdominal tenderness. There is no guarding or rebound.    Neurological:      Mental Status: She is alert and oriented to person, place, and time. Psychiatric:         Behavior: Behavior normal.         Bilateral hands: Tenderness of the palmar aspect of the first [facility] joint on the right hand with is of grind test.  Palpable sesamoid of palmar aspect of first MCP on right. Mild tenderness of left CMC joint. Negative grind test on left CMC joint. No swelling, ecchymosis, or erythema of bilateral hands    Component      Latest Ref Rng & Units 12/16/2020          11:40 AM   Color, UA       yellow   Clarity, UA       clear   Glucose, UA POC       >=1000 mg   Bilirubin, UA       negative   Ketones, UA       negative   Spec Grav, UA       1.020   Blood, UA POC       trace-intact   pH, UA       5.5   Protein, UA       negative   Urobilinogen, UA       0.2   Leukocytes, UA       negative   Nitrite, UA       negative       Impression/Plan:  1. Dysuria  New Problem of dysuria for 1 month. Urinalysis is negative for infection. She does have elevated glucose which she has known diabetes. No antibiotic is indicated. We will hold on culture at this time  - POCT Urinalysis No Micro (Auto)    2. Bilateral hand pain  Bilateral hand pain status post carpal tunnel release. Discussed she has arthritis of her first [facility] joint at least in her right hand. I think this is driving some of her symptoms. Recommend she wait the 1 year post surgery as recommended by orthopedic surgery prior to following up with them for ongoing residual symptoms. Recommend Tylenol. Recommend OT referral which she declined. She will do home exercise program that she finds online      They voiced understanding. All questions answered. They agreed with treatment plan. See patient instructions for any educational materials that may have been given. Discussed use, benefit, and side effects of prescribed medications. Reviewed health maintenance.     (Please note that portions of this note may have been completed with a voice recognition program. Efforts were made to edit the dictation but occasionally words are mis-transcribed.)    Return in about 6 months (around 6/16/2021) for DM, HTN.       Electronically signed by Lindy Leung MD on 12/16/2020 at 11:02 AM

## 2021-02-18 ENCOUNTER — TELEMEDICINE (OUTPATIENT)
Dept: FAMILY MEDICINE CLINIC | Age: 71
End: 2021-02-18
Payer: MEDICARE

## 2021-02-18 ENCOUNTER — TELEPHONE (OUTPATIENT)
Dept: FAMILY MEDICINE CLINIC | Age: 71
End: 2021-02-18

## 2021-02-18 DIAGNOSIS — R50.9 FEVER, UNSPECIFIED FEVER CAUSE: ICD-10-CM

## 2021-02-18 DIAGNOSIS — R39.9 UTI SYMPTOMS: Primary | ICD-10-CM

## 2021-02-18 PROCEDURE — 99213 OFFICE O/P EST LOW 20 MIN: CPT | Performed by: FAMILY MEDICINE

## 2021-02-18 PROCEDURE — 1090F PRES/ABSN URINE INCON ASSESS: CPT | Performed by: FAMILY MEDICINE

## 2021-02-18 PROCEDURE — 1123F ACP DISCUSS/DSCN MKR DOCD: CPT | Performed by: FAMILY MEDICINE

## 2021-02-18 PROCEDURE — G8399 PT W/DXA RESULTS DOCUMENT: HCPCS | Performed by: FAMILY MEDICINE

## 2021-02-18 PROCEDURE — 3017F COLORECTAL CA SCREEN DOC REV: CPT | Performed by: FAMILY MEDICINE

## 2021-02-18 PROCEDURE — G8427 DOCREV CUR MEDS BY ELIG CLIN: HCPCS | Performed by: FAMILY MEDICINE

## 2021-02-18 PROCEDURE — 4040F PNEUMOC VAC/ADMIN/RCVD: CPT | Performed by: FAMILY MEDICINE

## 2021-02-18 RX ORDER — CEFDINIR 300 MG/1
300 CAPSULE ORAL 2 TIMES DAILY
Qty: 14 CAPSULE | Refills: 0 | Status: SHIPPED | OUTPATIENT
Start: 2021-02-18 | End: 2021-02-25

## 2021-02-18 RX ORDER — OXCARBAZEPINE 150 MG/1
150 TABLET, FILM COATED ORAL 2 TIMES DAILY
COMMUNITY
End: 2021-06-14

## 2021-02-18 ASSESSMENT — ENCOUNTER SYMPTOMS
COUGH: 0
SHORTNESS OF BREATH: 0
WHEEZING: 0
ABDOMINAL PAIN: 0

## 2021-02-18 NOTE — PROGRESS NOTES
2021    TELEHEALTH EVALUATION -- Audio/Visual (During MOCHA- public health emergency)    HPI:    Devora Park (:  1950) has requested an audio/video evaluation for the following concern(s):    Urinary frequency and urgency:  Fever to 102.8 this morning. Not feeling good due to pain she attributes to weather. Has middle low back pain and denies flank pain. Trouble sleeping. Started a couple days ago with UTI symptoms. Worsening. Glucose levels are good. 103 this morning. Review of Systems   Constitutional: Positive for chills and fever. Respiratory: Negative for cough, shortness of breath and wheezing. Gastrointestinal: Negative for abdominal pain. Genitourinary: Positive for dysuria, frequency and urgency. Negative for flank pain. Prior to Visit Medications    Medication Sig Taking?  Authorizing Provider   OXcarbazepine (TRILEPTAL) 150 MG tablet Take 150 mg by mouth 2 times daily Yes Historical Provider, MD   dapagliflozin (FARXIGA) 5 MG tablet Take 5 mg by mouth every morning Yes Historical Provider, MD   ibuprofen (ADVIL;MOTRIN) 600 MG tablet Take 1 tablet by mouth 2 times daily as needed for Pain Yes Edil Kolb MD   benazepril (LOTENSIN) 20 MG tablet Take 1 tablet by mouth 2 times daily Yes Edil Kolb MD   atorvastatin (LIPITOR) 20 MG tablet Take 1 tablet by mouth nightly Yes Edil Kolb MD   albuterol sulfate HFA (PROAIR HFA) 108 (90 Base) MCG/ACT inhaler Inhale 2 puffs into the lungs every 6 hours as needed for Wheezing Yes Edil Kolb MD   insulin lispro, 1 Unit Dial, (HUMALOG KWIKPEN) 100 UNIT/ML SOPN Inject into the skin 3 times daily Indications: inject 0-12 unitsinto the skin 3 times daily-per sliding scale Yes Historical Provider, MD   furosemide (LASIX) 20 MG tablet  Yes Historical Provider, MD   LANANYA SOLOSTAR 100 UNIT/ML injection pen Inject 47 Units into the skin nightly Yes Historical Provider, MD pantoprazole (PROTONIX) 40 MG tablet Take 40 mg by mouth daily Yes Historical Provider, MD   Calcium Carb-Cholecalciferol 600-500 MG-UNIT CAPS Take by mouth daily Yes Historical Provider, MD   zoledronic acid (ZOMETA) 4 MG/5ML injection Infuse 4 mg intravenously once Yes Historical Provider, MD   aspirin 81 MG chewable tablet Take 81 mg by mouth daily Yes Historical Provider, MD   albuterol (PROVENTIL) (2.5 MG/3ML) 0.083% nebulizer solution Take 3 mLs by nebulization every 4 hours as needed for Wheezing Yes Belia Degroot, APRN - CNP   therapeutic multivitamin-minerals (THERAGRAN-M) tablet Take 1 tablet by mouth daily. Yes Historical Provider, MD   anastrozole (ARIMIDEX) 1 MG tablet Take 1 tablet by mouth daily  Patient not taking: Reported on 2/18/2021  Mercedes Fernandez MD   Handicap Placard MISC by Does not apply route Dx:  Shortness of breath on exertion; duration 3 years: exp date: 8/7/2022  Bienvenido Ford MD   EASY TOUCH PEN NEEDLES 31G X 6 MM [facility]   Historical Provider, MD       Social History     Tobacco Use    Smoking status: Never Smoker    Smokeless tobacco: Never Used   Substance Use Topics    Alcohol use:  Yes     Alcohol/week: 0.0 standard drinks     Comment: Socially    Drug use: No        Allergies   Allergen Reactions    Amoxicillin     Donnatal [Phenobarbital-Belladonna Alk] Other (See Comments)     palpitations    Lovastatin Other (See Comments)     myalgia    Metformin And Related Diarrhea    Vioxx Other (See Comments)     Liver problems    Dilaudid [Hydromorphone Hcl] Nausea And Vomiting    Fentanyl Nausea And Vomiting     Severe Nausea and vomitting   ,   Past Medical History:   Diagnosis Date    Arthritis     Asthma     Brain cyst     benign    Cancer ([facility] 09/1918    Left Breast    Chronic sinus complaints     Diverticulosis of colon     DKA [facility]diabetic k[facility]acidoses) ([facility] 05/2018    Elevated TSH     Hypertension     Osteoarthritis     Polyneuropathy  PONV (postoperative nausea and vomiting)     Spinal headache     Type 2 diabetes mellitus ([facility] 1990's       PHYSICAL EXAMINATION:  [ INSTRUCTIONS:  \"[x]\" Indicates a positive item  \"[]\" Indicates a negative item  -- DELETE ALL ITEMS NOT EXAMINED]    Constitutional: [x] Appears well-developed and well-nourished [] No apparent distress      [x] Abnormal- appears uncomfortable and feverish  Mental status  [x] Alert and awake  [] Oriented to person/place/time [x]Able to follow commands      Eyes:  EOM    []  Normal  [] Abnormal-  Sclera  [x]  Normal  [] Abnormal -         Discharge [x]  None visible  [] Abnormal -    Neck: [x] No visualized mass     Pulmonary/Chest: [x] Respiratory effort normal.  [x] No visualized signs of difficulty breathing or respiratory distress        [] Abnormal-         Skin:        [x] No significant exanthematous lesions or discoloration noted on facial skin         [] Abnormal-            Psychiatric:       [x] Normal Affect [x] No Hallucinations        [] Abnormal-     Other pertinent observable physical exam findings-     ASSESSMENT/PLAN:  1. UTI symptoms  sxs for 2-3 days. Now with fever. No flank pain suggesting pyelonephritis but fever is concerning. She declines UA and culture. Start antibiotic empirically. If not improving or she worsens, go to ER.   - cefdinir (OMNICEF) 300 MG capsule; Take 1 capsule by mouth 2 times daily for 7 days  Dispense: 14 capsule; Refill: 0    2. Fever, unspecified fever cause  Likely due to UTI, maybe early pyelonephritis. Less likely is covid. - cefdinir (OMNICEF) 300 MG capsule; Take 1 capsule by mouth 2 times daily for 7 days  Dispense: 14 capsule; Refill: 0    May need to go to ER if not improving. Discussed in detail    Return if symptoms worsen or fail to improve. Clive Valadez is a 65 y.o. female being evaluated by a Virtual Visit (video visit) encounter to address concerns as mentioned above. A caregiver was present when appropriate. Due to this being a TeleHealth encounter (During FALSS-20 public health emergency), evaluation of the following organ systems was limited: Vitals/Constitutional/EENT/Resp/CV/GI//MS/Neuro/Skin/Heme-Lymph-Imm. Pursuant to the emergency declaration under the [address] [place] [address], [address] [place] [address] and the [employer] and [employer] Act, this Virtual Visit was conducted with patient's (and/or legal guardian's) consent, to reduce the patient's risk of exposure to COVID-19 and provide necessary medical care. The patient (and/or legal guardian) has also been advised to contact this office for worsening conditions or problems, and seek emergency medical treatment and/or call 911 if deemed necessary. Patient identification was verified at the start of the visit: Yes    Total time spent on this encounter: Not billed by time    Services were provided through a video synchronous discussion virtually to substitute for in-person clinic visit. Patient and provider were located at their individual homes. --Anna Luna MD on 2/18/2021 at 8:15 AM    An electronic signature was used to authenticate this note.

## 2021-02-18 NOTE — TELEPHONE ENCOUNTER
Patient called in she has symptoms of urinary urgency, frequency and burning for 2 days she is asking if you can treat her with a antibiotic.  Please advise

## 2021-02-18 NOTE — TELEPHONE ENCOUNTER
Recommend VV or in office appt with one of my partners today. Please advise patient.   Kun Guevara MD

## 2021-02-19 ENCOUNTER — TELEPHONE (OUTPATIENT)
Dept: FAMILY MEDICINE CLINIC | Age: 71
End: 2021-02-19

## 2021-02-19 NOTE — TELEPHONE ENCOUNTER
I called and spoke with patient. Had fever overnight and a lot of pain. Took tylenol overnight. Pain was in middle of low back. Fever is resolved and she feels better now. No cough or SOB. Since she is doing better she wants to hold on doing urine culture or other testing.        Discussed signs and symptoms that warrant going to the ER or calling back

## 2021-03-15 ENCOUNTER — HOSPITAL ENCOUNTER (OUTPATIENT)
Dept: INFUSION THERAPY | Age: 71
Discharge: HOME | End: 2021-03-15
Payer: MEDICARE

## 2021-03-15 ENCOUNTER — OFFICE VISIT (OUTPATIENT)
Dept: ONCOLOGY | Age: 71
End: 2021-03-15
Payer: MEDICARE

## 2021-03-15 VITALS
SYSTOLIC BLOOD PRESSURE: 126 MMHG | HEART RATE: 101 BPM | DIASTOLIC BLOOD PRESSURE: 64 MMHG | WEIGHT: 201.8 LBS | OXYGEN SATURATION: 93 % | TEMPERATURE: 97 F | HEIGHT: 63 IN | RESPIRATION RATE: 18 BRPM | BODY MASS INDEX: 35.75 KG/M2

## 2021-03-15 DIAGNOSIS — C50.512 MALIGNANT NEOPLASM OF LOWER-OUTER QUADRANT OF LEFT BREAST OF FEMALE, ESTROGEN RECEPTOR POSITIVE (HCC): ICD-10-CM

## 2021-03-15 DIAGNOSIS — Z17.0 MALIGNANT NEOPLASM OF LOWER-OUTER QUADRANT OF LEFT BREAST OF FEMALE, ESTROGEN RECEPTOR POSITIVE (HCC): ICD-10-CM

## 2021-03-15 DIAGNOSIS — Z79.811 PROPHYLACTIC USE OF ANASTROZOLE (ARIMIDEX): ICD-10-CM

## 2021-03-15 DIAGNOSIS — Z85.3 ENCOUNTER FOR FOLLOW-UP SURVEILLANCE OF BREAST CANCER: ICD-10-CM

## 2021-03-15 DIAGNOSIS — M81.8 OTHER OSTEOPOROSIS WITHOUT CURRENT PATHOLOGICAL FRACTURE: Primary | ICD-10-CM

## 2021-03-15 DIAGNOSIS — Z08 ENCOUNTER FOR FOLLOW-UP SURVEILLANCE OF BREAST CANCER: ICD-10-CM

## 2021-03-15 PROCEDURE — 1123F ACP DISCUSS/DSCN MKR DOCD: CPT | Performed by: NURSE PRACTITIONER

## 2021-03-15 PROCEDURE — 3017F COLORECTAL CA SCREEN DOC REV: CPT | Performed by: NURSE PRACTITIONER

## 2021-03-15 PROCEDURE — G8417 CALC BMI ABV UP PARAM F/U: HCPCS | Performed by: NURSE PRACTITIONER

## 2021-03-15 PROCEDURE — G8484 FLU IMMUNIZE NO ADMIN: HCPCS | Performed by: NURSE PRACTITIONER

## 2021-03-15 PROCEDURE — G8399 PT W/DXA RESULTS DOCUMENT: HCPCS | Performed by: NURSE PRACTITIONER

## 2021-03-15 PROCEDURE — 99211 OFF/OP EST MAY X REQ PHY/QHP: CPT

## 2021-03-15 PROCEDURE — G8427 DOCREV CUR MEDS BY ELIG CLIN: HCPCS | Performed by: NURSE PRACTITIONER

## 2021-03-15 PROCEDURE — 1036F TOBACCO NON-USER: CPT | Performed by: NURSE PRACTITIONER

## 2021-03-15 PROCEDURE — 99215 OFFICE O/P EST HI 40 MIN: CPT | Performed by: NURSE PRACTITIONER

## 2021-03-15 PROCEDURE — 4040F PNEUMOC VAC/ADMIN/RCVD: CPT | Performed by: NURSE PRACTITIONER

## 2021-03-15 PROCEDURE — 1090F PRES/ABSN URINE INCON ASSESS: CPT | Performed by: NURSE PRACTITIONER

## 2021-03-15 RX ORDER — SODIUM CHLORIDE 9 MG/ML
INJECTION, SOLUTION INTRAVENOUS CONTINUOUS
Status: CANCELLED | OUTPATIENT
Start: 2021-03-15

## 2021-03-15 RX ORDER — 0.9 % SODIUM CHLORIDE 0.9 %
250 INTRAVENOUS SOLUTION INTRAVENOUS ONCE
Status: CANCELLED
Start: 2021-03-15 | End: 2021-03-15

## 2021-03-15 RX ORDER — EPINEPHRINE 1 MG/ML
0.3 INJECTION, SOLUTION, CONCENTRATE INTRAVENOUS PRN
Status: CANCELLED | OUTPATIENT
Start: 2021-03-15

## 2021-03-15 RX ORDER — EMPAGLIFLOZIN 25 MG/1
25 TABLET, FILM COATED ORAL DAILY
COMMUNITY

## 2021-03-15 RX ORDER — HEPARIN SODIUM (PORCINE) LOCK FLUSH IV SOLN 100 UNIT/ML 100 UNIT/ML
500 SOLUTION INTRAVENOUS PRN
Status: CANCELLED | OUTPATIENT
Start: 2021-03-15

## 2021-03-15 RX ORDER — DIPHENHYDRAMINE HYDROCHLORIDE 50 MG/ML
50 INJECTION INTRAMUSCULAR; INTRAVENOUS ONCE
Status: CANCELLED | OUTPATIENT
Start: 2021-03-15 | End: 2021-03-15

## 2021-03-15 RX ORDER — SODIUM CHLORIDE 0.9 % (FLUSH) 0.9 %
5 SYRINGE (ML) INJECTION PRN
Status: CANCELLED | OUTPATIENT
Start: 2021-03-15

## 2021-03-15 RX ORDER — METHYLPREDNISOLONE SODIUM SUCCINATE 125 MG/2ML
125 INJECTION, POWDER, LYOPHILIZED, FOR SOLUTION INTRAMUSCULAR; INTRAVENOUS ONCE
Status: CANCELLED | OUTPATIENT
Start: 2021-03-15 | End: 2021-03-15

## 2021-03-15 RX ORDER — ANASTROZOLE 1 MG/1
1 TABLET ORAL DAILY
Qty: 90 TABLET | Refills: 3 | Status: SHIPPED | OUTPATIENT
Start: 2021-03-15 | End: 2021-10-01 | Stop reason: SDUPTHER

## 2021-03-15 RX ORDER — SODIUM CHLORIDE 0.9 % (FLUSH) 0.9 %
10 SYRINGE (ML) INJECTION PRN
Status: CANCELLED | OUTPATIENT
Start: 2021-03-15

## 2021-03-15 NOTE — PROGRESS NOTES
[facility] [place] [address] [facility] [address]  [address] [address]  Dept: [phone]  Dept Fax: [phone]: [phone]      Visit Date:3/15/2021     Socorro Aceves is a 66 y.o. female who presents today for:   Chief Complaint   Patient presents with    Follow-up     Malignant neoplasm of lower-outer quadrant of left breast of female, estrogen receptor positive ([address]        HPI:   Socorro Aceves is a 66 y.o. female who follows with Dr. Alex Flores for left breast cancer. Per Dr. Isabel Arzate note on 8/4/2020: This is a 33-year-old patient diagnosed with left breast cancer in September 2018. She underwent routine screening mammogram on September 7, 2018 that revealed clustered calcifications in the left breast.  She had a needle biopsy on September 19, 2018 showing invasive ductal carcinoma, measuring 7 mm in size, grade 1, % positive, OH 70% positive and HER-2/oleg negative. The patient met with the surgeon Dr. Swapnil Trevino and after discussing her surgical treatment options she decided to proceed with left lumpectomy and sentinel lymph node biopsy. She had her surgery on October 10, 2018 at the lumpectomy specimen did not reveal evidence of residual malignancy. All 3 sentinel lymph nodes were cancer free. Subsequently the patient met with a radiation oncologist who based on and evidence from CALGB study didn't recommend postlumpectomy radiation treatment. Subsequently the patient met with Dr. Danielito Eagle who placed her on adjuvant hormonal therapy with Arimidex, which she started in October 2018. The patient underwent a bone density study in November 2018 that revealed osteoporosis. The lowest T score was -2.8. The patient initiated treatment with Zometa on November 7, 2018. Since Dr. Lui Lugo is retiring the patient established care with new medical oncologist at [address] [place] [address]. Pt stopped taking Arimidex 5/2020 d/t side effects.       Interval History 3/15/2021:   Pt presents today for follow up and evaluation of left breast cancer. Pt reports chronic muscle/joint aches, chronic fatigue, chronic weakness in bilateral hands d/t carpal tunnel. Pt denies H/A, dizziness, hot flashes, cough, SOB, CP, abd pain, N/V/C/D, peripheral edema, peripheral neuropathy, s/s bleeding, fever/chills, s/s infections, unintentional weight loss. Pt has lost 6# since December 2020 with medication change for her diabetes, intentional.  BS better controlled. PMH, SH, and FH:  I reviewed the patient's medication and allergy lists as noted on the electronic medical record. The PMH, SH, and FH were also reviewed as noted on the EMR.         Past Medical History:   Diagnosis Date    Arthritis     Asthma     Brain cyst     benign    Cancer ([facility] [address] 09/1918    Left Breast    Chronic sinus complaints     Diverticulosis of colon     DKA (diabetic ketoacidoses) ([address] 05/2018    Elevated TSH     Hypertension     Osteoarthritis     Polyneuropathy     PONV (postoperative nausea and vomiting)     Spinal headache     Type 2 diabetes mellitus ([address] 1990's      Past Surgical History:   Procedure Laterality Date    BLADDER SUSPENSION      times 2    CARPAL TUNNEL RELEASE Bilateral 02/2020    CHOLECYSTECTOMY      DILATION AND CURETTAGE OF UTERUS      x2    EYE SURGERY      HERNIA REPAIR  06/18/2019    HYSTERECTOMY  1995    JOINT REPLACEMENT Right 2007    Rt total knee    KNEE ARTHROSCOPY  1967, 2001    MANDIBLE FRACTURE SURGERY      OTHER SURGICAL HISTORY Right 12/30/2015    Excision of Sebaceous Cyst Right Ear     CA OFFICE/OUTPT VISIT,PROCEDURE ONLY Left 10/10/2018    LEFT BREAST LUMPECTOMY WITH SENTINEL LYMPH NODE BIOPSY performed by Jefe Spencer MD at [address] [place] [address]      TONSILLECTOMY AND ADENOIDECTOMY  age 4   Rosalinda Horn VENTRAL HERNIA REPAIR N/A 6/14/2019    COMBINED LAPAROSCOPIC AND OPEN VENTRAL HERNIA REPAIR WITH MESH performed by Jefe Spencer MD at [facility]      Family History   Problem Relation Age of Onset    Diabetes Mother     Heart Disease Mother     Lupus Mother     Heart Disease Father     Cancer Father         Squamous cell - face & scalp    Diabetes Maternal Grandmother     Heart Disease Maternal Grandfather     Cancer Paternal Aunt          multiple myeloma    Cancer Paternal Uncle         Lymphatic Leukemia    Breast Cancer Maternal Cousin     Heart Disease Maternal Uncle     Lupus Maternal Uncle     Other Paternal Grandfather         Brain aneurysm      Social History     Tobacco Use    Smoking status: Never Smoker    Smokeless tobacco: Never Used   Substance Use Topics    Alcohol use:  Yes     Alcohol/week: 0.0 standard drinks     Comment: Socially      Current Outpatient Medications   Medication Sig Dispense Refill    empagliflozin (JARDIANCE) 25 MG tablet Take 25 mg by mouth daily      anastrozole (ARIMIDEX) 1 MG tablet Take 1 tablet by mouth daily 90 tablet 3    OXcarbazepine (TRILEPTAL) 150 MG tablet Take 150 mg by mouth 2 times daily      ibuprofen (ADVIL;MOTRIN) 600 MG tablet Take 1 tablet by mouth 2 times daily as needed for Pain 180 tablet 1    benazepril (LOTENSIN) 20 MG tablet Take 1 tablet by mouth 2 times daily 180 tablet 1    atorvastatin (LIPITOR) 20 MG tablet Take 1 tablet by mouth nightly 90 tablet 1    albuterol sulfate HFA (PROAIR HFA) 108 (90 Base) MCG/ACT inhaler Inhale 2 puffs into the lungs every 6 hours as needed for Wheezing 1 Inhaler 1    insulin lispro, 1 Unit Dial, (HUMALOG KWIKPEN) 100 UNIT/ML SOPN Inject into the skin 3 times daily Indications: inject 0-12 unitsinto the skin 3 times daily-per sliding scale      furosemide (LASIX) 20 MG tablet       Handicap Placard MISC by Does not apply route Dx:  Shortness of breath on exertion; duration 3 years: exp date: 8/7/2022 1 each 0    EASY TOUCH PEN NEEDLES 31G X 6 MM MISC       LANTUS SOLOSTAR 100 UNIT/ML injection pen Inject 47 Units into the skin nightly      pantoprazole (PROTONIX) 40 MG tablet Take 40 mg by mouth daily      Calcium Carb-Cholecalciferol 600-500 MG-UNIT CAPS Take by mouth daily      zoledronic acid (ZOMETA) 4 MG/5ML injection Infuse 4 mg intravenously once      aspirin 81 MG chewable tablet Take 81 mg by mouth daily      albuterol (PROVENTIL) (2.5 MG/3ML) 0.083% nebulizer solution Take 3 mLs by nebulization every 4 hours as needed for Wheezing 1 Package 1    therapeutic multivitamin-minerals (THERAGRAN-M) tablet Take 1 tablet by mouth daily.  dapagliflozin (FARXIGA) 5 MG tablet Take 5 mg by mouth every morning       No current facility-administered medications for this visit. Allergies   Allergen Reactions    Amoxicillin     Donnatal [Phenobarbital-Belladonna Alk] Other (See Comments)     palpitations    Lovastatin Other (See Comments)     myalgia    Metformin And Related Diarrhea    Vioxx Other (See Comments)     Liver problems    Dilaudid [Hydromorphone Hcl] Nausea And Vomiting    Fentanyl Nausea And Vomiting     Severe Nausea and vomitting          Review of Systems:   Review of Systems   Pertinent review of systems noted in HPI, all other ROS negative. Objective:   Physical Exam   /64 (Site: Right Upper Arm, Position: Sitting, Cuff Size: Medium Adult)   Pulse 101   Temp 97 °F (36.1 °C) (Infrared)   Resp 18   Ht 5' 3\" (1.6 m)   Wt 201 lb 12.8 oz (91.5 kg)   SpO2 93%   BMI 35.75 kg/m²    General appearance: No apparent distress, calm and cooperative. HEENT: Pupils equal, round, and reactive to light. Conjunctivae/corneas clear. Oral mucosa moist.  Neck: Supple, with full range of motion. Trachea midline. Chest:  No lumps/masses on exam.  Respiratory:  Normal respiratory effort. Clear to auscultation, bilaterally diminished. Cardiovascular:  RRR, S1/S2. Abdomen: Soft, non-tender, non-distended with active BS x 4. Musculoskeletal: No clubbing, cyanosis or edema bilaterally.   Pt able to ambulate in office without difficulty or use of assistive device. Skin: Skin color, texture, turgor normal.  No visible rashes or lesions. Neurologic:  Neurovascularly intact without any focal sensory/motor deficits. Cranial nerves: II-XII intact, grossly non-focal.  Psychiatric: Alert and oriented x 3, thought content appropriate, normal insight  Capillary Refill: Brisk,< 3 seconds   Peripheral Pulses: +2 palpable, equal bilaterally       Imaging Studies and Labs:   CBC:   Lab Results   Component Value Date    WBC 4.5 (L) 09/05/2020    HGB 13.2 09/05/2020    HCT 40.3 09/05/2020    MCV 94.4 09/05/2020     09/05/2020     BMP:   Lab Results   Component Value Date     09/05/2020    K 3.9 09/05/2020     09/05/2020    CO2 24 09/05/2020    BUN 21 09/05/2020    CREATININE 0.6 09/05/2020    GLUCOSE 168 09/05/2020    GLUCOSE 188 09/29/2018    CALCIUM 9.2 09/05/2020      LFT:   Lab Results   Component Value Date    ALT 31 09/05/2020    AST 22 09/05/2020    ALKPHOS 52 09/05/2020    BILITOT 1.4 (H) 09/05/2020         Assessment and Plan:     1. Other osteoporosis without current pathological fracture  Last DEXA 11/2018, schedule new DEXA bone density scan. Pt continues Calcium Carb-cholecalciferol. Pt walks when weather is nice enough. - DEXA BONE DENSITY AXIAL SKELETON; Future    2. Malignant neoplasm of lower-outer quadrant of left breast of female, estrogen receptor positive ([facility]  S/P lumpectomy with sentinel LN biopsy. Pt did not have post-lumpectomy radiation treatment. No s/s of recurrence from today's examination. Continue surveillance. 3. Prophylactic use of anastrozole (Arimidex)  Pt to re-start Arimidex. Pt expects hot flashes to restart. Pt educated we can start a low-dose of Ditropan 2.5 mg nightly if needed. Pt hesitant d/t side effect of dry mouth & already have dry mouth. Pt will call if prescription warranted. May change to Femara if pt unable to tolerate d/t side effects.     4. Encounter for follow-up surveillance of breast cancer  No s/s recurrence from today's examination. Pt to get new DEXA scan completed. Mammogram due 9/2021. Return in about 6 months (around 9/15/2021). All patient questions answered. Pt voiced understanding. Patient agreed with treatment plan. Follow up as directed. Patient instructed to call for questions or concerns. Electronically signed by   NAV Dillon CNP     I spent a total of 45 minutes on the day of the visit.

## 2021-03-15 NOTE — PATIENT INSTRUCTIONS
1.  Start Arimidex  2. Call if getting hot flashes to possibly start low-dose ditropan. 3.  Go get bone density scan  4. Return to clinic in 6 mos to see Dr. Angelique Noriega. 5.  We will call you to schedule the Reclast infusion.

## 2021-03-18 ENCOUNTER — HOSPITAL ENCOUNTER (OUTPATIENT)
Dept: WOMENS IMAGING | Age: 71
Discharge: HOME | End: 2021-03-18
Payer: MEDICARE

## 2021-03-18 DIAGNOSIS — M81.8 OTHER OSTEOPOROSIS WITHOUT CURRENT PATHOLOGICAL FRACTURE: ICD-10-CM

## 2021-03-18 PROCEDURE — 77080 DXA BONE DENSITY AXIAL: CPT

## 2021-03-19 ENCOUNTER — HOSPITAL ENCOUNTER (OUTPATIENT)
Dept: INFUSION THERAPY | Age: 71
Discharge: HOME | End: 2021-03-19
Payer: MEDICARE

## 2021-03-19 VITALS
DIASTOLIC BLOOD PRESSURE: 60 MMHG | WEIGHT: 200.4 LBS | HEIGHT: 63 IN | SYSTOLIC BLOOD PRESSURE: 121 MMHG | RESPIRATION RATE: 16 BRPM | HEART RATE: 65 BPM | BODY MASS INDEX: 35.51 KG/M2 | OXYGEN SATURATION: 95 % | TEMPERATURE: 98.2 F

## 2021-03-19 DIAGNOSIS — Z17.0 MALIGNANT NEOPLASM OF UPPER-OUTER QUADRANT OF LEFT BREAST IN FEMALE, ESTROGEN RECEPTOR POSITIVE (HCC): ICD-10-CM

## 2021-03-19 DIAGNOSIS — Z17.0 MALIGNANT NEOPLASM OF LOWER-OUTER QUADRANT OF LEFT BREAST OF FEMALE, ESTROGEN RECEPTOR POSITIVE (HCC): Primary | ICD-10-CM

## 2021-03-19 DIAGNOSIS — C50.412 MALIGNANT NEOPLASM OF UPPER-OUTER QUADRANT OF LEFT BREAST IN FEMALE, ESTROGEN RECEPTOR POSITIVE (HCC): ICD-10-CM

## 2021-03-19 DIAGNOSIS — C50.512 MALIGNANT NEOPLASM OF LOWER-OUTER QUADRANT OF LEFT BREAST OF FEMALE, ESTROGEN RECEPTOR POSITIVE (HCC): Primary | ICD-10-CM

## 2021-03-19 DIAGNOSIS — M81.8 OTHER OSTEOPOROSIS WITHOUT CURRENT PATHOLOGICAL FRACTURE: ICD-10-CM

## 2021-03-19 LAB
ABSOLUTE IMMATURE GRANULOCYTE: 0 THOU/MM3 (ref 0–0.07)
ALBUMIN SERPL-MCNC: 4.3 G/DL (ref 3.5–5.1)
ALP BLD-CCNC: 67 U/L (ref 38–126)
ALT SERPL-CCNC: 31 U/L (ref 11–66)
AST SERPL-CCNC: 30 U/L (ref 5–40)
BASINOPHIL, AUTOMATED: 1 % (ref 0–3)
BASOPHILS ABSOLUTE: 0 THOU/MM3 (ref 0–0.1)
BILIRUB SERPL-MCNC: 1.1 MG/DL (ref 0.3–1.2)
BILIRUBIN DIRECT: < 0.2 MG/DL (ref 0–0.3)
BUN, WHOLE BLOOD: 23 MG/DL (ref 8–26)
CHLORIDE, WHOLE BLOOD: 105 MEQ/L (ref 98–109)
CREATININE, WHOLE BLOOD: 0.8 MG/DL (ref 0.5–1.2)
EOSINOPHILS ABSOLUTE: 0.2 THOU/MM3 (ref 0–0.4)
EOSINOPHILS RELATIVE PERCENT: 3 % (ref 0–4)
GFR, ESTIMATED: 75 ML/MIN/1.73M2
GLUCOSE, WHOLE BLOOD: 176 MG/DL (ref 70–108)
HCT VFR BLD CALC: 45.9 % (ref 37–47)
HEMOGLOBIN: 14.8 GM/DL (ref 12–16)
IMMATURE GRANULOCYTES: 0 %
IONIZED CALCIUM, WHOLE BLOOD: 1.15 MMOL/L (ref 1.12–1.32)
LYMPHOCYTES # BLD: 31 % (ref 15–47)
LYMPHOCYTES ABSOLUTE: 1.7 THOU/MM3 (ref 1–4.8)
MCH RBC QN AUTO: 29.8 PG (ref 26–33)
MCHC RBC AUTO-ENTMCNC: 32.2 GM/DL (ref 32.2–35.5)
MCV RBC AUTO: 92 FL (ref 81–99)
MONOCYTES ABSOLUTE: 0.6 THOU/MM3 (ref 0.4–1.3)
MONOCYTES: 10 % (ref 0–12)
PDW BLD-RTO: 13 % (ref 11.5–14.5)
PLATELET # BLD: 153 THOU/MM3 (ref 130–400)
PMV BLD AUTO: 9.1 FL (ref 9.4–12.4)
POTASSIUM, WHOLE BLOOD: 3.8 MEQ/L (ref 3.5–4.9)
RBC # BLD: 4.97 MILL/MM3 (ref 4.2–5.4)
SEG NEUTROPHILS: 55 % (ref 43–75)
SEGMENTED NEUTROPHILS ABSOLUTE COUNT: 2.9 THOU/MM3 (ref 1.8–7.7)
SODIUM, WHOLE BLOOD: 142 MEQ/L (ref 138–146)
TOTAL CO2, WHOLE BLOOD: 29 MEQ/L (ref 23–33)
TOTAL PROTEIN: 7.4 G/DL (ref 6.1–8)
WBC # BLD: 5.4 THOU/MM3 (ref 4.8–10.8)

## 2021-03-19 PROCEDURE — 80076 HEPATIC FUNCTION PANEL: CPT

## 2021-03-19 PROCEDURE — 6360000002 HC RX W HCPCS: Performed by: PHYSICIAN ASSISTANT

## 2021-03-19 PROCEDURE — 85025 COMPLETE CBC W/AUTO DIFF WBC: CPT

## 2021-03-19 PROCEDURE — 36415 COLL VENOUS BLD VENIPUNCTURE: CPT

## 2021-03-19 PROCEDURE — 2580000003 HC RX 258: Performed by: PHYSICIAN ASSISTANT

## 2021-03-19 PROCEDURE — 80047 BASIC METABLC PNL IONIZED CA: CPT

## 2021-03-19 PROCEDURE — 96365 THER/PROPH/DIAG IV INF INIT: CPT

## 2021-03-19 RX ORDER — SODIUM CHLORIDE 9 MG/ML
INJECTION, SOLUTION INTRAVENOUS ONCE
Status: COMPLETED | OUTPATIENT
Start: 2021-03-19 | End: 2021-03-19

## 2021-03-19 RX ORDER — SODIUM CHLORIDE 0.9 % (FLUSH) 0.9 %
10 SYRINGE (ML) INJECTION PRN
Status: CANCELLED | OUTPATIENT
Start: 2021-03-19

## 2021-03-19 RX ORDER — HEPARIN SODIUM (PORCINE) LOCK FLUSH IV SOLN 100 UNIT/ML 100 UNIT/ML
500 SOLUTION INTRAVENOUS PRN
Status: CANCELLED | OUTPATIENT
Start: 2021-03-19

## 2021-03-19 RX ORDER — DIPHENHYDRAMINE HYDROCHLORIDE 50 MG/ML
50 INJECTION INTRAMUSCULAR; INTRAVENOUS ONCE
Status: CANCELLED | OUTPATIENT
Start: 2021-03-19 | End: 2021-03-19

## 2021-03-19 RX ORDER — METHYLPREDNISOLONE SODIUM SUCCINATE 125 MG/2ML
125 INJECTION, POWDER, LYOPHILIZED, FOR SOLUTION INTRAMUSCULAR; INTRAVENOUS ONCE
Status: CANCELLED | OUTPATIENT
Start: 2021-03-19 | End: 2021-03-19

## 2021-03-19 RX ORDER — SODIUM CHLORIDE 9 MG/ML
INJECTION, SOLUTION INTRAVENOUS CONTINUOUS
Status: CANCELLED | OUTPATIENT
Start: 2021-03-19

## 2021-03-19 RX ORDER — SODIUM CHLORIDE 0.9 % (FLUSH) 0.9 %
5 SYRINGE (ML) INJECTION PRN
Status: CANCELLED | OUTPATIENT
Start: 2021-03-19

## 2021-03-19 RX ORDER — ZOLEDRONIC ACID 5 MG/100ML
5 INJECTION, SOLUTION INTRAVENOUS ONCE
Status: COMPLETED | OUTPATIENT
Start: 2021-03-19 | End: 2021-03-19

## 2021-03-19 RX ORDER — ZOLEDRONIC ACID 5 MG/100ML
5 INJECTION, SOLUTION INTRAVENOUS ONCE
Status: CANCELLED | OUTPATIENT
Start: 2021-03-19 | End: 2021-03-19

## 2021-03-19 RX ORDER — SODIUM CHLORIDE 9 MG/ML
INJECTION, SOLUTION INTRAVENOUS ONCE
Status: CANCELLED | OUTPATIENT
Start: 2021-03-19 | End: 2021-03-19

## 2021-03-19 RX ADMIN — ZOLEDRONIC ACID 5 MG: 5 INJECTION, SOLUTION INTRAVENOUS at 10:03

## 2021-03-19 RX ADMIN — SODIUM CHLORIDE: 9 INJECTION, SOLUTION INTRAVENOUS at 09:55

## 2021-03-19 ASSESSMENT — PAIN DESCRIPTION - PAIN TYPE: TYPE: CHRONIC PAIN

## 2021-03-19 ASSESSMENT — PAIN SCALES - GENERAL: PAINLEVEL_OUTOF10: 4

## 2021-03-19 ASSESSMENT — PAIN DESCRIPTION - LOCATION: LOCATION: GENERALIZED

## 2021-03-19 NOTE — PROGRESS NOTES
Patient assessed for the following post Reclast:    Dizziness   No  Lightheadedness  No      Acute nausea/vomiting No  Headache   No  Chest pain/pressure  No  Rash/itching   No  Shortness of breath  No    Patient tolerated Reclast treatment without any complications. Last vital signs:   /60   Pulse 65   Temp 98.2 °F (36.8 °C) (Oral)   Resp 16   Ht 5' 3\" (1.6 m)   Wt 200 lb 6.4 oz (90.9 kg)   SpO2 95%   BMI 35.50 kg/m²     Patient instructed if experience any of the above symptoms following today's infusion, she is to notify MD immediately or go to the emergency department. Discharge instructions given to patient. Verbalizes understanding. Ambulated off unit per self, with belongings.

## 2021-03-19 NOTE — PROGRESS NOTES
Reclast infusion reviewed, patient verbalizes understanding of medication being administered and potential side effects.

## 2021-03-19 NOTE — PLAN OF CARE
Problem: Pain:  Description: Pain management should include both nonpharmacologic and pharmacologic interventions. Goal: Pain level will decrease  Description: Pain level will decrease  Outcome: Met This Shift  Goal: Control of acute pain  Description: Control of acute pain  Outcome: Met This Shift  Goal: Control of chronic pain  Description: Control of chronic pain  Outcome: Met This Shift  Note: Patient rating pain a 3 out of 10 using YAMILETH scale, Pain located in generalized arthritic pain. Intervention: Opioid analgesia side-effects  Description: Opioid analgesia side-effects - REMINDER(s):  Bradycardia. Confusion. Constipation. Respiratory function, decreased. Note: Patient encouraged to take prescribed pain medications and call Physician if pain is not controlled. Problem: Musculor/Skeletal Functional Status  Goal: Absence of falls  Outcome: Met This Shift  Note: Patient free from falls while in O.P. Oncology. Intervention: Fall precautions  Note: Patient assessed for fall risk on admission to [address] [place] [address]. Fall band placed on patient. Discussed the need to use the call light for assistance prior to getting up out of chair/bed. Problem: Intellectual/Education/Knowledge Deficit  Goal: Teaching initiated upon admission  Outcome: Met This Shift  Note: Patient verbalizes understanding to verbal information given on Reclast infusion ,action and possible side effects. Aware to call MD if develop complications. Goal: Written Disposition Instruction form completed  Outcome: Met This Shift  Intervention: Verbal/written education provided  Note: Reclast infusion reviewed, patient verbalizes understanding of medication being administered and potential side effects.       Problem: Discharge Planning  Goal: Knowledge of discharge instructions  Description: Knowledge of discharge instructions     Outcome: Met This Shift  Note: Verbalized understanding of discharge instructions, follow-up appointments, and when to call the physician. Intervention: Interaction with patient/family and care team  Note: Discuss understanding of discharge instructions,follow-up appointments, and when to call the physician. Care plan reviewed with patient. Patient verbalize understanding of the plan of care and contribute to goal setting.

## 2021-03-23 DIAGNOSIS — M81.8 OTHER OSTEOPOROSIS WITHOUT CURRENT PATHOLOGICAL FRACTURE: Primary | ICD-10-CM

## 2021-06-11 ENCOUNTER — E-VISIT (OUTPATIENT)
Dept: FAMILY MEDICINE CLINIC | Age: 71
End: 2021-06-11
Payer: MEDICARE

## 2021-06-11 ENCOUNTER — TELEPHONE (OUTPATIENT)
Dept: FAMILY MEDICINE CLINIC | Age: 71
End: 2021-06-11

## 2021-06-11 ENCOUNTER — PATIENT MESSAGE (OUTPATIENT)
Dept: FAMILY MEDICINE CLINIC | Age: 71
End: 2021-06-11

## 2021-06-11 DIAGNOSIS — R39.9 UTI SYMPTOMS: Primary | ICD-10-CM

## 2021-06-11 DIAGNOSIS — N30.00 ACUTE CYSTITIS WITHOUT HEMATURIA: Primary | ICD-10-CM

## 2021-06-11 PROCEDURE — 99421 OL DIG E/M SVC 5-10 MIN: CPT | Performed by: FAMILY MEDICINE

## 2021-06-11 RX ORDER — SULFAMETHOXAZOLE AND TRIMETHOPRIM 800; 160 MG/1; MG/1
1 TABLET ORAL 2 TIMES DAILY
Qty: 14 TABLET | Refills: 0 | Status: SHIPPED
Start: 2021-06-11 | End: 2021-06-11 | Stop reason: SINTOL

## 2021-06-11 RX ORDER — CEFDINIR 300 MG/1
300 CAPSULE ORAL 2 TIMES DAILY
Qty: 14 CAPSULE | Refills: 0 | Status: SHIPPED | OUTPATIENT
Start: 2021-06-11 | End: 2021-06-18

## 2021-06-11 RX ORDER — FLUCONAZOLE 150 MG/1
150 TABLET ORAL
Qty: 2 TABLET | Refills: 0 | Status: SHIPPED | OUTPATIENT
Start: 2021-06-11 | End: 2021-06-17

## 2021-06-11 NOTE — TELEPHONE ENCOUNTER
Patient unable to be seen today due to having another appointment today. Discussed E-visit with Rhea Merchant and how to submit. She is going to try to do the E-Visit. Advised to let us know if she has any issues for it.

## 2021-06-11 NOTE — TELEPHONE ENCOUNTER
Hailey Ramos has taken Bactrim DS before but today she took 1 dose and went outside, she started itching all over , her vagina area is red, burning and itching , was slightly SOB but not anymore, no swelling, Advised her not to take anymore , take Benadryl for itching, could she have something else for her UTI and maybe Diflucan if she needs it ? She uses [facility].

## 2021-06-11 NOTE — TELEPHONE ENCOUNTER
From: Maru Siu  To: Kathy Lamb MD  Sent: 6/11/2021 8:29 AM EDT  Subject: Prescription Question    I am pretty sure I have a UTI (burning, etc. the usual symptoms) so am wondering if I can get a script called in to [facility] today? I have an appointment Monday with Dr. Princess Henning but would prefer not to wait till then to get relief sooner. Thank you!

## 2021-06-14 ENCOUNTER — OFFICE VISIT (OUTPATIENT)
Dept: FAMILY MEDICINE CLINIC | Age: 71
End: 2021-06-14
Payer: MEDICARE

## 2021-06-14 ENCOUNTER — HOSPITAL ENCOUNTER (OUTPATIENT)
Age: 71
Discharge: HOME | End: 2021-06-14
Payer: MEDICARE

## 2021-06-14 VITALS
BODY MASS INDEX: 35.75 KG/M2 | HEIGHT: 63 IN | SYSTOLIC BLOOD PRESSURE: 130 MMHG | DIASTOLIC BLOOD PRESSURE: 80 MMHG | RESPIRATION RATE: 16 BRPM | HEART RATE: 78 BPM | WEIGHT: 201.8 LBS | TEMPERATURE: 97.2 F | OXYGEN SATURATION: 97 %

## 2021-06-14 DIAGNOSIS — I25.10 ASCVD (ARTERIOSCLEROTIC CARDIOVASCULAR DISEASE): ICD-10-CM

## 2021-06-14 DIAGNOSIS — I10 ESSENTIAL HYPERTENSION: ICD-10-CM

## 2021-06-14 DIAGNOSIS — G89.29 CHRONIC PAIN OF LEFT KNEE: ICD-10-CM

## 2021-06-14 DIAGNOSIS — E66.01 MORBIDLY OBESE (HCC): ICD-10-CM

## 2021-06-14 DIAGNOSIS — M25.562 CHRONIC PAIN OF LEFT KNEE: ICD-10-CM

## 2021-06-14 DIAGNOSIS — J45.20 MILD INTERMITTENT ASTHMA WITHOUT COMPLICATION: ICD-10-CM

## 2021-06-14 DIAGNOSIS — E11.65 TYPE 2 DIABETES MELLITUS WITH HYPERGLYCEMIA, WITHOUT LONG-TERM CURRENT USE OF INSULIN (HCC): ICD-10-CM

## 2021-06-14 LAB
BASOPHILS # BLD: 0.6 %
BASOPHILS ABSOLUTE: 0 THOU/MM3 (ref 0–0.1)
EKG ATRIAL RATE: 59 BPM
EKG P AXIS: 47 DEGREES
EKG P-R INTERVAL: 216 MS
EKG Q-T INTERVAL: 418 MS
EKG QRS DURATION: 92 MS
EKG QTC CALCULATION (BAZETT): 413 MS
EKG R AXIS: 42 DEGREES
EKG T AXIS: 37 DEGREES
EKG VENTRICULAR RATE: 59 BPM
EOSINOPHIL # BLD: 2.5 %
EOSINOPHILS ABSOLUTE: 0.2 THOU/MM3 (ref 0–0.4)
ERYTHROCYTE [DISTWIDTH] IN BLOOD BY AUTOMATED COUNT: 13.2 % (ref 11.5–14.5)
ERYTHROCYTE [DISTWIDTH] IN BLOOD BY AUTOMATED COUNT: 47.9 FL (ref 35–45)
HCT VFR BLD CALC: 46.8 % (ref 37–47)
HEMOGLOBIN: 14.5 GM/DL (ref 12–16)
IMMATURE GRANS (ABS): 0.01 THOU/MM3 (ref 0–0.07)
IMMATURE GRANULOCYTES: 0.2 %
LYMPHOCYTES # BLD: 24.8 %
LYMPHOCYTES ABSOLUTE: 1.6 THOU/MM3 (ref 1–4.8)
MCH RBC QN AUTO: 30.5 PG (ref 26–33)
MCHC RBC AUTO-ENTMCNC: 31 GM/DL (ref 32.2–35.5)
MCV RBC AUTO: 98.3 FL (ref 81–99)
MONOCYTES # BLD: 9.2 %
MONOCYTES ABSOLUTE: 0.6 THOU/MM3 (ref 0.4–1.3)
NUCLEATED RED BLOOD CELLS: 0 /100 WBC
PLATELET # BLD: 180 THOU/MM3 (ref 130–400)
PMV BLD AUTO: 10.5 FL (ref 9.4–12.4)
RBC # BLD: 4.76 MILL/MM3 (ref 4.2–5.4)
SEG NEUTROPHILS: 62.7 %
SEGMENTED NEUTROPHILS ABSOLUTE COUNT: 4 THOU/MM3 (ref 1.8–7.7)
WBC # BLD: 6.4 THOU/MM3 (ref 4.8–10.8)

## 2021-06-14 PROCEDURE — G8427 DOCREV CUR MEDS BY ELIG CLIN: HCPCS | Performed by: FAMILY MEDICINE

## 2021-06-14 PROCEDURE — 4040F PNEUMOC VAC/ADMIN/RCVD: CPT | Performed by: FAMILY MEDICINE

## 2021-06-14 PROCEDURE — 85025 COMPLETE CBC W/AUTO DIFF WBC: CPT

## 2021-06-14 PROCEDURE — 93005 ELECTROCARDIOGRAM TRACING: CPT | Performed by: ORTHOPAEDIC SURGERY

## 2021-06-14 PROCEDURE — 1090F PRES/ABSN URINE INCON ASSESS: CPT | Performed by: FAMILY MEDICINE

## 2021-06-14 PROCEDURE — 80048 BASIC METABOLIC PNL TOTAL CA: CPT

## 2021-06-14 PROCEDURE — 2022F DILAT RTA XM EVC RTNOPTHY: CPT | Performed by: FAMILY MEDICINE

## 2021-06-14 PROCEDURE — 36415 COLL VENOUS BLD VENIPUNCTURE: CPT

## 2021-06-14 PROCEDURE — 99214 OFFICE O/P EST MOD 30 MIN: CPT | Performed by: FAMILY MEDICINE

## 2021-06-14 PROCEDURE — G8417 CALC BMI ABV UP PARAM F/U: HCPCS | Performed by: FAMILY MEDICINE

## 2021-06-14 PROCEDURE — 1036F TOBACCO NON-USER: CPT | Performed by: FAMILY MEDICINE

## 2021-06-14 PROCEDURE — 93010 ELECTROCARDIOGRAM REPORT: CPT | Performed by: INTERNAL MEDICINE

## 2021-06-14 PROCEDURE — G8399 PT W/DXA RESULTS DOCUMENT: HCPCS | Performed by: FAMILY MEDICINE

## 2021-06-14 PROCEDURE — 1123F ACP DISCUSS/DSCN MKR DOCD: CPT | Performed by: FAMILY MEDICINE

## 2021-06-14 PROCEDURE — 3046F HEMOGLOBIN A1C LEVEL >9.0%: CPT | Performed by: FAMILY MEDICINE

## 2021-06-14 PROCEDURE — 3017F COLORECTAL CA SCREEN DOC REV: CPT | Performed by: FAMILY MEDICINE

## 2021-06-14 RX ORDER — IBUPROFEN 600 MG/1
600 TABLET ORAL 2 TIMES DAILY PRN
Qty: 180 TABLET | Refills: 1 | Status: SHIPPED | OUTPATIENT
Start: 2021-06-14 | End: 2021-11-05

## 2021-06-14 RX ORDER — OXCARBAZEPINE 300 MG/1
300 TABLET, FILM COATED ORAL 2 TIMES DAILY
COMMUNITY
End: 2021-10-01 | Stop reason: ALTCHOICE

## 2021-06-14 RX ORDER — ALBUTEROL SULFATE 90 UG/1
2 AEROSOL, METERED RESPIRATORY (INHALATION) EVERY 6 HOURS PRN
Qty: 1 INHALER | Refills: 1 | Status: SHIPPED | OUTPATIENT
Start: 2021-06-14 | End: 2022-04-04

## 2021-06-14 RX ORDER — ATORVASTATIN CALCIUM 20 MG/1
20 TABLET, FILM COATED ORAL NIGHTLY
Qty: 90 TABLET | Refills: 1 | Status: SHIPPED | OUTPATIENT
Start: 2021-06-14 | End: 2021-12-20 | Stop reason: SDUPTHER

## 2021-06-14 RX ORDER — BENAZEPRIL HYDROCHLORIDE 20 MG/1
20 TABLET ORAL 2 TIMES DAILY
Qty: 180 TABLET | Refills: 1 | Status: SHIPPED | OUTPATIENT
Start: 2021-06-14 | End: 2021-12-20 | Stop reason: SDUPTHER

## 2021-06-14 ASSESSMENT — PATIENT HEALTH QUESTIONNAIRE - PHQ9
SUM OF ALL RESPONSES TO PHQ QUESTIONS 1-9: 0
SUM OF ALL RESPONSES TO PHQ QUESTIONS 1-9: 0
SUM OF ALL RESPONSES TO PHQ9 QUESTIONS 1 & 2: 0
1. LITTLE INTEREST OR PLEASURE IN DOING THINGS: 0
2. FEELING DOWN, DEPRESSED OR HOPELESS: 0
SUM OF ALL RESPONSES TO PHQ QUESTIONS 1-9: 0

## 2021-06-14 ASSESSMENT — ENCOUNTER SYMPTOMS
TROUBLE SWALLOWING: 1
COUGH: 0
WHEEZING: 0
SHORTNESS OF BREATH: 0

## 2021-06-14 NOTE — PROGRESS NOTES
[facility] PROFESSIONAL SERVS  [facility]  [address] [address]  Dept: [phone]  Dept Fax: [phone]  Loc: [phone]  PROGRESS NOTE      VisitDate: 6/14/2021    Shawnee Werner is a 66 y.o. female who presents today for:  Chief Complaint   Patient presents with    6 Month Follow-Up    Hypertension    Diabetes       Subjective:  Hypertension  Pertinent negatives include no chest pain or shortness of breath. 6 month f/u    HTN:  On benazepril and lasix. no chest pain. Home SBPs are 120s-130s. Hyperlipidemia:  On lipitor. Has myalgias. DM:  She goes to an internal med NP for care. On insulin and jardiance. She is on ACEI and statin. She sees podiatry. Has \"sensitivity\" in feet. Last A1c was 7% per patient about 2-3 months ago. CAD:  Minimal.  Has moderate aortic stenosis on heart cath in aug 2019 at [facility]. Joint pains. Uses ibuprofen. On trileptal for neuropathy in feet. Has been referred to bone fragility clinic for osteoporosis. Sees dermatology tomorrow. Dr. Benton Pierre is going to do hand surgery on right hand in july. Review of Systems   Constitutional: Negative for chills and fever. HENT: Positive for trouble swallowing. Respiratory: Negative for cough, shortness of breath and wheezing. Cardiovascular: Negative for chest pain and leg swelling. Musculoskeletal: Positive for arthralgias, joint swelling and myalgias. Neurological: Positive for numbness. Negative for dizziness and syncope.      Past Medical History:   Diagnosis Date    Arthritis     Asthma     Brain cyst     benign    Cancer ([facility] [address] 09/1918    Left Breast    Chronic sinus complaints     Diverticulosis of colon     DKA (diabetic ketoacidoses) ([facility] [address] 05/2018    Elevated TSH     Hypertension     Osteoarthritis     Polyneuropathy     PONV (postoperative nausea and vomiting)     Spinal headache     Type 2 diabetes mellitus ([facility] [address]  anastrozole (ARIMIDEX) 1 MG tablet Take 1 tablet by mouth daily 90 tablet 3    ibuprofen (ADVIL;MOTRIN) 600 MG tablet Take 1 tablet by mouth 2 times daily as needed for Pain 180 tablet 1    benazepril (LOTENSIN) 20 MG tablet Take 1 tablet by mouth 2 times daily 180 tablet 1    atorvastatin (LIPITOR) 20 MG tablet Take 1 tablet by mouth nightly 90 tablet 1    albuterol sulfate HFA (PROAIR HFA) 108 (90 Base) MCG/ACT inhaler Inhale 2 puffs into the lungs every 6 hours as needed for Wheezing 1 Inhaler 1    insulin lispro, 1 Unit Dial, (HUMALOG KWIKPEN) 100 UNIT/ML SOPN Inject into the skin 3 times daily Indications: inject 0-12 unitsinto the skin 3 times daily-per sliding scale      furosemide (LASIX) 20 MG tablet       Handicap Placard MISC by Does not apply route Dx:  Shortness of breath on exertion; duration 3 years: exp date: 8/7/2022 1 each 0    EASY TOUCH PEN NEEDLES 31G X 6 MM MISC       LANTUS SOLOSTAR 100 UNIT/ML injection pen Inject 47 Units into the skin nightly      Calcium Carb-Cholecalciferol 600-500 MG-UNIT CAPS Take by mouth daily      zoledronic acid (ZOMETA) 4 MG/5ML injection Infuse 4 mg intravenously once      aspirin 81 MG chewable tablet Take 81 mg by mouth daily      albuterol (PROVENTIL) (2.5 MG/3ML) 0.083% nebulizer solution Take 3 mLs by nebulization every 4 hours as needed for Wheezing 1 Package 1    therapeutic multivitamin-minerals (THERAGRAN-M) tablet Take 1 tablet by mouth daily. No current facility-administered medications for this visit.      Allergies   Allergen Reactions    Amoxicillin     Bactrim [Sulfamethoxazole-Trimethoprim] Itching    Donnatal [Phenobarbital-Belladonna Alk] Other (See Comments)     palpitations    Lovastatin Other (See Comments)     myalgia    Metformin And Related Diarrhea    Vioxx Other (See Comments)     Liver problems    Dilaudid [Hydromorphone Hcl] Nausea And Vomiting    Fentanyl Nausea And Vomiting     Severe Nausea and vomitting     Health Maintenance   Topic Date Due    A1C test (Diabetic or Prediabetic)  03/27/2020    Diabetic foot exam  06/16/2021    TSH testing  06/16/2021    Diabetic retinal exam  08/31/2021    Breast cancer screen  09/01/2021    Diabetic microalbuminuria test  09/05/2021    Lipid screen  09/05/2021    Potassium monitoring  09/05/2021    Creatinine monitoring  09/05/2021    Annual Wellness Visit (AWV)  12/17/2021    Colon cancer screen colonoscopy  06/26/2022    DTaP/Tdap/Td vaccine (2 - Td or Tdap) 04/25/2029    DEXA (modify frequency per FRAX score)  Completed    Flu vaccine  Completed    Shingles Vaccine  Completed    Pneumococcal 65+ years Vaccine  Completed    COVID-19 Vaccine  Completed    Hepatitis C screen  Completed    Hepatitis A vaccine  Aged Out    Hib vaccine  Aged Out    Meningococcal (ACWY) vaccine  Aged Out       Objective:  /80 (Site: Right Upper Arm, Position: Sitting)   Pulse 78   Temp 97.2 °F (36.2 °C)   Resp 16   Ht 5' 3\" (1.6 m)   Wt 201 lb 12.8 oz (91.5 kg)   SpO2 97%   BMI 35.75 kg/m²   Physical Exam  Vitals reviewed. Constitutional:       Appearance: She is well-developed. Cardiovascular:      Rate and Rhythm: Normal rate and regular rhythm. Heart sounds: Murmur heard. Systolic murmur is present with a grade of 2/6. Pulmonary:      Effort: Pulmonary effort is normal. No respiratory distress. Breath sounds: Normal breath sounds. No wheezing. Musculoskeletal:      Right lower leg: Edema (trace) present. Left lower leg: Edema (trace) present. Neurological:      Mental Status: She is alert. Mental status is at baseline.    Psychiatric:         Mood and Affect: Mood normal.         Behavior: Behavior normal.         Lab Results   Component Value Date    WBC 5.4 03/19/2021    HGB 14.8 03/19/2021    HCT 45.9 03/19/2021     03/19/2021    CHOL 112 09/05/2020    TRIG 122 09/05/2020    HDL 36 09/05/2020    ALT 31 03/19/2021 AST 30 03/19/2021     03/19/2021    K 3.8 03/19/2021     09/05/2020    CREATININE 0.8 03/19/2021    BUN 21 09/05/2020    CO2 24 09/05/2020    TSH 2.300 06/16/2020    INR 1.03 06/25/2020    LABA1C 7.7 (H) 03/27/2019    LABMICR 1.22 09/05/2020       Impression/Plan:  1. Essential hypertension  Chronic. Controlled. Refill medication.  - benazepril (LOTENSIN) 20 MG tablet; Take 1 tablet by mouth 2 times daily  Dispense: 180 tablet; Refill: 1    2. Chronic pain of left knee  Chronic. Partially controlled. Refill med  - ibuprofen (ADVIL;MOTRIN) 600 MG tablet; Take 1 tablet by mouth 2 times daily as needed for Pain  Dispense: 180 tablet; Refill: 1    3. Type 2 diabetes mellitus with hyperglycemia, without long-term current use of insulin (HCC)  Chronic. Follows with internal medicine for diabetes. - benazepril (LOTENSIN) 20 MG tablet; Take 1 tablet by mouth 2 times daily  Dispense: 180 tablet; Refill: 1  - atorvastatin (LIPITOR) 20 MG tablet; Take 1 tablet by mouth nightly  Dispense: 90 tablet; Refill: 1    4. ASCVD (arteriosclerotic cardiovascular disease)  Chronic. Stable. Refill med. continue aspirin  - atorvastatin (LIPITOR) 20 MG tablet; Take 1 tablet by mouth nightly  Dispense: 90 tablet; Refill: 1    5. Mild intermittent asthma without complication  Well-controlled. Chronic condition. Refill medication(s). - albuterol sulfate HFA (PROAIR HFA) 108 (90 Base) MCG/ACT inhaler; Inhale 2 puffs into the lungs every 6 hours as needed for Wheezing  Dispense: 1 Inhaler; Refill: 1    6. Morbidly obese (HCC)  Chronic. Stable. Diet and exercise      Recommend she talk to the shoulder surgeon first before proceeding with anything for her neck including epidural injection    They voiced understanding. All questions answered. They agreed with treatment plan. See patient instructions for any educational materials that may have been given.   Discussed use, benefit, and side effects of prescribed medications. Reviewed health maintenance. (Please note that portions of this note may have been completed with a voice recognition program.  Efforts were made to edit the dictation but occasionally words are mis-transcribed.)    Return in about 27 weeks (around 12/20/2021) for medicare wellness.     Electronically signed by Sara De La Cruz MD on 6/14/2021 at 12:24 PM

## 2021-06-15 LAB
ANION GAP SERPL CALCULATED.3IONS-SCNC: 13 MEQ/L (ref 8–16)
BUN BLDV-MCNC: 34 MG/DL (ref 7–22)
CALCIUM SERPL-MCNC: 10.3 MG/DL (ref 8.5–10.5)
CHLORIDE BLD-SCNC: 102 MEQ/L (ref 98–111)
CO2: 26 MEQ/L (ref 23–33)
CREAT SERPL-MCNC: 0.7 MG/DL (ref 0.4–1.2)
GFR SERPL CREATININE-BSD FRML MDRD: 82 ML/MIN/1.73M2
GLUCOSE BLD-MCNC: 106 MG/DL (ref 70–108)
POTASSIUM SERPL-SCNC: 4.6 MEQ/L (ref 3.5–5.2)
SODIUM BLD-SCNC: 141 MEQ/L (ref 135–145)

## 2021-06-22 ENCOUNTER — HOSPITAL ENCOUNTER (OUTPATIENT)
Dept: GENERAL RADIOLOGY | Age: 71
Discharge: HOME | End: 2021-06-22
Payer: MEDICARE

## 2021-06-22 DIAGNOSIS — R13.10 DYSPHAGIA, UNSPECIFIED TYPE: ICD-10-CM

## 2021-06-22 PROCEDURE — 2500000003 HC RX 250 WO HCPCS

## 2021-06-22 PROCEDURE — 74230 X-RAY XM SWLNG FUNCJ C+: CPT

## 2021-06-22 PROCEDURE — 92611 MOTION FLUOROSCOPY/SWALLOW: CPT | Performed by: SPEECH-LANGUAGE PATHOLOGIST

## 2021-06-22 RX ADMIN — BARIUM SULFATE 80 ML: 0.81 POWDER, FOR SUSPENSION ORAL at 10:34

## 2021-06-22 RX ADMIN — BARIUM SULFATE 20 ML: 400 PASTE ORAL at 10:33

## 2021-06-22 NOTE — DISCHARGE SUMMARY
[address] [facility] RADIOLOGY  Modified Barium Swallow    SLP Individual Minutes  Time In: 6393  Time Out: 8189  Minutes: 25  Timed Code Treatment Minutes: 0 Minutes       Date: 2021  Patient Name: Meet Castillo      CSN: [id number]   : 1950  (66 y.o.)  Gender: female   Referring Physician:  NAV Crystal-CNP  Diagnosis: dysphagia (R13.1)  Secondary Diagnosis: dysphagia  History of Present Illness/Injury: Patient reports she occasionally chokes on liquids. Reports the frequency varies - sometimes it happens several times a week and sometimes less. Denies any difficulties with foods. Patient reports she has a history of acid reflux and a small hiatal hernia. Also reports she has had an EGD with dilation about 3 years ago. Patient denies a history of pneumonia. MBS to assess current swallow function for safest diet recommendation. Patient  has a past medical history of Arthritis, Asthma, Brain cyst, Cancer ([address] Chronic sinus complaints, Diverticulosis of colon, DKA (diabetic ketoacidoses) ([address] Elevated TSH, Hypertension, Osteoarthritis, Polyneuropathy, PONV (postoperative nausea and vomiting), Spinal headache, and Type 2 diabetes mellitus ([address] Current Diet: Regular with thin liquids (diabetic diet and avoids some foods d/t having diverticulitis)    Pain: No pain reported. SUBJECTIVE:  Patient cooperative and pleasant. Sitting upright in the radiology suite chair throughout.     OBJECTIVE:    Respiratory Status:  Independent    Behavioral Observation:  Alert and Oriented    PATIENT WAS EVALUATED USING:  Puree, soft solid, hard solid, thin liquids via cup and straw    ORAL PREPARATION PHASE:  WFL    ORAL PHASE: Impaired: Uncontrolled Bolus/Diffuse Fall Over Tongue Base     ORAL PHASE OSCAR SCORE: (Dysphagia outcome and severity scale)  6 = WFL/Modified Independent - Normal Diet - May have mild oral delay    PHARYNGEAL PHASE:  Impaired: Delayed Swallow     PHARYNGEAL PHASE OSCAR SCORE: (Dysphagia outcome and severity scale)  5 = Mild Dysphagia - may need one consistency restricted - May have one or more of the following: Aspiration with thin - cough to clear, Airway penetration midway to the vocal cords with one or more consistency or to the vocal folds with one consistency, but clears spontaneously - Residue in the pharynx clears spontaneously    EVIDENCE FOR LARYNGEAL PENETRATION AND/OR ASPIRATION:  No evidence of aspiration  Laryngeal penetration evident with thin liquids via cup and straw inconsistently, but clears consistently with completion of the swallow. PENETRATION-ASPIRATION SCALE (PAS): Thin Liquids: 2 = Material enters the airway, remains above vocal folds, and is ejected from the airway  Puree:  1 = Material does not enter the airway  Soft Solid:  1 = Material does not enter the airway  Hard Solid: 1 = Material does not enter the airway    ESOPHAGEAL PHASE:   No significant findings at the level of the UES    ATTEMPTED TECHNIQUES:  Small Bolus Size Effective    Straw Effective    Cup Effective    Chin Tuck Not Attempted    Head Turn Not Attempted    Spoon Presentations Not Attempted    Volitional Cough Not Attempted    Spontaneous Cough Not Attempted           DIAGNOSTIC IMPRESSIONS:  Patient presents with an oral phase of the swallow that is modified independent given decreased bolus control at times, specifically with the mixed fruit. Liquids/juices from the fruit noted to spill to the pyriform sinuses prior to initiating a swallow. Patient presents with mild pharyngeal phase deficits characterized by a delayed swallow and this results in shallow, laryngeal penetration with thin liquids via cup and straw, however, this clears with completion of the swallow. No aspiration evident throughout this study.     Diet Recommendations:  Regular with thin liquids   Strategies:  Full Upright Position, Small Bite/Sip, Limit Distractions and slow rate     EDUCATION:  Learner: Patient  Education:  Reviewed results and recommendations of this evaluation, Reviewed diet and strategies, Reviewed signs, symptoms and risks of aspiration, Education Related to Potential Risks and Complications Due to Impairment/Illness/Injury and Reviewed the video with the patient on the MANA unit  Evaluation of Education: Verbalizes understanding and Family not present    PLAN:  No further speech therapy services indicated at this time.       Mary Pastrana M.S. [facility] [address]

## 2021-08-17 ENCOUNTER — CLINICAL DOCUMENTATION (OUTPATIENT)
Dept: SPIRITUAL SERVICES | Facility: CLINIC | Age: 71
End: 2021-08-17

## 2021-08-17 NOTE — ACP (ADVANCE CARE PLANNING)
Advance Care Planning   Ambulatory ACP Specialist Patient Outreach    Date:  8/17/2021  ACP Specialist:  Domingo Irvin    Outreach call to patient in follow-up to ACP Specialist referral from: Patient    [] PCP  [] Provider   [] Ambulatory Care Management [x] Other for Reason:    [x] Advance Directive Assistance  [] Code Status Discussion  [] Complete Portable DNR Order  [x] Discuss Goals of Care  [] Complete POST/MOST  [] Early ACP Decision-Making  [] Other    Date Referral Received: 8/17/2021    Today's Outreach:  [x] First   [] Second  [] Third                               Third outreach made by []  phone  [] email []   JamStarhart     Intervention:  [x] Spoke with Patient  [] Left VM requesting return call      Outcome:   made an initial attempt to contact [employer]" via telephone call to offer support, if desired, for the completion of Advance Directives documents as part of an [address] conversation. * Raymon expressed interest for self, as well as for his wife - Avril Thurman. Raymon placed the call on speaker so she could hear. *  explained documents for clarity and greater understanding, as well as information needed for completion. * An appointment is scheduled for 8/18 at [address] [place] [address]. Next Step:   [x] ACP scheduled conversation  [] Outreach again in one week               [] Email / Mail ACP Info Sheets  [] Email / Mail Advance Directive            [] Close Referral. Routing closure to referring provider/staff and to ACP Specialist .      Thank you for this referral.

## 2021-08-18 ENCOUNTER — CLINICAL DOCUMENTATION (OUTPATIENT)
Dept: SPIRITUAL SERVICES | Facility: CLINIC | Age: 71
End: 2021-08-18

## 2021-08-18 NOTE — ACP (ADVANCE CARE PLANNING)
Advance Care Planning   Advance Care Planning Note  Ambulatory [facility] Care Services    Date:  8/18/2021    Received request from patient. Consultation conversation participants:   Patient who understands ACP conversation  Spouse     Goals of ACP Conversation:  Discuss advance care planning documents  Facilitate a discussion related to patient's goals of care as they align with the patient's values and beliefs. Health Care Decision Makers:      Primary Decision Maker: Dawna Merida - Spouse - [phone]    Secondary Decision Maker: Shyam Godoy - Child - [phone]     Summary:  Completed New Documents  Updated Healthcare Decision Maker    Advance Care Planning Documents (Patient Wishes)  Currently on file:   Healthcare Power of /Advance Directive Appointment of [address]  Living Will/Advance Directive  Anatomical Gift/Organ Donation    Assessment:    met with Malina Nance" as well as her  - Carla Hamilton, for support in the completion of Advance Directives documents as part of an [address] conversation. * She was alert and oriented with decision-making capacity to express her wishes at this time. Interventions:  Provided education on documents for clarity and greater understanding  Assisted in the completion of documents according to patient's wishes at this time  Encouraged ongoing ACP conversation with future decision makers and loved ones    Care Preferences Communicated:  Ventilation:   If the patient, in their present state of health, suddenly became very ill and unable to breathe on their own, the patient would desire the use of a ventilator (breathing machine). If their health worsens and it becomes clear that the change of recovery is unlikely,the patient would NOT desire the use of a ventilator (breathing machine). * Included Note: \"Ventilation is okay for a period of time as long as progress is being made. \"    Patient expressed: \"Anoint by \"    Outcomes:  ACP Discussion: Completed  New advance directive completed. Returned original document(s) to patient, as well as copies for distribution to appointed agents  Routed ACP note to attending provider or other IDT member.     Electronically signed by Chaplain Jessica on 8/18/2021 at 4:52 PM.

## 2021-08-20 ENCOUNTER — HOSPITAL ENCOUNTER (OUTPATIENT)
Dept: OCCUPATIONAL THERAPY | Age: 71
Setting detail: THERAPIES SERIES
Discharge: HOME | End: 2021-08-20
Payer: MEDICARE

## 2021-08-20 PROCEDURE — 97165 OT EVAL LOW COMPLEX 30 MIN: CPT

## 2021-08-20 PROCEDURE — 97110 THERAPEUTIC EXERCISES: CPT

## 2021-08-20 NOTE — PROGRESS NOTES
suspensionplasty, release of A1 pulley flexor sheath of right thumb. Patient was in soft cast and then had forearm based thumb spica splint. Splint was removed last week and was told use her hand \"as normal.\" Was told that she could wear the splint if needed for support/pain. Comorbidities include HTN, DM, arthritis, and osteoporosis that could influence rehab process. SUBJECTIVE: Patient relates that she has been using her right hand as normally as possible. Social/Functional History:  Medications and Allergies have been reviewed and are listed on the [address] lives with spouse in a single story home with stairs and no handrail to enter. .    Task Prior Level of Function  (current level of function addressed below)   ADLs  Independent   Ambulation Independent   Transfers Independent   Hobbies Helping son with his house, yardwork   Driving Active    Work Retired     OBJECTIVE:  Hand Dominance left handed   Palpation    Observation Well healed incisions on right hand   Posture    Edema    Special Tests        ADL's Patient relates having difficulty with carrying items, opening jars, tying shoes, dressing, and using right hand in resistive activities. Relates having no difficulty with sleeping. Sensation Right Upper Extremity: Intact. Coordination 9 Hole Peg Test:   Right: 29 seconds     Left:   25 seconds. Patient used right shoulder motion to assist with placement of pegs.            RIGHT UPPER EXTREMITY  RANGE OF MOTION    AROM PROM COMMENTS      Wrist Flexion 65     Wrist Extension 60     Wrist Radial Deviation 5     Wrist Ulnar Deviation 30         RIGHT UPPER EXTREMITY  HAND RANGE OF MOTION    AROM COMMENTS   Thump MP 50    Thumb IP 55    Thumb Radial Abduction/Adduction 40    Thumb Palmar Abduction/Adduction 40    Thumb Opposition            STRENGTH     Right Left    Strength Settin 30# 45#   Pinch Strength 3 point Pinch: 4# 8#    Lateral Pinch 7# 11#           TREATMENT   Precautions: DM, osteoporosis, HTN; surgery 7/1/21   Pain:  2/10 at time of therapy; goes to 4/10 at its highest LOCATION: right hand    X in shaded column indicates Activity Completed Today   Modalities Parameters/  Location  Notes/Comments                     Manual Therapy Time/  Technique  Notes/Comments                     Exercises   Sets/  Sec Reps  Notes/Comments   Right thumb MP and IP PROM   x    Right thumb MP and IP AROM   x                                       Activities Time    Notes/Comments                       Specific Interventions Next Treatment: fluidotherapy, AROM/PROM, strengthening    Activity/Treatment Tolerance:  [x]  Patient tolerated treatment well  []  Patient limited by fatigue  []  Patient limited by pain   []  Patient limited by other medical complications  []  Other:     Assessment: Patient meets criteria for low complexity evaluation based on documented evaluation findings. Patient does have decreased strength of right  and pinch strength. Skilled therapy services are required to address ROM, strength, and functional use of right UE for daily task. Areas for Improvement: impaired ROM, impaired strength, pain and impaired ADL  Prognosis: good    GOALS:  Patient Goal: To get strength back in my hand    Short Term Goals:  Time Frame: 4 weeks  1. Be independent with HEP as instructed to increase her ability to open jars. 2. Increase active R1 MP flexion to 55 and IP flexion to 60 to increase her ability to grasp items with right hand. 3. Report pain going no higher than 2/10 at any time in right hand at any time to increase her ability to complete her ability to use right hand for kitchen tasks. 4. Increase right  strength to 35# to allow patient to complete normal daily tasks without difficulty. Long Term Goals:  Time Frame: 12 weeks  1. Be able to open jars using adaptive equipment without difficulty.   2. Increase right 3 point pinch to 7# and lateral pinch to 10# to increase ability to open containers. 3. Be able to complete dressing, bathing, and grooming tasks without difficulty . Patient Education:   [x]  HEP/Education Completed: Plan of Care, Goals, HEP - MP, IP flexion, thumb opposition     []  No new Education completed  []  Reviewed Prior HEP      [x]  Patient verbalized and/or demonstrated understanding of education provided. []  Patient unable to verbalize and/or demonstrate understanding of education provided. Will continue education. [x]  Barriers to learning: none    PLAN:  Treatment Recommendations: Strengthening, Range of Motion, Pain Management, Home Exercise Program, Patient Education, Self-Care Education and Training and Modalities    [x]  Plan of care initiated. Plan to see patient 2 times per week for 12 weeks to address the treatment planned outlined above.   []  Continue with current plan of care  []  Modify plan of care as follows:    []  Hold pending physician visit  []  Discharge    Time In 0845   Time Out 0930   Timed Code Minutes: 15 min   Total Treatment Time: 45 min       Mckay Klein OTR/L #[id number]

## 2021-08-24 ENCOUNTER — APPOINTMENT (OUTPATIENT)
Dept: OCCUPATIONAL THERAPY | Age: 71
End: 2021-08-24
Payer: MEDICARE

## 2021-08-26 ENCOUNTER — HOSPITAL ENCOUNTER (OUTPATIENT)
Dept: OCCUPATIONAL THERAPY | Age: 71
Setting detail: THERAPIES SERIES
Discharge: HOME | End: 2021-08-26
Payer: MEDICARE

## 2021-08-26 PROCEDURE — 97110 THERAPEUTIC EXERCISES: CPT

## 2021-08-26 PROCEDURE — 97022 WHIRLPOOL THERAPY: CPT

## 2021-08-26 NOTE — PROGRESS NOTES
[address] THERAPY  [] EVALUATION  [x] DAILY NOTE (LAND) [] DAILY NOTE (AQUATIC ) [] PROGRESS NOTE [] DISCHARGE NOTE    [] [facility]   [x] [address]    [] [facility]   [] Maico Regan    Date: 2021  Patient Name:  Mckenna Romero  : 1950  MRN: [id number]  CSN: [id number]    Referring Practitioner Carson Major MD   Diagnosis M18.11 Primary osteoarthritis of first carpometacarpal joint of right hand    Treatment Diagnosis Right hand pain   Date of Evaluation 21      Functional Outcome Measure Used UEFS   Functional Outcome Score 59/80 (21)       Insurance: Primary: Payor: Eloisa Bernal /  /  / ,   Secondary: Judit Goodrich   Authorization Information: Modalities covered except ionto and hot/cold packs   Visit # 2, 2/10 for progress note   Visits Allowed: unlimited   Recertification Date: 72   Physician Follow-Up: 21   Physician Orders: Evaluate and treat; ROM and progressive strength 2 x week x6 weeks   Pertinent History: Patient had bilateral CTR done last year and continued to have pain and weakness after surgeries. Patient had several injections with no relief. Patient was then found to have arthritis in right hand. Had surgery on 21 for excision of pisiform, trapezius excision and suture suspensionplasty, release of A1 pulley flexor sheath of right thumb. Patient was in soft cast and then had forearm based thumb spica splint. Splint was removed last week and was told use her hand \"as normal.\" Was told that she could wear the splint if needed for support/pain. Comorbidities include HTN, DM, arthritis, and osteoporosis that could influence rehab process. SUBJECTIVE: Patient states that she tried to help lift a dresser over the weekend and had pain.  States that she is not sure of what her restrictions      TREATMENT   Precautions: DM, osteoporosis, HTN; surgery 21 Pain:  2/10 at time of therapy;  LOCATION: right hand    X in shaded column indicates Activity Completed Today   Modalities Parameters/  Location  Notes/Comments   fluidotherapy to right hand x 15 minutes  x                Manual Therapy Time/  Technique  Notes/Comments                     Exercises   Sets/  Sec Reps  Notes/Comments   Right thumb MP and IP PROM   x    Right thumb MP and IP AROM   x    Right thumb opposition to each finger 1 10 each x    Active right thumb palmar abduction 1 15 x    Active right thumb radial abduction 1 15 x    Active right thumb circumduction 1 15 each direction x    Pinching yellow clothespins with right hand 1 3 cycles x    Activities Time    Notes/Comments   Discussion of using easy pull ring can opener  x                  Specific Interventions Next Treatment: fluidotherapy, AROM/PROM, strengthening    Activity/Treatment Tolerance:  [x]  Patient tolerated treatment well  []  Patient limited by fatigue  []  Patient limited by pain   []  Patient limited by other medical complications  []  Other:     Assessment: Patient is progressing toward goals. Patient has been using right hand for lifting due to recent surgery. GOALS:  Patient Goal: To get strength back in my hand    Short Term Goals:  Time Frame: 4 weeks  1. Be independent with HEP as instructed to increase her ability to open jars. 2. Increase active R1 MP flexion to 55 and IP flexion to 60 to increase her ability to grasp items with right hand. 3. Report pain going no higher than 2/10 at any time in right hand at any time to increase her ability to complete her ability to use right hand for kitchen tasks. 4. Increase right  strength to 35# to allow patient to complete normal daily tasks without difficulty. Long Term Goals:  Time Frame: 12 weeks  1. Be able to open jars using adaptive equipment without difficulty.   2. Increase right 3 point pinch to 7# and lateral pinch to 10# to increase ability to open containers. 3. Be able to complete dressing, bathing, and grooming tasks without difficulty . Patient Education:   [x]  HEP/Education Completed: instructed patient to avoid any lifting with right hand; active right thumb palmar and radial abduction; active right thumb circumduction; use of easy pull can opener     []  No new Education completed  []  Reviewed Prior HEP      [x]  Patient verbalized and/or demonstrated understanding of education provided. []  Patient unable to verbalize and/or demonstrate understanding of education provided. Will continue education. [x]  Barriers to learning: none    PLAN:      []  Plan of care initiated. Plan to see patient 2 times per week for 12 weeks to address the treatment planned outlined above.   [x]  Continue with current plan of care  []  Modify plan of care as follows:    []  Hold pending physician visit  []  Discharge    Time In 1130   Time Out 1215   Timed Code Minutes: 30 min   Total Treatment Time: 45 min       Venkata Yen OTR/L #[id number]

## 2021-08-30 ENCOUNTER — HOSPITAL ENCOUNTER (OUTPATIENT)
Dept: OCCUPATIONAL THERAPY | Age: 71
Setting detail: THERAPIES SERIES
Discharge: HOME | End: 2021-08-30
Payer: MEDICARE

## 2021-08-30 PROCEDURE — 97110 THERAPEUTIC EXERCISES: CPT

## 2021-08-30 PROCEDURE — 97022 WHIRLPOOL THERAPY: CPT

## 2021-08-30 NOTE — PROGRESS NOTES
[address]  [] EVALUATION  [x] DAILY NOTE (LAND) [] DAILY NOTE (AQUATIC ) [] PROGRESS NOTE [] DISCHARGE NOTE    [] [facility]   [x] [address]    [] [facility]   [] April Mas    Date: 2021  Patient Name:  Yun Bowers  : 1950  MRN: [id number]  CSN: [id number]    Referring Practitioner Tasneem Higgins MD   Diagnosis M18.11 Primary osteoarthritis of first carpometacarpal joint of right hand    Treatment Diagnosis Right hand pain   Date of Evaluation 21      Functional Outcome Measure Used UEFS   Functional Outcome Score 59/80 (21)       Insurance: Primary: Payor: Yessenia Thomas /  /  / ,   Secondary: Barber Mcneil   Authorization Information: Modalities covered except ionto and hot/cold packs   Visit # 3, 3/10 for progress note   Visits Allowed: unlimited   Recertification Date:    Physician Follow-Up: 21   Physician Orders: Evaluate and treat; ROM and progressive strength 2 x week x6 weeks   Pertinent History: Patient had bilateral CTR done last year and continued to have pain and weakness after surgeries. Patient had several injections with no relief. Patient was then found to have arthritis in right hand. Had surgery on 21 for excision of pisiform, trapezius excision and suture suspensionplasty, release of A1 pulley flexor sheath of right thumb. Patient was in soft cast and then had forearm based thumb spica splint. Splint was removed last week and was told use her hand \"as normal.\" Was told that she could wear the splint if needed for support/pain. Comorbidities include HTN, DM, arthritis, and osteoporosis that could influence rehab process. Lorella Peabody states that on Friday () she hit her right forearm on a cement block wall when she was in the basement. States that she thought that her arm \"broke. \" States that she didn't fall, but she lost her balance and the right arm hit the wall. States that her pain at that time was 7/10. States that she didn't call the doctor's office because she hasn't had calls returned to her from the office. States that she used ice and took some pain medication. States that she wore her right hand/ thumb splint last night to sleep. States that she has pain in the base of her right thumb when she is showering or pulling up her pants. TREATMENT   Precautions: DM, osteoporosis, HTN; surgery 7/1/21   Pain:  3/10 at time of therapy;  LOCATION: right hand/ base of right thumb    X in shaded column indicates Activity Completed Today   Modalities Parameters/  Location  Notes/Comments   fluidotherapy to right hand x 15 minutes  x                Manual Therapy Time/  Technique  Notes/Comments                     Exercises   Sets/  Sec Reps  Notes/Comments   Right thumb MP and IP PROM   x Slight clicking present with MP passive motion   Right thumb MP and IP AROM   x    Right thumb opposition to each finger 1 10 each x    Active right thumb palmar abduction 1 15 x    Active right thumb radial abduction 1 15 x    Active right thumb circumduction 1 15 each direction x    Pinching yellow clothespins with right hand 1 3 cycles     Activities Time    Notes/Comments   Discussion of using easy pull ring can opener      Rock tape applied to right thumb - I strip along extensor tendon and strip around right CMC joint for support  x Able to perform all motion with tape on           Specific Interventions Next Treatment: fluidotherapy, AROM/PROM, strengthening    Activity/Treatment Tolerance:  [x]  Patient tolerated treatment well  []  Patient limited by fatigue  []  Patient limited by pain   []  Patient limited by other medical complications  []  Other:     Assessment: Patient is progressing toward goals. Patient did have increased pain this date due to hitting her arm on wall over the weekend.  Tape applied for support and pain management. GOALS:  Patient Goal: To get strength back in my hand    Short Term Goals:  Time Frame: 4 weeks  1. Be independent with HEP as instructed to increase her ability to open jars. 2. Increase active R1 MP flexion to 55 and IP flexion to 60 to increase her ability to grasp items with right hand. 3. Report pain going no higher than 2/10 at any time in right hand at any time to increase her ability to complete her ability to use right hand for kitchen tasks. 4. Increase right  strength to 35# to allow patient to complete normal daily tasks without difficulty. Long Term Goals:  Time Frame: 12 weeks  1. Be able to open jars using adaptive equipment without difficulty. 2. Increase right 3 point pinch to 7# and lateral pinch to 10# to increase ability to open containers. 3. Be able to complete dressing, bathing, and grooming tasks without difficulty . Patient Education:    [x]  HEP/Education Completed:recommended for patient to notify physician of injury and increased pain level; wear and care of rock tape  []  No new Education completed  []  Reviewed Prior HEP      [x]  Patient verbalized and/or demonstrated understanding of education provided. []  Patient unable to verbalize and/or demonstrate understanding of education provided. Will continue education. [x]  Barriers to learning: none    PLAN:      []  Plan of care initiated. Plan to see patient 2 times per week for 12 weeks to address the treatment planned outlined above.   [x]  Continue with current plan of care  []  Modify plan of care as follows:    []  Hold pending physician visit  []  Discharge    Time In 0930   Time Out 1010   Timed Code Minutes: 25 min   Total Treatment Time: 40 min       Solange Butler OTR/ROMMEL #[id number]

## 2021-09-02 ENCOUNTER — HOSPITAL ENCOUNTER (OUTPATIENT)
Dept: OCCUPATIONAL THERAPY | Age: 71
Setting detail: THERAPIES SERIES
Discharge: HOME | End: 2021-09-02
Payer: MEDICARE

## 2021-09-15 ENCOUNTER — HOSPITAL ENCOUNTER (OUTPATIENT)
Dept: INFUSION THERAPY | Age: 71
End: 2021-09-15

## 2021-09-30 ASSESSMENT — ENCOUNTER SYMPTOMS
CHEST TIGHTNESS: 0
SHORTNESS OF BREATH: 0
COLOR CHANGE: 0
BACK PAIN: 0
SORE THROAT: 0
FACIAL SWELLING: 0
CONSTIPATION: 0
ABDOMINAL DISTENTION: 0
ABDOMINAL PAIN: 0
COUGH: 0
WHEEZING: 0
DIARRHEA: 0
VOMITING: 0
NAUSEA: 0
EYE DISCHARGE: 0
BLOOD IN STOOL: 0
TROUBLE SWALLOWING: 0
RECTAL PAIN: 0

## 2021-10-01 ENCOUNTER — HOSPITAL ENCOUNTER (OUTPATIENT)
Dept: INFUSION THERAPY | Age: 71
Discharge: HOME | End: 2021-10-01
Payer: MEDICARE

## 2021-10-01 ENCOUNTER — OFFICE VISIT (OUTPATIENT)
Dept: ONCOLOGY | Age: 71
End: 2021-10-01
Payer: MEDICARE

## 2021-10-01 VITALS
HEART RATE: 73 BPM | TEMPERATURE: 98.6 F | HEIGHT: 63 IN | BODY MASS INDEX: 35.51 KG/M2 | RESPIRATION RATE: 18 BRPM | OXYGEN SATURATION: 97 % | SYSTOLIC BLOOD PRESSURE: 159 MMHG | DIASTOLIC BLOOD PRESSURE: 67 MMHG | WEIGHT: 200.4 LBS

## 2021-10-01 DIAGNOSIS — C50.512 MALIGNANT NEOPLASM OF LOWER-OUTER QUADRANT OF LEFT BREAST OF FEMALE, ESTROGEN RECEPTOR POSITIVE (HCC): Primary | ICD-10-CM

## 2021-10-01 DIAGNOSIS — M81.8 OTHER OSTEOPOROSIS WITHOUT CURRENT PATHOLOGICAL FRACTURE: Primary | ICD-10-CM

## 2021-10-01 DIAGNOSIS — M81.8 OTHER OSTEOPOROSIS WITHOUT CURRENT PATHOLOGICAL FRACTURE: ICD-10-CM

## 2021-10-01 DIAGNOSIS — Z17.0 MALIGNANT NEOPLASM OF LEFT BREAST IN FEMALE, ESTROGEN RECEPTOR POSITIVE, UNSPECIFIED SITE OF BREAST (HCC): ICD-10-CM

## 2021-10-01 DIAGNOSIS — R22.32 MASS OF LEFT AXILLA: ICD-10-CM

## 2021-10-01 DIAGNOSIS — Z85.3 ENCOUNTER FOR FOLLOW-UP SURVEILLANCE OF BREAST CANCER: ICD-10-CM

## 2021-10-01 DIAGNOSIS — Z08 ENCOUNTER FOR FOLLOW-UP SURVEILLANCE OF BREAST CANCER: ICD-10-CM

## 2021-10-01 DIAGNOSIS — Z17.0 MALIGNANT NEOPLASM OF LOWER-OUTER QUADRANT OF LEFT BREAST OF FEMALE, ESTROGEN RECEPTOR POSITIVE (HCC): Primary | ICD-10-CM

## 2021-10-01 DIAGNOSIS — C50.412 MALIGNANT NEOPLASM OF UPPER-OUTER QUADRANT OF LEFT BREAST IN FEMALE, ESTROGEN RECEPTOR POSITIVE (HCC): ICD-10-CM

## 2021-10-01 DIAGNOSIS — Z17.0 MALIGNANT NEOPLASM OF UPPER-OUTER QUADRANT OF LEFT BREAST IN FEMALE, ESTROGEN RECEPTOR POSITIVE (HCC): ICD-10-CM

## 2021-10-01 DIAGNOSIS — C50.912 MALIGNANT NEOPLASM OF LEFT BREAST IN FEMALE, ESTROGEN RECEPTOR POSITIVE, UNSPECIFIED SITE OF BREAST (HCC): ICD-10-CM

## 2021-10-01 DIAGNOSIS — Z79.811 PROPHYLACTIC USE OF ANASTROZOLE (ARIMIDEX): ICD-10-CM

## 2021-10-01 DIAGNOSIS — Z85.3 HISTORY OF RIGHT BREAST CANCER: ICD-10-CM

## 2021-10-01 LAB
BUN, WHOLE BLOOD: 19 MG/DL (ref 8–26)
CHLORIDE, WHOLE BLOOD: 107 MEQ/L (ref 98–109)
CREATININE, WHOLE BLOOD: 0.5 MG/DL (ref 0.5–1.2)
GFR, ESTIMATED: > 90 ML/MIN/1.73M2
GLUCOSE, WHOLE BLOOD: 142 MG/DL (ref 70–108)
IONIZED CALCIUM, WHOLE BLOOD: 1.21 MMOL/L (ref 1.12–1.32)
POTASSIUM, WHOLE BLOOD: 4.5 MEQ/L (ref 3.5–4.9)
SODIUM, WHOLE BLOOD: 137 MEQ/L (ref 138–146)
TOTAL CO2, WHOLE BLOOD: 31 MEQ/L (ref 23–33)

## 2021-10-01 PROCEDURE — 4040F PNEUMOC VAC/ADMIN/RCVD: CPT | Performed by: INTERNAL MEDICINE

## 2021-10-01 PROCEDURE — 3017F COLORECTAL CA SCREEN DOC REV: CPT | Performed by: INTERNAL MEDICINE

## 2021-10-01 PROCEDURE — 99214 OFFICE O/P EST MOD 30 MIN: CPT | Performed by: INTERNAL MEDICINE

## 2021-10-01 PROCEDURE — G8399 PT W/DXA RESULTS DOCUMENT: HCPCS | Performed by: INTERNAL MEDICINE

## 2021-10-01 PROCEDURE — 2580000003 HC RX 258: Performed by: INTERNAL MEDICINE

## 2021-10-01 PROCEDURE — 96365 THER/PROPH/DIAG IV INF INIT: CPT

## 2021-10-01 PROCEDURE — 80047 BASIC METABLC PNL IONIZED CA: CPT

## 2021-10-01 PROCEDURE — G8427 DOCREV CUR MEDS BY ELIG CLIN: HCPCS | Performed by: INTERNAL MEDICINE

## 2021-10-01 PROCEDURE — 1036F TOBACCO NON-USER: CPT | Performed by: INTERNAL MEDICINE

## 2021-10-01 PROCEDURE — 36592 COLLECT BLOOD FROM PICC: CPT

## 2021-10-01 PROCEDURE — 1090F PRES/ABSN URINE INCON ASSESS: CPT | Performed by: INTERNAL MEDICINE

## 2021-10-01 PROCEDURE — 1123F ACP DISCUSS/DSCN MKR DOCD: CPT | Performed by: INTERNAL MEDICINE

## 2021-10-01 PROCEDURE — G8484 FLU IMMUNIZE NO ADMIN: HCPCS | Performed by: INTERNAL MEDICINE

## 2021-10-01 PROCEDURE — 6360000002 HC RX W HCPCS: Performed by: INTERNAL MEDICINE

## 2021-10-01 PROCEDURE — 99211 OFF/OP EST MAY X REQ PHY/QHP: CPT

## 2021-10-01 PROCEDURE — G8417 CALC BMI ABV UP PARAM F/U: HCPCS | Performed by: INTERNAL MEDICINE

## 2021-10-01 RX ORDER — DIPHENHYDRAMINE HYDROCHLORIDE 50 MG/ML
50 INJECTION INTRAMUSCULAR; INTRAVENOUS ONCE
Status: CANCELLED | OUTPATIENT
Start: 2021-10-01 | End: 2021-10-01

## 2021-10-01 RX ORDER — SODIUM CHLORIDE 9 MG/ML
INJECTION, SOLUTION INTRAVENOUS ONCE
Status: COMPLETED | OUTPATIENT
Start: 2021-10-01 | End: 2021-10-01

## 2021-10-01 RX ORDER — SODIUM CHLORIDE 0.9 % (FLUSH) 0.9 %
5-40 SYRINGE (ML) INJECTION PRN
Status: CANCELLED | OUTPATIENT
Start: 2021-10-01

## 2021-10-01 RX ORDER — HEPARIN SODIUM (PORCINE) LOCK FLUSH IV SOLN 100 UNIT/ML 100 UNIT/ML
500 SOLUTION INTRAVENOUS PRN
Status: CANCELLED | OUTPATIENT
Start: 2021-10-01

## 2021-10-01 RX ORDER — ZOLEDRONIC ACID 5 MG/100ML
5 INJECTION, SOLUTION INTRAVENOUS ONCE
Status: COMPLETED | OUTPATIENT
Start: 2021-10-01 | End: 2021-10-01

## 2021-10-01 RX ORDER — SODIUM CHLORIDE 0.9 % (FLUSH) 0.9 %
10 SYRINGE (ML) INJECTION PRN
Status: CANCELLED | OUTPATIENT
Start: 2021-10-01

## 2021-10-01 RX ORDER — METHYLPREDNISOLONE SODIUM SUCCINATE 125 MG/2ML
125 INJECTION, POWDER, LYOPHILIZED, FOR SOLUTION INTRAMUSCULAR; INTRAVENOUS ONCE
Status: CANCELLED | OUTPATIENT
Start: 2021-10-01 | End: 2021-10-01

## 2021-10-01 RX ORDER — 0.9 % SODIUM CHLORIDE 0.9 %
250 INTRAVENOUS SOLUTION INTRAVENOUS ONCE
Status: CANCELLED | OUTPATIENT
Start: 2021-10-01 | End: 2021-10-01

## 2021-10-01 RX ORDER — SODIUM CHLORIDE 9 MG/ML
INJECTION, SOLUTION INTRAVENOUS CONTINUOUS
Status: CANCELLED | OUTPATIENT
Start: 2021-10-01

## 2021-10-01 RX ORDER — SODIUM CHLORIDE 9 MG/ML
25 INJECTION, SOLUTION INTRAVENOUS PRN
Status: CANCELLED | OUTPATIENT
Start: 2021-10-01

## 2021-10-01 RX ORDER — SODIUM CHLORIDE 9 MG/ML
INJECTION, SOLUTION INTRAVENOUS ONCE
Status: CANCELLED | OUTPATIENT
Start: 2021-10-01 | End: 2021-10-01

## 2021-10-01 RX ORDER — ANASTROZOLE 1 MG/1
1 TABLET ORAL DAILY
Qty: 90 TABLET | Refills: 3 | Status: SHIPPED | OUTPATIENT
Start: 2021-10-01 | End: 2022-07-07 | Stop reason: SDUPTHER

## 2021-10-01 RX ORDER — ZOLEDRONIC ACID 5 MG/100ML
5 INJECTION, SOLUTION INTRAVENOUS ONCE
Status: CANCELLED | OUTPATIENT
Start: 2021-10-01 | End: 2021-10-01

## 2021-10-01 RX ORDER — SODIUM CHLORIDE 0.9 % (FLUSH) 0.9 %
5 SYRINGE (ML) INJECTION PRN
Status: CANCELLED | OUTPATIENT
Start: 2021-10-01

## 2021-10-01 RX ADMIN — SODIUM CHLORIDE: 9 INJECTION, SOLUTION INTRAVENOUS at 11:13

## 2021-10-01 RX ADMIN — ZOLEDRONIC ACID 5 MG: 5 INJECTION, SOLUTION INTRAVENOUS at 11:13

## 2021-10-01 NOTE — PATIENT INSTRUCTIONS
1. BMP today. 2.  Reclast today. 3.  Mammogram plus ultrasound of the left axilla in the next 1 to 2 weeks  4.   RTC to see me in 6 months

## 2021-10-01 NOTE — PLAN OF CARE
Problem: Musculor/Skeletal Functional Status  Goal: Absence of falls  Outcome: Met This Shift  Note: No falls this admission   Intervention: Fall precautions  Note: Patient aware of fall precautions for here and at home -call light in reach while here       Problem: Intellectual/Education/Knowledge Deficit  Goal: Teaching initiated upon admission  Outcome: Met This Shift  Note: Reviewed reclast with patient, patient offered no questions or concerns. Patient verbalized understanding of drug being administered. Goal: Written Disposition Instruction form completed  Outcome: Met This Shift  Note: Discharge instructions given and reviewed with patient. All questions answered. Patient verbalized understanding   Intervention: Verbal/written education provided  Note: Discharge instruction sheets     Problem: Discharge Planning  Goal: Knowledge of discharge instructions  Description: Knowledge of discharge instructions  Outcome: Met This Shift  Note: Patient  able to teach back follow up appointments and when to call the doctor. Patient offers no questions at this time   Intervention: Interaction with patient/family and care team  Note: All questions and concerns addressed   Intervention: Discharge to appropriate level of care  Note: Discharge home   Care plan reviewed with patient and she verbalized understanding of the plan of care and contributed to goal setting.

## 2021-10-01 NOTE — PROGRESS NOTES
This is a 68-year-old patient who was diagnosed with left breast cancer in September 2018. [facility]  [address] [facility] [address] [facility] [address]  [facility] [address]  Dept: [phone]  Dept Fax: [phone]: [phone]    Visit Date:10/1/2021     Alesia Schmidt is a 66 y.o. female who presents today for:   Chief Complaint   Patient presents with    Follow-up     Malignant neoplasm of lower-outer quadrant of left breast of female, estrogen receptor positive         HPI:   This is a 68-year-old patient diagnosed with left breast cancer in September 2018. She underwent routine screening mammogram on September 7, 2018 that revealed clustered calcifications in the left breast.  She had a needle biopsy on September 19, 2018 showing invasive ductal carcinoma, measuring 7 mm in size, grade 1, % positive, DC 70% positive and HER-2/oleg negative. The patient met with the surgeon Dr. Michaela He and after discussing her surgical treatment options she decided to proceed with left lumpectomy and sentinel lymph node biopsy. She had her surgery on October 10, 2018 at the lumpectomy specimen did not reveal evidence of residual malignancy. All 3 sentinel lymph nodes were cancer free. Subsequently the patient met with a radiation oncologist who based on and evidence from CALGB study didn't recommend postlumpectomy radiation treatment. Subsequently the patient met with Dr. Monie Rodriguez who placed her on adjuvant hormonal therapy with Arimidex, which she started in October 2018. The patient underwent a bone density study in November 2018 that revealed osteoporosis. The lowest T score was -2.8. The patient initiated treatment with Zometa on November 7, 2018. Since Dr. Mike Hunt is retiring the patient established care with new medical oncologist at [facility]. Interim history on 10/01/2021:  Patient presents to the medical oncology clinic for 6 months follow-up visit.   She had recently surgery on her right hand due to tendinitis. Patient reports that she tolerates Arimidex well. She reports worsening fullness and discomfort in the left axilla. Her arthralgia is stable. Denies having  hot flashes, no difficulty with concentration.     HPI   Past Medical History:   Diagnosis Date    Arthritis     Asthma     Brain cyst     benign    Breast cancer ([address] 09/18/2018    Left breast, 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    Cancer ([address] 09/1918    Left Breast    Chronic sinus complaints     Diverticulosis of colon     DKA (diabetic ketoacidoses) 05/2018    Elevated TSH     Hypertension     Osteoarthritis     Polyneuropathy     PONV (postoperative nausea and vomiting)     Spinal headache     Type 2 diabetes mellitus ([address] 1990's      Past Surgical History:   Procedure Laterality Date    BLADDER SUSPENSION      times 2    BREAST LUMPECTOMY Left 10/10/2018    CARPAL TUNNEL RELEASE Bilateral 02/2020    CHOLECYSTECTOMY      DILATION AND CURETTAGE OF UTERUS      x2    EYE SURGERY      HERNIA REPAIR  06/18/2019    HYSTERECTOMY  1995    JOINT REPLACEMENT Right 2007    Rt total knee    KNEE ARTHROSCOPY  1967, 2001    RUDY STEROTACTIC LOC BREAST BIOPSY LEFT Left 09/19/2018    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    MANDIBLE FRACTURE SURGERY      OTHER SURGICAL HISTORY Right 12/30/2015    Excision of Sebaceous Cyst Right Ear     OVARY REMOVAL  1995    AL OFFICE/OUTPT VISIT,PROCEDURE ONLY Left 10/10/2018    LEFT BREAST LUMPECTOMY WITH SENTINEL LYMPH NODE BIOPSY performed by Danny Acosta MD at [address] [place] [address],[address] [facility] SKIN BIOPSY      TONSILLECTOMY AND ADENOIDECTOMY  age 4   [place] VENTRAL HERNIA REPAIR N/A 6/14/2019    COMBINED LAPAROSCOPIC AND OPEN VENTRAL HERNIA REPAIR WITH MESH performed by Danny Acosta MD at [place] [address] History   Problem Relation Age of Onset    Diabetes Mother     Heart Disease Mother     Lupus Mother    [place] Heart Disease Father     Cancer Father         Squamous cell - face & scalp    Diabetes Maternal Grandmother     Heart Disease Maternal Grandfather     Cancer Paternal Aunt          multiple myeloma    Cancer Paternal Uncle         Lymphatic Leukemia    Breast Cancer Maternal Cousin         unsure on age, had breast cancer twice unsure if bilateral or if reoccurence    Heart Disease Maternal Uncle     Lupus Maternal Uncle     Other Paternal Grandfather         Brain aneurysm      Social History     Tobacco Use    Smoking status: Never Smoker    Smokeless tobacco: Never Used   Substance Use Topics    Alcohol use:  Yes     Alcohol/week: 0.0 standard drinks     Comment: Socially      Current Outpatient Medications   Medication Sig Dispense Refill    anastrozole (ARIMIDEX) 1 MG tablet Take 1 tablet by mouth daily 90 tablet 3    ibuprofen (ADVIL;MOTRIN) 600 MG tablet Take 1 tablet by mouth 2 times daily as needed for Pain 180 tablet 1    benazepril (LOTENSIN) 20 MG tablet Take 1 tablet by mouth 2 times daily 180 tablet 1    atorvastatin (LIPITOR) 20 MG tablet Take 1 tablet by mouth nightly 90 tablet 1    albuterol sulfate HFA (PROAIR HFA) 108 (90 Base) MCG/ACT inhaler Inhale 2 puffs into the lungs every 6 hours as needed for Wheezing 1 Inhaler 1    empagliflozin (JARDIANCE) 25 MG tablet Take 25 mg by mouth daily      insulin lispro, 1 Unit Dial, (HUMALOG KWIKPEN) 100 UNIT/ML SOPN Inject into the skin 3 times daily Indications: inject 0-12 unitsinto the skin 3 times daily-per sliding scale      furosemide (LASIX) 20 MG tablet       Handicap Placard MISC by Does not apply route Dx:  Shortness of breath on exertion; duration 3 years: exp date: 8/7/2022 1 each 0    EASY TOUCH PEN NEEDLES 31G X 6 MM MISC       LANTUS SOLOSTAR 100 UNIT/ML injection pen Inject 47 Units into the skin nightly      Calcium Carb-Cholecalciferol 600-500 MG-UNIT CAPS Take by mouth daily      aspirin 81 MG chewable tablet Take Respiratory: Negative for cough, chest tightness, shortness of breath and wheezing. Cardiovascular: Negative for chest pain, palpitations and leg swelling. Gastrointestinal: Negative for abdominal distention, abdominal pain, blood in stool, constipation, diarrhea, nausea, rectal pain and vomiting. Endocrine: Negative for cold intolerance, polydipsia and polyuria. Genitourinary: Negative for decreased urine volume, difficulty urinating, dysuria, flank pain, hematuria and urgency. Musculoskeletal: Negative for arthralgias, back pain, gait problem, joint swelling, myalgias and neck stiffness. Skin: Negative for color change, rash and wound. Neurological: Negative for dizziness, tremors, seizures, speech difficulty, weakness, light-headedness, numbness and headaches. Hematological: Negative for adenopathy. Does not bruise/bleed easily. Psychiatric/Behavioral: Negative for confusion and sleep disturbance. The patient is not nervous/anxious. Objective:   Physical Exam  Vitals reviewed. Constitutional:       General: She is not in acute distress. Appearance: She is well-developed. HENT:      Head: Normocephalic. Mouth/Throat:      Pharynx: No oropharyngeal exudate. Eyes:      General: No scleral icterus. Right eye: No discharge. Left eye: No discharge. Pupils: Pupils are equal, round, and reactive to light. Neck:      Thyroid: No thyromegaly. Vascular: No JVD. Trachea: No tracheal deviation. Cardiovascular:      Rate and Rhythm: Normal rate. Heart sounds: Normal heart sounds. No murmur heard. No friction rub. No gallop. Pulmonary:      Effort: Pulmonary effort is normal. No respiratory distress. Breath sounds: Normal breath sounds. No stridor. No wheezing or rales. Chest:      Chest wall: No tenderness. Breasts:         Left: Mass (Status post left lumpectomy. Lumpectomy incision nicely healed.   No palpable masses or nodules) present. Abdominal:      General: Bowel sounds are normal. There is no distension. Palpations: Abdomen is soft. There is no mass. Tenderness: There is no abdominal tenderness. There is no rebound. Musculoskeletal:         General: Normal range of motion. Cervical back: Normal range of motion and neck supple. Comments: Good range of motion in all four extremities. Lymphadenopathy:      Cervical: No cervical adenopathy. Skin:     General: Skin is warm. Findings: No erythema or rash. Neurological:      Mental Status: She is alert and oriented to person, place, and time. Cranial Nerves: No cranial nerve deficit. Motor: No abnormal muscle tone. Deep Tendon Reflexes: Reflexes are normal and symmetric. Psychiatric:         Behavior: Behavior normal.         Thought Content: Thought content normal.         Judgment: Judgment normal.         BP (!) 159/67 (Site: Right Upper Arm, Position: Sitting, Cuff Size: Medium Adult)   Pulse 73   Temp 98.6 °F (37 °C) (Oral)   Resp 18   Ht 5' 3\" (1.6 m)   Wt 200 lb 6.4 oz (90.9 kg)   SpO2 97%   BMI 35.50 kg/m²      ECOG status is 0. Imaging studies and labs: [facility] Digital Diagnostic W Or Wo Cad Left  Result Date: 3/18/2019  IMPRESSION: Mammogram BI-RADS: 2: Benign 1. There is no mammographic evidence of malignancy. 2. A 1 year screening mammogram is recommended. 3. The patient has been entered into our database and they will receive a notification when they are due for their next exam.  4. The patient was notified of the results. #[id number] - [facility] DIGITAL DIAGNOSTIC W OR WO CAD LEFT UNILATERAL LEFT DIGITAL DIAGNOSTIC MAMMOGRAM 3D/2D WITH CAD: 3/18/2019 CLINICAL: Tomosynthesis. Breast Cancer left breast.  Comparison is made to exams dated:  9/7/2018 mammogram, 8/16/2017  mammogram, and 12/1/2014 mammogram - [facility]. The tissue of the left breast is heterogeneously dense.  This may lower the sensitivity of mammography. Current study was also evaluated with a Computer Aided Detection (CAD) system. There are benign vascular calcifications left breast.  There also  are benign scattered calcifications left breast.  Additionally, there are post operative findings left breast.  No significant masses, calcifications, or other findings are seen  in the breast.  There has been no significant interval change. Darian Higgins M.D.          pgk/:3/18/2019 11:08:53  copy to: Juan Carlos Hairston M.D., [facility], ph: [phone], fax: [phone] Imaging Technologist: Elie Osborne RT(R)(M), [address] [place] [address] letter sent: [facility] Doctor Names       Lab Results   Component Value Date    WBC 6.4 06/14/2021    HGB 14.5 06/14/2021    HCT 46.8 06/14/2021    MCV 98.3 06/14/2021     06/14/2021       Chemistry        Component Value Date/Time     (L) 10/01/2021 1036     06/14/2021 1322    K 4.5 10/01/2021 1036    K 4.6 06/14/2021 1322     06/14/2021 1322    CO2 26 06/14/2021 1322    BUN 34 (H) 06/14/2021 1322    CREATININE 0.5 10/01/2021 1036    CREATININE 0.7 06/14/2021 1322        Component Value Date/Time    CALCIUM 10.3 06/14/2021 1322    ALKPHOS 67 03/19/2021 0929    AST 30 03/19/2021 0929    ALT 31 03/19/2021 0929    BILITOT 1.1 03/19/2021 0929            Assessment/Plan: 1. Stage IA (pT1b, pN0, cM0, G1, ER+, DC+, HER2-) left breast cancer diagnosed in 2018. The patient is status post lumpectomy with sentinel lymph node biopsy. The patient did not have postlumpectomy radiation treatment. The CALGB 9343 study enrolled patients 79years of age and older. The results showed 10% risk of local disease recurrence at 10 years in women treated with tamoxifen only ( no radiation therapy) in contrast to 2% risk of local disease recurrence at 10 years in women treated with tamoxifen and radiation therapy. However overall survival was the same for both groups.  PRIME 2 and exercise. Patient agreed with treatment plan. Follow up as directed.     Electronically signed by Nate Carlos MD on 4/10/19 at 2:35 PM

## 2021-10-01 NOTE — PROGRESS NOTES
Patient assessed for the following post reclast :    Dizziness   No  Lightheadedness  No      Acute nausea/vomiting No  Headache   No  Chest pain/pressure  No  Rash/itching   No  Shortness of breath  No    Patient kept for 20 minutes observation post infusion. Patient tolerated reclast without any complications. Patient instructed if experience any of the above symptoms following today's infusion,she is to notify MD immediately or go to the emergency department. Discharge instructions given to patient. Verbalizes understanding. Ambulated off unit per self with belongings.

## 2021-10-05 ENCOUNTER — HOSPITAL ENCOUNTER (OUTPATIENT)
Dept: WOMENS IMAGING | Age: 71
Discharge: HOME | End: 2021-10-05
Payer: MEDICARE

## 2021-10-05 DIAGNOSIS — R22.32 MASS OF LEFT AXILLA: ICD-10-CM

## 2021-10-05 DIAGNOSIS — Z85.3 HISTORY OF RIGHT BREAST CANCER: ICD-10-CM

## 2021-10-05 DIAGNOSIS — Z85.3 HISTORY OF LEFT BREAST CANCER: ICD-10-CM

## 2021-10-05 PROCEDURE — 77066 DX MAMMO INCL CAD BI: CPT

## 2021-10-05 PROCEDURE — 76882 US LMTD JT/FCL EVL NVASC XTR: CPT

## 2021-10-25 ENCOUNTER — HOSPITAL ENCOUNTER (OUTPATIENT)
Dept: OCCUPATIONAL THERAPY | Age: 71
Setting detail: THERAPIES SERIES
Discharge: HOME | End: 2021-10-25
Payer: MEDICARE

## 2021-10-25 PROCEDURE — 97165 OT EVAL LOW COMPLEX 30 MIN: CPT

## 2021-10-25 PROCEDURE — 97110 THERAPEUTIC EXERCISES: CPT

## 2021-10-25 NOTE — PROGRESS NOTES
** PLEASE SIGN, DATE AND TIME CERTIFICATION BELOW AND RETURN TO [facility] OUTPATIENT REHABILITATION (FAX #: [phone]). ATTEST/CO-SIGN IF ACCESSING VIA [employer]. THANK YOU.**    I certify that I have examined the patient below and determined that Physical Medicine and Rehabilitation service is necessary and that I approve the established plan of care for up to 90 days or as specifically noted. Attestation, signature or co-signature of physician indicates approval of certification requirements.    ________________________ ____________ __________  Physician Signature   Date   Time   [address] THERAPY  [x] EVALUATION  [] DAILY NOTE (LAND) [] DAILY NOTE (AQUATIC ) [] PROGRESS NOTE [] DISCHARGE NOTE    [] [facility]   [x] [address]    [] [address] [facility]   [] Nathaniel Antony    Date: 10/25/2021  Patient Name:  Kim Francis  : 1950  MRN: [id number]  CSN: [id number]    Referring Practitioner Calderon Dunlap MD   Diagnosis Unilateral primary osteoarthritis of first carpometacarpal joint, right hand [M18.11]    Treatment Diagnosis Right CMC arthritis, right thumb pain   Date of Evaluation 10/25/21      Functional Outcome Measure Used UEFS   Functional Outcome Score 41/80 (10/25/21)       Insurance: Primary: Payor: Ignacio Herrmann /  /  / ,   Secondary: [facility]   Authorization Information: No ionto, no hot/cold packs   Visit # 1, 1/10 for progress note   Visits Allowed: unlimited   Recertification Date:    Physician Follow-Up: 21   Physician Orders: ROM right hand and to full then prog strength    Pertinent History: Patient had surgery on right thumb on 21 for CMC arthritis. Patient had OT in 2021. It was recommended for patient to contact physician due to pain in right thumb. Patient returned to physician and was told that a tendon had ruptured.  Patient then had surgery for tendon repair on 9/7/21. Patient was in soft cast for 2 weeks and then was in custom thumb splint fabricated by OT at [facility] until last week. Was told to remove splint at last MD appointment and OT was ordered at that time. Comorbidities include HTN, DM, and arthritis that could influence rehab process. SUBJECTIVE: Patient relates that physician told her that they had to drill through the bone during the last surgery. States that she did have pain over the last few days due to doing some ironing on Friday. States that this surgery was more painful than the last surgery. Patient got a phone call right before therapy evaluation that a family member passed away. Social/Functional History:  Electronic Medical Record reviewed and up to date    Brady Early lives with spouse   Task Prior Level of Function  (current level of function addressed below)   ADLs  Independent   Ambulation uses straight cane when out of house and when on [address] work, family activities, craft shows   Driving Active    Work Retired     OBJECTIVE:  Hand Dominance left handed for writing and eating; does a lot of other activities with right hand   Palpation    Observation Patient unable to totally flatten right palm. ADL's Reports having moderate difficulty with kitchen activities, tying shoes, doing housework. States that her sleeping has improved. States that she has difficulty with laundry tasks. Unable to open jars without assistance. Sensation Reports tingling in right thumb - relates that she did have some tingling prior to surgery due to previous right thumb injury; States that tingling is worse currently. Coordination 9 Hole Peg Test:   Right: 33 seconds     Left:   26 seconds. Performed with right hand while holding right elbow up due to limited wrist motion.            RIGHT UPPER EXTREMITY  RANGE OF MOTION    AROM PROM COMMENTS            Forearm Pronation WFL Not tested Forearm Supination 70 Not tested       Wrist Flexion 15 25    Wrist Extension 45 50    Wrist Radial Deviation -5 Not tested    Wrist Ulnar Deviation 30 Not tested          RIGHT UPPER EXTREMITY  HAND RANGE OF MOTION    AROM PROM COMMENTS   Thump MP 45     Thumb IP 40     Thumb Radial Abduction/Adduction 20     Thumb Palmar Abduction/Adduction 40     Thumb Opposition Can touch small finger with increased time and effort       RIGHT UPPER EXTREMITY  HAND CIRCUMFERENTIAL MEASUREMENTS    MSMT IN CM COMMENTS   Wrist Crease 18.3 cm    Metacarpals 19 cm    Thumb IP 6.6 cm          TREATMENT   Precautions: HTN, DM, no lifting of anything \"too heavy\"   Pain:  2/10 at time of therapy; went to 4/10 over the weekend LOCATION: right thumb and wrist    X in shaded column indicates Activity Completed Today   Modalities Parameters/  Location  Notes/Comments                     Manual Therapy Time/  Technique  Notes/Comments                     Exercises   Sets/  Sec Reps  Notes/Comments   Active right wrist flexion/extension 1 10 x    Active right wrist circumduction 1 10 x    Passive right wrist flexion 1 10 x 10 second hold   Prayer stretch for wrist extension 1 10 x 10 second hold                        Activities Time    Notes/Comments                       Specific Interventions Next Treatment: fluidotherapy; AROM, PROM right wrist and thumb    Activity/Treatment Tolerance:  [x]  Patient tolerated treatment well  []  Patient limited by fatigue  []  Patient limited by pain   []  Patient limited by other medical complications  []  Other:     Assessment: Patient meets criteria for low complexity evaluation based on documented evaluation findings. Areas for Improvement: edema, impaired coordination, impaired ROM, impaired strength, pain and impaired ADL  Prognosis: good    GOALS:  Patient Goal: To have full use of my hand. Have strength, Be able to  and grab with my right hand. No pain    Short Term Goals:  Time Frame: 4 weeks  1. 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 2. Increase active right wrist flexion to 25, extension to 55, radial deviation to 0, ulnar deviation to 35, and supination to 80 to increase her ability to use of right hand in kitchen task. 3. Increase right thumb MP flexion to 55 and IR to 50 to increase her ability to use buttons and tie shoes. 4. Report pain going no higher than 2/10 in right thumb and wrist at any point to assist in improving sleep routine. Long Term Goals:  Time Frame: 12 weeks  1. Improve right hand coordination as evidenced by completing 9 hole peg test in less than 28 seconds to increase her ability to complete kitchen tasks. 2. Report being able to complete ironing activities with right hand with no increase in pain. 3. Be able to open jars with use of needed adaptive equipment with no increase in pain in right wrist and hand. Patient Education:   [x]  HEP/Education Completed: Plan of Care, Goals, HEP - active and passive right wrist flexion/extension, wrist circumduction, prayer stretch     []  No new Education completed  []  Reviewed Prior HEP      [x]  Patient verbalized and/or demonstrated understanding of education provided. []  Patient unable to verbalize and/or demonstrate understanding of education provided. Will continue education. [x]  Barriers to learning: none    PLAN:  Treatment Recommendations: Strengthening, Range of Motion, Pain Management, Home Exercise Program, Patient Education, Self-Care Education and Training and Modalities    [x]  Plan of care initiated. Plan to see patient 2 times per week for 12 weeks to address the treatment planned outlined above. []  Continue with current plan of care  []  Modify plan of care as follows:    []  Hold pending physician visit  []  Discharge    Time In 0850   Time Out 0935   Timed Code Minutes: 20 min   Total Treatment Time: 45 min       Jacquelyn SANTANA bAa Weaver, OTR/L #[id number]

## 2021-11-01 ENCOUNTER — HOSPITAL ENCOUNTER (OUTPATIENT)
Dept: OCCUPATIONAL THERAPY | Age: 71
Setting detail: THERAPIES SERIES
Discharge: HOME | End: 2021-11-01
Payer: MEDICARE

## 2021-11-01 PROCEDURE — 97110 THERAPEUTIC EXERCISES: CPT

## 2021-11-01 PROCEDURE — 97022 WHIRLPOOL THERAPY: CPT

## 2021-11-01 NOTE — PROGRESS NOTES
[address]  [] EVALUATION  [] DAILY NOTE (LAND) [] DAILY NOTE (AQUATIC ) [] PROGRESS NOTE [] DISCHARGE NOTE    [] [facility]   [x] [address]    [] [facility]   [] Eleazar Rose    Date: 2021  Patient Name:  Brady Early  : 1950  MRN: [id number]  CSN: [id number]    Referring Practitioner Balta Alvarez MD   Diagnosis Unilateral primary osteoarthritis of first carpometacarpal joint, right hand [M18.11]    Treatment Diagnosis Right CMC arthritis, right thumb pain   Date of Evaluation 10/25/21      Functional Outcome Measure Used UEFS   Functional Outcome Score 41/80 (10/25/21)       Insurance: Primary: Payor: Marta Pierce /  /  / ,   Secondary: [facility]   Authorization Information: No ionto, no hot/cold packs   Visit # 2, 2/10 for progress note   Visits Allowed: unlimited   Recertification Date:    Physician Follow-Up: 21   Physician Orders: ROM right hand and to full then prog strength    Pertinent History: Patient had surgery on right thumb on 21 for Aia 16 arthritis. Patient had OT in 2021. It was recommended for patient to contact physician due to pain in right thumb. Patient returned to physician and was told that a tendon had ruptured. Patient then had surgery for tendon repair on 21. Patient was in soft cast for 2 weeks and then was in custom thumb splint fabricated by OT at [facility] until last week. Was told to remove splint at last MD appointment and OT was ordered at that time. Comorbidities include HTN, DM, and arthritis that could influence rehab process. SUBJECTIVE:  Patient reports 4/10 prior to therapy, and 1/10 after fluidotherapy. Reports that she is trying not to do too much at home.           OBJECTIVE:  TREATMENT   Precautions: HTN, DM, no lifting of anything \"too heavy\"   Pain:   4/10 at start of theray LOCATION: right thumb and wrist    X in shaded column indicates Activity Completed Today   Modalities Parameters/  Location  Notes/Comments                     Manual Therapy Time/  Technique  Notes/Comments   IASTM to thenar and dorsal and volar forearm to deg                  Exercises   Sets/  Sec Reps  Notes/Comments   Active right wrist flexion/extension 1 10 x    Active right wrist circumduction 1 10 x    Passive right wrist flexion 1 10 x 10 second hold   Prayer stretch for wrist extension 1 10 x 10 second hold                        Activities Time    Notes/Comments                       Specific Interventions Next Treatment: fluidotherapy; AROM, PROM right wrist and thumb    Activity/Treatment Tolerance:  [x]  Patient tolerated treatment well  []  Patient limited by fatigue  []  Patient limited by pain   []  Patient limited by other medical complications  []  Other:     Assessment: Good gains with wrist ROM after IASTM to the forearm. Areas for Improvement: edema, impaired coordination, impaired ROM, impaired strength, pain and impaired ADL  Prognosis: good    GOALS:  Patient Goal: To have full use of my hand. Have strength, Be able to  and grab with my right hand. No pain    Short Term Goals:  Time Frame: 4 weeks  1. 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 2. Increase active right wrist flexion to 25, extension to 55, radial deviation to 0, ulnar deviation to 35, and supination to 80 to increase her ability to use of right hand in kitchen task. 3. Increase right thumb MP flexion to 55 and IR to 50 to increase her ability to use buttons and tie shoes. 4. Report pain going no higher than 2/10 in right thumb and wrist at any point to assist in improving sleep routine. Long Term Goals:  Time Frame: 12 weeks  1. Improve right hand coordination as evidenced by completing 9 hole peg test in less than 28 seconds to increase her ability to complete kitchen tasks.   2. Report being able to complete ironing activities with right hand with no increase in pain. 3. Be able to open jars with use of needed adaptive equipment with no increase in pain in right wrist and hand. Patient Education:   [x]  HEP/Education Completed: Plan of Care, Goals, HEP - active and passive right wrist flexion/extension, wrist circumduction, prayer stretch     []  No new Education completed  []  Reviewed Prior HEP      [x]  Patient verbalized and/or demonstrated understanding of education provided. []  Patient unable to verbalize and/or demonstrate understanding of education provided. Will continue education. [x]  Barriers to learning: none    PLAN:  Treatment Recommendations: Strengthening, Range of Motion, Pain Management, Home Exercise Program, Patient Education, Self-Care Education and Training and Modalities    [x]  Plan of care initiated. Plan to see patient 2 times per week for 12 weeks to address the treatment planned outlined above.   []  Continue with current plan of care  []  Modify plan of care as follows:    []  Hold pending physician visit  []  Discharge    Time In 0845   Time Out 0930   Timed Code Minutes: 30min   Total Treatment Time: 45 min       Leory Ganser, [address] [facility], OTR/L [id number]

## 2021-11-04 ENCOUNTER — HOSPITAL ENCOUNTER (OUTPATIENT)
Dept: OCCUPATIONAL THERAPY | Age: 71
Setting detail: THERAPIES SERIES
Discharge: HOME | End: 2021-11-04
Payer: MEDICARE

## 2021-11-04 DIAGNOSIS — G89.29 CHRONIC PAIN OF LEFT KNEE: ICD-10-CM

## 2021-11-04 DIAGNOSIS — M25.562 CHRONIC PAIN OF LEFT KNEE: ICD-10-CM

## 2021-11-04 PROCEDURE — 97022 WHIRLPOOL THERAPY: CPT

## 2021-11-04 PROCEDURE — 97110 THERAPEUTIC EXERCISES: CPT

## 2021-11-04 NOTE — PROGRESS NOTES
complete ironing activities with right hand with no increase in pain. 3. Be able to open jars with use of needed adaptive equipment with no increase in pain in right wrist and hand. Patient Education:   [x]  HEP/Education Completed: wrist flexion over edge of table  []  No new Education completed  []  Reviewed Prior HEP      [x]  Patient verbalized and/or demonstrated understanding of education provided. []  Patient unable to verbalize and/or demonstrate understanding of education provided. Will continue education. [x]  Barriers to learning: none    PLAN:      []  Plan of care initiated. Plan to see patient 2 times per week for 12 weeks to address the treatment planned outlined above.   [x]  Continue with current plan of care  []  Modify plan of care as follows:    []  Hold pending physician visit  []  Discharge    Time In 1005   Time Out 1050   Timed Code Minutes: 30min   Total Treatment Time: 45 min       Kellen Clinton OTR/L #[id number]

## 2021-11-04 NOTE — TELEPHONE ENCOUNTER
[facility] called requesting a refill on the following medications:  Requested Prescriptions     Pending Prescriptions Disp Refills    ibuprofen (ADVIL;MOTRIN) 600 MG tablet [Pharmacy Med Name: IBUPROFEN 600 MG TABLET] 180 tablet 0     Sig: Take 1 tablet by mouth 2 times daily as needed for Pain       Date of last visit: 6/14/2021  Date of next visit (if applicable):12/20/2021  Date of last refill: 06/14/21  Pharmacy Name: [facility], LPN

## 2021-11-05 RX ORDER — IBUPROFEN 600 MG/1
600 TABLET ORAL 2 TIMES DAILY PRN
Qty: 180 TABLET | Refills: 0 | Status: SHIPPED | OUTPATIENT
Start: 2021-11-05 | End: 2021-12-20 | Stop reason: SDUPTHER

## 2021-11-08 ENCOUNTER — HOSPITAL ENCOUNTER (OUTPATIENT)
Dept: OCCUPATIONAL THERAPY | Age: 71
Setting detail: THERAPIES SERIES
End: 2021-11-08
Payer: MEDICARE

## 2021-11-10 LAB
AVERAGE GLUCOSE: NORMAL
HBA1C MFR BLD: 7.1 %

## 2021-11-11 ENCOUNTER — HOSPITAL ENCOUNTER (OUTPATIENT)
Dept: OCCUPATIONAL THERAPY | Age: 71
Setting detail: THERAPIES SERIES
Discharge: HOME | End: 2021-11-11
Payer: MEDICARE

## 2021-11-11 PROCEDURE — 97110 THERAPEUTIC EXERCISES: CPT

## 2021-11-11 PROCEDURE — 97022 WHIRLPOOL THERAPY: CPT

## 2021-11-11 NOTE — PROGRESS NOTES
[address]  [] EVALUATION  [] DAILY NOTE (LAND) [] DAILY NOTE (AQUATIC ) [] PROGRESS NOTE [] DISCHARGE NOTE    [] [address] [facility]   [x] [address]    [] [facility]   [] Casimiro End    Date: 2021  Patient Name:  Oren Herbert  : 1950  MRN: [id number]  CSN: [id number]    Referring Practitioner Rob Davalos MD   Diagnosis Unilateral primary osteoarthritis of first carpometacarpal joint, right hand [M18.11]    Treatment Diagnosis Right CMC arthritis, right thumb pain   Date of Evaluation 10/25/21      Functional Outcome Measure Used UEFS   Functional Outcome Score 41/80 (10/25/21)       Insurance: Primary: Payor: Kenney [place] /  /  / ,   Secondary: [facility]   Authorization Information: No ionto, no hot/cold packs   Visit # 4, 4/10 for progress note   Visits Allowed: unlimited   Recertification Date:    Physician Follow-Up: 21   Physician Orders: ROM right hand and to full then prog strength    Pertinent History: Patient had surgery on right thumb on 21 for Aia 16 arthritis. Patient had OT in 2021. It was recommended for patient to contact physician due to pain in right thumb. Patient returned to physician and was told that a tendon had ruptured. Patient then had surgery for tendon repair on 21. Patient was in soft cast for 2 weeks and then was in custom thumb splint fabricated by OT at [facility] until last week. Was told to remove splint at last MD appointment and OT was ordered at that time. Comorbidities include HTN, DM, and arthritis that could influence rehab process. SUBJECTIVE:  States that she was really ill from the covid booster this week. States that she is feeling better now. States that she thinks that her thumb and wrist are a little better. States that she was back at normal activity yesterday.  States that she is sleeping better, continues to have numbness in right thumb area. OBJECTIVE:  TREATMENT   Precautions: HTN, DM, no lifting of anything \"too heavy\"   Pain:  1 /10 at start of theray LOCATION: right thumb and wrist    X in shaded column indicates Activity Completed Today   Modalities Parameters/  Location  Notes/Comments   fluidotherapy to right hand x 15 minutes  x                Manual Therapy Time/  Technique  Notes/Comments   STM to dorsal right forearm  x Slight tightness present               Exercises   Sets/  Sec Reps  Notes/Comments   Active right wrist flexion/extension 1 10 x    Active right wrist circumduction 1 10 each direction x    Passive right wrist flexion       Prayer stretch for wrist extension 1 5 x 10 second hold   Coordination maze with right hand 1 5 x    PROM completed to right wrist and thumb   x           Activities Time    Notes/Comments                       Specific Interventions Next Treatment: fluidotherapy; AROM, PROM right wrist and thumb    Activity/Treatment Tolerance:  [x]  Patient tolerated treatment well  []  Patient limited by fatigue  []  Patient limited by pain   []  Patient limited by other medical complications  []  Other:     Assessment: Progressing toward goals nicely. Patient does have some difficulty with coordinated movements in right hand. GOALS:  Patient Goal: To have full use of my hand. Have strength, Be able to  and grab with my right hand. No pain    Short Term Goals:  Time Frame: 4 weeks  1. 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 2. Increase active right wrist flexion to 25, extension to 55, radial deviation to 0, ulnar deviation to 35, and supination to 80 to increase her ability to use of right hand in kitchen task. 3. Increase right thumb MP flexion to 55 and IR to 50 to increase her ability to use buttons and tie shoes.   4. Report pain going no higher than 2/10 in right thumb and wrist at any point to assist in improving sleep routine. Long Term Goals:  Time Frame: 12 weeks  1. Improve right hand coordination as evidenced by completing 9 hole peg test in less than 28 seconds to increase her ability to complete kitchen tasks. 2. Report being able to complete ironing activities with right hand with no increase in pain. 3. Be able to open jars with use of needed adaptive equipment with no increase in pain in right wrist and hand. Patient Education:   []  HEP/Education Completed:   []  No new Education completed  []  Reviewed Prior HEP      [x]  Patient verbalized and/or demonstrated understanding of education provided. []  Patient unable to verbalize and/or demonstrate understanding of education provided. Will continue education. [x]  Barriers to learning: none    PLAN:      []  Plan of care initiated. Plan to see patient 2 times per week for 12 weeks to address the treatment planned outlined above.   [x]  Continue with current plan of care  []  Modify plan of care as follows:    []  Hold pending physician visit  []  Discharge    Time In 0945   Time Out 1030   Timed Code Minutes: 30min   Total Treatment Time: 45 min       Jojo Faulkner OTR/ROMMEL #[id number]

## 2021-11-16 ENCOUNTER — HOSPITAL ENCOUNTER (OUTPATIENT)
Dept: OCCUPATIONAL THERAPY | Age: 71
Setting detail: THERAPIES SERIES
Discharge: HOME | End: 2021-11-16
Payer: MEDICARE

## 2021-11-16 PROCEDURE — 97022 WHIRLPOOL THERAPY: CPT

## 2021-11-16 PROCEDURE — 97110 THERAPEUTIC EXERCISES: CPT

## 2021-11-16 NOTE — PROGRESS NOTES
[address]  [] EVALUATION  [] DAILY NOTE (LAND) [] DAILY NOTE (AQUATIC ) [] PROGRESS NOTE [] DISCHARGE NOTE    [] [address] [facility]   [x] [address]    [] [facility]   [] Earline Pitcher    Date: 2021  Patient Name:  Muriel Ruff  : 1950  MRN: [id number]  CSN: [id number]    Referring Practitioner Domenico Felder MD   Diagnosis Unilateral primary osteoarthritis of first carpometacarpal joint, right hand [M18.11]    Treatment Diagnosis Right CMC arthritis, right thumb pain   Date of Evaluation 10/25/21      Functional Outcome Measure Used UEFS   Functional Outcome Score 41/80 (10/25/21)       Insurance: Primary: Payor: Hailye Villalboos /  /  / ,   Secondary: [facility]   Authorization Information: No ionto, no hot/cold packs   Visit # 5, 5/10 for progress note   Visits Allowed: unlimited   Recertification Date:    Physician Follow-Up: 21   Physician Orders: ROM right hand and to full then prog strength    Pertinent History: Patient had surgery on right thumb on 21 for [facility] arthritis. Patient had OT in 2021. It was recommended for patient to contact physician due to pain in right thumb. Patient returned to physician and was told that a tendon had ruptured. Patient then had surgery for tendon repair on 21. Patient was in soft cast for 2 weeks and then was in custom thumb splint fabricated by OT at [facility] until last week. Was told to remove splint at last MD appointment and OT was ordered at that time. Comorbidities include HTN, DM, and arthritis that could influence rehab process. SUBJECTIVE:  Patient relates that she found it difficult to line drawers with contact paper due to using her right hand. States that she thinks that sometimes she might lay wrong which causes some pain in right forearm.      OBJECTIVE:  TREATMENT   Precautions: HTN, DM, no lifting of anything \"too heavy\"   Pain:  2 /10 last night; 1/10 at time of therapy LOCATION: right thumb and wrist    X in shaded column indicates Activity Completed Today   Modalities Parameters/  Location  Notes/Comments   fluidotherapy to right hand x 15 minutes  x                Manual Therapy Time/  Technique  Notes/Comments   STM to dorsal right forearm   Slight tightness present               Exercises   Sets/  Sec Reps  Notes/Comments   Active right wrist flexion/extension 1 10     Active right wrist circumduction 1 10 each direction     Passive right wrist flexion       Prayer stretch for wrist extension 1 5 x 10 second hold   Coordination maze with right hand 1 5 x    PROM completed to right wrist and thumb   x    Soft theraputty - taffy pulls using fingers versus whole hand 1 15 x    Red therabar - bending bar with forearms pronated and bending bar with forearms supinated 1 10 each direction x    Activities Time    Notes/Comments                       Specific Interventions Next Treatment: fluidotherapy; AROM, PROM right wrist and thumb    Activity/Treatment Tolerance:  [x]  Patient tolerated treatment well  []  Patient limited by fatigue  []  Patient limited by pain   []  Patient limited by other medical complications  []  Other:     Assessment: Patient is progressing toward goals. Patient is able to use her right hand for more normal activities with less difficulty. GOALS:  Patient Goal: To have full use of my hand. Have strength, Be able to  and grab with my right hand. No pain    Short Term Goals:  Time Frame: 4 weeks  1. 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 2. Increase active right wrist flexion to 25, extension to 55, radial deviation to 0, ulnar deviation to 35, and supination to 80 to increase her ability to use of right hand in kitchen task.   3. Increase right thumb MP flexion to 55 and IR to 50 to increase her ability to use buttons and tie shoes.  4. Report pain going no higher than 2/10 in right thumb and wrist at any point to assist in improving sleep routine. Long Term Goals:  Time Frame: 12 weeks  1. Improve right hand coordination as evidenced by completing 9 hole peg test in less than 28 seconds to increase her ability to complete kitchen tasks. 2. Report being able to complete ironing activities with right hand with no increase in pain. 3. Be able to open jars with use of needed adaptive equipment with no increase in pain in right wrist and hand. Patient Education:   []  HEP/Education Completed:   []  No new Education completed  [x]  Reviewed Prior HEP      [x]  Patient verbalized and/or demonstrated understanding of education provided. []  Patient unable to verbalize and/or demonstrate understanding of education provided. Will continue education. [x]  Barriers to learning: none    PLAN:      []  Plan of care initiated. Plan to see patient 2 times per week for 12 weeks to address the treatment planned outlined above.   [x]  Continue with current plan of care  []  Modify plan of care as follows:    []  Hold pending physician visit  []  Discharge    Time In 0800   Time Out 845   Timed Code Minutes: 30min   Total Treatment Time: 45 min       Parmjit Gonzalez OTR/L #[id number]

## 2021-11-18 ENCOUNTER — HOSPITAL ENCOUNTER (OUTPATIENT)
Dept: OCCUPATIONAL THERAPY | Age: 71
Setting detail: THERAPIES SERIES
Discharge: HOME | End: 2021-11-18
Payer: MEDICARE

## 2021-11-18 PROCEDURE — 97022 WHIRLPOOL THERAPY: CPT

## 2021-11-18 PROCEDURE — 97110 THERAPEUTIC EXERCISES: CPT

## 2021-11-18 NOTE — PROGRESS NOTES
[address]  [] EVALUATION  [] DAILY NOTE (LAND) [] DAILY NOTE (AQUATIC ) [] PROGRESS NOTE [] DISCHARGE NOTE    [] [facility]   [x] [address]    [] [facility]   [] Steven Erps    Date: 2021  Patient Name:  Rhonda Foreman  : 1950  MRN: [id number]  CSN: [id number]    Referring Practitioner Herb Pérez MD   Diagnosis Unilateral primary osteoarthritis of first carpometacarpal joint, right hand [M18.11]    Treatment Diagnosis Right CMC arthritis, right thumb pain   Date of Evaluation 10/25/21      Functional Outcome Measure Used UEFS   Functional Outcome Score 41/80 (10/25/21)       Insurance: Primary: Payor: Estefany Raygoza /  /  / ,   Secondary: [facility]   Authorization Information: No ionto, no hot/cold packs   Visit # 6, 6/10 for progress note   Visits Allowed: unlimited   Recertification Date:    Physician Follow-Up: 21   Physician Orders: ROM right hand and to full then prog strength    Pertinent History: Patient had surgery on right thumb on 21 for [facility] arthritis. Patient had OT in 2021. It was recommended for patient to contact physician due to pain in right thumb. Patient returned to physician and was told that a tendon had ruptured. Patient then had surgery for tendon repair on 21. Patient was in soft cast for 2 weeks and then was in custom thumb splint fabricated by OT at [facility] until last week. Was told to remove splint at last MD appointment and OT was ordered at that time. Comorbidities include HTN, DM, and arthritis that could influence rehab process. SUBJECTIVE:  States that last night she had stabbing pain in her right wrist and hand. States that she is not sure if it is due to the weather or not.  States that she got an attachment for her phone to assist with holding onto her phone    OBJECTIVE:  TREATMENT   Precautions: HTN, DM, no lifting of 50 to increase her ability to use buttons and tie shoes. 4. Report pain going no higher than 2/10 in right thumb and wrist at any point to assist in improving sleep routine. Long Term Goals:  Time Frame: 12 weeks  1. Improve right hand coordination as evidenced by completing 9 hole peg test in less than 28 seconds to increase her ability to complete kitchen tasks. 2. Report being able to complete ironing activities with right hand with no increase in pain. 3. Be able to open jars with use of needed adaptive equipment with no increase in pain in right wrist and hand. Patient Education:   [x]  HEP/Education Completed:  Wear and care of rock tape  []  No new Education completed  [x]  Reviewed Prior HEP      [x]  Patient verbalized and/or demonstrated understanding of education provided. []  Patient unable to verbalize and/or demonstrate understanding of education provided. Will continue education. [x]  Barriers to learning: none    PLAN:      []  Plan of care initiated. Plan to see patient 2 times per week for 12 weeks to address the treatment planned outlined above.   [x]  Continue with current plan of care  []  Modify plan of care as follows:    []  Hold pending physician visit  []  Discharge    Time In 1445   Time Out 1530   Timed Code Minutes: 30min   Total Treatment Time: 45 min       Carrie Neisha, OTR/L #[id number]

## 2021-11-20 ENCOUNTER — HOSPITAL ENCOUNTER (OUTPATIENT)
Age: 71
Discharge: HOME | End: 2021-11-20
Payer: MEDICARE

## 2021-11-20 LAB
ALBUMIN SERPL-MCNC: 4.2 G/DL (ref 3.5–5.1)
ALP BLD-CCNC: 57 U/L (ref 38–126)
ALT SERPL-CCNC: 26 U/L (ref 11–66)
ANION GAP SERPL CALCULATED.3IONS-SCNC: 14 MEQ/L (ref 8–16)
AST SERPL-CCNC: 24 U/L (ref 5–40)
BASOPHILS # BLD: 0.4 %
BASOPHILS ABSOLUTE: 0 THOU/MM3 (ref 0–0.1)
BILIRUB SERPL-MCNC: 1.5 MG/DL (ref 0.3–1.2)
BILIRUBIN URINE: NEGATIVE
BLOOD, URINE: NEGATIVE
BUN BLDV-MCNC: 18 MG/DL (ref 7–22)
CALCIUM SERPL-MCNC: 10 MG/DL (ref 8.5–10.5)
CHARACTER, URINE: CLEAR
CHLORIDE BLD-SCNC: 100 MEQ/L (ref 98–111)
CHOLESTEROL, TOTAL: 123 MG/DL (ref 100–199)
CO2: 25 MEQ/L (ref 23–33)
COLOR: YELLOW
CREAT SERPL-MCNC: 0.5 MG/DL (ref 0.4–1.2)
CREATININE, URINE: 64.3 MG/DL
EOSINOPHIL # BLD: 2.7 %
EOSINOPHILS ABSOLUTE: 0.1 THOU/MM3 (ref 0–0.4)
ERYTHROCYTE [DISTWIDTH] IN BLOOD BY AUTOMATED COUNT: 12.7 % (ref 11.5–14.5)
ERYTHROCYTE [DISTWIDTH] IN BLOOD BY AUTOMATED COUNT: 43.7 FL (ref 35–45)
GFR SERPL CREATININE-BSD FRML MDRD: > 90 ML/MIN/1.73M2
GLUCOSE BLD-MCNC: 155 MG/DL (ref 70–108)
GLUCOSE, URINE: >= 1000 MG/DL
HCT VFR BLD CALC: 43.4 % (ref 37–47)
HDLC SERPL-MCNC: 45 MG/DL
HEMOGLOBIN: 14 GM/DL (ref 12–16)
IMMATURE GRANS (ABS): 0.01 THOU/MM3 (ref 0–0.07)
IMMATURE GRANULOCYTES: 0.2 %
KETONES, URINE: NEGATIVE
LDL CHOLESTEROL CALCULATED: 53 MG/DL
LEUKOCYTE EST, POC: NEGATIVE
LYMPHOCYTES # BLD: 35.6 %
LYMPHOCYTES ABSOLUTE: 1.6 THOU/MM3 (ref 1–4.8)
MCH RBC QN AUTO: 30.8 PG (ref 26–33)
MCHC RBC AUTO-ENTMCNC: 32.3 GM/DL (ref 32.2–35.5)
MCV RBC AUTO: 95.4 FL (ref 81–99)
MICROALBUMIN UR-MCNC: < 1.2 MG/DL
MICROALBUMIN/CREAT UR-RTO: 19 MG/G (ref 0–30)
MONOCYTES # BLD: 9.4 %
MONOCYTES ABSOLUTE: 0.4 THOU/MM3 (ref 0.4–1.3)
NITRITE, URINE: NEGATIVE
NUCLEATED RED BLOOD CELLS: 0 /100 WBC
PH UA: 5.5 (ref 5–9)
PLATELET # BLD: 180 THOU/MM3 (ref 130–400)
PMV BLD AUTO: 9.8 FL (ref 9.4–12.4)
POTASSIUM SERPL-SCNC: 4.4 MEQ/L (ref 3.5–5.2)
PROTEIN UA: NEGATIVE MG/DL
RBC # BLD: 4.55 MILL/MM3 (ref 4.2–5.4)
SEG NEUTROPHILS: 51.7 %
SEGMENTED NEUTROPHILS ABSOLUTE COUNT: 2.3 THOU/MM3 (ref 1.8–7.7)
SODIUM BLD-SCNC: 139 MEQ/L (ref 135–145)
SPECIFIC GRAVITY UA: > 1.03 (ref 1–1.03)
TOTAL PROTEIN: 6.5 G/DL (ref 6.1–8)
TRIGL SERPL-MCNC: 127 MG/DL (ref 0–199)
UROBILINOGEN, URINE: 0.2 EU/DL (ref 0–1)
WBC # BLD: 4.5 THOU/MM3 (ref 4.8–10.8)

## 2021-11-20 PROCEDURE — 80053 COMPREHEN METABOLIC PANEL: CPT

## 2021-11-20 PROCEDURE — 81003 URINALYSIS AUTO W/O SCOPE: CPT

## 2021-11-20 PROCEDURE — 80061 LIPID PANEL: CPT

## 2021-11-20 PROCEDURE — 85025 COMPLETE CBC W/AUTO DIFF WBC: CPT

## 2021-11-20 PROCEDURE — 36415 COLL VENOUS BLD VENIPUNCTURE: CPT

## 2021-11-22 ENCOUNTER — HOSPITAL ENCOUNTER (OUTPATIENT)
Dept: OCCUPATIONAL THERAPY | Age: 71
Setting detail: THERAPIES SERIES
Discharge: HOME | End: 2021-11-22
Payer: MEDICARE

## 2021-11-22 PROCEDURE — 97110 THERAPEUTIC EXERCISES: CPT

## 2021-11-22 PROCEDURE — 97022 WHIRLPOOL THERAPY: CPT

## 2021-11-22 NOTE — PROGRESS NOTES
[address]  [] EVALUATION  [x] DAILY NOTE (LAND) [] DAILY NOTE (AQUATIC ) [] PROGRESS NOTE [] DISCHARGE NOTE    [] [facility]   [x] [address]    [] [facility]   [] Beto Hazard    Date: 2021  Patient Name:  Dionne Donovan  : 1950  MRN: [id number]  CSN: [id number]    Referring Practitioner Tanya De Anda MD   Diagnosis Unilateral primary osteoarthritis of first carpometacarpal joint, right hand [M18.11]    Treatment Diagnosis Right CMC arthritis, right thumb pain   Date of Evaluation 10/25/21      Functional Outcome Measure Used UEFS   Functional Outcome Score 41/80 (10/25/21)       Insurance: Primary: Payor: [employer] /  /  / ,   Secondary: [facility]   Authorization Information: No ionto, no hot/cold packs   Visit # 7, 7/10 for progress note   Visits Allowed: unlimited   Recertification Date:    Physician Follow-Up: 21   Physician Orders: ROM right hand and to full then prog strength    Pertinent History: Patient had surgery on right thumb on 21 for [facility] arthritis. Patient had OT in 2021. It was recommended for patient to contact physician due to pain in right thumb. Patient returned to physician and was told that a tendon had ruptured. Patient then had surgery for tendon repair on 21. Patient was in soft cast for 2 weeks and then was in custom thumb splint fabricated by OT at [facility] until last week. Was told to remove splint at last MD appointment and OT was ordered at that time. Comorbidities include HTN, DM, and arthritis that could influence rehab process. SUBJECTIVE:  States that her pain and edema continues, neither have gone since .     OBJECTIVE:  TREATMENT   Precautions: HTN, DM, no lifting of anything \"too heavy\"   Pain:  4/10 during the night and now LOCATION: right thumb and wrist    X in shaded column indicates Activity Completed Today Modalities Parameters/  Location  Notes/Comments   fluidotherapy to right hand x 15 minutes  x                Manual Therapy Time/  Technique  Notes/Comments   STM/IASTM and active release to dorsal right forearm  x To increase wrist flexion   IASTM to volar right forearm   x          Exercises   Sets/  Sec Reps  Notes/Comments   Active right wrist flexion/extension 1 10     Active right wrist circumduction 1 10 each direction x    Passive right wrist flexion       Prayer stretch for wrist extension 1 5 x 10 second hold   Coordination maze with right hand 1 5     PROM completed to right wrist and thumb   x    Pushing pencil        Soft theraputty - taffy pulls using fingers versus whole hand 1 15     Red therabar - bending bar with forearms pronated and bending bar with forearms supinated 1 15 each direction     Activities Time    Notes/Comments   Rock tape applied to right thumb for support and pain management, edema management  x                  Specific Interventions Next Treatment: fluidotherapy; AROM, PROM right wrist and thumb    Activity/Treatment Tolerance:  [x]  Patient tolerated treatment well  []  Patient limited by fatigue  []  Patient limited by pain   []  Patient limited by other medical complications  []  Other:     Assessment: Patient is progressing toward goals slowly. Taping for edema this date to decrease edema, and therefore pain. GOALS:  Patient Goal: To have full use of my hand. Have strength, Be able to  and grab with my right hand. No pain    Short Term Goals:  Time Frame: 4 weeks  1. 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 2. Increase active right wrist flexion to 25, extension to 55, radial deviation to 0, ulnar deviation to 35, and supination to 80 to increase her ability to use of right hand in kitchen task.   3. Increase right thumb MP flexion to 55 and IR to 50 to increase her ability to use buttons and tie shoes.  4. Report pain going no higher than 2/10 in right thumb and wrist at any point to assist in improving sleep routine. Long Term Goals:  Time Frame: 12 weeks  1. Improve right hand coordination as evidenced by completing 9 hole peg test in less than 28 seconds to increase her ability to complete kitchen tasks. 2. Report being able to complete ironing activities with right hand with no increase in pain. 3. Be able to open jars with use of needed adaptive equipment with no increase in pain in right wrist and hand. Patient Education:   [x]  HEP/Education Completed:  Wear and care of rock tape  []  No new Education completed  [x]  Reviewed Prior HEP      [x]  Patient verbalized and/or demonstrated understanding of education provided. []  Patient unable to verbalize and/or demonstrate understanding of education provided. Will continue education. [x]  Barriers to learning: none    PLAN:      []  Plan of care initiated. Plan to see patient 2 times per week for 12 weeks to address the treatment planned outlined above.   [x]  Continue with current plan of care  []  Modify plan of care as follows:    []  Hold pending physician visit  []  Discharge    Time In 0830   Time Out 0915   Timed Code Minutes: 30min   Total Treatment Time: 45 min       Sreedhar West, OTR/L [id number]

## 2021-11-23 ENCOUNTER — HOSPITAL ENCOUNTER (OUTPATIENT)
Dept: OCCUPATIONAL THERAPY | Age: 71
Setting detail: THERAPIES SERIES
Discharge: HOME | End: 2021-11-23
Payer: MEDICARE

## 2021-11-23 PROCEDURE — 97110 THERAPEUTIC EXERCISES: CPT

## 2021-11-23 PROCEDURE — 97022 WHIRLPOOL THERAPY: CPT

## 2021-11-23 NOTE — PROGRESS NOTES
[address]  [] EVALUATION  [x] DAILY NOTE (LAND) [] DAILY NOTE (AQUATIC ) [] PROGRESS NOTE [] DISCHARGE NOTE    [] [facility]   [x] [address]    [] [facility]   [] Amarilis Crimes    Date: 2021  Patient Name:  Eulalia Loya  : 1950  MRN: [id number]  CSN: [id number]    Referring Practitioner Frantz Rocha MD   Diagnosis Unilateral primary osteoarthritis of first carpometacarpal joint, right hand [M18.11]    Treatment Diagnosis Right CMC arthritis, right thumb pain   Date of Evaluation 10/25/21      Functional Outcome Measure Used UEFS   Functional Outcome Score 41/80 (10/25/21)       Insurance: Primary: Payor: Sol [employer]ador /  /  / ,   Secondary: [facility]   Authorization Information: No ionto, no hot/cold packs   Visit # 8, 8/10 for progress note   Visits Allowed: unlimited   Recertification Date:    Physician Follow-Up: 21   Physician Orders: ROM right hand and to full then prog strength    Pertinent History: Patient had surgery on right thumb on 21 for [facility] arthritis. Patient had OT in 2021. It was recommended for patient to contact physician due to pain in right thumb. Patient returned to physician and was told that a tendon had ruptured. Patient then had surgery for tendon repair on 21. Patient was in soft cast for 2 weeks and then was in custom thumb splint fabricated by OT at [facility] until last week. Was told to remove splint at last MD appointment and OT was ordered at that time. Comorbidities include HTN, DM, and arthritis that could influence rehab process. SUBJECTIVE:  Patient reports she continues to have her normal aching in her wrist and thumb. The tape does seem to help with swelling.      OBJECTIVE:  TREATMENT   Precautions: HTN, DM, no lifting of anything \"too heavy\"   Pain:  4/10 during the night and now LOCATION: right thumb and wrist    X in shaded column indicates Activity Completed Today   Modalities Parameters/  Location  Notes/Comments   Fluidotherapy to right hand x 15 minutes  x                Manual Therapy Time/  Technique  Notes/Comments   STM/IASTM and active release to dorsal right forearm  x To increase wrist flexion   IASTM to volar right forearm and palm   x          Exercises   Sets/  Sec Reps  Notes/Comments   Active right wrist flexion/extension 1 10     Active right wrist circumduction 1 10 each direction x    Passive right wrist flexion       Prayer stretch for wrist extension 1 5 x 10 second hold   Coordination maze with right hand 1 5     PROM completed to right wrist and thumb   x    Stretching to palm    x    Pushing pencil        Soft theraputty - taffy pulls using fingers versus whole hand 1 15     Red therabar - bending bar with forearms pronated and bending bar with forearms supinated 1 15 each direction     Activities Time    Notes/Comments   Rock tape applied to right thumb for support and pain management, edema management   Did not reapply today per patient request.                  Specific Interventions Next Treatment: fluidotherapy; AROM, PROM right wrist and thumb    Activity/Treatment Tolerance:  [x]  Patient tolerated treatment well  []  Patient limited by fatigue  []  Patient limited by pain   []  Patient limited by other medical complications  []  Other:     Assessment: Patient is progressing toward goals slowly. Patient declines rocktape today due to thanksgiving plans and afraid it will be a bother. GOALS:  Patient Goal: To have full use of my hand. Have strength, Be able to  and grab with my right hand. No pain    Short Term Goals:  Time Frame: 4 weeks  1. 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   2. Increase active right wrist flexion to 25, extension to 55, radial deviation to 0, ulnar deviation to 35, and supination to 80 to increase her ability to use of right hand in kitchen task. 3. Increase right thumb MP flexion to 55 and IR to 50 to increase her ability to use buttons and tie shoes. 4. Report pain going no higher than 2/10 in right thumb and wrist at any point to assist in improving sleep routine. Long Term Goals:  Time Frame: 12 weeks  1. Improve right hand coordination as evidenced by completing 9 hole peg test in less than 28 seconds to increase her ability to complete kitchen tasks. 2. Report being able to complete ironing activities with right hand with no increase in pain. 3. Be able to open jars with use of needed adaptive equipment with no increase in pain in right wrist and hand. Patient Education:   [x]  HEP/Education Completed:  Wear and care of rock tape  []  No new Education completed  [x]  Reviewed Prior HEP      [x]  Patient verbalized and/or demonstrated understanding of education provided. []  Patient unable to verbalize and/or demonstrate understanding of education provided. Will continue education. [x]  Barriers to learning: none    PLAN:      []  Plan of care initiated. Plan to see patient 2 times per week for 12 weeks to address the treatment planned outlined above.   [x]  Continue with current plan of care  []  Modify plan of care as follows:    []  Hold pending physician visit  []  Discharge    Time In 0800   Time Out 0840   Timed Code Minutes: 25 min   Total Treatment Time: 40 min       CASEY COLVIN/ROMMEL #[id number]

## 2021-11-29 ENCOUNTER — HOSPITAL ENCOUNTER (OUTPATIENT)
Dept: OCCUPATIONAL THERAPY | Age: 71
Setting detail: THERAPIES SERIES
Discharge: HOME | End: 2021-11-29
Payer: MEDICARE

## 2021-11-29 PROCEDURE — 97110 THERAPEUTIC EXERCISES: CPT

## 2021-11-29 PROCEDURE — 97022 WHIRLPOOL THERAPY: CPT

## 2021-11-29 NOTE — PROGRESS NOTES
\"too heavy\"   Pain:  4/10 during the night and now LOCATION: right thumb and wrist    X in shaded column indicates Activity Completed Today   Modalities Parameters/  Location  Notes/Comments   Fluidotherapy to right hand x 15 minutes  x                Manual Therapy Time/  Technique  Notes/Comments   STM/IASTM and active release to dorsal right forearm   To increase wrist flexion   IASTM to volar right forearm and palm             Exercises   Sets/  Sec Reps  Notes/Comments   Active right wrist flexion/extension 1 10 x    Active right wrist circumduction 1 10 each direction x    Passive right wrist flexion   x Care to avoid excessive ulnar side stretch   Prayer stretch for wrist extension 1 5 x 10 second hold   Coordination maze with right hand 1 5     PROM completed to right wrist and thumb       Stretching to palm        Pushing pencil  1 10 x    Soft theraputty - all to tolerance; warm 1 15 x    Red therabar - bending bar with forearms pronated and bending bar with forearms supinated 1 15 each direction     Activities Time    Notes/Comments   Rock tape applied to right thumb for support and pain management, edema management   Did not reapply today per patient request.                   RIGHT UPPER EXTREMITY  RANGE OF MOTION    AROM PROM COMMENTS      Wrist Flexion 55     Wrist Extension 55     Wrist Radial Deviation 15     Wrist Ulnar Deviation 30         RIGHT UPPER EXTREMITY  HAND RANGE OF MOTION    AROM PROM COMMENTS   Thump MP 45     Thumb IP 40          Specific Interventions Next Treatment: fluidotherapy; AROM, PROM right wrist and thumb    Activity/Treatment Tolerance:  [x]  Patient tolerated treatment well  []  Patient limited by fatigue  []  Patient limited by pain   []  Patient limited by other medical complications  []  Other:     Assessment: Patient is progressing toward goals slowly. She is limited with progress with strengthening by pain.   Reports that she has to use heat every evening for pain management. Reports that she feels like she sprains her wrist when lifting a 16oz soda in the morning. She is able to move her fingers as needed to perform her daily tasks, but the wrist remains stiff. Difficulty with wrist flexion motions. Reports that any activity that requires strength is difficulty, such as ironing. During active flexion, patient demonstrates increased flexion to ulnar side. Cues gives to avoid compensation during active wrist motion and during FM tasks. (raising elbow during FM activity). Patient is limited by pain, strengthening slow due to pain. Does perform home tasks as able . Was able to roll out pie dough and lift a pie into the oven, but fearful of dropping the pie. Will continue to progress with strengthening to tolerance of the patient. GOALS:  Patient Goal: To have full use of my hand. Have strength, Be able to  and grab with my right hand. No pain    Short Term Goals:  Time Frame: 4 weeks  1. 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 GOAL MET CONTINUE with upgrades  2. Increase active right wrist flexion to 25, extension to 55, radial deviation to 0, ulnar deviation to 35, and supination to 80 to increase her ability to use of right hand in kitchen task. GOAL MET 55 degrees wrist flexion, 55 degrees wrist extension, 15 degrees radial deviation, GOAL NOT MET 30 degrees ulnar deviation, 55 degrees supination. REVISE  Increase active right wrist flexion and extension to 65 degrees, and 80 degrees supination to increase ability to use right hand in kitchen tasks. 3. Increase right thumb MP flexion to 55 and IR to 50 to increase her ability to use buttons and tie shoes. GOAL NOT MET 45 MP flexion and 40 degrees IP flexion CONTINUE  4. Report pain going no higher than 2/10 in right thumb and wrist at any point to assist in improving sleep routine.  GOAL NOT MET pain up to 4/10  [facility],42-29 Goals: Time Frame: 8 weeks  1. Improve right hand coordination as evidenced by completing 9 hole peg test in less than 28 seconds to increase her ability to complete kitchen tasks. GOAL MET 27 seconds, cues to avoid compensation required. REVISE  Improve right hand coordination as evidenced by completing 9 hole peg test in less than 26 seconds to increase ability to perform kitchen tasks. 2. Report being able to complete ironing activities with right hand with no increase in pain. GOAL NOT MET CONTINUE  3. Be able to open jars with use of needed adaptive equipment with no increase in pain in right wrist and hand. GOAL NOT MET CONTINUE    Patient Education:   [x]  HEP/Education Completed:  Review progress toward goals, task to tolerance only, avoid compensation with wrist and FM movements, review of [place] protocol regarding typical progress  []  No new Education completed  [x]  Reviewed Prior HEP      [x]  Patient verbalized and/or demonstrated understanding of education provided. []  Patient unable to verbalize and/or demonstrate understanding of education provided. Will continue education. [x]  Barriers to learning: none    PLAN:      []  Plan of care initiated. Plan to see patient 2 times per week for 8 weeks to address the treatment planned outlined above.   [x]  Continue with current plan of care  []  Modify plan of care as follows:    []  Hold pending physician visit  []  Discharge    Time In 0820   Time Out 0910   Timed Code Minutes: 35 min   Total Treatment Time: 50 min       DIANA Gomez, OTR/L [id number]

## 2021-12-02 ENCOUNTER — APPOINTMENT (OUTPATIENT)
Dept: OCCUPATIONAL THERAPY | Age: 71
End: 2021-12-02
Payer: MEDICARE

## 2021-12-06 ENCOUNTER — HOSPITAL ENCOUNTER (OUTPATIENT)
Dept: OCCUPATIONAL THERAPY | Age: 71
Setting detail: THERAPIES SERIES
Discharge: HOME | End: 2021-12-06
Payer: MEDICARE

## 2021-12-06 PROCEDURE — 97022 WHIRLPOOL THERAPY: CPT

## 2021-12-06 PROCEDURE — 97110 THERAPEUTIC EXERCISES: CPT

## 2021-12-06 NOTE — PROGRESS NOTES
[address]  [] EVALUATION  [x] DAILY NOTE (LAND) [] DAILY NOTE (AQUATIC ) [] PROGRESS NOTE [] DISCHARGE NOTE    [] [address] [facility]   [x] [address]    [] [facility]   [] [employer] [facility]    Date: 2021  Patient Name:  Joni Pitts  : 1950  MRN: [id number]  CSN: [id number]    Referring Practitioner Minda Aleman MD   Diagnosis Unilateral primary osteoarthritis of first carpometacarpal joint, right hand [M18.11]    Treatment Diagnosis Right CMC arthritis, right thumb pain   Date of Evaluation 10/25/21      Functional Outcome Measure Used UEFS   Functional Outcome Score 41/80 (10/25/21)       Insurance: Primary: Payor: [employer] /  /  / ,   Secondary: [facility]   Authorization Information: No ionto, no hot/cold packs   Visit # 10, 1/10 for PN; PN 21   Visits Allowed: unlimited   Recertification Date:    Physician Follow-Up: No follow up scheduled with physician   Physician Orders: ROM right hand and to full then prog strength    Pertinent History: Patient had surgery on right thumb on 21 for [place] [address] arthritis. Patient had OT in 2021. It was recommended for patient to contact physician due to pain in right thumb. Patient returned to physician and was told that a tendon had ruptured. Patient then had surgery for tendon repair on 21. Patient was in soft cast for 2 weeks and then was in custom thumb splint fabricated by OT at [facility] until last week. Was told to remove splint at last MD appointment and OT was ordered at that time. Comorbidities include HTN, DM, and arthritis that could influence rehab process. SUBJECTIVE:   States that she saw the physician last week and was told that she needs to do more stretching of right wrist and can start strengthening. Was given no further restrictions.  States that she was told that she doesn't have swelling in right wrist, but it was described as fullness due to repositioning of the tendons. OBJECTIVE:  TREATMENT   Precautions: HTN, DM,    Pain:  4/10 during the night and now LOCATION: right thumb and wrist    X in shaded column indicates Activity Completed Today   Modalities Parameters/  Location  Notes/Comments   Fluidotherapy to right hand x 15 minutes  x                Manual Therapy Time/  Technique  Notes/Comments   STM/IASTM and active release to dorsal right forearm   To increase wrist flexion   IASTM to volar right forearm and palm             Exercises   Sets/  Sec Reps  Notes/Comments   Active right wrist flexion/extension 1 10     Active right wrist circumduction 1 10 each direction x    Active right thumb radial abduction 1 10 x    Active right thumb circumduction   x    Passive right wrist flexion   x    Prayer stretch for wrist extension 1 5  10 second hold   Passive right wrist radial abduction   x    PROM completed to right wrist and thumb       Active right wrist circumduction 1 10 x    Pushing pencil  1 10     Soft theraputty - all to tolerance; warm 1 15     Red therabar - bending bar with forearms pronated and bending bar with forearms supinated 1 15 each direction     Activities Time    Notes/Comments   Rock tape applied to right thumb for support and pain management, edema management   Did not reapply today per patient request.                     Specific Interventions Next Treatment: fluidotherapy; AROM, PROM right wrist and thumb    Activity/Treatment Tolerance:  [x]  Patient tolerated treatment well  []  Patient limited by fatigue  []  Patient limited by pain   []  Patient limited by other medical complications  []  Other:     Assessment: Patient is making progress toward goals. Patient does demonstrate decreased active right wrist radial deviation, but passive motion improved this date with stretching.  Patient instructed to work on right thumb radial abduction in preparation to work on wrist radial abduction. GOALS:  Patient Goal: To have full use of my hand. Have strength, Be able to  and grab with my right hand. No pain    Short Term Goals:  Time Frame: 4 weeks  1. 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 2.   Increase active right wrist flexion and extension to 65 degrees, and 80 degrees supination to increase ability to use right hand in kitchen tasks. 3. Increase right thumb MP flexion to 55 and IR to 50 to increase her ability to use buttons and tie shoes. 4. Report pain going no higher than 2/10 in right thumb and wrist at any point to assist in improving sleep routine. Long Term Goals:  Time Frame: 8 weeks  1. Improve right hand coordination as evidenced by completing 9 hole peg test in less than 26 seconds to increase ability to perform kitchen tasks. 2. Report being able to complete ironing activities with right hand with no increase in pain. 3. Be able to open jars with use of needed adaptive equipment with no increase in pain in right wrist and hand. Patient Education:   [x]  HEP/Education Completed: thumb radial abduction   []  No new Education completed  [x]  Reviewed Prior HEP      [x]  Patient verbalized and/or demonstrated understanding of education provided. []  Patient unable to verbalize and/or demonstrate understanding of education provided. Will continue education. [x]  Barriers to learning: none    PLAN:      []  Plan of care initiated. Plan to see patient 2 times per week for 8 weeks to address the treatment planned outlined above.   [x]  Continue with current plan of care  []  Modify plan of care as follows:    []  Hold pending physician visit  []  Discharge    Time In 0830   Time Out 0915   Timed Code Minutes: 30 min   Total Treatment Time: 45 min       Gerardo Abarca, OTR/L #[id number]

## 2021-12-09 ENCOUNTER — HOSPITAL ENCOUNTER (OUTPATIENT)
Dept: OCCUPATIONAL THERAPY | Age: 71
Setting detail: THERAPIES SERIES
Discharge: HOME | End: 2021-12-09
Payer: MEDICARE

## 2021-12-09 PROCEDURE — 97110 THERAPEUTIC EXERCISES: CPT

## 2021-12-09 PROCEDURE — 97022 WHIRLPOOL THERAPY: CPT

## 2021-12-09 NOTE — PROGRESS NOTES
[address]  [] EVALUATION  [x] DAILY NOTE (LAND) [] DAILY NOTE (AQUATIC ) [] PROGRESS NOTE [] DISCHARGE NOTE    [] [facility]   [x] [address]    [] [facility]   [] Coretta Torres    Date: 2021  Patient Name:  Maciel Agarwal  : 1950  MRN: [id number]  CSN: [id number]    Referring Practitioner Lizabeth Barriga MD   Diagnosis Unilateral primary osteoarthritis of first carpometacarpal joint, right hand [M18.11]    Treatment Diagnosis Right CMC arthritis, right thumb pain   Date of Evaluation 10/25/21      Functional Outcome Measure Used UEFS   Functional Outcome Score 41/80 (10/25/21)       Insurance: Primary: Payor: Cielo Dong /  /  / ,   Secondary: [facility]   Authorization Information: No ionto, no hot/cold packs   Visit # 10, 2/10 for PN; PN 21   Visits Allowed: unlimited   Recertification Date:    Physician Follow-Up: No follow up scheduled with physician   Physician Orders: ROM right hand and to full then prog strength    Pertinent History: Patient had surgery on right thumb on 21 for Aia 16 arthritis. Patient had OT in 2021. It was recommended for patient to contact physician due to pain in right thumb. Patient returned to physician and was told that a tendon had ruptured. Patient then had surgery for tendon repair on 21. Patient was in soft cast for 2 weeks and then was in custom thumb splint fabricated by OT at [facility] until last week. Was told to remove splint at last MD appointment and OT was ordered at that time. Comorbidities include HTN, DM, and arthritis that could influence rehab process. SUBJECTIVE:   States that she is having increased muscle pain over her body and the chiropractor thinks that it might be residual from her covid booster reaction. States that she had increased weakness in both hands this morning.  States that she lifted a heavy naa this week and had a \"sprain-like\" feeling in her right wrist.  OBJECTIVE:  TREATMENT   Precautions: HTN, DM,    Pain:  2/10 \"discomfort\" LOCATION: right thumb and wrist    X in shaded column indicates Activity Completed Today   Modalities Parameters/  Location  Notes/Comments   Fluidotherapy to right hand x 15 minutes  x                Manual Therapy Time/  Technique  Notes/Comments   STM/IASTM and active release to dorsal right forearm   To increase wrist flexion   IASTM to volar right forearm and palm             Exercises   Sets/  Sec Reps  Notes/Comments   Active right wrist flexion/extension 1 10     Active right wrist circumduction 1 10 each direction     Active right thumb radial abduction 1 10 x    Active right thumb circumduction       Passive right thumb radial and palmar abduction   x    Prayer stretch for wrist extension 1 5  10 second hold   Passive right wrist radial abduction   x    PROM completed to right wrist and thumb       Active right wrist circumduction 1 10     Pushing pencil  1 10     Soft theraputty - gripping right hand including thumb   x    Red therabar - bending bar with forearms pronated and bending bar with forearms supinated 1 15 each direction     Activities Time    Notes/Comments   Rock tape applied to right thumb for support and pain management, edema management   Did not reapply today per patient request.                     Specific Interventions Next Treatment: fluidotherapy; AROM, PROM right wrist and thumb    Activity/Treatment Tolerance:  [x]  Patient tolerated treatment well  []  Patient limited by fatigue  []  Patient limited by pain   []  Patient limited by other medical complications  []  Other:     Assessment: Progress continues to be made, but patient c/o increased discomfort today. GOALS:  Patient Goal: To have full use of my hand. Have strength, Be able to  and grab with my right hand. No pain    Short Term Goals:  Time Frame: 4 weeks  1.  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 2.   Increase active right wrist flexion and extension to 65 degrees, and 80 degrees supination to increase ability to use right hand in kitchen tasks. 3. Increase right thumb MP flexion to 55 and IR to 50 to increase her ability to use buttons and tie shoes. 4. Report pain going no higher than 2/10 in right thumb and wrist at any point to assist in improving sleep routine. Long Term Goals:  Time Frame: 8 weeks  1. Improve right hand coordination as evidenced by completing 9 hole peg test in less than 26 seconds to increase ability to perform kitchen tasks. 2. Report being able to complete ironing activities with right hand with no increase in pain. 3. Be able to open jars with use of needed adaptive equipment with no increase in pain in right wrist and hand. Patient Education:   [x]  HEP/Education Completed: thumb radial abduction   []  No new Education completed  [x]  Reviewed Prior HEP      [x]  Patient verbalized and/or demonstrated understanding of education provided. []  Patient unable to verbalize and/or demonstrate understanding of education provided. Will continue education. [x]  Barriers to learning: none    PLAN:      []  Plan of care initiated. Plan to see patient 2 times per week for 8 weeks to address the treatment planned outlined above.   [x]  Continue with current plan of care  []  Modify plan of care as follows:    []  Hold pending physician visit  []  Discharge    Time In 0930   Time Out 1015   Timed Code Minutes: 30 min   Total Treatment Time: 45 min       Parmjit Gonzalez OTR/ROMMEL #[id number]

## 2021-12-14 ENCOUNTER — HOSPITAL ENCOUNTER (OUTPATIENT)
Dept: OCCUPATIONAL THERAPY | Age: 71
Setting detail: THERAPIES SERIES
Discharge: HOME | End: 2021-12-14
Payer: MEDICARE

## 2021-12-14 PROCEDURE — 97022 WHIRLPOOL THERAPY: CPT

## 2021-12-14 PROCEDURE — 97110 THERAPEUTIC EXERCISES: CPT

## 2021-12-14 NOTE — PROGRESS NOTES
[address]  [] EVALUATION  [x] DAILY NOTE (LAND) [] DAILY NOTE (AQUATIC ) [] PROGRESS NOTE [] DISCHARGE NOTE    [] [facility]   [x] [address]    [] [facility]   [] Nathaniel Gomes    Date: 2021  Patient Name:  Kim Francis  : 1950  MRN: [id number]  CSN: [id number]    Referring Practitioner Calderon Dunlap MD   Diagnosis Unilateral primary osteoarthritis of first carpometacarpal joint, right hand [M18.11]    Treatment Diagnosis Right CMC arthritis, right thumb pain   Date of Evaluation 10/25/21      Functional Outcome Measure Used UEFS   Functional Outcome Score 41/80 (10/25/21)       Insurance: Primary: Payor: Ignacio Herrmann /  /  / ,   Secondary: [facility]   Authorization Information: No ionto, no hot/cold packs   Visit # 11, 3/10 for PN; PN 21   Visits Allowed: unlimited   Recertification Date:    Physician Follow-Up: No follow up scheduled with physician   Physician Orders: ROM right hand and to full then prog strength    Pertinent History: Patient had surgery on right thumb on 21 for Aia 16 arthritis. Patient had OT in 2021. It was recommended for patient to contact physician due to pain in right thumb. Patient returned to physician and was told that a tendon had ruptured. Patient then had surgery for tendon repair on 21. Patient was in soft cast for 2 weeks and then was in custom thumb splint fabricated by OT at [facility] until last week. Was told to remove splint at last MD appointment and OT was ordered at that time. Comorbidities include HTN, DM, and arthritis that could influence rehab process. SUBJECTIVE:  States that she is having fewer times of feeling like she is spraining her wrist with activities. States that she was able to carry several bags from the grocery today.        OBJECTIVE:  TREATMENT   Precautions: HTN, DM,    Pain:  1/10 \"discomfort\" LOCATION: right thumb and wrist    X in shaded column indicates Activity Completed Today   Modalities Parameters/  Location  Notes/Comments   Fluidotherapy to right hand x 15 minutes  x                Manual Therapy Time/  Technique  Notes/Comments   STM/IASTM and active release to dorsal right forearm   To increase wrist flexion   IASTM to volar right forearm and palm             Exercises   Sets/  Sec Reps  Notes/Comments   Active right wrist flexion/extension 1 10     Active right wrist circumduction 1 10 each direction     Active right thumb radial abduction 1 10     Active right thumb circumduction       Passive right thumb radial and palmar abduction   x    Prayer stretch for wrist extension 1 5  10 second hold   Passive right wrist radial abduction       PROM completed to right wrist and thumb       Active right wrist circumduction 1 10     Resistive clothespins with right hand   x All yellow, red, green, and blue clothespins; was able to pinch black with increased effort   ergogripper with 2 green and 1 red band with right UE 2 10  x    Pushing pencil  1 10     Medium theraputty - pinching and pulling with right hand 1 15 x    velcro board with right hand - large cylinder with key attachment 1 10 cycles x More difficulty bringing cylinder back toward her   Red therabar - bending bar with forearms pronated and bending bar with forearms supinated 1 15 each direction x    Activities Time    Notes/Comments   Rock tape applied to right thumb for support and pain management, edema management   Did not reapply today per patient request.                     Specific Interventions Next Treatment: fluidotherapy; AROM, PROM right wrist and thumb    Activity/Treatment Tolerance:  [x]  Patient tolerated treatment well  []  Patient limited by fatigue  []  Patient limited by pain   []  Patient limited by other medical complications  []  Other:     Assessment: Progressing toward goals.  Patient was able to tolerate

## 2021-12-16 ENCOUNTER — HOSPITAL ENCOUNTER (OUTPATIENT)
Dept: OCCUPATIONAL THERAPY | Age: 71
Setting detail: THERAPIES SERIES
Discharge: HOME | End: 2021-12-16
Payer: MEDICARE

## 2021-12-16 PROCEDURE — 97022 WHIRLPOOL THERAPY: CPT

## 2021-12-16 PROCEDURE — 97110 THERAPEUTIC EXERCISES: CPT

## 2021-12-16 NOTE — PROGRESS NOTES
[address]  [] EVALUATION  [x] DAILY NOTE (LAND) [] DAILY NOTE (AQUATIC ) [] PROGRESS NOTE [] DISCHARGE NOTE    [] [address] [facility]   [x] [address]    [] [facility]   [] Dl Duque    Date: 2021  Patient Name:  Merline Salm  : 1950  MRN: [id number]  CSN: [id number]    Referring Practitioner Hazel Goodwin MD   Diagnosis Unilateral primary osteoarthritis of first carpometacarpal joint, right hand [M18.11]    Treatment Diagnosis Right CMC arthritis, right thumb pain   Date of Evaluation 10/25/21      Functional Outcome Measure Used UEFS   Functional Outcome Score 41/80 (10/25/21)       Insurance: Primary: Payor: Balta Moran /  /  / ,   Secondary: [facility]   Authorization Information: No ionto, no hot/cold packs   Visit # 12, 4/10 for PN; PN 21   Visits Allowed: unlimited   Recertification Date:    Physician Follow-Up: No follow up scheduled with physician   Physician Orders: ROM right hand and to full then prog strength    Pertinent History: Patient had surgery on right thumb on 21 for [facility] arthritis. Patient had OT in 2021. It was recommended for patient to contact physician due to pain in right thumb. Patient returned to physician and was told that a tendon had ruptured. Patient then had surgery for tendon repair on 21. Patient was in soft cast for 2 weeks and then was in custom thumb splint fabricated by OT at [facility] until last week. Was told to remove splint at last MD appointment and OT was ordered at that time. Comorbidities include HTN, DM, and arthritis that could influence rehab process. SUBJECTIVE:  Patient states that she did have some soreness after her last therapy session due to increased strengthening activities completed.     OBJECTIVE:  TREATMENT   Precautions: HTN, DM,    Pain:  1/10 \"discomfort\" LOCATION: right thumb and wrist    X in shaded column indicates Activity Completed Today   Modalities Parameters/  Location  Notes/Comments   Fluidotherapy to right hand x 15 minutes  x                Manual Therapy Time/  Technique  Notes/Comments   STM/IASTM and active release to dorsal right forearm   To increase wrist flexion   IASTM to volar right forearm and palm             Exercises   Sets/  Sec Reps  Notes/Comments   Active right wrist flexion/extension 1 10     Active right wrist circumduction 1 10 each direction     Active right thumb radial abduction 1 10     Active right thumb circumduction       Passive right thumb radial and palmar abduction       Prayer stretch for wrist extension 1 5  10 second hold   Passive right wrist radial abduction       PROM completed to right wrist and thumb       Active right wrist circumduction 1 10     Resistive clothespins with right hand   x Pinched all resistances   ergogripper with 2 green and 1 red band with right UE 2 10  x    Pushing pencil  1 10     Medium theraputty - taffy pulls 1 3 minutes x    velcro board with right hand - large cylinder with key attachment 1 10 cycles  More difficulty bringing cylinder back toward her   Red therabar - bending bar with forearms pronated and bending bar with forearms supinated 1 15 each direction x    Activities Time    Notes/Comments   Rock tape applied to right thumb for support and pain management, edema management   Did not reapply today per patient request.                     Specific Interventions Next Treatment: fluidotherapy; AROM, PROM right wrist and thumb    Activity/Treatment Tolerance:  [x]  Patient tolerated treatment well  []  Patient limited by fatigue  []  Patient limited by pain   []  Patient limited by other medical complications  []  Other:     Assessment: Progressing toward goals and tolerated strengthening exercises well. GOALS:  Patient Goal: To have full use of my hand. Have strength, Be able to  and grab with my right hand. No pain    Short Term Goals:  Time Frame: 4 weeks  1. 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 2.   Increase active right wrist flexion and extension to 65 degrees, and 80 degrees supination to increase ability to use right hand in kitchen tasks. 3. Increase right thumb MP flexion to 55 and IR to 50 to increase her ability to use buttons and tie shoes. 4. Report pain going no higher than 2/10 in right thumb and wrist at any point to assist in improving sleep routine. Long Term Goals:  Time Frame: 8 weeks  1. Improve right hand coordination as evidenced by completing 9 hole peg test in less than 26 seconds to increase ability to perform kitchen tasks. 2. Report being able to complete ironing activities with right hand with no increase in pain. 3. Be able to open jars with use of needed adaptive equipment with no increase in pain in right wrist and hand. Patient Education:   []  HEP/Education Completed:    []  No new Education completed  [x]  Reviewed Prior HEP      [x]  Patient verbalized and/or demonstrated understanding of education provided. []  Patient unable to verbalize and/or demonstrate understanding of education provided. Will continue education. [x]  Barriers to learning: none    PLAN:      []  Plan of care initiated. Plan to see patient 2 times per week for 8 weeks to address the treatment planned outlined above.   [x]  Continue with current plan of care  []  Modify plan of care as follows:    []  Hold pending physician visit  []  Discharge    Time In 0930   Time Out 1015   Timed Code Minutes: 30 min   Total Treatment Time: 45 min       Emma See OTR/L #[id number]

## 2021-12-20 ENCOUNTER — OFFICE VISIT (OUTPATIENT)
Dept: FAMILY MEDICINE CLINIC | Age: 71
End: 2021-12-20
Payer: MEDICARE

## 2021-12-20 VITALS
OXYGEN SATURATION: 96 % | HEART RATE: 74 BPM | TEMPERATURE: 97 F | BODY MASS INDEX: 34.87 KG/M2 | SYSTOLIC BLOOD PRESSURE: 118 MMHG | WEIGHT: 196.8 LBS | HEIGHT: 63 IN | DIASTOLIC BLOOD PRESSURE: 72 MMHG

## 2021-12-20 DIAGNOSIS — M25.562 CHRONIC PAIN OF LEFT KNEE: ICD-10-CM

## 2021-12-20 DIAGNOSIS — I10 ESSENTIAL HYPERTENSION: ICD-10-CM

## 2021-12-20 DIAGNOSIS — Z00.00 ROUTINE GENERAL MEDICAL EXAMINATION AT A HEALTH CARE FACILITY: Primary | ICD-10-CM

## 2021-12-20 DIAGNOSIS — G89.29 CHRONIC PAIN OF LEFT KNEE: ICD-10-CM

## 2021-12-20 DIAGNOSIS — I25.10 ASCVD (ARTERIOSCLEROTIC CARDIOVASCULAR DISEASE): ICD-10-CM

## 2021-12-20 DIAGNOSIS — I42.8 OTHER CARDIOMYOPATHY (HCC): ICD-10-CM

## 2021-12-20 DIAGNOSIS — E11.65 TYPE 2 DIABETES MELLITUS WITH HYPERGLYCEMIA, WITHOUT LONG-TERM CURRENT USE OF INSULIN (HCC): ICD-10-CM

## 2021-12-20 PROCEDURE — 1123F ACP DISCUSS/DSCN MKR DOCD: CPT | Performed by: FAMILY MEDICINE

## 2021-12-20 PROCEDURE — 3046F HEMOGLOBIN A1C LEVEL >9.0%: CPT | Performed by: FAMILY MEDICINE

## 2021-12-20 PROCEDURE — G0439 PPPS, SUBSEQ VISIT: HCPCS | Performed by: FAMILY MEDICINE

## 2021-12-20 PROCEDURE — 3017F COLORECTAL CA SCREEN DOC REV: CPT | Performed by: FAMILY MEDICINE

## 2021-12-20 PROCEDURE — 4040F PNEUMOC VAC/ADMIN/RCVD: CPT | Performed by: FAMILY MEDICINE

## 2021-12-20 PROCEDURE — G8484 FLU IMMUNIZE NO ADMIN: HCPCS | Performed by: FAMILY MEDICINE

## 2021-12-20 RX ORDER — IBUPROFEN 600 MG/1
600 TABLET ORAL EVERY 6 HOURS PRN
Qty: 360 TABLET | Refills: 1 | Status: SHIPPED | OUTPATIENT
Start: 2021-12-20

## 2021-12-20 RX ORDER — ATORVASTATIN CALCIUM 20 MG/1
20 TABLET, FILM COATED ORAL NIGHTLY
Qty: 90 TABLET | Refills: 1 | Status: SHIPPED | OUTPATIENT
Start: 2021-12-20 | End: 2022-06-21 | Stop reason: SDUPTHER

## 2021-12-20 RX ORDER — PANTOPRAZOLE SODIUM 40 MG/1
TABLET, DELAYED RELEASE ORAL
COMMUNITY
Start: 2021-11-11

## 2021-12-20 RX ORDER — BENAZEPRIL HYDROCHLORIDE 20 MG/1
20 TABLET ORAL 2 TIMES DAILY
Qty: 180 TABLET | Refills: 1 | Status: SHIPPED | OUTPATIENT
Start: 2021-12-20 | End: 2022-05-04

## 2021-12-20 SDOH — ECONOMIC STABILITY: FOOD INSECURITY: WITHIN THE PAST 12 MONTHS, YOU WORRIED THAT YOUR FOOD WOULD RUN OUT BEFORE YOU GOT MONEY TO BUY MORE.: NEVER TRUE

## 2021-12-20 SDOH — ECONOMIC STABILITY: FOOD INSECURITY: WITHIN THE PAST 12 MONTHS, THE FOOD YOU BOUGHT JUST DIDN'T LAST AND YOU DIDN'T HAVE MONEY TO GET MORE.: NEVER TRUE

## 2021-12-20 ASSESSMENT — LIFESTYLE VARIABLES
HOW OFTEN DURING THE LAST YEAR HAVE YOU NEEDED AN ALCOHOLIC DRINK FIRST THING IN THE MORNING TO GET YOURSELF GOING AFTER A NIGHT OF HEAVY DRINKING: 0
HAS A RELATIVE, FRIEND, DOCTOR, OR ANOTHER HEALTH PROFESSIONAL EXPRESSED CONCERN ABOUT YOUR DRINKING OR SUGGESTED YOU CUT DOWN: 0
HOW OFTEN DURING THE LAST YEAR HAVE YOU FOUND THAT YOU WERE NOT ABLE TO STOP DRINKING ONCE YOU HAD STARTED: 0
HOW OFTEN DURING THE LAST YEAR HAVE YOU FAILED TO DO WHAT WAS NORMALLY EXPECTED FROM YOU BECAUSE OF DRINKING: 0
HOW OFTEN DO YOU HAVE A DRINK CONTAINING ALCOHOL: 1
AUDIT TOTAL SCORE: 1
HOW OFTEN DO YOU HAVE SIX OR MORE DRINKS ON ONE OCCASION: 0
HAVE YOU OR SOMEONE ELSE BEEN INJURED AS A RESULT OF YOUR DRINKING: 0
HOW OFTEN DURING THE LAST YEAR HAVE YOU HAD A FEELING OF GUILT OR REMORSE AFTER DRINKING: 0
HOW OFTEN DURING THE LAST YEAR HAVE YOU BEEN UNABLE TO REMEMBER WHAT HAPPENED THE NIGHT BEFORE BECAUSE YOU HAD BEEN DRINKING: 0
AUDIT-C TOTAL SCORE: 1
HOW MANY STANDARD DRINKS CONTAINING ALCOHOL DO YOU HAVE ON A TYPICAL DAY: 0

## 2021-12-20 ASSESSMENT — PATIENT HEALTH QUESTIONNAIRE - PHQ9
SUM OF ALL RESPONSES TO PHQ QUESTIONS 1-9: 0
SUM OF ALL RESPONSES TO PHQ QUESTIONS 1-9: 0
SUM OF ALL RESPONSES TO PHQ9 QUESTIONS 1 & 2: 0
SUM OF ALL RESPONSES TO PHQ QUESTIONS 1-9: 0
2. FEELING DOWN, DEPRESSED OR HOPELESS: 0
1. LITTLE INTEREST OR PLEASURE IN DOING THINGS: 0

## 2021-12-20 ASSESSMENT — SOCIAL DETERMINANTS OF HEALTH (SDOH): HOW HARD IS IT FOR YOU TO PAY FOR THE VERY BASICS LIKE FOOD, HOUSING, MEDICAL CARE, AND HEATING?: NOT HARD AT ALL

## 2021-12-20 NOTE — PROGRESS NOTES
Medicare Annual Wellness Visit  Name: Tu Jhaveri Date: 2021   MRN: [id number] Sex: Female   Age: 66 y.o. Ethnicity: Non- / Non    : 1950 Race: White (non-)      Shanta Smith is here for Medicare AWV and Medication Adjustment (would like to know if shecan do 3 tab for her iburprofen  instead of 2 )    Screenings for behavioral, psychosocial and functional/safety risks, and cognitive dysfunction are all negative except as indicated below. These results, as well as other patient data from the [address] form, are documented in Flowsheets linked to this Encounter. Allergies   Allergen Reactions    Amoxicillin     Bactrim [Sulfamethoxazole-Trimethoprim] Itching    Donnatal [Phenobarbital-Belladonna Alk] Other (See Comments)     palpitations    Lovastatin Other (See Comments)     myalgia    Metformin And Related Diarrhea    Vioxx Other (See Comments)     Liver problems    Dilaudid [Hydromorphone Hcl] Nausea And Vomiting    Fentanyl Nausea And Vomiting     Severe Nausea and vomitting       Prior to Visit Medications    Medication Sig Taking?  Authorizing Provider   ibuprofen (ADVIL;MOTRIN) 600 MG tablet Take 1 tablet by mouth 2 times daily as needed for Pain Yes Mindi Jimenez MD   anastrozole (ARIMIDEX) 1 MG tablet Take 1 tablet by mouth daily Yes Amarilis Velazquez MD   benazepril (LOTENSIN) 20 MG tablet Take 1 tablet by mouth 2 times daily Yes Mindi Jimenez MD   atorvastatin (LIPITOR) 20 MG tablet Take 1 tablet by mouth nightly Yes Mindi Jimenez MD   empagliflozin (JARDIANCE) 25 MG tablet Take 25 mg by mouth daily Yes Historical Provider, MD   insulin lispro, 1 Unit Dial, (HUMALOG KWIKPEN) 100 UNIT/ML SOPN Inject into the skin 3 times daily Indications: inject 0-12 unitsinto the skin 3 times daily-per sliding scale Yes Historical Provider, MD   furosemide (LASIX) 20 MG tablet  Yes Historical Provider, MD   Handicap Placard MISC by Does not apply route Dx:  Shortness of breath on exertion; duration 3 years: exp date: 8/7/2022 Yes Yris Cameron MD   EASY TOUCH PEN NEEDLES 31G X 6 MM MISC  Yes Historical Provider, MD REGINA AGEEAR 100 UNIT/ML injection pen Inject 47 Units into the skin nightly Yes Historical Provider, MD   Calcium Carb-Cholecalciferol 600-500 MG-UNIT CAPS Take by mouth daily Yes Historical Provider, MD   aspirin 81 MG chewable tablet Take 81 mg by mouth daily Yes Historical Provider, MD   therapeutic multivitamin-minerals (THERAGRAN-M) tablet Take 1 tablet by mouth daily.    Yes Historical Provider, MD   pantoprazole (PROTONIX) 40 MG tablet   Historical Provider, MD   albuterol sulfate HFA (PROAIR HFA) 108 (90 Base) MCG/ACT inhaler Inhale 2 puffs into the lungs every 6 hours as needed for Wheezing  Patient not taking: Reported on 12/20/2021  Yris Cameron MD   albuterol (PROVENTIL) (2.5 MG/3ML) 0.083% nebulizer solution Take 3 mLs by nebulization every 4 hours as needed for Wheezing  Patient not taking: Reported on 12/20/2021  NAV Whittington - CNP       Past Medical History:   Diagnosis Date    Arthritis     Asthma     Brain cyst     benign    Breast cancer ([address] 09/18/2018    Left breast, 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    Cancer ([address] 09/1918    Left Breast    Chronic sinus complaints     Diverticulosis of colon     DKA (diabetic ketoacidoses) 05/2018    Elevated TSH     Hypertension     Osteoarthritis     Polyneuropathy     PONV (postoperative nausea and vomiting)     Spinal headache     Type 2 diabetes mellitus ([address] 1990's       Past Surgical History:   Procedure Laterality Date    BLADDER SUSPENSION      times 2    BREAST LUMPECTOMY Left 10/10/2018    CARPAL TUNNEL RELEASE Bilateral 02/2020    CHOLECYSTECTOMY      DILATION AND CURETTAGE OF UTERUS      x2    EYE SURGERY      HAND SURGERY Right     thumb-revision of suspensionplasty    HERNIA REPAIR  06/18/2019    HYSTERECTOMY  1995    JOINT REPLACEMENT Right 2007    Rt total knee    KNEE ARTHROSCOPY  1967, 2001    RUDY STEROTACTIC LOC BREAST BIOPSY LEFT Left 09/19/2018    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    MANDIBLE FRACTURE SURGERY      OTHER SURGICAL HISTORY Right 12/30/2015    Excision of Sebaceous Cyst Right Ear     OVARY REMOVAL  1995    VT OFFICE/OUTPT VISIT,PROCEDURE ONLY Left 10/10/2018    LEFT BREAST LUMPECTOMY WITH SENTINEL LYMPH NODE BIOPSY performed by Marycruz Lee MD at [address] [facility], [facility] SKIN BIOPSY      TONSILLECTOMY AND ADENOIDECTOMY  age 4   [facility] VENTRAL HERNIA REPAIR N/A 6/14/2019    COMBINED LAPAROSCOPIC AND OPEN VENTRAL HERNIA REPAIR WITH MESH performed by Marycruz Lee MD at [facility] Right     right-excision        Family History   Problem Relation Age of Onset    Diabetes Mother     Heart Disease Mother     Lupus Mother     Heart Disease Father     Cancer Father         Squamous cell - face & scalp    Diabetes Maternal Grandmother     Heart Disease Maternal Grandfather     Cancer Paternal Aunt          multiple myeloma    Cancer Paternal Uncle         Lymphatic Leukemia    Breast Cancer Maternal Cousin         unsure on age, had breast cancer twice unsure if bilateral or if reoccurence    Heart Disease Maternal Uncle     Lupus Maternal Uncle     Other Paternal Grandfather         Brain aneurysm       CareTeam (Including outside providers/suppliers regularly involved in providing care):   Patient Care Team:  Joss Gan MD as PCP - General (Family Medicine)  Joss Gan MD as PCP - [facility] Empaneled Provider  Nataly Neely MD as Consulting Physician (Oncology)    Wt Readings from Last 3 Encounters:   12/20/21 196 lb 12.8 oz (89.3 kg)   10/01/21 200 lb 6.4 oz (90.9 kg)   06/14/21 201 lb 12.8 oz (91.5 kg)     Vitals:    12/20/21 1129   BP: 118/72   Site: Left Upper Arm   Position: Sitting   Pulse: 74   Temp: 97 °F (36.1 °C)   SpO2: 96%   Weight: 196 lb 12.8 oz (89.3 kg)   Height: 5' 3\" (1.6 m)     Body mass index is 34.86 kg/m². Based upon direct observation of the patient, evaluation of cognition reveals recent and remote memory intact. Physical Exam  Vitals reviewed. Constitutional:       General: She is not in acute distress. Appearance: She is well-developed. Cardiovascular:      Rate and Rhythm: Normal rate and regular rhythm. Heart sounds: Murmur heard. Systolic murmur is present with a grade of 2/6. Pulmonary:      Effort: Pulmonary effort is normal. No respiratory distress. Breath sounds: Normal breath sounds. No wheezing. Musculoskeletal:      Right lower leg: Edema (trace) present. Left lower leg: Edema (trace) present. Neurological:      Mental Status: She is alert. Mental status is at baseline. Psychiatric:         Mood and Affect: Mood normal.         Behavior: Behavior normal.           Patient's complete Health Risk Assessment and screening values have been reviewed and are found in Flowsheets. The following problems were reviewed today and where indicated follow up appointments were made and/or referrals ordered. Positive Risk Factor Screenings with Interventions:          General Health and ACP:  General  In general, how would you say your health is?: Good  In the past 7 days, have you experienced any of the following?  New or Increased Pain, New or Increased Fatigue, Loneliness, Social Isolation, Stress or Anger?: (!) New or Increased Pain (after got COVID booster she's been having really bad muscle pains.)  Do you get the social and emotional support that you need?: Yes  Do you have a Living Will?: Yes  Advance Directives     Power of  Living Will ACP-Advance Directive ACP-Power Satinder Long on 08/19/21 Filed on 08/19/21 Filed [address] [facility] Risk Interventions:  · Poor self-assessment of health status: patient declines any further evaluation/treatment for this issue  · Pain issues: increased ibu dose. using tylenol.   in OT for right wrist    Health Habits/Nutrition:  Health Habits/Nutrition  Do you exercise for at least 20 minutes 2-3 times per week?: Yes  Have you lost any weight without trying in the past 3 months?: No  Do you eat only one meal per day?: No  Have you seen the dentist within the past year?: Yes  Body mass index: (!) 34.86  Health Habits/Nutrition Interventions:  · diet and exercise       Personalized Preventive Plan   Current Health Maintenance Status  Immunization History   Administered Date(s) Administered    COVID-19, Rahel Nash, Primary or Immunocompromised, PF, 100mcg/0.5mL 02/04/2021, 03/02/2021, 11/05/2021    Influenza Virus Vaccine 10/01/2015, 09/28/2018, 09/11/2019    Influenza Whole 10/01/2015    Influenza, High Dose (Fluzone 65 yrs and older) 10/26/2015    Influenza, Quadv, IM, (6 mo and older Fluzone, Flulaval, Fluarix and 3 yrs and older Afluria) 09/27/2017    Influenza, Quadv, IM, PF (6 mo and older Fluzone, Flulaval, Fluarix, and 3 yrs and older Afluria) 09/12/2020    Influenza, Triv, inactivated, subunit, adjuvanted, IM (Fluad 65 yrs and older) 09/27/2017, 09/28/2018, 09/14/2019    Pneumococcal Conjugate 13-valent ([id number]) 10/01/2015, 10/26/2015    Pneumococcal Polysaccharide ([id number]) 05/06/2019    Tdap (Boostrix, Adacel) 04/25/2019    Zoster Recombinant (Shingrix) 09/12/2020, 03/22/2021        Health Maintenance   Topic Date Due    A1C test (Diabetic or Prediabetic)  03/27/2020    Diabetic foot exam  06/16/2021    TSH testing  06/16/2021    Diabetic retinal exam  08/31/2021    Annual Wellness Visit (AWV)  12/17/2021    Colon cancer screen colonoscopy  06/26/2022    Breast cancer screen  10/05/2022    Diabetic microalbuminuria test  11/20/2022    Lipid screen  11/20/2022    Potassium monitoring  11/20/2022    Creatinine monitoring  11/20/2022    DTaP/Tdap/Td vaccine (2 - Td or Tdap) 04/25/2029    DEXA (modify frequency per FRAX score)  Completed    Flu vaccine  Completed    Shingles Vaccine  Completed    Pneumococcal 65+ years Vaccine  Completed    COVID-19 Vaccine  Completed    Hepatitis C screen  Completed    Hepatitis A vaccine  Aged Out    Hib vaccine  Aged Out    Meningococcal (ACWY) vaccine  Aged Out     Recommendations for [employer] Due: see orders and patient instructions/AVS.  . Recommended screening schedule for the next 5-10 years is provided to the patient in written form: see Patient Instructions/AVS.    Adela De La Cruz was seen today for medicare awv and medication adjustment. Diagnoses and all orders for this visit:    Routine general medical examination at a health care facility    Type 2 diabetes mellitus with hyperglycemia, without long-term current use of insulin (HCC)  -     benazepril (LOTENSIN) 20 MG tablet; Take 1 tablet by mouth 2 times daily  -     atorvastatin (LIPITOR) 20 MG tablet; Take 1 tablet by mouth nightly    Essential hypertension  -     benazepril (LOTENSIN) 20 MG tablet; Take 1 tablet by mouth 2 times daily    ASCVD (arteriosclerotic cardiovascular disease)  -     atorvastatin (LIPITOR) 20 MG tablet; Take 1 tablet by mouth nightly    Chronic pain of left knee  -     ibuprofen (ADVIL;MOTRIN) 600 MG tablet; Take 1 tablet by mouth every 6 hours as needed for Pain    Other cardiomyopathy ([address]               still has muscle pain from covid booster in nov. Sees Ivette Thompson, [facility] Will obtain a1c result from them    Has right wrist and hand issues. Sees Dr. Kurtz Said    Above conditions are considered controlled and medications are refilled.   Diabetes per internal medicine    Increased  ibuprofen dose today    F/u 6 months HTN    Susan Olivas MD

## 2021-12-20 NOTE — PATIENT INSTRUCTIONS
Personalized Preventive Plan for Muriel Ruff - 12/20/2021  Medicare offers a range of preventive health benefits. Some of the tests and screenings are paid in full while other may be subject to a deductible, co-insurance, and/or copay. Some of these benefits include a comprehensive review of your medical history including lifestyle, illnesses that may run in your family, and various assessments and screenings as appropriate. After reviewing your medical record and screening and assessments performed today your provider may have ordered immunizations, labs, imaging, and/or referrals for you. A list of these orders (if applicable) as well as your Preventive Care list are included within your After Visit Summary for your review. Other Preventive Recommendations:    · A preventive eye exam performed by an eye specialist is recommended every 1-2 years to screen for glaucoma; cataracts, macular degeneration, and other eye disorders. · A preventive dental visit is recommended every 6 months. · Try to get at least 150 minutes of exercise per week or 10,000 steps per day on a pedometer . · Order or download the FREE \"Exercise & Physical Activity: Your Everyday Guide\" from [employer]. Call 7-847.308.5522 or search [employer] online. · You need 1852-3061 mg of calcium and 2645-2975 IU of vitamin D per day. It is possible to meet your calcium requirement with diet alone, but a vitamin D supplement is usually necessary to meet this goal.  · When exposed to the sun, use a sunscreen that protects against both UVA and UVB radiation with an SPF of 30 or greater. Reapply every 2 to 3 hours or after sweating, drying off with a towel, or swimming. · Always wear a seat belt when traveling in a car. Always wear a helmet when riding a bicycle or motorcycle.

## 2021-12-21 ENCOUNTER — HOSPITAL ENCOUNTER (OUTPATIENT)
Dept: OCCUPATIONAL THERAPY | Age: 71
Setting detail: THERAPIES SERIES
Discharge: HOME | End: 2021-12-21
Payer: MEDICARE

## 2021-12-21 PROCEDURE — 97110 THERAPEUTIC EXERCISES: CPT

## 2021-12-21 PROCEDURE — 97022 WHIRLPOOL THERAPY: CPT

## 2021-12-21 NOTE — PROGRESS NOTES
[address]  [] EVALUATION  [x] DAILY NOTE (LAND) [] DAILY NOTE (AQUATIC ) [] PROGRESS NOTE [] DISCHARGE NOTE    [] [facility]   [x] [address]    [] [facility]   [] Rocio Alanis    Date: 2021  Patient Name:  Chad Gordon  : 1950  MRN: [id number]  CSN: [id number]    Referring Practitioner Aria Nascimento MD   Diagnosis Unilateral primary osteoarthritis of first carpometacarpal joint, right hand [M18.11]    Treatment Diagnosis Right [facility] arthritis, right thumb pain   Date of Evaluation 10/25/21      Functional Outcome Measure Used UEFS   Functional Outcome Score 41/80 (10/25/21)       Insurance: Primary: Payor: Judi Lloyd /  /  / ,   Secondary: [facility]   Authorization Information: No ionto, no hot/cold packs   Visit # 13, 5/10 for PN; PN 21   Visits Allowed: unlimited   Recertification Date: 63   Physician Follow-Up: No follow up scheduled with physician   Physician Orders: ROM right hand and to full then prog strength    Pertinent History: Patient had surgery on right thumb on 21 for [facility] arthritis. Patient had OT in 2021. It was recommended for patient to contact physician due to pain in right thumb. Patient returned to physician and was told that a tendon had ruptured. Patient then had surgery for tendon repair on 21. Patient was in soft cast for 2 weeks and then was in custom thumb splint fabricated by OT at [facility] until last week. Was told to remove splint at last MD appointment and OT was ordered at that time. Comorbidities include HTN, DM, and arthritis that could influence rehab process. SUBJECTIVE:  States that her hands woke her up this morning - left hand was painful and right hand was tingling. States that she continues to have difficulty with turning doorknobs.     OBJECTIVE:  TREATMENT   Precautions: HTN, DM,    Pain:  1/10 \"discomfort\"; reports 2/10 at night   LOCATION: right thumb and wrist    X in shaded column indicates Activity Completed Today   Modalities Parameters/  Location  Notes/Comments   Fluidotherapy to right hand x 15 minutes  x                Manual Therapy Time/  Technique  Notes/Comments   STM/IASTM and active release to dorsal right forearm   To increase wrist flexion   IASTM to volar right forearm and palm             Exercises   Sets/  Sec Reps  Notes/Comments   Active right wrist flexion/extension 1 10     Active right wrist circumduction 1 10 each direction     Active right thumb radial abduction 1 10     Active right thumb circumduction       Passive right thumb radial and palmar abduction       Coordination maze with right UE 1 5     Prayer stretch for wrist extension 1 5  10 second hold   Passive right wrist radial abduction       PROM completed to right wrist and thumb       Active right wrist circumduction 1 10     Resistive clothespins with right hand    Pinched all resistances   ergogripper with 2 green and 1 red band with right UE 2 10  x Had difficulty completing 2nd set of gripping   Pushing pencil  1 10     Resisted radial abduction against rubber band with right thumb 1 10 x    Medium theraputty - taffy pulls 1 3 minutes     velcro board with right hand - large cylinder with key attachment 1 10 cycles x More difficulty bringing cylinder back toward her   Green therabar - bending bar with forearms pronated and bending bar with forearms supinated 1 15 each direction x    Activities Time    Notes/Comments   Rock tape applied to right thumb for support and pain management, edema management   Did not reapply today per patient request.                     Specific Interventions Next Treatment: fluidotherapy; AROM, PROM right wrist and thumb    Activity/Treatment Tolerance:  [x]  Patient tolerated treatment well  []  Patient limited by fatigue  []  Patient limited by pain   []  Patient limited by other medical complications  []  Other:     Assessment: Progressing toward goals slowly. Patient continues to have difficulty with active and resistive right thumb radial abduction as well as strength in right hand. GOALS:  Patient Goal: To have full use of my hand. Have strength, Be able to  and grab with my right hand. No pain    Short Term Goals:  Time Frame: 4 weeks  1. 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 2.   Increase active right wrist flexion and extension to 65 degrees, and 80 degrees supination to increase ability to use right hand in kitchen tasks. 3. Increase right thumb MP flexion to 55 and IR to 50 to increase her ability to use buttons and tie shoes. 4. Report pain going no higher than 2/10 in right thumb and wrist at any point to assist in improving sleep routine. Long Term Goals:  Time Frame: 8 weeks  1. Improve right hand coordination as evidenced by completing 9 hole peg test in less than 26 seconds to increase ability to perform kitchen tasks. 2. Report being able to complete ironing activities with right hand with no increase in pain. 3. Be able to open jars with use of needed adaptive equipment with no increase in pain in right wrist and hand. Patient Education:   [x]  HEP/Education Completed:  Resistive radial abduction with rubber band  []  No new Education completed  [x]  Reviewed Prior HEP      [x]  Patient verbalized and/or demonstrated understanding of education provided. []  Patient unable to verbalize and/or demonstrate understanding of education provided. Will continue education. [x]  Barriers to learning: none    PLAN:      []  Plan of care initiated. Plan to see patient 2 times per week for 8 weeks to address the treatment planned outlined above.   [x]  Continue with current plan of care  []  Modify plan of care as follows:    []  Hold pending physician visit  []  Discharge    Time In 0830   Time Out 0915   Timed Code Minutes: 30 min   Total Treatment Time: 45 min       Sofi Singh OTR/L #[id number]

## 2021-12-23 ENCOUNTER — HOSPITAL ENCOUNTER (OUTPATIENT)
Dept: OCCUPATIONAL THERAPY | Age: 71
Setting detail: THERAPIES SERIES
Discharge: HOME | End: 2021-12-23
Payer: MEDICARE

## 2021-12-23 PROCEDURE — 97110 THERAPEUTIC EXERCISES: CPT

## 2021-12-23 PROCEDURE — 97022 WHIRLPOOL THERAPY: CPT

## 2021-12-23 NOTE — PROGRESS NOTES
[address]  [] EVALUATION  [x] DAILY NOTE (LAND) [] DAILY NOTE (AQUATIC ) [] PROGRESS NOTE [] DISCHARGE NOTE    [] [address] [facility]   [x] [address]    [] [facility]   [] [place] Draft    Date: 2021  Patient Name:  Giselle Ritchie  : 1950  MRN: [id number]  CSN: [id number]    Referring Practitioner Pancho Evans MD   Diagnosis Unilateral primary osteoarthritis of first carpometacarpal joint, right hand [M18.11]    Treatment Diagnosis Right CMC arthritis, right thumb pain   Date of Evaluation 10/25/21      Functional Outcome Measure Used UEFS   Functional Outcome Score 41/80 (10/25/21)       Insurance: Primary: Payor: Nancy Gao /  /  / ,   Secondary: [facility]   Authorization Information: No ionto, no hot/cold packs   Visit # 14, 6/10 for PN; PN 21   Visits Allowed: unlimited   Recertification Date: 3/54/86   Physician Follow-Up: No follow up scheduled with physician   Physician Orders: ROM right hand and to full then prog strength    Pertinent History: Patient had surgery on right thumb on 21 for Aia 16 arthritis. Patient had OT in 2021. It was recommended for patient to contact physician due to pain in right thumb. Patient returned to physician and was told that a tendon had ruptured. Patient then had surgery for tendon repair on 21. Patient was in soft cast for 2 weeks and then was in custom thumb splint fabricated by OT at [facility] until last week. Was told to remove splint at last MD appointment and OT was ordered at that time. Comorbidities include HTN, DM, and arthritis that could influence rehab process. SUBJECTIVE:  States that she had \"bone pain\" last night. States that she carried groceries in yesterday and she is thinking that they were probably too heavy.      OBJECTIVE:  TREATMENT   Precautions: HTN, DM,    Pain:  1/10 pain at time of therapy; reports 3/10 last night   LOCATION: right thumb and wrist    X in shaded column indicates Activity Completed Today   Modalities Parameters/  Location  Notes/Comments   Fluidotherapy to right hand x 15 minutes  x                Manual Therapy Time/  Technique  Notes/Comments   STM/IASTM and active release to dorsal right forearm   To increase wrist flexion   IASTM to volar right forearm and palm             Exercises   Sets/  Sec Reps  Notes/Comments   Active right wrist flexion/extension 1 10     Active right wrist circumduction 1 10 each direction     Active right thumb radial abduction 1 10     Active right thumb circumduction       Passive right thumb radial and palmar abduction       Coordination maze with right UE 1 5     Prayer stretch for wrist extension 1 5  10 second hold   Passive right wrist radial abduction       PROM completed to right wrist and thumb       Active right wrist circumduction 1 10     Resistive clothespins with right hand  2 cycles x Pinched all resistances   ergogripper with 2 green, 1 blue, and 1 red band with right UE 2 10  x    Wrist exerciser with 1# with right UE doing the activity 1 6 x    Resisted radial abduction against rubber band with right thumb 1 10     Medium theraputty - taffy pulls 1 3 minutes     velcro board with right hand - large cylinder with key attachment 1 10 cycles x More difficulty bringing cylinder back toward her   Red therabar - bending bar with forearms supinated 1 15 x    Green therabar - bending bar with forearms pronated 1 15 x    Activities Time    Notes/Comments   Rock tape applied to right thumb for support and pain management, edema management   Did not reapply today per patient request.                     Specific Interventions Next Treatment: fluidotherapy; AROM, PROM right wrist and thumb    Activity/Treatment Tolerance:  [x]  Patient tolerated treatment well  []  Patient limited by fatigue  []  Patient limited by pain   []  Patient limited by other medical complications  []  Other:     Assessment: Progressing toward goals. Patient might benefit from right wrist brace for heavy activity to reduce pain with these activities. GOALS:  Patient Goal: To have full use of my hand. Have strength, Be able to  and grab with my right hand. No pain    Short Term Goals:  Time Frame: 4 weeks  1. 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 2.   Increase active right wrist flexion and extension to 65 degrees, and 80 degrees supination to increase ability to use right hand in kitchen tasks. 3. Increase right thumb MP flexion to 55 and IR to 50 to increase her ability to use buttons and tie shoes. 4. Report pain going no higher than 2/10 in right thumb and wrist at any point to assist in improving sleep routine. Long Term Goals:  Time Frame: 8 weeks  1. Improve right hand coordination as evidenced by completing 9 hole peg test in less than 26 seconds to increase ability to perform kitchen tasks. 2. Report being able to complete ironing activities with right hand with no increase in pain. 3. Be able to open jars with use of needed adaptive equipment with no increase in pain in right wrist and hand. Patient Education:   []  HEP/Education Completed:    []  No new Education completed  [x]  Reviewed Prior HEP      [x]  Patient verbalized and/or demonstrated understanding of education provided. []  Patient unable to verbalize and/or demonstrate understanding of education provided. Will continue education. [x]  Barriers to learning: none    PLAN:      []  Plan of care initiated. Plan to see patient 2 times per week for 8 weeks to address the treatment planned outlined above.   [x]  Continue with current plan of care  []  Modify plan of care as follows:    []  Hold pending physician visit  []  Discharge    Time In 0840   Time Out 0925   Timed Code Minutes: 30 min   Total Treatment Time: 45 min Parmjit Gonzalez, OTR/L #[id number]

## 2021-12-28 ENCOUNTER — HOSPITAL ENCOUNTER (OUTPATIENT)
Dept: OCCUPATIONAL THERAPY | Age: 71
Setting detail: THERAPIES SERIES
Discharge: HOME | End: 2021-12-28
Payer: MEDICARE

## 2021-12-28 PROCEDURE — 97022 WHIRLPOOL THERAPY: CPT

## 2021-12-28 PROCEDURE — 97110 THERAPEUTIC EXERCISES: CPT

## 2021-12-28 NOTE — PROGRESS NOTES
[address]  [] EVALUATION  [x] DAILY NOTE (LAND) [] DAILY NOTE (AQUATIC ) [] PROGRESS NOTE [] DISCHARGE NOTE    [] [address] [facility]   [x] [address]    [] [facility]   [] Guzman Damon    Date: 2021  Patient Name:  Jimmy Mckeon  : 1950  MRN: [id number]  CSN: [id number]    Referring Practitioner Arnoldo Chaves MD   Diagnosis Unilateral primary osteoarthritis of first carpometacarpal joint, right hand [M18.11]    Treatment Diagnosis Right CMC arthritis, right thumb pain   Date of Evaluation 10/25/21      Functional Outcome Measure Used UEFS   Functional Outcome Score 41/80 (10/25/21)       Insurance: Primary: Payor: Socorro Wyatt /  /  / ,   Secondary: [facility]   Authorization Information: No ionto, no hot/cold packs   Visit # 15, 7/10 for PN; PN 21   Visits Allowed: unlimited   Recertification Date:    Physician Follow-Up: No follow up scheduled with physician   Physician Orders: ROM right hand and to full then prog strength    Pertinent History: Patient had surgery on right thumb on 21 for Aia 16 arthritis. Patient had OT in 2021. It was recommended for patient to contact physician due to pain in right thumb. Patient returned to physician and was told that a tendon had ruptured. Patient then had surgery for tendon repair on 21. Patient was in soft cast for 2 weeks and then was in custom thumb splint fabricated by OT at [facility] until last week. Was told to remove splint at last MD appointment and OT was ordered at that time. Comorbidities include HTN, DM, and arthritis that could influence rehab process. SUBJECTIVE:  Patient states that she used a hand mixer for 15 minutes with her right hand. Relates that her right wrist felt strained and fatigued.      OBJECTIVE:  TREATMENT   Precautions: HTN, DM,    Pain:  2/10 pain at time of therapy; reports 3/10 last night LOCATION: right thumb and wrist    X in shaded column indicates Activity Completed Today   Modalities Parameters/  Location  Notes/Comments   Fluidotherapy to right hand x 15 minutes  x                Manual Therapy Time/  Technique  Notes/Comments   STM/IASTM and active release to dorsal right forearm   To increase wrist flexion   IASTM to volar right forearm and palm             Exercises   Sets/  Sec Reps  Notes/Comments   Active right wrist flexion/extension 1 10     Active right wrist circumduction 1 10 each direction     Active right wrist radial deviation 1 5 x 5 second hold   Active right thumb circumduction       Passive right thumb radial and palmar abduction       Coordination maze with right UE 1 5     Prayer stretch for wrist extension 1 5  10 second hold   Passive right wrist radial abduction       PROM completed to right wrist and thumb       Active right wrist circumduction 1 10     Resistive clothespins with right hand  2 cycles x Pinched all resistances   ergogripper with 2 green, 1 blue, and 1 red band with right UE 1 10  x    Wrist exerciser with 1# with right UE doing the activity 1 7 x    Resisted radial abduction against rubber band with right thumb 1 10     Medium theraputty - taffy pulls 1 3 minutes     velcro board with right hand - large cylinder with key attachment 1 10 cycles  More difficulty bringing cylinder back toward her   Red therabar - bending bar with forearms supinated 2 15 x    Green therabar - bending bar with forearms supinated 1 15 x    Green therabar - bending bar with forearms pronated 2 15 x    Activities Time    Notes/Comments   Rock tape applied to right thumb for support and pain management, edema management   Did not reapply today per patient request.    Education on joint protection to avoid ulnar drift of right hand/digits  x Opening jars/bottles; stirring with spoon/wisk              Specific Interventions Next Treatment: fluidotherapy; AROM, PROM right wrist and 2 times per week for 8 weeks to address the treatment planned outlined above.   [x]  Continue with current plan of care  []  Modify plan of care as follows:    []  Hold pending physician visit  []  Discharge    Time In 0830   Time Out 0915   Timed Code Minutes: 30 min   Total Treatment Time: 45 min       Cinthia Snider OTR/L #[id number]

## 2021-12-30 ENCOUNTER — HOSPITAL ENCOUNTER (OUTPATIENT)
Dept: OCCUPATIONAL THERAPY | Age: 71
Setting detail: THERAPIES SERIES
Discharge: HOME | End: 2021-12-30
Payer: MEDICARE

## 2021-12-30 PROCEDURE — 97110 THERAPEUTIC EXERCISES: CPT

## 2021-12-30 PROCEDURE — 97022 WHIRLPOOL THERAPY: CPT

## 2021-12-30 NOTE — PROGRESS NOTES
Completed Today   Modalities Parameters/  Location  Notes/Comments   Fluidotherapy to right hand x 10 minutes  x                Manual Therapy Time/  Technique  Notes/Comments   STM/IASTM and active release to dorsal right forearm   To increase wrist flexion   IASTM to volar right forearm and palm             Exercises   Sets/  Sec Reps  Notes/Comments   Active right wrist flexion/extension 1 10     Active right wrist circumduction 1 10 each direction     Active right wrist radial deviation 1 10 x 5 second hold   Active right thumb radial abduction 1 10 x    Active right wrist radial deviation while holding thumb in radial abducted position 1 10 x This is difficult for patient to complete   Coordination maze with right UE 1 5     Prayer stretch for wrist extension 1 5  10 second hold   Passive right wrist radial abduction       PROM completed to right wrist and thumb       Active right wrist circumduction 1 10     Resistive clothespins with right hand  2 cycles x Pinched all resistances, but had more difficulty this date   ergogripper with 2 green, 1 blue, and 1 red band with right UE 1 10      Wrist exerciser with 1# with right UE doing the activity 1 7     Resisted radial abduction against rubber band with right thumb 1 10     Medium theraputty - taffy pulls 1 3 minutes     velcro board with right hand - large cylinder with key attachment 1 10 cycles  More difficulty bringing cylinder back toward her   Red therabar - bending bar with forearms supinated 2 15     Green therabar - bending bar with forearms supinated 1 15     Green therabar - bending bar with forearms pronated 2 15     Activities Time    Notes/Comments   Rock tape applied to right thumb for support and pain management, edema management   Did not reapply today per patient request.    Education on joint protection to avoid ulnar drift of right hand/digits   Opening jars/bottles; stirring with spoon/wisk              Specific Interventions Next Treatment: fluidotherapy; AROM, PROM right wrist and thumb    Activity/Treatment Tolerance:  [x]  Patient tolerated treatment well  []  Patient limited by fatigue  []  Patient limited by pain   []  Patient limited by other medical complications  []  Other:     Assessment: Progressing toward goals. Patient is having significant difficulty with active right wrist radial deviation. Patient is not able to radially deviate right thumb and wrist at the same time. Patient demonstrates MP flexion when trying to radially deviate right wrist. Patient c/o pain along radial side of right CMC when doing simple activities such as zipping coat. GOALS:  Patient Goal: To have full use of my hand. Have strength, Be able to  and grab with my right hand. No pain    Short Term Goals:  Time Frame: 4 weeks  1. 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 2.   Increase active right wrist flexion and extension to 65 degrees, and 80 degrees supination to increase ability to use right hand in kitchen tasks. 3. Increase right thumb MP flexion to 55 and IR to 50 to increase her ability to use buttons and tie shoes. 4. Report pain going no higher than 2/10 in right thumb and wrist at any point to assist in improving sleep routine. Long Term Goals:  Time Frame: 8 weeks  1. Improve right hand coordination as evidenced by completing 9 hole peg test in less than 26 seconds to increase ability to perform kitchen tasks. 2. Report being able to complete ironing activities with right hand with no increase in pain. 3. Be able to open jars with use of needed adaptive equipment with no increase in pain in right wrist and hand.      Patient Education:   [x]  HEP/Education Completed:  Radial deviation of right wrist while holding thumb in radial abduction  []  No new Education completed  [x]  Reviewed Prior HEP      [x]  Patient verbalized and/or demonstrated understanding of education provided. []  Patient unable to verbalize and/or demonstrate understanding of education provided. Will continue education. [x]  Barriers to learning: none    PLAN:      []  Plan of care initiated. Plan to see patient 2 times per week for 8 weeks to address the treatment planned outlined above.   [x]  Continue with current plan of care  []  Modify plan of care as follows:    []  Hold pending physician visit  []  Discharge    Time In 0845   Time Out 0925   Timed Code Minutes: 30 min   Total Treatment Time: 40 min       Marcelo Lindsey OTR/L #[id number]

## 2022-01-04 ENCOUNTER — APPOINTMENT (OUTPATIENT)
Dept: OCCUPATIONAL THERAPY | Age: 72
End: 2022-01-04
Payer: MEDICARE

## 2022-01-06 ENCOUNTER — HOSPITAL ENCOUNTER (OUTPATIENT)
Dept: OCCUPATIONAL THERAPY | Age: 72
Setting detail: THERAPIES SERIES
Discharge: HOME | End: 2022-01-06
Payer: MEDICARE

## 2022-01-06 PROCEDURE — 97022 WHIRLPOOL THERAPY: CPT

## 2022-01-06 PROCEDURE — 97110 THERAPEUTIC EXERCISES: CPT

## 2022-01-06 NOTE — PROGRESS NOTES
[address]  [] EVALUATION  [x] DAILY NOTE (LAND) [] DAILY NOTE (AQUATIC ) [] PROGRESS NOTE [] DISCHARGE NOTE    [] [address] [facility]   [x] [address]    [] [facility]   [] Julee Saba    Date: 2022  Patient Name:  Kellen Leslie  : 1950  MRN: [id number]  CSN: [id number]    Referring Practitioner Beather Hatchet, MD   Diagnosis Unilateral primary osteoarthritis of first carpometacarpal joint, right hand [M18.11]    Treatment Diagnosis Right CMC arthritis, right thumb pain   Date of Evaluation 10/25/21      Functional Outcome Measure Used UEFS   Functional Outcome Score 41/80 (10/25/21)       Insurance: Primary: Payor: Hans Murray /  /  / ,   Secondary: [facility]   Authorization Information: No ionto, no hot/cold packs   Visit # 17, 9/10 for PN; PN 21   Visits Allowed: unlimited   Recertification Date:    Physician Follow-Up: No follow up scheduled with physician   Physician Orders: ROM right hand and to full then prog strength    Pertinent History:+ Patient had surgery on right thumb on 21 for CMC arthritis. Patient had OT in 2021. It was recommended for patient to contact physician due to pain in right thumb. Patient returned to physician and was told that a tendon had ruptured. Patient then had surgery for tendon repair on 21. Patient was in soft cast for 2 weeks and then was in custom thumb splint fabricated by OT at [facility] until last week. Was told to remove splint at last MD appointment and OT was ordered at that time. Comorbidities include HTN, DM, and arthritis that could influence rehab process. SUBJECTIVE:   Patient states that she had a high level of pain in right wrist and had a \"lump\" on the ulnar side of right wrist. States that she doesn't know what caused the increased pain. States that her right hand feels \"tight\" and \"swollen\" in the evenings. OBJECTIVE:  TREATMENT   Precautions: HTN, DM,    Pain:  3/10 pain at time of therapy;    LOCATION: right thumb and wrist    X in shaded column indicates Activity Completed Today   Modalities Parameters/  Location  Notes/Comments   Fluidotherapy to right hand x 10 minutes  x                Manual Therapy Time/  Technique  Notes/Comments   STM/IASTM and active release to dorsal right forearm   To increase wrist flexion   IASTM to volar right forearm and palm             Exercises   Sets/  Sec Reps  Notes/Comments   Active right wrist flexion/extension 1 10     Active right wrist circumduction 1 10 each direction     Active right wrist radial deviation 1 10  5 second hold   Active right thumb radial abduction 1 10     Active right wrist radial deviation while holding thumb in radial abducted position 1 10  This is difficult for patient to complete   Coordination maze with right UE 1 5     Prayer stretch for wrist extension 1 5  10 second hold   Passive right wrist radial abduction       PROM completed to right wrist and thumb       Active right wrist circumduction 1 10     Resistive clothespins with right hand  2 cycles  Pinched all resistances, but had more difficulty this date   ergogripper with 2 green, 1 blue, and 1 red band with right UE 1 10  x Increased difficulty this date   Wrist exerciser with 1# with right UE doing the activity 1 7     Pulling velcro apart with both hands - using right hand to do the pulling 1 10 x    Medium theraputty - taffy pulls 1 3 minutes     velcro board with right hand - large cylinder with key attachment 1 10 cycles  More difficulty bringing cylinder back toward her   Red therabar - bending bar with forearms supinated 2 15     Green therabar - bending bar with forearms supinated 1 15     Green therabar - bending bar with forearms pronated 2 15     Activities Time    Notes/Comments   Rock tape applied to right thumb for support and pain management, edema management   Did not reapply today per patient request.    Education on joint protection to avoid ulnar drift of right hand/digits   Opening jars/bottles; stirring with spoon/wisk   Extensive education regarding operative report and how muscles/tendons were re-routed  x         Specific Interventions Next Treatment: fluidotherapy; AROM, PROM right wrist and thumb    Activity/Treatment Tolerance:  [x]  Patient tolerated treatment well  []  Patient limited by fatigue  []  Patient limited by pain   []  Patient limited by other medical complications  []  Other:     Assessment: Patient continues to have difficulty with using right hand in resistive activities due to weakness and pain in right thumb. GOALS:  Patient Goal: To have full use of my hand. Have strength, Be able to  and grab with my right hand. No pain    Short Term Goals:  Time Frame: 4 weeks  1. 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 2.   Increase active right wrist flexion and extension to 65 degrees, and 80 degrees supination to increase ability to use right hand in kitchen tasks. 3. Increase right thumb MP flexion to 55 and IR to 50 to increase her ability to use buttons and tie shoes. 4. Report pain going no higher than 2/10 in right thumb and wrist at any point to assist in improving sleep routine. Long Term Goals:  Time Frame: 8 weeks  1. Improve right hand coordination as evidenced by completing 9 hole peg test in less than 26 seconds to increase ability to perform kitchen tasks. 2. Report being able to complete ironing activities with right hand with no increase in pain. 3. Be able to open jars with use of needed adaptive equipment with no increase in pain in right wrist and hand.      Patient Education:   [x]  HEP/Education Completed:  velcro pulling for pinch strengthening; education regarding operative report  []  No new Education completed  [x]  Reviewed Prior HEP      [x] Patient verbalized and/or demonstrated understanding of education provided. []  Patient unable to verbalize and/or demonstrate understanding of education provided. Will continue education. [x]  Barriers to learning: none    PLAN:      []  Plan of care initiated. Plan to see patient 2 times per week for 8 weeks to address the treatment planned outlined above.   [x]  Continue with current plan of care  []  Modify plan of care as follows:    []  Hold pending physician visit  []  Discharge    Time In 0830   Time Out 0915   Timed Code Minutes: 30 min   Total Treatment Time: 45 min       Tejas Kuhn, OTR/L #[id number]

## 2022-01-11 ENCOUNTER — HOSPITAL ENCOUNTER (OUTPATIENT)
Dept: OCCUPATIONAL THERAPY | Age: 72
Setting detail: THERAPIES SERIES
Discharge: HOME | End: 2022-01-11
Payer: MEDICARE

## 2022-01-11 PROCEDURE — 97022 WHIRLPOOL THERAPY: CPT

## 2022-01-11 PROCEDURE — 97110 THERAPEUTIC EXERCISES: CPT

## 2022-01-11 NOTE — PROGRESS NOTES
** PLEASE SIGN, DATE AND TIME CERTIFICATION BELOW AND RETURN TO [facility] OUTPATIENT REHABILITATION (FAX #: [phone]). ATTEST/CO-SIGN IF ACCESSING VIA [employer]. THANK YOU.**    I certify that I have examined the patient below and determined that Physical Medicine and Rehabilitation service is necessary and that I approve the established plan of care for up to 90 days or as specifically noted. Attestation, signature or co-signature of physician indicates approval of certification requirements.    ________________________ ____________ __________  Physician Signature   Date   Time       [address]  [] EVALUATION  [] DAILY NOTE (LAND) [] DAILY NOTE (AQUATIC ) [x] PROGRESS NOTE [] DISCHARGE NOTE    [] [address]   [x] [facility] 90    [] [address] [facility]   [] Susan Johnsonon    Date: 2022  Patient Name:  Diane Espinal  : 1950  MRN: [id number]  CSN: [id number]    Referring Practitioner Alaina Sharma MD   Diagnosis Unilateral primary osteoarthritis of first carpometacarpal joint, right hand [M18.11]    Treatment Diagnosis Right CMC arthritis, right thumb pain   Date of Evaluation 10/25/21      Functional Outcome Measure Used UEFS   Functional Outcome Score 41/80 (10/25/21)       Insurance: Primary: Payor: Jacquie Baxter /  /  / ,   Secondary: [facility]   Authorization Information: No ionto, no hot/cold packs   Visit # 18, ; PN 22   Visits Allowed: unlimited   Recertification Date:    Physician Follow-Up: No follow up scheduled with physician   Physician Orders: ROM right hand and to full then prog strength    Pertinent History:+ Patient had surgery on right thumb on 21 for Aia 16 arthritis. Patient had OT in 2021. It was recommended for patient to contact physician due to pain in right thumb. Patient returned to physician and was told that a tendon had ruptured.  Patient then radial abduction       PROM completed to right wrist and thumb       Active right wrist circumduction 1 10     Resistive clothespins with right hand  2 cycles  Pinched all resistances, but had more difficulty this date   ergogripper with 2 green, 1 blue, and 1 red band with right UE 1 10   Increased difficulty this date   Wrist exerciser with 1# with right UE doing the activity 1 7     Pulling velcro apart with both hands - using right hand to do the pulling 1 10     soft theraputty - taffy pulls 1 2 minutes x    velcro board with right hand - large cylinder with key attachment 1 10 cycles  More difficulty bringing cylinder back toward her   Red therabar - bending bar with forearms supinated 2 15     Green therabar - bending bar with forearms supinated 1 15     Green therabar - bending bar with forearms pronated 2 15     Activities Time    Notes/Comments   Rock tape applied to right thumb for support and pain management, edema management   Did not reapply today per patient request.    Education on joint protection to avoid ulnar drift of right hand/digits   Opening jars/bottles; stirring with spoon/wisk   Extensive education regarding operative report and how muscles/tendons were re-routed         RIGHT UPPER EXTREMITY  RANGE OF MOTION    AROM PROM COMMENTS   Foreram Supination 75        Wrist Flexion 45     Wrist Extension 62     2Wrist Radial Deviation (-)5     Wrist Ulnar Deviation 33         RIGHT UPPER EXTREMITY  HAND RANGE OF MOTION    AROM PROM COMMENTS   Thump MP 30     Thumb IP 40           HAND STRENGTH AND COORDINATION     Right Left    Strength Settin 17# 40#   Pinch Strength       Lateral Pinch 6# 11#    3 Point Pinch 2# 7#      Coordination 9 Hole Peg Test 24 seconds                    Specific Interventions Next Treatment: fluidotherapy; AROM, PROM right wrist and thumb, strengthening    Activity/Treatment Tolerance:  [x]  Patient tolerated treatment well  []  Patient limited by fatigue  [] Patient limited by pain   []  Patient limited by other medical complications  []  Other:     Assessment: Patient is making slow, but steady progress toward goals. Patient does continue to have significant weakness in her right hand in both  and pinch strengthening. Patient continues to also have significantly decreased radial deviation of right wrist as well as decreased radial abduction of right thumb. These decreased motions are interfering with her ability to complete her normal daily activities such as opening containers, opening doors, and using utensils with her right hand. Patient is also continuing to have pain and discomfort in her right wrist with resistive activities. Patient is very compliant with HEP and tries to use her right hand in all of her normal activities as much as able. Further skilled therapy services are required to address both ROM and strength of right hand to increase her ability to complete her normal home activities including cooking and opening of all food containers. GOALS:  Patient Goal: To have full use of my hand. Have strength, Be able to  and grab with my right hand. No pain    Short Term Goals:  Time Frame: 4 weeks  1. 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 GOAL MET - CONTINUE GOAL  2. Increase active right wrist flexion and extension to 65 degrees, and 80 degrees supination to increase ability to use right hand in kitchen tasks. GOAL NOT MET - SEE ABOVE FOR MEASUREMENTS - CONTINUE GOAL  3. Increase right thumb MP flexion to 55 and IP to 50 to increase her ability to use buttons and tie shoes. GOAL NOT MET - SEE ABOVE FOR MEASUREMENTS - CONTINUE GOAL  4. Report pain going no higher than 2/10 in right thumb and wrist at any point to assist in improving sleep routine. GOAL NOT MET - RELATES THAT PAIN GOES TO 3/10 - CONTINUE GOAL  5.  GOAL INITIATED: Increase right wrist radial deviation to 5 to increase her ability to wash table top with right hand. Long Term Goals:  Time Frame: 6 weeks  1. Improve right hand coordination as evidenced by completing 9 hole peg test in less than 26 seconds to increase ability to perform kitchen tasks. GOAL MET - SEE ABOVE FOR DETAILS - REVISED GOAL: Increase right  strength to 25#, right lateral pinch to 9#, and 3 point pinch to 5# to increase her ability to complete her normal daily activities. 2. Report being able to complete ironing activities with right hand with no increase in pain. GOAL NOT MET - CONTINUE GOAL  3. Be able to open jars with use of needed adaptive equipment with no increase in pain in right wrist and hand. GOAL NOT MET - RELATES THAT THIS HAS IMPROVED, BUT NOT MET - CONTINUE GOAL  4. GOAL INITIATED: Be able to  small items from table top (such as a pill) using right hand without difficulty. Patient Education:   [x]  HEP/Education Completed:  Goal status; OT POC  []  No new Education completed  [x]  Reviewed Prior HEP      [x]  Patient verbalized and/or demonstrated understanding of education provided. []  Patient unable to verbalize and/or demonstrate understanding of education provided. Will continue education. [x]  Barriers to learning: none    PLAN:      []  Plan of care initiated. Plan to see patient 2 times per week for 8 weeks to address the treatment planned outlined above.   []  Continue with current plan of care  [x]  Modify plan of care as follows: See patient 2 x week x 6 weeks from 1/11/22   []  Hold pending physician visit  []  Discharge    Time In 0900   Time Out 0945   Timed Code Minutes: 30 min   Total Treatment Time: 45 min       Opal Torres OTR/L #[id number]

## 2022-01-13 ENCOUNTER — HOSPITAL ENCOUNTER (OUTPATIENT)
Dept: OCCUPATIONAL THERAPY | Age: 72
Setting detail: THERAPIES SERIES
Discharge: HOME | End: 2022-01-13
Payer: MEDICARE

## 2022-01-13 PROCEDURE — 97110 THERAPEUTIC EXERCISES: CPT

## 2022-01-13 NOTE — PROGRESS NOTES
and wrist    X in shaded column indicates Activity Completed Today   Modalities Parameters/  Location  Notes/Comments   Fluidotherapy to right hand x 10 minutes  x                Manual Therapy Time/  Technique  Notes/Comments   STM/IASTM and active release to dorsal right forearm   To increase wrist flexion   IASTM to volar right forearm and palm             Exercises   Sets/  Sec Reps  Notes/Comments   Active right wrist flexion/extension 1 10     Active right wrist circumduction 1 10 each direction     Active right wrist radial deviation 2 5 x 5 second hold   Active right thumb radial abduction 1 10 x 5 second hold   Active right wrist radial deviation while holding thumb in radial abducted position 1 10     Coordination maze with right UE 1 5     Prayer stretch for wrist extension 1 5  10 second hold   Passive right wrist radial abduction       PROM completed to right wrist and thumb       Active right wrist circumduction 1 10     Resistive clothespins with right hand  2 cycles x Pinched all resistances, but had more difficulty this date   ergogripper with 2 green, 1 blue, and 1 red band with right UE 1 10   Increased difficulty this date   Wrist exerciser with 1# with right UE doing the activity 1 10 x    Pulling velcro apart with both hands - using right hand to do the pulling 1 10     Medium theraputty - knob turn tool with right UE 1 5  x    velcro board with right hand - large cylinder with key attachment 1 10 cycles  More difficulty bringing cylinder back toward her   Red therabar - bending bar with forearms supinated 1 15 x    Green therabar - bending bar with forearms supinated 1 15     Green therabar - bending bar with forearms pronated 1 15 x    Activities Time    Notes/Comments   Rock tape applied to right thumb for support and pain management, edema management   Did not reapply today per patient request.    Education on joint protection to avoid ulnar drift of right hand/digits   Opening jars/bottles; stirring with spoon/wisk   Extensive education regarding operative report and how muscles/tendons were re-routed             Specific Interventions Next Treatment: fluidotherapy; AROM, PROM right wrist and thumb, strengthening    Activity/Treatment Tolerance:  [x]  Patient tolerated treatment well  []  Patient limited by fatigue  []  Patient limited by pain   []  Patient limited by other medical complications  []  Other:     Assessment: Patient progressing toward goals slowly. GOALS:  Patient Goal: To have full use of my hand. Have strength, Be able to  and grab with my right hand. No pain    Short Term Goals:  Time Frame: 4 weeks  1. 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 2.   Increase active right wrist flexion and extension to 65 degrees, and 80 degrees supination to increase ability to use right hand in kitchen tasks. 3. Increase right thumb MP flexion to 55 and IP to 50 to increase her ability to use buttons and tie shoes. 4. Report pain going no higher than 2/10 in right thumb and wrist at any point to assist in improving sleep routine. 5.  Increase right wrist radial deviation to 5 to increase her ability to wash table top with right hand. Long Term Goals:  Time Frame: 6 weeks  1. Increase right  strength to 25#, right lateral pinch to 9#, and 3 point pinch to 5# to increase her ability to complete her normal daily activities. 2. Report being able to complete ironing activities with right hand with no increase in pain. 3. Be able to open jars with use of needed adaptive equipment with no increase in pain in right wrist and hand. 4. Be able to  small items from table top (such as a pill) using right hand without difficulty.      Patient Education:   []  HEP/Education Completed:    []  No new Education completed  [x]  Reviewed Prior HEP      [x]  Patient verbalized and/or demonstrated understanding of education provided. []  Patient unable to verbalize and/or demonstrate understanding of education provided. Will continue education. [x]  Barriers to learning: none    PLAN:      []  Plan of care initiated. Plan to see patient 2 times per week for 8 weeks to address the treatment planned outlined above.   [x]  Continue with current plan of care  []  Modify plan of care as follows: See patient 2 x week x 6 weeks from 1/11/22   []  Hold pending physician visit  []  Discharge    Time In 0830   Time Out 0915   Timed Code Minutes: 30 min   Total Treatment Time: 39 min       Ramón Chacko OTR/L #[id number]

## 2022-01-18 ENCOUNTER — HOSPITAL ENCOUNTER (OUTPATIENT)
Dept: OCCUPATIONAL THERAPY | Age: 72
Setting detail: THERAPIES SERIES
Discharge: HOME | End: 2022-01-18
Payer: MEDICARE

## 2022-01-18 PROCEDURE — 97022 WHIRLPOOL THERAPY: CPT

## 2022-01-18 PROCEDURE — 97110 THERAPEUTIC EXERCISES: CPT

## 2022-01-18 NOTE — PROGRESS NOTES
[address]  [] EVALUATION  [x] DAILY NOTE (LAND) [] DAILY NOTE (AQUATIC ) [] PROGRESS NOTE [] DISCHARGE NOTE    [] [address] [facility]   [x] [place] 90    [] [facility]   [] Kellie Sandoval    Date: 2022  Patient Name:  Andrew Hercules  : 1950  MRN: [id number]  CSN: [id number]    Referring Practitioner Lesia Mcgill MD   Diagnosis Unilateral primary osteoarthritis of first carpometacarpal joint, right hand [M18.11]    Treatment Diagnosis Right CMC arthritis, right thumb pain   Date of Evaluation 10/25/21      Functional Outcome Measure Used UEFS   Functional Outcome Score 41/80 (10/25/21)       Insurance: Primary: Payor: Tere Galvan /  /  / ,   Secondary: [facility]   Authorization Information: No ionto, no hot/cold packs   Visit # 20, 2/10 for PN ; PN 22   Visits Allowed: unlimited   Recertification Date: 3/15/66   Physician Follow-Up: No follow up scheduled with physician   Physician Orders: ROM right hand and to full then prog strength    Pertinent History:+ Patient had surgery on right thumb on 21 for Aia 16 arthritis. Patient had OT in 2021. It was recommended for patient to contact physician due to pain in right thumb. Patient returned to physician and was told that a tendon had ruptured. Patient then had surgery for tendon repair on 21. Patient was in soft cast for 2 weeks and then was in custom thumb splint fabricated by OT at [facility] until last week. Was told to remove splint at last MD appointment and OT was ordered at that time. Comorbidities include HTN, DM, and arthritis that could influence rehab process. SUBJECTIVE:   Patient relates that she has had increased pain and discomfort in her right hand. States that she noticed more swelling in her right thumb and wrist last night. States that she had pain when she was holding her new grandauter over the weekend. OBJECTIVE:  TREATMENT   Precautions: HTN, DM,    Pain:  4/10 pain at time of therapy;   LOCATION: right thumb and wrist    X in shaded column indicates Activity Completed Today   Modalities Parameters/  Location  Notes/Comments   Fluidotherapy to right hand x 15 minutes  x                Manual Therapy Time/  Technique  Notes/Comments   STM/IASTM and active release to dorsal right forearm   To increase wrist flexion   IASTM to volar right forearm and palm             Exercises   Sets/  Sec Reps  Notes/Comments   Active right wrist flexion/extension 1 10     Active right wrist circumduction 1 10 each direction     Active right wrist radial deviation 2 5  5 second hold   Active right thumb radial abduction 1 10  5 second hold   Active right wrist radial deviation while holding thumb in radial abducted position 1 10     Coordination maze with right UE 1 5     Prayer stretch for wrist extension 1 5  10 second hold   Passive right wrist radial abduction       PROM completed to right wrist and thumb   x    Active right wrist circumduction 1 10     Resistive clothespins with right hand  2 cycles x Pinched all resistances, but had more difficulty this date   ergogripper with 2 green, 1 blue, and 1 red band with right UE 1 10   Increased difficulty this date   Wrist exerciser with 1# with right UE doing the activity 1 10     Pulling velcro apart with both hands - using right hand to do the pulling 1 10     Medium theraputty - knob turn tool with right UE 1 10 x    velcro board with right hand - large cylinder with key attachment 1 10 cycles  More difficulty bringing cylinder back toward her   Red therabar - bending bar with forearms supinated 1 15 x    Green therabar - bending bar with forearms supinated 1 15     Green therabar - bending bar with forearms pronated 1 15     Activities Time    Notes/Comments   Rock tape applied to right thumb for support and pain management, edema management   Did not reapply today per patient request.    Education on joint protection to avoid ulnar drift of right hand/digits   Opening jars/bottles; stirring with spoon/wisk   Extensive education regarding operative report and how muscles/tendons were re-routed             Specific Interventions Next Treatment: fluidotherapy; AROM, PROM right wrist and thumb, strengthening    Activity/Treatment Tolerance:  [x]  Patient tolerated treatment well  []  Patient limited by fatigue  []  Patient limited by pain   []  Patient limited by other medical complications  []  Other:     Assessment: Patient progressing toward goals slowly. Patient experiencing increased discomfort today. Patient might benefit from splinting of right wrist/thumb for support to allow patient to complete her daily tasks that require increased strength. Patient will bring in previous custom splint for evaluation and possible modification for proper fit. GOALS:  Patient Goal: To have full use of my hand. Have strength, Be able to  and grab with my right hand. No pain    Short Term Goals:  Time Frame: 4 weeks  1. 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 2.   Increase active right wrist flexion and extension to 65 degrees, and 80 degrees supination to increase ability to use right hand in kitchen tasks. 3. Increase right thumb MP flexion to 55 and IP to 50 to increase her ability to use buttons and tie shoes. 4. Report pain going no higher than 2/10 in right thumb and wrist at any point to assist in improving sleep routine. 5.  Increase right wrist radial deviation to 5 to increase her ability to wash table top with right hand. Long Term Goals:  Time Frame: 6 weeks  1. Increase right  strength to 25#, right lateral pinch to 9#, and 3 point pinch to 5# to increase her ability to complete her normal daily activities.   2. Report being able to complete ironing activities with right hand with no increase in pain. 3. Be able to open jars with use of needed adaptive equipment with no increase in pain in right wrist and hand. 4. Be able to  small items from table top (such as a pill) using right hand without difficulty. Patient Education:   []  HEP/Education Completed:    []  No new Education completed  [x]  Reviewed Prior HEP      [x]  Patient verbalized and/or demonstrated understanding of education provided. []  Patient unable to verbalize and/or demonstrate understanding of education provided. Will continue education. [x]  Barriers to learning: none    PLAN:      []  Plan of care initiated. Plan to see patient 2 times per week for 8 weeks to address the treatment planned outlined above.   [x]  Continue with current plan of care  []  Modify plan of care as follows: See patient 2 x week x 6 weeks from 1/11/22   []  Hold pending physician visit  []  Discharge    Time In 1100   Time Out 1145   Timed Code Minutes: 30 min   Total Treatment Time: 39 min       Landon Mcneil OTR/L #[id number]

## 2022-01-20 ENCOUNTER — HOSPITAL ENCOUNTER (OUTPATIENT)
Dept: OCCUPATIONAL THERAPY | Age: 72
Setting detail: THERAPIES SERIES
Discharge: HOME | End: 2022-01-20
Payer: MEDICARE

## 2022-01-20 PROCEDURE — 97110 THERAPEUTIC EXERCISES: CPT

## 2022-01-20 PROCEDURE — 97022 WHIRLPOOL THERAPY: CPT

## 2022-01-20 PROCEDURE — 97760 ORTHOTIC MGMT&TRAING 1ST ENC: CPT

## 2022-01-20 NOTE — PROGRESS NOTES
[address]  [] EVALUATION  [x] DAILY NOTE (LAND) [] DAILY NOTE (AQUATIC ) [] PROGRESS NOTE [] DISCHARGE NOTE    [] [facility]   [x] [address]    [] [facility]   [] Elaine Conte    Date: 2022  Patient Name:  Skylar Silva  : 1950  MRN: [id number]  CSN: [id number]    Referring Practitioner Brea Guadalupe MD   Diagnosis Unilateral primary osteoarthritis of first carpometacarpal joint, right hand [M18.11]    Treatment Diagnosis Right CMC arthritis, right thumb pain   Date of Evaluation 10/25/21      Functional Outcome Measure Used UEFS   Functional Outcome Score 41/80 (10/25/21)       Insurance: Primary: Payor: [facility] /  /  / ,   Secondary: [facility]   Authorization Information: No ionto, no hot/cold packs   Visit # 21, 3/10 for PN ; PN 22   Visits Allowed: unlimited   Recertification Date:    Physician Follow-Up: No follow up scheduled with physician   Physician Orders: ROM right hand and to full then prog strength    Pertinent History:+ Patient had surgery on right thumb on 21 for [facility] arthritis. Patient had OT in 2021. It was recommended for patient to contact physician due to pain in right thumb. Patient returned to physician and was told that a tendon had ruptured. Patient then had surgery for tendon repair on 21. Patient was in soft cast for 2 weeks and then was in custom thumb splint fabricated by OT at [facility] until last week. Was told to remove splint at last MD appointment and OT was ordered at that time. Comorbidities include HTN, DM, and arthritis that could influence rehab process. SUBJECTIVE:   Patient reports that she continues to have pain when she is preparing meals and cleaning up after meals. States that the pain is at least 4/10 when she is doing these actvities.      OBJECTIVE:  TREATMENT   Precautions: HTN, DM,    Pain:  4/10 pain at time of therapy;   LOCATION: right thumb and wrist    X in shaded column indicates Activity Completed Today   Modalities Parameters/  Location  Notes/Comments   Fluidotherapy to right hand x 15 minutes  x                Manual Therapy Time/  Technique  Notes/Comments   STM/IASTM and active release to dorsal right forearm   To increase wrist flexion   IASTM to volar right forearm and palm             Exercises   Sets/  Sec Reps  Notes/Comments   Active right wrist flexion/extension 1 10     Active right wrist circumduction 1 10 each direction     Active right wrist radial deviation 2 5  5 second hold   Active right thumb radial abduction 1 10  5 second hold   Active right wrist radial deviation while holding thumb in radial abducted position 1 10     Coordination maze with right UE 1 5     Prayer stretch for wrist extension 1 5  10 second hold   Passive right wrist radial abduction       PROM completed to right wrist and thumb       Active right wrist circumduction 1 10 x Difficult for patient to move wrist in radial deviation during circumduction   Resistive clothespins with right hand  2 cycles  Pinched all resistances, but had more difficulty this date   ergogripper with 2 green, 1 blue, and 1 red band with right UE 1 10   Increased difficulty this date   Wrist exerciser with 1# with right UE doing the activity 1 10     Medium theraputty - taffy pulls   2 minutes x    Medium theraputty - knob turn tool with right UE 1 10     velcro board with right hand - large cylinder with key attachment 1 10 cycles  More difficulty bringing cylinder back toward her   Red therabar - bending bar with forearms supinated and pronated 1 15 each x    Green therabar - bending bar with forearms supinated 1 15     Green therabar - bending bar with forearms pronated 1 15     Activities Time    Notes/Comments   Rock tape applied to right thumb for support and pain management, edema management   Did not reapply today per patient request.    Education on joint protection to avoid ulnar drift of right hand/digits   Opening jars/bottles; stirring with spoon/wisk   Adjusted splint that was made for her post-op; reduced restriction at thumb IP joint for improved function while wearing splint  x Instructed patient to wear this when she is doing activities that are causing her some pain          Specific Interventions Next Treatment: fluidotherapy; AROM, PROM right wrist and thumb, strengthening    Activity/Treatment Tolerance:  [x]  Patient tolerated treatment well  []  Patient limited by fatigue  []  Patient limited by pain   []  Patient limited by other medical complications  []  Other:     Assessment: Patient continues to have pain in right wrist and it limits her functional use of right hand. Patient's post-op splint was adjusted this date to see if she could wear the splint during resistive activities to allow patient to complete those with less difficulty. GOALS:  Patient Goal: To have full use of my hand. Have strength, Be able to  and grab with my right hand. No pain    Short Term Goals:  Time Frame: 4 weeks  1. 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 2.   Increase active right wrist flexion and extension to 65 degrees, and 80 degrees supination to increase ability to use right hand in kitchen tasks. 3. Increase right thumb MP flexion to 55 and IP to 50 to increase her ability to use buttons and tie shoes. 4. Report pain going no higher than 2/10 in right thumb and wrist at any point to assist in improving sleep routine. 5.  Increase right wrist radial deviation to 5 to increase her ability to wash table top with right hand. Long Term Goals:  Time Frame: 6 weeks  1. Increase right  strength to 25#, right lateral pinch to 9#, and 3 point pinch to 5# to increase her ability to complete her normal daily activities.   2. Report being able to complete ironing activities with right hand with no increase in pain. 3. Be able to open jars with use of needed adaptive equipment with no increase in pain in right wrist and hand. 4. Be able to  small items from table top (such as a pill) using right hand without difficulty. Patient Education:   [x]  HEP/Education Completed: To wear splint during resistive activities  []  No new Education completed  [x]  Reviewed Prior HEP      [x]  Patient verbalized and/or demonstrated understanding of education provided. []  Patient unable to verbalize and/or demonstrate understanding of education provided. Will continue education. [x]  Barriers to learning: none    PLAN:      []  Plan of care initiated. Plan to see patient 2 times per week for 8 weeks to address the treatment planned outlined above.   [x]  Continue with current plan of care  []  Modify plan of care as follows: See patient 2 x week x 6 weeks from 1/11/22   []  Hold pending physician visit  []  Discharge    Time In 1045   Time Out 1130   Timed Code Minutes: 15 min   Total Treatment Time: 45 min       Genevia Sicard, OTR/L #[id number]

## 2022-01-25 ENCOUNTER — APPOINTMENT (OUTPATIENT)
Dept: OCCUPATIONAL THERAPY | Age: 72
End: 2022-01-25
Payer: MEDICARE

## 2022-01-27 ENCOUNTER — HOSPITAL ENCOUNTER (OUTPATIENT)
Dept: OCCUPATIONAL THERAPY | Age: 72
Setting detail: THERAPIES SERIES
Discharge: HOME | End: 2022-01-27
Payer: MEDICARE

## 2022-01-27 PROCEDURE — 97022 WHIRLPOOL THERAPY: CPT

## 2022-01-27 PROCEDURE — 97110 THERAPEUTIC EXERCISES: CPT

## 2022-01-27 NOTE — PROGRESS NOTES
[address]  [] EVALUATION  [x] DAILY NOTE (LAND) [] DAILY NOTE (AQUATIC ) [] PROGRESS NOTE [] DISCHARGE NOTE    [] [facility]   [x] [address]    [] [facility]   [] Suh Pac    Date: 2022  Patient Name:  Adis Hollis  : 1950  MRN: [id number]  CSN: [id number]    Referring Practitioner Naomie Montes MD   Diagnosis Unilateral primary osteoarthritis of first carpometacarpal joint, right hand [M18.11]    Treatment Diagnosis Right CMC arthritis, right thumb pain   Date of Evaluation 10/25/21      Functional Outcome Measure Used UEFS   Functional Outcome Score 41/80 (10/25/21)       Insurance: Primary: Payor: [employer] /  /  / ,   Secondary: [facility]   Authorization Information: No ionto, no hot/cold packs   Visit # 22, 4/10 for PN ; PN 22   Visits Allowed: unlimited   Recertification Date: 6/15/82   Physician Follow-Up: No follow up scheduled with physician   Physician Orders: ROM right hand and to full then prog strength    Pertinent History:+ Patient had surgery on right thumb on 21 for [address] [address] arthritis. Patient had OT in 2021. It was recommended for patient to contact physician due to pain in right thumb. Patient returned to physician and was told that a tendon had ruptured. Patient then had surgery for tendon repair on 21. Patient was in soft cast for 2 weeks and then was in custom thumb splint fabricated by OT at [facility] until last week. Was told to remove splint at last MD appointment and OT was ordered at that time. Comorbidities include HTN, DM, and arthritis that could influence rehab process. SUBJECTIVE:    States that she continues to have the feeling that she sprains her wrist. States that she has been wearing the splint when she is able. States that she is considering ordering a thumb spica splint.  States that her right wrist pain does continue to cause her difficulty sleeping.      OBJECTIVE:  TREATMENT   Precautions: HTN, DM,    Pain:  3-4/10 pain at time of therapy;   LOCATION: right thumb and wrist    X in shaded column indicates Activity Completed Today   Modalities Parameters/  Location  Notes/Comments   Fluidotherapy to right hand x 15 minutes  x                Manual Therapy Time/  Technique  Notes/Comments   STM/IASTM and active release to dorsal right forearm   To increase wrist flexion   IASTM to volar right forearm and palm             Exercises   Sets/  Sec Reps  Notes/Comments   Active right wrist flexion/extension 1 10     Active right wrist circumduction 1 10 each direction     Active right wrist radial deviation 2 5  5 second hold   Active right thumb radial abduction 1 10  5 second hold   Active right wrist radial deviation while holding thumb in radial abducted position 1 10     Coordination maze with right UE 1 5     Prayer stretch for wrist extension 1 5  10 second hold   Right thumb radial abduction isometrics 1 10 x Patient was able to tolerate increased amount of resistance this date   Soft theraputty - finger/thumb extension 1 5 x donut of theraputty to work on thumb extension/radial abduction   Active right wrist circumduction 1 10  Difficult for patient to move wrist in radial deviation during circumduction   Resistive clothespins with right hand  2 cycles  Pinched all resistances, but had more difficulty this date   ergogripper with 2 green, 1 blue, and 1 red band with right UE 1 10   Increased difficulty this date   Wrist exerciser with 1# with right UE doing the activity 1 10     Medium theraputty - taffy pulls   2 minutes     Medium theraputty - knob turn tool with right UE 1 10     velcro board with right hand - large cylinder with key attachment 1 10 cycles x More difficulty bringing cylinder back toward her   Red therabar - bending bar with forearms supinated  2 10 x Patient c/o \"strain\" in right wrist after 2nd set   Green therabar - bending bar with forearms supinated 1 10 x Was able to complete 10 reps if allowed to use shoulder abduction to assist in completion   Green therabar - bending bar with forearms pronated 2 10 x    Activities Time    Notes/Comments   Rock tape applied to right thumb for support and pain management, edema management   Did not reapply today per patient request.    Education on joint protection to avoid ulnar drift of right hand/digits   Opening jars/bottles; stirring with spoon/wisk   Adjusted splint that was made for her post-op; reduced restriction at thumb IP joint for improved function while wearing splint   Instructed patient to wear this when she is doing activities that are causing her some pain          Specific Interventions Next Treatment: fluidotherapy; AROM, PROM right wrist and thumb, strengthening    Activity/Treatment Tolerance:  [x]  Patient tolerated treatment well  []  Patient limited by fatigue  []  Patient limited by pain   []  Patient limited by other medical complications  []  Other:     Assessment: Patient progressing toward goals slowly. Patient did demonstrate slight increase in ability to resist pressure against right thumb for radial abduction. GOALS:  Patient Goal: To have full use of my hand. Have strength, Be able to  and grab with my right hand. No pain    Short Term Goals:  Time Frame: 4 weeks  1. 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 2.   Increase active right wrist flexion and extension to 65 degrees, and 80 degrees supination to increase ability to use right hand in kitchen tasks. 3. Increase right thumb MP flexion to 55 and IP to 50 to increase her ability to use buttons and tie shoes. 4. Report pain going no higher than 2/10 in right thumb and wrist at any point to assist in improving sleep routine.    5.  Increase right wrist radial deviation to 5 to increase her ability to wash table top with right hand. Long Term Goals:  Time Frame: 6 weeks  1. Increase right  strength to 25#, right lateral pinch to 9#, and 3 point pinch to 5# to increase her ability to complete her normal daily activities. 2. Report being able to complete ironing activities with right hand with no increase in pain. 3. Be able to open jars with use of needed adaptive equipment with no increase in pain in right wrist and hand. 4. Be able to  small items from table top (such as a pill) using right hand without difficulty. Patient Education:   []  HEP/Education Completed:    []  No new Education completed  [x]  Reviewed Prior HEP      [x]  Patient verbalized and/or demonstrated understanding of education provided. []  Patient unable to verbalize and/or demonstrate understanding of education provided. Will continue education. [x]  Barriers to learning: none    PLAN:      []  Plan of care initiated. Plan to see patient 2 times per week for 8 weeks to address the treatment planned outlined above.   [x]  Continue with current plan of care  []  Modify plan of care as follows: See patient 2 x week x 6 weeks from 1/11/22   []  Hold pending physician visit  []  Discharge    Time In 1015   Time Out 1100   Timed Code Minutes: 30 min   Total Treatment Time: 45 min       Genoveva Amado OTR/ROMMEL #[id number]

## 2022-02-01 ENCOUNTER — HOSPITAL ENCOUNTER (OUTPATIENT)
Dept: OCCUPATIONAL THERAPY | Age: 72
Setting detail: THERAPIES SERIES
Discharge: HOME | End: 2022-02-01
Payer: MEDICARE

## 2022-02-01 PROCEDURE — 97110 THERAPEUTIC EXERCISES: CPT

## 2022-02-01 PROCEDURE — 97022 WHIRLPOOL THERAPY: CPT

## 2022-02-01 NOTE — PROGRESS NOTES
[address]  [] EVALUATION  [x] DAILY NOTE (LAND) [] DAILY NOTE (AQUATIC ) [] PROGRESS NOTE [] DISCHARGE NOTE    [] [address] [facility]   [x] [address]    [] [facility]   [] Demarco Mejia    Date: 2022  Patient Name:  Cherise Kaplan  : 1950  MRN: [id number]  CSN: [id number]    Referring Practitioner Patricia Mcarthur MD   Diagnosis Unilateral primary osteoarthritis of first carpometacarpal joint, right hand [M18.11]    Treatment Diagnosis Right CMC arthritis, right thumb pain   Date of Evaluation 10/25/21      Functional Outcome Measure Used UEFS   Functional Outcome Score 41/80 (10/25/21)       Insurance: Primary: Payor: [employer] /  /  / ,   Secondary: [facility]   Authorization Information: No ionto, no hot/cold packs   Visit # 23, 5/10 for PN ; PN 22   Visits Allowed: unlimited   Recertification Date: 37   Physician Follow-Up: No follow up scheduled with physician   Physician Orders: ROM right hand and to full then prog strength    Pertinent History:+ Patient had surgery on right thumb on 21 for [facility] arthritis. Patient had OT in 2021. It was recommended for patient to contact physician due to pain in right thumb. Patient returned to physician and was told that a tendon had ruptured. Patient then had surgery for tendon repair on 21. Patient was in soft cast for 2 weeks and then was in custom thumb splint fabricated by OT at [facility] until last week. Was told to remove splint at last MD appointment and OT was ordered at that time. Comorbidities include HTN, DM, and arthritis that could influence rehab process. SUBJECTIVE:    States that she has been very frustrated regarding her right thumb and wrist. States that she wasn't able to push the button for the showerhead this morning with her right hand.  States she has difficulty using right hand to hold onto a washcloth this morning. States that she feels as though she had difficulty with these tasks due to both pain and weakness.  Patient is considering obtaining a second opinion for right hand/thumb/wrist.    OBJECTIVE:  TREATMENT   Precautions: HTN, DM,    Pain:  3-4/10 pain at time of therapy;   LOCATION: right thumb and wrist    X in shaded column indicates Activity Completed Today   Modalities Parameters/  Location  Notes/Comments   Fluidotherapy to right hand x 15 minutes  x                Manual Therapy Time/  Technique  Notes/Comments   STM to right thumb and dorsal wrist  x    IASTM to volar right forearm and palm             Exercises   Sets/  Sec Reps  Notes/Comments   Active right wrist flexion/extension 1 10 x    PROM to right thumb for all motions   x    Active right wrist radial deviation 2 5  5 second hold   Active right thumb radial abduction 1 10 x 5 second hold   Active right wrist radial deviation while holding thumb in radial abducted position 1 10     Coordination maze with right UE 1 5     Prayer stretch for wrist extension 1 5  10 second hold   Right thumb radial abduction isometrics 1 10  Patient was able to tolerate increased amount of resistance this date   medium theraputty - finger/thumb extension 1 5 x donut of theraputty to work on thumb extension/radial abduction   Active right wrist circumduction 1 10  Difficult for patient to move wrist in radial deviation during circumduction   Resistive clothespins with right hand  2 cycles  Pinched all resistances, but had more difficulty this date   ergogripper with 2 green, 1 blue, and 1 red band with right UE 1 10   Increased difficulty this date   Wrist exerciser with 1# with right UE doing the activity 1 10     Medium theraputty - taffy pulls   2 minutes     Medium theraputty - knob turn tool with right UE 1 10     velcro board with right hand - large cylinder with key attachment 1 10 cycles  More difficulty bringing cylinder back toward her   Red therabar - bending bar with forearms supinated  2 10  Patient c/o \"strain\" in right wrist after 2nd set   Green therabar - bending bar with forearms supinated 1 10  Was able to complete 10 reps if allowed to use shoulder abduction to assist in completion   Green therabar - bending bar with forearms pronated 2 10     Activities Time    Notes/Comments   Rock tape applied to right thumb for support and pain management, edema management   Did not reapply today per patient request.    Education on joint protection to avoid ulnar drift of right hand/digits   Opening jars/bottles; stirring with spoon/wisk   Adjusted splint that was made for her post-op; reduced restriction at thumb IP joint for improved function while wearing splint   Instructed patient to wear this when she is doing activities that are causing her some pain          Specific Interventions Next Treatment: fluidotherapy; AROM, PROM right wrist and thumb, strengthening    Activity/Treatment Tolerance:  [x]  Patient tolerated treatment well  []  Patient limited by fatigue  []  Patient limited by pain   []  Patient limited by other medical complications  []  Other:     Assessment: Patient progressing toward goals slowly. Patient demonstrates laxity in R2-5 MP joints. Patient also has difficulty getting her right hand flat on table. GOALS:  Patient Goal: To have full use of my hand. Have strength, Be able to  and grab with my right hand. No pain    Short Term Goals:  Time Frame: 4 weeks  1. 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 2.   Increase active right wrist flexion and extension to 65 degrees, and 80 degrees supination to increase ability to use right hand in kitchen tasks. 3. Increase right thumb MP flexion to 55 and IP to 50 to increase her ability to use buttons and tie shoes.     4. Report pain going no higher than 2/10 in right thumb and wrist at any point to assist in improving sleep routine. 5.  Increase right wrist radial deviation to 5 to increase her ability to wash table top with right hand. Long Term Goals:  Time Frame: 6 weeks  1. Increase right  strength to 25#, right lateral pinch to 9#, and 3 point pinch to 5# to increase her ability to complete her normal daily activities. 2. Report being able to complete ironing activities with right hand with no increase in pain. 3. Be able to open jars with use of needed adaptive equipment with no increase in pain in right wrist and hand. 4. Be able to  small items from table top (such as a pill) using right hand without difficulty. Patient Education:   []  HEP/Education Completed:    []  No new Education completed  [x]  Reviewed Prior HEP      [x]  Patient verbalized and/or demonstrated understanding of education provided. []  Patient unable to verbalize and/or demonstrate understanding of education provided. Will continue education. [x]  Barriers to learning: none    PLAN:      []  Plan of care initiated. Plan to see patient 2 times per week for 8 weeks to address the treatment planned outlined above.   [x]  Continue with current plan of care  []  Modify plan of care as follows: See patient 2 x week x 6 weeks from 1/11/22   []  Hold pending physician visit  []  Discharge    Time In 0830   Time Out 0915   Timed Code Minutes: 30 min   Total Treatment Time: 39 min       Tita Nunez OTR/L #[id number]

## 2022-02-03 ENCOUNTER — APPOINTMENT (OUTPATIENT)
Dept: OCCUPATIONAL THERAPY | Age: 72
End: 2022-02-03
Payer: MEDICARE

## 2022-02-08 ENCOUNTER — HOSPITAL ENCOUNTER (OUTPATIENT)
Dept: OCCUPATIONAL THERAPY | Age: 72
Setting detail: THERAPIES SERIES
Discharge: HOME | End: 2022-02-08
Payer: MEDICARE

## 2022-02-08 PROCEDURE — 97022 WHIRLPOOL THERAPY: CPT

## 2022-02-08 PROCEDURE — 97110 THERAPEUTIC EXERCISES: CPT

## 2022-02-08 PROCEDURE — 97140 MANUAL THERAPY 1/> REGIONS: CPT

## 2022-02-08 NOTE — PROGRESS NOTES
[address]  [] EVALUATION  [x] DAILY NOTE (LAND) [] DAILY NOTE (AQUATIC ) [] PROGRESS NOTE [] DISCHARGE NOTE    [] [facility]   [x] [address]    [] [facility][1]   [] Andrew Valladares    Date: 2022  Patient Name:  Jie Maradiaga  : 1950  MRN: [id number]  CSN: [id number]    Referring Practitioner Alexandria Correa MD   Diagnosis No admission diagnoses are documented for this encounter. Treatment Diagnosis Right CMC arthritis, right thumb pain   Date of Evaluation 10/25/21      Functional Outcome Measure Used UEFS   Functional Outcome Score 41/80 (10/25/21)       Insurance: Primary: Payor: Kingsley Lepe /  /  / ,   Secondary: [facility]   Authorization Information: No ionto, no hot/cold packs   Visit # 24, 6/10 for PN ; PN 22   Visits Allowed: unlimited   Recertification Date:    Physician Follow-Up: No follow up scheduled with physician   Physician Orders: ROM right hand and to full then prog strength    Pertinent History:+ Patient had surgery on right thumb on 21 for CMC arthritis. Patient had OT in 2021. It was recommended for patient to contact physician due to pain in right thumb. Patient returned to physician and was told that a tendon had ruptured. Patient then had surgery for tendon repair on 21. Patient was in soft cast for 2 weeks and then was in custom thumb splint fabricated by OT at [facility] until last week. Was told to remove splint at last MD appointment and OT was ordered at that time. Comorbidities include HTN, DM, and arthritis that could influence rehab process. SUBJECTIVE:    Patient states that she is no longer able to push the button on the shower head using her right hand. Also states that she is starting to have difficulty with pushing the buttons on the remote control.  Also states that holding onto her blow dryer is difficult with her right hand. States that her left shoulder has been causing her more pain in the recent days.     OBJECTIVE:  TREATMENT   Precautions: HTN, DM,    Pain:  4/10 pain at time of therapy;   LOCATION: right thumb and wrist    X in shaded column indicates Activity Completed Today   Modalities Parameters/  Location  Notes/Comments   Fluidotherapy to right hand x 15 minutes  x                Manual Therapy Time/  Technique  Notes/Comments   STM to right thumb and dorsal wrist  x    IASTM to volar right forearm   x Tightness present in volar forearm         Exercises   Sets/  Sec Reps  Notes/Comments   Active right thumb circumduction 1 10 each direction x    PROM to right thumb for all motions   x    Active right wrist radial deviation 2 5  5 second hold   Active right thumb radial abduction 1 10  5 second hold   Active right wrist radial deviation while holding thumb in radial abducted position 1 10     Coordination maze with right UE 1 5     Prayer stretch for wrist extension 1 5  10 second hold   Right thumb radial abduction isometrics 1 10  Patient was able to tolerate increased amount of resistance this date   medium theraputty - finger/thumb extension 1 5  donut of theraputty to work on thumb extension/radial abduction   Active right wrist circumduction 1 10  Difficult for patient to move wrist in radial deviation during circumduction   Resistive clothespins with right hand  2 cycles  Pinched all resistances, but had more difficulty this date   ergogripper with 2 green, 1 blue, and 1 red band with right UE 1 10   Increased difficulty this date   Wrist exerciser with 1# with right UE doing the activity 1 10     Medium theraputty - taffy pulls   2 minutes     Medium theraputty - knob turn tool with right UE 1 10     velcro board with right hand - large cylinder with key attachment 1 10 cycles  More difficulty bringing cylinder back toward her   Red therabar - bending bar with forearms supinated  2 10  Patient c/o \"strain\" in right wrist after 2nd set   Green therabar - bending bar with forearms supinated 1 10  Was able to complete 10 reps if allowed to use shoulder abduction to assist in completion   Green therabar - bending bar with forearms pronated 2 10     Activities Time    Notes/Comments   Rock tape applied to right thumb for support and pain management, edema management   Did not reapply today per patient request.    Education on joint protection to avoid ulnar drift of right hand/digits   Opening jars/bottles; stirring with spoon/wisk   Adjusted splint that was made for her post-op; reduced restriction at thumb IP joint for improved function while wearing splint   Instructed patient to wear this when she is doing activities that are causing her some pain          Specific Interventions Next Treatment: fluidotherapy; AROM, PROM right wrist and thumb, strengthening    Activity/Treatment Tolerance:  [x]  Patient tolerated treatment well  []  Patient limited by fatigue  []  Patient limited by pain   []  Patient limited by other medical complications  []  Other:     Assessment: Patient continues to have pain in right hand and relates that she feels as though her strength has decreased in her right hand. Patient is pursuing a second opinion for her right wrist.    GOALS:  Patient Goal: To have full use of my hand. Have strength, Be able to  and grab with my right hand. No pain    Short Term Goals:  Time Frame: 4 weeks  1. 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 2.   Increase active right wrist flexion and extension to 65 degrees, and 80 degrees supination to increase ability to use right hand in kitchen tasks. 3. Increase right thumb MP flexion to 55 and IP to 50 to increase her ability to use buttons and tie shoes. 4. Report pain going no higher than 2/10 in right thumb and wrist at any point to assist in improving sleep routine.    5.  Increase right wrist radial deviation to 5 to increase her ability to wash table top with right hand. Long Term Goals:  Time Frame: 6 weeks  1. Increase right  strength to 25#, right lateral pinch to 9#, and 3 point pinch to 5# to increase her ability to complete her normal daily activities. 2. Report being able to complete ironing activities with right hand with no increase in pain. 3. Be able to open jars with use of needed adaptive equipment with no increase in pain in right wrist and hand. 4. Be able to  small items from table top (such as a pill) using right hand without difficulty. Patient Education:   []  HEP/Education Completed:    []  No new Education completed  [x]  Reviewed Prior HEP      [x]  Patient verbalized and/or demonstrated understanding of education provided. []  Patient unable to verbalize and/or demonstrate understanding of education provided. Will continue education. [x]  Barriers to learning: none    PLAN:      []  Plan of care initiated. Plan to see patient 2 times per week for 8 weeks to address the treatment planned outlined above.   [x]  Continue with current plan of care  []  Modify plan of care as follows: See patient 2 x week x 6 weeks from 1/11/22   []  Hold pending physician visit  []  Discharge    Time In 1430   Time Out 1515   Timed Code Minutes: 30 min   Total Treatment Time: 45 min       Mita Cabello OTR/ROMMEL #[id number]

## 2022-02-10 ENCOUNTER — HOSPITAL ENCOUNTER (OUTPATIENT)
Dept: OCCUPATIONAL THERAPY | Age: 72
Setting detail: THERAPIES SERIES
Discharge: HOME | End: 2022-02-10
Payer: MEDICARE

## 2022-02-10 ENCOUNTER — TELEPHONE (OUTPATIENT)
Dept: FAMILY MEDICINE CLINIC | Age: 72
End: 2022-02-10

## 2022-02-10 DIAGNOSIS — M79.641 BILATERAL HAND PAIN: Primary | ICD-10-CM

## 2022-02-10 DIAGNOSIS — M79.642 BILATERAL HAND PAIN: Primary | ICD-10-CM

## 2022-02-10 PROCEDURE — 97022 WHIRLPOOL THERAPY: CPT

## 2022-02-10 PROCEDURE — 97110 THERAPEUTIC EXERCISES: CPT

## 2022-02-10 NOTE — TELEPHONE ENCOUNTER
Piedad Mcdaniel stops in requesting some blood work , her hand is no better even with Therapy. She thinks maybe checking for Lupus or Rheumatoid Arthritis . Wants to know your thoughts.

## 2022-02-10 NOTE — PROGRESS NOTES
[address]  [] EVALUATION  [x] DAILY NOTE (LAND) [] DAILY NOTE (AQUATIC ) [] PROGRESS NOTE [] DISCHARGE NOTE    [] [facility]   [x] [address]    [] [facility]   [] [employer]    Date: 2/10/2022  Patient Name:  Byron Marshall  : 1950  MRN: [id number]  CSN: [id number]    Referring Practitioner Vincenzo Thomas MD   Diagnosis No admission diagnoses are documented for this encounter. Treatment Diagnosis Right CMC arthritis, right thumb pain   Date of Evaluation 10/25/21      Functional Outcome Measure Used UEFS   Functional Outcome Score 41/80 (10/25/21)       Insurance: Primary: Payor: Miranda Guillory /  /  / ,   Secondary: [facility]   Authorization Information: No ionto, no hot/cold packs   Visit # 25, 7/10 for PN ; PN 22   Visits Allowed: unlimited   Recertification Date:    Physician Follow-Up: No follow up scheduled with physician   Physician Orders: ROM right hand and to full then prog strength    Pertinent History:+ Patient had surgery on right thumb on 21 for CMC arthritis. Patient had OT in 2021. It was recommended for patient to contact physician due to pain in right thumb. Patient returned to physician and was told that a tendon had ruptured. Patient then had surgery for tendon repair on 21. Patient was in soft cast for 2 weeks and then was in custom thumb splint fabricated by OT at [facility] until last week. Was told to remove splint at last MD appointment and OT was ordered at that time. Comorbidities include HTN, DM, and arthritis that could influence rehab process. SUBJECTIVE:    States that she was not able to use fingernail clippers with her right hand. States that she doesn't have the strength to do it. States that she had difficulty with using computer mouse and remote control.     OBJECTIVE:  TREATMENT   Precautions: HTN, DM,    Pain:  4/10 pain at time of therapy;   LOCATION: right thumb and wrist    X in shaded column indicates Activity Completed Today   Modalities Parameters/  Location  Notes/Comments   Fluidotherapy to right hand x 15 minutes  x                Manual Therapy Time/  Technique  Notes/Comments   STM to right thumb and dorsal wrist  x    IASTM to volar right forearm    Tightness present in volar forearm         Exercises   Sets/  Sec Reps  Notes/Comments   Active right thumb circumduction 1 10 each direction     PROM to right thumb for all motions       Active right wrist radial deviation 2 5  5 second hold   Active right thumb radial abduction 1 10  5 second hold   Active right wrist radial deviation while holding thumb in radial abducted position 1 10     Coordination maze with right UE 1 5     Prayer stretch for wrist extension 1 5  10 second hold   Right thumb radial abduction isometrics 1 10  Patient was able to tolerate increased amount of resistance this date   medium theraputty - finger/thumb extension 1 5  donut of theraputty to work on thumb extension/radial abduction   Active right wrist circumduction 1 10  Difficult for patient to move wrist in radial deviation during circumduction   Resistive clothespins with right hand  2 cycles  Pinched all resistances, but had more difficulty this date   ergogripper with 2 green, 1 blue, and 1 red band with right UE 1 10   Increased difficulty this date   Wrist exerciser with 1# with right UE doing the activity 1 10     Medium theraputty - taffy pulls   2 minutes     Medium theraputty - knob turn tool with right UE 1 10     velcro board with right hand - large cylinder with key attachment 1 10 cycles  More difficulty bringing cylinder back toward her   Red therabar - bending bar with forearms supinated  2 10 x    Green therabar - bending bar with forearms supinated 1 10  Was able to complete 10 reps if allowed to use shoulder abduction to assist in completion   Green therabar - bending bar with forearms pronated 1 10 x Activity stopped due to left shoulder pain   Activities Time    Notes/Comments   Rock tape applied to right thumb for support and pain management, edema management   Did not reapply today per patient request.    Education on joint protection to avoid ulnar drift of right hand/digits   Opening jars/bottles; stirring with spoon/wisk   Discussed adapted fingernail clippers and trying a different kind of computer mouse  x           Specific Interventions Next Treatment: fluidotherapy; AROM, PROM right wrist and thumb, strengthening    Activity/Treatment Tolerance:  [x]  Patient tolerated treatment well  []  Patient limited by fatigue  []  Patient limited by pain   []  Patient limited by other medical complications  []  Other:     Assessment: Patient continues to have decreased strength of right hand as well as increased pain in right hand. Discussion was held with patient regarding need to start thinking about how to adapt the ways to complete certain tasks such as adapted fingernail clipper, different computer mouse, using fingers versus thumb on remote control. Patient was slightly reluctant to revert to adaptive techniques. GOALS:  Patient Goal: To have full use of my hand. Have strength, Be able to  and grab with my right hand. No pain    Short Term Goals:  Time Frame: 4 weeks  1. 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 2.   Increase active right wrist flexion and extension to 65 degrees, and 80 degrees supination to increase ability to use right hand in kitchen tasks. 3. Increase right thumb MP flexion to 55 and IP to 50 to increase her ability to use buttons and tie shoes. 4. Report pain going no higher than 2/10 in right thumb and wrist at any point to assist in improving sleep routine. 5.  Increase right wrist radial deviation to 5 to increase her ability to wash table top with right hand.      Long Term Goals: Time Frame: 6 weeks  1. Increase right  strength to 25#, right lateral pinch to 9#, and 3 point pinch to 5# to increase her ability to complete her normal daily activities. 2. Report being able to complete ironing activities with right hand with no increase in pain. 3. Be able to open jars with use of needed adaptive equipment with no increase in pain in right wrist and hand. 4. Be able to  small items from table top (such as a pill) using right hand without difficulty. Patient Education:   [x]  HEP/Education Completed: Adaptive techniques and equipment possibilities  []  No new Education completed  [x]  Reviewed Prior HEP      [x]  Patient verbalized and/or demonstrated understanding of education provided. []  Patient unable to verbalize and/or demonstrate understanding of education provided. Will continue education. [x]  Barriers to learning: none    PLAN:      []  Plan of care initiated. Plan to see patient 2 times per week for 8 weeks to address the treatment planned outlined above.   [x]  Continue with current plan of care  []  Modify plan of care as follows: See patient 2 x week x 6 weeks from 1/11/22   []  Hold pending physician visit  []  Discharge    Time In 1430   Time Out 1515   Timed Code Minutes: 30 min   Total Treatment Time: 45 min       Deepthi Freitas OTR/L #[id number]

## 2022-02-11 NOTE — TELEPHONE ENCOUNTER
Has seen ortho Dr. Felipa Rizvi for hand pains. Referred to OT by Dr. Felipa Rizvi    Recommend appt in office to discuss symptoms and determine what blood work. Please advise patient.   Vicky Martinez MD

## 2022-02-11 NOTE — TELEPHONE ENCOUNTER
Left message for Archie Ojeda to call our office to schedule appt for discussion/treatment of hand pain.

## 2022-02-15 ENCOUNTER — HOSPITAL ENCOUNTER (OUTPATIENT)
Dept: OCCUPATIONAL THERAPY | Age: 72
Setting detail: THERAPIES SERIES
Discharge: HOME | End: 2022-02-15
Payer: MEDICARE

## 2022-02-15 PROCEDURE — 97110 THERAPEUTIC EXERCISES: CPT

## 2022-02-15 PROCEDURE — 97022 WHIRLPOOL THERAPY: CPT

## 2022-02-15 NOTE — PROGRESS NOTES
supinated 1 10  Was able to complete 10 reps if allowed to use shoulder abduction to assist in completion   Green therabar - bending bar with forearms pronated 1 10  Activity stopped due to left shoulder pain   Activities Time    Notes/Comments   Rock tape applied to right thumb for support and pain management, edema management   Did not reapply today per patient request.    Education on joint protection to avoid ulnar drift of right hand/digits   Opening jars/bottles; stirring with spoon/wisk   Discussed adapted fingernail clippers and trying a different kind of computer mouse             Specific Interventions Next Treatment: fluidotherapy; AROM, PROM right wrist and thumb, strengthening    Activity/Treatment Tolerance:  [x]  Patient tolerated treatment well  []  Patient limited by fatigue  []  Patient limited by pain   []  Patient limited by other medical complications  []  Other:     Assessment: Patient is progressing toward goals very slowly. GOALS:  Patient Goal: To have full use of my hand. Have strength, Be able to  and grab with my right hand. No pain    Short Term Goals:  Time Frame: 4 weeks  1. 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 2.   Increase active right wrist flexion and extension to 65 degrees, and 80 degrees supination to increase ability to use right hand in kitchen tasks. 3. Increase right thumb MP flexion to 55 and IP to 50 to increase her ability to use buttons and tie shoes. 4. Report pain going no higher than 2/10 in right thumb and wrist at any point to assist in improving sleep routine. 5.  Increase right wrist radial deviation to 5 to increase her ability to wash table top with right hand. Long Term Goals:  Time Frame: 6 weeks  1. Increase right  strength to 25#, right lateral pinch to 9#, and 3 point pinch to 5# to increase her ability to complete her normal daily activities.   2. Report being able to complete ironing activities with right hand with no increase in pain. 3. Be able to open jars with use of needed adaptive equipment with no increase in pain in right wrist and hand. 4. Be able to  small items from table top (such as a pill) using right hand without difficulty. Patient Education:   []  HEP/Education Completed:    []  No new Education completed  [x]  Reviewed Prior HEP      [x]  Patient verbalized and/or demonstrated understanding of education provided. []  Patient unable to verbalize and/or demonstrate understanding of education provided. Will continue education. [x]  Barriers to learning: none    PLAN:      []  Plan of care initiated. Plan to see patient 2 times per week for 8 weeks to address the treatment planned outlined above.   [x]  Continue with current plan of care  []  Modify plan of care as follows: See patient 2 x week x 6 weeks from 1/11/22   []  Hold pending physician visit  []  Discharge    Time In 1300   Time Out 1345   Timed Code Minutes: 30 min   Total Treatment Time: 45 min       Mony Renae OTR/L #[id number]

## 2022-02-16 ENCOUNTER — HOSPITAL ENCOUNTER (OUTPATIENT)
Age: 72
Discharge: HOME | End: 2022-02-16
Payer: MEDICARE

## 2022-02-16 ENCOUNTER — OFFICE VISIT (OUTPATIENT)
Dept: FAMILY MEDICINE CLINIC | Age: 72
End: 2022-02-16
Payer: MEDICARE

## 2022-02-16 VITALS
SYSTOLIC BLOOD PRESSURE: 136 MMHG | DIASTOLIC BLOOD PRESSURE: 88 MMHG | BODY MASS INDEX: 35.33 KG/M2 | TEMPERATURE: 97.1 F | WEIGHT: 199.4 LBS | RESPIRATION RATE: 18 BRPM | HEART RATE: 72 BPM | HEIGHT: 63 IN | OXYGEN SATURATION: 97 %

## 2022-02-16 DIAGNOSIS — M79.641 BILATERAL HAND PAIN: ICD-10-CM

## 2022-02-16 DIAGNOSIS — M79.642 BILATERAL HAND PAIN: ICD-10-CM

## 2022-02-16 DIAGNOSIS — M79.642 BILATERAL HAND PAIN: Primary | ICD-10-CM

## 2022-02-16 DIAGNOSIS — M79.641 BILATERAL HAND PAIN: Primary | ICD-10-CM

## 2022-02-16 PROCEDURE — 36415 COLL VENOUS BLD VENIPUNCTURE: CPT

## 2022-02-16 PROCEDURE — 86038 ANTINUCLEAR ANTIBODIES: CPT

## 2022-02-16 PROCEDURE — 85651 RBC SED RATE NONAUTOMATED: CPT

## 2022-02-16 PROCEDURE — 86430 RHEUMATOID FACTOR TEST QUAL: CPT

## 2022-02-16 PROCEDURE — 86225 DNA ANTIBODY NATIVE: CPT

## 2022-02-16 PROCEDURE — 86140 C-REACTIVE PROTEIN: CPT

## 2022-02-16 PROCEDURE — 99213 OFFICE O/P EST LOW 20 MIN: CPT | Performed by: FAMILY MEDICINE

## 2022-02-16 PROCEDURE — 4040F PNEUMOC VAC/ADMIN/RCVD: CPT | Performed by: FAMILY MEDICINE

## 2022-02-16 PROCEDURE — G8484 FLU IMMUNIZE NO ADMIN: HCPCS | Performed by: FAMILY MEDICINE

## 2022-02-16 PROCEDURE — 1123F ACP DISCUSS/DSCN MKR DOCD: CPT | Performed by: FAMILY MEDICINE

## 2022-02-16 PROCEDURE — G8417 CALC BMI ABV UP PARAM F/U: HCPCS | Performed by: FAMILY MEDICINE

## 2022-02-16 PROCEDURE — 86200 CCP ANTIBODY: CPT

## 2022-02-16 PROCEDURE — 1090F PRES/ABSN URINE INCON ASSESS: CPT | Performed by: FAMILY MEDICINE

## 2022-02-16 PROCEDURE — 1036F TOBACCO NON-USER: CPT | Performed by: FAMILY MEDICINE

## 2022-02-16 PROCEDURE — 3017F COLORECTAL CA SCREEN DOC REV: CPT | Performed by: FAMILY MEDICINE

## 2022-02-16 PROCEDURE — G8427 DOCREV CUR MEDS BY ELIG CLIN: HCPCS | Performed by: FAMILY MEDICINE

## 2022-02-16 PROCEDURE — G8399 PT W/DXA RESULTS DOCUMENT: HCPCS | Performed by: FAMILY MEDICINE

## 2022-02-16 NOTE — PROGRESS NOTES
SRPX [facility] PROFESSIONAL [facility]  [address] [facility]  Dept: [phone]  Dept Fax: [phone]  Loc: [phone]  PROGRESS NOTE      Visit Date: 2/16/2022    Hansel oCffman is a 66 y.o. female who presents today for:  Chief Complaint   Patient presents with    Hand Pain     Almost done with PT, but still experiencing pain; wanting to discuss possible rheumatoid arthritis     Health Maintenance     Podiatrist visit 12/2021 Jazlyn Null) for DM Foot Exam; Dr. Fanny Sparrow for eye exam in past year       Subjective:  HPI     Bilateral hand pain. Seen by ortho Dr. Deb Rowell in dec. Has been doing OT and is not progressing. Left hand/wrist has been worsening. Taking motrin and tylenol. Wakes 2-3 times per night. Using brace on right wrist.  Has morning stiffness and swelling of right wrist.     S/p surgery right wrist in July for arthritis and and then revision for tendon rupture in sept. Hx of carpal tunnel surgery Bilaterally.      Mother with hx of lupus    Needs left shoulder surgery in future    Review of Systems  Patient Active Problem List   Diagnosis    Hyperlipidemia    Asthma    Polyneuropathy    AS (aortic stenosis)    Cardiomyopathy eF 39 TO 50% PER ECHO MAY 2012    Hiatal hernia    Hypothyroid    Type 2 diabetes mellitus without complication, without long-term current use of insulin (HCC)    Osteoarthritis of multiple joints    Ear canal mass    Diabetic ketoacidosis without coma associated with type 2 diabetes mellitus (HCC)    Malignant neoplasm of left breast in female, estrogen receptor positive (HCC)    Localized osteoarthritis of left knee    Other osteoporosis without current pathological fracture    S/P repair of ventral hernia    Cervical stenosis of spinal canal    Tear of left supraspinatus tendon    Morbidly obese (HCC)     Past Medical History:   Diagnosis Date    Arthritis     Asthma     Brain cyst benign    Breast cancer ([facility] [address] 09/18/2018    Left breast, 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    Cancer ([facility] [address] 09/1918    Left Breast    Chronic sinus complaints     Diverticulosis of colon     DKA (diabetic ketoacidoses) 05/2018    Elevated TSH     Hypertension     Osteoarthritis     Polyneuropathy     PONV (postoperative nausea and vomiting)     Spinal headache     Type 2 diabetes mellitus ([address] 1990's      Past Surgical History:   Procedure Laterality Date    BLADDER SUSPENSION      times 2    BREAST LUMPECTOMY Left 10/10/2018    CARPAL TUNNEL RELEASE Bilateral 02/2020    CHOLECYSTECTOMY      DILATION AND CURETTAGE OF UTERUS      x2    EYE SURGERY      HAND SURGERY Right     thumb-revision of suspensionplasty    HERNIA REPAIR  06/18/2019    HYSTERECTOMY  1995    JOINT REPLACEMENT Right 2007    Rt total knee    KNEE ARTHROSCOPY  1967, 2001    RUDY STEROTACTIC LOC BREAST BIOPSY LEFT Left 09/19/2018    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    MANDIBLE FRACTURE SURGERY      OTHER SURGICAL HISTORY Right 12/30/2015    Excision of Sebaceous Cyst Right Ear     OVARY REMOVAL  1995    [place] OFFICE/OUTPT VISIT,PROCEDURE ONLY Left 10/10/2018    LEFT BREAST LUMPECTOMY WITH SENTINEL LYMPH NODE BIOPSY performed by Eran To MD at [address] [facility], [facility] SKIN BIOPSY      TONSILLECTOMY AND ADENOIDECTOMY  age 4   [facility] VENTRAL HERNIA REPAIR N/A 6/14/2019    COMBINED LAPAROSCOPIC AND OPEN VENTRAL HERNIA REPAIR WITH MESH performed by Eran To MD at [address] [place] [address] Right     right-excision      Family History   Problem Relation Age of Onset    Diabetes Mother     Heart Disease Mother     Lupus Mother     Heart Disease Father     Cancer Father         Squamous cell - face & scalp    Diabetes Maternal Grandmother     Heart Disease Maternal Grandfather     Cancer Paternal Aunt          multiple myeloma    Cancer Paternal Uncle Lymphatic Leukemia    Breast Cancer Maternal Cousin         unsure on age, had breast cancer twice unsure if bilateral or if reoccurence    Heart Disease Maternal Uncle     Lupus Maternal Uncle     Other Paternal Grandfather         Brain aneurysm     Social History     Tobacco Use    Smoking status: Never Smoker    Smokeless tobacco: Never Used   Substance Use Topics    Alcohol use: Yes     Alcohol/week: 0.0 standard drinks     Comment: Socially      Current Outpatient Medications   Medication Sig Dispense Refill    SUPER B COMPLEX/C PO Take 1 tablet by mouth daily      pantoprazole (PROTONIX) 40 MG tablet       benazepril (LOTENSIN) 20 MG tablet Take 1 tablet by mouth 2 times daily 180 tablet 1    atorvastatin (LIPITOR) 20 MG tablet Take 1 tablet by mouth nightly 90 tablet 1    ibuprofen (ADVIL;MOTRIN) 600 MG tablet Take 1 tablet by mouth every 6 hours as needed for Pain 360 tablet 1    anastrozole (ARIMIDEX) 1 MG tablet Take 1 tablet by mouth daily 90 tablet 3    empagliflozin (JARDIANCE) 25 MG tablet Take 25 mg by mouth daily      insulin lispro, 1 Unit Dial, (HUMALOG KWIKPEN) 100 UNIT/ML SOPN Inject into the skin 3 times daily Indications: inject 0-12 unitsinto the skin 3 times daily-per sliding scale      furosemide (LASIX) 20 MG tablet       Handicap Placard MISC by Does not apply route Dx:  Shortness of breath on exertion; duration 3 years: exp date: 8/7/2022 1 each 0    EASY TOUCH PEN NEEDLES 31G X 6 MM MISC       LANTUS SOLOSTAR 100 UNIT/ML injection pen Inject 39 Units into the skin nightly       Calcium Carb-Cholecalciferol 600-500 MG-UNIT CAPS Take by mouth daily      aspirin 81 MG chewable tablet Take 81 mg by mouth daily      therapeutic multivitamin-minerals (THERAGRAN-M) tablet Take 1 tablet by mouth daily.         albuterol sulfate HFA (PROAIR HFA) 108 (90 Base) MCG/ACT inhaler Inhale 2 puffs into the lungs every 6 hours as needed for Wheezing (Patient not taking: Reported on 12/20/2021) 1 Inhaler 1    albuterol (PROVENTIL) (2.5 MG/3ML) 0.083% nebulizer solution Take 3 mLs by nebulization every 4 hours as needed for Wheezing (Patient not taking: Reported on 12/20/2021) 1 Package 1     No current facility-administered medications for this visit. Allergies   Allergen Reactions    Amoxicillin     Bactrim [Sulfamethoxazole-Trimethoprim] Itching    Donnatal [Phenobarbital-Belladonna Alk] Other (See Comments)     palpitations    Lovastatin Other (See Comments)     myalgia    Metformin And Related Diarrhea    Vioxx Other (See Comments)     Liver problems    Dilaudid [Hydromorphone Hcl] Nausea And Vomiting    Fentanyl Nausea And Vomiting     Severe Nausea and vomitting     Health Maintenance   Topic Date Due    A1C test (Diabetic or Prediabetic)  03/27/2020    Diabetic foot exam  06/16/2021    TSH testing  06/16/2021    Diabetic retinal exam  08/31/2021    Colorectal Cancer Screen  06/26/2022    Breast cancer screen  10/05/2022    Diabetic microalbuminuria test  11/20/2022    Lipid screen  11/20/2022    Potassium monitoring  11/20/2022    Creatinine monitoring  11/20/2022    Depression Screen  12/20/2022    Annual Wellness Visit (AWV)  12/21/2022    DTaP/Tdap/Td vaccine (2 - Td or Tdap) 04/25/2029    DEXA (modify frequency per FRAX score)  Completed    Flu vaccine  Completed    Shingles Vaccine  Completed    Pneumococcal 65+ years Vaccine  Completed    COVID-19 Vaccine  Completed    Hepatitis C screen  Completed    Hepatitis A vaccine  Aged Out    Hib vaccine  Aged Out    Meningococcal (ACWY) vaccine  Aged Out       Objective:  /88 (Site: Left Upper Arm, Position: Sitting, Cuff Size: Medium Adult)   Pulse 72   Temp 97.1 °F (36.2 °C) (Temporal)   Resp 18   Ht 5' 3\" (1.6 m)   Wt 199 lb 6.4 oz (90.4 kg)   SpO2 97%   BMI 35.32 kg/m²   Physical Exam  Vitals reviewed. Constitutional:       General: She is not in acute distress. Appearance: She is not ill-appearing. Neurological:      Mental Status: She is alert. Seems frustrated by her ongoing pain issues    Right hand: No erythema. Healed Surgical scars present. No drainage. Decreased wrist range of motion in flexion and extension. Ulnar deviation present    Left hand: Normal range of motion of the hand and wrist.  No ulnar deviation    Some swelling across the MCP joints of both hands    Impression/Plan:  1. Bilateral hand pain  Bilateral hand pain. Chronic. Uncontrolled. Status post surgery x2 on the right wrist and hand by Dr. Jose Chavez. Has not improved much with OT. Will obtain lupus and rheumatological labs as listed below. Recommend follow-up with Dr. Jose Chavez and then possibly going to another hand surgeon for second opinion. X-rays not indicated today. Recommend Tylenol and continue ibuprofen. Discussed trying Cymbalta which she declines. - Sedimentation Rate; Future  - C-Reactive Protein; Future  - KRISTEL Screen With Reflex; Future  - Rheumatoid Factor; Future  - Cyclic Citrul Peptide Antibody, IgG; Future  - Anti-DNA Antibody, Double-Stranded; Future      They voiced understanding. All questions answered. They agreed with treatment plan. See patient instructions for any educational materials that may have been given. Discussed use, benefit, and side effects of prescribed medications. Reviewed health maintenance. (Please note that portions of this note may have been completed with a voice recognition program.  Efforts were made to edit the dictation but occasionally words are mis-transcribed.)    Return if symptoms worsen or fail to improve.       Electronically signed by Sabino Randolph MD on 2/16/2022 at 10:55 AM

## 2022-02-17 ENCOUNTER — HOSPITAL ENCOUNTER (OUTPATIENT)
Dept: OCCUPATIONAL THERAPY | Age: 72
Setting detail: THERAPIES SERIES
Discharge: HOME | End: 2022-02-17
Payer: MEDICARE

## 2022-02-17 LAB
C-REACTIVE PROTEIN: < 0.3 MG/DL (ref 0–1)
RHEUMATOID FACTOR: < 10 IU/ML (ref 0–13)
SEDIMENTATION RATE, ERYTHROCYTE: 5 MM/HR (ref 0–20)

## 2022-02-17 PROCEDURE — 97110 THERAPEUTIC EXERCISES: CPT

## 2022-02-17 PROCEDURE — 97022 WHIRLPOOL THERAPY: CPT

## 2022-02-17 NOTE — PROGRESS NOTES
** PLEASE SIGN, DATE AND TIME CERTIFICATION BELOW AND RETURN TO [facility] OUTPATIENT REHABILITATION (FAX #: [phone]). ATTEST/CO-SIGN IF ACCESSING VIA [employer]. THANK YOU.**    I certify that I have examined the patient below and determined that Physical Medicine and Rehabilitation service is necessary and that I approve the established plan of care for up to 90 days or as specifically noted. Attestation, signature or co-signature of physician indicates approval of certification requirements.    ________________________ ____________ __________  Physician Signature   Date   Time         [address]  [] EVALUATION  [] DAILY NOTE (LAND) [] DAILY NOTE (AQUATIC ) [x] PROGRESS NOTE [] DISCHARGE NOTE    [] [address] [facility]   [x] [address]    [] [address] [facility]   [] Mylene Levin    Date: 2022  Patient Name:  Hansel Cfofman  : 1950  MRN: [id number]  CSN: [id number]    Referring Practitioner Citlaly Vazquez MD   Diagnosis No admission diagnoses are documented for this encounter. Treatment Diagnosis Right CMC arthritis, right thumb pain   Date of Evaluation 10/25/21      Functional Outcome Measure Used UEFS   Functional Outcome Score 41/80 (10/25/21)       Insurance: Primary: Payor: Hafsa Joshi /  /  / ,   Secondary: [facility]   Authorization Information: No ionto, no hot/cold packs   Visit # 27,  PN 22   Visits Allowed: unlimited   Recertification Date:    Physician Follow-Up: No follow up scheduled with physician   Physician Orders: ROM right hand and to full then prog strength    Pertinent History:+ Patient had surgery on right thumb on 21 for CMC arthritis. Patient had OT in 2021. It was recommended for patient to contact physician due to pain in right thumb. Patient returned to physician and was told that a tendon had ruptured.  Patient then had surgery for tendon repair on 9/7/21. Patient was in soft cast for 2 weeks and then was in custom thumb splint fabricated by OT at [facility] until last week. Was told to remove splint at last MD appointment and OT was ordered at that time. Comorbidities include HTN, DM, and arthritis that could influence rehab process. SUBJECTIVE:    States that she saw her family physician yesterday and received orders for blood work for RA and lupus. Patient states that she is awaiting the lab results prior to scheduling a follow up with ortho. Patient verbalizes frustration regarding her progress and her continued weakness/pain in right hand. Patient relates that she wears the splint as much as possible, but there are many activities that she is not able to complete while wearing it. Patient relates that she has difficulty with driving, opening doors, opening cans, opening bottles, using right hand to wash herself, washing her neck with her right hand, using computer mouse with right hand, and gripping household items such as sweeper, electric knife. Relates that she also has difficulty with cutting meat, cutting vegetables, holding onto items tightly with right UE, and difficulty tying her shoes. Patient also relates that she has difficulty using her right thumb to put pressure on right ring finger when doing finger sticks for blood sugar checks. Also relates difficulty using remote control with right hand and zipping coat.     OBJECTIVE:  TREATMENT   Precautions: HTN, DM,    Pain:  2/10 pain at time of therapy;   LOCATION: right thumb and wrist    X in shaded column indicates Activity Completed Today   Modalities Parameters/  Location  Notes/Comments   Fluidotherapy to right hand x 15 minutes  x                Manual Therapy Time/  Technique  Notes/Comments   STM to right thumb and dorsal wrist      IASTM to volar right forearm    Tightness present in volar forearm         Exercises   Sets/  Sec Reps  Notes/Comments   Active right thumb circumduction 1 10 each direction     PROM to right thumb for all motions   x    Active right wrist radial deviation 2 5  5 second hold   Active right thumb radial abduction 1 10  Improved motion present this date - put thumb on piece of paper to reduce drag on table    Active right wrist radial deviation while holding thumb in radial abducted position 1 10     Coordination maze with right UE 1 5     Prayer stretch for wrist extension 1 5  10 second hold   Right thumb radial abduction isometrics 1 10  Patient was able to tolerate increased amount of resistance this date   medium theraputty - finger/thumb extension 1 5  donut of theraputty to work on thumb extension/radial abduction   Active right wrist circumduction 1 10  Difficult for patient to move wrist in radial deviation during circumduction   Resistive clothespins with right hand  2 cycles  Pinched all resistances, but had more difficulty this date   ergogripper with 2 green, 1 blue, and 1 red band with right UE 1 10   Increased difficulty this date   Wrist exerciser with 1# with right UE doing the activity 1 10     Medium theraputty - taffy pulls   2 minutes     Medium theraputty - knob turn tool with right UE 1 10     velcro board with right hand - large cylinder with key attachment 1 10 cycles  More difficulty bringing cylinder back toward her   Red therabar - bending bar with forearms supinated and forearms pronated 1 10 each direction     Green therabar - bending bar with forearms supinated 1 10  Was able to complete 10 reps if allowed to use shoulder abduction to assist in completion   Green therabar - bending bar with forearms pronated 1 10  Activity stopped due to left shoulder pain   Activities Time    Notes/Comments   Rock tape applied to right thumb for support and pain management, edema management   Did not reapply today per patient request.    Education on joint protection to avoid ulnar drift of right hand/digits   Opening jars/bottles; stirring with spoon/wisk Discussed adapted fingernail clippers and trying a different kind of computer mouse           RIGHT UPPER EXTREMITY  HAND RANGE OF MOTION    AROM PROM COMMENTS   Thump MP 30     Thumb IP 35     Thumb Opposition Can touch all fingertips with increased effort and if she holds fingers against firm surface (such as table)         RIGHT UPPER EXTREMITY  RANGE OF MOTION    AROM PROM COMMENTS   Forearm Supination 70        Wrist Flexion 45     Wrist Extension 55     Wrist Radial Deviation 3       HAND STRENGTH AND COORDINATION     Right Left    Strength Settin 15# 22#   Pinch Strength       Lateral Pinch 4# 9#    3 Point Pinch 3# 6#       Specific Interventions Next Treatment: fluidotherapy; AROM, PROM right wrist and thumb, strengthening    Activity/Treatment Tolerance:  [x]  Patient tolerated treatment well  []  Patient limited by fatigue  []  Patient limited by pain   []  Patient limited by other medical complications  []  Other:     Assessment: Patient is demonstrating very minimal progress toward goals. Patient did demonstrate slight improvement in radial deviation of right wrist, but this is accomplished thru much effort. Patient continues to have difficulty with very basic activities at home such as kitchen tasks, using scissors, tying shoes, opening doors, opening jars, holding onto steering wheel, and using computer mouse. Patient could benefit from further diagnostic testing and further skilled therapy services to address ROM, strength, and mostly functional use of right UE to allow patient to complete her normal daily activities with less difficulty. GOALS:  Patient Goal: To have full use of my hand. Have strength, Be able to  and grab with my right hand. No pain    Short Term Goals:  Time Frame: 4 weeks  1. 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 GOAL MET - CONTINUE GOAL  2.    Increase active right wrist flexion and extension to 65 degrees, and 80 degrees supination to increase ability to use right hand in kitchen tasks. GOAL NOT MET - CONTINUE GOAL  3. Increase right thumb MP flexion to 55 and IP to 50 to increase her ability to use buttons and tie shoes. GOAL NOT MET - SEE ABOVE FOR MEASUREMENTS - CONTINUE GOAL  4. Report pain going no higher than 2/10 in right thumb and wrist at any point to assist in improving sleep routine. GOAL NOT MET - CONTINUE GOAL  5. Increase right wrist radial deviation to 5 to increase her ability to wash table top with right hand. GOAL NOT MET - CONTINUE GOAL    Long Term Goals:  Time Frame: 6 weeks  1. Increase right  strength to 25#, right lateral pinch to 9#, and 3 point pinch to 5# to increase her ability to complete her normal daily activities. GOAL NOT MET - CONTINUE GOAL  2. Report being able to complete ironing activities with right hand with no increase in pain. GOAL NOT MET - CONTINUE GOAL  3. Be able to open jars with use of needed adaptive equipment with no increase in pain in right wrist and hand. GOAL NOT MET - CONTINUE GOAL  4. Be able to  small items from table top (such as a pill) using right hand without difficulty. GOAL NOT MET - CONTINUE GOAL    Patient Education:   [x]  HEP/Education Completed:  Goal status  []  No new Education completed  [x]  Reviewed Prior HEP      [x]  Patient verbalized and/or demonstrated understanding of education provided. []  Patient unable to verbalize and/or demonstrate understanding of education provided. Will continue education. [x]  Barriers to learning: none    PLAN:      []  Plan of care initiated. Plan to see patient 2 times per week for 8 weeks to address the treatment planned outlined above.   []  Continue with current plan of care  [x]  Modify plan of care as follows: See patient 1 x week x 6 weeks from 2/17/22   []  Hold pending physician visit  []  Discharge    Time In 1430   Time Out 1515   Timed Code Minutes: 30 min   Total Treatment Time: 45 min       Carson Stein OTR/L #[id number]

## 2022-02-18 LAB — CYCLIC CITRULLINATED PEPTIDE ANTIBODY IGG: 2.3 U/ML (ref 0–7)

## 2022-02-19 LAB
ANA SCREEN: NORMAL
DSDNA ANTIBODY: NORMAL

## 2022-03-01 ENCOUNTER — HOSPITAL ENCOUNTER (OUTPATIENT)
Dept: OCCUPATIONAL THERAPY | Age: 72
Setting detail: THERAPIES SERIES
Discharge: HOME | End: 2022-03-01
Payer: MEDICARE

## 2022-03-01 PROCEDURE — 97110 THERAPEUTIC EXERCISES: CPT

## 2022-03-01 PROCEDURE — 97022 WHIRLPOOL THERAPY: CPT

## 2022-03-01 NOTE — PROGRESS NOTES
[address]  [] EVALUATION  [x] DAILY NOTE (LAND) [] DAILY NOTE (AQUATIC ) [] PROGRESS NOTE [] DISCHARGE NOTE    [] [facility]   [x] [address]    [] [facility]   [] Elaine Conte    Date: 3/1/2022  Patient Name:  Jen García  : 1950  MRN: [id number]  CSN: [id number]    Referring Practitioner Brea Guadalupe MD   Diagnosis Unilateral primary osteoarthritis of first carpometacarpal joint, right hand [M18.11]; CRPS Type 1 of right UE   Treatment Diagnosis Right CMC arthritis, right thumb pain   Date of Evaluation 10/25/21      Functional Outcome Measure Used UEFS   Functional Outcome Score 41/80 (10/25/21)       Insurance: Primary: Payor: Luisa Livers /  /  / ,   Secondary: [facility]   Authorization Information: No ionto, no hot/cold packs   Visit # 27,  PN 22   Visits Allowed: unlimited   Recertification Date:    Physician Follow-Up: 22   Physician Orders: ROM right hand and to full then prog strength ; script of  for scrub brush exercises, axial loading, desensitizing 3 x week x 6 weeks   Pertinent History:+ Patient had surgery on right thumb on 21 for CMC arthritis. Patient had OT in 2021. It was recommended for patient to contact physician due to pain in right thumb. Patient returned to physician and was told that a tendon had ruptured. Patient then had surgery for tendon repair on 21. Patient was in soft cast for 2 weeks and then was in custom thumb splint fabricated by OT at [facility] until last week. Was told to remove splint at last MD appointment and OT was ordered at that time. Comorbidities include HTN, DM, and arthritis that could influence rehab process. SUBJECTIVE:    Patient saw physician yesterday and was given the diagnosis of CRPS type 1 and is being referred to rheumatology at [facility] for further testing.     OBJECTIVE:  TREATMENT Precautions: HTN, DM,    Pain:  2/10 pain at time of therapy;   LOCATION: right thumb and wrist    X in shaded column indicates Activity Completed Today   Modalities Parameters/  Location  Notes/Comments   Fluidotherapy to right hand x 15 minutes  x                Manual Therapy Time/  Technique  Notes/Comments   STM to right thumb and dorsal wrist      IASTM to volar right forearm    Tightness present in volar forearm         Exercises   Sets/  Sec Reps  Notes/Comments   Weight bearing onto poof ball with right UE 1 5 x 30 second hold   Active right thumb circumduction 1 10 each direction     PROM to right thumb for all motions   x    Active right wrist radial deviation 2 5  5 second hold   Active right thumb radial abduction 1 10  Improved motion present this date - put thumb on piece of paper to reduce drag on table    Active right wrist radial deviation while holding thumb in radial abducted position 1 10     Coordination maze with right UE 1 5     Prayer stretch for wrist extension 1 5  10 second hold   Right thumb radial abduction isometrics 1 10  Patient was able to tolerate increased amount of resistance this date   medium theraputty - finger/thumb extension 1 5  donut of theraputty to work on thumb extension/radial abduction   Active right wrist circumduction 1 10  Difficult for patient to move wrist in radial deviation during circumduction   Resistive clothespins with right hand  2 cycles  Pinched all resistances, but had more difficulty this date   ergogripper with 2 green, 1 blue, and 1 red band with right UE 1 10   Increased difficulty this date   Wrist exerciser with 1# with right UE doing the activity 1 10     Medium theraputty - taffy pulls   2 minutes     Medium theraputty - knob turn tool with right UE 1 10     velcro board with right hand - large cylinder with key attachment 1 10 cycles  More difficulty bringing cylinder back toward her   Red therabar - bending bar with forearms supinated and forearms pronated 1 10 each direction     Green therabar - bending bar with forearms supinated 1 10  Was able to complete 10 reps if allowed to use shoulder abduction to assist in completion   Green therabar - bending bar with forearms pronated 1 10  Activity stopped due to left shoulder pain   Activities Time    Notes/Comments   Various desensitization techniques to right hand and forearm  x Brush, velcro, netting, fabric   Education on joint protection to avoid ulnar drift of right hand/digits   Opening jars/bottles; stirring with spoon/wisk   Discussed adapted fingernail clippers and trying a different kind of computer mouse           Specific Interventions Next Treatment: fluidotherapy; AROM, PROM right wrist and thumb, strengthening    Activity/Treatment Tolerance:  [x]  Patient tolerated treatment well  []  Patient limited by fatigue  []  Patient limited by pain   []  Patient limited by other medical complications  []  Other:     Assessment: Extended time spent this date on explanation of diagnosis and reasoning for desensitization techniques. Patient verbalized understanding and asked appropriate questions. Skilled therapy services are required for further education, desensitization, and axial loading of right UE. GOALS:  Patient Goal: To have full use of my hand. Have strength, Be able to  and grab with my right hand. No pain    Short Term Goals:  Time Frame: 4 weeks  1. 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 2.   Increase active right wrist flexion and extension to 65 degrees, and 80 degrees supination to increase ability to use right hand in kitchen tasks. 3. Increase right thumb MP flexion to 55 and IP to 50 to increase her ability to use buttons and tie shoes. 4. Report pain going no higher than 2/10 in right thumb and wrist at any point to assist in improving sleep routine.    5.  Increase right wrist radial deviation to 5 to increase her ability to wash table top with right hand. Long Term Goals:  Time Frame: 6 weeks  1. Increase right  strength to 25#, right lateral pinch to 9#, and 3 point pinch to 5# to increase her ability to complete her normal daily activities. 2. Report being able to complete ironing activities with right hand with no increase in pain. 3. Be able to open jars with use of needed adaptive equipment with no increase in pain in right wrist and hand. 4. Be able to  small items from table top (such as a pill) using right hand without difficulty. Patient Education:   [x]  HEP/Education Completed:  Explanation of diagnosis; brushing techniques, weightbearing onto right UE  []  No new Education completed  [x]  Reviewed Prior HEP      [x]  Patient verbalized and/or demonstrated understanding of education provided. []  Patient unable to verbalize and/or demonstrate understanding of education provided. Will continue education. [x]  Barriers to learning: none    PLAN:      []  Plan of care initiated. Plan to see patient 2 times per week for 8 weeks to address the treatment planned outlined above.   [x]  Continue with current plan of care  []  Modify plan of care as follows: See patient 1 x week x 6 weeks from 2/17/22   []  Hold pending physician visit  []  Discharge    Time In 1300   Time Out 1350   Timed Code Minutes: 35 min   Total Treatment Time: 50 min       ARMANDO Morrison/ROMMEL #[id number]

## 2022-03-04 ENCOUNTER — HOSPITAL ENCOUNTER (OUTPATIENT)
Dept: OCCUPATIONAL THERAPY | Age: 72
Setting detail: THERAPIES SERIES
Discharge: HOME | End: 2022-03-04
Payer: MEDICARE

## 2022-03-04 PROCEDURE — 97110 THERAPEUTIC EXERCISES: CPT

## 2022-03-04 PROCEDURE — 97022 WHIRLPOOL THERAPY: CPT

## 2022-03-04 NOTE — PROGRESS NOTES
[address]  [] EVALUATION  [x] DAILY NOTE (LAND) [] DAILY NOTE (AQUATIC ) [] PROGRESS NOTE [] DISCHARGE NOTE    [] [address] [facility]   [x] [address]    [] [facility]   [] Brooke Naik    Date: 3/4/2022  Patient Name:  Geneva Pittman  : 1950  MRN: [id number]  CSN: [id number]    Referring Practitioner Sha Bowen MD   Diagnosis Unilateral primary osteoarthritis of first carpometacarpal joint, right hand [M18.11]; CRPS Type 1 of right UE   Treatment Diagnosis Right CMC arthritis, right thumb pain   Date of Evaluation 10/25/21      Functional Outcome Measure Used UEFS   Functional Outcome Score 41/80 (10/25/21)       Insurance: Primary: Payor: Elisa Ya /  /  / ,   Secondary: [facility]   Authorization Information: No ionto, no hot/cold packs   Visit # 28,  1/10 for PN,  PN 22   Visits Allowed: unlimited   Recertification Date:    Physician Follow-Up: 22   Physician Orders: ROM right hand and to full then prog strength ; script of  for scrub brush exercises, axial loading, desensitizing 3 x week x 6 weeks   Pertinent History:+ Patient had surgery on right thumb on 21 for CMC arthritis. Patient had OT in 2021. It was recommended for patient to contact physician due to pain in right thumb. Patient returned to physician and was told that a tendon had ruptured. Patient then had surgery for tendon repair on 21. Patient was in soft cast for 2 weeks and then was in custom thumb splint fabricated by OT at [facility] until last week. Was told to remove splint at last MD appointment and OT was ordered at that time. Comorbidities include HTN, DM, and arthritis that could influence rehab process. SUBJECTIVE:    Patient reports that she has been trying to incorporate relaxation techniques.     OBJECTIVE:  TREATMENT   Precautions: HTN, DM,    Pain:  3/10 pain at time of therapy;   LOCATION: right thumb and wrist    X in shaded column indicates Activity Completed Today   Modalities Parameters/  Location  Notes/Comments   Fluidotherapy to right hand x 15 minutes  x                Manual Therapy Time/  Technique  Notes/Comments   STM to right thumb and dorsal wrist      IASTM to volar right forearm    Tightness present in volar forearm         Exercises   Sets/  Sec Reps  Notes/Comments   Weight bearing onto poof ball with right UE 1 5  30 second hold   Table top wash with R UE, and then switching and using R UE as support while left UE washes   x Patient instructed to stop after a 1 point pain increase to allow pain to reduce. Wash A to R, rest break with massage to decrease pain back to 3/10, then resume alphabet. A to Z. Alternating and weightbearing through right UE and writing alphabet with left UE, A to Z. Required brief MHP, pt allowed pain to increase to high, education provided to take frequent rest breaks and return to activity.       Active right thumb circumduction 1 10 each direction     PROM to right thumb for all motions   x    Active right wrist radial deviation 2 5  5 second hold   Active right thumb radial abduction 1 10  Improved motion present this date - put thumb on piece of paper to reduce drag on table    Active right wrist radial deviation while holding thumb in radial abducted position 1 10     Coordination maze with right UE 1 5     Prayer stretch for wrist extension 1 5  10 second hold   Right thumb radial abduction isometrics 1 10  Patient was able to tolerate increased amount of resistance this date   medium theraputty - finger/thumb extension 1 5  donut of theraputty to work on thumb extension/radial abduction   Active right wrist circumduction 1 10  Difficult for patient to move wrist in radial deviation during circumduction   Resistive clothespins with right hand  2 cycles  Pinched all resistances, but had more difficulty this date   rosa m with 2 green, 1 blue, and 1 red band with right UE 1 10   Increased difficulty this date   Wrist exerciser with 1# with right UE doing the activity 1 10     Medium theraputty - taffy pulls   2 minutes     Medium theraputty - knob turn tool with right UE 1 10     velcro board with right hand - large cylinder with key attachment 1 10 cycles  More difficulty bringing cylinder back toward her   Red therabar - bending bar with forearms supinated and forearms pronated 1 10 each direction     Green therabar - bending bar with forearms supinated 1 10  Was able to complete 10 reps if allowed to use shoulder abduction to assist in completion   Green therabar - bending bar with forearms pronated 1 10  Activity stopped due to left shoulder pain   Activities Time    Notes/Comments   Education and return performance of relaxation techniques      Various desensitization techniques to right hand and forearm   Brush, velcro, netting, fabric   Education on joint protection to avoid ulnar drift of right hand/digits   Opening jars/bottles; stirring with spoon/wisk   Discussed adapted fingernail clippers and trying a different kind of computer mouse           Specific Interventions Next Treatment: fluidotherapy; AROM, PROM right wrist and thumb, strengthening    Activity/Treatment Tolerance:  [x]  Patient tolerated treatment well  []  Patient limited by fatigue  []  Patient limited by pain   []  Patient limited by other medical complications  []  Other:     Assessment: Education provided regarding visual imagery relaxation techniques, patient voiced understanding. Patient educated regarding importance of stopping activity before pain increases to un tolerable, more frequent rest breaks, to allow her to return to activity quicker. Voiced understanding, but increased difficulty following this technique in the clinic. GOALS:  Patient Goal: To have full use of my hand. Have strength, Be able to  and grab with my right hand. No pain    Short Term Goals:  Time Frame: 4 weeks  1. 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 2.   Increase active right wrist flexion and extension to 65 degrees, and 80 degrees supination to increase ability to use right hand in kitchen tasks. 3. Increase right thumb MP flexion to 55 and IP to 50 to increase her ability to use buttons and tie shoes. 4. Report pain going no higher than 2/10 in right thumb and wrist at any point to assist in improving sleep routine. 5.  Increase right wrist radial deviation to 5 to increase her ability to wash table top with right hand. Long Term Goals:  Time Frame: 6 weeks  1. Increase right  strength to 25#, right lateral pinch to 9#, and 3 point pinch to 5# to increase her ability to complete her normal daily activities. 2. Report being able to complete ironing activities with right hand with no increase in pain. 3. Be able to open jars with use of needed adaptive equipment with no increase in pain in right wrist and hand. 4. Be able to  small items from table top (such as a pill) using right hand without difficulty. Patient Education:   [x]  HEP/Education Completed:  Explanation of diagnosis; brushing techniques, weightbearing onto right UE, visual imagery relaxation, appropriate work/rest ratios. []  No new Education completed  [x]  Reviewed Prior HEP      [x]  Patient verbalized and/or demonstrated understanding of education provided. []  Patient unable to verbalize and/or demonstrate understanding of education provided. Will continue education. [x]  Barriers to learning: none    PLAN:      []  Plan of care initiated. Plan to see patient 2 times per week for 8 weeks to address the treatment planned outlined above.   [x]  Continue with current plan of care  []  Modify plan of care as follows: See patient 1 x week x 6 weeks from 2/17/22   []  Hold pending physician visit  [] Discharge    Time In 0830   Time Out 0915   Timed Code Minutes: 30 min   Total Treatment Time: 45 min       Sreedhar Keith, OTR/L [id number]

## 2022-03-07 ENCOUNTER — HOSPITAL ENCOUNTER (OUTPATIENT)
Dept: OCCUPATIONAL THERAPY | Age: 72
Setting detail: THERAPIES SERIES
Discharge: HOME | End: 2022-03-07
Payer: MEDICARE

## 2022-03-07 PROCEDURE — 97110 THERAPEUTIC EXERCISES: CPT

## 2022-03-07 PROCEDURE — 97022 WHIRLPOOL THERAPY: CPT

## 2022-03-07 NOTE — PROGRESS NOTES
[address]  [] EVALUATION  [x] DAILY NOTE (LAND) [] DAILY NOTE (AQUATIC ) [] PROGRESS NOTE [] DISCHARGE NOTE    [] [address] [facility]   [x] [address]    [] [facility]   [] Ebony Rodriguez    Date: 3/7/2022  Patient Name:  Vilma Washington  : 1950  MRN: [id number]  CSN: [id number]    Referring Practitioner Chitra Caraballo MD   Diagnosis Unilateral primary osteoarthritis of first carpometacarpal joint, right hand [M18.11]; CRPS Type 1 of right UE   Treatment Diagnosis Right CMC arthritis, right thumb pain   Date of Evaluation 10/25/21      Functional Outcome Measure Used UEFS   Functional Outcome Score 41/80 (10/25/21)       Insurance: Primary: Payor: Lai Rao /  /  / ,   Secondary: [facility]   Authorization Information: No ionto, no hot/cold packs   Visit # 29,  2/10 for PN,  PN 22   Visits Allowed: unlimited   Recertification Date:    Physician Follow-Up: 22   Physician Orders: ROM right hand and to full then prog strength ; script of  for scrub brush exercises, axial loading, desensitizing 3 x week x 6 weeks   Pertinent History:+ Patient had surgery on right thumb on 21 for CMC arthritis. Patient had OT in 2021. It was recommended for patient to contact physician due to pain in right thumb. Patient returned to physician and was told that a tendon had ruptured. Patient then had surgery for tendon repair on 21. Patient was in soft cast for 2 weeks and then was in custom thumb splint fabricated by OT at [facility] until last week. Was told to remove splint at last MD appointment and OT was ordered at that time. Comorbidities include HTN, DM, and arthritis that could influence rehab process. SUBJECTIVE:    Patient reports that she cleaned a lot on Saturday to get ready for a party on . Reports pain as a result of cleaning and baking.   Reports that she was using heat for pain management. Reports that she climbed a ladder to dust, reports that where most of her pain was from. OBJECTIVE:  TREATMENT   Precautions: HTN, DM,    Pain:  2/10 pain at time of therapy right hand;  Reports pain worse in the left   LOCATION: right thumb and wrist    X in shaded column indicates Activity Completed Today   Modalities Parameters/  Location  Notes/Comments   Fluidotherapy to right and left hand x 15 minutes  x                Manual Therapy Time/  Technique  Notes/Comments   STM to right thumb and dorsal wrist      IASTM to volar right forearm    Tightness present in volar forearm         Exercises   Sets/  Sec Reps  Notes/Comments   Weight bearing onto poof ball with right UE 1 5  30 second hold   Table top scrub with dystrophile at 4#   x Two 30 second intervals with push down only  Four  30 second intervals with push down and scrub. Desensitization with towel, gauze between scrubs. Massage and edema massage to decrease pain as needed. Pain 3/10 at end of session.        Active right thumb circumduction 1 10 each direction     PROM to right thumb for all motions   x    Active right wrist radial deviation 2 5  5 second hold   Active right thumb radial abduction 1 10  Improved motion present this date - put thumb on piece of paper to reduce drag on table    Active right wrist radial deviation while holding thumb in radial abducted position 1 10     Coordination maze with right UE 1 5     Prayer stretch for wrist extension 1 5  10 second hold   Right thumb radial abduction isometrics 1 10  Patient was able to tolerate increased amount of resistance this date   medium theraputty - finger/thumb extension 1 5  donut of theraputty to work on thumb extension/radial abduction   Active right wrist circumduction 1 10  Difficult for patient to move wrist in radial deviation during circumduction   Resistive clothespins with right hand  2 cycles  Pinched all resistances, but had more difficulty this date   ergogripper with 2 green, 1 blue, and 1 red band with right UE 1 10   Increased difficulty this date   Wrist exerciser with 1# with right UE doing the activity 1 10     Medium theraputty - taffy pulls   2 minutes     Medium theraputty - knob turn tool with right UE 1 10     velcro board with right hand - large cylinder with key attachment 1 10 cycles  More difficulty bringing cylinder back toward her   Red therabar - bending bar with forearms supinated and forearms pronated 1 10 each direction     Green therabar - bending bar with forearms supinated 1 10  Was able to complete 10 reps if allowed to use shoulder abduction to assist in completion   Green therabar - bending bar with forearms pronated 1 10  Activity stopped due to left shoulder pain   Activities Time    Notes/Comments   Education and return performance of relaxation techniques      Various desensitization techniques to right hand and forearm   Brush, velcro, netting, fabric   Education on joint protection to avoid ulnar drift of right hand/digits   Opening jars/bottles; stirring with spoon/wisk   Discussed adapted fingernail clippers and trying a different kind of computer mouse           Specific Interventions Next Treatment: fluidotherapy; AROM, PROM right wrist and thumb, strengthening    Activity/Treatment Tolerance:  [x]  Patient tolerated treatment well  []  Patient limited by fatigue  []  Patient limited by pain   []  Patient limited by other medical complications  []  Other:     Assessment: Patient is progressing toward goals. GOALS:  Patient Goal: To have full use of my hand. Have strength, Be able to  and grab with my right hand. No pain    Short Term Goals:  Time Frame: 4 weeks  1. 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     2.   Increase active right wrist flexion and extension to 65 degrees, and 80 degrees supination to increase ability to use right hand in kitchen tasks. 3. Increase right thumb MP flexion to 55 and IP to 50 to increase her ability to use buttons and tie shoes. 4. Report pain going no higher than 2/10 in right thumb and wrist at any point to assist in improving sleep routine. 5.  Increase right wrist radial deviation to 5 to increase her ability to wash table top with right hand. Long Term Goals:  Time Frame: 6 weeks  1. Increase right  strength to 25#, right lateral pinch to 9#, and 3 point pinch to 5# to increase her ability to complete her normal daily activities. 2. Report being able to complete ironing activities with right hand with no increase in pain. 3. Be able to open jars with use of needed adaptive equipment with no increase in pain in right wrist and hand. 4. Be able to  small items from table top (such as a pill) using right hand without difficulty. Patient Education:   [x]  HEP/Education Completed:  Explanation of diagnosis; brushing techniques, weightbearing onto right UE, visual imagery relaxation, appropriate work/rest ratios. []  No new Education completed  [x]  Reviewed Prior HEP      [x]  Patient verbalized and/or demonstrated understanding of education provided. []  Patient unable to verbalize and/or demonstrate understanding of education provided. Will continue education. [x]  Barriers to learning: none    PLAN:      []  Plan of care initiated. Plan to see patient 2 times per week for 8 weeks to address the treatment planned outlined above.   [x]  Continue with current plan of care  []  Modify plan of care as follows: See patient 1 x week x 6 weeks from 2/17/22   []  Hold pending physician visit  []  Discharge    Time In 1000   Time Out 1045   Timed Code Minutes: 30 min   Total Treatment Time: 45 min       Sreedhar Taylor, OTR/L [id number]

## 2022-03-10 ENCOUNTER — HOSPITAL ENCOUNTER (OUTPATIENT)
Dept: OCCUPATIONAL THERAPY | Age: 72
Setting detail: THERAPIES SERIES
Discharge: HOME | End: 2022-03-10
Payer: MEDICARE

## 2022-03-10 PROCEDURE — 97022 WHIRLPOOL THERAPY: CPT

## 2022-03-10 PROCEDURE — 97110 THERAPEUTIC EXERCISES: CPT

## 2022-03-10 NOTE — PROGRESS NOTES
[address]  [] EVALUATION  [x] DAILY NOTE (LAND) [] DAILY NOTE (AQUATIC ) [] PROGRESS NOTE [] DISCHARGE NOTE    [] [facility]   [x] [address]    [] [facility][1]   [] Prateek Cony    Date: 3/10/2022  Patient Name:  Shahab Thakkar  : 1950  MRN: [id number]  CSN: [id number]    Referring Practitioner Gerardo Abreu MD   Diagnosis Unilateral primary osteoarthritis of first carpometacarpal joint, right hand [M18.11]; CRPS Type 1 of right UE   Treatment Diagnosis Right CMC arthritis, right thumb pain   Date of Evaluation 10/25/21      Functional Outcome Measure Used UEFS   Functional Outcome Score 41/80 (10/25/21)       Insurance: Primary: Payor: Bharati Bowen /  /  / ,   Secondary: [facility]   Authorization Information: No ionto, no hot/cold packs   Visit # 30,  3/10 for PN,  PN 22   Visits Allowed: unlimited   Recertification Date:    Physician Follow-Up: 22; sees rheumatology at [facility] on    Physician Orders: ROM right hand and to full then prog strength ; script of  for scrub brush exercises, axial loading, desensitizing 3 x week x 6 weeks   Pertinent History:+ Patient had surgery on right thumb on 21 for CMC arthritis. Patient had OT in 2021. It was recommended for patient to contact physician due to pain in right thumb. Patient returned to physician and was told that a tendon had ruptured. Patient then had surgery for tendon repair on 21. Patient was in soft cast for 2 weeks and then was in custom thumb splint fabricated by OT at [facility] until last week. Was told to remove splint at last MD appointment and OT was ordered at that time. Comorbidities include HTN, DM, and arthritis that could influence rehab process.      SUBJECTIVE:    States that she was doing really well today until she was doing some driving and had pain when she was holding onto the steering wheel. States that she tries to practice using scissors every day. States that she is surprised about how much heat and pressure applied to right hand helps the pain.      OBJECTIVE:  TREATMENT   Precautions: HTN, DM,    Pain:  2-3/10 pain at time of therapy right hand;  Reports pain worse in the left   LOCATION: right thumb and wrist    X in shaded column indicates Activity Completed Today   Modalities Parameters/  Location  Notes/Comments   Fluidotherapy to right and left hand x 15 minutes  x                Manual Therapy Time/  Technique  Notes/Comments   STM to right thumb and dorsal wrist      IASTM to volar right forearm    Tightness present in volar forearm         Exercises   Sets/  Sec Reps  Notes/Comments   Weight bearing onto poof ball with right UE 1 5  30 second hold   Table top scrub with dystrophile at 4#  10  15 second hold   Active right thumb circumduction 1 10 each direction     PROM to right thumb for all motions       Active right wrist radial deviation 2 5  5 second hold   Active right thumb radial abduction 1 10  Improved motion present this date - put thumb on piece of paper to reduce drag on table    Rolling poof ball on tabletop using right UE 1 3 minutes x    Coordination maze with right UE 1 5     Rolling paint roller with right hand   x    Prayer stretch for wrist extension 1 5  10 second hold   Right thumb radial abduction isometrics 1 10  Patient was able to tolerate increased amount of resistance this date   medium theraputty - finger/thumb extension 1 5  donut of theraputty to work on thumb extension/radial abduction   Active right wrist circumduction 1 10  Difficult for patient to move wrist in radial deviation during circumduction   Resistive clothespins with right hand  2 cycles  Pinched all resistances, but had more difficulty this date   ergogripper with 2 green, 1 blue, and 1 red band with right UE 1 10   Increased difficulty this date   Wrist exerciser with 1# with right UE doing the activity 1 10     Medium theraputty - pressing down with right hand 1 5 minutes x    Medium theraputty - knob turn tool with right UE 1 10     velcro board with right hand - large cylinder with key attachment 1 10 cycles  More difficulty bringing cylinder back toward her   Red therabar - bending bar with forearms supinated and forearms pronated 1 10 each direction     Green therabar - rolling with right UE 1 2 minutes x    Green therabar - bending bar with forearms pronated 1 10  Activity stopped due to left shoulder pain   Activities Time    Notes/Comments   Education and return performance of relaxation techniques      Various desensitization techniques to right hand and forearm   Brush, velcro, netting, fabric   Education on joint protection to avoid ulnar drift of right hand/digits   Opening jars/bottles; stirring with spoon/wisk   Discussed adapted fingernail clippers and trying a different kind of computer mouse           Specific Interventions Next Treatment: fluidotherapy; AROM, PROM right wrist and thumb, strengthening    Activity/Treatment Tolerance:  [x]  Patient tolerated treatment well  []  Patient limited by fatigue  []  Patient limited by pain   []  Patient limited by other medical complications  []  Other:     Assessment:   Progressing toward goals. Patient relates that she is trying to use her right hand more and that she would like to have more strength in her right hand. GOALS:  Patient Goal: To have full use of my hand. Have strength, Be able to  and grab with my right hand. No pain    Short Term Goals:  Time Frame: 4 weeks  1. 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 2.   Increase active right wrist flexion and extension to 65 degrees, and 80 degrees supination to increase ability to use right hand in kitchen tasks.     3. Increase right thumb MP flexion to 55 and IP to 50 to increase her ability to use buttons and tie shoes. 4. Report pain going no higher than 2/10 in right thumb and wrist at any point to assist in improving sleep routine. 5.  Increase right wrist radial deviation to 5 to increase her ability to wash table top with right hand. Long Term Goals:  Time Frame: 6 weeks  1. Increase right  strength to 25#, right lateral pinch to 9#, and 3 point pinch to 5# to increase her ability to complete her normal daily activities. 2. Report being able to complete ironing activities with right hand with no increase in pain. 3. Be able to open jars with use of needed adaptive equipment with no increase in pain in right wrist and hand. 4. Be able to  small items from table top (such as a pill) using right hand without difficulty. Patient Education:   [x]  HEP/Education Completed: variety of weightbearing activities provided; suggested to trial compression glove on right hand to assist with holding steering wheel    []  No new Education completed  [x]  Reviewed Prior HEP      [x]  Patient verbalized and/or demonstrated understanding of education provided. []  Patient unable to verbalize and/or demonstrate understanding of education provided. Will continue education. [x]  Barriers to learning: none    PLAN:      []  Plan of care initiated. Plan to see patient 2 times per week for 8 weeks to address the treatment planned outlined above.   [x]  Continue with current plan of care  []  Modify plan of care as follows: See patient 1 x week x 6 weeks from 2/17/22   []  Hold pending physician visit  []  Discharge    Time In 1430   Time Out 1515   Timed Code Minutes: 30 min   Total Treatment Time: 45 min       Ijeoma Sánchez OTR/L #[id number]

## 2022-03-14 ENCOUNTER — HOSPITAL ENCOUNTER (OUTPATIENT)
Dept: OCCUPATIONAL THERAPY | Age: 72
Setting detail: THERAPIES SERIES
Discharge: HOME | End: 2022-03-14
Payer: MEDICARE

## 2022-03-14 PROCEDURE — 97022 WHIRLPOOL THERAPY: CPT

## 2022-03-14 PROCEDURE — 97110 THERAPEUTIC EXERCISES: CPT

## 2022-03-14 NOTE — PROGRESS NOTES
[address]  [] EVALUATION  [x] DAILY NOTE (LAND) [] DAILY NOTE (AQUATIC ) [] PROGRESS NOTE [] DISCHARGE NOTE    [] [facility]   [x] [address]    [] [facility]   [] Raquel Gerardo    Date: 3/14/2022  Patient Name:  Madyson Starks  : 1950  MRN: [id number]  CSN: [id number]    Referring Practitioner Flash Wilhelm MD   Diagnosis Unilateral primary osteoarthritis of first carpometacarpal joint, right hand [M18.11]; CRPS Type 1 of right UE   Treatment Diagnosis Right CMC arthritis, right thumb pain   Date of Evaluation 10/25/21      Functional Outcome Measure Used UEFS   Functional Outcome Score 41/80 (10/25/21)       Insurance: Primary: Payor: Michael Colby /  /  / ,   Secondary: [facility]   Authorization Information: No ionto, no hot/cold packs   Visit # 31, 4/10 for PN,  PN 22   Visits Allowed: unlimited   Recertification Date: 14   Physician Follow-Up: 22; sees rheumatology at [facility] on    Physician Orders: ROM right hand and to full then prog strength ; script of  for scrub brush exercises, axial loading, desensitizing 3 x week x 6 weeks   Pertinent History:+ Patient had surgery on right thumb on 21 for CMC arthritis. Patient had OT in 2021. It was recommended for patient to contact physician due to pain in right thumb. Patient returned to physician and was told that a tendon had ruptured. Patient then had surgery for tendon repair on 21. Patient was in soft cast for 2 weeks and then was in custom thumb splint fabricated by OT at [facility] until last week. Was told to remove splint at last MD appointment and OT was ordered at that time. Comorbidities include HTN, DM, and arthritis that could influence rehab process. SUBJECTIVE:  Patient states that her hand is \"ok\". States that this morning she was able to turn the doorknob with her right hand. OBJECTIVE:  TREATMENT   Precautions: HTN, DM,    Pain:  2-3/10 pain at time of therapy right hand;     LOCATION: right thumb and wrist    X in shaded column indicates Activity Completed Today   Modalities Parameters/  Location  Notes/Comments   Fluidotherapy to right and left hand x 15 minutes  x                Manual Therapy Time/  Technique  Notes/Comments   STM to right thumb and dorsal wrist      IASTM to volar right forearm    Tightness present in volar forearm         Exercises   Sets/  Sec Reps  Notes/Comments   Weight bearing onto poof ball with right UE 1 5  30 second hold   Weight bearing with dystrophile at 4#  10 x 15 second hold   Active right thumb circumduction 1 10 each direction     PROM to right thumb for all motions       Active right wrist radial deviation 2 5  5 second hold   Active right thumb radial abduction 1 10  Improved motion present this date - put thumb on piece of paper to reduce drag on table    Rolling poof ball on tabletop using right UE 1 3 minutes x    Coordination maze with right UE 1 5     Rolling paint roller with right hand       Prayer stretch for wrist extension 1 5  10 second hold   Right thumb radial abduction isometrics 1 10  Patient was able to tolerate increased amount of resistance this date   medium theraputty - finger/thumb extension 1 5  donut of theraputty to work on thumb extension/radial abduction   Medium theraputty - gripping with right UE 1 10 x    Resistive clothespins with right hand  2 cycles  Pinched all resistances, but had more difficulty this date   ergogripper with 2 green, 1 blue, and 1 red band with right UE 1 10   Increased difficulty this date   Wrist exerciser with 1# with right UE doing the activity 1 10     Medium theraputty - pressing down with right hand 1 5 minutes x    Medium theraputty - knob turn tool with right UE 1 10     velcro board with right hand - large cylinder with key attachment 1 10 cycles  More difficulty bringing cylinder back toward her   green therabar - bending bar with forearms supinated and forearms pronated 1 10 each direction x    Green therabar - rolling with right UE 1 2 minutes x           Activities Time    Notes/Comments   Education and return performance of relaxation techniques      Various desensitization techniques to right hand and forearm   Brush, velcro, netting, fabric   Education on joint protection to avoid ulnar drift of right hand/digits   Opening jars/bottles; stirring with spoon/wisk   Discussed adapted fingernail clippers and trying a different kind of computer mouse           Specific Interventions Next Treatment: fluidotherapy; AROM, PROM right wrist and thumb, strengthening    Activity/Treatment Tolerance:  [x]  Patient tolerated treatment well  []  Patient limited by fatigue  []  Patient limited by pain   []  Patient limited by other medical complications  []  Other:     Assessment:   Patient is progressing toward goals slowly. GOALS:  Patient Goal: To have full use of my hand. Have strength, Be able to  and grab with my right hand. No pain    Short Term Goals:  Time Frame: 4 weeks  1. 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 2.   Increase active right wrist flexion and extension to 65 degrees, and 80 degrees supination to increase ability to use right hand in kitchen tasks. 3. Increase right thumb MP flexion to 55 and IP to 50 to increase her ability to use buttons and tie shoes. 4. Report pain going no higher than 2/10 in right thumb and wrist at any point to assist in improving sleep routine. 5.  Increase right wrist radial deviation to 5 to increase her ability to wash table top with right hand. Long Term Goals:  Time Frame: 6 weeks  1. Increase right  strength to 25#, right lateral pinch to 9#, and 3 point pinch to 5# to increase her ability to complete her normal daily activities.    2. Report being able to complete ironing activities with right hand with no increase in pain. 3. Be able to open jars with use of needed adaptive equipment with no increase in pain in right wrist and hand. 4. Be able to  small items from table top (such as a pill) using right hand without difficulty. Patient Education:   []  HEP/Education Completed:     []  No new Education completed  [x]  Reviewed Prior HEP      [x]  Patient verbalized and/or demonstrated understanding of education provided. []  Patient unable to verbalize and/or demonstrate understanding of education provided. Will continue education. [x]  Barriers to learning: none    PLAN:      []  Plan of care initiated. Plan to see patient 2 times per week for 8 weeks to address the treatment planned outlined above.   [x]  Continue with current plan of care  []  Modify plan of care as follows: See patient 1 x week x 6 weeks from 2/17/22   []  Hold pending physician visit  []  Discharge    Time In 0915   Time Out 0955   Timed Code Minutes: 25 min   Total Treatment Time: 36 min       Nathaly Carrillo OTR/L #[id number]

## 2022-03-17 ENCOUNTER — HOSPITAL ENCOUNTER (OUTPATIENT)
Dept: OCCUPATIONAL THERAPY | Age: 72
Setting detail: THERAPIES SERIES
Discharge: HOME | End: 2022-03-17
Payer: MEDICARE

## 2022-03-17 PROCEDURE — 97022 WHIRLPOOL THERAPY: CPT

## 2022-03-17 PROCEDURE — 97110 THERAPEUTIC EXERCISES: CPT

## 2022-03-17 NOTE — PROGRESS NOTES
[address]  [] EVALUATION  [x] DAILY NOTE (LAND) [] DAILY NOTE (AQUATIC ) [] PROGRESS NOTE [] DISCHARGE NOTE    [] [facility]   [x] [address]    [] [facility]   [] Susan Babb    Date: 3/17/2022  Patient Name:  Diane Espinal  : 1950  MRN: [id number]  CSN: [id number]    Referring Practitioner Alaina Sharma MD   Diagnosis Unilateral primary osteoarthritis of first carpometacarpal joint, right hand [M18.11]; CRPS Type 1 of right UE   Treatment Diagnosis Right CMC arthritis, right thumb pain   Date of Evaluation 10/25/21      Functional Outcome Measure Used UEFS   Functional Outcome Score 41/80 (10/25/21)       Insurance: Primary: Payor: Jacquie Baxter /  /  / ,   Secondary: [facility]   Authorization Information: No ionto, no hot/cold packs   Visit # 28, 5/10 for PN,  PN 22   Visits Allowed: unlimited   Recertification Date: 3/70/25   Physician Follow-Up: 22; sees rheumatology at [facility] on    Physician Orders: ROM right hand and to full then prog strength ; script of  for scrub brush exercises, axial loading, desensitizing 3 x week x 6 weeks   Pertinent History: Patient had surgery on right thumb on 21 for CMC arthritis. Patient had OT in 2021. It was recommended for patient to contact physician due to pain in right thumb. Patient returned to physician and was told that a tendon had ruptured. Patient then had surgery for tendon repair on 21. Patient was in soft cast for 2 weeks and then was in custom thumb splint fabricated by OT at [facility] until last week. Was told to remove splint at last MD appointment and OT was ordered at that time. Comorbidities include HTN, DM, and arthritis that could influence rehab process. SUBJECTIVE:  States that she has been wrapping right hand with heating pad and applying pressure to her right hand.  States that she thinks that Medium theraputty - knob turn tool with right UE 1 10     velcro board with right hand - large cylinder with key attachment 1 10 cycles  More difficulty bringing cylinder back toward her   green therabar - bending bar with forearms supinated and forearms pronated 1 10 each direction x    Green therabar - rolling with right UE 1 2 minutes x           Activities Time    Notes/Comments   Education and return performance of relaxation techniques      Various desensitization techniques to right hand and forearm   Brush, velcro, netting, fabric   Education on joint protection to avoid ulnar drift of right hand/digits   Opening jars/bottles; stirring with spoon/wisk   Discussed adapted fingernail clippers and trying a different kind of computer mouse           Specific Interventions Next Treatment: fluidotherapy; AROM, PROM right wrist and thumb, strengthening    Activity/Treatment Tolerance:  [x]  Patient tolerated treatment well  []  Patient limited by fatigue  []  Patient limited by pain   []  Patient limited by other medical complications  []  Other:     Assessment:   Patient is making slow progress toward goals. GOALS:  Patient Goal: To have full use of my hand. Have strength, Be able to  and grab with my right hand. No pain    Short Term Goals:  Time Frame: 4 weeks  1. 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 2.   Increase active right wrist flexion and extension to 65 degrees, and 80 degrees supination to increase ability to use right hand in kitchen tasks. 3. Increase right thumb MP flexion to 55 and IP to 50 to increase her ability to use buttons and tie shoes. 4. Report pain going no higher than 2/10 in right thumb and wrist at any point to assist in improving sleep routine. 5.  Increase right wrist radial deviation to 5 to increase her ability to wash table top with right hand. Long Term Goals:  Time Frame: 6 weeks  1. Increase right  strength to 25#, right lateral pinch to 9#, and 3 point pinch to 5# to increase her ability to complete her normal daily activities. 2. Report being able to complete ironing activities with right hand with no increase in pain. 3. Be able to open jars with use of needed adaptive equipment with no increase in pain in right wrist and hand. 4. Be able to  small items from table top (such as a pill) using right hand without difficulty. Patient Education:   []  HEP/Education Completed:     []  No new Education completed  [x]  Reviewed Prior HEP      [x]  Patient verbalized and/or demonstrated understanding of education provided. []  Patient unable to verbalize and/or demonstrate understanding of education provided. Will continue education. [x]  Barriers to learning: none    PLAN:      []  Plan of care initiated. Plan to see patient 2 times per week for 8 weeks to address the treatment planned outlined above.   [x]  Continue with current plan of care  []  Modify plan of care as follows: See patient 1 x week x 6 weeks from 2/17/22   []  Hold pending physician visit  []  Discharge    Time In 0845   Time Out 0930   Timed Code Minutes: 30 min   Total Treatment Time: 39 min       Ijeoma Sánchez OTR/L #[id number]

## 2022-03-22 ENCOUNTER — HOSPITAL ENCOUNTER (OUTPATIENT)
Dept: OCCUPATIONAL THERAPY | Age: 72
Setting detail: THERAPIES SERIES
Discharge: HOME | End: 2022-03-22
Payer: MEDICARE

## 2022-03-22 PROCEDURE — 97110 THERAPEUTIC EXERCISES: CPT

## 2022-03-22 PROCEDURE — 97022 WHIRLPOOL THERAPY: CPT

## 2022-03-22 NOTE — PROGRESS NOTES
[address]  [] EVALUATION  [x] DAILY NOTE (LAND) [] DAILY NOTE (AQUATIC ) [] PROGRESS NOTE [] DISCHARGE NOTE    [] [facility]   [x] [address]    [] [facility]   [] Steph Barahonabus    Date: 3/22/2022  Patient Name:  Tor Floyd  : 1950  MRN: [id number]  CSN: [id number]    Referring Practitioner Dominguez Alvarado MD   Diagnosis Unilateral primary osteoarthritis of first carpometacarpal joint, right hand [M18.11]; CRPS Type 1 of right UE   Treatment Diagnosis Right CMC arthritis, right thumb pain   Date of Evaluation 10/25/21      Functional Outcome Measure Used UEFS   Functional Outcome Score 41/80 (10/25/21)       Insurance: Primary: Payor: Lorena Valderrama /  /  / ,   Secondary: [facility]   Authorization Information: No ionto, no hot/cold packs   Visit # 33, 6/10 for PN,  PN 22   Visits Allowed: unlimited   Recertification Date:    Physician Follow-Up: 22; sees rheumatology at [facility] on    Physician Orders: ROM right hand and to full then prog strength ; script of  for scrub brush exercises, axial loading, desensitizing 3 x week x 6 weeks   Pertinent History: Patient had surgery on right thumb on 21 for CMC arthritis. Patient had OT in 2021. It was recommended for patient to contact physician due to pain in right thumb. Patient returned to physician and was told that a tendon had ruptured. Patient then had surgery for tendon repair on 21. Patient was in soft cast for 2 weeks and then was in custom thumb splint fabricated by OT at [facility] until last week. Was told to remove splint at last MD appointment and OT was ordered at that time. Comorbidities include HTN, DM, and arthritis that could influence rehab process. SUBJECTIVE:  States that she was able to pull the rope on the leaf blower this weekend using her right hand.  States that her right hand feels \"tight\" today.       OBJECTIVE:  TREATMENT   Precautions: HTN, DM,    Pain:  2/10 pain at time of therapy right hand;     LOCATION: right thumb and wrist    X in shaded column indicates Activity Completed Today   Modalities Parameters/  Location  Notes/Comments   Fluidotherapy to right and left hand x 15 minutes  x                Manual Therapy Time/  Technique  Notes/Comments   STM to right thumb and dorsal wrist      STM to volar right forearm   x Tightness present in volar forearm         Exercises   Sets/  Sec Reps  Notes/Comments   Weight bearing onto poof ball with right UE 1 5  30 second hold   Weight bearing with dystrophile at 4# and then at 6#  1 10 each x 10 second hold   Active right thumb circumduction 1 10 each direction     PROM to right thumb and wrist for all motions   x    Active right wrist radial deviation 2 5  5 second hold   Active right thumb radial abduction 1 10  Improved motion present this date - put thumb on piece of paper to reduce drag on table    Rolling poof ball on tabletop using right UE 1 3 minutes     Coordination maze with right UE 1 5     Rolling paint roller with right hand       Prayer stretch for wrist extension 1 5  10 second hold   Right thumb radial abduction isometrics 1 10  Patient was able to tolerate increased amount of resistance this date   medium theraputty - finger/thumb extension 1 5  donut of theraputty to work on thumb extension/radial abduction   Medium theraputty - gripping with right UE 1 15 x    Medium theraputty - weightbearing through right UE on table 1 15     ergogripper with 2 green, 1 blue, and 1 red band with right UE 1 10   Increased difficulty this date   Wrist exerciser with 1# with right UE doing the activity 1 10     Medium theraputty - pressing down with right hand 1 5 minutes x    Medium theraputty - knob turn tool with right UE 1 10     velcro board with right hand - large cylinder with key attachment 1 10 cycles  More difficulty bringing cylinder back toward her   green therabar - bending bar with forearms supinated and forearms pronated 1 10 each direction     Green therabar - rolling with right UE 1 2 minutes            Activities Time    Notes/Comments   Education and return performance of relaxation techniques      Various desensitization techniques to right hand and forearm   Brush, velcro, netting, fabric   Education on joint protection to avoid ulnar drift of right hand/digits   Opening jars/bottles; stirring with spoon/wisk   Discussed adapted fingernail clippers and trying a different kind of computer mouse           Specific Interventions Next Treatment: fluidotherapy; AROM, PROM right wrist and thumb, strengthening    Activity/Treatment Tolerance:  [x]  Patient tolerated treatment well  []  Patient limited by fatigue  []  Patient limited by pain   []  Patient limited by other medical complications  []  Other:     Assessment:   Progressing slowly toward goals. Patient relates that she is able to use her right hand in more normal activities with less pain in right thumb. GOALS:  Patient Goal: To have full use of my hand. Have strength, Be able to  and grab with my right hand. No pain    Short Term Goals:  Time Frame: 4 weeks  1. 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 2.   Increase active right wrist flexion and extension to 65 degrees, and 80 degrees supination to increase ability to use right hand in kitchen tasks. 3. Increase right thumb MP flexion to 55 and IP to 50 to increase her ability to use buttons and tie shoes. 4. Report pain going no higher than 2/10 in right thumb and wrist at any point to assist in improving sleep routine. 5.  Increase right wrist radial deviation to 5 to increase her ability to wash table top with right hand. Long Term Goals:  Time Frame: 6 weeks  1.    Increase right  strength to 25#, right lateral pinch to 9#, and 3 point pinch to 5# to increase her ability to complete her normal daily activities. 2. Report being able to complete ironing activities with right hand with no increase in pain. 3. Be able to open jars with use of needed adaptive equipment with no increase in pain in right wrist and hand. 4. Be able to  small items from table top (such as a pill) using right hand without difficulty. Patient Education:   []  HEP/Education Completed:     []  No new Education completed  [x]  Reviewed Prior HEP      [x]  Patient verbalized and/or demonstrated understanding of education provided. []  Patient unable to verbalize and/or demonstrate understanding of education provided. Will continue education. [x]  Barriers to learning: none    PLAN:      []  Plan of care initiated. Plan to see patient 2 times per week for 8 weeks to address the treatment planned outlined above.   [x]  Continue with current plan of care  []  Modify plan of care as follows: See patient 1 x week x 6 weeks from 2/17/22   []  Hold pending physician visit  []  Discharge    Time In 0915   Time Out 1000   Timed Code Minutes: 30 min   Total Treatment Time: 39 min       Lisbeth Osborn OTR/L #[id number]

## 2022-03-24 ENCOUNTER — HOSPITAL ENCOUNTER (OUTPATIENT)
Dept: OCCUPATIONAL THERAPY | Age: 72
Setting detail: THERAPIES SERIES
Discharge: HOME | End: 2022-03-24
Payer: MEDICARE

## 2022-03-24 PROCEDURE — 97022 WHIRLPOOL THERAPY: CPT

## 2022-03-24 PROCEDURE — 97110 THERAPEUTIC EXERCISES: CPT

## 2022-03-24 NOTE — PROGRESS NOTES
[address]  [] EVALUATION  [x] DAILY NOTE (LAND) [] DAILY NOTE (AQUATIC ) [] PROGRESS NOTE [] DISCHARGE NOTE    [] [facility]   [x] [address]    [] [facility]   [] Clara Sanchez    Date: 3/24/2022  Patient Name:  Kiran Roberts  : 1950  MRN: [id number]  CSN: [id number]    Referring Practitioner Yaw Thompson MD   Diagnosis Unilateral primary osteoarthritis of first carpometacarpal joint, right hand [M18.11]; CRPS Type 1 of right UE   Treatment Diagnosis Right CMC arthritis, right thumb pain   Date of Evaluation 10/25/21      Functional Outcome Measure Used UEFS   Functional Outcome Score 41/80 (10/25/21)       Insurance: Primary: Payor: Bo Sparrow /  /  / ,   Secondary: [facility]   Authorization Information: No ionto, no hot/cold packs   Visit # 34, 7/10 for PN,  PN 22   Visits Allowed: unlimited   Recertification Date: 3/48/38   Physician Follow-Up: 22; sees rheumatology at [address] [place] [address] on    Physician Orders: ROM right hand and to full then prog strength ; script of  for scrub brush exercises, axial loading, desensitizing 3 x week x 6 weeks   Pertinent History: Patient had surgery on right thumb on 21 for CMC arthritis. Patient had OT in 2021. It was recommended for patient to contact physician due to pain in right thumb. Patient returned to physician and was told that a tendon had ruptured. Patient then had surgery for tendon repair on 21. Patient was in soft cast for 2 weeks and then was in custom thumb splint fabricated by OT at [facility] until last week. Was told to remove splint at last MD appointment and OT was ordered at that time. Comorbidities include HTN, DM, and arthritis that could influence rehab process. SUBJECTIVE:  States that her thumb was aching this morning.        OBJECTIVE:  TREATMENT   Precautions: HTN, DM,    Pain:  3/10 pain at time of therapy right hand;     LOCATION: right thumb and wrist    X in shaded column indicates Activity Completed Today   Modalities Parameters/  Location  Notes/Comments   Fluidotherapy to right and left hand x 15 minutes  x                Manual Therapy Time/  Technique  Notes/Comments   STM to right thumb and dorsal wrist  x    STM to volar right forearm    Tightness present in volar forearm         Exercises   Sets/  Sec Reps  Notes/Comments   Weight bearing onto poof ball with right UE 1 5  30 second hold   Weight bearing with dystrophile at  6#  1 10 each x 15 second hold   Active right thumb circumduction 1 10 each direction     PROM to right thumb and wrist for all motions   x    Active right wrist radial deviation 2 5  5 second hold   Active right thumb radial abduction 1 10  Improved motion present this date - put thumb on piece of paper to reduce drag on table    Rolling poof ball on tabletop using right UE 1 3 minutes     Coordination maze with right UE 1 5     Weightbearing through poof ball using right UE 1 10 x 15 second hold   Prayer stretch for wrist extension 1 5  10 second hold   Right thumb radial abduction isometrics 1 10  Patient was able to tolerate increased amount of resistance this date   medium theraputty - finger/thumb extension 1 5  donut of theraputty to work on thumb extension/radial abduction   Medium theraputty - gripping with right UE 1 3 minutes x    Medium theraputty - weightbearing through right UE on table 1 15     ergogripper with 1 green, 1 blue, and 1 red band with right UE 1 20 x Increased difficulty this date   Wrist exerciser with 1# with right UE doing the activity 1 10     Medium theraputty - pressing down with right hand 1 5 minutes     Medium theraputty - knob turn tool with right UE 1 10     velcro board with right hand - large cylinder with key attachment 1 10 cycles  More difficulty bringing cylinder back toward her   red therabar - bending bar with forearms supinated and forearms pronated 1 20 each direction x    Green therabar - rolling with right UE 1 2 minutes            Activities Time    Notes/Comments   Education and return performance of relaxation techniques      Various desensitization techniques to right hand and forearm   Brush, velcro, netting, fabric   Education on joint protection to avoid ulnar drift of right hand/digits   Opening jars/bottles; stirring with spoon/wisk   Discussed adapted fingernail clippers and trying a different kind of computer mouse           Specific Interventions Next Treatment: fluidotherapy; AROM, PROM right wrist and thumb, strengthening    Activity/Treatment Tolerance:  [x]  Patient tolerated treatment well  []  Patient limited by fatigue  []  Patient limited by pain   []  Patient limited by other medical complications  []  Other:     Assessment:  Progressing toward goals slowly. GOALS:  Patient Goal: To have full use of my hand. Have strength, Be able to  and grab with my right hand. No pain    Short Term Goals:  Time Frame: 4 weeks  1. 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 2.   Increase active right wrist flexion and extension to 65 degrees, and 80 degrees supination to increase ability to use right hand in kitchen tasks. 3. Increase right thumb MP flexion to 55 and IP to 50 to increase her ability to use buttons and tie shoes. 4. Report pain going no higher than 2/10 in right thumb and wrist at any point to assist in improving sleep routine. 5.  Increase right wrist radial deviation to 5 to increase her ability to wash table top with right hand. Long Term Goals:  Time Frame: 6 weeks  1. Increase right  strength to 25#, right lateral pinch to 9#, and 3 point pinch to 5# to increase her ability to complete her normal daily activities. 2. Report being able to complete ironing activities with right hand with no increase in pain.     3. Be able to open jars with use of needed adaptive equipment with no increase in pain in right wrist and hand. 4. Be able to  small items from table top (such as a pill) using right hand without difficulty. Patient Education:   []  HEP/Education Completed:     []  No new Education completed  [x]  Reviewed Prior HEP      [x]  Patient verbalized and/or demonstrated understanding of education provided. []  Patient unable to verbalize and/or demonstrate understanding of education provided. Will continue education. [x]  Barriers to learning: none    PLAN:      []  Plan of care initiated. Plan to see patient 2 times per week for 8 weeks to address the treatment planned outlined above.   [x]  Continue with current plan of care  []  Modify plan of care as follows: See patient 1 x week x 6 weeks from 2/17/22   []  Hold pending physician visit  []  Discharge    Time In 0915   Time Out 1000   Timed Code Minutes: 30 min   Total Treatment Time: 39 min       Ijeoma Sánchez OTR/L #[id number]

## 2022-03-29 ENCOUNTER — HOSPITAL ENCOUNTER (OUTPATIENT)
Dept: OCCUPATIONAL THERAPY | Age: 72
Setting detail: THERAPIES SERIES
Discharge: HOME | End: 2022-03-29
Payer: MEDICARE

## 2022-03-29 PROCEDURE — 97022 WHIRLPOOL THERAPY: CPT

## 2022-03-29 PROCEDURE — 97110 THERAPEUTIC EXERCISES: CPT

## 2022-03-29 NOTE — PROGRESS NOTES
[address]  [] EVALUATION  [x] DAILY NOTE (LAND) [] DAILY NOTE (AQUATIC ) [] PROGRESS NOTE [] DISCHARGE NOTE    [] [facility]   [x] [address]    [] [facility]   [] Elly Stokes    Date: 3/29/2022  Patient Name:  Jairo Delacruz  : 1950  MRN: [id number]  CSN: [id number]    Referring Practitioner Sonam Pack MD   Diagnosis Unilateral primary osteoarthritis of first carpometacarpal joint, right hand [M18.11]; CRPS Type 1 of right UE   Treatment Diagnosis Right CMC arthritis, right thumb pain   Date of Evaluation 10/25/21      Functional Outcome Measure Used UEFS   Functional Outcome Score 41/80 (10/25/21)       Insurance: Primary: Payor: Duyen Mehta /  /  / ,   Secondary: [facility]   Authorization Information: No ionto, no hot/cold packs   Visit # 35, 8/10 for PN,  PN 22   Visits Allowed: unlimited   Recertification Date: 32   Physician Follow-Up: 22; sees rheumatology at [facility] on    Physician Orders: ROM right hand and to full then prog strength ; script of  for scrub brush exercises, axial loading, desensitizing 3 x week x 6 weeks   Pertinent History: Patient had surgery on right thumb on 21 for CMC arthritis. Patient had OT in 2021. It was recommended for patient to contact physician due to pain in right thumb. Patient returned to physician and was told that a tendon had ruptured. Patient then had surgery for tendon repair on 21. Patient was in soft cast for 2 weeks and then was in custom thumb splint fabricated by OT at [facility] until last week. Was told to remove splint at last MD appointment and OT was ordered at that time. Comorbidities include HTN, DM, and arthritis that could influence rehab process. SUBJECTIVE:  Patient states that she worked outside in the cold yesterday and had a high level of pain in her hands due to the cold weather. States that she will be seeing the rheumatologist on Friday at [facility].       OBJECTIVE:  TREATMENT   Precautions: HTN, DM,    Pain:  3/10 pain at time of therapy right hand;     LOCATION: right thumb and wrist    X in shaded column indicates Activity Completed Today   Modalities Parameters/  Location  Notes/Comments   Fluidotherapy to right and left hand x 15 minutes  x                Manual Therapy Time/  Technique  Notes/Comments   STM to right thumb and dorsal wrist  x    STM to volar right forearm    Tightness present in volar forearm         Exercises   Sets/  Sec Reps  Notes/Comments   Weight bearing onto poof ball with right UE 1 5  30 second hold   Weight bearing with dystrophile at  6#  1 10 each x 15 second hold   Active right thumb circumduction 1 10 each direction     PROM to right thumb and wrist for all motions   x    Active right wrist radial deviation 2 5  5 second hold   Active right thumb radial abduction 1 10  Improved motion present this date - put thumb on piece of paper to reduce drag on table    Rolling poof ball on tabletop using right UE 1 3 minutes     Coordination maze with right UE 1 5     Weightbearing through poof ball using right UE 1 10 x 15 second hold   Prayer stretch for wrist extension 1 5  10 second hold   Right thumb radial abduction isometrics 1 10  Patient was able to tolerate increased amount of resistance this date   medium theraputty - finger/thumb extension 1 5  donut of theraputty to work on thumb extension/radial abduction   Medium theraputty - gripping with right UE 1 3 minutes x Instructed patient to gently  it versus gripping intensely   Medium theraputty - weightbearing through right UE on table 1 15     ergogripper with 1 green, 1 blue, and 1 red band with right UE 1 20 x Patient was able to do 17 without much difficulty, but last 3 reps were difficult to complete   Wrist exerciser with 1# with right UE doing the activity 1 10     Medium theraputty - pressing down with right hand 1 5 minutes     Medium theraputty - knob turn tool with right UE 1 10     velcro board with right hand - large cylinder with key attachment 1 10 cycles  More difficulty bringing cylinder back toward her   red therabar - bending bar with forearms supinated and forearms pronated 1 20 each direction x    Green therabar - rolling with right UE 1 2 minutes            Activities Time    Notes/Comments   Education and return performance of relaxation techniques      Various desensitization techniques to right hand and forearm   Brush, velcro, netting, fabric   Education on joint protection to avoid ulnar drift of right hand/digits   Opening jars/bottles; stirring with spoon/wisk   Discussed adapted fingernail clippers and trying a different kind of computer mouse           Specific Interventions Next Treatment: fluidotherapy; AROM, PROM right wrist and thumb, strengthening    Activity/Treatment Tolerance:  [x]  Patient tolerated treatment well  []  Patient limited by fatigue  []  Patient limited by pain   []  Patient limited by other medical complications  []  Other:     Assessment:   Patient is progressing toward goals slowly. Patient does continue to have complaints of pain in right thumb and wrist - these pain complaints tend to fluctuate. GOALS:  Patient Goal: To have full use of my hand. Have strength, Be able to  and grab with my right hand. No pain    Short Term Goals:  Time Frame: 4 weeks  1. 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 2.   Increase active right wrist flexion and extension to 65 degrees, and 80 degrees supination to increase ability to use right hand in kitchen tasks. 3. Increase right thumb MP flexion to 55 and IP to 50 to increase her ability to use buttons and tie shoes. 4. Report pain going no higher than 2/10 in right thumb and wrist at any point to assist in improving sleep routine.    5.  Increase right

## 2022-03-31 ENCOUNTER — HOSPITAL ENCOUNTER (OUTPATIENT)
Dept: OCCUPATIONAL THERAPY | Age: 72
Setting detail: THERAPIES SERIES
Discharge: HOME | End: 2022-03-31
Payer: MEDICARE

## 2022-03-31 PROCEDURE — 97110 THERAPEUTIC EXERCISES: CPT

## 2022-03-31 PROCEDURE — 97022 WHIRLPOOL THERAPY: CPT

## 2022-03-31 NOTE — PROGRESS NOTES
** PLEASE SIGN, DATE AND TIME CERTIFICATION BELOW AND RETURN TO [facility] OUTPATIENT REHABILITATION (FAX #: [phone]). ATTEST/CO-SIGN IF ACCESSING VIA [employer]. THANK YOU.**    I certify that I have examined the patient below and determined that Physical Medicine and Rehabilitation service is necessary and that I approve the established plan of care for up to 90 days or as specifically noted. Attestation, signature or co-signature of physician indicates approval of certification requirements.    ________________________ ____________ __________  Physician Signature   Date   Time         [address] THERAPY  [] EVALUATION  [] DAILY NOTE (LAND) [] DAILY NOTE (AQUATIC ) [x] PROGRESS NOTE [] DISCHARGE NOTE    [] [address] [facility]   [x] [address]    [] [address] [facility]   [] Jarocho Dance    Date: 3/31/2022  Patient Name:  Severa Hitchcock  : 1950  MRN: [id number]  CSN: [id number]    Referring Practitioner Levi Nguyen MD   Diagnosis Unilateral primary osteoarthritis of first carpometacarpal joint, right hand [M18.11]; CRPS Type 1 of right UE   Treatment Diagnosis Right CMC arthritis, right thumb pain   Date of Evaluation 10/25/21      Functional Outcome Measure Used UEFS   Functional Outcome Score 41/80 (10/25/21)       Insurance: Primary: Payor: Olya Pabon /  /  / ,   Secondary: [facility]   Authorization Information: No ionto, no hot/cold packs   Visit # 36, PN completed on 3/31/22   Visits Allowed: unlimited   Recertification Date:    Physician Follow-Up: 22; sees rheumatology at [facility] on    Physician Orders: ROM right hand and to full then prog strength ; script of  for scrub brush exercises, axial loading, desensitizing 3 x week x 6 weeks   Pertinent History: Patient had surgery on right thumb on 21 for CMC arthritis. Patient had OT in 2021.  It was recommended for patient to contact physician due to pain in right thumb. Patient returned to physician and was told that a tendon had ruptured. Patient then had surgery for tendon repair on 9/7/21. Patient was in soft cast for 2 weeks and then was in custom thumb splint fabricated by OT at [facility] until last week. Was told to remove splint at last MD appointment and OT was ordered at that time. Comorbidities include HTN, DM, and arthritis that could influence rehab process. SUBJECTIVE:  Patient states that she is seeing the rheumatologist tomorrow at [facility]. States that she feels as though she is a lot better than she was 6 weeks ago. States that she believes that she is stronger. States that she is able to open water bottles now and is able to [facility] a little better in the kitchen. \" States that she continues to have difficulty with getting a good  on items using right hand, lifting heavy items with right hand, and opening door knobs and handles. Patient states that she continues to have difficulty operating the computer mouse.       OBJECTIVE:  TREATMENT   Precautions: HTN, DM,    Pain:  2/10 pain at time of therapy right hand;     LOCATION: right thumb and wrist    X in shaded column indicates Activity Completed Today   Modalities Parameters/  Location  Notes/Comments   Fluidotherapy to right and left hand x 15 minutes  x                Manual Therapy Time/  Technique  Notes/Comments   STM to right thumb and dorsal wrist      STM to volar right forearm    Tightness present in volar forearm         Exercises   Sets/  Sec Reps  Notes/Comments   Weight bearing onto poof ball with right UE 1 5  30 second hold   Weight bearing with dystrophile at  8#  1 10 x 15 second hold   Active right thumb circumduction 1 10 each direction     PROM to right thumb and wrist for all motions       Active right wrist radial deviation 2 5  5 second hold   Active right thumb radial abduction 1 10  Improved motion present this date - put thumb on piece of paper to reduce drag on table    Rolling poof ball on tabletop using right UE 1 3 minutes     Coordination maze with right UE 1 5     Weightbearing through poof ball using right UE 1 10  15 second hold   Prayer stretch for wrist extension 1 5  10 second hold   Right thumb radial abduction isometrics 1 10  Patient was able to tolerate increased amount of resistance this date   medium theraputty - finger/thumb extension 1 5  donut of theraputty to work on thumb extension/radial abduction   Medium theraputty - gripping with right UE 1 5 minutes x Instructed patient to gently  it versus gripping intensely   Medium theraputty - weightbearing through right UE on table 1 15     ergogripper with 1 green, 1 blue, and 1 red band with right UE 2 10 x    Wrist exerciser with 1# with right UE doing the activity 1 10     Medium theraputty - pressing down with right hand 1 5 minutes     Medium theraputty - knob turn tool with right UE 1 10     velcro board with right hand - large cylinder with key attachment 1 10 cycles  More difficulty bringing cylinder back toward her   red therabar - bending bar with forearms supinated and forearms pronated 1 20 each direction     Green therabar - rolling with right UE 1 2 minutes            Activities Time    Notes/Comments   Education and return performance of relaxation techniques      Various desensitization techniques to right hand and forearm   Brush, velcro, netting, fabric   Education on joint protection to avoid ulnar drift of right hand/digits   Opening jars/bottles; stirring with spoon/wisk   Discussed adapted fingernail clippers and trying a different kind of computer mouse           Right    Strength Settin 25#   Pinch Strength      Lateral Pinch 4#    3 Point Pinch 4#       RIGHT UPPER EXTREMITY  HAND RANGE OF MOTION    AROM COMMENTS   Thump MP 50    Thumb IP 55      RIGHT UPPER EXTREMITY  RANGE OF MOTION    AROM COMMENTS      Forearm Supination 68       Wrist Flexion 45 Wrist Extension 60    Wrist Radial Deviation 1        Specific Interventions Next Treatment: fluidotherapy; AROM, PROM right wrist and thumb, strengthening    Activity/Treatment Tolerance:  [x]  Patient tolerated treatment well  []  Patient limited by fatigue  []  Patient limited by pain   []  Patient limited by other medical complications  []  Other:     Assessment:   Patient is making progress toward strength and functional goals. Patient does continue to have pain complaints in right thumb and wrist, but her pain reports have decreased over the past month since more weight bearing activities have been initiated. Patient has reported slight improvement in her normal daily use of right UE. Further skilled therapy services are required to address further strengthening and education on adaptive techniques for improved use of right hand/thumb/wrist.    GOALS:  Patient Goal: To have full use of my hand. Have strength, Be able to  and grab with my right hand. No pain    Short Term Goals:  Time Frame: NO STG FORMULATED AT THIS TIME - SEE LTG  1. 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 GOAL MET - SEE BELOW  2. Increase active right wrist flexion and extension to 65 degrees, and 80 degrees supination to increase ability to use right hand in kitchen tasks. GOAL NOT MET -SEE ABOVE FOR DETAILS - GOAL DISCONTINUED - SEE BELOW  3. Increase right thumb MP flexion to 55 and IP to 50 to increase her ability to use buttons and tie shoes. GOAL MET FOR IP FLEXION, GOAL NOT MET FOR MP FLEXION -SEE ABOVE FOR DETAILS - GOAL DISCONTINUED - SEE BELOW  4. Report pain going no higher than 2/10 in right thumb and wrist at any point to assist in improving sleep routine. GOAL NOT MET, BUT PAIN LEVEL HAS IMPROVED - GOAL DISCONTINUED - SEE BELOW  5. Increase right wrist radial deviation to 5 to increase her ability to wash table top with right hand.  GOAL NOT MET - SEE

## 2022-04-03 ASSESSMENT — ENCOUNTER SYMPTOMS
NAUSEA: 0
RECTAL PAIN: 0
VOMITING: 0
CHEST TIGHTNESS: 0
WHEEZING: 0
CONSTIPATION: 0
COUGH: 0
ABDOMINAL PAIN: 0
SORE THROAT: 0
BACK PAIN: 0
SHORTNESS OF BREATH: 0
EYE DISCHARGE: 0
DIARRHEA: 0
FACIAL SWELLING: 0
TROUBLE SWALLOWING: 0
BLOOD IN STOOL: 0
COLOR CHANGE: 0
ABDOMINAL DISTENTION: 0

## 2022-04-04 ENCOUNTER — OFFICE VISIT (OUTPATIENT)
Dept: ONCOLOGY | Age: 72
End: 2022-04-04
Payer: MEDICARE

## 2022-04-04 ENCOUNTER — HOSPITAL ENCOUNTER (OUTPATIENT)
Dept: INFUSION THERAPY | Age: 72
Discharge: HOME | End: 2022-04-04
Payer: MEDICARE

## 2022-04-04 VITALS
OXYGEN SATURATION: 93 % | DIASTOLIC BLOOD PRESSURE: 67 MMHG | HEART RATE: 76 BPM | RESPIRATION RATE: 16 BRPM | TEMPERATURE: 98.4 F | SYSTOLIC BLOOD PRESSURE: 140 MMHG

## 2022-04-04 VITALS
TEMPERATURE: 98.4 F | WEIGHT: 204.8 LBS | SYSTOLIC BLOOD PRESSURE: 140 MMHG | DIASTOLIC BLOOD PRESSURE: 67 MMHG | BODY MASS INDEX: 36.29 KG/M2 | HEIGHT: 63 IN | OXYGEN SATURATION: 93 % | HEART RATE: 76 BPM | RESPIRATION RATE: 16 BRPM

## 2022-04-04 DIAGNOSIS — Z17.0 MALIGNANT NEOPLASM OF LOWER-OUTER QUADRANT OF LEFT BREAST OF FEMALE, ESTROGEN RECEPTOR POSITIVE (HCC): ICD-10-CM

## 2022-04-04 DIAGNOSIS — E66.01 SEVERE OBESITY (BMI 35.0-39.9) WITH COMORBIDITY (HCC): ICD-10-CM

## 2022-04-04 DIAGNOSIS — Z85.3 ENCOUNTER FOR FOLLOW-UP SURVEILLANCE OF BREAST CANCER: ICD-10-CM

## 2022-04-04 DIAGNOSIS — C50.512 MALIGNANT NEOPLASM OF LOWER-OUTER QUADRANT OF LEFT BREAST OF FEMALE, ESTROGEN RECEPTOR POSITIVE (HCC): Primary | ICD-10-CM

## 2022-04-04 DIAGNOSIS — Z85.3 HISTORY OF RIGHT BREAST CANCER: ICD-10-CM

## 2022-04-04 DIAGNOSIS — C50.512 MALIGNANT NEOPLASM OF LOWER-OUTER QUADRANT OF LEFT BREAST OF FEMALE, ESTROGEN RECEPTOR POSITIVE (HCC): ICD-10-CM

## 2022-04-04 DIAGNOSIS — Z17.0 MALIGNANT NEOPLASM OF LOWER-OUTER QUADRANT OF LEFT BREAST OF FEMALE, ESTROGEN RECEPTOR POSITIVE (HCC): Primary | ICD-10-CM

## 2022-04-04 DIAGNOSIS — Z08 ENCOUNTER FOR FOLLOW-UP SURVEILLANCE OF BREAST CANCER: ICD-10-CM

## 2022-04-04 DIAGNOSIS — M25.551 ACUTE RIGHT HIP PAIN: ICD-10-CM

## 2022-04-04 DIAGNOSIS — M81.8 OTHER OSTEOPOROSIS WITHOUT CURRENT PATHOLOGICAL FRACTURE: ICD-10-CM

## 2022-04-04 DIAGNOSIS — Z79.811 PROPHYLACTIC USE OF ANASTROZOLE (ARIMIDEX): ICD-10-CM

## 2022-04-04 LAB
BUN, WHOLE BLOOD: 22 MG/DL (ref 8–26)
CHLORIDE, WHOLE BLOOD: 106 MEQ/L (ref 98–109)
CREATININE, WHOLE BLOOD: 0.8 MG/DL (ref 0.5–1.2)
GFR, ESTIMATED: 75 ML/MIN/1.73M2
GLUCOSE, WHOLE BLOOD: 116 MG/DL (ref 70–108)
IONIZED CALCIUM, WHOLE BLOOD: 1.16 MMOL/L (ref 1.12–1.32)
POTASSIUM, WHOLE BLOOD: 4 MEQ/L (ref 3.5–4.9)
SODIUM, WHOLE BLOOD: 144 MEQ/L (ref 138–146)
TOTAL CO2, WHOLE BLOOD: 30 MEQ/L (ref 23–33)

## 2022-04-04 PROCEDURE — G8417 CALC BMI ABV UP PARAM F/U: HCPCS | Performed by: INTERNAL MEDICINE

## 2022-04-04 PROCEDURE — 1090F PRES/ABSN URINE INCON ASSESS: CPT | Performed by: INTERNAL MEDICINE

## 2022-04-04 PROCEDURE — 1123F ACP DISCUSS/DSCN MKR DOCD: CPT | Performed by: INTERNAL MEDICINE

## 2022-04-04 PROCEDURE — 99214 OFFICE O/P EST MOD 30 MIN: CPT | Performed by: INTERNAL MEDICINE

## 2022-04-04 PROCEDURE — 99211 OFF/OP EST MAY X REQ PHY/QHP: CPT

## 2022-04-04 PROCEDURE — 4040F PNEUMOC VAC/ADMIN/RCVD: CPT | Performed by: INTERNAL MEDICINE

## 2022-04-04 PROCEDURE — 80047 BASIC METABLC PNL IONIZED CA: CPT

## 2022-04-04 PROCEDURE — 1036F TOBACCO NON-USER: CPT | Performed by: INTERNAL MEDICINE

## 2022-04-04 PROCEDURE — G8427 DOCREV CUR MEDS BY ELIG CLIN: HCPCS | Performed by: INTERNAL MEDICINE

## 2022-04-04 PROCEDURE — G8399 PT W/DXA RESULTS DOCUMENT: HCPCS | Performed by: INTERNAL MEDICINE

## 2022-04-04 PROCEDURE — 3017F COLORECTAL CA SCREEN DOC REV: CPT | Performed by: INTERNAL MEDICINE

## 2022-04-04 RX ORDER — MAGNESIUM OXIDE 400 MG/1
400 TABLET ORAL DAILY
COMMUNITY

## 2022-04-04 NOTE — PATIENT INSTRUCTIONS
1.  MRI right hip in 1 week  2. No Reclast today.   It would be given in October 2022.  3.  RTC to see nurse practitioner in 3 months

## 2022-04-04 NOTE — PROGRESS NOTES
This is a 68-year-old patient who was diagnosed with left breast cancer in September 2018. [facility]  [address] [facility] [address], [address] [facility] [address]  [address] [facility]  Dept: [phone]  Dept Fax: [phone]: [phone]    Visit Date:4/4/2022     Farnk Kaur is a 74 y.o. female who presents today for:   Chief Complaint   Patient presents with    Follow-up     Malignant neoplasm of lower-outer quadrant of left breast of female, estrogen receptor positive         HPI:   This is a 68-year-old patient diagnosed with left breast cancer in September 2018. She underwent routine screening mammogram on September 7, 2018 that revealed clustered calcifications in the left breast.  She had a needle biopsy on September 19, 2018 showing invasive ductal carcinoma, measuring 7 mm in size, grade 1, % positive, MS 70% positive and HER-2/oleg negative. The patient met with the surgeon Dr. Efe Emerson and after discussing her surgical treatment options she decided to proceed with left lumpectomy and sentinel lymph node biopsy. She had her surgery on October 10, 2018 at the lumpectomy specimen did not reveal evidence of residual malignancy. All 3 sentinel lymph nodes were cancer free. Subsequently the patient met with a radiation oncologist who based on and evidence from CALGB study didn't recommend postlumpectomy radiation treatment. Subsequently the patient met with Dr. Carlos Kurtz who placed her on adjuvant hormonal therapy with Arimidex, which she started in October 2018. The patient underwent a bone density study in November 2018 that revealed osteoporosis. The lowest T score was -2.8. The patient initiated treatment with Zometa on November 7, 2018. Since Dr. Lc Vance is retiring the patient established care with new medical oncologist at [address] [address]. Interim history on 10/01/2021:  Patient presents to the medical oncology clinic for 6 months follow-up visit.  Patient reports that she tolerates Arimidex well. She reports worsening pain  in her right hip over the last 3 weeks. Denies having  hot flashes, no difficulty with concentration. Denies having any recent hospitalizations.   Remaining 12 point review of systems is negative    HPI   Past Medical History:   Diagnosis Date    Arthritis     Asthma     Brain cyst     benign    Breast cancer ([address] 09/18/2018    Left breast, 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    Cancer ([address] 09/1918    Left Breast    Chronic sinus complaints     Diverticulosis of colon     DKA (diabetic ketoacidoses) 05/2018    Elevated TSH     Hypertension     Osteoarthritis     Polyneuropathy     PONV (postoperative nausea and vomiting)     Spinal headache     Type 2 diabetes mellitus ([address] 1990's      Past Surgical History:   Procedure Laterality Date    BLADDER SUSPENSION      times 2    BREAST LUMPECTOMY Left 10/10/2018    CARPAL TUNNEL RELEASE Bilateral 02/2020    CHOLECYSTECTOMY      DILATION AND CURETTAGE OF UTERUS      x2    EYE SURGERY      HAND SURGERY Right     thumb-revision of suspensionplasty    HERNIA REPAIR  06/18/2019    HYSTERECTOMY  1995    JOINT REPLACEMENT Right 2007    Rt total knee    KNEE ARTHROSCOPY  1967, 2001    RUDY STEROTACTIC LOC BREAST BIOPSY LEFT Left 09/19/2018    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    MANDIBLE FRACTURE SURGERY      OTHER SURGICAL HISTORY Right 12/30/2015    Excision of Sebaceous Cyst Right Ear     OVARY REMOVAL  1995    MN OFFICE/OUTPT VISIT,PROCEDURE ONLY Left 10/10/2018    LEFT BREAST LUMPECTOMY WITH SENTINEL LYMPH NODE BIOPSY [facility]      TONSILLECTOMY AND ADENOIDECTOMY  age 4   [de-identified] VENTRAL HERNIA REPAIR N/A 6/14/2019    COMBINED LAPAROSCOPIC AND OPEN VENTRAL HERNIA REPAIR WITH MESH performed by Antonella Lopez MD at [address] [place] [address] Right     right-excision Family History   Problem Relation Age of Onset    Diabetes Mother     Heart Disease Mother     Lupus Mother     Heart Disease Father     Cancer Father         Squamous cell - face & scalp    Diabetes Maternal Grandmother     Heart Disease Maternal Grandfather     Cancer Paternal Aunt          multiple myeloma    Cancer Paternal Uncle         Lymphatic Leukemia    Breast Cancer Maternal Cousin         unsure on age, had breast cancer twice unsure if bilateral or if reoccurence    Heart Disease Maternal Uncle     Lupus Maternal Uncle     Other Paternal Grandfather         Brain aneurysm      Social History     Tobacco Use    Smoking status: Never Smoker    Smokeless tobacco: Never Used   Substance Use Topics    Alcohol use:  Yes     Alcohol/week: 0.0 standard drinks     Comment: Socially      Current Outpatient Medications   Medication Sig Dispense Refill    magnesium oxide (MAG-OX) 400 MG tablet Take 400 mg by mouth daily      SUPER B COMPLEX/C PO Take 1 tablet by mouth daily      pantoprazole (PROTONIX) 40 MG tablet       benazepril (LOTENSIN) 20 MG tablet Take 1 tablet by mouth 2 times daily 180 tablet 1    atorvastatin (LIPITOR) 20 MG tablet Take 1 tablet by mouth nightly 90 tablet 1    ibuprofen (ADVIL;MOTRIN) 600 MG tablet Take 1 tablet by mouth every 6 hours as needed for Pain 360 tablet 1    anastrozole (ARIMIDEX) 1 MG tablet Take 1 tablet by mouth daily 90 tablet 3    empagliflozin (JARDIANCE) 25 MG tablet Take 25 mg by mouth daily      insulin lispro, 1 Unit Dial, (HUMALOG KWIKPEN) 100 UNIT/ML SOPN Inject into the skin 3 times daily Indications: inject 0-12 unitsinto the skin 3 times daily-per sliding scale      furosemide (LASIX) 20 MG tablet       Handicap Placard MISC by Does not apply route Dx:  Shortness of breath on exertion; duration 3 years: exp date: 8/7/2022 1 each 0    EASY TOUCH PEN NEEDLES 31G X 6 MM MISC       LANTUS SOLOSTAR 100 UNIT/ML injection pen Inject 39 Units into the skin nightly       Calcium Carb-Cholecalciferol 600-500 MG-UNIT CAPS Take by mouth daily      aspirin 81 MG chewable tablet Take 81 mg by mouth daily      therapeutic multivitamin-minerals (THERAGRAN-M) tablet Take 1 tablet by mouth daily. No current facility-administered medications for this visit. Allergies   Allergen Reactions    Amoxicillin     Bactrim [Sulfamethoxazole-Trimethoprim] Itching    Donnatal [Phenobarbital-Belladonna Alk] Other (See Comments)     palpitations    Lovastatin Other (See Comments)     myalgia    Metformin And Related Diarrhea    Vioxx Other (See Comments)     Liver problems    Dilaudid [Hydromorphone Hcl] Nausea And Vomiting    Fentanyl Nausea And Vomiting     Severe Nausea and vomitting      Health Maintenance   Topic Date Due    Diabetic foot exam  06/16/2021    TSH testing  06/16/2021    Diabetic retinal exam  08/31/2021    Colorectal Cancer Screen  06/26/2022    Breast cancer screen  10/05/2022    A1C test (Diabetic or Prediabetic)  11/10/2022    Diabetic microalbuminuria test  11/20/2022    Lipid screen  11/20/2022    Potassium monitoring  11/20/2022    Creatinine monitoring  11/20/2022    Depression Screen  12/20/2022    Annual Wellness Visit (AWV)  12/21/2022    DTaP/Tdap/Td vaccine (2 - Td or Tdap) 04/25/2029    DEXA (modify frequency per FRAX score)  Completed    Flu vaccine  Completed    Shingles Vaccine  Completed    Pneumococcal 65+ years Vaccine  Completed    COVID-19 Vaccine  Completed    Hepatitis C screen  Completed    Hepatitis A vaccine  Aged Out    Hib vaccine  Aged Out    Meningococcal (ACWY) vaccine  Aged Out        Subjective:   Review of Systems   Constitutional: Negative for activity change, appetite change, fatigue and fever. HENT: Negative for congestion, dental problem, facial swelling, hearing loss, mouth sores, nosebleeds, sore throat, tinnitus and trouble swallowing.     Eyes: Negative for discharge and visual disturbance. Respiratory: Negative for cough, chest tightness, shortness of breath and wheezing. Cardiovascular: Negative for chest pain, palpitations and leg swelling. Gastrointestinal: Negative for abdominal distention, abdominal pain, blood in stool, constipation, diarrhea, nausea, rectal pain and vomiting. Endocrine: Negative for cold intolerance, polydipsia and polyuria. Genitourinary: Negative for decreased urine volume, difficulty urinating, dysuria, flank pain, hematuria and urgency. Musculoskeletal: Negative for arthralgias, back pain, gait problem, joint swelling, myalgias and neck stiffness. Skin: Negative for color change, rash and wound. Neurological: Negative for dizziness, tremors, seizures, speech difficulty, weakness, light-headedness, numbness and headaches. Hematological: Negative for adenopathy. Does not bruise/bleed easily. Psychiatric/Behavioral: Negative for confusion and sleep disturbance. The patient is not nervous/anxious. Objective:   Physical Exam  Vitals reviewed. Constitutional:       General: She is not in acute distress. Appearance: She is well-developed. HENT:      Head: Normocephalic. Mouth/Throat:      Pharynx: No oropharyngeal exudate. Eyes:      General: No scleral icterus. Right eye: No discharge. Left eye: No discharge. Pupils: Pupils are equal, round, and reactive to light. Neck:      Thyroid: No thyromegaly. Vascular: No JVD. Trachea: No tracheal deviation. Cardiovascular:      Rate and Rhythm: Normal rate. Heart sounds: Normal heart sounds. No murmur heard. No friction rub. No gallop. Pulmonary:      Effort: Pulmonary effort is normal. No respiratory distress. Breath sounds: Normal breath sounds. No stridor. No wheezing or rales. Chest:      Chest wall: No tenderness. Breasts:      Left: Mass (Status post left lumpectomy. Lumpectomy incision nicely healed. No palpable masses or nodules) present. Abdominal:      General: Bowel sounds are normal. There is no distension. Palpations: Abdomen is soft. There is no mass. Tenderness: There is no abdominal tenderness. There is no rebound. Musculoskeletal:         General: Normal range of motion. Cervical back: Normal range of motion and neck supple. Comments: Good range of motion in all four extremities. Lymphadenopathy:      Cervical: No cervical adenopathy. Skin:     General: Skin is warm. Findings: No erythema or rash. Neurological:      Mental Status: She is alert and oriented to person, place, and time. Cranial Nerves: No cranial nerve deficit. Motor: No abnormal muscle tone. Deep Tendon Reflexes: Reflexes are normal and symmetric. Psychiatric:         Behavior: Behavior normal.         Thought Content: Thought content normal.         Judgment: Judgment normal.         BP (!) 140/67 (Site: Right Upper Arm, Position: Sitting, Cuff Size: Medium Adult)   Pulse 76   Temp 98.4 °F (36.9 °C) (Oral)   Resp 16   Ht 5' 3\" (1.6 m)   Wt 204 lb 12.8 oz (92.9 kg)   SpO2 93%   BMI 36.28 kg/m²      ECOG status is 0. Imaging studies and labs:       Lab Results   Component Value Date    WBC 4.5 (L) 11/20/2021    HGB 14.0 11/20/2021    HCT 43.4 11/20/2021    MCV 95.4 11/20/2021     11/20/2021       Chemistry        Component Value Date/Time     04/04/2022 0845     11/20/2021 0811    K 4.0 04/04/2022 0845    K 4.4 11/20/2021 0811     11/20/2021 0811    CO2 25 11/20/2021 0811    BUN 18 11/20/2021 0811    CREATININE 0.8 04/04/2022 0845    CREATININE 0.5 11/20/2021 0811        Component Value Date/Time    CALCIUM 10.0 11/20/2021 0811    ALKPHOS 57 11/20/2021 0811    AST 24 11/20/2021 0811    ALT 26 11/20/2021 0811    BILITOT 1.5 (H) 11/20/2021 0811        Mammogram in October 2021 showed:     BI-RADS CATEGORY 2: BENIGN FINDINGS.          Assessment/Plan: 1.Stage IA (pT1b, pN0, cM0, G1, ER+, OR+, HER2-) left breast cancer diagnosed in 2018. The patient is status post lumpectomy with sentinel lymph node biopsy. The patient did not have postlumpectomy radiation treatment. The CALGB 9343 study enrolled patients 79years of age and older. The results showed 10% risk of local disease recurrence at 10 years in women treated with tamoxifen only ( no radiation therapy) in contrast to 2% risk of local disease recurrence at 10 years in women treated with tamoxifen and radiation therapy. However overall survival was the same for both groups. PRIME 2 study, confirmed a modest reduction in recurrence rate with RT that did not translate into a survival benefit. After a median follow-up of five years, ipsilateral breast tumor recurrences were lower in women assigned to RT (1.3 versus 4.1 percent). No differences in overall survival, regional recurrence, distant metastases, contralateral breast cancers, or new breast cancers were noted between the two groups. 2.  Adjuvant hormonal therapy with Arimidex. Adjuvant endocrine therapy reduces breast cancer recurrence and breast cancer mortality in postmenopausal women. AIs result in a more substantial reduction in recurrence rates during treatment and lower breast cancer mortality both during and after treatment. End of May 2020 the patient stopped taking Arimidex due to arthralgia and hot flashes and cloudiness. After being off AI for 2 months, she restarted Arimidex and so far she tolerates it well. However, she developed gradually worsening left hip pain. Therefore the patient will have MRI of the hip. She had annual mammogram in October 2021 that showed benign findings. 3.  Osteoporosis. The patient received first dose of Reclast in November 2019, then in May 2019 and November 2019. Next dose of Reclast in October 2000 2010. Diagnosis Orders   1.  Malignant neoplasm of lower-outer quadrant of left breast of female, estrogen receptor positive ([facility]  MRI HIP RIGHT W WO CONTRAST   2. Acute right hip pain  MRI HIP RIGHT W WO CONTRAST   3. Severe obesity (BMI 35.0-39. 9) with comorbidity ([facility]     4. Other osteoporosis without current pathological fracture     5. History of right breast cancer     6. Prophylactic use of anastrozole (Arimidex)     7. Encounter for follow-up surveillance of breast cancer          Plan:   No follow-ups on file. Orders Placed:   Orders Placed This Encounter   Procedures    MRI HIP RIGHT W WO CONTRAST     Standing Status:   Future     Standing Expiration Date:   4/4/2023     Order Specific Question:   STAT Creatinine as needed:     Answer:   Yes        Medications Prescribed:   No orders of the defined types were placed in this encounter. Discussed use, benefit, and side effectsof prescribed medications. All patient questions answered. Pt voiced understanding. Instructed to continue current medications, diet and exercise. Patient agreed with treatment plan. Follow up as directed.     Electronically signed by Ramona Latham MD on 4/10/19 at 2:35 PM

## 2022-04-12 ENCOUNTER — HOSPITAL ENCOUNTER (OUTPATIENT)
Dept: OCCUPATIONAL THERAPY | Age: 72
Setting detail: THERAPIES SERIES
Discharge: HOME | End: 2022-04-12
Payer: MEDICARE

## 2022-04-12 PROCEDURE — 97110 THERAPEUTIC EXERCISES: CPT

## 2022-04-12 NOTE — DISCHARGE SUMMARY
OrthoNeuro. States that he ordered MRI which she has completed. States that she has been told that she doesn't have any \"cushioning\" in the joint currently. Patient states that she returns to see new orthopedic later this month.     OBJECTIVE:  TREATMENT   Precautions: HTN, DM,    Pain:  3/10 pain at time of therapy right hand;     LOCATION: right thumb and wrist    X in shaded column indicates Activity Completed Today   Modalities Parameters/  Location  Notes/Comments   Fluidotherapy to right and left hand x 15 minutes                  Manual Therapy Time/  Technique  Notes/Comments   STM to right thumb and dorsal wrist      STM to volar right forearm    Tightness present in volar forearm         Exercises   Sets/  Sec Reps  Notes/Comments   Weight bearing onto poof ball with right UE 1 5  30 second hold   Weight bearing with dystrophile at  8#  1 10  15 second hold   Active right thumb circumduction 1 10 each direction     PROM to right thumb and wrist for all motions       Active right wrist radial deviation 2 5  5 second hold   Active right thumb radial abduction 1 10  Improved motion present this date - put thumb on piece of paper to reduce drag on table    Rolling poof ball on tabletop using right UE 1 3 minutes     Coordination maze with right UE 1 5     Weightbearing through poof ball using right UE 1 10  15 second hold   Prayer stretch for wrist extension 1 5  10 second hold   Right thumb radial abduction isometrics 1 10  Patient was able to tolerate increased amount of resistance this date   medium theraputty - finger/thumb extension 1 5  donut of theraputty to work on thumb extension/radial abduction   Medium theraputty - gripping with right UE 1 5 minutes  Instructed patient to gently  it versus gripping intensely   Medium theraputty - weightbearing through right UE on table 1 15     ergogripper with 1 green, 1 blue, and 1 red band with right UE 2 10     Wrist exerciser with 1# with right UE doing the activity 1 10     Medium theraputty - pressing down with right hand 1 5 minutes     Medium theraputty - knob turn tool with right UE 1 10     velcro board with right hand - large cylinder with key attachment 1 10 cycles  More difficulty bringing cylinder back toward her   red therabar - bending bar with forearms supinated and forearms pronated 1 20 each direction     Green therabar - rolling with right UE 1 2 minutes            Activities Time    Notes/Comments   Education and return performance of relaxation techniques      Various desensitization techniques to right hand and forearm   Brush, velcro, netting, fabric   Education on joint protection to avoid ulnar drift of right hand/digits   Opening jars/bottles; stirring with spoon/wisk   Discussed options for computer mouse as well as different holding technique for hoeing and other yardwork tools  x         Right    Strength Settin 15#             Specific Interventions Next Treatment: fluidotherapy; AROM, PROM right wrist and thumb, strengthening    Activity/Treatment Tolerance:  [x]  Patient tolerated treatment well  []  Patient limited by fatigue  []  Patient limited by pain   []  Patient limited by other medical complications  []  Other:     Assessment:   Patient continues to have difficulty with performing basic tasks such as opening jars and doing yard activities. Patient has utilized several adaptive techniques, but she continues to have difficulty with several activities. Patient is independent with HEP and has excellent understanding of completion. Patient's  strength this date was 10# less than last time taken about 10 days ago. GOALS:  Patient Goal: To have full use of my hand. Have strength, Be able to  and grab with my right hand. No pain    Short Term Goals:  Time Frame: NO STG FORMULATED AT THIS TIME - SEE LTG      Long Term Goals:  Time Frame: 4 weeks  1.    Increase right  strength to 30# to increase her ability to open all food containers. GOAL NOT MET - SEE ABOVE FOR DETAILS. 2. Report being able to complete ironing activities with right hand with no increase in pain. GOAL NOT MET  3. Be able to open jars with use of needed adaptive equipment with no increase in pain in right wrist and hand. GOAL NOT MET - RELATES THAT THIS CAN STILL BE PAINFUL  4. Be able to  small items from table top (such as a pill) using right hand without difficulty. GOAL NOT MET   5. Report pain going no higher than 2/10 in right thumb and wrist at any point to assist in improving daily activities. GOAL NOT MET -RELATES THAT PAIN GOES UP TO 3/10    Patient Education:   [x]  HEP/Education Completed:   Goal status, variety of computer mouse designs, use of spinner knob on , to change her hand-hold on yard tools  []  No new Education completed  [x]  Reviewed Prior HEP      [x]  Patient verbalized and/or demonstrated understanding of education provided. []  Patient unable to verbalize and/or demonstrate understanding of education provided. Will continue education. [x]  Barriers to learning: none    PLAN:      []  Plan of care initiated. Plan to see patient 2 times per week for 8 weeks to address the treatment planned outlined above.   []  Continue with current plan of care  []  Modify plan of care as follows: See patient 2 x week x 4 weeks from 3/31/22  []  Hold pending physician visit  [x]  Discharge    Time In 1030   Time Out 1110   Timed Code Minutes: 40 min   Total Treatment Time: 40 min       Triny Glaserident, KATTR/ROMMEL #[id number]

## 2022-04-21 ENCOUNTER — TELEPHONE (OUTPATIENT)
Dept: FAMILY MEDICINE CLINIC | Age: 72
End: 2022-04-21

## 2022-04-21 ENCOUNTER — HOSPITAL ENCOUNTER (EMERGENCY)
Age: 72
Discharge: HOME | End: 2022-04-21
Payer: MEDICARE

## 2022-04-21 VITALS
TEMPERATURE: 98.4 F | SYSTOLIC BLOOD PRESSURE: 121 MMHG | HEART RATE: 90 BPM | RESPIRATION RATE: 16 BRPM | OXYGEN SATURATION: 94 % | HEIGHT: 63 IN | DIASTOLIC BLOOD PRESSURE: 66 MMHG | BODY MASS INDEX: 36.5 KG/M2 | WEIGHT: 206 LBS

## 2022-04-21 DIAGNOSIS — B96.89 BACTERIAL SINUSITIS: Primary | ICD-10-CM

## 2022-04-21 DIAGNOSIS — J32.9 BACTERIAL SINUSITIS: Primary | ICD-10-CM

## 2022-04-21 PROCEDURE — 99213 OFFICE O/P EST LOW 20 MIN: CPT | Performed by: NURSE PRACTITIONER

## 2022-04-21 PROCEDURE — 99213 OFFICE O/P EST LOW 20 MIN: CPT

## 2022-04-21 RX ORDER — AZITHROMYCIN 250 MG/1
TABLET, FILM COATED ORAL
Qty: 6 TABLET | Refills: 0 | Status: SHIPPED | OUTPATIENT
Start: 2022-04-21 | End: 2022-06-21

## 2022-04-21 RX ORDER — PREDNISONE 20 MG/1
40 TABLET ORAL DAILY
Qty: 10 TABLET | Refills: 0 | Status: SHIPPED | OUTPATIENT
Start: 2022-04-21 | End: 2022-04-26

## 2022-04-21 ASSESSMENT — ENCOUNTER SYMPTOMS
VOMITING: 1
DIARRHEA: 0
ABDOMINAL PAIN: 0
SHORTNESS OF BREATH: 0
SORE THROAT: 1
COUGH: 1
SINUS PAIN: 1
NAUSEA: 1
SINUS PRESSURE: 1

## 2022-04-21 ASSESSMENT — PAIN DESCRIPTION - PAIN TYPE: TYPE: ACUTE PAIN

## 2022-04-21 ASSESSMENT — PAIN DESCRIPTION - DESCRIPTORS: DESCRIPTORS: ACHING

## 2022-04-21 ASSESSMENT — PAIN - FUNCTIONAL ASSESSMENT: PAIN_FUNCTIONAL_ASSESSMENT: 0-10

## 2022-04-21 ASSESSMENT — PAIN DESCRIPTION - FREQUENCY: FREQUENCY: CONTINUOUS

## 2022-04-21 ASSESSMENT — PAIN SCALES - GENERAL: PAINLEVEL_OUTOF10: 3

## 2022-04-21 ASSESSMENT — PAIN DESCRIPTION - LOCATION: LOCATION: HEAD

## 2022-04-21 NOTE — TELEPHONE ENCOUNTER
Avril Thurman calls with C/O Sinus issues , vomiting from coughing up phlegm , cough , feeling miserable , no appointments available , advised Urgent Care.

## 2022-04-21 NOTE — ED PROVIDER NOTES
[address]  Urgent Care Encounter       CHIEF COMPLAINT       Chief Complaint   Patient presents with    Cough    Sinus Problem     headache and sinus pressure    Pharyngitis     \"scratchy throat\" onset 4/15/22        Nurses Notes reviewed and I agree except as noted in the HPI. HISTORY OF PRESENT ILLNESS   Charles Peters is a 74 y.o. female who presents with complaints of sinus and nasal congestion, cough and mild sore throat, onset 1 week ago. Patient reports a lot of drainage last night and did have an episode of nausea vomiting. No diarrhea. No fever currently but did have a fever up to 101 °F 2 days ago. She has taken ibuprofen for headache but no other cold medications. The history is provided by the patient. REVIEW OF SYSTEMS     Review of Systems   Constitutional: Positive for fatigue. Negative for appetite change, chills and fever. HENT: Positive for congestion, postnasal drip, sinus pressure, sinus pain and sore throat. Negative for ear pain. Respiratory: Positive for cough. Negative for shortness of breath. Cardiovascular: Negative for chest pain. Gastrointestinal: Positive for nausea and vomiting. Negative for abdominal pain and diarrhea. Musculoskeletal: Negative for myalgias. Skin: Negative for rash. Neurological: Positive for headaches.        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    Cancer ([address] 09/1918    Left Breast    Chronic sinus complaints     Diverticulosis of colon     DKA (diabetic ketoacidoses) 05/2018    Elevated TSH     Hypertension     Osteoarthritis     Polyneuropathy     PONV (postoperative nausea and vomiting)     Spinal headache     Type 2 diabetes mellitus ([address] 1990's       SURGICALHISTORY     Patient  has a past surgical history that includes Tonsillectomy and adenoidectomy (age 4); Dilation and curettage of uterus; sinus surgery; Mandible fracture surgery; Cholecystectomy; Knee arthroscopy (1967, 2001); joint replacement (Right, 2007); skin biopsy; eye surgery; bladder suspension; other surgical history (Right, 12/30/2015); pr office/outpt visit,procedure only (Left, 10/10/2018); ventral hernia repair (N/A, 6/14/2019); hernia repair (06/18/2019); Carpal tunnel release (Bilateral, 02/2020); Breast lumpectomy (Left, 10/10/2018); [facility] STEREO BREAST BX W LOC DEVICE 1ST LESION LEFT (Left, 09/19/2018); Ovary removal (1995); Hysterectomy (1995); Wrist surgery (Right); and Hand surgery (Right).     CURRENT MEDICATIONS       Discharge Medication List as of 4/21/2022 11:51 AM      CONTINUE these medications which have NOT CHANGED    Details   magnesium oxide (MAG-OX) 400 MG tablet Take 400 mg by mouth dailyHistorical Med      SUPER B COMPLEX/C PO Take 1 tablet by mouth dailyHistorical Med      pantoprazole (PROTONIX) 40 MG tablet Historical Med      benazepril (LOTENSIN) 20 MG tablet Take 1 tablet by mouth 2 times daily, Disp-180 tablet, R-1Normal      atorvastatin (LIPITOR) 20 MG tablet Take 1 tablet by mouth nightly, Disp-90 tablet, R-1Normal      ibuprofen (ADVIL;MOTRIN) 600 MG tablet Take 1 tablet by mouth every 6 hours as needed for Pain, Disp-360 tablet, R-1Normal      anastrozole (ARIMIDEX) 1 MG tablet Take 1 tablet by mouth daily, Disp-90 tablet, R-3Normal      empagliflozin (JARDIANCE) 25 MG tablet Take 25 mg by mouth dailyHistorical Med      insulin lispro, 1 Unit Dial, (HUMALOG KWIKPEN) 100 UNIT/ML SOPN Inject into the skin 3 times daily Indications: inject 0-12 unitsinto the skin 3 times daily-per sliding scaleHistorical Med      furosemide (LASIX) 20 MG tablet Historical Med      Handicap Placard MISC Starting Wed 8/7/2019, Disp-1 each, R-0, PrintDx:  Shortness of breath on exertion; duration 3 years: exp date: 8/7/2022      EASY TOUCH PEN NEEDLES 31G X 6 MM MISC Starting Thu 6/27/2019, MASHA, Historical Med      LANTUS SOLOSTAR 100 UNIT/ML injection pen Inject 39 Units into the skin nightly , DAWHistorical Med      Calcium Carb-Cholecalciferol 600-500 MG-UNIT CAPS Take by mouth dailyHistorical Med      aspirin 81 MG chewable tablet Take 81 mg by mouth dailyHistorical Med      therapeutic multivitamin-minerals (THERAGRAN-M) tablet Take 1 tablet by mouth daily. ALLERGIES     Patient is is allergic to amoxicillin, bactrim [sulfamethoxazole-trimethoprim], donnatal [phenobarbital-belladonna alk], lovastatin, metformin and related, vioxx, dilaudid [hydromorphone hcl], and fentanyl. Patients   Immunization History   Administered Date(s) Administered    COVID-19, Moderna, Primary or Immunocompromised, PF, 100mcg/0.5mL 02/04/2021, 03/02/2021, 11/05/2021    Influenza Virus Vaccine 10/01/2015, 09/28/2018, 09/11/2019    Influenza Whole 10/01/2015    Influenza, High Dose (Fluzone 65 yrs and older) 10/26/2015    Influenza, Quadv, IM, (6 mo and older Fluzone, Flulaval, Fluarix and 3 yrs and older Afluria) 09/27/2017    Influenza, Quadv, IM, PF (6 mo and older Fluzone, Flulaval, Fluarix, and 3 yrs and older Afluria) 09/12/2020    Influenza, Quadv, adjuvanted, 65 yrs +, IM, PF (Fluad) 09/25/2021    Influenza, Triv, inactivated, subunit, adjuvanted, IM (Fluad 65 yrs and older) 09/27/2017, 09/28/2018, 09/14/2019    Pneumococcal Conjugate 13-valent ([id number]) 10/01/2015, 10/26/2015    Pneumococcal Polysaccharide ([id number]) 05/06/2019    Tdap (Boostrix, Adacel) 04/25/2019    Zoster Recombinant (Shingrix) 09/12/2020, 03/22/2021       FAMILY HISTORY     Patient's family history includes Breast Cancer in her maternal cousin; Cancer in her father, paternal aunt, and paternal uncle; Diabetes in her maternal grandmother and mother; Heart Disease in her father, maternal grandfather, maternal uncle, and mother; Lupus in her maternal uncle and mother;  Other in her paternal grandfather. SOCIAL HISTORY     Patient  reports that she has never smoked. She has never used smokeless tobacco. She reports current alcohol use. She reports that she does not use drugs. PHYSICAL EXAM     ED TRIAGE VITALS  BP: 121/66, Temp: 98.4 °F (36.9 °C), Pulse: 90, Resp: 16, SpO2: 94 %,Estimated body mass index is 36.49 kg/m² as calculated from the following:    Height as of this encounter: 5' 3\" (1.6 m). Weight as of this encounter: 206 lb (93.4 kg). ,No LMP recorded. Patient is postmenopausal.    Physical Exam  Vitals and nursing note reviewed. Constitutional:       General: She is not in acute distress. Appearance: She is well-developed. She is not ill-appearing. HENT:      Head: Normocephalic and atraumatic. Right Ear: Tympanic membrane and ear canal normal.      Left Ear: Tympanic membrane and ear canal normal.      Nose: Congestion present. Right Sinus: Maxillary sinus tenderness and frontal sinus tenderness present. Left Sinus: Maxillary sinus tenderness and frontal sinus tenderness present. Mouth/Throat:      Lips: Pink. Mouth: Mucous membranes are moist.      Pharynx: Oropharynx is clear. Uvula midline. No oropharyngeal exudate or posterior oropharyngeal erythema. Eyes:      General: Lids are normal. No scleral icterus. Conjunctiva/sclera:      Right eye: Right conjunctiva is not injected. Left eye: Left conjunctiva is not injected. Pupils: Pupils are equal.   Cardiovascular:      Rate and Rhythm: Normal rate and regular rhythm. Heart sounds: Normal heart sounds, S1 normal and S2 normal.   Pulmonary:      Effort: Pulmonary effort is normal. No respiratory distress. Breath sounds: Normal breath sounds and air entry. Musculoskeletal:      Comments: Normal active ROM x 4 extremities  Gait steady   Lymphadenopathy:      Comments: No head or neck adenopathy   Skin:     General: Skin is warm and dry.       Findings: No rash (to exposed areas of skin). Neurological:      General: No focal deficit present. Mental Status: She is alert and oriented to person, place, and time. Sensory: No sensory deficit. Comments: Answers questions readily and appropriately   Psychiatric:         Mood and Affect: Mood normal.         Speech: Speech normal.         Behavior: Behavior normal. Behavior is cooperative. DIAGNOSTIC RESULTS     Labs:No results found for this visit on 04/21/22. IMAGING:    No orders to display         EKG:      URGENT CARE COURSE:     Vitals:    04/21/22 1133   BP: 121/66   Pulse: 90   Resp: 16   Temp: 98.4 °F (36.9 °C)   TempSrc: Temporal   SpO2: 94%   Weight: 206 lb (93.4 kg)   Height: 5' 3\" (1.6 m)       Medications - No data to display         PROCEDURES:  None    FINAL IMPRESSION      1. Bacterial sinusitis          DISPOSITION/ PLAN     Patient presents with acute bacterial sinusitis. Treat with azithromycin and prednisone. Patient is to monitor blood sugars closely as prednisone will most likely increase her blood sugars. Can use over-the-counter medications for symptom management. Follow-up family doctor in 1 week if symptoms have not improved with treatment. Further instructions were outlined verbally and in the patient's discharge instructions. All the patient's questions were answered. The patient/parent agreed with the plan and was discharged from the [facility] in good condition. PATIENT REFERRED TO:  Jose M Coles MD  [address] [place] [id number]      DISCHARGE MEDICATIONS:  Discharge Medication List as of 4/21/2022 11:51 AM      START taking these medications    Details   predniSONE (DELTASONE) 20 MG tablet Take 2 tablets by mouth daily for 5 days, Disp-10 tablet, R-0Normal      azithromycin (ZITHROMAX) 250 MG tablet Take 2 tablets today, then take one tablet on days 2-5., Disp-6 tablet, R-0Normal             Discharge Medication List as of 4/21/2022 11:51 AM Discharge Medication List as of 4/21/2022 11:51 AM          NAV Enriquez CNP    (Please note that portions of this note were completed with a voice recognition program. Efforts were made to edit the dictations but occasionally words are mis-transcribed.)         NAV Enriquez CNP  04/21/22 1155

## 2022-04-21 NOTE — ED TRIAGE NOTES
C/O scratchy throat onset 4/15/22 now has sinus pressure/headache, cough progressively worse. Pt states she did have a fever on 4/19/22 of 100.6F but has since resolved. Pt states she took home covid test which was negative.

## 2022-05-03 ENCOUNTER — TELEPHONE (OUTPATIENT)
Dept: ONCOLOGY | Age: 72
End: 2022-05-03

## 2022-05-03 DIAGNOSIS — E11.65 TYPE 2 DIABETES MELLITUS WITH HYPERGLYCEMIA, WITHOUT LONG-TERM CURRENT USE OF INSULIN (HCC): ICD-10-CM

## 2022-05-03 DIAGNOSIS — I10 ESSENTIAL HYPERTENSION: ICD-10-CM

## 2022-05-03 NOTE — TELEPHONE ENCOUNTER
CALLED AND TALKED TO PT REGARDING HER HIP MRI SHE MISSED ON 4/19.  PT STATED SHE DOES NOT FEEL SHE NEEDS TO HAVE IT DONE AND WILL NOT RESCHEDULE IT

## 2022-05-04 RX ORDER — BENAZEPRIL HYDROCHLORIDE 20 MG/1
20 TABLET ORAL 2 TIMES DAILY
Qty: 180 TABLET | Refills: 1 | Status: SHIPPED | OUTPATIENT
Start: 2022-05-04 | End: 2022-10-20

## 2022-05-04 NOTE — TELEPHONE ENCOUNTER
Jon Whitfield is requesting a refill on the following medications:  Requested Prescriptions     Pending Prescriptions Disp Refills    benazepril (LOTENSIN) 20 MG tablet [Pharmacy Med Name: BENAZEPRIL HCL 20 MG TABLET] 180 tablet 1     Sig: TAKE 1 TABLET BY MOUTH 2 TIMES DAILY       Date of last visit: 2/16/2022  Date of next visit (if applicable):6/20/2022  Date of last refill: 12/20/2021  [facility] [address] [facility] [place]

## 2022-06-14 DIAGNOSIS — I25.10 ASCVD (ARTERIOSCLEROTIC CARDIOVASCULAR DISEASE): ICD-10-CM

## 2022-06-14 DIAGNOSIS — E11.65 TYPE 2 DIABETES MELLITUS WITH HYPERGLYCEMIA, WITHOUT LONG-TERM CURRENT USE OF INSULIN (HCC): ICD-10-CM

## 2022-06-14 RX ORDER — ATORVASTATIN CALCIUM 20 MG/1
20 TABLET, FILM COATED ORAL NIGHTLY
Qty: 90 TABLET | Refills: 1 | OUTPATIENT
Start: 2022-06-14

## 2022-06-14 NOTE — TELEPHONE ENCOUNTER
Pt called back and she said she sent a request in for a refill for atorvastatin. Pt said xu told her to call our office to see why it got denied. Pt has appt on the 20th with Omar. Pt recounted she does have enough till appt. She thought she could get an early refill.

## 2022-06-15 ENCOUNTER — APPOINTMENT (OUTPATIENT)
Dept: GENERAL RADIOLOGY | Age: 72
End: 2022-06-15
Payer: MEDICARE

## 2022-06-15 ENCOUNTER — HOSPITAL ENCOUNTER (EMERGENCY)
Age: 72
Discharge: HOME | End: 2022-06-15
Payer: MEDICARE

## 2022-06-15 VITALS
OXYGEN SATURATION: 96 % | HEIGHT: 63 IN | DIASTOLIC BLOOD PRESSURE: 72 MMHG | HEART RATE: 81 BPM | WEIGHT: 206 LBS | BODY MASS INDEX: 36.5 KG/M2 | RESPIRATION RATE: 23 BRPM | TEMPERATURE: 100.8 F | SYSTOLIC BLOOD PRESSURE: 132 MMHG

## 2022-06-15 DIAGNOSIS — R07.89 CHEST WALL PAIN: ICD-10-CM

## 2022-06-15 DIAGNOSIS — U07.1 COVID-19: Primary | ICD-10-CM

## 2022-06-15 LAB
ANION GAP SERPL CALCULATED.3IONS-SCNC: 13 MEQ/L (ref 8–16)
BASOPHILS # BLD: 0.2 %
BASOPHILS ABSOLUTE: 0 THOU/MM3 (ref 0–0.1)
BUN BLDV-MCNC: 21 MG/DL (ref 7–22)
CALCIUM SERPL-MCNC: 9.3 MG/DL (ref 8.5–10.5)
CHLORIDE BLD-SCNC: 100 MEQ/L (ref 98–111)
CO2: 23 MEQ/L (ref 23–33)
CREAT SERPL-MCNC: 0.4 MG/DL (ref 0.4–1.2)
EKG ATRIAL RATE: 84 BPM
EKG P AXIS: -12 DEGREES
EKG P-R INTERVAL: 186 MS
EKG Q-T INTERVAL: 366 MS
EKG QRS DURATION: 88 MS
EKG QTC CALCULATION (BAZETT): 432 MS
EKG R AXIS: 4 DEGREES
EKG T AXIS: 23 DEGREES
EKG VENTRICULAR RATE: 84 BPM
EOSINOPHIL # BLD: 0.6 %
EOSINOPHILS ABSOLUTE: 0 THOU/MM3 (ref 0–0.4)
ERYTHROCYTE [DISTWIDTH] IN BLOOD BY AUTOMATED COUNT: 12.9 % (ref 11.5–14.5)
ERYTHROCYTE [DISTWIDTH] IN BLOOD BY AUTOMATED COUNT: 44 FL (ref 35–45)
FLU A ANTIGEN: NEGATIVE
FLU B ANTIGEN: NEGATIVE
GFR SERPL CREATININE-BSD FRML MDRD: > 90 ML/MIN/1.73M2
GLUCOSE BLD-MCNC: 119 MG/DL (ref 70–108)
HCT VFR BLD CALC: 43.5 % (ref 37–47)
HEMOGLOBIN: 14.5 GM/DL (ref 12–16)
IMMATURE GRANS (ABS): 0.02 THOU/MM3 (ref 0–0.07)
IMMATURE GRANULOCYTES: 0.4 %
LACTIC ACID: 1.4 MMOL/L (ref 0.5–2)
LYMPHOCYTES # BLD: 8.6 %
LYMPHOCYTES ABSOLUTE: 0.5 THOU/MM3 (ref 1–4.8)
MCH RBC QN AUTO: 31.1 PG (ref 26–33)
MCHC RBC AUTO-ENTMCNC: 33.3 GM/DL (ref 32.2–35.5)
MCV RBC AUTO: 93.3 FL (ref 81–99)
MONOCYTES # BLD: 12.1 %
MONOCYTES ABSOLUTE: 0.7 THOU/MM3 (ref 0.4–1.3)
NUCLEATED RED BLOOD CELLS: 0 /100 WBC
OSMOLALITY CALCULATION: 276.1 MOSMOL/KG (ref 275–300)
PLATELET # BLD: 143 THOU/MM3 (ref 130–400)
PMV BLD AUTO: 9.4 FL (ref 9.4–12.4)
POTASSIUM SERPL-SCNC: 4.3 MEQ/L (ref 3.5–5.2)
PROCALCITONIN: 0.06 NG/ML (ref 0.01–0.09)
RBC # BLD: 4.66 MILL/MM3 (ref 4.2–5.4)
SARS-COV-2, NAAT: DETECTED
SEG NEUTROPHILS: 78.1 %
SEGMENTED NEUTROPHILS ABSOLUTE COUNT: 4.2 THOU/MM3 (ref 1.8–7.7)
SODIUM BLD-SCNC: 136 MEQ/L (ref 135–145)
TROPONIN T: < 0.01 NG/ML
WBC # BLD: 5.4 THOU/MM3 (ref 4.8–10.8)

## 2022-06-15 PROCEDURE — 36415 COLL VENOUS BLD VENIPUNCTURE: CPT

## 2022-06-15 PROCEDURE — 85025 COMPLETE CBC W/AUTO DIFF WBC: CPT

## 2022-06-15 PROCEDURE — 83605 ASSAY OF LACTIC ACID: CPT

## 2022-06-15 PROCEDURE — 80048 BASIC METABOLIC PNL TOTAL CA: CPT

## 2022-06-15 PROCEDURE — 84484 ASSAY OF TROPONIN QUANT: CPT

## 2022-06-15 PROCEDURE — 87804 INFLUENZA ASSAY W/OPTIC: CPT

## 2022-06-15 PROCEDURE — 93010 ELECTROCARDIOGRAM REPORT: CPT | Performed by: NUCLEAR MEDICINE

## 2022-06-15 PROCEDURE — 99285 EMERGENCY DEPT VISIT HI MDM: CPT

## 2022-06-15 PROCEDURE — 71046 X-RAY EXAM CHEST 2 VIEWS: CPT

## 2022-06-15 PROCEDURE — 87635 SARS-COV-2 COVID-19 AMP PRB: CPT

## 2022-06-15 PROCEDURE — 84145 PROCALCITONIN (PCT): CPT

## 2022-06-15 PROCEDURE — 93005 ELECTROCARDIOGRAM TRACING: CPT | Performed by: NURSE PRACTITIONER

## 2022-06-15 PROCEDURE — 6370000000 HC RX 637 (ALT 250 FOR IP): Performed by: NURSE PRACTITIONER

## 2022-06-15 RX ORDER — HYDROCODONE BITARTRATE AND ACETAMINOPHEN 5; 325 MG/1; MG/1
1 TABLET ORAL EVERY 6 HOURS PRN
Qty: 12 TABLET | Refills: 0 | Status: SHIPPED | OUTPATIENT
Start: 2022-06-15 | End: 2022-06-18

## 2022-06-15 RX ORDER — BENZONATATE 100 MG/1
100 CAPSULE ORAL 3 TIMES DAILY PRN
Qty: 30 CAPSULE | Refills: 0 | Status: SHIPPED | OUTPATIENT
Start: 2022-06-15 | End: 2022-06-21 | Stop reason: SDUPTHER

## 2022-06-15 RX ORDER — BENZONATATE 100 MG/1
100 CAPSULE ORAL ONCE
Status: COMPLETED | OUTPATIENT
Start: 2022-06-15 | End: 2022-06-15

## 2022-06-15 RX ORDER — HYDROCODONE BITARTRATE AND ACETAMINOPHEN 5; 325 MG/1; MG/1
1 TABLET ORAL ONCE
Status: COMPLETED | OUTPATIENT
Start: 2022-06-15 | End: 2022-06-15

## 2022-06-15 RX ADMIN — HYDROCODONE BITARTRATE AND ACETAMINOPHEN 1 TABLET: 5; 325 TABLET ORAL at 08:25

## 2022-06-15 RX ADMIN — BENZONATATE 100 MG: 100 CAPSULE ORAL at 08:25

## 2022-06-15 ASSESSMENT — ENCOUNTER SYMPTOMS
NAUSEA: 1
CHEST TIGHTNESS: 1
ABDOMINAL DISTENTION: 0
ABDOMINAL PAIN: 0
BACK PAIN: 1
WHEEZING: 0
TROUBLE SWALLOWING: 0
SORE THROAT: 0
VOMITING: 1
COUGH: 1
COLOR CHANGE: 0
SHORTNESS OF BREATH: 1
RHINORRHEA: 0
DIARRHEA: 1

## 2022-06-15 NOTE — ED NOTES
In to complete orders, patient states that she wants something to eat prior to taking the medications ordered. Patient provided with crackers and water. Lab at bedside for draw.       Khurram Aldana RN  06/15/22 1608

## 2022-06-15 NOTE — ED PROVIDER NOTES
for immunocompromised state. Neurological: Negative for dizziness, weakness, light-headedness and headaches. Hematological: Does not bruise/bleed easily. Psychiatric/Behavioral: Negative for agitation, behavioral problems and confusion. 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    Cancer ([facility] [address] 09/1918    Left Breast    Chronic sinus complaints     Diverticulosis of colon     DKA (diabetic ketoacidoses) 05/2018    Elevated TSH     Hypertension     Osteoarthritis     Polyneuropathy     PONV (postoperative nausea and vomiting)     Spinal headache     Type 2 diabetes mellitus ([facility] [address] 1's       SURGICALHISTORY      has a past surgical history that includes Tonsillectomy and adenoidectomy (age 4); Dilation and curettage of uterus; sinus surgery; Mandible fracture surgery; Cholecystectomy; Knee arthroscopy (1967, 2001); joint replacement (Right, 2007); skin biopsy; eye surgery; bladder suspension; other surgical history (Right, 12/30/2015); pr office/outpt visit,procedure only (Left, 10/10/2018); ventral hernia repair (N/A, 6/14/2019); hernia repair (06/18/2019); Carpal tunnel release (Bilateral, 02/2020); Breast lumpectomy (Left, 10/10/2018); [facility] STEREO BREAST BX W LOC DEVICE 1ST LESION LEFT (Left, 09/19/2018); Ovary removal (1995); Hysterectomy (1995); Wrist surgery (Right); and Hand surgery (Right).     CURRENT MEDICATIONS       Discharge Medication List as of 6/15/2022 10:15 AM      CONTINUE these medications which have NOT CHANGED    Details   benazepril (LOTENSIN) 20 MG tablet TAKE 1 TABLET BY MOUTH 2 TIMES DAILY, Disp-180 tablet, R-1Normal      azithromycin (ZITHROMAX) 250 MG tablet Take 2 tablets today, then take one tablet on days 2-5., Disp-6 tablet, R-0Normal      magnesium oxide (MAG-OX) 400 MG tablet Take 400 mg by mouth dailyHistorical Med      SUPER B COMPLEX/C PO Take 1 tablet by mouth dailyHistorical Med      pantoprazole (PROTONIX) 40 MG tablet Historical Med      atorvastatin (LIPITOR) 20 MG tablet Take 1 tablet by mouth nightly, Disp-90 tablet, R-1Normal      ibuprofen (ADVIL;MOTRIN) 600 MG tablet Take 1 tablet by mouth every 6 hours as needed for Pain, Disp-360 tablet, R-1Normal      anastrozole (ARIMIDEX) 1 MG tablet Take 1 tablet by mouth daily, Disp-90 tablet, R-3Normal      empagliflozin (JARDIANCE) 25 MG tablet Take 25 mg by mouth dailyHistorical Med      insulin lispro, 1 Unit Dial, (HUMALOG KWIKPEN) 100 UNIT/ML SOPN Inject into the skin 3 times daily Indications: inject 0-12 unitsinto the skin 3 times daily-per sliding scaleHistorical Med      furosemide (LASIX) 20 MG tablet Historical Med      Handicap Placard [facility] Starting Wed 2019, Disp-1 each, R-0, PrintDx:  Shortness of breath on exertion; duration 3 years: exp date: 2022      EASY TOUCH PEN NEEDLES 31G X 6 MM MISC Starting Thu 2019, MASHA, Historical Med      LANTUS SOLOSTAR 100 UNIT/ML injection pen Inject 39 Units into the skin nightly , DAWHistorical Med      Calcium Carb-Cholecalciferol 600-500 MG-UNIT CAPS Take by mouth dailyHistorical Med      aspirin 81 MG chewable tablet Take 81 mg by mouth dailyHistorical Med      therapeutic multivitamin-minerals (THERAGRAN-M) tablet Take 1 tablet by mouth daily. ALLERGIES     is allergic to amoxicillin, bactrim [sulfamethoxazole-trimethoprim], donnatal [phenobarbital-belladonna alk], lovastatin, metformin and related, vioxx, dilaudid [hydromorphone hcl], and fentanyl. FAMILY HISTORY     She indicated that her mother is . She indicated that her father is . She indicated that her maternal grandmother is . She indicated that her maternal grandfather is . She indicated that her paternal grandfather is .  She indicated that her maternal uncle is . She indicated that her paternal aunt is . She indicated that her paternal uncle is . She indicated that her maternal cousin is . family history includes Breast Cancer in her maternal cousin; Cancer in her father, paternal aunt, and paternal uncle; Diabetes in her maternal grandmother and mother; Heart Disease in her father, maternal grandfather, maternal uncle, and mother; Lupus in her maternal uncle and mother; Other in her paternal grandfather. SOCIAL HISTORY       Social History     Socioeconomic History    Marital status:      Spouse name: Not on file    Number of children: 3    Years of education: 15    Highest education level: Not on file   Occupational History    Not on file   Tobacco Use    Smoking status: Never Smoker    Smokeless tobacco: Never Used   Vaping Use    Vaping Use: Never used   Substance and Sexual Activity    Alcohol use: Yes     Alcohol/week: 0.0 standard drinks     Comment: Socially    Drug use: No    Sexual activity: Not on file   Other Topics Concern    Not on file   Social History Narrative    Not on file     Social Determinants of Health     Financial Resource Strain: Low Risk     Difficulty of Paying Living Expenses: Not hard at all   Food Insecurity: No Food Insecurity    Worried About [facility] in the Last Year: Never true    [facility] in the Last Year: Never true   Transportation Needs:     Lack of Transportation (Medical): Not on file    Lack of Transportation (Non-Medical):  Not on file   Physical Activity:     Days of Exercise per Week: Not on file    Minutes of Exercise per Session: Not on file   Stress:     Feeling of Stress : Not on file   Social Connections:     Frequency of Communication with Friends and Family: Not on file    Frequency of Social Gatherings with Friends and Family: Not on file    Attends Druze Services: Not on file    Active Member of Clubs or Organizations: Not on file    Attends Club or Organization Meetings: Not on file    Marital Status: Not on file   Intimate Partner Violence:     Fear of Current or Ex-Partner: Not on file    Emotionally Abused: Not on file    Physically Abused: Not on file    Sexually Abused: Not on file   Housing Stability:     Unable to Pay for Housing in the Last Year: Not on file    Number of Radha in the Last Year: Not on file    Unstable Housing in the Last Year: Not on file       PHYSICAL EXAM     INITIAL VITALS:  height is 5' 3\" (1.6 m) and weight is 206 lb (93.4 kg). Her axillary temperature is 100.8 °F (38.2 °C) (abnormal). Her blood pressure is 132/72 and her pulse is 81. Her respiration is 23 and oxygen saturation is 96%. Physical Exam  Vitals and nursing note reviewed. Constitutional:       Appearance: Normal appearance. She is well-developed. HENT:      Head: Normocephalic. Mouth/Throat:      Pharynx: Uvula midline. Eyes:      Conjunctiva/sclera: Conjunctivae normal.   Cardiovascular:      Rate and Rhythm: Normal rate and regular rhythm. Pulses: Normal pulses. Heart sounds: S1 normal and S2 normal. Murmur heard. Pulmonary:      Effort: Pulmonary effort is normal. Tachypnea present. No accessory muscle usage or respiratory distress. Breath sounds: Normal breath sounds. No decreased breath sounds, wheezing, rhonchi or rales. Chest:      Chest wall: No tenderness. Abdominal:      General: Bowel sounds are normal. There is no distension. Palpations: Abdomen is soft. Tenderness: There is no abdominal tenderness. Musculoskeletal:         General: Normal range of motion. Cervical back: Normal range of motion and neck supple. Lymphadenopathy:      Cervical: No cervical adenopathy. Skin:     General: Skin is warm and dry. Capillary Refill: Capillary refill takes less than 2 seconds. Neurological:      General: No focal deficit present.       Mental Status: She is alert and oriented to person, place, and time. Psychiatric:         Mood and Affect: Mood normal.         Speech: Speech normal.         Behavior: Behavior normal.         Thought Content: Thought content normal.         DIFFERENTIAL DIAGNOSIS:   COVID-19, influenza, pneumonia, DKA, ACS, aortic dissection,    DIAGNOSTIC RESULTS     EKG: All EKG's are interpreted by the Emergency Department Physician who eithersigns or Co-signs this chart in the absence of a cardiologist.    EKG read and interpreted by myself with comparison to 6/14/2021 gives impression of normal sinus rhythm with heart rate of 84; interval 186; QRS 88;QTc 432; axis P--12, R-4, T-23. RADIOLOGY: non-plainfilm images(s) such as CT, Ultrasound and MRI are read by the radiologist.  Plain radiographic images are visualized and preliminarily interpreted by the emergency physician unless otherwise stated below. XR CHEST (2 VW)   Final Result   1. Slightly increased density in the right upper lobe which may represent infiltrate. 2. Borderline cardiomegaly. 3. Prominence of the right left hilum which may represent pulmonary hypertension. 4. Postoperative changes along the left chest wall. 5. Thoracic spondylosis. **This report has been created using voice recognition software. It may contain minor errors which are inherent in voice recognition technology. **      Final report electronically signed by DR Radha Simmons on 6/15/2022 8:22 AM            LABS:   Labs Reviewed   COVID-19, RAPID - Abnormal; Notable for the following components:       Result Value    SARS-CoV-2, NAAT DETECTED (*)     All other components within normal limits   CBC WITH AUTO DIFFERENTIAL - Abnormal; Notable for the following components:    Lymphocytes Absolute 0.5 (*)     All other components within normal limits   BASIC METABOLIC PANEL - Abnormal; Notable for the following components:    Glucose 119 (*)     All other components within normal limits   RAPID INFLUENZA A/B ANTIGENS   TROPONIN   PROCALCITONIN   LACTIC ACID   ANION GAP   GLOMERULAR FILTRATION RATE, ESTIMATED   OSMOLALITY   BLOOD GAS, VENOUS       EMERGENCY DEPARTMENT COURSE:   Vitals:    Vitals:    06/15/22 0742 06/15/22 0819   BP: 137/66 132/72   Pulse: 80 81   Resp: 22 23   Temp: (!) 100.8 °F (38.2 °C)    TempSrc: Axillary    SpO2: 96% 96%   Weight: 206 lb (93.4 kg)    Height: 5' 3\" (1.6 m)          MDM    Patient was seen and evaluated in the emergency department, patient appeared to be in no acute distress, vital signs reviewed, no significant findings noted. Physical exam was completed, tachypnea noted, lungs were clear, no significant edema noted. Labs and imaging were ordered. COVID-19 noted to be positive, Pro-Long negative. Chest x-ray shows no significant sign of pneumonia. We discussed antiviral treatment, patient would like to continue with conservative treatment. Patient is advised to continue taking Tylenol or ibuprofen for fever management. She is given a prescription for Norco for rib pain and shoulder pain. She is given a prescription for Tessalon Perles for cough. She is advised to check oxygen level frequently, and return to the ER with any hypoxia or any worsening shortness of breath. Patient verbalized understanding of plan of care. Medications   benzonatate (TESSALON) capsule 100 mg (100 mg Oral Given 6/15/22 0825)   HYDROcodone-acetaminophen (NORCO) 5-325 MG per tablet 1 tablet (1 tablet Oral Given 6/15/22 0825)       Patient was seenindependently by myself. The patient's final impression and disposition and plan was determined by myself. CRITICAL CARE:   None    CONSULTS:  None    PROCEDURES:  None    FINAL IMPRESSION     1. COVID-19    2.  Chest wall pain          DISPOSITION/PLAN   Patient discharged    PATIENT REFERREDTO:  [facility] [address] [address] EMERGENCY DEPT  [address]  Go to   If symptoms worsen      DISCHARGE MEDICATIONS:  Discharge Medication List as of 6/15/2022 10:15 AM      START taking these medications    Details   benzonatate (TESSALON PERLES) 100 MG capsule Take 1 capsule by mouth 3 times daily as needed for Cough, Disp-30 capsule, R-0Normal      HYDROcodone-acetaminophen (NORCO) 5-325 MG per tablet Take 1 tablet by mouth every 6 hours as needed for Pain for up to 3 days. Intended supply: 3 days. Take lowest dose possible to manage pain, Disp-12 tablet, R-0Normal             (Please note that portions of this note were completed with a voice recognition program.  Efforts were made to edit the dictations but occasionally words are mis-transcribed.)      Provider:  I personally performed the services described in the documentation,reviewed and edited the documentation which was dictated to the scribe in my presence, and it accurately records my words and actions.     Mychal Garrido CNP 06/15/22 4:47 PM    Burton Garrido, APRN - CNP        [employer], APRROJAS - CNP  06/15/22 0900

## 2022-06-15 NOTE — ED TRIAGE NOTES
Patient to room 7 from triage for multiple complaints this morning. Patient states that she noticed that she was feeling ill on Sunday when she had diarrhea and emesis. She reports that she is now experiencing a cough, rib pain, left shoulder pain, fatigue, and fever. She states that she feels like she cannot breathe. She does report that she has been to a chiropractor recently who stated that her rib was \"out of place. \"  EKG completed at bedside.

## 2022-06-16 ENCOUNTER — PATIENT MESSAGE (OUTPATIENT)
Dept: FAMILY MEDICINE CLINIC | Age: 72
End: 2022-06-16

## 2022-06-16 ENCOUNTER — CARE COORDINATION (OUTPATIENT)
Dept: CARE COORDINATION | Age: 72
End: 2022-06-16

## 2022-06-16 ENCOUNTER — TELEPHONE (OUTPATIENT)
Dept: FAMILY MEDICINE CLINIC | Age: 72
End: 2022-06-16

## 2022-06-16 NOTE — TELEPHONE ENCOUNTER
Yes she is using Norco for Rib pain from coughing and Tessalon for cough , still coughing so much she can't sleep.

## 2022-06-16 NOTE — TELEPHONE ENCOUNTER
It appears the ER gave her rx for norco and tessalon. Did she try these meds? Please advise patient.      Fatoumata Robert MD

## 2022-06-16 NOTE — TELEPHONE ENCOUNTER
Pily Zamudio was diagnosed with COVID last night in the ED , she is requesting something to help with sleep , she can't stop coughing.

## 2022-06-16 NOTE — TELEPHONE ENCOUNTER
From: Maranda Perkins  To: Dr. Janina Ojeda: 6/16/2022 4:57 PM EDT  Subject: appointment    I called Thursday, 06/16 to notify office I have covid and that I am scheduled for my 6 month checkup on June 20. I was told appointment would be cancelled.

## 2022-06-16 NOTE — TELEPHONE ENCOUNTER
Recommend over-the-counter cough medication with dextromethorphan in it. May Increase Tessalon to 200 mg 3 times per day    Please advise patient.   Susan Ott MD

## 2022-06-21 ENCOUNTER — PATIENT MESSAGE (OUTPATIENT)
Dept: FAMILY MEDICINE CLINIC | Age: 72
End: 2022-06-21

## 2022-06-21 ENCOUNTER — TELEMEDICINE (OUTPATIENT)
Dept: FAMILY MEDICINE CLINIC | Age: 72
End: 2022-06-21
Payer: MEDICARE

## 2022-06-21 DIAGNOSIS — U07.1 COVID-19 VIRUS INFECTION: Primary | ICD-10-CM

## 2022-06-21 DIAGNOSIS — I25.10 ASCVD (ARTERIOSCLEROTIC CARDIOVASCULAR DISEASE): ICD-10-CM

## 2022-06-21 DIAGNOSIS — E11.9 TYPE 2 DIABETES MELLITUS WITHOUT COMPLICATION, WITHOUT LONG-TERM CURRENT USE OF INSULIN (HCC): ICD-10-CM

## 2022-06-21 PROCEDURE — G8399 PT W/DXA RESULTS DOCUMENT: HCPCS | Performed by: FAMILY MEDICINE

## 2022-06-21 PROCEDURE — 99213 OFFICE O/P EST LOW 20 MIN: CPT | Performed by: FAMILY MEDICINE

## 2022-06-21 PROCEDURE — 1090F PRES/ABSN URINE INCON ASSESS: CPT | Performed by: FAMILY MEDICINE

## 2022-06-21 PROCEDURE — 3046F HEMOGLOBIN A1C LEVEL >9.0%: CPT | Performed by: FAMILY MEDICINE

## 2022-06-21 PROCEDURE — 2022F DILAT RTA XM EVC RTNOPTHY: CPT | Performed by: FAMILY MEDICINE

## 2022-06-21 PROCEDURE — 1123F ACP DISCUSS/DSCN MKR DOCD: CPT | Performed by: FAMILY MEDICINE

## 2022-06-21 PROCEDURE — 3017F COLORECTAL CA SCREEN DOC REV: CPT | Performed by: FAMILY MEDICINE

## 2022-06-21 PROCEDURE — G8427 DOCREV CUR MEDS BY ELIG CLIN: HCPCS | Performed by: FAMILY MEDICINE

## 2022-06-21 RX ORDER — PREDNISONE 20 MG/1
20 TABLET ORAL 2 TIMES DAILY
Qty: 10 TABLET | Refills: 0 | Status: SHIPPED | OUTPATIENT
Start: 2022-06-21 | End: 2022-06-26

## 2022-06-21 RX ORDER — DOXYCYCLINE HYCLATE 100 MG
100 TABLET ORAL 2 TIMES DAILY
Qty: 20 TABLET | Refills: 0 | Status: SHIPPED | OUTPATIENT
Start: 2022-06-21 | End: 2022-07-01

## 2022-06-21 RX ORDER — ATORVASTATIN CALCIUM 20 MG/1
20 TABLET, FILM COATED ORAL NIGHTLY
Qty: 90 TABLET | Refills: 1 | Status: SHIPPED | OUTPATIENT
Start: 2022-06-21

## 2022-06-21 RX ORDER — BENZONATATE 100 MG/1
100 CAPSULE ORAL 3 TIMES DAILY PRN
Qty: 30 CAPSULE | Refills: 0 | Status: SHIPPED | OUTPATIENT
Start: 2022-06-21 | End: 2022-07-01

## 2022-06-21 ASSESSMENT — ENCOUNTER SYMPTOMS
COUGH: 1
SHORTNESS OF BREATH: 1
WHEEZING: 0

## 2022-06-21 NOTE — PROGRESS NOTES
2022    TELEHEALTH EVALUATION -- Audio/Visual (During [place] [id number] public health emergency)    HPI:    Lucho Bragg (:  1950) has requested an audio/video evaluation for the following concern(s):      Positive covid test on 6/15. Seen in ER on 6/15:  Treated with norco and tessalon. Taking tessalon and using robitussin. Cough bothers her the most.  Diarrhea 1 episode per day. Feels weak. 95% pulse ox. SOB with climbing flight of steps. No fever since being in ER. Decreased appetite. Drinking water. Glucose in 90s. She took lasix today as she been hold the lasix. Review of Systems   Constitutional: Negative for chills and fever. Respiratory: Positive for cough and shortness of breath. Negative for wheezing. Cardiovascular: Negative for chest pain and leg swelling. Prior to Visit Medications    Medication Sig Taking?  Authorizing Provider   benzonatate (TESSALON PERLES) 100 MG capsule Take 1 capsule by mouth 3 times daily as needed for Cough Yes Mychal Garrido, APRN - CNP   benazepril (LOTENSIN) 20 MG tablet TAKE 1 TABLET BY MOUTH 2 TIMES DAILY Yes Benji Willis MD   magnesium oxide (MAG-OX) 400 MG tablet Take 400 mg by mouth daily Yes Historical Provider, MD   SUPER B COMPLEX/C PO Take 1 tablet by mouth daily Yes Historical Provider, MD   pantoprazole (PROTONIX) 40 MG tablet  Yes Historical Provider, MD   atorvastatin (LIPITOR) 20 MG tablet Take 1 tablet by mouth nightly Yes Benji Willis MD   ibuprofen (ADVIL;MOTRIN) 600 MG tablet Take 1 tablet by mouth every 6 hours as needed for Pain Yes Benji Willis MD   anastrozole (ARIMIDEX) 1 MG tablet Take 1 tablet by mouth daily Yes Alicia Jean MD   empagliflozin (JARDIANCE) 25 MG tablet Take 25 mg by mouth daily Yes Historical Provider, MD   insulin lispro, 1 Unit Dial, (HUMALOG KWIKPEN) 100 UNIT/ML SOPN Inject into the skin 3 times daily Indications: inject 0-12 unitsinto the skin 3 times daily-per sliding scale Yes Historical Provider, MD   furosemide (LASIX) 20 MG tablet  Yes Historical Provider, MD   LANTUS SOLOSTAR 100 UNIT/ML injection pen Inject 39 Units into the skin nightly  Yes Historical Provider, MD   Calcium Carb-Cholecalciferol 600-500 MG-UNIT CAPS Take by mouth daily Yes Historical Provider, MD   aspirin 81 MG chewable tablet Take 81 mg by mouth daily Yes Historical Provider, MD   therapeutic multivitamin-minerals (THERAGRAN-M) tablet Take 1 tablet by mouth daily. Yes Historical Provider, MD   Handicap Placard MISC by Does not apply route Dx:  Shortness of breath on exertion; duration 3 years: exp date: 8/7/2022  Daria Magdaleno MD   EASY TOUCH PEN NEEDLES 31G X 6 MM [facility]   Historical Provider, MD       Social History     Tobacco Use    Smoking status: Never Smoker    Smokeless tobacco: Never Used   Vaping Use    Vaping Use: Never used   Substance Use Topics    Alcohol use:  Yes     Alcohol/week: 0.0 standard drinks     Comment: Socially    Drug use: No        Allergies   Allergen Reactions    Amoxicillin     Bactrim [Sulfamethoxazole-Trimethoprim] Itching    Donnatal [Phenobarbital-Belladonna Alk] Other (See Comments)     palpitations    Lovastatin Other (See Comments)     myalgia    Metformin And Related Diarrhea    Vioxx Other (See Comments)     Liver problems    Dilaudid [Hydromorphone Hcl] Nausea And Vomiting    Fentanyl Nausea And Vomiting     Severe Nausea and vomitting   ,   Past Medical History:   Diagnosis Date    Arthritis     Asthma     Brain cyst     benign    Breast cancer ([facility] [address] 09/18/2018    Left breast, 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    Cancer ([facility] [address] 09/1918    Left Breast    Chronic sinus complaints     Diverticulosis of colon     DKA (diabetic ketoacidoses) 05/2018    Elevated TSH     Hypertension     Osteoarthritis     Polyneuropathy     PONV (postoperative nausea and vomiting)     Spinal headache     Type 2 diabetes mellitus ([address] 1990's PHYSICAL EXAMINATION:  [ INSTRUCTIONS:  \"[x]\" Indicates a positive item  \"[]\" Indicates a negative item  -- DELETE ALL ITEMS NOT EXAMINED]  Constitutional: [x] Appears well-developed and well-nourished [x] No apparent distress      [] Abnormal-   Mental status  [x] Alert and awake  [] Oriented to person/place/time [x]Able to follow commands      Eyes:  EOM    []  Normal  [] Abnormal-  Sclera  [x]  Normal  [] Abnormal -         Discharge [x]  None visible  [] Abnormal -    HENT:   [x] Normocephalic, atraumatic. [] Abnormal   [x] Mouth/Throat: Mucous membranes are moist.     External Ears [x] Normal  [] Abnormal-     Neck: [x] No visualized mass     Pulmonary/Chest: [x] Respiratory effort normal.  [] No visualized signs of difficulty breathing or respiratory distress        [x] Abnormal- harsh cough with deep inspiration with SOB afterwards         Skin:        [x] No significant exanthematous lesions or discoloration noted on facial skin         [] Abnormal-            Psychiatric:       [x] Normal Affect [x] No Hallucinations        [] Abnormal-     Other pertinent observable physical exam findings-     ASSESSMENT/PLAN:  1. COVID-19 virus infection  COVID infection status post about 9 days. Having ongoing symptoms. Some shortness of breath but pulse ox is good. Will treat with antibiotic in case there is superimposed bacterial infection. Treat with Tessalon and prednisone to try to improve her cough  - benzonatate (TESSALON PERLES) 100 MG capsule; Take 1 capsule by mouth 3 times daily as needed for Cough  Dispense: 30 capsule; Refill: 0  - predniSONE (DELTASONE) 20 MG tablet; Take 1 tablet by mouth 2 times daily for 5 days  Dispense: 10 tablet; Refill: 0  - doxycycline hyclate (VIBRA-TABS) 100 MG tablet; Take 1 tablet by mouth 2 times daily for 10 days  Dispense: 20 tablet; Refill: 0    2.  Type 2 diabetes mellitus without complication, without long-term current use of insulin ([facility] [address]  May have elevated glucose prednisone    3. ASCVD (arteriosclerotic cardiovascular disease)  Med refill  - atorvastatin (LIPITOR) 20 MG tablet; Take 1 tablet by mouth nightly  Dispense: 90 tablet; Refill: 1      Return if symptoms worsen or fail to improve. Stephanie Padilla, was evaluated through a synchronous (real-time) audio-video encounter. The patient (or guardian if applicable) is aware that this is a billable service, which includes applicable co-pays. This Virtual Visit was conducted with patient's (and/or legal guardian's) consent. The visit was conducted pursuant to the emergency declaration under the [facility], [address] [place] [address] authority and the [employer] General Act. Patient identification was verified, and a caregiver was present when appropriate. The patient was located at Home: [address] [place], [place] [id number]. Provider was located at [facility]): [address] [place] [address]. Total time spent on this encounter: Not billed by time    --Cori May MD on 6/21/2022 at 2:44 PM    An electronic signature was used to authenticate this note.

## 2022-06-21 NOTE — TELEPHONE ENCOUNTER
From: Myrtle Perkins  To: Dr. Shelton Holiday: 6/21/2022 7:44 AM EDT  Subject: Steroid? My covid cough from over a week ago is not improving so I'm wondering if I can get a prescription for a steroid or do I have to go back to ER to be seen? This is my first covid experience so not sure what the protocol is but I need something beside the Benzonatate caps. 2 TID and I am on 2nd bottle of Robitussin DM. Sleeping at night is difficult even practically sitting up. Thank you!

## 2022-06-24 ENCOUNTER — CARE COORDINATION (OUTPATIENT)
Dept: CARE COORDINATION | Age: 72
End: 2022-06-24

## 2022-06-29 ENCOUNTER — OFFICE VISIT (OUTPATIENT)
Dept: FAMILY MEDICINE CLINIC | Age: 72
End: 2022-06-29
Payer: MEDICARE

## 2022-06-29 VITALS
OXYGEN SATURATION: 97 % | HEART RATE: 78 BPM | TEMPERATURE: 97.2 F | HEIGHT: 63 IN | WEIGHT: 204.6 LBS | DIASTOLIC BLOOD PRESSURE: 64 MMHG | SYSTOLIC BLOOD PRESSURE: 130 MMHG | BODY MASS INDEX: 36.25 KG/M2 | RESPIRATION RATE: 16 BRPM

## 2022-06-29 DIAGNOSIS — M15.9 PRIMARY OSTEOARTHRITIS INVOLVING MULTIPLE JOINTS: Chronic | ICD-10-CM

## 2022-06-29 DIAGNOSIS — E78.00 PURE HYPERCHOLESTEROLEMIA: ICD-10-CM

## 2022-06-29 DIAGNOSIS — U09.9 POST-COVID CHRONIC COUGH: ICD-10-CM

## 2022-06-29 DIAGNOSIS — E11.9 TYPE 2 DIABETES MELLITUS WITHOUT COMPLICATION, WITHOUT LONG-TERM CURRENT USE OF INSULIN (HCC): Primary | ICD-10-CM

## 2022-06-29 DIAGNOSIS — R05.3 POST-COVID CHRONIC COUGH: ICD-10-CM

## 2022-06-29 DIAGNOSIS — J45.20 MILD INTERMITTENT ASTHMA WITHOUT COMPLICATION: ICD-10-CM

## 2022-06-29 PROCEDURE — 3017F COLORECTAL CA SCREEN DOC REV: CPT | Performed by: FAMILY MEDICINE

## 2022-06-29 PROCEDURE — G8427 DOCREV CUR MEDS BY ELIG CLIN: HCPCS | Performed by: FAMILY MEDICINE

## 2022-06-29 PROCEDURE — 2022F DILAT RTA XM EVC RTNOPTHY: CPT | Performed by: FAMILY MEDICINE

## 2022-06-29 PROCEDURE — G8399 PT W/DXA RESULTS DOCUMENT: HCPCS | Performed by: FAMILY MEDICINE

## 2022-06-29 PROCEDURE — 3046F HEMOGLOBIN A1C LEVEL >9.0%: CPT | Performed by: FAMILY MEDICINE

## 2022-06-29 PROCEDURE — 99214 OFFICE O/P EST MOD 30 MIN: CPT | Performed by: FAMILY MEDICINE

## 2022-06-29 PROCEDURE — 1090F PRES/ABSN URINE INCON ASSESS: CPT | Performed by: FAMILY MEDICINE

## 2022-06-29 PROCEDURE — 1036F TOBACCO NON-USER: CPT | Performed by: FAMILY MEDICINE

## 2022-06-29 PROCEDURE — G8417 CALC BMI ABV UP PARAM F/U: HCPCS | Performed by: FAMILY MEDICINE

## 2022-06-29 PROCEDURE — 1123F ACP DISCUSS/DSCN MKR DOCD: CPT | Performed by: FAMILY MEDICINE

## 2022-06-29 RX ORDER — ALBUTEROL SULFATE 90 UG/1
2 AEROSOL, METERED RESPIRATORY (INHALATION) 4 TIMES DAILY PRN
Qty: 18 G | Refills: 1 | Status: SHIPPED | OUTPATIENT
Start: 2022-06-29

## 2022-06-29 ASSESSMENT — ENCOUNTER SYMPTOMS
COUGH: 1
WHEEZING: 1
SHORTNESS OF BREATH: 1

## 2022-06-29 ASSESSMENT — PATIENT HEALTH QUESTIONNAIRE - PHQ9
SUM OF ALL RESPONSES TO PHQ QUESTIONS 1-9: 0
SUM OF ALL RESPONSES TO PHQ QUESTIONS 1-9: 0
2. FEELING DOWN, DEPRESSED OR HOPELESS: 0
SUM OF ALL RESPONSES TO PHQ9 QUESTIONS 1 & 2: 0
1. LITTLE INTEREST OR PLEASURE IN DOING THINGS: 0
SUM OF ALL RESPONSES TO PHQ QUESTIONS 1-9: 0
SUM OF ALL RESPONSES TO PHQ QUESTIONS 1-9: 0

## 2022-06-29 NOTE — PROGRESS NOTES
[facility]  [facility]  [address] [facility]  Dept: [phone]  Dept Fax: [phone]  Loc: [phone]  PROGRESS NOTE      Visit Date: 6/29/2022    Elijah Hoyt is a 74 y.o. female who presents today for:  Chief Complaint   Patient presents with    6 Month Follow-Up    Hypertension    Post-COVID Symptoms     pt has some concerns        Subjective:  HPI     6 month f/u    Type 2 DM: On jardiance, lantus 37 units daily, and humalog ISS 3x per day. Glucose 97 this morning. Had higher glucose levels when on prednisone. Decreased appetite from recent covid infection. Some hypoglycemic episodes. a1c 7.1% in nov 2021. Goes to internal medicine for diabetes. HTN:  On benazepril. Uses lasix prn    Hyperlipidemia:  On lipitor. LDL 53 in November 2021    Had covid in mid June. She thinks she developed asthma. Coughs with AC in car and in fan in house. Finishing out doxycycline. Completed course of prednisone. planning to do cologuard:  Sees GI    Requests handicap placard    Review of Systems   Constitutional: Negative for chills and fever. Respiratory: Positive for cough, shortness of breath and wheezing. Neurological: Positive for light-headedness. Negative for dizziness.      Patient Active Problem List   Diagnosis    Hyperlipidemia    Asthma    Polyneuropathy    AS (aortic stenosis)    Cardiomyopathy eF 39 TO 50% PER ECHO MAY 2012    Hiatal hernia    Hypothyroid    Type 2 diabetes mellitus without complication, without long-term current use of insulin (HCC)    Osteoarthritis of multiple joints    Ear canal mass    Diabetic ketoacidosis without coma associated with type 2 diabetes mellitus ([address]    Malignant neoplasm of left breast in female, estrogen receptor positive (HCC)    Localized osteoarthritis of left knee    Other osteoporosis without current pathological fracture    S/P repair of ventral hernia    Cervical stenosis of spinal canal    Tear of left supraspinatus tendon    Morbidly obese (HCC)     Past Medical History:   Diagnosis Date    Arthritis     Asthma     Brain cyst     benign    Breast cancer ([address] 09/18/2018    Left breast, 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    Cancer ([address] 09/1918    Left Breast    Chronic sinus complaints     Diverticulosis of colon     DKA (diabetic ketoacidoses) 05/2018    Elevated TSH     Hypertension     Osteoarthritis     Polyneuropathy     PONV (postoperative nausea and vomiting)     Spinal headache     Type 2 diabetes mellitus ([address] 1990's      Past Surgical History:   Procedure Laterality Date    BLADDER SUSPENSION      times 2    BREAST LUMPECTOMY Left 10/10/2018    CARPAL TUNNEL RELEASE Bilateral 02/2020    CHOLECYSTECTOMY      DILATION AND CURETTAGE OF UTERUS      x2    EYE SURGERY      HAND SURGERY Right     thumb-revision of suspensionplasty    HERNIA REPAIR  06/18/2019    HYSTERECTOMY (CERVIX STATUS UNKNOWN)  1995    JOINT REPLACEMENT Right 2007    Rt total knee    KNEE ARTHROSCOPY  1967, 2001    RUDY STEROTACTIC LOC BREAST BIOPSY LEFT Left 09/19/2018    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    MANDIBLE FRACTURE SURGERY      OTHER SURGICAL HISTORY Right 12/30/2015    Excision of Sebaceous Cyst Right Ear     OVARY REMOVAL  1995    AL OFFICE/OUTPT VISIT,PROCEDURE ONLY Left 10/10/2018    LEFT BREAST LUMPECTOMY WITH SENTINEL LYMPH NODE BIOPSY [facility]      TONSILLECTOMY AND ADENOIDECTOMY  age 4   [facility] VENTRAL HERNIA REPAIR N/A 6/14/2019    COMBINED LAPAROSCOPIC AND OPEN VENTRAL HERNIA REPAIR WITH MESH performed by Sultana Ledesma MD at [address] [place] [address] Right     right-excision      Family History   Problem Relation Age of Onset    Diabetes Mother     Heart Disease Mother     Lupus Mother     Heart Disease Father     Cancer Father         Squamous cell - face & scalp    Diabetes Maternal Grandmother     Heart Disease Maternal Grandfather     Cancer Paternal Aunt          multiple myeloma    Cancer Paternal Uncle         Lymphatic Leukemia    Breast Cancer Maternal Cousin         unsure on age, had breast cancer twice unsure if bilateral or if reoccurence    Heart Disease Maternal Uncle     Lupus Maternal Uncle     Other Paternal Grandfather         Brain aneurysm     Social History     Tobacco Use    Smoking status: Never Smoker    Smokeless tobacco: Never Used   Substance Use Topics    Alcohol use:  Yes     Alcohol/week: 0.0 standard drinks     Comment: Socially      Current Outpatient Medications   Medication Sig Dispense Refill    benzonatate (TESSALON PERLES) 100 MG capsule Take 1 capsule by mouth 3 times daily as needed for Cough 30 capsule 0    atorvastatin (LIPITOR) 20 MG tablet Take 1 tablet by mouth nightly 90 tablet 1    doxycycline hyclate (VIBRA-TABS) 100 MG tablet Take 1 tablet by mouth 2 times daily for 10 days 20 tablet 0    benazepril (LOTENSIN) 20 MG tablet TAKE 1 TABLET BY MOUTH 2 TIMES DAILY 180 tablet 1    magnesium oxide (MAG-OX) 400 MG tablet Take 400 mg by mouth daily      SUPER B COMPLEX/C PO Take 1 tablet by mouth daily      pantoprazole (PROTONIX) 40 MG tablet       ibuprofen (ADVIL;MOTRIN) 600 MG tablet Take 1 tablet by mouth every 6 hours as needed for Pain 360 tablet 1    anastrozole (ARIMIDEX) 1 MG tablet Take 1 tablet by mouth daily 90 tablet 3    empagliflozin (JARDIANCE) 25 MG tablet Take 25 mg by mouth daily      insulin lispro, 1 Unit Dial, (HUMALOG KWIKPEN) 100 UNIT/ML SOPN Inject into the skin 3 times daily Indications: inject 0-12 unitsinto the skin 3 times daily-per sliding scale      furosemide (LASIX) 20 MG tablet       Handicap Placard MISC by Does not apply route Dx:  Shortness of breath on exertion; duration 3 years: exp date: 8/7/2022 1 each 0    EASY TOUCH PEN NEEDLES 31G X 6 MM MISC       LANTUS SOLOSTAR 100 UNIT/ML injection pen Inject 39 Units into the skin nightly       Calcium Carb-Cholecalciferol 600-500 MG-UNIT CAPS Take by mouth daily      aspirin 81 MG chewable tablet Take 81 mg by mouth daily      therapeutic multivitamin-minerals (THERAGRAN-M) tablet Take 1 tablet by mouth daily. No current facility-administered medications for this visit. Allergies   Allergen Reactions    Amoxicillin     Bactrim [Sulfamethoxazole-Trimethoprim] Itching    Donnatal [Phenobarbital-Belladonna Alk] Other (See Comments)     palpitations    Lovastatin Other (See Comments)     myalgia    Metformin And Related Diarrhea    Vioxx Other (See Comments)     Liver problems    Dilaudid [Hydromorphone Hcl] Nausea And Vomiting    Fentanyl Nausea And Vomiting     Severe Nausea and vomitting     Health Maintenance   Topic Date Due    Diabetic retinal exam  08/31/2021    Colorectal Cancer Screen  06/26/2022    Breast cancer screen  10/05/2022    A1C test (Diabetic or Prediabetic)  11/10/2022    Diabetic microalbuminuria test  11/20/2022    Lipids  11/20/2022    Depression Screen  12/20/2022    Annual Wellness Visit (AWV)  12/21/2022    Diabetic foot exam  06/29/2023    DTaP/Tdap/Td vaccine (2 - Td or Tdap) 04/25/2029    DEXA (modify frequency per FRAX score)  Completed    Flu vaccine  Completed    Shingles vaccine  Completed    Pneumococcal 65+ years Vaccine  Completed    COVID-19 Vaccine  Completed    Hepatitis C screen  Completed    Hepatitis A vaccine  Aged Out    Hib vaccine  Aged Out    Meningococcal (ACWY) vaccine  Aged Out       Objective:  /64 (Site: Left Upper Arm, Position: Sitting)   Pulse 78   Temp 97.2 °F (36.2 °C)   Resp 16   Ht 5' 3\" (1.6 m)   Wt 204 lb 9.6 oz (92.8 kg)   SpO2 97%   BMI 36.24 kg/m²   Physical Exam  Vitals reviewed. Constitutional:       General: She is not in acute distress.      Appearance: She is well-developed. Cardiovascular:      Rate and Rhythm: Normal rate and regular rhythm. Heart sounds: Murmur heard. Systolic murmur is present with a grade of 2/6. Pulmonary:      Effort: Pulmonary effort is normal. No respiratory distress. Breath sounds: Normal breath sounds. No wheezing or rhonchi. Musculoskeletal:      Right lower leg: Edema (trace) present. Left lower leg: Edema (trace) present. Neurological:      Mental Status: She is alert. Mental status is at baseline. Psychiatric:         Mood and Affect: Mood normal.         Behavior: Behavior normal.         Impression/Plan:  1. Type 2 diabetes mellitus without complication, without long-term current use of insulin (HCC)  Chronic. Follows with internal medicine. Continue Jardiance, Lantus, and Humalog. -  DIABETES FOOT EXAM    2. Pure hypercholesterolemia  Chronic. Continue Lipitor. Controlled. 3. Mild intermittent asthma without complication  Chronic. Has been worse with increase sensitivity since having COVID. Continue albuterol. If not improving, consider other inhalers and PFTs    4. Post-COVID chronic cough  Recent COVID infection. Has cough yet. More sensitive to cold air. Continue albuterol as needed  - albuterol sulfate HFA (VENTOLIN HFA) 108 (90 Base) MCG/ACT inhaler; Inhale 2 puffs into the lungs 4 times daily as needed for Wheezing  Dispense: 18 g; Refill: 1    5. Primary osteoarthritis involving multiple joints  - Handicap Placard MISC; by Does not apply route Duration:  5 years; dx:  osteoarthritis  Dispense: 1 each; Refill: 0      They voiced understanding. All questions answered. They agreed with treatment plan. See patient instructions for any educational materials that may have been given. Discussed use, benefit, and side effects of prescribed medications. Reviewed health maintenance.     (Please note that portions of this note may have been completed with a voice recognition program. Efforts were made to edit the dictation but occasionally words are mis-transcribed.)    Return in about 6 months (around 12/22/2022) for medicare wellness.       Electronically signed by Meet Carolina MD on 6/29/2022 at 11:04 AM

## 2022-07-07 ENCOUNTER — OFFICE VISIT (OUTPATIENT)
Dept: ONCOLOGY | Age: 72
End: 2022-07-07
Payer: MEDICARE

## 2022-07-07 ENCOUNTER — HOSPITAL ENCOUNTER (OUTPATIENT)
Dept: INFUSION THERAPY | Age: 72
Discharge: HOME | End: 2022-07-07
Payer: MEDICARE

## 2022-07-07 VITALS
HEART RATE: 72 BPM | SYSTOLIC BLOOD PRESSURE: 128 MMHG | OXYGEN SATURATION: 95 % | DIASTOLIC BLOOD PRESSURE: 60 MMHG | WEIGHT: 199 LBS | RESPIRATION RATE: 16 BRPM | HEIGHT: 63 IN | BODY MASS INDEX: 35.26 KG/M2 | TEMPERATURE: 98.1 F

## 2022-07-07 VITALS
SYSTOLIC BLOOD PRESSURE: 128 MMHG | DIASTOLIC BLOOD PRESSURE: 60 MMHG | RESPIRATION RATE: 16 BRPM | OXYGEN SATURATION: 95 % | HEART RATE: 72 BPM | TEMPERATURE: 98.1 F

## 2022-07-07 DIAGNOSIS — Z17.0 MALIGNANT NEOPLASM OF LOWER-OUTER QUADRANT OF LEFT BREAST OF FEMALE, ESTROGEN RECEPTOR POSITIVE (HCC): ICD-10-CM

## 2022-07-07 DIAGNOSIS — C50.512 MALIGNANT NEOPLASM OF LOWER-OUTER QUADRANT OF LEFT BREAST OF FEMALE, ESTROGEN RECEPTOR POSITIVE (HCC): ICD-10-CM

## 2022-07-07 DIAGNOSIS — Z12.31 VISIT FOR SCREENING MAMMOGRAM: Primary | ICD-10-CM

## 2022-07-07 PROCEDURE — 3017F COLORECTAL CA SCREEN DOC REV: CPT | Performed by: PHYSICIAN ASSISTANT

## 2022-07-07 PROCEDURE — G8427 DOCREV CUR MEDS BY ELIG CLIN: HCPCS | Performed by: PHYSICIAN ASSISTANT

## 2022-07-07 PROCEDURE — 1090F PRES/ABSN URINE INCON ASSESS: CPT | Performed by: PHYSICIAN ASSISTANT

## 2022-07-07 PROCEDURE — 99211 OFF/OP EST MAY X REQ PHY/QHP: CPT

## 2022-07-07 PROCEDURE — 99214 OFFICE O/P EST MOD 30 MIN: CPT | Performed by: PHYSICIAN ASSISTANT

## 2022-07-07 PROCEDURE — G8399 PT W/DXA RESULTS DOCUMENT: HCPCS | Performed by: PHYSICIAN ASSISTANT

## 2022-07-07 PROCEDURE — 1123F ACP DISCUSS/DSCN MKR DOCD: CPT | Performed by: PHYSICIAN ASSISTANT

## 2022-07-07 PROCEDURE — G8417 CALC BMI ABV UP PARAM F/U: HCPCS | Performed by: PHYSICIAN ASSISTANT

## 2022-07-07 PROCEDURE — 1036F TOBACCO NON-USER: CPT | Performed by: PHYSICIAN ASSISTANT

## 2022-07-07 RX ORDER — ANASTROZOLE 1 MG/1
1 TABLET ORAL DAILY
Qty: 90 TABLET | Refills: 3 | Status: SHIPPED | OUTPATIENT
Start: 2022-07-07

## 2022-07-07 NOTE — PATIENT INSTRUCTIONS
1. Return to clinic in 6 months with Nhung Marie PA-C  2. Schedule Reclast injection 10/1/22 or week of. BMP prior to injection  3. Annual mammogram in October 2022  4. Please call for questions or concerns. 5. Instructed to take arimidex at night. Try evening primrose oil for hot flashes.

## 2022-07-07 NOTE — PROGRESS NOTES
[facility], [address] [address]  [address] [address]  Dept: [phone]  Dept Fax: [phone]: [phone]      Visit Date:7/7/2022     Chase Medina is a 74 y.o. female who presents today for:   Chief Complaint   Patient presents with    Follow-up     Malignant neoplasm of lower-outer quadrant of left breast of female, estrogen receptor positive         HPI:   Chase Medina is a 74 y.o. female previously followed by Dr. Brianne Peguero for history of left breast cancer. Per Dr. Sea Purdy note on 4/4/2022:   She underwent routine screening mammogram on September 7, 2018 that revealed clustered calcifications in the left breast.  She had a needle biopsy on September 19, 2018 showing invasive ductal carcinoma, measuring 7 mm in size, grade 1, % positive, IA 70% positive and HER-2/oleg negative. The patient met with the surgeon Dr. Joni Shaw and after discussing her surgical treatment options she decided to proceed with left lumpectomy and sentinel lymph node biopsy. She had her surgery on October 10, 2018 at the lumpectomy specimen did not reveal evidence of residual malignancy. All 3 sentinel lymph nodes were cancer free. Subsequently the patient met with a radiation oncologist who based on and evidence from CALGB study didn't recommend postlumpectomy radiation treatment. Subsequently the patient met with Dr. Manjinder Gomez who placed her on adjuvant hormonal therapy with Arimidex, which she started in October 2018. The patient underwent a bone density study in November 2018 that revealed osteoporosis. The lowest T score was -2.8. The patient initiated treatment with Zometa on November 7, 2018. Since Dr. Charleen Zamarripa is retiring the patient established care with new medical oncologist at [address] [address]. Interval History 7/7/2022:   Patient is here for follow-up evaluation.   She was evaluated by Dr. Brianne Peguero in April 2022 and had reported worsening arthralgias involving the right hip. The patient had MRI of the right hip ordered per Dr. Homar Ruiz; however, the patient did not complete as she felt pain was improved. Since last visit on 4/4/2022, the patient went to the urgent care on 4/21/2022 and was diagnosed with bacterial sinusitis. She was evaluated in the ED on 6/15/2022 with cough, shortness of breath, febrile. She was noted to have COVID-19 infection. Patient states she feels she is recovering from COVID-19 infection. She affirms poor appetite and mild shortness of breath which is improving over time. The patient did not have MRI of the head completed as she felt pain resolved. She denies any concerns related to her breast today. denies nipple inversion, nipple discharge or painful lumps or bumps. She denies unintentional weight loss, early satiety, or headaches. PMH, SH, and FH:  I reviewed the patients medication list and allergy list as noted on the electronic medical record. The PMH, SH and FH were also reviewed as noted on the EMR. Review of Systems:   Review of Systems   Pertinent review of systems noted in HPI, all other ROS negative. Objective:   Physical Exam   /60 (Site: Right Upper Arm, Position: Sitting, Cuff Size: Medium Adult)   Pulse 72   Temp 98.1 °F (36.7 °C) (Oral)   Resp 16   Ht 5' 3\" (1.6 m)   Wt 199 lb (90.3 kg)   SpO2 95%   BMI 35.25 kg/m²    General appearance: No apparent distress, well developed, and cooperative. HEENT: Pupils equal, round, and reactive to light. Conjunctivae/corneas clear. Neck: Supple, with full range of motion. Trachea midline. Respiratory:  Normal respiratory effort. Clear to auscultation bilaterally. No wheezes, rhonchi. Cardiovascular: Regular rate and rhythm with normal S1/S2  Abdomen: Soft, active bowel sounds. Musculoskeletal: No clubbing, cyanosis or edema bilaterally. Skin: Skin color, texture, turgor normal.  No visible rashes or lesions.   Neurologic:  Neurovascularly intact without any focal sensory/motor deficits. Psychiatric: Alert and oriented      Imaging Studies and Labs:   CBC:   Lab Results   Component Value Date    WBC 5.4 06/15/2022    HGB 14.5 06/15/2022    HCT 43.5 06/15/2022    MCV 93.3 06/15/2022     06/15/2022     BMP:   Lab Results   Component Value Date/Time     06/15/2022 08:18 AM    K 4.3 06/15/2022 08:18 AM     06/15/2022 08:18 AM    CO2 23 06/15/2022 08:18 AM    BUN 21 06/15/2022 08:18 AM    CREATININE 0.4 06/15/2022 08:18 AM    GLUCOSE 119 06/15/2022 08:18 AM    GLUCOSE 188 09/29/2018 06:30 AM    CALCIUM 9.3 06/15/2022 08:18 AM      LFT:   Lab Results   Component Value Date    ALT 26 11/20/2021    AST 24 11/20/2021    ALKPHOS 57 11/20/2021    BILITOT 1.5 (H) 11/20/2021         Assessment and Plan: 1. Stage IA (pT1b, pN0, cM0, G1, ER+, CO+, HER2-) left breast cancer diagnosed in 2018  S/P lumpectomy with sentinel lymph node biopsy. The patient did not have postlumpectomy radiation treatment. The CALGB 9343 study enrolled patients 79years of age and older. The results showed 10% risk of local disease recurrence at 10 years in women treated with tamoxifen only ( no radiation therapy) in contrast to 2% risk of local disease recurrence at 10 years in women treated with tamoxifen and radiation therapy. However overall survival was the same for both groups. PRIME 2 study, confirmed a modest reduction in recurrence rate with RT that did not translate into a survival benefit. After a median follow-up of five years, ipsilateral breast tumor recurrences were lower in women assigned to RT (1.3 versus 4.1 percent). No differences in overall survival, regional recurrence, distant metastases, contralateral breast cancers, or new breast cancers were noted between the two groups  Mammogram completed on 10/5/2021 showed no evidence of malignancy.    -She will be due for mammogram in October 2022     2.   Adjuvant hormonal therapy with Arimidex   The patient was started on Arimidex. At the end of May 2020 the patient stopped taking Arimidex due to arthralgia and hot flashes and cloudiness. After being off AI for 2 months, she restarted Arimidex. At appointment in April 2022 with Dr. Brianne Peguero she had reported worsening left hip pain. MRI of the hip was ordered per Dr. Brianne Peguero which patient did not complete due to resolution of pain.    -She will continue Arimidex. Patient reports hot flashes and was instructed to take arimidex at night. Instructed to try evening primrose oil.   -Reviewed cancer surveillance with patient. 3.  Osteoporosis  DEXA on 3/18/21 showed osteoporosis. The patient has been on Reclast annually. She will be due for next injection 10/1/2022 or the week of. Will obtain BMP prior to injection. She will be due for DEXA March 2023. RTC in 6 months     All patient questions answered. Pt voiced understanding. Patient agreed with treatment plan. Follow up as directed. Patient instructed to call for questions or concerns.           Electronically signed by   Boni Powell PA-C

## 2022-09-29 DIAGNOSIS — M81.8 OTHER OSTEOPOROSIS WITHOUT CURRENT PATHOLOGICAL FRACTURE: ICD-10-CM

## 2022-09-29 DIAGNOSIS — Z85.3 HISTORY OF RIGHT BREAST CANCER: Primary | ICD-10-CM

## 2022-09-30 ENCOUNTER — HOSPITAL ENCOUNTER (OUTPATIENT)
Dept: INFUSION THERAPY | Age: 72
Discharge: HOME | End: 2022-09-30
Payer: MEDICARE

## 2022-09-30 VITALS
TEMPERATURE: 98.3 F | SYSTOLIC BLOOD PRESSURE: 117 MMHG | OXYGEN SATURATION: 94 % | HEART RATE: 64 BPM | RESPIRATION RATE: 16 BRPM | DIASTOLIC BLOOD PRESSURE: 56 MMHG

## 2022-09-30 DIAGNOSIS — M81.8 OTHER OSTEOPOROSIS WITHOUT CURRENT PATHOLOGICAL FRACTURE: ICD-10-CM

## 2022-09-30 DIAGNOSIS — Z85.3 HISTORY OF RIGHT BREAST CANCER: Primary | ICD-10-CM

## 2022-09-30 DIAGNOSIS — Z17.0 MALIGNANT NEOPLASM OF UPPER-OUTER QUADRANT OF LEFT BREAST IN FEMALE, ESTROGEN RECEPTOR POSITIVE (HCC): ICD-10-CM

## 2022-09-30 DIAGNOSIS — C50.412 MALIGNANT NEOPLASM OF UPPER-OUTER QUADRANT OF LEFT BREAST IN FEMALE, ESTROGEN RECEPTOR POSITIVE (HCC): ICD-10-CM

## 2022-09-30 LAB
BUN, WHOLE BLOOD: 27 MG/DL (ref 8–26)
CHLORIDE, WHOLE BLOOD: 106 MEQ/L (ref 98–109)
CREATININE, WHOLE BLOOD: 0.8 MG/DL (ref 0.5–1.2)
GFR, ESTIMATED: 74 ML/MIN/1.73M2
GLUCOSE, WHOLE BLOOD: 96 MG/DL (ref 70–108)
IONIZED CALCIUM, WHOLE BLOOD: 1.15 MMOL/L (ref 1.12–1.32)
POTASSIUM, WHOLE BLOOD: 4.2 MEQ/L (ref 3.5–4.9)
SODIUM, WHOLE BLOOD: 142 MEQ/L (ref 138–146)
TOTAL CO2, WHOLE BLOOD: 29 MEQ/L (ref 23–33)

## 2022-09-30 PROCEDURE — 6360000002 HC RX W HCPCS: Performed by: PHYSICIAN ASSISTANT

## 2022-09-30 PROCEDURE — 96365 THER/PROPH/DIAG IV INF INIT: CPT

## 2022-09-30 PROCEDURE — 2580000003 HC RX 258: Performed by: PHYSICIAN ASSISTANT

## 2022-09-30 PROCEDURE — 80047 BASIC METABLC PNL IONIZED CA: CPT

## 2022-09-30 RX ORDER — ZOLEDRONIC ACID 5 MG/100ML
5 INJECTION, SOLUTION INTRAVENOUS ONCE
Status: CANCELLED
Start: 2022-09-30 | End: 2022-09-30

## 2022-09-30 RX ORDER — METHYLPREDNISOLONE SODIUM SUCCINATE 125 MG/2ML
125 INJECTION, POWDER, LYOPHILIZED, FOR SOLUTION INTRAMUSCULAR; INTRAVENOUS ONCE
OUTPATIENT
Start: 2022-09-30 | End: 2022-09-30

## 2022-09-30 RX ORDER — SODIUM CHLORIDE 9 MG/ML
INJECTION, SOLUTION INTRAVENOUS PRN
Start: 2022-09-30

## 2022-09-30 RX ORDER — SODIUM CHLORIDE 9 MG/ML
25 INJECTION, SOLUTION INTRAVENOUS PRN
OUTPATIENT
Start: 2022-09-30

## 2022-09-30 RX ORDER — SODIUM CHLORIDE 0.9 % (FLUSH) 0.9 %
5-40 SYRINGE (ML) INJECTION PRN
OUTPATIENT
Start: 2022-09-30

## 2022-09-30 RX ORDER — DIPHENHYDRAMINE HYDROCHLORIDE 50 MG/ML
50 INJECTION INTRAMUSCULAR; INTRAVENOUS ONCE
OUTPATIENT
Start: 2022-09-30 | End: 2022-09-30

## 2022-09-30 RX ORDER — ZOLEDRONIC ACID 5 MG/100ML
5 INJECTION, SOLUTION INTRAVENOUS ONCE
Status: COMPLETED | OUTPATIENT
Start: 2022-09-30 | End: 2022-09-30

## 2022-09-30 RX ORDER — HEPARIN SODIUM (PORCINE) LOCK FLUSH IV SOLN 100 UNIT/ML 100 UNIT/ML
500 SOLUTION INTRAVENOUS PRN
OUTPATIENT
Start: 2022-09-30

## 2022-09-30 RX ORDER — SODIUM CHLORIDE 9 MG/ML
INJECTION, SOLUTION INTRAVENOUS CONTINUOUS
OUTPATIENT
Start: 2022-09-30

## 2022-09-30 RX ORDER — SODIUM CHLORIDE 9 MG/ML
INJECTION, SOLUTION INTRAVENOUS PRN
Status: DISCONTINUED | OUTPATIENT
Start: 2022-09-30 | End: 2022-10-01 | Stop reason: HOSPADM

## 2022-09-30 RX ADMIN — ZOLEDRONIC ACID 5 MG: 5 INJECTION, SOLUTION INTRAVENOUS at 10:04

## 2022-09-30 RX ADMIN — SODIUM CHLORIDE: 9 INJECTION, SOLUTION INTRAVENOUS at 09:37

## 2022-09-30 NOTE — PROGRESS NOTES
Patient tolerated  Reclast infusion without any complications. Discharge instructions given to patient-verbalizes understanding. Ambulated off unit per self with belongings.

## 2022-09-30 NOTE — DISCHARGE INSTRUCTIONS
Please contact your Oncologist if you have any questions regarding the Reclast infusion that you received today. Patient instructed if experience any of the symptoms following today's Reclast infusion to notify MD immediately or go to emergency department.     * dizziness/lightheadedness  *acute nausea/vomiting - not relieved with medication  *headache - not relieved from Tylenol/pain medication  *chest pain/pressure  *rash/itching  *shortness of breath        Drink fluids - 48oz fluids daily  Call if develop fever/ chills/ signs or symptoms of infection

## 2022-10-07 ENCOUNTER — HOSPITAL ENCOUNTER (OUTPATIENT)
Dept: WOMENS IMAGING | Age: 72
Discharge: HOME | End: 2022-10-07
Payer: MEDICARE

## 2022-10-07 DIAGNOSIS — Z12.31 VISIT FOR SCREENING MAMMOGRAM: ICD-10-CM

## 2022-10-07 PROCEDURE — 77063 BREAST TOMOSYNTHESIS BI: CPT

## 2022-10-20 DIAGNOSIS — I10 ESSENTIAL HYPERTENSION: ICD-10-CM

## 2022-10-20 DIAGNOSIS — E11.65 TYPE 2 DIABETES MELLITUS WITH HYPERGLYCEMIA, WITHOUT LONG-TERM CURRENT USE OF INSULIN (HCC): ICD-10-CM

## 2022-10-20 RX ORDER — BENAZEPRIL HYDROCHLORIDE 20 MG/1
20 TABLET ORAL 2 TIMES DAILY
Qty: 180 TABLET | Refills: 1 | Status: SHIPPED | OUTPATIENT
Start: 2022-10-20

## 2022-10-20 NOTE — TELEPHONE ENCOUNTER
Wicho Douglas is requesting a refill on the following medications:  Requested Prescriptions     Pending Prescriptions Disp Refills    benazepril (LOTENSIN) 20 MG tablet [Pharmacy Med Name: BENAZEPRIL HCL 20 MG TABLET] 180 tablet 1     Sig: TAKE 1 TABLET BY MOUTH 2 TIMES DAILY       Date of last visit: 6/29/2022  Date of next visit (if applicable):1/9/2023  Date of last refill: 5/04/2022  [facility] [place] [place]

## 2022-12-08 ENCOUNTER — HOSPITAL ENCOUNTER (OUTPATIENT)
Dept: PULMONOLOGY | Age: 72
Discharge: HOME | End: 2022-12-08
Payer: MEDICARE

## 2022-12-08 DIAGNOSIS — I35.0 NONRHEUMATIC AORTIC VALVE STENOSIS: ICD-10-CM

## 2022-12-08 DIAGNOSIS — R06.09 DYSPNEA ON EXERTION: ICD-10-CM

## 2022-12-08 PROCEDURE — 94060 EVALUATION OF WHEEZING: CPT

## 2022-12-13 DIAGNOSIS — M25.562 CHRONIC PAIN OF LEFT KNEE: ICD-10-CM

## 2022-12-13 DIAGNOSIS — G89.29 CHRONIC PAIN OF LEFT KNEE: ICD-10-CM

## 2022-12-13 RX ORDER — IBUPROFEN 600 MG/1
600 TABLET ORAL EVERY 6 HOURS PRN
Qty: 120 TABLET | Refills: 0 | Status: SHIPPED | OUTPATIENT
Start: 2022-12-13

## 2022-12-13 NOTE — TELEPHONE ENCOUNTER
Merced Chao called requesting a refill on the following medications:  Requested Prescriptions     Pending Prescriptions Disp Refills    ibuprofen (ADVIL;MOTRIN) 600 MG tablet [Pharmacy Med Name: IBUPROFEN 600 MG TABLET] 360 tablet 1     Sig: TAKE 1 TABLET BY MOUTH EVERY 6 HOURS AS NEEDED FOR PAIN       Date of last visit: 6/29/2022  Date of next visit (if applicable):1/9/2023-[facility]  Date of last refill: 12/20/21   Ph[facility] Name: [facility]    120*0 pending     Thanks,  Flora Cabrera

## 2022-12-30 SDOH — HEALTH STABILITY: PHYSICAL HEALTH: ON AVERAGE, HOW MANY DAYS PER WEEK DO YOU ENGAGE IN MODERATE TO STRENUOUS EXERCISE (LIKE A BRISK WALK)?: 0 DAYS

## 2022-12-30 SDOH — HEALTH STABILITY: PHYSICAL HEALTH: ON AVERAGE, HOW MANY MINUTES DO YOU ENGAGE IN EXERCISE AT THIS LEVEL?: 0 MIN

## 2022-12-30 ASSESSMENT — PATIENT HEALTH QUESTIONNAIRE - PHQ9
SUM OF ALL RESPONSES TO PHQ QUESTIONS 1-9: 1
SUM OF ALL RESPONSES TO PHQ QUESTIONS 1-9: 1
SUM OF ALL RESPONSES TO PHQ9 QUESTIONS 1 & 2: 1
SUM OF ALL RESPONSES TO PHQ QUESTIONS 1-9: 1
2. FEELING DOWN, DEPRESSED OR HOPELESS: 0
1. LITTLE INTEREST OR PLEASURE IN DOING THINGS: 1
SUM OF ALL RESPONSES TO PHQ QUESTIONS 1-9: 1

## 2022-12-30 ASSESSMENT — LIFESTYLE VARIABLES
HOW MANY STANDARD DRINKS CONTAINING ALCOHOL DO YOU HAVE ON A TYPICAL DAY: PATIENT DOES NOT DRINK
HOW OFTEN DO YOU HAVE A DRINK CONTAINING ALCOHOL: 1
HOW OFTEN DO YOU HAVE A DRINK CONTAINING ALCOHOL: NEVER
HOW OFTEN DO YOU HAVE SIX OR MORE DRINKS ON ONE OCCASION: 1
HOW MANY STANDARD DRINKS CONTAINING ALCOHOL DO YOU HAVE ON A TYPICAL DAY: 0

## 2023-01-06 ENCOUNTER — HOSPITAL ENCOUNTER (OUTPATIENT)
Dept: INFUSION THERAPY | Age: 73
Discharge: HOME | End: 2023-01-06
Payer: MEDICARE

## 2023-01-06 ENCOUNTER — OFFICE VISIT (OUTPATIENT)
Dept: ONCOLOGY | Age: 73
End: 2023-01-06
Payer: MEDICARE

## 2023-01-06 VITALS
OXYGEN SATURATION: 96 % | TEMPERATURE: 98.3 F | BODY MASS INDEX: 34.48 KG/M2 | HEART RATE: 82 BPM | HEIGHT: 63 IN | WEIGHT: 194.6 LBS | DIASTOLIC BLOOD PRESSURE: 55 MMHG | SYSTOLIC BLOOD PRESSURE: 102 MMHG | RESPIRATION RATE: 18 BRPM

## 2023-01-06 VITALS
SYSTOLIC BLOOD PRESSURE: 102 MMHG | DIASTOLIC BLOOD PRESSURE: 55 MMHG | WEIGHT: 194.6 LBS | RESPIRATION RATE: 18 BRPM | TEMPERATURE: 98.3 F | HEART RATE: 82 BPM | BODY MASS INDEX: 34.48 KG/M2 | OXYGEN SATURATION: 96 % | HEIGHT: 63 IN

## 2023-01-06 DIAGNOSIS — Z85.3 HISTORY OF RIGHT BREAST CANCER: Primary | ICD-10-CM

## 2023-01-06 DIAGNOSIS — Z79.811 PROPHYLACTIC USE OF ANASTROZOLE (ARIMIDEX): ICD-10-CM

## 2023-01-06 PROCEDURE — 99214 OFFICE O/P EST MOD 30 MIN: CPT | Performed by: PHYSICIAN ASSISTANT

## 2023-01-06 PROCEDURE — 1090F PRES/ABSN URINE INCON ASSESS: CPT | Performed by: PHYSICIAN ASSISTANT

## 2023-01-06 PROCEDURE — 99211 OFF/OP EST MAY X REQ PHY/QHP: CPT

## 2023-01-06 PROCEDURE — 3017F COLORECTAL CA SCREEN DOC REV: CPT | Performed by: PHYSICIAN ASSISTANT

## 2023-01-06 PROCEDURE — G8427 DOCREV CUR MEDS BY ELIG CLIN: HCPCS | Performed by: PHYSICIAN ASSISTANT

## 2023-01-06 PROCEDURE — G8417 CALC BMI ABV UP PARAM F/U: HCPCS | Performed by: PHYSICIAN ASSISTANT

## 2023-01-06 PROCEDURE — 1036F TOBACCO NON-USER: CPT | Performed by: PHYSICIAN ASSISTANT

## 2023-01-06 PROCEDURE — G8399 PT W/DXA RESULTS DOCUMENT: HCPCS | Performed by: PHYSICIAN ASSISTANT

## 2023-01-06 PROCEDURE — 1123F ACP DISCUSS/DSCN MKR DOCD: CPT | Performed by: PHYSICIAN ASSISTANT

## 2023-01-06 PROCEDURE — G8484 FLU IMMUNIZE NO ADMIN: HCPCS | Performed by: PHYSICIAN ASSISTANT

## 2023-01-06 RX ORDER — ANASTROZOLE 1 MG/1
1 TABLET ORAL DAILY
Qty: 90 TABLET | Refills: 3 | Status: SHIPPED | OUTPATIENT
Start: 2023-01-06

## 2023-01-06 RX ORDER — OMEPRAZOLE 20 MG/1
20 TABLET, DELAYED RELEASE ORAL EVERY OTHER DAY
COMMUNITY

## 2023-01-06 RX ORDER — MULTIVITAMIN WITH IRON
500 TABLET ORAL DAILY
COMMUNITY

## 2023-01-06 RX ORDER — AMLODIPINE BESYLATE 5 MG/1
TABLET ORAL
COMMUNITY
Start: 2022-12-28

## 2023-01-06 NOTE — PROGRESS NOTES
[facility] [address], [address]  [facility] [address]  Dept: [phone]  Dept Fax: [phone] Loc: [phone]      Visit Date:1/6/2023     Celia Perkins is a 72 y.o. female who presents today for:   Chief Complaint   Patient presents with    Follow-up     Malignant neoplasm of lower-outer quadrant of left breast of female, estrogen receptor positive     Results     Rv Mammo        HPI:   Celia Perkins is a 72 y.o. female previously followed by Dr. Alatorre for history of left breast cancer.  Per Dr. Alatorre's note on 4/4/2022:   She underwent routine screening mammogram on September 7, 2018 that revealed clustered calcifications in the left breast.  She had a needle biopsy on September 19, 2018 showing invasive ductal carcinoma, measuring 7 mm in size, grade 1, % positive, RI 70% positive and HER-2/oleg negative.  The patient met with the surgeon Dr. Bean and after discussing her surgical treatment options she decided to proceed with left lumpectomy and sentinel lymph node biopsy.  She had her surgery on October 10, 2018 at the lumpectomy specimen did not reveal evidence of residual malignancy.  All 3 sentinel lymph nodes were cancer free.  Subsequently the patient met with a radiation oncologist who based on and evidence from CALGB study didn't recommend postlumpectomy radiation treatment.  Subsequently the patient met with Dr. Cerda who placed her on adjuvant hormonal therapy with Arimidex, which she started in October 2018.  The patient underwent a bone density study in November 2018 that revealed osteoporosis.  The lowest T score was -2.8.  The patient initiated treatment with Zometa on November 7, 2018.  Since Dr. Cerda is retiring the patient established care with new medical oncologist at [facility].      Interval History 1/6/2023:   Patient is here for follow-up evaluation.  Since her visit in July 2022, she reports she has been feeling more  fatigued. Denies ED visits or hospitalizations. She was recently evaluated at the [facility] structural heart clinic due to worsening SOB, fatigue and known aortic stenosis. She is in work-up for potential aortic valve replacement. She states she has been approved for TAVR. Denies any concerns related to her breast.  Denies nipple inversion, nipple discharge or palpable masses. She continues taking Arimidex. She has had improvement in arthralgias with evening primrose oil. Denies hot flashes, night sweats, vaginal pain or incontinence. Previously reported hip pain has resolved. She reports she recently had positive cologuard. Had follow up with GI.       PMH, SH, and FH:  I reviewed the patients medication list and allergy list as noted on the electronic medical record. The PMH, SH and FH were also reviewed as noted on the EMR. Review of Systems:   Review of Systems   Pertinent review of systems noted in HPI, all other ROS negative. Objective:   Physical Exam   BP (!) 102/55 (Site: Right Upper Arm, Position: Sitting, Cuff Size: Medium Adult)   Pulse 82   Temp 98.3 °F (36.8 °C) (Oral)   Resp 18   Ht 5' 3\" (1.6 m)   Wt 194 lb 9.6 oz (88.3 kg)   SpO2 96%   BMI 34.47 kg/m²    General appearance: No apparent distress, well developed, and cooperative. HEENT: Pupils equal, round, and reactive to light. Conjunctivae/corneas clear. Neck: Supple, with full range of motion. Trachea midline. Respiratory:  Normal respiratory effort. Clear to auscultation bilaterally. No wheezes, rhonchi. Cardiovascular: Regular rate and rhythm with normal K6/W0, systolic murmur. Breast: s/p left lumpectomy. No nipple inversion bilaterally, no evidence of lymphedema. Abdomen: Soft, active bowel sounds. Musculoskeletal: No clubbing, cyanosis or edema bilaterally. Skin: Skin color, texture, turgor normal.  No visible rashes or lesions.   Neurologic:  Neurovascularly intact without any focal sensory/motor deficits. Psychiatric: Alert and oriented      Imaging Studies and Labs:   CBC:   Lab Results   Component Value Date    WBC 5.4 06/15/2022    HGB 14.5 06/15/2022    HCT 43.5 06/15/2022    MCV 93.3 06/15/2022     06/15/2022     BMP:   Lab Results   Component Value Date/Time     09/30/2022 09:02 AM     06/15/2022 08:18 AM    K 4.2 09/30/2022 09:02 AM    K 4.3 06/15/2022 08:18 AM     06/15/2022 08:18 AM    CO2 23 06/15/2022 08:18 AM    BUN 21 06/15/2022 08:18 AM    CREATININE 0.8 09/30/2022 09:02 AM    CREATININE 0.4 06/15/2022 08:18 AM    GLUCOSE 119 06/15/2022 08:18 AM    GLUCOSE 188 09/29/2018 06:30 AM    CALCIUM 9.3 06/15/2022 08:18 AM      LFT:   Lab Results   Component Value Date    ALT 26 11/20/2021    AST 24 11/20/2021    ALKPHOS 57 11/20/2021    BILITOT 1.5 (H) 11/20/2021         Assessment and Plan: 1. Stage IA (pT1b, pN0, cM0, G1, ER+, WV+, HER2-) left breast cancer diagnosed in 2018  S/P lumpectomy with sentinel lymph node biopsy. The patient did not have postlumpectomy radiation treatment. The CALGB 9343 study enrolled patients 79years of age and older. The results showed 10% risk of local disease recurrence at 10 years in women treated with tamoxifen only ( no radiation therapy) in contrast to 2% risk of local disease recurrence at 10 years in women treated with tamoxifen and radiation therapy. However overall survival was the same for both groups. PRIME 2 study, confirmed a modest reduction in recurrence rate with RT that did not translate into a survival benefit. After a median follow-up of five years, ipsilateral breast tumor recurrences were lower in women assigned to RT (1.3 versus 4.1 percent).  No differences in overall survival, regional recurrence, distant metastases, contralateral breast cancers, or new breast cancers were noted between the two groups  Mammogram completed on 10/7/2022 showed no evidence of malignancy.    -She will be due for mammogram in October 2023     2. Adjuvant hormonal therapy with Arimidex  The patient was started on Arimidex. At the end of May 2020 the patient stopped taking Arimidex due to arthralgia and hot flashes and cloudiness. After being off AI for 2 months, she restarted Arimidex. At appointment in April 2022 with Dr. Peyman Ramos she had reported worsening left hip pain. MRI of the hip was ordered per Dr. Peyman Ramos which patient did not complete due to resolution of pain. Denies recurrence of pain.   -Continue Arimidex. She began October 2018.    -Reviewed cancer surveillance with patient. 3.  Osteoporosis  DEXA on 3/18/21 showed osteoporosis. The patient has been on Reclast annually. She will be due for DEXA March 2023. Order placed     RTC in 6 months     All patient questions answered. Pt voiced understanding. Patient agreed with treatment plan. Follow up with GI as planned. Follow up as directed. Patient instructed to call for questions or concerns.           Electronically signed by   Kimmy Conway PA-C

## 2023-01-06 NOTE — PATIENT INSTRUCTIONS
DEXA scan - due in March 2023  Return to clinic with Dr. Lakisha Ortiz in 6 months   Please call for questions or concerns.

## 2023-01-09 ENCOUNTER — OFFICE VISIT (OUTPATIENT)
Dept: FAMILY MEDICINE CLINIC | Age: 73
End: 2023-01-09
Payer: MEDICARE

## 2023-01-09 VITALS
WEIGHT: 196 LBS | RESPIRATION RATE: 16 BRPM | TEMPERATURE: 97.1 F | DIASTOLIC BLOOD PRESSURE: 79 MMHG | HEIGHT: 62 IN | BODY MASS INDEX: 36.07 KG/M2 | OXYGEN SATURATION: 94 % | HEART RATE: 73 BPM | SYSTOLIC BLOOD PRESSURE: 119 MMHG

## 2023-01-09 DIAGNOSIS — E11.9 TYPE 2 DIABETES MELLITUS WITHOUT COMPLICATION, WITHOUT LONG-TERM CURRENT USE OF INSULIN (HCC): ICD-10-CM

## 2023-01-09 DIAGNOSIS — Z00.00 MEDICARE ANNUAL WELLNESS VISIT, SUBSEQUENT: Primary | ICD-10-CM

## 2023-01-09 DIAGNOSIS — I25.10 ASCVD (ARTERIOSCLEROTIC CARDIOVASCULAR DISEASE): ICD-10-CM

## 2023-01-09 PROCEDURE — G0439 PPPS, SUBSEQ VISIT: HCPCS | Performed by: FAMILY MEDICINE

## 2023-01-09 PROCEDURE — G8484 FLU IMMUNIZE NO ADMIN: HCPCS | Performed by: FAMILY MEDICINE

## 2023-01-09 PROCEDURE — 1123F ACP DISCUSS/DSCN MKR DOCD: CPT | Performed by: FAMILY MEDICINE

## 2023-01-09 PROCEDURE — 3046F HEMOGLOBIN A1C LEVEL >9.0%: CPT | Performed by: FAMILY MEDICINE

## 2023-01-09 PROCEDURE — 3017F COLORECTAL CA SCREEN DOC REV: CPT | Performed by: FAMILY MEDICINE

## 2023-01-09 RX ORDER — ATORVASTATIN CALCIUM 20 MG/1
20 TABLET, FILM COATED ORAL NIGHTLY
Qty: 90 TABLET | Refills: 3 | Status: SHIPPED | OUTPATIENT
Start: 2023-01-09

## 2023-01-09 SDOH — ECONOMIC STABILITY: FOOD INSECURITY: WITHIN THE PAST 12 MONTHS, YOU WORRIED THAT YOUR FOOD WOULD RUN OUT BEFORE YOU GOT MONEY TO BUY MORE.: NEVER TRUE

## 2023-01-09 SDOH — ECONOMIC STABILITY: FOOD INSECURITY: WITHIN THE PAST 12 MONTHS, THE FOOD YOU BOUGHT JUST DIDN'T LAST AND YOU DIDN'T HAVE MONEY TO GET MORE.: NEVER TRUE

## 2023-01-09 ASSESSMENT — SOCIAL DETERMINANTS OF HEALTH (SDOH): HOW HARD IS IT FOR YOU TO PAY FOR THE VERY BASICS LIKE FOOD, HOUSING, MEDICAL CARE, AND HEATING?: NOT HARD AT ALL

## 2023-01-09 NOTE — PATIENT INSTRUCTIONS

## 2023-01-09 NOTE — PROGRESS NOTES
Medicare Annual Wellness Visit    Eulalia Loya is here for Medicare AWV (No issues/concerns)    Assessment & Plan   Medicare annual wellness visit, subsequent  ASCVD (arteriosclerotic cardiovascular disease)  -     atorvastatin (LIPITOR) 20 MG tablet; Take 1 tablet by mouth nightly, Disp-90 tablet, R-3Normal  -     Lipid Panel; Future  Type 2 diabetes mellitus without complication, without long-term current use of insulin ([address]        Diabetes: Managed by internal medicine. Controlled. Continue Jardiance, lispro, and Lantus    Recommendations for Preventive Services Due: see orders and patient instructions/AVS.  Recommended screening schedule for the next 5-10 years is provided to the patient in written form: see Patient Instructions/AVS.     Return in about 6 months (around 7/9/2023) for DM; cholesterol. Subjective       Planning on TAVR at [address] [place] [address] in feb. Positive cologard that did not need done given colonoscopy in 2021. Talking with GI about it    Gets a1c at internal medicine specialties in [address] [facility]    Patient's complete Health Risk Assessment and screening values have been reviewed and are found in [facility]. The following problems were reviewed today and where indicated follow up appointments were made and/or referrals ordered.     Positive Risk Factor Screenings with Interventions:                 Weight and Activity:  Physical Activity: Inactive    Days of Exercise per Week: 0 days    Minutes of Exercise per Session: 0 min     On average, how many days per week do you engage in moderate to strenuous exercise (like a brisk walk)?: 0 days  Have you lost any weight without trying in the past 3 months?: No  Body mass index: (!) 35.84      Inactivity Interventions:  Patient declined any further interventions or treatment  Obesity Interventions:  Patient comments: awaiting TAVR  Patient declines any further evaluation or treatment             Safety:  Do all of your stairways have a railing or banister?: (!) No    Interventions:  Patient declined any further interventions or treatment                     Objective   Vitals:    01/09/23 0751   BP: 119/79   Pulse: 73   Resp: 16   Temp: 97.1 °F (36.2 °C)   SpO2: 94%   Weight: 196 lb (88.9 kg)   Height: 5' 2\" (1.575 m)      Body mass index is 35.85 kg/m². Physical Exam  Vitals reviewed. Constitutional:       General: She is not in acute distress. Appearance: She is well-developed. Cardiovascular:      Rate and Rhythm: Normal rate and regular rhythm. Heart sounds: Murmur heard. Systolic murmur is present with a grade of 2/6. Pulmonary:      Effort: Pulmonary effort is normal. No respiratory distress. Breath sounds: Normal breath sounds. No wheezing or rhonchi. Musculoskeletal:      Right lower leg: Edema (trace) present. Left lower leg: Edema (trace) present. Neurological:      Mental Status: She is alert. Mental status is at baseline. Psychiatric:         Mood and Affect: Mood normal.         Behavior: Behavior normal.            Allergies   Allergen Reactions    Amoxicillin     Bactrim [Sulfamethoxazole-Trimethoprim] Itching    Donnatal [Phenobarbital-Belladonna Alk] Other (See Comments)     palpitations    Lovastatin Other (See Comments)     myalgia    Metformin And Related Diarrhea    Vioxx Other (See Comments)     Liver problems    Dilaudid [Hydromorphone Hcl] Nausea And Vomiting    Fentanyl Nausea And Vomiting     Severe Nausea and vomitting     Prior to Visit Medications    Medication Sig Taking?  Authorizing Provider   amLODIPine (NORVASC) 5 MG tablet take 1 tablet by mouth once daily Yes Historical Provider, MD   omeprazole (PRILOSEC OTC) 20 MG tablet Take 20 mg by mouth every other day Yes Historical Provider, MD   Evening Primrose Oil 1000 MG CAPS  Yes Historical Provider, MD   magnesium (MAGNESIUM-OXIDE) 250 MG TABS tablet Take 500 mg by mouth daily Yes Historical Provider, MD   anastrozole (ARIMIDEX) 1 MG tablet Take 1 tablet by mouth daily Yes Darling Kat PA-C   ibuprofen (ADVIL;MOTRIN) 600 MG tablet TAKE 1 TABLET BY MOUTH EVERY 6 HOURS AS NEEDED FOR PAIN Yes Marilyn Arias MD   benazepril (LOTENSIN) 20 MG tablet TAKE 1 TABLET BY MOUTH 2 TIMES DAILY Yes Marilyn Arias MD   Handicap Placard MISC by Does not apply route Duration:  5 years; dx:  osteoarthritis Yes Marilyn Arias MD   atorvastatin (LIPITOR) 20 MG tablet Take 1 tablet by mouth nightly Yes Marilyn Arias MD   pantoprazole (PROTONIX) 40 MG tablet  Yes Historical Provider, MD   empagliflozin (JARDIANCE) 25 MG tablet Take 25 mg by mouth daily Yes Historical Provider, MD   insulin lispro, 1 Unit Dial, 100 UNIT/ML SOPN Inject into the skin 3 times daily Indications: inject 0-12 unitsinto the skin 3 times daily-per sliding scale Yes Historical Provider, MD   furosemide (LASIX) 20 MG tablet  Yes Historical Provider, MD   EASY TOUCH PEN NEEDLES 31G X 6 MM [address] [place] [address]  Yes Historical Provider, MD   LANTUS SOLOSTAR 100 UNIT/ML injection pen Inject 39 Units into the skin nightly  Yes Historical Provider, MD   Calcium Carb-Cholecalciferol 600-500 MG-UNIT CAPS Take by mouth daily Yes Historical Provider, MD   aspirin 81 MG chewable tablet Take 81 mg by mouth daily Yes Historical Provider, MD   therapeutic multivitamin-minerals (THERAGRAN-M) tablet Take 1 tablet by mouth daily.    Yes Historical Provider, MD   albuterol sulfate HFA (VENTOLIN HFA) 108 (90 Base) MCG/ACT inhaler Inhale 2 puffs into the lungs 4 times daily as needed for Wheezing  Patient not taking: Reported on 1/9/2023  Marilyn Arias MD   SUPER B COMPLEX/C PO Take 1 tablet by mouth daily  Patient not taking: No sig reported  Historical Provider, MD Cervantes (Including outside providers/suppliers regularly involved in providing care):   Patient Care Team:  Marilyn Arias MD as PCP - General (Family Medicine)  Marilyn Arias MD as PCP - Capri Easonled Provider  Sukhdev Mullins MD as Consulting Physician (Oncology)     Reviewed and updated this visit:  Tobacco  Allergies  Meds  Problems  Med Hx  Surg Hx  Soc Hx  Fam Hx

## 2023-01-19 ENCOUNTER — OFFICE VISIT (OUTPATIENT)
Dept: FAMILY MEDICINE CLINIC | Age: 73
End: 2023-01-19

## 2023-01-19 VITALS
HEART RATE: 75 BPM | DIASTOLIC BLOOD PRESSURE: 62 MMHG | TEMPERATURE: 97.7 F | OXYGEN SATURATION: 97 % | RESPIRATION RATE: 16 BRPM | HEIGHT: 62 IN | BODY MASS INDEX: 36.58 KG/M2 | WEIGHT: 198.8 LBS | SYSTOLIC BLOOD PRESSURE: 110 MMHG

## 2023-01-19 DIAGNOSIS — R30.0 DYSURIA: Primary | ICD-10-CM

## 2023-01-19 LAB
BILIRUBIN, POC: NEGATIVE
BLOOD URINE, POC: NEGATIVE
CLARITY, POC: CLEAR
COLOR, POC: YELLOW
GLUCOSE URINE, POC: >=1000
KETONES, POC: NEGATIVE
LEUKOCYTE EST, POC: NEGATIVE
NITRITE, POC: NEGATIVE
PH, POC: 5.5
PROTEIN, POC: NEGATIVE
SPECIFIC GRAVITY, POC: 1.01
UROBILINOGEN, POC: 0.2

## 2023-01-19 RX ORDER — CEFDINIR 300 MG/1
300 CAPSULE ORAL 2 TIMES DAILY
Qty: 6 CAPSULE | Refills: 0 | Status: SHIPPED | OUTPATIENT
Start: 2023-01-19 | End: 2023-01-22

## 2023-01-19 NOTE — PROGRESS NOTES
[facility]  [facility]  [address] [facility]  Dept: [phone]  Dept Fax: [phone]  Loc: [phone]  PROGRESS NOTE      Visit Date: 1/19/2023    Aysha Noble is a 74 y.o. female who presents today for:  Chief Complaint   Patient presents with    Urinary Tract Infection     Started a Monday night with urgency, frequency, burning urination and itching. Subjective:  HPI    Urinary frequency, urgency, and dysuria. Started 3 days ago. Has some itching. No fever or chills. No flank pain. Glucose 143 this morning. TAVR scheduled at [address] [place] [address] in early feb. Review of Systems   Constitutional:  Negative for chills and fever. Genitourinary:  Positive for dysuria, frequency and urgency. Negative for flank pain and vaginal discharge.      Past Medical History:   Diagnosis Date    Arthritis     Asthma     Brain cyst     benign    Breast cancer ([facility] 09/18/2018    Left breast, 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    [facility]    Left Breast    Chronic sinus complaints     Diverticulosis of colon     DKA (diabetic ketoacidoses) 05/2018    Elevated TSH     Hypertension     Osteoarthritis     Polyneuropathy     PONV (postoperative nausea and vomiting)     Spinal headache     Type 2 diabetes mellitus (HCC) 1990's      Current Outpatient Medications   Medication Sig Dispense Refill    atorvastatin (LIPITOR) 20 MG tablet Take 1 tablet by mouth nightly 90 tablet 3    amLODIPine (NORVASC) 5 MG tablet take 1 tablet by mouth once daily      omeprazole (PRILOSEC OTC) 20 MG tablet Take 20 mg by mouth every other day      Evening Primrose Oil 1000 MG CAPS       magnesium (MAGNESIUM-OXIDE) 250 MG TABS tablet Take 500 mg by mouth daily      anastrozole (ARIMIDEX) 1 MG tablet Take 1 tablet by mouth daily 90 tablet 3    ibuprofen (ADVIL;MOTRIN) 600 MG tablet TAKE 1 TABLET BY MOUTH EVERY 6 HOURS AS NEEDED FOR PAIN 120 tablet 0    benazepril (LOTENSIN) 20 MG tablet TAKE 1 TABLET BY MOUTH 2 TIMES DAILY 180 tablet 1    Handicap Placard MISC by Does not apply route Duration:  5 years; dx:  osteoarthritis 1 each 0    pantoprazole (PROTONIX) 40 MG tablet       empagliflozin (JARDIANCE) 25 MG tablet Take 25 mg by mouth daily      insulin lispro, 1 Unit Dial, 100 UNIT/ML SOPN Inject into the skin 3 times daily Indications: inject 0-12 unitsinto the skin 3 times daily-per sliding scale      furosemide (LASIX) 20 MG tablet       EASY TOUCH PEN NEEDLES 31G X 6 MM MISC       LANTUS SOLOSTAR 100 UNIT/ML injection pen Inject 39 Units into the skin nightly       Calcium Carb-Cholecalciferol 600-500 MG-UNIT CAPS Take by mouth daily      aspirin 81 MG chewable tablet Take 81 mg by mouth daily      therapeutic multivitamin-minerals (THERAGRAN-M) tablet Take 1 tablet by mouth daily. albuterol sulfate HFA (VENTOLIN HFA) 108 (90 Base) MCG/ACT inhaler Inhale 2 puffs into the lungs 4 times daily as needed for Wheezing (Patient not taking: No sig reported) 18 g 1    SUPER B COMPLEX/C PO Take 1 tablet by mouth daily (Patient not taking: No sig reported)       No current facility-administered medications for this visit. Allergies   Allergen Reactions    Amoxicillin     Bactrim [Sulfamethoxazole-Trimethoprim] Itching    Donnatal [Phenobarbital-Belladonna Alk] Other (See Comments)     palpitations    Lovastatin Other (See Comments)     myalgia    Metformin And Related Diarrhea    Vioxx Other (See Comments)     Liver problems    Dilaudid [Hydromorphone Hcl] Nausea And Vomiting    Fentanyl Nausea And Vomiting     Severe Nausea and vomitting       Objective:     /62   Pulse 75   Temp 97.7 °F (36.5 °C)   Resp 16   Ht 5' 2\" (1.575 m)   Wt 198 lb 12.8 oz (90.2 kg)   SpO2 97%   BMI 36.36 kg/m²   Physical Exam  Vitals reviewed. Constitutional:       General: She is not in acute distress. Appearance: She is not ill-appearing. Abdominal:      General: There is no distension. Palpations: Abdomen is soft. Tenderness: There is no abdominal tenderness. There is no right CVA tenderness, left CVA tenderness, guarding or rebound. Neurological:      Mental Status: She is alert. Psychiatric:         Mood and Affect: Mood normal.         Behavior: Behavior normal.     Component      Latest Ref Rng & Units 1/19/2023           9:04 AM   Color, UA       Yellow   Clarity, UA       Clear   Glucose, UA POC       >=1,000   Bilirubin, UA       Negative   Ketones, UA       Negative   Spec Grav, UA       1.015   Blood, UA POC       Negative   pH, UA       5.5   Protein, UA       Negative   Urobilinogen, UA       0.2   Leukocytes, UA       Negative   Nitrite, UA       Negative     Impression/Plan:  1. Dysuria  Urinary symptoms with urinalysis negative for infection. She has had this in the past and has had resolution of symptoms with antibiotic. We will treat with a short course of Omnicef. We will not pursue urine culture at this time.    -Glucose in the urine and morning glucose today was appropriate  - POCT Urinalysis No Micro (Auto)  - cefdinir (OMNICEF) 300 MG capsule; Take 1 capsule by mouth 2 times daily for 3 days  Dispense: 6 capsule; Refill: 0      They voiced understanding. All questions answered. They agreed with treatment plan. See patient instructions for any educational materials that may have been given. Discussed use, benefit, and side effects of prescribed medications. (Please note that portions of this note may have been completed with a voice recognition program.  Efforts were made to edit the dictation but occasionally words are mis-transcribed.)    Return if symptoms worsen or fail to improve.        Electronically signed by Bill Parra MD on 1/19/2023 at 9:02 AM

## 2023-02-28 ENCOUNTER — HOSPITAL ENCOUNTER (EMERGENCY)
Age: 73
Discharge: HOME | End: 2023-02-28
Payer: MEDICARE

## 2023-02-28 VITALS
WEIGHT: 185 LBS | HEART RATE: 66 BPM | DIASTOLIC BLOOD PRESSURE: 77 MMHG | TEMPERATURE: 98.5 F | OXYGEN SATURATION: 100 % | BODY MASS INDEX: 34.04 KG/M2 | SYSTOLIC BLOOD PRESSURE: 138 MMHG | RESPIRATION RATE: 16 BRPM | HEIGHT: 62 IN

## 2023-02-28 DIAGNOSIS — R81 GLUCOSURIA: ICD-10-CM

## 2023-02-28 DIAGNOSIS — R41.3 MEMORY DIFFICULTY: ICD-10-CM

## 2023-02-28 DIAGNOSIS — R51.9 GENERALIZED HEADACHE: Primary | ICD-10-CM

## 2023-02-28 LAB
BILIRUB UR STRIP.AUTO-MCNC: NEGATIVE MG/DL
CHARACTER UR: CLEAR
COLOR: YELLOW
GLUCOSE UR QL STRIP.AUTO: >= 1000 MG/DL
KETONES UR QL STRIP.AUTO: NEGATIVE
NITRITE UR QL STRIP.AUTO: NEGATIVE
PH UR STRIP.AUTO: 5.5 [PH] (ref 5–9)
PROT UR STRIP.AUTO-MCNC: NEGATIVE MG/DL
RBC #/AREA URNS HPF: NEGATIVE /[HPF]
SP GR UR STRIP.AUTO: 1.01 (ref 1–1.03)
UROBILINOGEN, URINE: 0.2 EU/DL (ref 0.2–1)
WBC #/AREA URNS HPF: NEGATIVE /[HPF]

## 2023-02-28 PROCEDURE — 81003 URINALYSIS AUTO W/O SCOPE: CPT

## 2023-02-28 PROCEDURE — 99213 OFFICE O/P EST LOW 20 MIN: CPT | Performed by: NURSE PRACTITIONER

## 2023-02-28 PROCEDURE — 99213 OFFICE O/P EST LOW 20 MIN: CPT

## 2023-02-28 ASSESSMENT — ENCOUNTER SYMPTOMS
EYE REDNESS: 0
TROUBLE SWALLOWING: 0
BACK PAIN: 0
NAUSEA: 0
RHINORRHEA: 0
VOMITING: 0
DIARRHEA: 0
SORE THROAT: 0
EYE DISCHARGE: 0
COUGH: 0
ABDOMINAL PAIN: 0
SHORTNESS OF BREATH: 0

## 2023-02-28 ASSESSMENT — PAIN DESCRIPTION - DESCRIPTORS: DESCRIPTORS: THROBBING

## 2023-02-28 ASSESSMENT — PAIN DESCRIPTION - ONSET: ONSET: ON-GOING

## 2023-02-28 ASSESSMENT — PAIN SCALES - GENERAL: PAINLEVEL_OUTOF10: 6

## 2023-02-28 ASSESSMENT — PAIN DESCRIPTION - LOCATION: LOCATION: HEAD

## 2023-02-28 ASSESSMENT — PAIN DESCRIPTION - FREQUENCY: FREQUENCY: CONTINUOUS

## 2023-02-28 ASSESSMENT — PAIN - FUNCTIONAL ASSESSMENT: PAIN_FUNCTIONAL_ASSESSMENT: 0-10

## 2023-02-28 NOTE — ED NOTES
Pt presents to [facility] for c/o headaches everyday since having a valve replacement last week     Tanner Diop LPN  [phone]/10/[phone]

## 2023-03-01 NOTE — ED PROVIDER NOTES
[address]  Urgent Care Encounter      CHIEF COMPLAINT       Chief Complaint   Patient presents with    Post-op Problem     Headache since valve replacement       Nurses Notes reviewed and I agree except as noted in the HPI. HISTORY OF PRESENT ILLNESS   Norma Finney is a 74 y.o. female who presents for evaluation of a headache. History given by spouse/patient. Patient states that she has had a persistent headache since 2/22/2023. Patient had valve replacement. Patient was given medication for her headache after procedure. Patient has taken over-the-counter medicine intermittently over the last week with minimal relief. Currently rates headache 6/10. No worst headache of life. Patient states since last week she has been more emotional.  She has also been experiencing periods of forgetfulness. She also notes an episode of incontinence. No dysuria, hematuria, abdominal pain, back pain. No changes in diet. No new foods, medications. History of diabetes. Patient checks her blood sugar 4 times daily. Blood sugar this morning was 150. Patient did not contact cardiologist prior to coming to urgent care. No additional complaints. REVIEW OF SYSTEMS     Review of Systems   Constitutional:  Negative for chills, diaphoresis, fatigue and fever. HENT:  Negative for congestion, ear pain, rhinorrhea, sore throat and trouble swallowing. Eyes:  Negative for discharge and redness. Respiratory:  Negative for cough and shortness of breath. Cardiovascular:  Negative for chest pain. Gastrointestinal:  Negative for abdominal pain, diarrhea, nausea and vomiting. Genitourinary:  Negative for decreased urine volume, difficulty urinating, dysuria, flank pain, frequency, genital sores, hematuria, menstrual problem, pelvic pain, urgency, vaginal bleeding, vaginal discharge and vaginal pain. Musculoskeletal:  Negative for back pain, neck pain and neck stiffness.    Skin: Negative for rash. Neurological:  Positive for headaches. Negative for syncope, facial asymmetry, speech difficulty and weakness. Hematological:  Negative for adenopathy. Psychiatric/Behavioral:  Positive for confusion and dysphoric mood. Negative for sleep disturbance and suicidal ideas. 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    Cancer ([facility] [address] 09/1918    Left Breast    Chronic sinus complaints     Diverticulosis of colon     DKA (diabetic ketoacidoses) 05/2018    Elevated TSH     Hypertension     Osteoarthritis     Polyneuropathy     PONV (postoperative nausea and vomiting)     Spinal headache     Type 2 diabetes mellitus ([facility] [address] 1990's       SURGICAL HISTORY     Patient  has a past surgical history that includes Tonsillectomy and adenoidectomy (age 4); Dilation and curettage of uterus; sinus surgery; Mandible fracture surgery; Cholecystectomy; Knee arthroscopy (1967, 2001); joint replacement (Right, 2007); skin biopsy; eye surgery; bladder suspension; other surgical history (Right, 12/30/2015); pr office/outpt visit,procedure only (Left, 10/10/2018); ventral hernia repair (N/A, 6/14/2019); hernia repair (06/18/2019); Carpal tunnel release (Bilateral, 02/2020); Breast lumpectomy (Left, 10/10/2018); [facility] STEREO BREAST BX W LOC DEVICE 1ST LESION LEFT (Left, 09/19/2018); Ovary removal (1995); Hysterectomy (1995); Wrist surgery (Right); and Hand surgery (Right).     CURRENT MEDICATIONS       Discharge Medication List as of 2/28/2023 12:24 PM        CONTINUE these medications which have NOT CHANGED    Details   atorvastatin (LIPITOR) 20 MG tablet Take 1 tablet by mouth nightly, Disp-90 tablet, R-3Normal      amLODIPine (NORVASC) 5 MG tablet take 1 tablet by mouth once dailyHistorical Med      omeprazole (PRILOSEC OTC) 20 MG tablet Take 20 mg by mouth every other dayHistorical Med      Evening Primrose Oil 1000 MG CAPS Historical Med      magnesium (MAGNESIUM-OXIDE) 250 MG TABS tablet Take 500 mg by mouth dailyHistorical Med      anastrozole (ARIMIDEX) 1 MG tablet Take 1 tablet by mouth daily, Disp-90 tablet, R-3Normal      ibuprofen (ADVIL;MOTRIN) 600 MG tablet TAKE 1 TABLET BY MOUTH EVERY 6 HOURS AS NEEDED FOR PAIN, Disp-120 tablet, R-0Normal      benazepril (LOTENSIN) 20 MG tablet TAKE 1 TABLET BY MOUTH 2 TIMES DAILY, Disp-180 tablet, R-1Normal      albuterol sulfate HFA (VENTOLIN HFA) 108 (90 Base) MCG/ACT inhaler Inhale 2 puffs into the lungs 4 times daily as needed for Wheezing, Disp-18 g, R-1Normal      Handicap Placard [facility] Starting Wed 6/29/2022, Disp-1 each, R-0, PrintDuration:  5 years; dx:  osteoarthritis      SUPER B COMPLEX/C PO Take 1 tablet by mouth dailyHistorical Med      pantoprazole (PROTONIX) 40 MG tablet Historical Med      empagliflozin (JARDIANCE) 25 MG tablet Take 25 mg by mouth dailyHistorical Med      insulin lispro, 1 Unit Dial, 100 UNIT/ML SOPN Inject into the skin 3 times daily Indications: inject 0-12 unitsinto the skin 3 times daily-per sliding scaleHistorical Med      furosemide (LASIX) 20 MG tablet Historical Med      EASY TOUCH PEN NEEDLES 31G X 6 MM MISC Starting Thu 6/27/2019, MASHA, Historical Med      LANTUS SOLOSTAR 100 UNIT/ML injection pen Inject 39 Units into the skin nightly , DAWHistorical Med      Calcium Carb-Cholecalciferol 600-500 MG-UNIT CAPS Take by mouth dailyHistorical Med      aspirin 81 MG chewable tablet Take 81 mg by mouth dailyHistorical Med      therapeutic multivitamin-minerals (THERAGRAN-M) tablet Take 1 tablet by mouth daily. ALLERGIES     Patient is is allergic to amoxicillin, bactrim [sulfamethoxazole-trimethoprim], donnatal [phenobarbital-belladonna alk], lovastatin, metformin and related, vioxx, dilaudid [hydromorphone hcl], and fentanyl.     FAMILY HISTORY     Patient'sfamily history includes Breast Cancer in her maternal cousin; Cancer in her father, paternal aunt, and paternal uncle; Diabetes in her maternal grandmother and mother; Heart Disease in her father, maternal grandfather, maternal uncle, and mother; Lupus in her maternal uncle and mother; Other in her paternal grandfather. SOCIAL HISTORY     Patient  reports that she has never smoked. She has never used smokeless tobacco. She reports current alcohol use. She reports that she does not use drugs. PHYSICAL EXAM     ED TRIAGE VITALS  BP: 138/77, Temp: 98.5 °F (36.9 °C), Heart Rate: 66, Resp: 16, SpO2: 100 %  Physical Exam  Vitals and nursing note reviewed. Constitutional:       General: She is not in acute distress. Appearance: Normal appearance. She is well-developed. She is not ill-appearing, toxic-appearing or diaphoretic. HENT:      Head: Normocephalic and atraumatic. Right Ear: Hearing, tympanic membrane, ear canal and external ear normal. No mastoid tenderness. No hemotympanum. Tympanic membrane is not perforated, erythematous or bulging. Left Ear: Hearing, tympanic membrane, ear canal and external ear normal. No mastoid tenderness. No hemotympanum. Tympanic membrane is not perforated, erythematous or bulging. Nose: Nose normal.      Mouth/Throat:      Mouth: Mucous membranes are moist.      Pharynx: Oropharynx is clear. Uvula midline. Tonsils: 0 on the right. 0 on the left. Eyes:      General: No scleral icterus. Extraocular Movements: Extraocular movements intact. Conjunctiva/sclera: Conjunctivae normal.      Right eye: Right conjunctiva is not injected. No hemorrhage. Left eye: Left conjunctiva is not injected. No hemorrhage. Pupils: Pupils are equal, round, and reactive to light. Neck:      Thyroid: No thyromegaly. Trachea: Trachea normal.   Cardiovascular:      Rate and Rhythm: Normal rate and regular rhythm. No extrasystoles are present. Chest Wall: PMI is not displaced. Heart sounds: Normal heart sounds. No murmur heard. No friction rub. No gallop. Pulmonary:      Effort: Pulmonary effort is normal. No respiratory distress. Breath sounds: Normal breath sounds. Musculoskeletal:      Cervical back: Normal range of motion and neck supple. Lymphadenopathy:      Head:      Right side of head: No submental, submandibular, tonsillar or occipital adenopathy. Left side of head: No submental, submandibular, tonsillar or occipital adenopathy. Cervical: No cervical adenopathy. Upper Body:      Right upper body: No supraclavicular adenopathy. Left upper body: No supraclavicular adenopathy. Skin:     General: Skin is warm and dry. Capillary Refill: Capillary refill takes less than 2 seconds. Coloration: Skin is not pale. Findings: No rash. Comments: Skin warm and dry to touch, no rashes noted on exposed surfaces. Neurological:      Mental Status: She is alert and oriented to person, place, and time. She is not disoriented. Psychiatric:         Mood and Affect: Mood normal.         Behavior: Behavior is cooperative.        DIAGNOSTIC RESULTS   Labs:  Results for orders placed or performed during the hospital encounter of 02/28/23   Urinalysis   Result Value Ref Range    Glucose, Ur >= 1000 (A) NEGATIVE mg/dl    Bilirubin Urine Negative NEGATIVE    Ketones, Urine Negative NEGATIVE    Specific Gravity, Urine 1.015 1.002 - 1.030    Blood, Urine Negative NEGATIVE    pH, UA 5.50 5.0 - 9.0    Protein, UA Negative NEGATIVE mg/dl    Urobilinogen, Urine 0.20 0.2 - 1.0 eu/dl    Nitrite, Urine Negative NEGATIVE    Leukocyte Esterase, Urine Negative NEGATIVE    Color, UA Yellow STRAW-YELLOW    Character, Urine Clear CLEAR-SL CLOUD       IMAGING:  No orders to display      URGENT CARE COURSE:     Vitals:    02/28/23 1122   BP: 138/77   Pulse: 66   Resp: 16   Temp: 98.5 °F (36.9 °C)   TempSrc: Temporal   SpO2: 100%   Weight: 185 lb (83.9 kg) Height: 5' 2\" (1.575 m)       Medications - No data to display  PROCEDURES:  None  FINAL IMPRESSION      1. Generalized headache    2. Memory difficulty    3. Glucosuria        DISPOSITION/PLAN   DISPOSITION Decision To Discharge 02/28/2023 12:23:20 PM    Nontoxic, no distress. Patient presents with a generalized headache. Advised to alternate over-the-counter medicine for headache. Increase fluid intake. Call PCP for follow-up. Call cardiology. If symptoms worsen go to ER. PATIENT REFERRED TO:  Jazmín De La Rosa MD  [address] [place] [address]  [phone]    Call   Call for follow up. Continue to monitor glucose. Alternate OTC med for headache. Call cardiology as needed. If worse go to ER.     DISCHARGE MEDICATIONS:  Discharge Medication List as of 2/28/2023 12:24 PM        Discharge Medication List as of 2/28/2023 12:24 PM          [address] [facility], APRN - CNP  02/28/23 1949

## 2023-03-03 ENCOUNTER — APPOINTMENT (OUTPATIENT)
Dept: GENERAL RADIOLOGY | Age: 73
End: 2023-03-03
Payer: MEDICARE

## 2023-03-03 ENCOUNTER — APPOINTMENT (OUTPATIENT)
Dept: CT IMAGING | Age: 73
End: 2023-03-03
Payer: MEDICARE

## 2023-03-03 ENCOUNTER — OFFICE VISIT (OUTPATIENT)
Dept: FAMILY MEDICINE CLINIC | Age: 73
End: 2023-03-03

## 2023-03-03 ENCOUNTER — HOSPITAL ENCOUNTER (OUTPATIENT)
Age: 73
Setting detail: OBSERVATION
Discharge: HOME | End: 2023-03-04
Attending: EMERGENCY MEDICINE
Payer: MEDICARE

## 2023-03-03 ENCOUNTER — APPOINTMENT (OUTPATIENT)
Dept: MRI IMAGING | Age: 73
End: 2023-03-03
Payer: MEDICARE

## 2023-03-03 VITALS
HEIGHT: 62 IN | DIASTOLIC BLOOD PRESSURE: 60 MMHG | HEART RATE: 64 BPM | SYSTOLIC BLOOD PRESSURE: 118 MMHG | TEMPERATURE: 97.5 F | WEIGHT: 195 LBS | BODY MASS INDEX: 35.88 KG/M2 | RESPIRATION RATE: 16 BRPM | OXYGEN SATURATION: 97 %

## 2023-03-03 DIAGNOSIS — I10 ESSENTIAL HYPERTENSION: ICD-10-CM

## 2023-03-03 DIAGNOSIS — Z95.2 S/P TAVR (TRANSCATHETER AORTIC VALVE REPLACEMENT): ICD-10-CM

## 2023-03-03 DIAGNOSIS — G43.709 CHRONIC MIGRAINE WITHOUT AURA WITHOUT STATUS MIGRAINOSUS, NOT INTRACTABLE: ICD-10-CM

## 2023-03-03 DIAGNOSIS — R41.82 ALTERED MENTAL STATUS, UNSPECIFIED ALTERED MENTAL STATUS TYPE: Primary | ICD-10-CM

## 2023-03-03 DIAGNOSIS — I63.9 CEREBELLAR INFARCTION (HCC): ICD-10-CM

## 2023-03-03 DIAGNOSIS — R41.3 ACUTE MEMORY IMPAIRMENT: Primary | ICD-10-CM

## 2023-03-03 DIAGNOSIS — E66.01 SEVERE OBESITY (BMI 35.0-39.9) WITH COMORBIDITY (HCC): ICD-10-CM

## 2023-03-03 DIAGNOSIS — E11.65 TYPE 2 DIABETES MELLITUS WITH HYPERGLYCEMIA, WITHOUT LONG-TERM CURRENT USE OF INSULIN (HCC): ICD-10-CM

## 2023-03-03 DIAGNOSIS — G47.33 OSA (OBSTRUCTIVE SLEEP APNEA): ICD-10-CM

## 2023-03-03 DIAGNOSIS — I25.10 ASCVD (ARTERIOSCLEROTIC CARDIOVASCULAR DISEASE): ICD-10-CM

## 2023-03-03 PROBLEM — F05 ACUTE CONFUSIONAL STATE: Status: ACTIVE | Noted: 2023-03-03

## 2023-03-03 LAB
ALBUMIN SERPL BCG-MCNC: 4.3 G/DL (ref 3.5–5.1)
ALP SERPL-CCNC: 64 U/L (ref 38–126)
ALT SERPL W/O P-5'-P-CCNC: 30 U/L (ref 11–66)
AMMONIA PLAS-MCNC: 27 UMOL/L (ref 11–60)
ANION GAP SERPL CALC-SCNC: 14 MEQ/L (ref 8–16)
AST SERPL-CCNC: 20 U/L (ref 5–40)
BASOPHILS ABSOLUTE: 0 THOU/MM3 (ref 0–0.1)
BASOPHILS NFR BLD AUTO: 0.7 %
BILIRUB SERPL-MCNC: 1 MG/DL (ref 0.3–1.2)
BUN SERPL-MCNC: 21 MG/DL (ref 7–22)
CALCIUM SERPL-MCNC: 10.1 MG/DL (ref 8.5–10.5)
CHLORIDE SERPL-SCNC: 104 MEQ/L (ref 98–111)
CO2 SERPL-SCNC: 23 MEQ/L (ref 23–33)
CREAT SERPL-MCNC: 0.6 MG/DL (ref 0.4–1.2)
DEPRECATED RDW RBC AUTO: 41.5 FL (ref 35–45)
EKG ATRIAL RATE: 66 BPM
EKG P AXIS: 19 DEGREES
EKG P-R INTERVAL: 202 MS
EKG Q-T INTERVAL: 390 MS
EKG QRS DURATION: 102 MS
EKG QTC CALCULATION (BAZETT): 408 MS
EKG R AXIS: 18 DEGREES
EKG T AXIS: 21 DEGREES
EKG VENTRICULAR RATE: 66 BPM
EOSINOPHIL NFR BLD AUTO: 2.3 %
EOSINOPHILS ABSOLUTE: 0.1 THOU/MM3 (ref 0–0.4)
ERYTHROCYTE [DISTWIDTH] IN BLOOD BY AUTOMATED COUNT: 12.7 % (ref 11.5–14.5)
FLUAV RNA RESP QL NAA+PROBE: NOT DETECTED
FLUBV RNA RESP QL NAA+PROBE: NOT DETECTED
GFR SERPL CREATININE-BSD FRML MDRD: > 60 ML/MIN/1.73M2
GLUCOSE BLD STRIP.AUTO-MCNC: 116 MG/DL (ref 70–108)
GLUCOSE BLD STRIP.AUTO-MCNC: 180 MG/DL (ref 70–108)
GLUCOSE BLD STRIP.AUTO-MCNC: 185 MG/DL (ref 70–108)
GLUCOSE SERPL-MCNC: 208 MG/DL (ref 70–108)
HCT VFR BLD AUTO: 45.6 % (ref 37–47)
HGB BLD-MCNC: 14.9 GM/DL (ref 12–16)
IMM GRANULOCYTES # BLD AUTO: 0.02 THOU/MM3 (ref 0–0.07)
IMM GRANULOCYTES NFR BLD AUTO: 0.3 %
LYMPHOCYTES ABSOLUTE: 1.6 THOU/MM3 (ref 1–4.8)
LYMPHOCYTES NFR BLD AUTO: 26.5 %
MCH RBC QN AUTO: 29.9 PG (ref 26–33)
MCHC RBC AUTO-ENTMCNC: 32.7 GM/DL (ref 32.2–35.5)
MCV RBC AUTO: 91.6 FL (ref 81–99)
MONOCYTES ABSOLUTE: 0.5 THOU/MM3 (ref 0.4–1.3)
MONOCYTES NFR BLD AUTO: 8.3 %
NEUTROPHILS NFR BLD AUTO: 61.9 %
NRBC BLD AUTO-RTO: 0 /100 WBC
NT-PROBNP SERPL IA-MCNC: 54.2 PG/ML (ref 0–124)
OSMOLALITY SERPL CALC.SUM OF ELEC: 290.3 MOSMOL/KG (ref 275–300)
PLATELET # BLD AUTO: 201 THOU/MM3 (ref 130–400)
PMV BLD AUTO: 9.4 FL (ref 9.4–12.4)
POTASSIUM SERPL-SCNC: 4.4 MEQ/L (ref 3.5–5.2)
PROT SERPL-MCNC: 7 G/DL (ref 6.1–8)
RBC # BLD AUTO: 4.98 MILL/MM3 (ref 4.2–5.4)
SARS-COV-2 RNA RESP QL NAA+PROBE: NOT DETECTED
SEGMENTED NEUTROPHILS ABSOLUTE COUNT: 3.7 THOU/MM3 (ref 1.8–7.7)
SODIUM SERPL-SCNC: 141 MEQ/L (ref 135–145)
TROPONIN T: < 0.01 NG/ML
TSH SERPL DL<=0.005 MIU/L-ACNC: 2.69 UIU/ML (ref 0.4–4.2)
WBC # BLD AUTO: 6 THOU/MM3 (ref 4.8–10.8)

## 2023-03-03 PROCEDURE — 83880 ASSAY OF NATRIURETIC PEPTIDE: CPT

## 2023-03-03 PROCEDURE — 95816 EEG AWAKE AND DROWSY: CPT | Performed by: PSYCHIATRY & NEUROLOGY

## 2023-03-03 PROCEDURE — 96372 THER/PROPH/DIAG INJ SC/IM: CPT

## 2023-03-03 PROCEDURE — 70450 CT HEAD/BRAIN W/O DYE: CPT

## 2023-03-03 PROCEDURE — 6360000004 HC RX CONTRAST MEDICATION: Performed by: NURSE PRACTITIONER

## 2023-03-03 PROCEDURE — 80307 DRUG TEST PRSMV CHEM ANLYZR: CPT

## 2023-03-03 PROCEDURE — 6360000002 HC RX W HCPCS: Performed by: PHYSICIAN ASSISTANT

## 2023-03-03 PROCEDURE — A9579 GAD-BASE MR CONTRAST NOS,1ML: HCPCS | Performed by: NURSE PRACTITIONER

## 2023-03-03 PROCEDURE — 87636 SARSCOV2 & INF A&B AMP PRB: CPT

## 2023-03-03 PROCEDURE — 84484 ASSAY OF TROPONIN QUANT: CPT

## 2023-03-03 PROCEDURE — 96374 THER/PROPH/DIAG INJ IV PUSH: CPT

## 2023-03-03 PROCEDURE — 81003 URINALYSIS AUTO W/O SCOPE: CPT

## 2023-03-03 PROCEDURE — 93010 ELECTROCARDIOGRAM REPORT: CPT | Performed by: INTERNAL MEDICINE

## 2023-03-03 PROCEDURE — G0378 HOSPITAL OBSERVATION PER HR: HCPCS

## 2023-03-03 PROCEDURE — 70553 MRI BRAIN STEM W/O & W/DYE: CPT

## 2023-03-03 PROCEDURE — 99285 EMERGENCY DEPT VISIT HI MDM: CPT

## 2023-03-03 PROCEDURE — 80053 COMPREHEN METABOLIC PANEL: CPT

## 2023-03-03 PROCEDURE — 96375 TX/PRO/DX INJ NEW DRUG ADDON: CPT

## 2023-03-03 PROCEDURE — 82140 ASSAY OF AMMONIA: CPT

## 2023-03-03 PROCEDURE — 36415 COLL VENOUS BLD VENIPUNCTURE: CPT

## 2023-03-03 PROCEDURE — 95816 EEG AWAKE AND DROWSY: CPT

## 2023-03-03 PROCEDURE — 95819 EEG AWAKE AND ASLEEP: CPT | Performed by: PHYSICIAN ASSISTANT

## 2023-03-03 PROCEDURE — 6360000002 HC RX W HCPCS: Performed by: STUDENT IN AN ORGANIZED HEALTH CARE EDUCATION/TRAINING PROGRAM

## 2023-03-03 PROCEDURE — 85025 COMPLETE CBC W/AUTO DIFF WBC: CPT

## 2023-03-03 PROCEDURE — 99223 1ST HOSP IP/OBS HIGH 75: CPT | Performed by: PHYSICIAN ASSISTANT

## 2023-03-03 PROCEDURE — 84443 ASSAY THYROID STIM HORMONE: CPT

## 2023-03-03 PROCEDURE — 6370000000 HC RX 637 (ALT 250 FOR IP): Performed by: PHYSICIAN ASSISTANT

## 2023-03-03 PROCEDURE — 71046 X-RAY EXAM CHEST 2 VIEWS: CPT

## 2023-03-03 PROCEDURE — 6370000000 HC RX 637 (ALT 250 FOR IP): Performed by: STUDENT IN AN ORGANIZED HEALTH CARE EDUCATION/TRAINING PROGRAM

## 2023-03-03 PROCEDURE — 93005 ELECTROCARDIOGRAM TRACING: CPT | Performed by: STUDENT IN AN ORGANIZED HEALTH CARE EDUCATION/TRAINING PROGRAM

## 2023-03-03 PROCEDURE — 82948 REAGENT STRIP/BLOOD GLUCOSE: CPT

## 2023-03-03 PROCEDURE — 2580000003 HC RX 258: Performed by: PHYSICIAN ASSISTANT

## 2023-03-03 RX ORDER — POLYETHYLENE GLYCOL 3350 17 G/17G
17 POWDER, FOR SOLUTION ORAL DAILY PRN
Status: DISCONTINUED | OUTPATIENT
Start: 2023-03-03 | End: 2023-03-04 | Stop reason: HOSPADM

## 2023-03-03 RX ORDER — ACETAMINOPHEN 325 MG/1
650 TABLET ORAL EVERY 6 HOURS PRN
Status: DISCONTINUED | OUTPATIENT
Start: 2023-03-03 | End: 2023-03-04 | Stop reason: HOSPADM

## 2023-03-03 RX ORDER — PANTOPRAZOLE SODIUM 40 MG/1
40 TABLET, DELAYED RELEASE ORAL
Status: DISCONTINUED | OUTPATIENT
Start: 2023-03-04 | End: 2023-03-04 | Stop reason: HOSPADM

## 2023-03-03 RX ORDER — KETOROLAC TROMETHAMINE 30 MG/ML
15 INJECTION, SOLUTION INTRAMUSCULAR; INTRAVENOUS ONCE
Status: COMPLETED | OUTPATIENT
Start: 2023-03-03 | End: 2023-03-03

## 2023-03-03 RX ORDER — ANASTROZOLE 1 MG/1
1 TABLET ORAL DAILY
Status: DISCONTINUED | OUTPATIENT
Start: 2023-03-04 | End: 2023-03-04 | Stop reason: HOSPADM

## 2023-03-03 RX ORDER — SODIUM CHLORIDE 9 MG/ML
INJECTION, SOLUTION INTRAVENOUS PRN
Status: DISCONTINUED | OUTPATIENT
Start: 2023-03-03 | End: 2023-03-04 | Stop reason: HOSPADM

## 2023-03-03 RX ORDER — DEXTROSE MONOHYDRATE 100 MG/ML
INJECTION, SOLUTION INTRAVENOUS CONTINUOUS PRN
Status: DISCONTINUED | OUTPATIENT
Start: 2023-03-03 | End: 2023-03-04 | Stop reason: HOSPADM

## 2023-03-03 RX ORDER — AMLODIPINE BESYLATE 5 MG/1
5 TABLET ORAL DAILY
Status: DISCONTINUED | OUTPATIENT
Start: 2023-03-04 | End: 2023-03-04 | Stop reason: HOSPADM

## 2023-03-03 RX ORDER — ACETAMINOPHEN 500 MG
1000 TABLET ORAL ONCE
Status: COMPLETED | OUTPATIENT
Start: 2023-03-03 | End: 2023-03-03

## 2023-03-03 RX ORDER — ENOXAPARIN SODIUM 100 MG/ML
40 INJECTION SUBCUTANEOUS DAILY
Status: DISCONTINUED | OUTPATIENT
Start: 2023-03-03 | End: 2023-03-04 | Stop reason: HOSPADM

## 2023-03-03 RX ORDER — INSULIN LISPRO 100 [IU]/ML
0-4 INJECTION, SOLUTION INTRAVENOUS; SUBCUTANEOUS
Status: DISCONTINUED | OUTPATIENT
Start: 2023-03-03 | End: 2023-03-04 | Stop reason: HOSPADM

## 2023-03-03 RX ORDER — ONDANSETRON 4 MG/1
4 TABLET, ORALLY DISINTEGRATING ORAL EVERY 8 HOURS PRN
Status: DISCONTINUED | OUTPATIENT
Start: 2023-03-03 | End: 2023-03-04 | Stop reason: HOSPADM

## 2023-03-03 RX ORDER — INSULIN GLARGINE 100 [IU]/ML
35 INJECTION, SOLUTION SUBCUTANEOUS NIGHTLY
Status: DISCONTINUED | OUTPATIENT
Start: 2023-03-03 | End: 2023-03-03

## 2023-03-03 RX ORDER — BENAZEPRIL HYDROCHLORIDE 20 MG/1
20 TABLET ORAL 2 TIMES DAILY
Status: DISCONTINUED | OUTPATIENT
Start: 2023-03-03 | End: 2023-03-04 | Stop reason: HOSPADM

## 2023-03-03 RX ORDER — LISINOPRIL 5 MG/1
5 TABLET ORAL DAILY
Status: DISCONTINUED | OUTPATIENT
Start: 2023-03-03 | End: 2023-03-03

## 2023-03-03 RX ORDER — SODIUM CHLORIDE 0.9 % (FLUSH) 0.9 %
5-40 SYRINGE (ML) INJECTION EVERY 12 HOURS SCHEDULED
Status: DISCONTINUED | OUTPATIENT
Start: 2023-03-03 | End: 2023-03-04 | Stop reason: HOSPADM

## 2023-03-03 RX ORDER — ACETAMINOPHEN 650 MG/1
650 SUPPOSITORY RECTAL EVERY 6 HOURS PRN
Status: DISCONTINUED | OUTPATIENT
Start: 2023-03-03 | End: 2023-03-04 | Stop reason: HOSPADM

## 2023-03-03 RX ORDER — SODIUM CHLORIDE 0.9 % (FLUSH) 0.9 %
5-40 SYRINGE (ML) INJECTION PRN
Status: DISCONTINUED | OUTPATIENT
Start: 2023-03-03 | End: 2023-03-04 | Stop reason: HOSPADM

## 2023-03-03 RX ORDER — ASPIRIN 81 MG/1
81 TABLET, CHEWABLE ORAL DAILY
Status: DISCONTINUED | OUTPATIENT
Start: 2023-03-04 | End: 2023-03-04 | Stop reason: HOSPADM

## 2023-03-03 RX ORDER — INSULIN LISPRO 100 [IU]/ML
0-4 INJECTION, SOLUTION INTRAVENOUS; SUBCUTANEOUS NIGHTLY
Status: DISCONTINUED | OUTPATIENT
Start: 2023-03-03 | End: 2023-03-04 | Stop reason: HOSPADM

## 2023-03-03 RX ORDER — ATORVASTATIN CALCIUM 20 MG/1
20 TABLET, FILM COATED ORAL NIGHTLY
Status: DISCONTINUED | OUTPATIENT
Start: 2023-03-03 | End: 2023-03-04 | Stop reason: HOSPADM

## 2023-03-03 RX ORDER — INSULIN GLARGINE 100 [IU]/ML
39 INJECTION, SOLUTION SUBCUTANEOUS NIGHTLY
Status: DISCONTINUED | OUTPATIENT
Start: 2023-03-03 | End: 2023-03-04 | Stop reason: HOSPADM

## 2023-03-03 RX ORDER — LISINOPRIL 20 MG/1
20 TABLET ORAL DAILY
Status: DISCONTINUED | OUTPATIENT
Start: 2023-03-04 | End: 2023-03-03 | Stop reason: SDUPTHER

## 2023-03-03 RX ORDER — DIPHENHYDRAMINE HYDROCHLORIDE 50 MG/ML
25 INJECTION INTRAMUSCULAR; INTRAVENOUS ONCE
Status: COMPLETED | OUTPATIENT
Start: 2023-03-03 | End: 2023-03-03

## 2023-03-03 RX ORDER — METOCLOPRAMIDE HYDROCHLORIDE 5 MG/ML
10 INJECTION INTRAMUSCULAR; INTRAVENOUS ONCE
Status: COMPLETED | OUTPATIENT
Start: 2023-03-03 | End: 2023-03-03

## 2023-03-03 RX ORDER — ONDANSETRON 2 MG/ML
4 INJECTION INTRAMUSCULAR; INTRAVENOUS EVERY 6 HOURS PRN
Status: DISCONTINUED | OUTPATIENT
Start: 2023-03-03 | End: 2023-03-04 | Stop reason: HOSPADM

## 2023-03-03 RX ADMIN — ENOXAPARIN SODIUM 40 MG: 100 INJECTION SUBCUTANEOUS at 20:55

## 2023-03-03 RX ADMIN — GADOTERIDOL 20 ML: 279.3 INJECTION, SOLUTION INTRAVENOUS at 19:02

## 2023-03-03 RX ADMIN — SODIUM CHLORIDE, PRESERVATIVE FREE 10 ML: 5 INJECTION INTRAVENOUS at 20:52

## 2023-03-03 RX ADMIN — ATORVASTATIN CALCIUM 20 MG: 20 TABLET, FILM COATED ORAL at 20:54

## 2023-03-03 RX ADMIN — METOCLOPRAMIDE 10 MG: 5 INJECTION, SOLUTION INTRAMUSCULAR; INTRAVENOUS at 11:06

## 2023-03-03 RX ADMIN — INSULIN GLARGINE 39 UNITS: 100 INJECTION, SOLUTION SUBCUTANEOUS at 20:55

## 2023-03-03 RX ADMIN — DIPHENHYDRAMINE HYDROCHLORIDE 25 MG: 50 INJECTION, SOLUTION INTRAMUSCULAR; INTRAVENOUS at 11:06

## 2023-03-03 RX ADMIN — BENAZEPRIL HYDROCHLORIDE 20 MG: 20 TABLET ORAL at 20:54

## 2023-03-03 RX ADMIN — ACETAMINOPHEN 1000 MG: 500 TABLET ORAL at 11:06

## 2023-03-03 RX ADMIN — KETOROLAC TROMETHAMINE 15 MG: 30 INJECTION, SOLUTION INTRAMUSCULAR; INTRAVENOUS at 13:22

## 2023-03-03 SDOH — ECONOMIC STABILITY: FOOD INSECURITY: WITHIN THE PAST 12 MONTHS, YOU WORRIED THAT YOUR FOOD WOULD RUN OUT BEFORE YOU GOT MONEY TO BUY MORE.: NEVER TRUE

## 2023-03-03 SDOH — ECONOMIC STABILITY: FOOD INSECURITY: WITHIN THE PAST 12 MONTHS, THE FOOD YOU BOUGHT JUST DIDN'T LAST AND YOU DIDN'T HAVE MONEY TO GET MORE.: NEVER TRUE

## 2023-03-03 SDOH — ECONOMIC STABILITY: HOUSING INSECURITY
IN THE LAST 12 MONTHS, WAS THERE A TIME WHEN YOU DID NOT HAVE A STEADY PLACE TO SLEEP OR SLEPT IN A SHELTER (INCLUDING NOW)?: NO

## 2023-03-03 SDOH — ECONOMIC STABILITY: INCOME INSECURITY: HOW HARD IS IT FOR YOU TO PAY FOR THE VERY BASICS LIKE FOOD, HOUSING, MEDICAL CARE, AND HEATING?: NOT HARD AT ALL

## 2023-03-03 ASSESSMENT — ENCOUNTER SYMPTOMS
EYE REDNESS: 0
CHEST TIGHTNESS: 0
COLOR CHANGE: 0
NAUSEA: 0
EYE ITCHING: 0
EYE DISCHARGE: 0
SINUS PRESSURE: 0
SHORTNESS OF BREATH: 0
RHINORRHEA: 0
ABDOMINAL DISTENTION: 0
SHORTNESS OF BREATH: 0
DIARRHEA: 0
VOMITING: 0
SINUS PAIN: 0
ABDOMINAL PAIN: 0

## 2023-03-03 ASSESSMENT — PAIN DESCRIPTION - DESCRIPTORS: DESCRIPTORS: ACHING

## 2023-03-03 ASSESSMENT — PAIN SCALES - GENERAL: PAINLEVEL_OUTOF10: 2

## 2023-03-03 ASSESSMENT — PAIN - FUNCTIONAL ASSESSMENT
PAIN_FUNCTIONAL_ASSESSMENT: ACTIVITIES ARE NOT PREVENTED
PAIN_FUNCTIONAL_ASSESSMENT: NONE - DENIES PAIN

## 2023-03-03 ASSESSMENT — PAIN DESCRIPTION - LOCATION: LOCATION: HEAD

## 2023-03-03 ASSESSMENT — PAIN DESCRIPTION - ORIENTATION: ORIENTATION: MID

## 2023-03-03 NOTE — H&P
Hospitalist History & Physical    Patient:  Kennedy Hameed    Unit/Bed:04/004A  YOB: 1950  MRN: [id number]   Acct: [de-identified]   PCP: Katharine Cunningham MD  Code Status: Prior    Date of Service: Pt seen/examined on 03/03/23 and admitted to Observation with expected LOS less than two midnights due to medical therapy. Chief Complaint: Confusion    Assessment/Plan:    Acute confusional state  Suspect confusional migraine versus acute confusional episode. CT head without acute findings. Metabolic work-up unremarkable. Neurology consulted for further evaluation. EEG ordered. Will defer selection of MRI to neurology. SLP evaluation  Intractable headache, history of migraine headaches  CT head without acute findings. Single dose of Toradol given. Neurology consult as above. Aortic stenosis status post TAVR  Performed at [facility] on 2/22/2023  Continue aspirin  Insulin-dependent type 2 diabetes mellitus  On Lantus 39 units nightly at home plus sliding scale. Low-dose sliding scale. Continue Lantus 39 units nightly. Carb controlled diet. History of breast cancer  Continue anastrozole  Hypertension  Continue home regimen  Hyperlipidemia  Continue Lipitor    History of Present Illness:  Kennedy Hameed is a 74 y.o. female with a history of aortic stenosis status post TAVR on 2/22/2023, insulin-dependent type 2 diabetes mellitus, breast cancer on anastrozole, hypertension, and hyperlipidemia who presented to [facility] with chief complaint of confusion. The patient underwent a TAVR at [facility] on 2/22/2023. During her hospitalization, she began having a headache. This headache has been intractable and is still present at this time. Following discharge from the hospital, the patient was noted to have difficulty performing certain tasks. She was having difficulty texting on her phone, writing a check, and performing other ADLs.   She sought treatment at her family [de-identified] office who then referred her to the emergency department. In the emergency department, the patient is still having difficulty performing complex commands. However, she has no focal neurologic deficits. She denies any unilateral weakness, facial droop, slurred speech, aphasia, gait disturbance, or vision changes. She still has a headache. However, she is having difficulty describing the severity and characteristics of her headache. She does state it is bifrontal in nature. She has a history of migraines in the past, but states that this headache is different than those previously. She did have an MRI in 2018 with an indication of episodes of memory loss. She does report issues with having difficulty expressing herself in the past when having a migraine headache, but denies any difficulty with performing activities previously. She denies any history of stroke or seizure. She has not had any seizure-like activity. Her TAVR went well without complication. She denies any fevers, chills, chest pain, cough, shortness of breath, abdominal pain, nausea, vomiting, diarrhea, or constipation. She denies any significant history of anxiety, but does endorse some stress related to her recent surgical procedure. Review of Systems: Pertinent positives as noted in the HPI. All other systems reviewed and negative.     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    Cancer ([facility] [address] 09/1918    Left Breast    Chronic sinus complaints     Diverticulosis of colon     DKA (diabetic ketoacidoses) 05/2018    Elevated TSH     Hypertension     Osteoarthritis     Polyneuropathy     PONV (postoperative nausea and vomiting)     Spinal headache     Type 2 diabetes mellitus ([address] 1990's       Past Surgical History:        Procedure Laterality Date    BLADDER SUSPENSION      times 2    BREAST LUMPECTOMY Left 10/10/2018    CARPAL TUNNEL RELEASE Bilateral 02/2020    CHOLECYSTECTOMY      DILATION AND CURETTAGE OF UTERUS      x2    EYE SURGERY      HAND SURGERY Right     thumb-revision of suspensionplasty    HERNIA REPAIR  06/18/2019    HYSTERECTOMY (CERVIX STATUS UNKNOWN)  1995    JOINT REPLACEMENT Right 2007    Rt total knee    KNEE ARTHROSCOPY  1967, 2001    RUDY STEROTACTIC LOC BREAST BIOPSY LEFT Left 09/19/2018    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    MANDIBLE FRACTURE SURGERY      OTHER SURGICAL HISTORY Right 12/30/2015    Excision of Sebaceous Cyst Right Ear     OVARY REMOVAL  1995    FL OFFICE/OUTPT VISIT,PROCEDURE ONLY Left 10/10/2018    LEFT BREAST LUMPECTOMY WITH SENTINEL LYMPH NODE BIOPSY performed by Paula Guerra MD at [facility]  age 4    VENTRAL HERNIA REPAIR N/A 6/14/2019    COMBINED LAPAROSCOPIC AND OPEN VENTRAL HERNIA REPAIR WITH MESH performed by Puala Guerra MD at [facility] Right     right-excision        Home Medications:   No current facility-administered medications on file prior to encounter.      Current Outpatient Medications on File Prior to Encounter   Medication Sig Dispense Refill    atorvastatin (LIPITOR) 20 MG tablet Take 1 tablet by mouth nightly 90 tablet 3    amLODIPine (NORVASC) 5 MG tablet take 1 tablet by mouth once daily      omeprazole (PRILOSEC OTC) 20 MG tablet Take 20 mg by mouth every other day      Evening Primrose Oil 1000 MG CAPS       magnesium (MAGNESIUM-OXIDE) 250 MG TABS tablet Take 500 mg by mouth daily      anastrozole (ARIMIDEX) 1 MG tablet Take 1 tablet by mouth daily 90 tablet 3    ibuprofen (ADVIL;MOTRIN) 600 MG tablet TAKE 1 TABLET BY MOUTH EVERY 6 HOURS AS NEEDED FOR PAIN 120 tablet 0    benazepril (LOTENSIN) 20 MG tablet TAKE 1 TABLET BY MOUTH 2 TIMES DAILY 180 tablet 1    albuterol sulfate HFA (VENTOLIN HFA) 108 (90 Base) MCG/ACT inhaler Inhale 2 puffs into the lungs 4 times daily as needed for Wheezing (Patient not taking: No sig reported) 18 g 1    Handicap Placard MISC by Does not apply route Duration:  5 years; dx:  osteoarthritis 1 each 0    SUPER B COMPLEX/C PO Take 1 tablet by mouth daily (Patient not taking: No sig reported)      pantoprazole (PROTONIX) 40 MG tablet       empagliflozin (JARDIANCE) 25 MG tablet Take 25 mg by mouth daily      insulin lispro, 1 Unit Dial, 100 UNIT/ML SOPN Inject into the skin 3 times daily Indications: inject 0-12 unitsinto the skin 3 times daily-per sliding scale      furosemide (LASIX) 20 MG tablet  (Patient not taking: Reported on 3/3/2023)      EASY TOUCH PEN NEEDLES 31G X 6 MM MISC       LANTUS SOLOSTAR 100 UNIT/ML injection pen Inject 39 Units into the skin nightly       Calcium Carb-Cholecalciferol 600-500 MG-UNIT CAPS Take by mouth daily      aspirin 81 MG chewable tablet Take 81 mg by mouth daily      therapeutic multivitamin-minerals (THERAGRAN-M) tablet Take 1 tablet by mouth daily. Allergies:    Amoxicillin, Bactrim [sulfamethoxazole-trimethoprim], Donnatal [phenobarbital-belladonna alk], Lovastatin, Metformin and related, Vioxx, Dilaudid [hydromorphone hcl], and Fentanyl    Social History:    reports that she has never smoked. She has never used smokeless tobacco. She reports current alcohol use. She reports that she does not use drugs.     Family History:       Problem Relation Age of Onset    Diabetes Mother     Heart Disease Mother     Lupus Mother     Heart Disease Father     Cancer Father         Squamous cell - face & scalp    Diabetes Maternal Grandmother     Heart Disease Maternal Grandfather     Cancer Paternal Aunt          multiple myeloma    Cancer Paternal Uncle         Lymphatic Leukemia    Breast Cancer Maternal Cousin         unsure on age, had breast cancer twice unsure if bilateral or if reoccurence    Heart Disease Maternal Uncle     Lupus Maternal Uncle Other Paternal Grandfather         Brain aneurysm       Diet:  No diet orders on file      Physical Exam:  BP (!) 158/89   Pulse 65   Temp 97.5 °F (36.4 °C) (Oral)   Resp 16   SpO2 98%   General: Alert, in no acute distress, cooperative  HEENT:  Normocephalic and atraumatic. No scleral icterus. Pupils equal, round, and reactive to light. External nares without deformity. External ears normal.  Neck: Supple. No JVD. No thyromegaly. No cervical lymphadenopathy. Lungs: On room air. Respiratory rate and effort normal.  Lungs clear to auscultation bilaterally. Cardiac: Regular rate and rhythm. No murmur, rubs, or gallops. Abdomen: Nondistended, soft, no tenderness to palpation, guarding, rigidity. Bowel sounds normoactive. Extremities:  No clubbing, cyanosis, or lower extremity edema. Vasculature: capillary refill < 3 seconds. Lower extremity pulses 2+ bilaterally  Skin:  Warm and dry. No rashes or lesions. Psych: Mood normal.  Affect appropriate. Neurologic: Alert and oriented to person, place, and time. The patient does have difficulty with stating her age. The patient is able to perform simple tasks but has more difficulty performing complex tasks. There is no dysarthria or aphasia. No cranial nerve deficits. No extremity weakness, dysmetria, sensory loss, or pronator drift. Data: (All radiographs, tracings, PFTs, and imaging are personally viewed and interpreted unless otherwise noted)  Labs:   Recent Labs     03/03/23  0955   WBC 6.0   HGB 14.9   HCT 45.6        Recent Labs     03/03/23  0955      K 4.4      CO2 23   BUN 21   CREATININE 0.6   CALCIUM 10.1     Recent Labs     03/03/23  0955   AST 20   ALT 30   BILITOT 1.0   ALKPHOS 64     No results for input(s): INR in the last 72 hours. No results for input(s): Thony Cristobal in the last 72 hours.   Urinalysis:   Lab Results   Component Value Date/Time    NITRU Negative 02/28/2023 12:12 PM    WBCUA 10-15 06/25/2020 10:13 AM    WBCUA 5-10 03/24/2012 07:40 PM    BACTERIA FEW 06/25/2020 10:13 AM    RBCUA 5-10 06/25/2020 10:13 AM    BLOODU Negative 02/28/2023 12:12 PM    SPECGRAV 1.015 01/19/2023 09:04 AM    SPECGRAV >1.030 11/20/2021 08:11 AM    GLUCOSEU >= 1000 02/28/2023 12:12 PM       EKG: normal sinus rhythm, no blocks or conduction defects, no ischemic changes    Radiology:  XR CHEST (2 VW)    Result Date: 3/3/2023  PROCEDURE: XR CHEST (2 VW) CLINICAL INFORMATION: altered mental status. COMPARISON: Chest x-ray dated 15th of June 2022. TECHNIQUE: AP and lateral views the chest. FINDINGS: The heart size is normal. The patient is status post TAVR. The mediastinum is not widened. There are no pulmonary infiltrates or effusions. The pulmonary vascularity is normal. No suspicious osseous lesions are present. 1. The patient has undergone interval aortic valve replacement. 2. The appearance of the chest is otherwise unchanged since previous study dated 15th of June 2022. **This report has been created using voice recognition software. It may contain minor errors which are inherent in voice recognition technology. ** Final report electronically signed by DR Mcdonald Both on 3/3/2023 10:56 AM    CT HEAD WO CONTRAST    Result Date: 3/3/2023  PROCEDURE: CT HEAD WO CONTRAST CLINICAL INFORMATION: confusion, recent TAVR, concern for TIA or CVA. COMPARISON: Brain MRI 8/12/2019. Head CT 8/16/2017. TECHNIQUE: Noncontrast 5 mm axial images were obtained through the brain. Sagittal and coronal reconstructions were obtained. All CT scans at this facility use dose modulation, iterative reconstruction, and/or weight-based dosing when appropriate to reduce radiation dose to as low as reasonably achievable. FINDINGS: There is no hemorrhage. There are no intra-or extra-axial collections. There is no hydrocephalus, midline shift or mass effect.   The brain volume is at the lower limits of normal. This has developed since the prior exams. There is symmetric abnormal low  attenuation in the periatrial white matter bilaterally which was present previously. This is consistent with chronic small vessel ischemic changes. There are no new areas of abnormal attenuation. The paranasal sinuses and mastoid air cells are normally aerated. There is no suspicious calvarial abnormality. 1. No acute findings. 2. Interval decrease in the overall brain volume loss. **This report has been created using voice recognition software. It may contain minor errors which are inherent in voice recognition technology. ** Final report electronically signed by Dr. Martha Jensen on 3/3/2023 10:51 AM        DVT prophylaxis: Lovenox    Diet: No diet orders on file    Fluids: By mouth    Code Status: Prior    PT/OT Eval Status: Active and ongoing    Tele:   [x] yes             [] no      Thank you Arias Manzo MD for the opportunity to be involved in this patient's care.     Electronically signed by Miky Salinas PA-C on 3/3/2023 at 3:01 PM

## 2023-03-03 NOTE — PROGRESS NOTES
[facility]  [address] [place] [address]  Dept: [phone]  Dept Fax: [phone]  Loc: [phone]  PROGRESS NOTE      Visit Date: 3/3/2023    Opal Huston is a 74 y.o. female who presents today for:  Chief Complaint   Patient presents with    Follow-up     Patient got a valve replaced Feb 22nd then the next day had a severe headache and confusion       Subjective:  HPI    Headaches. S/p aortic valve replacement at [facility] on 2/22. Seen in urgent care on 2/28. Has had some confusion since 2/24:  problems with writing checks, changing channel on tv, difficulty figuring out how to text with phone. Difficulty with subtracting yesterday. No head injury. On aspirin. Has neck pain. Headaches are frontal.    Glucose 125 this morning.  is present    Review of Systems   Constitutional:  Negative for chills and fever. Respiratory:  Negative for shortness of breath. Cardiovascular:  Negative for chest pain. Genitourinary:  Negative for dysuria, frequency and urgency. Neurological:  Positive for headaches. Negative for facial asymmetry, speech difficulty, weakness and numbness.    Patient Active Problem List   Diagnosis    Hyperlipidemia    Asthma    Polyneuropathy    AS (aortic stenosis)    Hiatal hernia    Hypothyroid    Type 2 diabetes mellitus without complication, without long-term current use of insulin (HCC)    Osteoarthritis of multiple joints    Ear canal mass    Diabetic ketoacidosis without coma associated with type 2 diabetes mellitus ([address]    Malignant neoplasm of left breast in female, estrogen receptor positive ([address]    Localized osteoarthritis of left knee    Other osteoporosis without current pathological fracture    S/P repair of ventral hernia    Cervical stenosis of spinal canal    Tear of left supraspinatus tendon    Morbidly obese (HCC)     Past Medical History:   Diagnosis Date    Arthritis Asthma     Brain cyst     benign    Breast cancer ([facility] [address] 09/18/2018    Left breast, 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    Cancer ([facility] [address] 09/1918    Left Breast    Chronic sinus complaints     Diverticulosis of colon     DKA (diabetic ketoacidoses) 05/2018    Elevated TSH     Hypertension     Osteoarthritis     Polyneuropathy     PONV (postoperative nausea and vomiting)     Spinal headache     Type 2 diabetes mellitus ([address] 1990's      Past Surgical History:   Procedure Laterality Date    BLADDER SUSPENSION      times 2    BREAST LUMPECTOMY Left 10/10/2018    CARPAL TUNNEL RELEASE Bilateral 02/2020    CHOLECYSTECTOMY      DILATION AND CURETTAGE OF UTERUS      x2    EYE SURGERY      HAND SURGERY Right     thumb-revision of suspensionplasty    HERNIA REPAIR  06/18/2019    HYSTERECTOMY (CERVIX STATUS UNKNOWN)  1995    JOINT REPLACEMENT Right 2007    Rt total knee    KNEE ARTHROSCOPY  1967, 2001    RUDY STEROTACTIC LOC BREAST BIOPSY LEFT Left 09/19/2018    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    MANDIBLE FRACTURE SURGERY      OTHER SURGICAL HISTORY Right 12/30/2015    Excision of Sebaceous Cyst Right Ear     OVARY REMOVAL  1995    [place] OFFICE/OUTPT VISIT,PROCEDURE ONLY Left 10/10/2018    LEFT BREAST LUMPECTOMY WITH SENTINEL LYMPH NODE BIOPSY performed by Almer Phalen, MD at [facility]  age 4    VENTRAL HERNIA REPAIR N/A 6/14/2019    COMBINED LAPAROSCOPIC AND OPEN VENTRAL HERNIA REPAIR WITH MESH performed by Almer Phalen, MD at [address] Right     right-excision      Family History   Problem Relation Age of Onset    Diabetes Mother     Heart Disease Mother     Lupus Mother     Heart Disease Father     Cancer Father         Squamous cell - face & scalp    Diabetes Maternal Grandmother     Heart Disease Maternal Grandfather     Cancer Paternal Aunt          multiple myeloma    Cancer Paternal Uncle         Lymphatic Leukemia    Breast Cancer Maternal Cousin         unsure on age, had breast cancer twice unsure if bilateral or if reoccurence    Heart Disease Maternal Uncle     Lupus Maternal Uncle     Other Paternal Grandfather         Brain aneurysm     Social History     Tobacco Use    Smoking status: Never    Smokeless tobacco: Never   Substance Use Topics    Alcohol use: Yes     Alcohol/week: 0.0 standard drinks     Comment: Socially      Current Outpatient Medications   Medication Sig Dispense Refill    atorvastatin (LIPITOR) 20 MG tablet Take 1 tablet by mouth nightly 90 tablet 3    amLODIPine (NORVASC) 5 MG tablet take 1 tablet by mouth once daily      omeprazole (PRILOSEC OTC) 20 MG tablet Take 20 mg by mouth every other day      Evening Primrose Oil 1000 MG CAPS       magnesium (MAGNESIUM-OXIDE) 250 MG TABS tablet Take 500 mg by mouth daily      anastrozole (ARIMIDEX) 1 MG tablet Take 1 tablet by mouth daily 90 tablet 3    ibuprofen (ADVIL;MOTRIN) 600 MG tablet TAKE 1 TABLET BY MOUTH EVERY 6 HOURS AS NEEDED FOR PAIN 120 tablet 0    benazepril (LOTENSIN) 20 MG tablet TAKE 1 TABLET BY MOUTH 2 TIMES DAILY 180 tablet 1    Handicap Placard MISC by Does not apply route Duration:  5 years; dx:  osteoarthritis 1 each 0    pantoprazole (PROTONIX) 40 MG tablet       empagliflozin (JARDIANCE) 25 MG tablet Take 25 mg by mouth daily      insulin lispro, 1 Unit Dial, 100 UNIT/ML SOPN Inject into the skin 3 times daily Indications: inject 0-12 unitsinto the skin 3 times daily-per sliding scale      EASY TOUCH PEN NEEDLES 31G X 6 MM MISC       LANTUS SOLOSTAR 100 UNIT/ML injection pen Inject 39 Units into the skin nightly       Calcium Carb-Cholecalciferol 600-500 MG-UNIT CAPS Take by mouth daily      aspirin 81 MG chewable tablet Take 81 mg by mouth daily      therapeutic multivitamin-minerals (THERAGRAN-M) tablet Take 1 tablet by mouth daily.         albuterol sulfate HFA (VENTOLIN HFA) 108 (90 Base) MCG/ACT inhaler Inhale 2 puffs into the lungs 4 times daily as needed for Wheezing (Patient not taking: No sig reported) 18 g 1    SUPER B COMPLEX/C PO Take 1 tablet by mouth daily (Patient not taking: No sig reported)      furosemide (LASIX) 20 MG tablet  (Patient not taking: Reported on 3/3/2023)       No current facility-administered medications for this visit. Allergies   Allergen Reactions    Amoxicillin     Bactrim [Sulfamethoxazole-Trimethoprim] Itching    Donnatal [Phenobarbital-Belladonna Alk] Other (See Comments)     palpitations    Lovastatin Other (See Comments)     myalgia    Metformin And Related Diarrhea    Vioxx Other (See Comments)     Liver problems    Dilaudid [Hydromorphone Hcl] Nausea And Vomiting    Fentanyl Nausea And Vomiting     Severe Nausea and vomitting     Health Maintenance   Topic Date Due    COVID-19 Vaccine (4 - Booster for Moderna series) 12/31/2021    Diabetic retinal exam  08/18/2022    Lipids  11/20/2022    Diabetic foot exam  06/29/2023    A1C test (Diabetic or Prediabetic)  08/24/2023    Diabetic Alb to Cr ratio (uACR) test  08/24/2023    GFR test (Diabetes, CKD 3-4, OR last GFR 15-59)  09/30/2023    Breast cancer screen  10/07/2023    Annual Wellness Visit (AWV)  01/10/2024    Depression Screen  01/19/2024    DTaP/Tdap/Td vaccine (2 - Td or Tdap) 04/25/2029    Colorectal Cancer Screen  06/29/2031    DEXA (modify frequency per FRAX score)  Completed    Flu vaccine  Completed    Shingles vaccine  Completed    Pneumococcal 65+ years Vaccine  Completed    Hepatitis C screen  Completed    Hepatitis A vaccine  Aged Out    Hib vaccine  Aged Out    Meningococcal (ACWY) vaccine  Aged Out       Objective:  /60   Pulse 64   Temp 97.5 °F (36.4 °C)   Resp 16   Ht 5' 2\" (1.575 m)   Wt 195 lb (88.5 kg)   SpO2 97%   BMI 35.67 kg/m²   Physical Exam  Vitals reviewed. Constitutional:       General: She is not in acute distress. Appearance: She is not ill-appearing.    Eyes: Extraocular Movements: Extraocular movements intact. Pupils: Pupils are equal, round, and reactive to light. Cardiovascular:      Rate and Rhythm: Normal rate and regular rhythm. Pulmonary:      Effort: Pulmonary effort is normal. No respiratory distress. Breath sounds: No wheezing or rhonchi. Abdominal:      General: There is no distension. Palpations: Abdomen is soft. Tenderness: There is no abdominal tenderness. Neurological:      Mental Status: She is alert. Cranial Nerves: Cranial nerves 2-12 are intact. No dysarthria. Coordination: Finger-Nose-Finger Test abnormal.      Gait: Gait normal.   Psychiatric:         Attention and Perception: Attention normal.         Mood and Affect: Affect is labile and tearful. Speech: Speech normal.         Behavior: Behavior is cooperative. Cognition and Memory: She exhibits impaired recent memory. Unable to spell world backwards  Knows month, day of week, holiday, name, . Impression/Plan:  1. Acute memory impairment  New problem of acute memory impairment x1 week after TAVR 2 days prior to symptom onset. No focal neurological deficits except for the memory impairment and mood impairment. Urinalysis was negative on . Normal glucose level this morning. Given her acute symptoms, I am concerned for possible CVA. Alternative possible diagnoses include electrolyte disorder, infection, liver dysfunction, renal failure, complex migraine, etc. recommend  bring her to the emergency department for further acute evaluation to rule out a potentially serious condition. Our staff called report to ED    2. S/P TAVR (transcatheter aortic valve replacement)    3. Severe obesity (BMI 35.0-39. 9) with comorbidity (HCC)  Chronic. Diet and physical activity    They voiced understanding. All questions answered. They agreed with treatment plan.    See patient instructions for any educational materials that may have been given. Discussed use, benefit, and side effects of prescribed medications. Reviewed health maintenance. (Please note that portions of this note may have been completed with a voice recognition program.  Efforts were made to edit the dictation but occasionally words are mis-transcribed.)    Return if symptoms worsen or fail to improve.       Electronically signed by Yissel Turner MD on 3/3/2023 at 8:58 AM

## 2023-03-03 NOTE — PROGRESS NOTES
[facility] Laboratory Technician Worksheet      EEG Date: 3/3/2023    Name: Agusto Chery   : 1950   Age: 74 y.o. SEX: female    ROOM: Tory Mcclellan MRN: [id number]           CSN: [id number]      Ordering Provider: LISSY Carolina  EEG Number: [id number] Time of Test:  0181    Hand: Left   Sedation: no    H.V. Done: No  age protocol  Photic: Yes    Sleep: No  Drowsy: Yes   Sleep Deprived: No    Seizures observed: no    Mentality: confused, follows simple commands,  unable to repeat any given words  example car, berry, dog, wood      Clinical History:AMS, headache,  difficulty using her phone, TAVR , ct    Impression       1. No acute findings. 2. Interval decrease in the overall brain volume loss.            Past Medical History:       Diagnosis Date    Arthritis     Asthma     Brain cyst     benign    Breast cancer ([facility] [address] 2018    Left breast, 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    Cancer ([facility] [address] 1918    Left Breast    Chronic sinus complaints     Diverticulosis of colon     DKA (diabetic ketoacidoses) 2018    Elevated TSH     Hypertension     Osteoarthritis     Polyneuropathy     PONV (postoperative nausea and vomiting)     Spinal headache     Type 2 diabetes mellitus (HCC)        Scheduled Meds:   sodium chloride flush  5-40 mL IntraVENous 2 times per day    enoxaparin  40 mg SubCUTAneous Daily    insulin lispro  0-4 Units SubCUTAneous TID WC    insulin lispro  0-4 Units SubCUTAneous Nightly    insulin glargine  39 Units SubCUTAneous Nightly    [START ON 3/4/2023] amLODIPine  5 mg Oral Daily    [START ON 3/4/2023] anastrozole  1 mg Oral Daily    [START ON 3/4/2023] aspirin  81 mg Oral Daily    atorvastatin  20 mg Oral Nightly    lisinopril  5 mg Oral Daily    [START ON 3/4/2023] pantoprazole  40 mg Oral QAM AC     Continuous Infusions:   sodium chloride      dextrose       PRN Meds:.sodium chloride flush, sodium chloride, ondansetron **OR** ondansetron, polyethylene glycol, acetaminophen **OR** acetaminophen, glucose, dextrose bolus **OR** dextrose bolus, glucagon (rDNA), dextrose    Technician: Julian Durant 3/3/2023

## 2023-03-03 NOTE — ED NOTES
ED to inpatient nurses report    Chief Complaint   Patient presents with    Altered Mental Status      Present to ED from home  LOC: alert and orientated to name, place, date  Vital signs   Vitals:    03/03/23 0953 03/03/23 1110 03/03/23 1255   BP: (!) 157/66 (!) 161/95 (!) 158/89   Pulse: 62 63 65   Resp: 16 19 16   Temp: 97.5 °F (36.4 °C)     TempSrc: Oral     SpO2: 97% 97% 98%      Oxygen Baseline 97% Room Air    Current needs required Room Air Bipap/Cpap No  LDAs:   Peripheral IV 03/03/23 Left Wrist (Active)   Site Assessment Clean, dry & intact 03/03/23 1035   Line Status Blood return noted;Normal saline locked;Specimen collected 03/03/23 1035     Mobility: Requires assistance * 2  Pending ED orders: Urine  Present condition: Stable      C-SSRS Risk of Suicide: No Risk  Swallow Screening    Preferred Language: [place]     Electronically signed by Darling Valera RN on 3/3/2023 at 12:56 PM       Darling Valera RN  03/03/23 1257

## 2023-03-03 NOTE — ED PROVIDER NOTES
[address] [address]          Pt Name: Soila Lu  MRN: [id number]  [place] 1950  Date of evaluation: 3/3/2023  Emergency Physician: Purnima Dunn DO  Supervising Physician: Dr. Megan Almaraz       Chief Complaint   Patient presents with    Altered Mental Status     History obtained from the patient. HISTORY OF PRESENT ILLNESS    HPI  Soila Lu is a 74 y.o. female who presents to the emergency department for evaluation of altered mental status. The patient had a TAVR on 2/22/2023 at [facility]. She states that since then she has had a headache and then developed confusion the next day which she describes as difficulty thinking, difficulty using her television remote, and difficulty using her phone to text. She went to her primary care provider's office today, who became concerned that she could be having mini strokes and referred her to the emergency department. She reports that she has a history of migraines and 6 days ago when this started she had wiggly lines in her field of vision. She reports that she was unable to spell \"world\" backwards at her PCPs office. She denies fevers, numbness, tingling, weakness, difficulty walking, and slurred speech. The patient has no other acute complaints at this time. REVIEW OF SYSTEMS   Review of Systems   Constitutional:  Negative for fever. HENT:  Negative for rhinorrhea, sinus pressure and sinus pain. Eyes:  Negative for discharge, redness and itching. Respiratory:  Negative for chest tightness and shortness of breath. Cardiovascular:  Negative for chest pain. Gastrointestinal:  Negative for abdominal distention, abdominal pain, diarrhea, nausea and vomiting. Genitourinary:  Negative for difficulty urinating, dysuria, frequency, hematuria and urgency. Musculoskeletal:  Negative for arthralgias. Skin:  Negative for color change and pallor.    Neurological: Positive for headaches. Negative for dizziness and light-headedness. Psychiatric/Behavioral:  Positive for confusion.         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    Cancer ([facility] [address] 09/1918    Left Breast    Chronic sinus complaints     Diverticulosis of colon     DKA (diabetic ketoacidoses) 05/2018    Elevated TSH     Hypertension     Osteoarthritis     Polyneuropathy     PONV (postoperative nausea and vomiting)     Spinal headache     Type 2 diabetes mellitus ([address] 1990's     Past Surgical History:   Procedure Laterality Date    AORTIC VALVE REPAIR  02/22/2023    osu    BLADDER SUSPENSION      times 2    BREAST LUMPECTOMY Left 10/10/2018    CARPAL TUNNEL RELEASE Bilateral 02/2020    CHOLECYSTECTOMY      DILATION AND CURETTAGE OF UTERUS      x2    EYE SURGERY      HAND SURGERY Right     thumb-revision of suspensionplasty    HERNIA REPAIR  06/18/2019    HYSTERECTOMY (CERVIX STATUS UNKNOWN)  1995    JOINT REPLACEMENT Right 2007    Rt total knee    KNEE ARTHROSCOPY  1967, 2001    RUDY STEROTACTIC LOC BREAST BIOPSY LEFT Left 09/19/2018    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    MANDIBLE FRACTURE SURGERY      OTHER SURGICAL HISTORY Right 12/30/2015    Excision of Sebaceous Cyst Right Ear     OVARY REMOVAL  1995    GA OFFICE/OUTPT VISIT,PROCEDURE ONLY Left 10/10/2018    LEFT BREAST LUMPECTOMY WITH SENTINEL LYMPH NODE BIOPSY performed by Rubin Ramirez MD at [facility]  age 4    VENTRAL HERNIA REPAIR N/A 06/14/2019    COMBINED LAPAROSCOPIC AND OPEN VENTRAL HERNIA REPAIR WITH MESH performed by Rubin Ramirez MD at [address] Right     right-excision          MEDICATIONS     Current Facility-Administered Medications: sodium chloride flush 0.9 % injection 5-40 mL, 5-40 mL, IntraVENous, 2 times per day, Lit Plants, PA-C    sodium chloride flush 0.9 % injection 5-40 mL, 5-40 mL, IntraVENous, PRN, Skinnynancy Goldsmith PA-C    0.9 % sodium chloride infusion, , IntraVENous, PRN, Skinny Ohlemac, PA-C    enoxaparin (LOVENOX) injection 40 mg, 40 mg, SubCUTAneous, Daily, ISABELL Mercedes-C    ondansetron (ZOFRAN-ODT) disintegrating tablet 4 mg, 4 mg, Oral, Q8H PRN **OR** ondansetron (ZOFRAN) injection 4 mg, 4 mg, IntraVENous, Q6H PRN, ISABELL Mercedes-C    polyethylene glycol (GLYCOLAX) packet 17 g, 17 g, Oral, Daily PRN, ISABELL Babb-MICHELLE    acetaminophen (TYLENOL) tablet 650 mg, 650 mg, Oral, Q6H PRN **OR** acetaminophen (TYLENOL) suppository 650 mg, 650 mg, Rectal, Q6H PRN, ISABELL Mercedes-C    glucose chewable tablet 16 g, 4 tablet, Oral, PRN, Skinnynancy Goldsmith PA-C    dextrose bolus 10% 125 mL, 125 mL, IntraVENous, PRN **OR** dextrose bolus 10% 250 mL, 250 mL, IntraVENous, PRN, Skinny Ohlemac, PA-C    glucagon (rDNA) injection 1 mg, 1 mg, SubCUTAneous, PRN, ISABELL Mercedes-C    dextrose 10 % infusion, , IntraVENous, Continuous PRN, Skinny Goldsmith PA-C    insulin lispro (HUMALOG) injection vial 0-4 Units, 0-4 Units, SubCUTAneous, TID WC, ISABELL Mercedes-MICHELLE    insulin lispro (HUMALOG) injection vial 0-4 Units, 0-4 Units, SubCUTAneous, Nightly, Skinny Goldsmith PA-C    insulin glargine (LANTUS) injection vial 39 Units, 39 Units, SubCUTAneous, Nightly, Skinny Goldsmith PA-C    [START ON 3/4/2023] amLODIPine (NORVASC) tablet 5 mg, 5 mg, Oral, Daily, Skinny Ohlemacher, PA-C    [START ON 3/4/2023] anastrozole (ARIMIDEX) tablet 1 mg, 1 mg, Oral, Daily, Skinny Ohlemacher, PA-C    [START ON 3/4/2023] aspirin chewable tablet 81 mg, 81 mg, Oral, Daily, Skinny Ohlemacher, PA-C    atorvastatin (LIPITOR) tablet 20 mg, 20 mg, Oral, Nightly, Skinny Ohlemacher, PA-C    [START ON 3/4/2023] pantoprazole (PROTONIX) tablet 40 mg, 40 mg, Oral, Skinny LOREDO PA-C    benazepril (LOTENSIN) tablet 20 mg (Patient Supplied), 20 mg, Oral, BID, Brenda Ayala PA-C      SOCIAL HISTORY     Social History     Social History Narrative    Not on file     Social History     Tobacco Use    Smoking status: Never    Smokeless tobacco: Never   Vaping Use    Vaping Use: Never used   Substance Use Topics    Alcohol use: Yes     Alcohol/week: 0.0 standard drinks     Comment: Socially    Drug use: No         ALLERGIES     Allergies   Allergen Reactions    Amoxicillin     Bactrim [Sulfamethoxazole-Trimethoprim] Itching    Donnatal [Phenobarbital-Belladonna Alk] Other (See Comments)     palpitations    Lovastatin Other (See Comments)     myalgia    Metformin And Related Diarrhea    Vioxx Other (See Comments)     Liver problems    Dilaudid [Hydromorphone Hcl] Nausea And Vomiting    Fentanyl Nausea And Vomiting     Severe Nausea and vomitting         FAMILY HISTORY     Family History   Problem Relation Age of Onset    Diabetes Mother     Heart Disease Mother     Lupus Mother     Heart Disease Father     Cancer Father         Squamous cell - face & scalp    Diabetes Maternal Grandmother     Heart Disease Maternal Grandfather     Cancer Paternal Aunt          multiple myeloma    Cancer Paternal Uncle         Lymphatic Leukemia    Breast Cancer Maternal Cousin         unsure on age, had breast cancer twice unsure if bilateral or if reoccurence    Heart Disease Maternal Uncle     Lupus Maternal Uncle     Other Paternal Grandfather         Brain aneurysm         PHYSICAL EXAM     ED Triage Vitals [03/03/23 0953]   BP Temp Temp Source Heart Rate Resp SpO2 Height Weight   (!) 157/66 97.5 °F (36.4 °C) Oral 62 16 97 % -- --         Additional Vital Signs:  Vitals:    03/03/23 1535   BP: 119/66   Pulse: 62   Resp: 16   Temp: 98.1 °F (36.7 °C)   SpO2: 95%       Physical Exam  Vitals and nursing note reviewed. Constitutional:       General: She is not in acute distress. Appearance: Normal appearance. She is not ill-appearing. HENT:      Head: Normocephalic and atraumatic. Nose: Nose normal. No congestion or rhinorrhea. Mouth/Throat:      Mouth: Mucous membranes are moist.      Pharynx: Oropharynx is clear. No oropharyngeal exudate or posterior oropharyngeal erythema. Eyes:      Extraocular Movements: Extraocular movements intact. Pupils: Pupils are equal, round, and reactive to light. Cardiovascular:      Rate and Rhythm: Normal rate and regular rhythm. Pulses: Normal pulses. Heart sounds: Normal heart sounds. Pulmonary:      Effort: Pulmonary effort is normal.      Breath sounds: Normal breath sounds. Abdominal:      General: Abdomen is flat. There is no distension. Palpations: Abdomen is soft. Tenderness: There is no abdominal tenderness. Musculoskeletal:         General: No swelling or tenderness. Normal range of motion. Cervical back: Normal range of motion. Right lower leg: No edema. Left lower leg: No edema. Skin:     General: Skin is warm and dry. Neurological:      General: No focal deficit present. Mental Status: She is alert and oriented to person, place, and time. Mental status is at baseline. Comments: No pronator drift. When the patient attempts finger-to-nose testing, she became tearful because she stated that she was unable to process how to do this. I asked the patient to draw a clock with the minute and our hands in the 10 o'clock position and the patient was only able to draw a [place]. Her  states she was unable to figure out how to do this.          INITIAL IMPRESSION, DIFFERENTIAL DIAGNOSIS, AND PLAN   Initial Assessment: Given the patient's above chief complaint and findings on history and physical examination, I thought it was appropriate to consider the following emergency medical conditions: Anemia, electrolyte abnormality, ACS, migraine, anxiety, ICH, TIA/CVA, pneumonia, hyperammonemia, thyroid abnormality, COVID-19, influenza, JHONATAN, UTI. Although some of these diagnoses are unlikely they were considered in my medical decision making. Chronic Conditions considered:   Patient Active Problem List   Diagnosis    Hyperlipidemia    Asthma    Polyneuropathy    AS (aortic stenosis)    Hiatal hernia    Hypothyroid    Type 2 diabetes mellitus without complication, without long-term current use of insulin (HCC)    Osteoarthritis of multiple joints    Ear canal mass    Diabetic ketoacidosis without coma associated with type 2 diabetes mellitus (HCC)    Malignant neoplasm of left breast in female, estrogen receptor positive (HCC)    Localized osteoarthritis of left knee    Other osteoporosis without current pathological fracture    S/P repair of ventral hernia    Cervical stenosis of spinal canal    Tear of left supraspinatus tendon    Morbidly obese (HCC)    Acute confusional state    Altered mental status       Diagnostic: IV, labs, EKG, chest x-ray, CT head without contrast, UA, rapid COVID-19 and influenza swab.     Therapeutic:    ED RESULTS   Laboratory results:  Labs Reviewed   COMPREHENSIVE METABOLIC PANEL - Abnormal; Notable for the following components:       Result Value    Glucose 208 (*)     All other components within normal limits   POCT GLUCOSE - Abnormal; Notable for the following components:    POC Glucose 185 (*)     All other components within normal limits   POCT GLUCOSE - Abnormal; Notable for the following components:    POC Glucose 116 (*)     All other components within normal limits   POCT GLUCOSE - Abnormal; Notable for the following components:    POC Glucose 180 (*)     All other components within normal limits   COVID-19 & INFLUENZA COMBO   CBC WITH AUTO DIFFERENTIAL   TROPONIN   BRAIN NATRIURETIC PEPTIDE   AMMONIA   TSH WITH REFLEX   ANION GAP   GLOMERULAR FILTRATION RATE, ESTIMATED OSMOLALITY   URINALYSIS WITH REFLEX TO CULTURE   URINE DRUG SCREEN   BASIC METABOLIC PANEL W/ REFLEX TO MG FOR LOW K   CBC WITH AUTO DIFFERENTIAL       Radiologic studies results:  MRI BRAIN W WO CONTRAST         XR CHEST (2 VW)   Final Result   1. The patient has undergone interval aortic valve replacement. 2. The appearance of the chest is otherwise unchanged since previous study dated 15th of June 2022. **This report has been created using voice recognition software. It may contain minor errors which are inherent in voice recognition technology. **      Final report electronically signed by DR William Au on 3/3/2023 10:56 AM      CT HEAD WO CONTRAST   Final Result       1. No acute findings. 2. Interval decrease in the overall brain volume loss. **This report has been created using voice recognition software. It may contain minor errors which are inherent in voice recognition technology. **      Final report electronically signed by Dr. Ellen Álvarez on 3/3/2023 10:51 AM          ED Medications administered this visit:   Medications   sodium chloride flush 0.9 % injection 5-40 mL (has no administration in time range)   sodium chloride flush 0.9 % injection 5-40 mL (has no administration in time range)   0.9 % sodium chloride infusion (has no administration in time range)   enoxaparin (LOVENOX) injection 40 mg (has no administration in time range)   ondansetron (ZOFRAN-ODT) disintegrating tablet 4 mg (has no administration in time range)     Or   ondansetron (ZOFRAN) injection 4 mg (has no administration in time range)   polyethylene glycol (GLYCOLAX) packet 17 g (has no administration in time range)   acetaminophen (TYLENOL) tablet 650 mg (has no administration in time range)     Or   acetaminophen (TYLENOL) suppository 650 mg (has no administration in time range)   glucose chewable tablet 16 g (has no administration in time range)   dextrose bolus 10% 125 mL (has no administration in time range)     Or   dextrose bolus 10% 250 mL (has no administration in time range)   glucagon (rDNA) injection 1 mg (has no administration in time range)   dextrose 10 % infusion (has no administration in time range)   insulin lispro (HUMALOG) injection vial 0-4 Units (0 Units SubCUTAneous Not Given 3/3/23 1701)   insulin lispro (HUMALOG) injection vial 0-4 Units (has no administration in time range)   insulin glargine (LANTUS) injection vial 39 Units (has no administration in time range)   amLODIPine (NORVASC) tablet 5 mg (has no administration in time range)   anastrozole (ARIMIDEX) tablet 1 mg (has no administration in time range)   aspirin chewable tablet 81 mg (has no administration in time range)   atorvastatin (LIPITOR) tablet 20 mg (has no administration in time range)   pantoprazole (PROTONIX) tablet 40 mg (has no administration in time range)   benazepril (LOTENSIN) tablet 20 mg (Patient Supplied) (has no administration in time range)   acetaminophen (TYLENOL) tablet 1,000 mg (1,000 mg Oral Given 3/3/23 1106)   metoclopramide (REGLAN) injection 10 mg (10 mg IntraVENous Given 3/3/23 1106)   diphenhydrAMINE (BENADRYL) injection 25 mg (25 mg IntraVENous Given 3/3/23 1106)   ketorolac (TORADOL) injection 15 mg (15 mg IntraVENous Given 3/3/23 1322)   gadoteridol (PROHANCE) injection 20 mL (20 mLs IntraVENous Given 3/3/23 1902)         [facility]     ED Course as of 03/03/23 2028   Fri Mar 03, 2023   1120 CT HEAD WO CONTRAST  IMPRESSION:     1. No acute findings. 2. Interval decrease in the overall brain volume loss. [DO]      ED Course User Index  [DO] Emily Pratt DO     Available laboratory and imaging results were independently reviewed and clinically correlated. Decision Rules/Clinical Scores utilized: Jodi Sandoval      Code Status:  Not addressed during this ED visit    [facility] Social determinants of health considered to potentially effect treatment and/or disposition plan:    Healthy People 2030, U.[place]. Department of Health and Human Services, Office of Disease Prevention and Health Promotion. Older age     Medical comorbidities impacting treatment or disposition:  Not Applicable. 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 Final Assessment and Plan:   Per ED course      The diagnosis, extensive differential diagnosis, plan of care, laboratory, and imaging findings were discussed at the bedside. All questions and concerns were addressed at the time of the encounter. MEDICATION CHANGES     DISCHARGE MEDICATIONS:  Current Discharge Medication List               FINAL DISPOSITION     Final diagnoses: Altered mental status, unspecified altered mental status type     Condition: condition: serious  Dispo: Admit to med/surg floor    PATIENT REFERRED TO:  No follow-up provider specified. Critical Care Time   CRITICAL CARE:  None    PROCEDURES: (None if blank)  Procedures:   Medical Decision Making      This transcription was electronically signed. Parts of this transcriptions may have been dictated by use of voice recognition software and electronically transcribed, and parts may have been transcribed with the assistance of an ED scribe. The transcription may contain errors not detected in proofreading.     Electronically Signed: Joselo Rutherford DO, 03/03/23, 8:29 PM        Joselo Rutherford DO  Resident  03/03/23 2029

## 2023-03-03 NOTE — ED PROVIDER NOTES

## 2023-03-03 NOTE — ED NOTES
In to complete orders, patient updated on plan of care. No needs voiced at this time.       Seamus Fields RN  03/03/23 [id number]

## 2023-03-03 NOTE — ED TRIAGE NOTES
Patient to room 4 from triage as directed by her PCP, Dr. Ruth Ann Davison. Per report, had a heart valve replacement on 2/22/23 at [facility]. Since this time patient has been having intermittent headaches. She has also been experiencing confusion since 2/24/2023,  at bedside states that she has been trouble doing tasks of daily living - for example using the remote control or texting on her phone. Accucheck 185, EKG completed at bedside.

## 2023-03-03 NOTE — PROCEDURES
Date: 3/3/2023  Referring physician: Rio Graves PA-C    Indication  Patient aged 74 y with encephalopathy. EEG done to assess for epileptiform activity. Introduction  This routine 20-minute EEG was recorded using the [employer] 10-20 System on a [employer] workstation at 256 samples/s. Automated spike and seizure detection algorithms were applied. Description  During the maximal alert state, a well-regulated, symmetric, and reactive 7-8 Hz posterior dominant rhythm was seen. No consistent focal slowing or interhemispheric asymmetry was noted. Stage I and stage II sleep were observed. There were no interictal epileptiform discharges or electrographic seizures. Activations  Hyperventilation was not performed. Intermittent photic stimulation was performed and demonstrated no posterior driving response. Impression  Abnormal awake EEG. The slowing mentioned above suggests mild non specific encephalopathy. No epileptiform discharges were identified. Please note the absence of such activity on this record cannot conclusively rule out an epileptic disorder. If such is still clinically suspected, a repeat study with sleep deprivation and/or prolonged sampling may be helpful. Julieta Zambrano MD  Epilepsy Board Certified. Neurology Board Certified.     Electronically Signed

## 2023-03-04 ENCOUNTER — APPOINTMENT (OUTPATIENT)
Dept: CT IMAGING | Age: 73
End: 2023-03-04
Payer: MEDICARE

## 2023-03-04 VITALS
DIASTOLIC BLOOD PRESSURE: 43 MMHG | TEMPERATURE: 97 F | RESPIRATION RATE: 14 BRPM | WEIGHT: 192.8 LBS | BODY MASS INDEX: 35.48 KG/M2 | HEIGHT: 62 IN | OXYGEN SATURATION: 95 % | HEART RATE: 75 BPM | SYSTOLIC BLOOD PRESSURE: 148 MMHG

## 2023-03-04 LAB
AMPHETAMINES UR QL SCN: NEGATIVE
ANION GAP SERPL CALC-SCNC: 17 MEQ/L (ref 8–16)
BARBITURATES UR QL SCN: NEGATIVE
BASOPHILS ABSOLUTE: 0 THOU/MM3 (ref 0–0.1)
BASOPHILS NFR BLD AUTO: 0.4 %
BENZODIAZ UR QL SCN: NEGATIVE
BILIRUB UR QL STRIP.AUTO: NEGATIVE
BUN SERPL-MCNC: 22 MG/DL (ref 7–22)
BZE UR QL SCN: NEGATIVE
CALCIUM SERPL-MCNC: 9.4 MG/DL (ref 8.5–10.5)
CANNABINOIDS UR QL SCN: NEGATIVE
CHARACTER UR: CLEAR
CHLORIDE SERPL-SCNC: 107 MEQ/L (ref 98–111)
CO2 SERPL-SCNC: 18 MEQ/L (ref 23–33)
COLOR: YELLOW
CREAT SERPL-MCNC: 0.5 MG/DL (ref 0.4–1.2)
DEPRECATED RDW RBC AUTO: 43.1 FL (ref 35–45)
EOSINOPHIL NFR BLD AUTO: 3.4 %
EOSINOPHILS ABSOLUTE: 0.2 THOU/MM3 (ref 0–0.4)
ERYTHROCYTE [DISTWIDTH] IN BLOOD BY AUTOMATED COUNT: 12.8 % (ref 11.5–14.5)
FENTANYL: NEGATIVE
GFR SERPL CREATININE-BSD FRML MDRD: > 60 ML/MIN/1.73M2
GLUCOSE BLD STRIP.AUTO-MCNC: 100 MG/DL (ref 70–108)
GLUCOSE BLD STRIP.AUTO-MCNC: 129 MG/DL (ref 70–108)
GLUCOSE SERPL-MCNC: 114 MG/DL (ref 70–108)
GLUCOSE UR QL STRIP.AUTO: 500 MG/DL
HCT VFR BLD AUTO: 42.8 % (ref 37–47)
HGB BLD-MCNC: 14 GM/DL (ref 12–16)
HGB UR QL STRIP.AUTO: NEGATIVE
IMM GRANULOCYTES # BLD AUTO: 0.01 THOU/MM3 (ref 0–0.07)
IMM GRANULOCYTES NFR BLD AUTO: 0.2 %
KETONES UR QL STRIP.AUTO: ABNORMAL
LYMPHOCYTES ABSOLUTE: 1.8 THOU/MM3 (ref 1–4.8)
LYMPHOCYTES NFR BLD AUTO: 36.9 %
MCH RBC QN AUTO: 30.4 PG (ref 26–33)
MCHC RBC AUTO-ENTMCNC: 32.7 GM/DL (ref 32.2–35.5)
MCV RBC AUTO: 92.8 FL (ref 81–99)
MONOCYTES ABSOLUTE: 0.5 THOU/MM3 (ref 0.4–1.3)
MONOCYTES NFR BLD AUTO: 9.7 %
NEUTROPHILS NFR BLD AUTO: 49.4 %
NITRITE UR QL STRIP: NEGATIVE
NRBC BLD AUTO-RTO: 0 /100 WBC
OPIATES UR QL SCN: NEGATIVE
OXYCODONE: NEGATIVE
PCP UR QL SCN: NEGATIVE
PH UR STRIP.AUTO: 5.5 [PH] (ref 5–9)
PLATELET # BLD AUTO: 182 THOU/MM3 (ref 130–400)
PMV BLD AUTO: 9.6 FL (ref 9.4–12.4)
POTASSIUM SERPL-SCNC: 4.2 MEQ/L (ref 3.5–5.2)
PROT UR STRIP.AUTO-MCNC: NEGATIVE MG/DL
RBC # BLD AUTO: 4.61 MILL/MM3 (ref 4.2–5.4)
SEGMENTED NEUTROPHILS ABSOLUTE COUNT: 2.4 THOU/MM3 (ref 1.8–7.7)
SODIUM SERPL-SCNC: 142 MEQ/L (ref 135–145)
SP GR UR REFRACT.AUTO: > 1.03 (ref 1–1.03)
UROBILINOGEN, URINE: 0.2 EU/DL (ref 0–1)
WBC # BLD AUTO: 4.9 THOU/MM3 (ref 4.8–10.8)
WBC #/AREA URNS HPF: NEGATIVE /[HPF]

## 2023-03-04 PROCEDURE — G0378 HOSPITAL OBSERVATION PER HR: HCPCS

## 2023-03-04 PROCEDURE — 6360000004 HC RX CONTRAST MEDICATION

## 2023-03-04 PROCEDURE — 36415 COLL VENOUS BLD VENIPUNCTURE: CPT

## 2023-03-04 PROCEDURE — 92523 SPEECH SOUND LANG COMPREHEN: CPT

## 2023-03-04 PROCEDURE — 6370000000 HC RX 637 (ALT 250 FOR IP): Performed by: PHYSICIAN ASSISTANT

## 2023-03-04 PROCEDURE — 96372 THER/PROPH/DIAG INJ SC/IM: CPT

## 2023-03-04 PROCEDURE — 80048 BASIC METABOLIC PNL TOTAL CA: CPT

## 2023-03-04 PROCEDURE — 99239 HOSP IP/OBS DSCHRG MGMT >30: CPT | Performed by: PHYSICIAN ASSISTANT

## 2023-03-04 PROCEDURE — G1010 CDSM STANSON: HCPCS

## 2023-03-04 PROCEDURE — 6370000000 HC RX 637 (ALT 250 FOR IP)

## 2023-03-04 PROCEDURE — 85025 COMPLETE CBC W/AUTO DIFF WBC: CPT

## 2023-03-04 PROCEDURE — 6360000002 HC RX W HCPCS

## 2023-03-04 PROCEDURE — 6360000002 HC RX W HCPCS: Performed by: PHYSICIAN ASSISTANT

## 2023-03-04 PROCEDURE — 99223 1ST HOSP IP/OBS HIGH 75: CPT

## 2023-03-04 PROCEDURE — 82948 REAGENT STRIP/BLOOD GLUCOSE: CPT

## 2023-03-04 PROCEDURE — 2580000003 HC RX 258: Performed by: PHYSICIAN ASSISTANT

## 2023-03-04 RX ORDER — TOPIRAMATE 25 MG/1
25 TABLET ORAL 2 TIMES DAILY
Status: DISCONTINUED | OUTPATIENT
Start: 2023-03-04 | End: 2023-03-04 | Stop reason: HOSPADM

## 2023-03-04 RX ORDER — TOPIRAMATE 25 MG/1
25 TABLET ORAL 2 TIMES DAILY
Qty: 60 TABLET | Refills: 3 | Status: SHIPPED | OUTPATIENT
Start: 2023-03-04

## 2023-03-04 RX ORDER — DEXAMETHASONE SODIUM PHOSPHATE 4 MG/ML
10 INJECTION, SOLUTION INTRA-ARTICULAR; INTRALESIONAL; INTRAMUSCULAR; INTRAVENOUS; SOFT TISSUE ONCE
Status: DISCONTINUED | OUTPATIENT
Start: 2023-03-04 | End: 2023-03-04

## 2023-03-04 RX ORDER — ATORVASTATIN CALCIUM 40 MG/1
40 TABLET, FILM COATED ORAL NIGHTLY
Qty: 90 TABLET | Refills: 1 | Status: SHIPPED | OUTPATIENT
Start: 2023-03-04

## 2023-03-04 RX ORDER — CLOPIDOGREL BISULFATE 75 MG/1
75 TABLET ORAL DAILY
Status: DISCONTINUED | OUTPATIENT
Start: 2023-03-04 | End: 2023-03-04 | Stop reason: HOSPADM

## 2023-03-04 RX ORDER — CLOPIDOGREL BISULFATE 75 MG/1
75 TABLET ORAL DAILY
Qty: 30 TABLET | Refills: 3 | Status: SHIPPED | OUTPATIENT
Start: 2023-03-04

## 2023-03-04 RX ORDER — DEXAMETHASONE 4 MG/1
10 TABLET ORAL ONCE
Status: COMPLETED | OUTPATIENT
Start: 2023-03-04 | End: 2023-03-04

## 2023-03-04 RX ADMIN — DEXAMETHASONE 10 MG: 4 TABLET ORAL at 14:53

## 2023-03-04 RX ADMIN — BENAZEPRIL HYDROCHLORIDE 20 MG: 20 TABLET ORAL at 09:09

## 2023-03-04 RX ADMIN — ANASTROZOLE 1 MG: 1 TABLET ORAL at 09:07

## 2023-03-04 RX ADMIN — ENOXAPARIN SODIUM 40 MG: 100 INJECTION SUBCUTANEOUS at 09:41

## 2023-03-04 RX ADMIN — ASPIRIN 81 MG 81 MG: 81 TABLET ORAL at 09:11

## 2023-03-04 RX ADMIN — CLOPIDOGREL BISULFATE 75 MG: 75 TABLET ORAL at 14:38

## 2023-03-04 RX ADMIN — TOPIRAMATE 25 MG: 25 TABLET, FILM COATED ORAL at 14:39

## 2023-03-04 RX ADMIN — IOPAMIDOL 80 ML: 755 INJECTION, SOLUTION INTRAVENOUS at 09:24

## 2023-03-04 RX ADMIN — PANTOPRAZOLE SODIUM 40 MG: 40 TABLET, DELAYED RELEASE ORAL at 05:05

## 2023-03-04 RX ADMIN — SODIUM CHLORIDE, PRESERVATIVE FREE 10 ML: 5 INJECTION INTRAVENOUS at 09:07

## 2023-03-04 RX ADMIN — AMLODIPINE BESYLATE 5 MG: 5 TABLET ORAL at 09:07

## 2023-03-04 ASSESSMENT — PAIN SCALES - GENERAL
PAINLEVEL_OUTOF10: 0
PAINLEVEL_OUTOF10: 0

## 2023-03-04 ASSESSMENT — ENCOUNTER SYMPTOMS
TROUBLE SWALLOWING: 0
RHINORRHEA: 0
ABDOMINAL PAIN: 0
SORE THROAT: 0
VOMITING: 0
PHOTOPHOBIA: 1
SHORTNESS OF BREATH: 0
DIARRHEA: 0
COUGH: 0
NAUSEA: 0

## 2023-03-04 NOTE — PROGRESS NOTES
Pt discharge home with family transporting; Reviewed discharge packet regarding: medications, follow up appts, discharge instructions, and pt education with no further questions or concerns voiced at this time.

## 2023-03-04 NOTE — PROGRESS NOTES
[address] [id number]  SPEECH THERAPY  [facility] SURG 8B  Speech - Language - Cognitive Evaluation    SLP Individual Minutes  Time In: 1106  Time Out: 7697  Minutes: 26  Timed Code Treatment Minutes: 0 Minutes       Date: 3/4/2023  Patient Name: Mylene Gurrola      CSN: [id number]   : 1950  (74 y.o.)  Gender: female   Referring Physician: Lit Crandall PA-C  Diagnosis: Acute confusional state   Precautions: Fall risk   History of Present Illness/Injury: Patient admit to [facility] with above medical dx. Per physician H&P, \"Celia Perkins is a 74 y.o. female with a history of aortic stenosis status post TAVR on 2023, insulin-dependent type 2 diabetes mellitus, breast cancer on anastrozole, hypertension, and hyperlipidemia who presented to [facility] with chief complaint of confusion. The patient underwent a TAVR at [facility] on 2023. During her hospitalization, she began having a headache. This headache has been intractable and is still present at this time. Following discharge from the hospital, the patient was noted to have difficulty performing certain tasks. She was having difficulty texting on her phone, writing a check, and performing other ADLs. She sought treatment at her family [de-identified] office who then referred her to the emergency department. In the emergency department, the patient is still having difficulty performing complex commands. However, she has no focal neurologic deficits. She denies any unilateral weakness, facial droop, slurred speech, aphasia, gait disturbance, or vision changes. She still has a headache. However, she is having difficulty describing the severity and characteristics of her headache. She does state it is bifrontal in nature. She has a history of migraines in the past, but states that this headache is different than those previously. She did have an MRI in 2018 with an indication of episodes of memory loss.   She does report issues with having difficulty expressing herself in the past when having a migraine headache, but denies any difficulty with performing activities previously. She denies any history of stroke or seizure. She has not had any seizure-like activity. Her TAVR went well without complication. She denies any fevers, chills, chest pain, cough, shortness of breath, abdominal pain, nausea, vomiting, diarrhea, or constipation. She denies any significant history of anxiety, but does endorse some stress related to her recent surgical procedure. \"     CT of head negative:    MRI results came back this AM with findings indicative of:    Impression       1. Abnormal signal with cortical enhancement in the inferior medial left cerebellum. This is most consistent with a late subacute infarct. Follow-up brain MRI in 2 months is recommended to ensure the enhancement resolves. 2. Mild severity chronic small vessel ischemic changes. These findings were telephoned to [facility], one of the nurses on 8B at 8:26 AM on 3/4/2023. ST consult for cognitive assessment s/t intracranial findings on MRI. Past Medical History:   Diagnosis Date    Arthritis     Asthma     Brain cyst     benign    Breast cancer ([facility] [address] 09/18/2018    Left breast, 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    Cancer ([facility] [address] 09/1918    Left Breast    Chronic sinus complaints     Diverticulosis of colon     DKA (diabetic ketoacidoses) 05/2018    Elevated TSH     Hypertension     Osteoarthritis     Polyneuropathy     PONV (postoperative nausea and vomiting)     Spinal headache     Type 2 diabetes mellitus (HCC) 1990's       Pain: No pain reported. Subjective:  Session approved by RN, Dulce Yu. Patient seen upright in bed with daughter and  present. Alert and cooperative. **Patient visibly frustrated by cognitive deficits presenting. Given deficits r/t sequencing/organization, recommend OT assessment to r/o deficits with ADL management.  MRI wet read initially demonstrating no acute findings; however, final MRI reading demonstrating new CVA. 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 **increased difficulty to follow TAVR  Hobbies: yard work, painting   Vision Status: glasses   Hearing: WFL        SPEECH / VOICE:  Speech and Voice appear to be grossly intact for basic and complex daily communication    LANGUAGE:  Receptive:  2 Step Commands: 2/3  Complex Yes/No Questions: 2/3    Expressive:  Confrontational Naming: 3/3 [address] [facility] [address]; 3/3 objects  Responsive Namin/3  Divergent Naming (abstract): x3 wpm (N=11+)  Sentence Formulation: WFL  Conversational Speech: WFL  Paraphasias: none noted   Repetition: sentence level 1/2    COGNITION:  Aubrey Cognitive Assessment (MOCA) version 7.2 completed. Patient scored 19/30. Normal is greater than or equal to 26/30. Inclusion of +1 point given highest level of education achieved less than/equal to 12th grade or GED with limited-0 post-secondary schooling     Orientation: 5/6  Immediate Recall: 3/5, improved to 4/5 with repetition  Short-Term Recall: 2/5 indep, 1/5 min cues, 1/5 FO2  Divergent Naming: x3 wpm (N=11+)  Reasonin/2  Thought Organization: high level deficits  Insight: excellent  Attention: WFL  Math Computation: 0/1  Executive Functionin/5 *slow processing, decreased understanding of complex directions, extra time required to comprehend and organize tasks    SWALLOWING:  Current Diet: Regular diet and thin liquids   Not Tested *patient denies deficits  *given results of MRI, recommendations for ST to follow up with CSE.      RECOMMENDATIONS/ASSESSMENT:  DIAGNOSTIC IMPRESSIONS:  Patient presents with mild cognitive-linguistic impairments evidenced by derived MOCA score of 19/30 with deficits outlined above. Significant deficits r/t comprehension of complex information, working recall, thought organization, slow processing and executive functioning. Recommend discontinuation of driving post discharge. Will require family supervision/assistance with IADL tasks (I.e. medications, finances, scheduling, etc). Would highly benefit from IPR to intensive cognitive rehabilitation to progress towards PLOF in light of new CVA. Rehabilitation Potential: excellent  Discharge Recommendations: Inpatient Rehabilitation    EDUCATION:  Learner: Patient and Family  Education:  Reviewed results and recommendations of this evaluation, Reviewed ST goals and Plan of Care, and Reviewed recommendations for follow-up  Evaluation of Education: Theoplis Mari understanding and Needs further instruction    PLAN:  Skilled SLP intervention on acute care 3-5 x per week or until goals met and/or pt plateaus in function. Specific interventions for next session may include: CSE. PATIENT GOAL:    Return to prior level of function. SHORT TERM GOALS:  Short Term Goals  Time Frame for Short Term Goals: 2 weeks  Goal 1: Patient will complete executive functioning tasks (i.e. medications, time management, deductive reasoning, finances, scheduling, etc) with 80% accuracy, mod cues for improved IADL management. Goal 2: Patient will complete thought organization tasks (i.e. high level sequencing, divergent thinking, complex multi step verbal/written commands, reading comprehension, etc) with 80% accuracy, mod cues for improved mental flexibility and organization. Goal 3: Patient will complete memory tasks (i.e. 5 units, prospective, working recalll) with 70% accuracy, mod cues for improved retention of novel information. Goal 4: Complete CSE s/t new CVA to r/o deficits and determine need for further dysphagia follow up.     LONG TERM GOALS:  No established LTG's given short ELOS       Tatiana Arrington M.S. Jaden Nunes 3/4/2023

## 2023-03-04 NOTE — PROGRESS NOTES
Pt was with her  at the time of the visit. She was dealing with acute confusional state. She was anointed.     03/03/23 1910   Encounter Summary   Encounter Overview/Reason  Initial Encounter   Service Provided For: Patient and family together   Referral/Consult From: [facility]   Support System Spouse   Last Encounter  03/03/23  (Anointed)   Complexity of Encounter Low   Spiritual/Emotional needs   Type Spiritual Support   Rituals, Rites and Sacraments   Type Anointing   Assessment/Intervention/Outcome   Assessment Anxious

## 2023-03-04 NOTE — DISCHARGE SUMMARY
Hospitalist Discharge Summary        Patient: Soila Lu  YOB: 1950  MRN: [id number]   Acct: [de-identified]    Primary Care Physician: Zain Schmidt MD    Admit date  3/3/2023    Discharge date: No discharge date for patient encounter. Chief Complaint on presentation :-  Confusion    Discharge Assessment and Plan:-   Acute confusional state, suspect related to #2  Suspect confusional migraine versus acute confusional episode. CT head without acute findings. Metabolic work-up unremarkable. Neurology consulted for further evaluation. EEG neg for epileptiform activity, did show consistencies with encephalopathy. Intractable headache, history of migraine headaches  CT head without acute findings. Single dose of Toradol given. Neurology consult as above. S/p Decadron 10 mg IV once  Started on Topamax and to continue at discharge. F/u with outpatient Neuro. Late subacute L cerebellar infarct: statin increased to 40 mg at discharge per Neuro. Noted on MRI brain   Neuro okay with 2D echocardiogram to be completed as outpatient, Neuro ordered. Follow-up with PCP/Cardiology   Aortic stenosis status post TAVR  Performed at [facility] on 2/22/2023  Continue aspirin  Insulin-dependent type 2 diabetes mellitus  On Lantus 39 units nightly at home plus sliding scale. Low-dose sliding scale. Continue Lantus 39 units nightly. Carb controlled diet. History of breast cancer  Continue anastrozole  Hypertension  Continue home regimen  Hyperlipidemia  Continue Lipitor    Initial H and P and Hospital course:-  \"Celia Perkins is a 74 y.o. female with a history of aortic stenosis status post TAVR on 2/22/2023, insulin-dependent type 2 diabetes mellitus, breast cancer on anastrozole, hypertension, and hyperlipidemia who presented to [facility] with chief complaint of confusion. The patient underwent a TAVR at [facility] on 2/22/2023. During her hospitalization, she began having a headache.   This headache has been intractable and is still present at this time. Following discharge from the hospital, the patient was noted to have difficulty performing certain tasks. She was having difficulty texting on her phone, writing a check, and performing other ADLs. She sought treatment at her family [de-identified] office who then referred her to the emergency department. In the emergency department, the patient is still having difficulty performing complex commands. However, she has no focal neurologic deficits. She denies any unilateral weakness, facial droop, slurred speech, aphasia, gait disturbance, or vision changes. She still has a headache. However, she is having difficulty describing the severity and characteristics of her headache. She does state it is bifrontal in nature. She has a history of migraines in the past, but states that this headache is different than those previously. She did have an MRI in 2018 with an indication of episodes of memory loss. She does report issues with having difficulty expressing herself in the past when having a migraine headache, but denies any difficulty with performing activities previously. She denies any history of stroke or seizure. She has not had any seizure-like activity. Her TAVR went well without complication. She denies any fevers, chills, chest pain, cough, shortness of breath, abdominal pain, nausea, vomiting, diarrhea, or constipation. She denies any significant history of anxiety, but does endorse some stress related to her recent surgical procedure. \"    Please see above for full details. Patient was admitted secondary to confusion. CT of the head was done without acute findings and metabolic work-up was unremarkable. Neurology was consulted for evaluation an EEG was done which was negative for epileptiform activity. It did show consistencies with metabolic encephalopathy.   Patient underwent MRI imaging which did reveal a subacute left-sided cerebellar stroke. Patient was continued on DAPT at discharge in addition to increasing her dose of statin to high-dose therapy. Neurology was okay with patient obtaining 2D echocardiogram as an outpatient to complete work-up. Patient was noted to be back at her baseline upon day of discharge, confirmed by family at bedside. Both patient and family were comfortable with plan for patient to discharge home and follow-up outpatient for further work-up and PCP/cardiology follow-up. Additionally it was considered to the patient may have intractable headache and she was given Decadron 10 mg PO in house and was started on Topamax 25 mg twice daily. Patient was instructed to follow-up with outpatient neuro to gradually increase this dosage under their supervision. Is also recommended that patient follow-up with pulmonology for sleep study. Pulm referral was given. All VSS and suitable for discharge home. Physical Exam:-  Vitals:   Patient Vitals for the past 24 hrs:   BP Temp Temp src Pulse Resp SpO2 Height Weight   03/04/23 1141 (!) 148/43 97 °F (36.1 °C) Oral 75 14 95 % -- --   03/04/23 0900 134/67 98.6 °F (37 °C) Oral 70 16 97 % -- --   03/04/23 0315 (!) 101/55 97.8 °F (36.6 °C) -- 55 12 94 % -- --   03/03/23 2345 (!) 110/56 97.9 °F (36.6 °C) Oral 55 18 94 % -- --   03/03/23 2048 (!) 102/54 98.5 °F (36.9 °C) Oral 64 14 94 % -- --   03/03/23 1535 119/66 98.1 °F (36.7 °C) Oral 62 16 95 % 5' 2\" (1.575 m) 192 lb 12.8 oz (87.5 kg)     Weight:   Weight: 192 lb 12.8 oz (87.5 kg)   24 hour intake/output:     Intake/Output Summary (Last 24 hours) at 3/4/2023 1356  Last data filed at 3/4/2023 6322  Gross per 24 hour   Intake 10 ml   Output 100 ml   Net -90 ml       General appearance: No apparent distress, appears stated age and cooperative. HEENT: Normal cephalic, atraumatic without obvious deformity. Pupils equal, round, and reactive to light. Extra ocular muscles intact. Conjunctivae/corneas clear.   Neck: Supple, with full range of motion. No jugular venous distention. Trachea midline. Respiratory:  Normal respiratory effort. Clear to auscultation, bilaterally without Rales/Wheezes/Rhonchi. Cardiovascular: Regular rate and rhythm with normal S1/S2 without murmurs, rubs or gallops. Abdomen: Soft, non-tender, non-distended with normal bowel sounds. Musculoskeletal:  No clubbing, cyanosis or edema bilaterally. Skin: Skin color, texture, turgor normal.  No rashes or lesions. Neurologic:  Neurovascularly intact without any focal sensory/motor deficits.  Cranial nerves: II-XII intact, grossly non-focal. Good performance of hand flip (cerebellar test)  Psychiatric: Alert and oriented x 4, thought content appropriate, normal insight  Capillary Refill: Brisk,< 3 seconds   Peripheral Pulses: +2 palpable, equal bilaterally       Discharge Medications:-      Medication List        START taking these medications      clopidogrel 75 MG tablet  Commonly known as: PLAVIX  Take 1 tablet by mouth daily     topiramate 25 MG tablet  Commonly known as: TOPAMAX  Take 1 tablet by mouth 2 times daily            CONTINUE taking these medications      amLODIPine 5 MG tablet  Commonly known as: NORVASC     anastrozole 1 MG tablet  Commonly known as: ARIMIDEX  Take 1 tablet by mouth daily     aspirin 81 MG chewable tablet     atorvastatin 20 MG tablet  Commonly known as: LIPITOR  Take 1 tablet by mouth nightly     benazepril 20 MG tablet  Commonly known as: LOTENSIN  TAKE 1 TABLET BY MOUTH 2 TIMES DAILY     Calcium Carb-Cholecalciferol 600-500 MG-UNIT Caps     Easy Touch Pen Needles 31G X 6 MM Misc  Generic drug: Insulin Pen Needle     Evening Primrose Oil 1000 MG Caps     Handicap Placard Misc  by Does not apply route Duration:  5 years; dx:  osteoarthritis     insulin lispro (1 Unit Dial) 100 UNIT/ML Sopn  Commonly known as: HUMALOG/ADMELOG     Jardiance 25 MG tablet  Generic drug: empagliflozin     Lantus SoloStar 100 UNIT/ML injection pen  Generic drug: insulin glargine     magnesium 250 MG Tabs tablet  Commonly known as: MAGNESIUM-OXIDE     omeprazole 20 MG tablet  Commonly known as: PRILOSEC OTC     therapeutic multivitamin-minerals tablet            STOP taking these medications      ibuprofen 600 MG tablet  Commonly known as: ADVIL;MOTRIN            ASK your doctor about these medications      albuterol sulfate  (90 Base) MCG/ACT inhaler  Commonly known as: Ventolin HFA  Inhale 2 puffs into the lungs 4 times daily as needed for Wheezing     furosemide 20 MG tablet  Commonly known as: LASIX               Where to Get Your Medications        These medications were sent to [address] [place], [place] [address]noemy [place], [place] - [address] [facility] [address]      Phone: [phone]   clopidogrel 75 MG tablet  topiramate 25 MG tablet          Labs :-  Recent Results (from the past 72 hour(s))   EKG 12 Lead    Collection Time: 03/03/23  9:48 AM   Result Value Ref Range    Ventricular Rate 66 BPM    Atrial Rate 66 BPM    P-R Interval 202 ms    QRS Duration 102 ms    Q-T Interval 390 ms    QTc Calculation (Bazett) 408 ms    P Axis 19 degrees    R Axis 18 degrees    T Axis 21 degrees   POCT Glucose    Collection Time: 03/03/23  9:51 AM   Result Value Ref Range    POC Glucose 185 (H) 70 - 108 mg/dl   CBC with Auto Differential    Collection Time: 03/03/23  9:55 AM   Result Value Ref Range    WBC 6.0 4.8 - 10.8 thou/mm3    RBC 4.98 4.20 - 5.40 mill/mm3    Hemoglobin 14.9 12.0 - 16.0 gm/dl    Hematocrit 45.6 37.0 - 47.0 %    MCV 91.6 81.0 - 99.0 fL    MCH 29.9 26.0 - 33.0 pg    MCHC 32.7 32.2 - 35.5 gm/dl    RDW-CV 12.7 11.5 - 14.5 %    RDW-SD 41.5 35.0 - 45.0 fL    Platelets 065 492 - 166 thou/mm3    MPV 9.4 9.4 - 12.4 fL    Seg Neutrophils 61.9 %    Lymphocytes 26.5 %    Monocytes 8.3 %    Eosinophils 2.3 %    Basophils 0.7 %    Immature Granulocytes 0.3 %    Segs Absolute 3.7 1.8 - 7.7 thou/mm3 Lymphocytes Absolute 1.6 1.0 - 4.8 thou/mm3    Monocytes Absolute 0.5 0.4 - 1.3 thou/mm3    Eosinophils Absolute 0.1 0.0 - 0.4 thou/mm3    Basophils Absolute 0.0 0.0 - 0.1 thou/mm3    Immature Grans (Abs) 0.02 0.00 - 0.07 thou/mm3    nRBC 0 /100 wbc   CMP    Collection Time: 03/03/23  9:55 AM   Result Value Ref Range    Glucose 208 (H) 70 - 108 mg/dL    Creatinine 0.6 0.4 - 1.2 mg/dL    BUN 21 7 - 22 mg/dL    Sodium 141 135 - 145 meq/L    Potassium 4.4 3.5 - 5.2 meq/L    Chloride 104 98 - 111 meq/L    CO2 23 23 - 33 meq/L    Calcium 10.1 8.5 - 10.5 mg/dL    AST 20 5 - 40 U/L    Alkaline Phosphatase 64 38 - 126 U/L    Total Protein 7.0 6.1 - 8.0 g/dL    Albumin 4.3 3.5 - 5.1 g/dL    Total Bilirubin 1.0 0.3 - 1.2 mg/dL    ALT 30 11 - 66 U/L   Troponin    Collection Time: 03/03/23  9:55 AM   Result Value Ref Range    Troponin T < 0.010 ng/ml   Brain Natriuretic Peptide    Collection Time: 03/03/23  9:55 AM   Result Value Ref Range    Pro-BNP 54.2 0.0 - 124.0 pg/mL   TSH with Reflex    Collection Time: 03/03/23  9:55 AM   Result Value Ref Range    TSH 2.690 0.400 - 4.200 uIU/mL   Anion Gap    Collection Time: 03/03/23  9:55 AM   Result Value Ref Range    Anion Gap 14.0 8.0 - 16.0 meq/L   Glomerular Filtration Rate, Estimated    Collection Time: 03/03/23  9:55 AM   Result Value Ref Range    Est, Glom Filt Rate >60 >60 ml/min/1.73m2   Osmolality    Collection Time: 03/03/23  9:55 AM   Result Value Ref Range    Osmolality Calc 290.3 275.0 - 300.0 mOsmol/kg   Ammonia    Collection Time: 03/03/23 10:19 AM   Result Value Ref Range    Ammonia 27 11 - 60 umol/L   COVID-19 & Influenza Combo    Collection Time: 03/03/23 11:15 AM    Specimen: Nasopharyngeal Swab   Result Value Ref Range    SARS-CoV-2 RNA, RT PCR NOT DETECTED NOT DETECTED    INFLUENZA A NOT DETECTED NOT DETECTED    INFLUENZA B NOT DETECTED NOT DETECTED   POCT glucose    Collection Time: 03/03/23  4:38 PM   Result Value Ref Range    POC Glucose 116 (H) 70 - 108 mg/dl   POCT Glucose    Collection Time: 03/03/23  7:25 PM   Result Value Ref Range    POC Glucose 180 (H) 70 - 108 mg/dl   Urinalysis with Reflex to Culture    Collection Time: 03/03/23 11:53 PM    Specimen: Urine   Result Value Ref Range    Glucose, Ur 500 (A) NEGATIVE mg/dl    Bilirubin Urine NEGATIVE NEGATIVE    Ketones, Urine TRACE (A) NEGATIVE    Specific Gravity, Urine > 1.030 (A) 1.002 - 1.030    Blood, Urine NEGATIVE NEGATIVE    pH, UA 5.5 5.0 - 9.0    Protein, UA NEGATIVE NEGATIVE    Urobilinogen, Urine 0.2 0.0 - 1.0 eu/dl    Nitrite, Urine NEGATIVE NEGATIVE    Leukocyte Esterase, Urine NEGATIVE NEGATIVE    Color, UA YELLOW STRAW-YELLOW    Character, Urine CLEAR CLEAR-SL CLOUD   Urine Drug Screen    Collection Time: 03/03/23 11:53 PM   Result Value Ref Range    Amphetamine+Methamphetamine Urine Screen Negative NEGATIVE    Barbiturate Quant, Ur Negative NEGATIVE    Benzodiazepine Quant, Ur Negative NEGATIVE    Cannabinoid Quant, Ur Negative NEGATIVE    Cocaine Metab Quant, Ur Negative NEGATIVE    Opiates, Urine Negative NEGATIVE    Oxycodone Negative NEGATIVE    PCP Quant, Ur Negative NEGATIVE    Fentanyl Negative NEGATIVE   Basic Metabolic Panel w/ Reflex to MG    Collection Time: 03/04/23  5:50 AM   Result Value Ref Range    Sodium 142 135 - 145 meq/L    Potassium reflex Magnesium 4.2 3.5 - 5.2 meq/L    Chloride 107 98 - 111 meq/L    CO2 18 (L) 23 - 33 meq/L    Glucose 114 (H) 70 - 108 mg/dL    BUN 22 7 - 22 mg/dL    Creatinine 0.5 0.4 - 1.2 mg/dL    Calcium 9.4 8.5 - 10.5 mg/dL   CBC with Auto Differential    Collection Time: 03/04/23  5:50 AM   Result Value Ref Range    WBC 4.9 4.8 - 10.8 thou/mm3    RBC 4.61 4.20 - 5.40 mill/mm3    Hemoglobin 14.0 12.0 - 16.0 gm/dl    Hematocrit 42.8 37.0 - 47.0 %    MCV 92.8 81.0 - 99.0 fL    MCH 30.4 26.0 - 33.0 pg    MCHC 32.7 32.2 - 35.5 gm/dl    RDW-CV 12.8 11.5 - 14.5 %    RDW-SD 43.1 35.0 - 45.0 fL    Platelets 337 604 - 429 thou/mm3    MPV 9.6 9.4 - 12.4 fL Seg Neutrophils 49.4 %    Lymphocytes 36.9 %    Monocytes 9.7 %    Eosinophils 3.4 %    Basophils 0.4 %    Immature Granulocytes 0.2 %    Segs Absolute 2.4 1.8 - 7.7 thou/mm3    Lymphocytes Absolute 1.8 1.0 - 4.8 thou/mm3    Monocytes Absolute 0.5 0.4 - 1.3 thou/mm3    Eosinophils Absolute 0.2 0.0 - 0.4 thou/mm3    Basophils Absolute 0.0 0.0 - 0.1 thou/mm3    Immature Grans (Abs) 0.01 0.00 - 0.07 thou/mm3    nRBC 0 /100 wbc   Anion Gap    Collection Time: 03/04/23  5:50 AM   Result Value Ref Range    Anion Gap 17.0 (H) 8.0 - 16.0 meq/L   Glomerular Filtration Rate, Estimated    Collection Time: 03/04/23  5:50 AM   Result Value Ref Range    Est, Glom Filt Rate >60 >60 ml/min/1.73m2   POCT Glucose    Collection Time: 03/04/23  7:40 AM   Result Value Ref Range    POC Glucose 129 (H) 70 - 108 mg/dl   POCT Glucose    Collection Time: 03/04/23  1:03 PM   Result Value Ref Range    POC Glucose 100 70 - 108 mg/dl        Microbiology:    Blood culture #1: No results found for: BC    Blood culture #2:No results found for: BLOODCULT2    Organism:    Lab Results   Component Value Date/Time    LABGRAM  06/27/2019 08:00 AM     Few segmented neutrophils observed. No epithelial cells observed. Rare gram negative bacilli. MRSA culture only:No results found for: [facility]    Urine culture:   Lab Results   Component Value Date/Time    LABURIN [place] count: 10,000-50,000 CFU/mL 06/25/2020 10:13 AM    LABURIN 300 mg 11/18/2019 09:04 AM     Lab Results   Component Value Date/Time    ORG Escherichia coli 06/25/2020 10:13 AM        Respiratory culture: No results found for: CULTRESP    Aerobic and Anaerobic :  Lab Results   Component Value Date/Time    LABAERO  06/27/2019 08:00 AM     Culture also yielded light growth of swarming Proteus  species.       LABAERO moderate growth 06/27/2019 08:00 AM     Lab Results   Component Value Date/Time    LABANAE  06/27/2019 08:00 AM     No anaerobes isolated- preliminary  No anaerobes isolated Urinalysis:      Lab Results   Component Value Date/Time    NITRU NEGATIVE 03/03/2023 11:53 PM    WBCUA 10-15 06/25/2020 10:13 AM    WBCUA 5-10 03/24/2012 07:40 PM    BACTERIA FEW 06/25/2020 10:13 AM    RBCUA 5-10 06/25/2020 10:13 AM    BLOODU NEGATIVE 03/03/2023 11:53 PM    SPECGRAV 1.015 01/19/2023 09:04 AM    SPECGRAV >1.030 11/20/2021 08:11 AM    GLUCOSEU 500 03/03/2023 11:53 PM       Radiology:-  CTA HEAD W WO CONTRAST    Result Date: 3/4/2023  PROCEDURE: CTA HEAD W WO CONTRAST, CTA NECK W WO CONTRAST CLINICAL INFORMATION: subacute stroke, acute confusional state. Recent infarct left cerebellum. COMPARISON: Brain MRI 3/3/2023. TECHNIQUE: 1 mm axial images were obtained through the head and neck after the fast bolus administration of contrast. A noncontrast localizer was obtained. 3-D reconstructions were performed on a dedicated 3-D workstation. These include multiplanar MPR images and multiplanar MIP images. Centerline reconstructions were obtained of the carotid systems. Isovue intravenous contrast was given. All CT scans at this facility use dose modulation, iterative reconstruction, and/or weight-based dosing when appropriate to reduce radiation dose to as low as reasonably achievable. FINDINGS: CTA NECK: Aortic arch and branches: Aortic arch is normal. The origins of innominate artery, left common carotid artery left subclavian artery are normal. The right subclavian artery origin is normal. Right common carotid artery/ICA: The right common carotid artery is normal. There is some minimal calcified atherosclerosis of the right carotid bulb. There is no stenosis. There is no stenosis of the right internal carotid artery. Left common carotid artery/ICA: The left common carotid artery is normal. The left carotid bulb is normal. The left internal carotid artery is normal. There is no atherosclerosis. There is no stenosis. Vertebral arteries:  The vertebral arteries are codominant and normal. CTA HEAD: Internal carotid arteries: There is some very mild atherosclerosis of the cavernous segment of the right internal carotid artery. There is no stenosis. The left internal carotid artery is normal. The ophthalmic artery origins are normal bilaterally. Middle cerebral arteries: Normal. The proximal branches are also normal. Anterior cerebral arteries: Normal. The proximal branches are normal. There is a normal small anterior communicating artery. Vertebral arteries: The vertebral arteries are normal.  There are small bilateral normal posterior inferior cerebellar arteries. Basilar artery: Normal. Superior cerebellar arteries: Normal. Posterior cerebral arteries: Normal. The proximal branches are also normal. No aneurysms, stenoses or occlusions are noted. The superior sagittal sinus, vein of Roberto, internal cerebral veins, straight sinus, transverse sinuses and sigmoid sinuses are patent. Axial source data: The patient's known infarct in the left cerebellum is not well seen on CT. There is no cervical adenopathy. There is no upper mediastinal adenopathy. There are no suspicious findings in the lung apices. The paranasal sinuses are normally aerated. 1. Minimal atherosclerosis of the right internal carotid artery. There is no stenosis. 2. No intracranial or extracranial stenoses or occlusions. **This report has been created using voice recognition software. It may contain minor errors which are inherent in voice recognition technology. ** Final report electronically signed by Dr. Grant Nowak on 3/4/2023 10:12 AM    XR CHEST (2 VW)    Result Date: 3/3/2023  PROCEDURE: XR CHEST (2 VW) CLINICAL INFORMATION: altered mental status. COMPARISON: Chest x-ray dated 15th of June 2022. TECHNIQUE: AP and lateral views the chest. FINDINGS: The heart size is normal. The patient is status post TAVR. The mediastinum is not widened. There are no pulmonary infiltrates or effusions.   The pulmonary vascularity is normal. No suspicious osseous lesions are present. 1. The patient has undergone interval aortic valve replacement. 2. The appearance of the chest is otherwise unchanged since previous study dated 15th of June 2022. **This report has been created using voice recognition software. It may contain minor errors which are inherent in voice recognition technology. ** Final report electronically signed by DR Peter York on 3/3/2023 10:56 AM    CT HEAD WO CONTRAST    Result Date: 3/3/2023  PROCEDURE: CT HEAD WO CONTRAST CLINICAL INFORMATION: confusion, recent TAVR, concern for TIA or CVA. COMPARISON: Brain MRI 8/12/2019. Head CT 8/16/2017. TECHNIQUE: Noncontrast 5 mm axial images were obtained through the brain. Sagittal and coronal reconstructions were obtained. All CT scans at this facility use dose modulation, iterative reconstruction, and/or weight-based dosing when appropriate to reduce radiation dose to as low as reasonably achievable. FINDINGS: There is no hemorrhage. There are no intra-or extra-axial collections. There is no hydrocephalus, midline shift or mass effect. The brain volume is at the lower limits of normal. This has developed since the prior exams. There is symmetric abnormal low  attenuation in the periatrial white matter bilaterally which was present previously. This is consistent with chronic small vessel ischemic changes. There are no new areas of abnormal attenuation. The paranasal sinuses and mastoid air cells are normally aerated. There is no suspicious calvarial abnormality. 1. No acute findings. 2. Interval decrease in the overall brain volume loss. **This report has been created using voice recognition software. It may contain minor errors which are inherent in voice recognition technology. ** Final report electronically signed by Dr. Miguelina Jacobs on 3/3/2023 10:51 AM    CTA NECK W WO CONTRAST    Result Date: 3/4/2023  PROCEDURE: CTA HEAD W WO CONTRAST, CTA NECK W WO CONTRAST CLINICAL INFORMATION: subacute stroke, acute confusional state. Recent infarct left cerebellum. COMPARISON: Brain MRI 3/3/2023. TECHNIQUE: 1 mm axial images were obtained through the head and neck after the fast bolus administration of contrast. A noncontrast localizer was obtained. 3-D reconstructions were performed on a dedicated 3-D workstation. These include multiplanar MPR images and multiplanar MIP images. Centerline reconstructions were obtained of the carotid systems. Isovue intravenous contrast was given. All CT scans at this facility use dose modulation, iterative reconstruction, and/or weight-based dosing when appropriate to reduce radiation dose to as low as reasonably achievable. FINDINGS: CTA NECK: Aortic arch and branches: Aortic arch is normal. The origins of innominate artery, left common carotid artery left subclavian artery are normal. The right subclavian artery origin is normal. Right common carotid artery/ICA: The right common carotid artery is normal. There is some minimal calcified atherosclerosis of the right carotid bulb. There is no stenosis. There is no stenosis of the right internal carotid artery. Left common carotid artery/ICA: The left common carotid artery is normal. The left carotid bulb is normal. The left internal carotid artery is normal. There is no atherosclerosis. There is no stenosis. Vertebral arteries: The vertebral arteries are codominant and normal. CTA HEAD: Internal carotid arteries: There is some very mild atherosclerosis of the cavernous segment of the right internal carotid artery. There is no stenosis. The left internal carotid artery is normal. The ophthalmic artery origins are normal bilaterally. Middle cerebral arteries: Normal. The proximal branches are also normal. Anterior cerebral arteries: Normal. The proximal branches are normal. There is a normal small anterior communicating artery. Vertebral arteries:  The vertebral arteries are normal.  There are small bilateral normal posterior inferior cerebellar arteries. Basilar artery: Normal. Superior cerebellar arteries: Normal. Posterior cerebral arteries: Normal. The proximal branches are also normal. No aneurysms, stenoses or occlusions are noted. The superior sagittal sinus, vein of Roberto, internal cerebral veins, straight sinus, transverse sinuses and sigmoid sinuses are patent. Axial source data: The patient's known infarct in the left cerebellum is not well seen on CT. There is no cervical adenopathy. There is no upper mediastinal adenopathy. There are no suspicious findings in the lung apices. The paranasal sinuses are normally aerated. 1. Minimal atherosclerosis of the right internal carotid artery. There is no stenosis. 2. No intracranial or extracranial stenoses or occlusions. **This report has been created using voice recognition software. It may contain minor errors which are inherent in voice recognition technology. ** Final report electronically signed by Dr. Lopez Friend on 3/4/2023 10:12 AM    MRI BRAIN W WO CONTRAST    Result Date: 3/4/2023  PROCEDURE: MRI BRAIN W WO CONTRAST CLINICAL INFORMATIONconfusion, difficulty with executive functioning. Confusion since surgery 2/22/2023. COMPARISON: Head CT 3/3/2023. Brain MRI 8/12/2019. TECHNIQUE: Multiplanar and multiple spin echo T1 and T2-weighted images were obtained through the brain before and after the administration of intravenous contrast. FINDINGS: There is faint high signal in the inferior medial left cerebellum on the diffusion-weighted images. This is not dark on the ADC map. There is high signal on the FLAIR and T2-weighted sequences involving the white matter and cortex. After contrast, there is enhancement of the cortex. This is consistent with late subacute infarct. There is no evidence of an acute infarct. The brain volume is normal.There are no intra-or extra-axial collections. There is no hydrocephalus, midline shift or mass effect.  On the FLAIR and T2-weighted sequences, there is mild signal hyperintensity scattered in the white matter of the brain. This is most consistent with mild severity chronic small vessel ischemic changes. On the gradient echo T2-weighted images, there is no susceptibility artifact present. There are no other areas of abnormal enhancement. The major intracranial vascular flow voids are present. The midline craniocervical junction structures are normal.  The brainstem and pituitary gland are normal.      1. Abnormal signal with cortical enhancement in the inferior medial left cerebellum. This is most consistent with a late subacute infarct. Follow-up brain MRI in 2 months is recommended to ensure the enhancement resolves. 2. Mild severity chronic small vessel ischemic changes. These findings were telephoned to [facility], one of the nurses on 8B at 8:26 AM on 3/4/2023. **This report has been created using voice recognition software. It may contain minor errors which are inherent in voice recognition technology. ** Final report electronically signed by Dr. Ellen Álvarez on 3/4/2023 8:29 AM       Follow-up scheduled after discharge :-    in the next few days with Ann Mccabe MD  in 2 weeks with Neuro  In the next few weeks with Cardiology     Consultations during this hospital stay:-  [] NONE [] Cardiology  [] Nephrology  [] Hemo onco   [] GI   [] ID  [] Endocrine  [] Pulm    [x] Neuro    [] Psych   [] Urology  [] ENT   [] G SURGERY   []Ortho    []CV surg    [] Palliative  [] Hospice [] Pain management   []    []TCU   [x] PT/OT  OTHERS:- SLP    Disposition: home  Condition at Discharge: Stable    Time Spent:- 45 minutes    Electronically signed by Shankar Gutierrez PA-C on 3/4/23 at 1:54 PM EST   Discharging Hospitalist

## 2023-03-04 NOTE — CONSULTS
Neurology Consult Note    Date:3/4/2023       WRTF:[id number]  Patient Name:Celia Perkins     YOB: 1950     Age:72 y.o. Requesting Physician: Dudley Melendrez PA-C     Reason for Consult:  Evaluate for changes in executive function, headache      Chief Complaint:   Chief Complaint   Patient presents with    Altered Mental Status       Subjective     Patrick Jhaveri is a 74 y.o. female with a history of asthma, benign brain cyst, left-sided breast cancer-currently on chemotherapy, hypertension, type 2 diabetes, hypothyroidism, aortic stenosis s/p TAVR at [facility] 3/08/4406 without complications who presented to [facility] ED yesterday afternoon with family concerned about mental status changes and a headache since the procedure. Shortly after the procedure, patient began experiencing an intractable headache, present every day since. Patient reports taking ibuprofen 4 times a day without relief. She does report a history of migraines with aura, including visual disturbances, however she reports this headache is different. She also admits to associated cervical pain in addition to her bifrontal headache. She denies any visual loss/disturbances/auras with current headache. He has also had increased difficulty with every day activities, including ADLs, writing checks, using the TV remote, simple mathematics, texting on her phone. Family at bedside states this has been going on for about a week. Patient denies history of head injury, strokes, seizures. She denies previous difficulty with memory, however she did undergo an MRI in 2018 for episodes of memory loss, per documentation. Infectious/metabolic work-up unremarkable. UDS negative. Noncontrasted CT of the head negative for acute intracranial abnormalities. EEG negative for epileptiform discharges. MRI of the brain without contrast revealed a late subacute infarct in the inferior medial left cerebellum.   Today, patient continues with difficulty following complex commands and recent memory.  She remains without any focal neurologic deficits.  She denies recent fevers, illness, chills.  Denies numbness or tingling, extremity weakness, dizziness, difficulty ambulating, nausea, vomiting, difficulty speaking or understanding speech.  On my exam, she states her headache is still present, although improved from last night and this morning.  She is on ASA 81 mg and Lipitor 20 mg as outpatient. Patient reports poor sleep over the past few weeks.     Review of Systems   Review of Systems   Constitutional:  Negative for chills, fatigue and fever.   HENT:  Negative for rhinorrhea, sore throat and trouble swallowing.    Eyes:  Positive for photophobia. Negative for visual disturbance.   Respiratory:  Negative for cough and shortness of breath.    Cardiovascular:  Negative for chest pain and palpitations.   Gastrointestinal:  Negative for abdominal pain, diarrhea, nausea and vomiting.   Genitourinary:  Negative for dysuria.   Musculoskeletal:  Positive for neck stiffness. Negative for arthralgias, gait problem and myalgias.   Skin:  Negative for rash.   Neurological:  Positive for tremors (left hand) and headaches. Negative for dizziness, seizures, facial asymmetry, speech difficulty, weakness and numbness.   Psychiatric/Behavioral:  Positive for confusion. Negative for decreased concentration. The patient is nervous/anxious.      Medications   Scheduled Meds:    clopidogrel  75 mg Oral Daily    topiramate  25 mg Oral BID    sodium chloride flush  5-40 mL IntraVENous 2 times per day    enoxaparin  40 mg SubCUTAneous Daily    insulin lispro  0-4 Units SubCUTAneous TID     insulin lispro  0-4 Units SubCUTAneous Nightly    insulin glargine  39 Units SubCUTAneous Nightly    amLODIPine  5 mg Oral Daily    anastrozole  1 mg Oral Daily    aspirin  81 mg Oral Daily    atorvastatin  20 mg Oral Nightly    pantoprazole  40 mg Oral QAM AC    benazepril  20 mg Oral BID  Continuous Infusions:    sodium chloride      dextrose       PRN Meds: sodium chloride flush, sodium chloride, ondansetron **OR** ondansetron, polyethylene glycol, acetaminophen **OR** acetaminophen, glucose, dextrose bolus **OR** dextrose bolus, glucagon (rDNA), dextrose  Medications Prior to Admission:   No current facility-administered medications on file prior to encounter. Current Outpatient Medications on File Prior to Encounter   Medication Sig Dispense Refill    amLODIPine (NORVASC) 5 MG tablet take 1 tablet by mouth once daily      omeprazole (PRILOSEC OTC) 20 MG tablet Take 20 mg by mouth every other day      Evening Primrose Oil 1000 MG CAPS       magnesium (MAGNESIUM-OXIDE) 250 MG TABS tablet Take 500 mg by mouth daily      anastrozole (ARIMIDEX) 1 MG tablet Take 1 tablet by mouth daily 90 tablet 3    benazepril (LOTENSIN) 20 MG tablet TAKE 1 TABLET BY MOUTH 2 TIMES DAILY 180 tablet 1    albuterol sulfate HFA (VENTOLIN HFA) 108 (90 Base) MCG/ACT inhaler Inhale 2 puffs into the lungs 4 times daily as needed for Wheezing (Patient not taking: No sig reported) 18 g 1    Handicap Placard MISC by Does not apply route Duration:  5 years; dx:  osteoarthritis 1 each 0    empagliflozin (JARDIANCE) 25 MG tablet Take 25 mg by mouth daily      insulin lispro, 1 Unit Dial, 100 UNIT/ML SOPN Inject into the skin 3 times daily Indications: inject 0-12 unitsinto the skin 3 times daily-per sliding scale      furosemide (LASIX) 20 MG tablet  (Patient not taking: No sig reported)      EASY TOUCH PEN NEEDLES 31G X 6 MM MISC       LANTUS SOLOSTAR 100 UNIT/ML injection pen Inject 39 Units into the skin nightly       Calcium Carb-Cholecalciferol 600-500 MG-UNIT CAPS Take by mouth daily      aspirin 81 MG chewable tablet Take 81 mg by mouth daily      therapeutic multivitamin-minerals (THERAGRAN-M) tablet Take 1 tablet by mouth daily.          Past History    Past Medical History:   has a past medical history of Arthritis, Asthma, Brain cyst, Breast cancer ([facility] [address] Cancer ([facility] [address] Chronic sinus complaints, Diverticulosis of colon, DKA (diabetic ketoacidoses), Elevated TSH, Hypertension, Osteoarthritis, Polyneuropathy, PONV (postoperative nausea and vomiting), Spinal headache, and Type 2 diabetes mellitus ([facility] [address] Social History:   reports that she has never smoked. She has never used smokeless tobacco. She reports current alcohol use. She reports that she does not use drugs. Family History:   Family History   Problem Relation Age of Onset    Diabetes Mother     Heart Disease Mother     Lupus Mother     Heart Disease Father     Cancer Father         Squamous cell - face & scalp    Diabetes Maternal Grandmother     Heart Disease Maternal Grandfather     Cancer Paternal Aunt          multiple myeloma    Cancer Paternal Uncle         Lymphatic Leukemia    Breast Cancer Maternal Cousin         unsure on age, had breast cancer twice unsure if bilateral or if reoccurence    Heart Disease Maternal Uncle     Lupus Maternal Uncle     Other Paternal Grandfather         Brain aneurysm       Physical Examination      Vitals:  BP (!) 148/43   Pulse 75   Temp 97 °F (36.1 °C) (Oral)   Resp 14   Ht 5' 2\" (1.575 m)   Wt 192 lb 12.8 oz (87.5 kg)   SpO2 95%   BMI 35.26 kg/m²   Temp (24hrs), Av °F (36.7 °C), Min:97 °F (36.1 °C), Max:98.6 °F (37 °C)      I/O (24Hr): Intake/Output Summary (Last 24 hours) at 3/4/2023 1455  Last data filed at 3/4/2023 0907  Gross per 24 hour   Intake 10 ml   Output 100 ml   Net -90 ml         Physical Exam  Vitals reviewed. Constitutional:       General: She is not in acute distress. Appearance: Normal appearance. She is not ill-appearing. HENT:      Head: Normocephalic and atraumatic.       Right Ear: External ear normal.      Left Ear: External ear normal.      Nose: Nose normal.      Mouth/Throat:      Mouth: Mucous membranes are moist.      Pharynx: No oropharyngeal exudate or posterior oropharyngeal erythema. Eyes:      Extraocular Movements: Extraocular movements intact and EOM normal.      Pupils: Pupils are equal, round, and reactive to light. Cardiovascular:      Rate and Rhythm: Normal rate and regular rhythm. Heart sounds: Normal heart sounds. No murmur heard. Pulmonary:      Effort: Pulmonary effort is normal. No respiratory distress. Breath sounds: Normal breath sounds. No wheezing. Abdominal:      General: Bowel sounds are normal.      Palpations: Abdomen is soft. Tenderness: There is no abdominal tenderness. Musculoskeletal:         General: Normal range of motion. Cervical back: Normal range of motion. Right lower leg: No edema. Left lower leg: No edema. Skin:     General: Skin is warm. Findings: No rash. Neurological:      General: No focal deficit present. Mental Status: She is alert. Coordination: Finger-Nose-Finger Test and Heel to [place] mary Test normal.      Comments: Alert and oriented to self, age, location, month but not year. Psychiatric:         Attention and Perception: Attention normal.         Mood and Affect: Mood is anxious. Affect is labile. Speech: Speech normal.         Behavior: Behavior normal. Behavior is cooperative. Cognition and Memory: Memory is impaired. She exhibits impaired recent memory. Neurologic Exam     Mental Status   Oriented to person. Oriented to place. Disoriented to year. Oriented to month. Follows 1 step commands. Attention: normal. Concentration: normal.   Speech: speech is normal   Level of consciousness: alert  Able to repeat. Alert and oriented to self, age, location, month but not year. She is able to follow simple commands, however has difficulty with complex commands. She has difficulty with abstract thinking and simple equations. She does require some repeat cueing and visual prompting. Cranial Nerves     CN II   Visual fields full to confrontation.    Right visual field deficit: none  Left visual field deficit: none     CN III, IV, VI   Pupils are equal, round, and reactive to light. Extraocular motions are normal.   Right pupil: Shape: regular. Reactivity: brisk. Left pupil: Shape: regular. Reactivity: brisk. CN V   Facial sensation intact. Right facial sensation deficit: none  Left facial sensation deficit: none    CN VII   Facial expression full, symmetric. Right facial weakness: none  Left facial weakness: none    CN VIII   CN VIII normal.   Hearing: intact    CN IX, X   CN IX normal.   CN X normal.   Palate: symmetric    CN XI   CN XI normal.   Right trapezius strength: normal  Left trapezius strength: normal    CN XII   CN XII normal.   Tongue: not atrophic  Fasciculations: absent  Tongue deviation: none    Motor Exam   Muscle bulk: normal  Overall muscle tone: normal  Right arm pronator drift: absent  Left arm pronator drift: absent  Motor strength:  BUE: 4+/5  BLE: 4/5     Sensory Exam   Light touch normal.     Gait, Coordination, and Reflexes     Coordination   Finger to nose coordination: normal  Heel to shin coordination: normal    Tremor   Resting tremor: present (chronic, L hand)        Labs/Imaging/Diagnostics   Labs:  CBC:  Recent Labs     03/03/23  0955 03/04/23  0550   WBC 6.0 4.9   RBC 4.98 4.61   HGB 14.9 14.0   HCT 45.6 42.8   MCV 91.6 92.8    182     CHEMISTRIES:  Recent Labs     03/03/23  0955 03/04/23  0550    142   K 4.4 4.2    107   CO2 23 18*   BUN 21 22   CREATININE 0.6 0.5   GLUCOSE 208* 114*     COAGULATION STUDIES:No results for input(s): PROTIME, INR, APTT in the last 72 hours. LIVER PROFILE:  Recent Labs     03/03/23  0955   AST 20   ALT 30   BILITOT 1.0   ALKPHOS 64     CHOLESTEROL AND A1C:No results for input(s): LDLCALC, HDL, CHOL, TRIG, LABA1C in the last 720 hours.    Imaging Last 24 Hours:  CTA HEAD W WO CONTRAST    Result Date: 3/4/2023  PROCEDURE: CTA HEAD W WO CONTRAST, CTA NECK W WO CONTRAST CLINICAL INFORMATION: subacute stroke, acute confusional state. Recent infarct left cerebellum. COMPARISON: Brain MRI 3/3/2023. TECHNIQUE: 1 mm axial images were obtained through the head and neck after the fast bolus administration of contrast. A noncontrast localizer was obtained. 3-D reconstructions were performed on a dedicated 3-D workstation. These include multiplanar MPR images and multiplanar MIP images. Centerline reconstructions were obtained of the carotid systems. Isovue intravenous contrast was given. All CT scans at this facility use dose modulation, iterative reconstruction, and/or weight-based dosing when appropriate to reduce radiation dose to as low as reasonably achievable. FINDINGS: CTA NECK: Aortic arch and branches: Aortic arch is normal. The origins of innominate artery, left common carotid artery left subclavian artery are normal. The right subclavian artery origin is normal. Right common carotid artery/ICA: The right common carotid artery is normal. There is some minimal calcified atherosclerosis of the right carotid bulb. There is no stenosis. There is no stenosis of the right internal carotid artery. Left common carotid artery/ICA: The left common carotid artery is normal. The left carotid bulb is normal. The left internal carotid artery is normal. There is no atherosclerosis. There is no stenosis. Vertebral arteries: The vertebral arteries are codominant and normal. CTA HEAD: Internal carotid arteries: There is some very mild atherosclerosis of the cavernous segment of the right internal carotid artery. There is no stenosis. The left internal carotid artery is normal. The ophthalmic artery origins are normal bilaterally. Middle cerebral arteries: Normal. The proximal branches are also normal. Anterior cerebral arteries: Normal. The proximal branches are normal. There is a normal small anterior communicating artery. Vertebral arteries:  The vertebral arteries are normal.  There are small bilateral normal posterior inferior cerebellar arteries. Basilar artery: Normal. Superior cerebellar arteries: Normal. Posterior cerebral arteries: Normal. The proximal branches are also normal. No aneurysms, stenoses or occlusions are noted. The superior sagittal sinus, vein of Roberto, internal cerebral veins, straight sinus, transverse sinuses and sigmoid sinuses are patent. Axial source data: The patient's known infarct in the left cerebellum is not well seen on CT. There is no cervical adenopathy. There is no upper mediastinal adenopathy. There are no suspicious findings in the lung apices. The paranasal sinuses are normally aerated. 1. Minimal atherosclerosis of the right internal carotid artery. There is no stenosis. 2. No intracranial or extracranial stenoses or occlusions. **This report has been created using voice recognition software. It may contain minor errors which are inherent in voice recognition technology. ** Final report electronically signed by Dr. Lizzie Sanderson on 3/4/2023 10:12 AM    XR CHEST (2 VW)    Result Date: 3/3/2023  PROCEDURE: XR CHEST (2 VW) CLINICAL INFORMATION: altered mental status. COMPARISON: Chest x-ray dated 15th of June 2022. TECHNIQUE: AP and lateral views the chest. FINDINGS: The heart size is normal. The patient is status post TAVR. The mediastinum is not widened. There are no pulmonary infiltrates or effusions. The pulmonary vascularity is normal. No suspicious osseous lesions are present. 1. The patient has undergone interval aortic valve replacement. 2. The appearance of the chest is otherwise unchanged since previous study dated 15th of June 2022. **This report has been created using voice recognition software. It may contain minor errors which are inherent in voice recognition technology. ** Final report electronically signed by DR Ricky Guallpa on 3/3/2023 10:56 AM    CT HEAD WO CONTRAST    Result Date: 3/3/2023  PROCEDURE: CT HEAD WO CONTRAST CLINICAL INFORMATION: confusion, recent TAVR, concern for TIA or CVA. COMPARISON: Brain MRI 8/12/2019. Head CT 8/16/2017. TECHNIQUE: Noncontrast 5 mm axial images were obtained through the brain. Sagittal and coronal reconstructions were obtained. All CT scans at this facility use dose modulation, iterative reconstruction, and/or weight-based dosing when appropriate to reduce radiation dose to as low as reasonably achievable. FINDINGS: There is no hemorrhage. There are no intra-or extra-axial collections. There is no hydrocephalus, midline shift or mass effect. The brain volume is at the lower limits of normal. This has developed since the prior exams. There is symmetric abnormal low  attenuation in the periatrial white matter bilaterally which was present previously. This is consistent with chronic small vessel ischemic changes. There are no new areas of abnormal attenuation. The paranasal sinuses and mastoid air cells are normally aerated. There is no suspicious calvarial abnormality. 1. No acute findings. 2. Interval decrease in the overall brain volume loss. **This report has been created using voice recognition software. It may contain minor errors which are inherent in voice recognition technology. ** Final report electronically signed by Dr. Kathy Braswell on 3/3/2023 10:51 AM    CTA NECK W WO CONTRAST    Result Date: 3/4/2023  PROCEDURE: CTA HEAD W WO CONTRAST, CTA NECK W WO CONTRAST CLINICAL INFORMATION: subacute stroke, acute confusional state. Recent infarct left cerebellum. COMPARISON: Brain MRI 3/3/2023. TECHNIQUE: 1 mm axial images were obtained through the head and neck after the fast bolus administration of contrast. A noncontrast localizer was obtained. 3-D reconstructions were performed on a dedicated 3-D workstation. These include multiplanar MPR images and multiplanar MIP images. Centerline reconstructions were obtained of the carotid systems. Isovue intravenous contrast was given.  All CT scans at this facility use dose modulation, iterative reconstruction, and/or weight-based dosing when appropriate to reduce radiation dose to as low as reasonably achievable. FINDINGS: CTA NECK: Aortic arch and branches: Aortic arch is normal. The origins of innominate artery, left common carotid artery left subclavian artery are normal. The right subclavian artery origin is normal. Right common carotid artery/ICA: The right common carotid artery is normal. There is some minimal calcified atherosclerosis of the right carotid bulb. There is no stenosis. There is no stenosis of the right internal carotid artery. Left common carotid artery/ICA: The left common carotid artery is normal. The left carotid bulb is normal. The left internal carotid artery is normal. There is no atherosclerosis. There is no stenosis. Vertebral arteries: The vertebral arteries are codominant and normal. CTA HEAD: Internal carotid arteries: There is some very mild atherosclerosis of the cavernous segment of the right internal carotid artery. There is no stenosis. The left internal carotid artery is normal. The ophthalmic artery origins are normal bilaterally. Middle cerebral arteries: Normal. The proximal branches are also normal. Anterior cerebral arteries: Normal. The proximal branches are normal. There is a normal small anterior communicating artery. Vertebral arteries: The vertebral arteries are normal.  There are small bilateral normal posterior inferior cerebellar arteries. Basilar artery: Normal. Superior cerebellar arteries: Normal. Posterior cerebral arteries: Normal. The proximal branches are also normal. No aneurysms, stenoses or occlusions are noted. The superior sagittal sinus, vein of Roberto, internal cerebral veins, straight sinus, transverse sinuses and sigmoid sinuses are patent. Axial source data: The patient's known infarct in the left cerebellum is not well seen on CT. There is no cervical adenopathy.  There is no upper mediastinal adenopathy. There are no suspicious findings in the lung apices. The paranasal sinuses are normally aerated. 1. Minimal atherosclerosis of the right internal carotid artery. There is no stenosis. 2. No intracranial or extracranial stenoses or occlusions. **This report has been created using voice recognition software. It may contain minor errors which are inherent in voice recognition technology. ** Final report electronically signed by Dr. Monster Do on 3/4/2023 10:12 AM    MRI BRAIN W WO CONTRAST    Result Date: 3/4/2023  PROCEDURE: MRI BRAIN W WO CONTRAST CLINICAL INFORMATIONconfusion, difficulty with executive functioning. Confusion since surgery 2/22/2023. COMPARISON: Head CT 3/3/2023. Brain MRI 8/12/2019. TECHNIQUE: Multiplanar and multiple spin echo T1 and T2-weighted images were obtained through the brain before and after the administration of intravenous contrast. FINDINGS: There is faint high signal in the inferior medial left cerebellum on the diffusion-weighted images. This is not dark on the ADC map. There is high signal on the FLAIR and T2-weighted sequences involving the white matter and cortex. After contrast, there is enhancement of the cortex. This is consistent with late subacute infarct. There is no evidence of an acute infarct. The brain volume is normal.There are no intra-or extra-axial collections. There is no hydrocephalus, midline shift or mass effect. On the FLAIR and T2-weighted sequences, there is mild signal hyperintensity scattered in the white matter of the brain. This is most consistent with mild severity chronic small vessel ischemic changes. On the gradient echo T2-weighted images, there is no susceptibility artifact present. There are no other areas of abnormal enhancement. The major intracranial vascular flow voids are present.   The midline craniocervical junction structures are normal.  The brainstem and pituitary gland are normal.      1. Abnormal signal with cortical enhancement in the inferior medial left cerebellum. This is most consistent with a late subacute infarct. Follow-up brain MRI in 2 months is recommended to ensure the enhancement resolves. 2. Mild severity chronic small vessel ischemic changes. These findings were telephoned to Diogenes, one of the nurses on 8B at 8:26 AM on 3/4/2023. **This report has been created using voice recognition software. It may contain minor errors which are inherent in voice recognition technology. ** Final report electronically signed by Dr. Jesus Mace on 3/4/2023 8:29 AM     Assessment and Plan:        Late subacute left cerebellar infarct  Imaging  [facility] WO: Negative for acute intracranial abnormalities  CTA of head and neck revealed minimal atherosclerosis of right ICA without stenosis. No need for carotid ultrasound. MRI of brain without contrast: Late subacute infarct in the inferior medial left cerebellum  2D echocardiogram ordered. Can be completed as outpatient. Follow-up with PCP/Cardiology   Stat CT head is needed if the patient develops new-onset altered mental status, a severe headache, or new-onset neurologic deficit  Risk factors and medications  Blood pressure goal: less than 130/80. Or per primary team  Keep well hydrated. Initiate normal saline at 75 ml/hr as needed. Antithrombotics: DAPT with ASA 81/Plavix 75 mg indefinitely. If required to stop for any future procedures or dental cleanings, okay to do so. Resume when safe to do so. HgbA1C ordered. If the patient has diabetes, recommend tight control of A1c with goal of <7.0 if attainable. Lipid panel ordered. LDL goal of 45-70. Increase Lipitor to 40 mg if no history of myalgias in the past at this dose. Follow-up with PCP for dose modification as needed. Recommend repeat Hemoglobin A1c/Lipid Panel every 3 months with dose modification as applicable, per primary. Smoking and alcohol cessation when applicable.  Provide stroke eduction for individualized risk factors. EKG/telemetry to monitor for atrial fibrillation  Core stroke metrics  Dysphagia screen prior to oral intake  PT/OT/SLP consult. IPR consult if applicable. DVT prophylaxis: Lovenox  NIHSS every shift. Neuro checks per unit unless otherwise specified. Pre-morbid Modified [place] Scale: 1 - No significant disability: despite symptoms, able to carry out all usual duties and activities. Outpatient neurology follow-up with Dr. Pancho Disla in 2-4 weeks for further evaluation. Patient and family educated on the acronym BEFAST and advised to report emergently to the ED for further evaluation if experiencing any of these neurologic/strokelike symptoms. Patient and family expressed verbal understanding and agreement. Intractable headache, possibly related to increased perfusion to the brain following TAVR  Decadron 10 mg IV ordered for headache. RN reported loss of IV access, so switched to PO upon discharge. Begin Topamax 25 mg BID. May increased by increments of 25 mg (50 mg AM/25 mg PM, 50 mg AM/50 mg PM, 75 mg AM/50 mg PM....as needed until reaching 100 mg BID or therapeutic) every 3-5 days until therapeutic. Educated patient/family regarding typical side effects (numbness/tingling of fingertips/toes) vs serious side effects, including flank pain (nephrolithiasis) and significant eye pain (open angle glaucoma), which would warrant emergent discontinuation and presentation to the ED. Patient and family expressed verbal understanding and agreement. Follow up with PCP in 1 week for referral for sleep study for possible sleep apnea. Follow up with Dr. Pancho Disla in 2-4 weeks for further evaluation of headaches and dose adjustment of Topamax, if needed. Inpatient neurologic work-up completed at this time. Neurology will sign off. Follow-up with PCP for aggressive secondary stroke prevention, including aggressive risk factor modification. Outpatient neurology follow-up.   Please call with any questions or concerns. Thank you for this consult. This patient was seen and evaluated with Dr. Sophie Damon and he is in agreement with the assessment and plan.     Electronically signed by Tahir Zamorano PA-C on 3/4/23 at 3:16 PM EST

## 2023-03-04 NOTE — PLAN OF CARE
Problem: Discharge Planning  Goal: Discharge to home or other facility with appropriate resources  Outcome: Completed     Problem: Pain  Goal: Verbalizes/displays adequate comfort level or baseline comfort level  Outcome: Completed     Problem: Safety - Adult  Goal: Free from fall injury  Outcome: Completed     Problem: Neurosensory - Adult  Goal: Achieves stable or improved neurological status  Outcome: Completed     Problem: Cardiovascular - Adult  Goal: Absence of cardiac dysrhythmias or at baseline  Outcome: Completed     Problem: Skin/Tissue Integrity - Adult  Goal: Skin integrity remains intact  Outcome: Completed     Problem: Musculoskeletal - Adult  Goal: Return mobility to safest level of function  Outcome: Completed     Problem: Chronic Conditions and Co-morbidities  Goal: Patient's chronic conditions and co-morbidity symptoms are monitored and maintained or improved  Outcome: Completed   Care plan reviewed with patient. Patient verbalize understanding of the plan of care and contribute to goal setting.

## 2023-03-06 ENCOUNTER — CARE COORDINATION (OUTPATIENT)
Dept: CASE MANAGEMENT | Age: 73
End: 2023-03-06

## 2023-03-06 ENCOUNTER — TELEPHONE (OUTPATIENT)
Dept: FAMILY MEDICINE CLINIC | Age: 73
End: 2023-03-06

## 2023-03-06 DIAGNOSIS — I63.9 CEREBELLAR STROKE (HCC): Primary | ICD-10-CM

## 2023-03-06 DIAGNOSIS — I67.89 OTHER CEREBROVASCULAR DISEASE: ICD-10-CM

## 2023-03-06 NOTE — TELEPHONE ENCOUNTER
Patient's daughter, Clarita Hamman called  and would like to know if they could have an order for a echo.

## 2023-03-06 NOTE — TELEPHONE ENCOUNTER
Echo ordered due to MRI showing subacute cerebellar stroke. Please advise patient.   Susan Olivas MD

## 2023-03-06 NOTE — TELEPHONE ENCOUNTER
[facility] Transitions Initial Follow Up Call    Outreach made within 2 business days of discharge: Yes    Patient: Sebas Cabrera Patient : 1950   MRN: [id number]  Reason for Admission: There are no discharge diagnoses documented for the most recent discharge. Discharge Date: 3/4/23       Spoke with: Tom ny    Discharge department/facility: [facility] Interactive Patient Contact:  Was patient able to fill all prescriptions: Yes  Was patient instructed to bring all medications to the follow-up visit: Yes  Is patient taking all medications as directed in the discharge summary?  Yes  Does patient understand their discharge instructions: Yes  Does patient have questions or concerns that need addressed prior to 7-14 day follow up office visit: no    Scheduled appointment with PCP within 7-14 days    Follow Up  Future Appointments   Date Time Provider Elsie Foley   3/21/2023  9:00 AM Adriane [facility] Radiolog   2023 10:30 AM Mario Hu MD  Oncology P [place]   7/10/2023  7:45 AM Nunu Dior MD [address] [place] [address]

## 2023-03-07 ENCOUNTER — CARE COORDINATION (OUTPATIENT)
Dept: CASE MANAGEMENT | Age: 73
End: 2023-03-07

## 2023-03-07 NOTE — CARE COORDINATION
Care Transitions Outreach Attempt    Call within 2 business days of discharge: Yes   Attempted to reach patient for transitions of care follow up. Unable to reach patient. #1    Patient: Rhonda Foreman Patient : 1950 MRN: <[id number]>    Last Discharge  [address]       Date Complaint Diagnosis Description Type Department Provider    3/3/23 Altered Mental Status Altered mental status, unspecified altered mental status type . .. ED to Hosp-Admission (Discharged) (ADMITTED) [facility] Richard Lr PA-C; Chip Lebron. ..           Noted following upcoming appointments from discharge chart review:   [facility] follow up appointment(s):   Future Appointments   Date Time Provider Elsie Foley   3/9/2023  8:45 AM Emily Jiang MD [facility] [address] [facility] [place]   3/10/2023 11:00 AM STR ECHO RM1 [facility] ECHO [place]   3/21/2023  9:00 AM [address] RM STRZ WOMEN STR Radiolog   3/27/2023  9:00 AM Karina Dior  [address] [place] [address]   2023 10:30 AM Josiah Mcclelland MD N Oncology [facility] - [place]   7/10/2023  7:45 AM Emily Jiang MD [facility] [place]     Non-[facility] follow up appointment(s): JULIANNE Watts RN [phone]

## 2023-03-07 NOTE — CARE COORDINATION
Care Transitions Outreach Attempt    Call within 2 business days of discharge: Yes       2nd unsuccessful attempt to reach for Care Transition discharge call. HIPAA compliant message left requesting call back to [phone] . If no call back received, episode will be closed per protocol, no further outreach scheduled      Unable to Reach letter sent out via My chart      Patient: Maria T Sanchez Patient : 1950 MRN: <[id number]>    Last Discharge [address]       Date Complaint Diagnosis Description Type Department Provider    3/3/23 Altered Mental Status Altered mental status, unspecified altered mental status type . .. ED to Hosp-Admission (Discharged) (ADMITTED) [facility] Eleonora Smith PA-C; Daneil Grade. ..           Noted following upcoming appointments from discharge chart review:   [facility] follow up appointment(s):   Future Appointments   Date Time Provider Elsie Foley   3/9/2023  8:45 AM Bibi Zambrano MD [address] [facility] [place]   3/10/2023 11:00 AM STR ECHO RM1 STRZ ECHO [place]   3/21/2023  9:00 AM [address] RM STRZ WOMEN STR Radiolog   3/27/2023  9:00 AM Trinidad Jack  [address] [place] [address]   2023 10:30 AM Aries Barbour MD N Oncology [facility] - [place]   7/10/2023  7:45 AM Bibi Zambrano MD [facility] [place]     Non-BS follow up appointment(s): JULIANNE Hurst RN [phone]

## 2023-03-09 ENCOUNTER — OFFICE VISIT (OUTPATIENT)
Dept: FAMILY MEDICINE CLINIC | Age: 73
End: 2023-03-09

## 2023-03-09 VITALS
DIASTOLIC BLOOD PRESSURE: 88 MMHG | RESPIRATION RATE: 16 BRPM | TEMPERATURE: 97.6 F | WEIGHT: 189.2 LBS | SYSTOLIC BLOOD PRESSURE: 138 MMHG | OXYGEN SATURATION: 99 % | HEIGHT: 62 IN | HEART RATE: 98 BPM | BODY MASS INDEX: 34.82 KG/M2

## 2023-03-09 DIAGNOSIS — Z09 HOSPITAL DISCHARGE FOLLOW-UP: ICD-10-CM

## 2023-03-09 DIAGNOSIS — I63.9 CEREBELLAR STROKE (HCC): ICD-10-CM

## 2023-03-09 DIAGNOSIS — F39 MOOD DISORDER (HCC): ICD-10-CM

## 2023-03-09 DIAGNOSIS — G43.009 MIGRAINE WITHOUT AURA AND WITHOUT STATUS MIGRAINOSUS, NOT INTRACTABLE: ICD-10-CM

## 2023-03-09 DIAGNOSIS — R41.3 ACUTE MEMORY IMPAIRMENT: Primary | ICD-10-CM

## 2023-03-09 PROBLEM — F05 ACUTE CONFUSIONAL STATE: Status: RESOLVED | Noted: 2023-03-03 | Resolved: 2023-03-09

## 2023-03-09 PROBLEM — R41.82 ALTERED MENTAL STATUS: Status: RESOLVED | Noted: 2023-03-03 | Resolved: 2023-03-09

## 2023-03-09 RX ORDER — SERTRALINE HYDROCHLORIDE 25 MG/1
25 TABLET, FILM COATED ORAL DAILY
Qty: 30 TABLET | Refills: 0 | Status: SHIPPED | OUTPATIENT
Start: 2023-03-09

## 2023-03-09 NOTE — PROGRESS NOTES
Post-Discharge Transitional Care Follow Up      Bronwyn Cho   YOB: 1950    Date of Office Visit:  3/9/2023  Date of Hospital Admission: 3/3/23  Date of Hospital Discharge: 3/4/23  Readmission Risk Score (high >=14%. Medium >=10%):No data recorded    Care management risk score Rising risk (score 2-5) and Complex Care (Scores >=6): No Risk Score On File     Non face to face  following discharge, date last encounter closed (first attempt may have been earlier): 03/07/2023     Call initiated 2 business days of discharge: Yes     Acute memory impairment  -     [facility] [place]  Cerebellar stroke ([facility] [address]  Migraine without aura and without status migrainosus, not intractable  -     External Referral To Neurology  Mood disorder ([facility] [address]  -     sertraline (ZOLOFT) 25 MG tablet; Take 1 tablet by mouth daily, Disp-30 tablet, R-0Normal  Hospital discharge follow-up  -     MA DISCHARGE MEDS RECONCILED W/ CURRENT OUTPATIENT MED LIST    Medical Decision Making: high complexity  Return in about 2 weeks (around 3/23/2023) for migraine. Status post hospitalization for acute memory impairment possibly related to migraine/headache with subacute cerebellar stroke on imaging. Referral for cognitive therapy with speech therapy. Referral to neurologist for the migraines. Continue Topamax 25 mg twice a day. Having mood disorder so we will start Zoloft      Migraine is acute and uncontrolled: possibly related to recent TAVR. Her acute memory issues are affecting her ability to care for herself. She has family support from  and children    Subjective:   HPI    Inpatient course: Discharge summary reviewed- see chart. Interval history/Current status: Still having memory problems and headaches. Has trouble with doing tasks and memory problems. Mood is anxious and depressed. On topamax 25 mg twice daily for headaches. Decreased appetite.     Unsure if going to start cardiac rehab.    Daughter and  is present    Patient Active Problem List   Diagnosis    Hyperlipidemia    Asthma    Polyneuropathy    AS (aortic stenosis)    Hiatal hernia    Hypothyroid    Type 2 diabetes mellitus without complication, without long-term current use of insulin (HCC)    Osteoarthritis of multiple joints    Ear canal mass    Diabetic ketoacidosis without coma associated with type 2 diabetes mellitus ([facility] [address]    Malignant neoplasm of left breast in female, estrogen receptor positive ([facility] [address]    Localized osteoarthritis of left knee    Other osteoporosis without current pathological fracture    S/P repair of ventral hernia    Cervical stenosis of spinal canal    Tear of left supraspinatus tendon    Morbidly obese ([facility] [address]    Acute confusional state    Altered mental status       Medications listed as ordered at the time of discharge from hospital     Medication List            Accurate as of March 9, 2023  8:49 AM. If you have any questions, ask your nurse or doctor.                 CONTINUE taking these medications      albuterol sulfate  (90 Base) MCG/ACT inhaler  Commonly known as: Ventolin HFA  Inhale 2 puffs into the lungs 4 times daily as needed for Wheezing     amLODIPine 5 MG tablet  Commonly known as: NORVASC     anastrozole 1 MG tablet  Commonly known as: ARIMIDEX  Take 1 tablet by mouth daily     aspirin 81 MG chewable tablet     atorvastatin 40 MG tablet  Commonly known as: LIPITOR  Take 1 tablet by mouth nightly     benazepril 20 MG tablet  Commonly known as: LOTENSIN  TAKE 1 TABLET BY MOUTH 2 TIMES DAILY     Calcium Carb-Cholecalciferol 600-500 MG-UNIT Caps     clopidogrel 75 MG tablet  Commonly known as: PLAVIX  Take 1 tablet by mouth daily     Easy Touch Pen Needles 31G X 6 MM Misc  Generic drug: Insulin Pen Needle     Evening Primrose Oil 1000 MG Caps     furosemide 20 MG tablet  Commonly known as: LASIX     Handicap Placard Misc  by Does not apply route Duration:  5 years; dx: osteoarthritis     insulin lispro (1 Unit Dial) 100 UNIT/ML Sopn  Commonly known as: HUMALOG/ADMELOG     Jardiance 25 MG tablet  Generic drug: empagliflozin     Lantus SoloStar 100 UNIT/ML injection pen  Generic drug: insulin glargine     magnesium 250 MG Tabs tablet  Commonly known as: MAGNESIUM-OXIDE     omeprazole 20 MG tablet  Commonly known as: PRILOSEC OTC     therapeutic multivitamin-minerals tablet     topiramate 25 MG tablet  Commonly known as: TOPAMAX  Take 1 tablet by mouth 2 times daily               Medications marked \"taking\" at this time  Outpatient Medications Marked as Taking for the 3/9/23 encounter (Office Visit) with Yash Nelson MD   Medication Sig Dispense Refill    clopidogrel (PLAVIX) 75 MG tablet Take 1 tablet by mouth daily 30 tablet 3    topiramate (TOPAMAX) 25 MG tablet Take 1 tablet by mouth 2 times daily 60 tablet 3    atorvastatin (LIPITOR) 40 MG tablet Take 1 tablet by mouth nightly 90 tablet 1    amLODIPine (NORVASC) 5 MG tablet take 1 tablet by mouth once daily      omeprazole (PRILOSEC OTC) 20 MG tablet Take 20 mg by mouth every other day      Evening Primrose Oil 1000 MG CAPS       magnesium (MAGNESIUM-OXIDE) 250 MG TABS tablet Take 500 mg by mouth daily      anastrozole (ARIMIDEX) 1 MG tablet Take 1 tablet by mouth daily 90 tablet 3    benazepril (LOTENSIN) 20 MG tablet TAKE 1 TABLET BY MOUTH 2 TIMES DAILY 180 tablet 1    Handicap Placard MISC by Does not apply route Duration:  5 years; dx:  osteoarthritis 1 each 0    empagliflozin (JARDIANCE) 25 MG tablet Take 25 mg by mouth daily      insulin lispro, 1 Unit Dial, 100 UNIT/ML SOPN Inject into the skin 3 times daily Indications: inject 0-12 unitsinto the skin 3 times daily-per sliding scale      EASY TOUCH PEN NEEDLES 31G X 6 MM MISC       LANTUS SOLOSTAR 100 UNIT/ML injection pen Inject 39 Units into the skin nightly       Calcium Carb-Cholecalciferol 600-500 MG-UNIT CAPS Take by mouth daily      aspirin 81 MG chewable  tablet Take 81 mg by mouth daily      therapeutic multivitamin-minerals (THERAGRAN-M) tablet Take 1 tablet by mouth daily. Medications patient taking as of now reconciled against medications ordered at time of hospital discharge: Yes    Review of Systems   Constitutional:  Positive for appetite change. Negative for chills and fever. Neurological:  Positive for headaches. Negative for syncope. Psychiatric/Behavioral:  Positive for confusion and decreased concentration. The patient is nervous/anxious. Objective:    /88   Pulse 98   Temp 97.6 °F (36.4 °C)   Resp 16   Ht 5' 2\" (1.575 m)   Wt 189 lb 3.2 oz (85.8 kg)   SpO2 99%   BMI 34.61 kg/m²   Physical Exam  Vitals reviewed. Constitutional:       Appearance: She is not ill-appearing. Cardiovascular:      Rate and Rhythm: Normal rate and regular rhythm. Pulmonary:      Effort: Pulmonary effort is normal. No respiratory distress. Breath sounds: Normal breath sounds. No wheezing or rhonchi. Neurological:      Mental Status: She is alert. Psychiatric:         Mood and Affect: Mood is anxious and depressed. Speech: Speech is delayed. Cognition and Memory: She exhibits impaired recent memory. Comments: Some word finding issues and pronunciation issues with words       An electronic signature was used to authenticate this note.   --Leelee Carl MD

## 2023-03-10 ENCOUNTER — HOSPITAL ENCOUNTER (OUTPATIENT)
Dept: NON INVASIVE DIAGNOSTICS | Age: 73
Discharge: HOME | End: 2023-03-10
Payer: MEDICARE

## 2023-03-10 DIAGNOSIS — I67.89 OTHER CEREBROVASCULAR DISEASE: ICD-10-CM

## 2023-03-10 DIAGNOSIS — I63.9 CEREBELLAR STROKE (HCC): ICD-10-CM

## 2023-03-10 LAB
LV EF: 60 %
LVEF MODALITY: NORMAL

## 2023-03-10 PROCEDURE — 93306 TTE W/DOPPLER COMPLETE: CPT

## 2023-03-14 ENCOUNTER — TELEPHONE (OUTPATIENT)
Dept: FAMILY MEDICINE CLINIC | Age: 73
End: 2023-03-14

## 2023-03-14 DIAGNOSIS — G43.009 MIGRAINE WITHOUT AURA AND WITHOUT STATUS MIGRAINOSUS, NOT INTRACTABLE: Primary | ICD-10-CM

## 2023-03-14 NOTE — TELEPHONE ENCOUNTER
I called patient to let her know Dr. Linda Munoz is not accepting patients at this time, patient said that she will look around for a neurologist and let us know where to send the referral.

## 2023-03-14 NOTE — TELEPHONE ENCOUNTER
Patient called and said she would like her referral for neurology sent to Dr. PATTERSON [facility] in [facility].  Please advise, thank you

## 2023-03-15 ENCOUNTER — HOSPITAL ENCOUNTER (OUTPATIENT)
Dept: SPEECH THERAPY | Age: 73
Setting detail: THERAPIES SERIES
Discharge: HOME | End: 2023-03-15
Payer: MEDICARE

## 2023-03-15 PROCEDURE — 92523 SPEECH SOUND LANG COMPREHEN: CPT

## 2023-03-15 NOTE — PROGRESS NOTES
** PLEASE SIGN, DATE AND TIME CERTIFICATION BELOW AND RETURN TO [facility] OUTPATIENT REHABILITATION (FAX #: [phone]). ATTEST/CO-SIGN IF ACCESSING VIA [employer]. THANK YOU.**    I certify that I have examined the patient below and determined that Physical Medicine and Rehabilitation service is necessary and that I approve the established plan of care for up to 90 days or as specifically noted. Attestation, signature or co-signature of physician indicates approval of certification requirements.    ________________________ ____________ __________  Physician Signature   Date   Time   [facility] THERAPY  [x] SPEECH-LANGUAGE COGNITIVE EVALUATION  [] DAILY NOTE   [] PROGRESS NOTE [] DISCHARGE NOTE    [x] [facility]   [] [facility]    [] [facility]   [] [facility]    Date: 3/15/2023  Patient Name:  Purnima Matthew  : 1950  MRN: [id number]  CSN: [id number]    Referring Practitioner Galindo Mclaughlin MD   Diagnosis Acute memory impairment [R41.3]    Treatment Diagnosis Cognitive   Date of Evaluation 3/15/23      Functional Outcome Measure Used [facility] Memory   Functional Outcome Score 4 (3/15/23)       Insurance: Primary: Payor: Sean Zee /  /  / ,   Secondary: Keren Stokes   Authorization Information: No precert required    Visit # 1, 1/10 for progress note   Visits Allowed: Unlimited visits based on medical necessity    Recertification Date: [id number]   Physician Follow-Up: 3/28/23 f/u with Dr. Danuta Leal   Physician Orders: Sima Staff and treat   Pertinent History: Patient reports she had a TAVR completed on 23 at [facility]. Following this procedure, patient reports she had a horrible headache that was not relieved by Tylenol. She was discharged a day later. She noticed after this procedure she had difficulty preforming certain tasks such as texting on her phone and writing a check.  Noted confusion often with ADL tasks. She went to Dr. Gabriel Greenfield office (PCP) one week after her TAVR due to having these confusion events. He sent her to [facility]. During hospital stay, patient was identified to have a previous cerebellar stroke, but no focal neurological deficits. Since discharge from the hospital, patient reports she is able to operate her phone, the computer and do online banking. Does note difficulty with spelling and math computation. Patient is very concerned as she is having severe headaches and migraines often. She is unable to identify any triggers to these headaches. She does have a history of migraines in the past.     Throughout patient's provision of history, she demonstrated poor thought organization and sequencing. Often required cues to allow for ST to comprehend timeline of events. SUBJECTIVE: Patient arrived to evaluation with . Very pleasant and cooperative throughout. Social/Functional History:  Electronic Medical Record reviewed and up to date. Not taking Lasix anymore. Tatyana Richard lives with spouse in a single story home with stairs and no handrail to enter. She did buy one but needs to install. Task Prior Level of Function Current Level of Function   ADLs  Independent Independent   Finance Management Independent Independent. She is teaching  how to help with these as she is having trouble with the math computation. Medication Management Independent Modified Independent. Uses a pill organizer and  assists. Educational Level 12th grade  12th grade    Driving Active  Drives with self-imposed restrictions   Work Retired Retired   [employer] work, painting  Gets tired easily and feels this is her barrier to completing these hobbies at home   Vision Status Corrected Corrected   Hearing Status WNL WNL   Diet Regular diet with thin liquids  Regular diet with thin liquids      Pain:  No pain reported.     ORAL MOTOR:  Oral-Motor Assessment not completed    SPEECH / VOICE:  Speech and Voice appear to be grossly intact for basic and complex daily communication    LANGUAGE:  Receptive:  Receptive language skills appear to be grossly intact for basic and complex daily communication. Expressive:  Expressive language skills appear to be grossly intact for basic and complex daily communication. COGNITION:  Ahmet Perkins completed the Neurobehavioral Cognitive Status Examination (COGNISTAT) with the following results:    Level of Consciousness:  Alert  Orientation:  - Typical Functioning  Attention:   - Mild/Moderate Impairment  Language:  Comprehension:  - Mild Impairment  Repetition:  - Typical Functioning  Namin/8 - Typical Functioning  Constructions:  - Typical Functioning  Memory:  - Mild Impairment  Calculations:  - Typical Functioning  Reasoning:  Similarities:  - Typical Functioning  Judgment:  - Typical Functioning    IMPRESSIONS: Patient presents with a mild cognitive impairment as evidenced by deficits within immediate/delayed recall, working memory, attention, and following 2-3 step directions as based on score on the Cognistat this date. During evaluation, patient tearful when completing the comprehension subtest which involved following 2-3 step directions. She demonstrated good mental arithmetic; however, reports this took much longer than it would have in the past. Patient reports a very independent lifestyle prior to onset of cognitive changes and has not been formally told to refrain from driving. Therefore, recommend patient receive skilled speech therapy to address the aforementioned cognitive deficits to promote safety in completion of ADLs/iADLs and driving.     Areas for Improvement: Impaired cognition  Assessment: Progressing towards goals   Specific Interventions Next Treatment: immediate/delayed recall tasks, WRAP strategies, attention tasks, working memory tasks, following 2-3 step directions and executive function tasks     Activity/Treatment Tolerance:  [x]  Patient tolerated treatment well; headaches []  Patient limited by fatigue  []  Patient limited by pain   []  Patient limited by other medical complications  []  Other:     Patient Education:   [x]  HEP/Education Completed: Plan of Care, Goals, Low stimulation guidelines to reduce headaches  []  No new Education completed  [x]  Reviewed Prior HEP      [x]  Patient verbalized and/or demonstrated understanding of education provided. []  Patient unable to verbalize and/or demonstrate understanding of education provided. Will continue education. []  Barriers to learning:     GOALS:  Patient Goal: Get rid of the migraines/headaches    Short Term Goals:  Time Frame for Short-Term Goals: 4 weeks    SHORT TERM GOAL #1: Patient will complete immediate/delayed recall tasks of up to 4 units of information given a 5 minute delay with 80% accuracy given min cues to improve retention of novel information. INTERVENTIONS: to be completed in subsequent sessions    SHORT TERM GOAL #2: Patient will complete working memory tasks with 80% accuracy given mod cues to improve mental manipulation skills for successful recall of daily information and conversations. INTERVENTIONS: to be completed in subsequent sessions    SHORT TERM GOAL #3: Patient will complete attention tasks (sustained/selective/divided/alternating) with no more than 3 errors in a 5 minute time span to improve focus and promote safe return to driving and completion of ADLs/iADLs. INTERVENTIONS: to be completed in subsequent sessions    SHORT TERM GOAL #4: Patient will complete 2-3 step verbal directions with 70% accuracy given min cues to improve language comprehension and mental manipulation skills to improve recall of information shared in conversations.    INTERVENTIONS: to be completed in subsequent sessions    SHORT TERM GOAL #5: Patient will complete moderately-complex executive function tasks (finances/online banking, medications, scheduling) with 70% accuracy given min cues to promote safe/successful participation at home. INTERVENTIONS: to be completed in subsequent sessions    Long Term Goals:  Time Frame for Long-Term Goals: 8 weeks    LONG-TERM GOAL #1: Patient will improve NENA NOMs Attention (FOM) score by 1 level (level 5) to promote use of internal/external memory aids to recall novel information. INTERVENTIONS: to be completed in subsequent sessions    PLAN:  Treatment Recommendations:     [x]  Plan of care initiated. Plan to see patient 2 times per week for 8 weeks to address the treatment planned outlined above.   []  Continue with current plan of care  []  Modify plan of care as follows:    []  Hold pending physician visit  []  Discharge    Time In 1505   Time Out 1615   Timed Code Minutes: 0 min   Total Treatment Time: 79 min     [address] [facility] [facility] [address]

## 2023-03-15 NOTE — TELEPHONE ENCOUNTER
I am unable to find a Dr. [place] Clair in [facility]. I did find a Dr. Cyndy Gray and placed a referral for Dr. Cyndy Gray, neurology [facility].   Please send referral and advise patient

## 2023-03-21 ENCOUNTER — HOSPITAL ENCOUNTER (OUTPATIENT)
Dept: WOMENS IMAGING | Age: 73
Discharge: HOME | End: 2023-03-21
Payer: MEDICARE

## 2023-03-21 DIAGNOSIS — Z85.3 HISTORY OF RIGHT BREAST CANCER: ICD-10-CM

## 2023-03-21 DIAGNOSIS — Z79.811 PROPHYLACTIC USE OF ANASTROZOLE (ARIMIDEX): ICD-10-CM

## 2023-03-21 PROCEDURE — 77080 DXA BONE DENSITY AXIAL: CPT

## 2023-03-22 ENCOUNTER — HOSPITAL ENCOUNTER (OUTPATIENT)
Dept: SPEECH THERAPY | Age: 73
Setting detail: THERAPIES SERIES
Discharge: HOME | End: 2023-03-22
Payer: MEDICARE

## 2023-03-22 PROCEDURE — 97129 THER IVNTJ 1ST 15 MIN: CPT

## 2023-03-22 PROCEDURE — 97130 THER IVNTJ EA ADDL 15 MIN: CPT

## 2023-03-22 NOTE — PROGRESS NOTES
tasks, working memory tasks, following 2-3 step directions and executive function tasks     Activity/Treatment Tolerance:  [x]  Patient tolerated treatment well; headaches []  Patient limited by fatigue  []  Patient limited by pain   []  Patient limited by other medical complications  []  Other:     Patient Education:   [x]  HEP/Education Completed: Plan of Care, Goals, WRAP compensatory memory strategies  []  No new Education completed  [x]  Reviewed Prior HEP      [x]  Patient verbalized and/or demonstrated understanding of education provided. []  Patient unable to verbalize and/or demonstrate understanding of education provided. Will continue education. []  Barriers to learning:     PLAN:  Treatment Recommendations:     []  Plan of care initiated. Plan to see patient 2 times per week for 8 weeks to address the treatment planned outlined above.   [x]  Continue with current plan of care  []  Modify plan of care as follows:    []  Hold pending physician visit  []  Discharge    Time In 1400   Time Out 1447   Timed Code Minutes: 47 min   Total Treatment Time: 52 min     [address] [facility] [facility], .[address] [place] [address]

## 2023-03-23 ENCOUNTER — TELEPHONE (OUTPATIENT)
Dept: ONCOLOGY | Age: 73
End: 2023-03-23

## 2023-03-24 ENCOUNTER — HOSPITAL ENCOUNTER (OUTPATIENT)
Dept: SPEECH THERAPY | Age: 73
Setting detail: THERAPIES SERIES
Discharge: HOME | End: 2023-03-24
Payer: MEDICARE

## 2023-03-24 PROCEDURE — 97129 THER IVNTJ 1ST 15 MIN: CPT | Performed by: SPEECH-LANGUAGE PATHOLOGIST

## 2023-03-24 PROCEDURE — 97130 THER IVNTJ EA ADDL 15 MIN: CPT | Performed by: SPEECH-LANGUAGE PATHOLOGIST

## 2023-03-24 NOTE — PROGRESS NOTES
difficulty at this time. Long Term Goals:  Time Frame for Long-Term Goals: 8 weeks    LONG-TERM GOAL #1: Patient will improve NENA NOMs Attention (FOM) score by 1 level (level 5) to promote use of internal/external memory aids to recall novel information. - ONGOING    Assessment: Progressing towards goals   Specific Interventions Next Treatment: immediate/delayed recall tasks, WRAP strategies, attention tasks, working memory tasks, following 2-3 step directions and executive function tasks     Activity/Treatment Tolerance:  [x]  Patient tolerated treatment well; headaches []  Patient limited by fatigue  []  Patient limited by pain   []  Patient limited by other medical complications  []  Other:     Patient Education:   [x]  HEP/Education Completed: Plan of Care, Goals, WRAP compensatory memory strategies, progress  []  No new Education completed  [x]  Reviewed Prior HEP      [x]  Patient verbalized and/or demonstrated understanding of education provided. []  Patient unable to verbalize and/or demonstrate understanding of education provided. Will continue education. []  Barriers to learning:     PLAN:  Treatment Recommendations:     []  Plan of care initiated. Plan to see patient 2 times per week for 8 weeks to address the treatment planned outlined above.   [x]  Continue with current plan of care  []  Modify plan of care as follows:    []  Hold pending physician visit  []  Discharge    Time In 0930   Time Out 1015   Timed Code Minutes: 45 min   Total Treatment Time: 39 min       Seb Thorpe M.S. [facility] 7382

## 2023-03-28 ENCOUNTER — OFFICE VISIT (OUTPATIENT)
Dept: FAMILY MEDICINE CLINIC | Age: 73
End: 2023-03-28

## 2023-03-28 ENCOUNTER — HOSPITAL ENCOUNTER (OUTPATIENT)
Dept: CARDIAC REHAB | Age: 73
Setting detail: THERAPIES SERIES
Discharge: HOME | End: 2023-03-28
Payer: MEDICARE

## 2023-03-28 VITALS
HEART RATE: 79 BPM | SYSTOLIC BLOOD PRESSURE: 120 MMHG | WEIGHT: 187.6 LBS | HEIGHT: 62 IN | OXYGEN SATURATION: 97 % | BODY MASS INDEX: 34.52 KG/M2 | RESPIRATION RATE: 16 BRPM | TEMPERATURE: 97.9 F | DIASTOLIC BLOOD PRESSURE: 64 MMHG

## 2023-03-28 VITALS — BODY MASS INDEX: 34.85 KG/M2 | HEIGHT: 62 IN | WEIGHT: 189.4 LBS

## 2023-03-28 DIAGNOSIS — R41.3 ACUTE MEMORY IMPAIRMENT: ICD-10-CM

## 2023-03-28 DIAGNOSIS — G43.009 MIGRAINE WITHOUT AURA AND WITHOUT STATUS MIGRAINOSUS, NOT INTRACTABLE: Primary | ICD-10-CM

## 2023-03-28 DIAGNOSIS — Z86.73 HISTORY OF STROKE: ICD-10-CM

## 2023-03-28 PROCEDURE — G0422 INTENS CARDIAC REHAB W/EXERC: HCPCS

## 2023-03-28 PROCEDURE — G0423 INTENS CARDIAC REHAB NO EXER: HCPCS

## 2023-03-28 RX ORDER — LAMOTRIGINE 25 MG/1
25 TABLET ORAL DAILY
Qty: 30 TABLET | Refills: 0 | Status: SHIPPED | OUTPATIENT
Start: 2023-03-28

## 2023-03-28 ASSESSMENT — PATIENT HEALTH QUESTIONNAIRE - PHQ9
1. LITTLE INTEREST OR PLEASURE IN DOING THINGS: 0
SUM OF ALL RESPONSES TO PHQ QUESTIONS 1-9: 6
10. IF YOU CHECKED OFF ANY PROBLEMS, HOW DIFFICULT HAVE THESE PROBLEMS MADE IT FOR YOU TO DO YOUR WORK, TAKE CARE OF THINGS AT HOME, OR GET ALONG WITH OTHER PEOPLE: 1
8. MOVING OR SPEAKING SO SLOWLY THAT OTHER PEOPLE COULD HAVE NOTICED. OR THE OPPOSITE, BEING SO FIGETY OR RESTLESS THAT YOU HAVE BEEN MOVING AROUND A LOT MORE THAN USUAL: 0
6. FEELING BAD ABOUT YOURSELF - OR THAT YOU ARE A FAILURE OR HAVE LET YOURSELF OR YOUR FAMILY DOWN: 0
5. POOR APPETITE OR OVEREATING: 3
SUM OF ALL RESPONSES TO PHQ QUESTIONS 1-9: 6
SUM OF ALL RESPONSES TO PHQ9 QUESTIONS 1 & 2: 0
2. FEELING DOWN, DEPRESSED OR HOPELESS: 0
3. TROUBLE FALLING OR STAYING ASLEEP: 0
9. THOUGHTS THAT YOU WOULD BE BETTER OFF DEAD, OR OF HURTING YOURSELF: 0
4. FEELING TIRED OR HAVING LITTLE ENERGY: 3
7. TROUBLE CONCENTRATING ON THINGS, SUCH AS READING THE NEWSPAPER OR WATCHING TELEVISION: 0

## 2023-03-28 NOTE — PROGRESS NOTES
[facility]  [facility]  [address] [facility]  Dept: [phone]  Dept Fax: [phone]  Loc: [phone]  PROGRESS NOTE      Visit Date: 3/28/2023    Liv Salas is a 74 y.o. female who presents today for:  Chief Complaint   Patient presents with    Migraine     Migraines daily       Subjective:  HPI    2.5-week follow-up    Migraine/headaches: On Topamax 25 mg twice daily. Having decreased appetite with topamax and metallic taste. She is in speech therapy for cognitive impairment. Memory has improved. She tried ubrelvy 50 mg which NP Ivette Daily gave her. Bright lights worsen her headaches. Headaches are daily. Mood disorder: was started on Zoloft 25 mg daily but she never started it. Mood is better    Plan from appointment on 3/9:  \"acute memory impairment possibly related to migraine/headache with subacute cerebellar stroke on imaging. Referral for cognitive therapy with speech therapy. Referral to neurologist for the migraines. Continue Topamax 25 mg twice a day.   Having mood disorder so we will start Zoloft\"    Review of Systems  Patient Active Problem List   Diagnosis    Hyperlipidemia    Asthma    Polyneuropathy    AS (aortic stenosis)    Hiatal hernia    Hypothyroid    Type 2 diabetes mellitus without complication, without long-term current use of insulin (HCC)    Osteoarthritis of multiple joints    Ear canal mass    Diabetic ketoacidosis without coma associated with type 2 diabetes mellitus (HCC)    Malignant neoplasm of left breast in female, estrogen receptor positive (HCC)    Localized osteoarthritis of left knee    Other osteoporosis without current pathological fracture    S/P repair of ventral hernia    Cervical stenosis of spinal canal    Tear of left supraspinatus tendon    Morbidly obese (HCC)     Past Medical History:   Diagnosis Date    Arthritis     Asthma     Brain cyst     benign    Breast cancer

## 2023-03-28 NOTE — PLAN OF CARE
Completed Videos/Date Completed Videos/Date Recommended Educational Videos Completed    [] Yes      [] No    **If not completed, Why? Smoking Cessation/Relaspe Prevention Intervention needed? [] Yes      [x] No  *Pt verbalizes and agrees to attend intervention Smoking Cessation/Relapse Prevention Scheduled? NA Smoking Cessation/Relapse Prevention completed? NA Smoking Cessation/Relapse Prevention completed? NA Smoking Cessation/Relapse Prevention completed? NA Smoking Cessation Counseling attended  NA   Professional Referrals:  *Please check if needed  [] Diabetes Clinic  [] Lipid Clinic   [] Other:      Professional Referrals:  *Please check if needed  [] Diabetes Clinic  [] Lipid Clinic   [] Other:   Preventative Medication Preventative Medication Preventative Medication Preventative Medication Preventative Medication Preventative Medication   Aspirin  [x] Yes    [] No  Blood Thinner: Clopidogrel/Effient/Brillinta  [x] Yes    [] No  Beta Blocker  [] Yes    [x] No  Ace Inhibitor  [x] Yes    [] No  Statin/Lipid Lowering  [x] Yes    [] No Medication Changes? [] Yes    [] No Medication Changes? [] Yes    [] No Medication Changes? [] Yes    [] No Medication Changes? [] Yes    [] No Medication Changes? [] Yes    [] No   *Education* *Education* *Education* *Education* *Education* *Education*   Does Lin Covert require any additional education? [] Yes    [x] No   Does Lin Covert require any additional education? [] Yes    [] No Does Lin Covert require any additional education? [] Yes    [] No Does Lin Covert require any additional education? [] Yes    [] No Does Lin Covert require any additional education? [] Yes    [] No Does Lin Covert require any additional education?   [] Yes    [] No   Additional Educational Videos    Exercise  [] Improve Performance    Medical  [] Aging Enhancing Your QoL  [] Biology of Weight Control  [] Decoding Lab Results  [] Diseases of Our Time - Overview  [] Sleep

## 2023-03-28 NOTE — PROGRESS NOTES
Video Education Report - ICR/CR  Name:  Andrea Hatch     Date:  3/28/2023  MRN: [id number]     Session #:  1  Session Length: 45 min    Core Videos        []Heart Disease Risk Reduction       [x]Overview of Pritikin Eating Plan      []Move it          []Calorie Density         []Healthy Minds, Bodies, Hearts        []Label Reading - Part 1       []Metabolic Syndrome and Belly Fat        []How Our Thoughts Can Heal Our Hearts   []Dining Out - Part 1      []Biomechanical Limitations  []Facts on Fat        []Hypertension & Heart Disease    []Diseases of Our Time - Focusing on Diabetes   []Body Composition  []Nutrition Action Plan    []Exercise Action Plan        Comments:  Video and program orientation completed.

## 2023-03-29 ENCOUNTER — HOSPITAL ENCOUNTER (OUTPATIENT)
Dept: SPEECH THERAPY | Age: 73
Setting detail: THERAPIES SERIES
Discharge: HOME | End: 2023-03-29
Payer: MEDICARE

## 2023-03-29 PROCEDURE — 97130 THER IVNTJ EA ADDL 15 MIN: CPT

## 2023-03-29 PROCEDURE — 97129 THER IVNTJ 1ST 15 MIN: CPT

## 2023-03-29 NOTE — PROGRESS NOTES
migraines in the past.     Throughout patient's provision of history, she demonstrated poor thought organization and sequencing. Often required cues to allow for ST to comprehend timeline of events. SUBJECTIVE: Patient reports she brought questions with her to her visit with Dr. Tu Flores yesterday. Patient provided ST with a document that outlined questions in paragraph form regarding details to her medical care as well as questions. She reports she worked on this document 3 different times to avoid overuse of screen time as this can affect her migraines. Overall, this document was very detailed and appropriate. Patient even commented that Dr. Tu Flores made a copy to keep as he thought this was very helpful. Reports she did have a migraine with an aura this weekend. Additionally, patient reports she did call the neurology department with Dr. Cecy Kruse, but was not able to get her appointment in any earlier than July. She has been placed on their cancellation list.     Short Term Goals:  Time Frame for Short-Term Goals: 4 weeks    SHORT TERM GOAL #1: Patient will complete immediate/delayed recall tasks of up to 4 units of information given a 5 minute delay with 80% accuracy given min cues to improve retention of novel information. INTERVENTIONS: At the start of the session, patient able to recall 4/4 WRAP compensatory memory strategies. Excellent success with use of the write it down strategy for daily tasks within home and community. She was also able to recall the 4/4 errands provided for patient to recall in previous ST session without difficulty. Functional Recall: Patient was provided with 4 details regarding her next appointment (Cardiac rehab, Mon. 4/3, 9-11 am, bring water bottle)  - Immediate: 4/4 independent  - 10 minute delay: 4/4 independent   **Great success!     SHORT TERM GOAL #2: Patient will complete working memory tasks with 80% accuracy given mod cues to improve mental manipulation skills

## 2023-04-01 LAB
EKG ATRIAL RATE: 66 BPM
EKG P AXIS: 19 DEGREES
EKG P-R INTERVAL: 202 MS
EKG Q-T INTERVAL: 390 MS
EKG QRS DURATION: 102 MS
EKG QTC CALCULATION (BAZETT): 408 MS
EKG R AXIS: 18 DEGREES
EKG T AXIS: 21 DEGREES
EKG VENTRICULAR RATE: 66 BPM

## 2023-04-03 ENCOUNTER — HOSPITAL ENCOUNTER (OUTPATIENT)
Dept: CARDIAC REHAB | Age: 73
Setting detail: THERAPIES SERIES
Discharge: HOME | End: 2023-04-03
Payer: MEDICARE

## 2023-04-03 PROCEDURE — G0422 INTENS CARDIAC REHAB W/EXERC: HCPCS

## 2023-04-03 PROCEDURE — G0423 INTENS CARDIAC REHAB NO EXER: HCPCS

## 2023-04-03 NOTE — PROGRESS NOTES
Maria Isabel VALENCIA.:  1950    MRN:  [id number]    Date: 4/3/2023      Session Length:  50 min   Session # _______    EXERCISE WORKSHOP:  Heart Disease Risk Reduction                        Todays class reviewed the anatomy and physiology of the heart. Additionally, we reviewed how the three Pritikin pillars impact risk factors, the progression, and the management of heart disease. Readiness to change:    ( ) Pre-contemplative   ( ) Contemplative - ambivalent about change    ( x) Action - ready to set action plan and implement   ( ) Maintenance - has made change and is trying, and or practicing different alternative behaviors     Additional Notes:      Richard Grimes was in the Workshop with the Exercise Physiologist for 50 minutes. The content was presented via Powerpoint, lecture, and patient participation based format. Motivational interviewing was utilized when needed, to promote change. Patient voiced understanding.     Electronically signed by VIRA Umaña on 4/3/2023 at 11:08 AM

## 2023-04-04 ENCOUNTER — HOSPITAL ENCOUNTER (OUTPATIENT)
Dept: SPEECH THERAPY | Age: 73
Setting detail: THERAPIES SERIES
Discharge: HOME | End: 2023-04-04
Payer: MEDICARE

## 2023-04-04 PROCEDURE — 97130 THER IVNTJ EA ADDL 15 MIN: CPT | Performed by: SPEECH-LANGUAGE PATHOLOGIST

## 2023-04-04 PROCEDURE — 97129 THER IVNTJ 1ST 15 MIN: CPT | Performed by: SPEECH-LANGUAGE PATHOLOGIST

## 2023-04-04 NOTE — PROGRESS NOTES
[address] [facility] THERAPY  [] SPEECH-LANGUAGE COGNITIVE EVALUATION  [x] DAILY NOTE   [] PROGRESS NOTE [] DISCHARGE NOTE    [x] [facility] [place]   [] [place] [address]    [] [facility]   [] Leticia Barry    Date: 2023  Patient Name:  Cristopher Jarrett  : 1950  MRN: [id number]  CSN: [id number]    Referring Practitioner Darien Neely MD   Diagnosis Acute memory impairment [R41.3]    Treatment Diagnosis Cognitive   Date of Evaluation 3/15/23      Functional Outcome Measure Used [facility] Memory   Functional Outcome Score 4 (3/15/23)       Insurance: Primary: Payor: Fabiola Casanova /  /  / ,   Secondary: Arnaldo Peoples Information: No precert required    Visit # 5, 5/10 for progress note   Visits Allowed: Unlimited visits based on medical necessity    Recertification Date:    Physician Follow-Up: 23 f/u with Dr. Krista Greene   Physician Orders: Stephanie Mondragon and treat   Pertinent History: Patient reports she had a TAVR completed on 23 at [address] [place] [address]. Following this procedure, patient reports she had a horrible headache that was not relieved by Tylenol. She was discharged a day later. She noticed after this procedure she had difficulty preforming certain tasks such as texting on her phone and writing a check. Noted confusion often with ADL tasks. She went to Dr. Iraida Peters office (PCP) one week after her TAVR due to having these confusion events. He sent her to [facility]. During hospital stay, patient was identified to have a previous cerebellar stroke, but no focal neurological deficits. Since discharge from the hospital, patient reports she is able to operate her phone, the computer and do online banking. Does note difficulty with spelling and math computation. Patient is very concerned as she is having severe headaches and migraines often. She is unable to identify any triggers to these headaches.  She does have a history of

## 2023-04-05 ENCOUNTER — HOSPITAL ENCOUNTER (OUTPATIENT)
Dept: CARDIAC REHAB | Age: 73
Setting detail: THERAPIES SERIES
Discharge: HOME | End: 2023-04-05
Payer: MEDICARE

## 2023-04-05 PROCEDURE — G0423 INTENS CARDIAC REHAB NO EXER: HCPCS

## 2023-04-05 PROCEDURE — G0422 INTENS CARDIAC REHAB W/EXERC: HCPCS

## 2023-04-05 NOTE — PROGRESS NOTES
Video Education Report - ICR/CR  Name:  Tuyet Hinojosa     Date:  4/5/2023  MRN: [id number]     Session #:    Session Length: 40 min    Core Videos        [x]Heart Disease Risk Reduction       []Overview of Pritikin Eating Plan      []Move it          []Calorie Density         []Healthy Minds, Bodies, Hearts        []Label Reading - Part 1       []Metabolic Syndrome and Belly Fat        []How Our Thoughts Can Heal Our Hearts   []Dining Out - Part 1      []Biomechanical Limitations  []Facts on Fat        []Hypertension & Heart Disease    []Diseases of Our Time - Focusing on Diabetes   []Body Composition  []Nutrition Action Plan    []Exercise Action Plan    Comments:  Video completed, group discussion

## 2023-04-07 ENCOUNTER — HOSPITAL ENCOUNTER (OUTPATIENT)
Dept: CARDIAC REHAB | Age: 73
Setting detail: THERAPIES SERIES
Discharge: HOME | End: 2023-04-07
Payer: MEDICARE

## 2023-04-07 ENCOUNTER — HOSPITAL ENCOUNTER (OUTPATIENT)
Dept: SPEECH THERAPY | Age: 73
Setting detail: THERAPIES SERIES
Discharge: HOME | End: 2023-04-07
Payer: MEDICARE

## 2023-04-07 PROCEDURE — G0422 INTENS CARDIAC REHAB W/EXERC: HCPCS

## 2023-04-07 PROCEDURE — 97130 THER IVNTJ EA ADDL 15 MIN: CPT | Performed by: SPEECH-LANGUAGE PATHOLOGIST

## 2023-04-07 PROCEDURE — G0423 INTENS CARDIAC REHAB NO EXER: HCPCS

## 2023-04-07 PROCEDURE — 97129 THER IVNTJ 1ST 15 MIN: CPT | Performed by: SPEECH-LANGUAGE PATHOLOGIST

## 2023-04-07 NOTE — PROGRESS NOTES
Carmen VALENCIA.:  1950  Acct Number: [de-identified]  MRN:  [id number]                  [facility] COOKING SCHOOL WORKSHOP             Date: 2023        Session # ________   Kyra Perkins class covered:    [facility]    [] Adding Flavor - Sodium-Free  [] Fast & Healthy Breakfasts    [] Powerhouse Plant-Based Proteins [x] Satisfying Salads and Dressings    [] Simple Sides & Sauces   [] Personalizing Your Plate    [] Delicious Desserts    [] Savory Soups    [] Efficiency Cooking - Meals in a Snap [] Tasty Appetizers & Snacks     [] Comforting Weekend Breakfasts  [] One-Pot Wonders    [] Fast Evening Meals   [] Easy Entertaining    []  International Cuisine Spotlight on the Blue Zone          Patients were shown how to choose, prep, and cook; substitutions and other options were given. Samples were offered. Recipes were given and questions answered. The patient above was in the [employer] for 40 minutes.       Electronically signed by Jyoti Cassidy RD on 2023 at 10:37 AM

## 2023-04-07 NOTE — PROGRESS NOTES
[address] [facility]  [] SPEECH-LANGUAGE COGNITIVE EVALUATION  [x] DAILY NOTE   [] PROGRESS NOTE [] DISCHARGE NOTE    [x] OUTPATIENT REHABILITATION CENTER [facility]   [] [place] [address]    [] [facility]   [] Anil Diaswer    Date: 2023  Patient Name:  Yuliana Cuevas  : 1950  MRN: [id number]  CSN: [id number]    Referring Practitioner Hunter Leonardo MD   Diagnosis Acute memory impairment [R41.3]    Treatment Diagnosis Cognitive   Date of Evaluation 3/15/23      Functional Outcome Measure Used [facility] Memory   Functional Outcome Score 4 (3/15/23)       Insurance: Primary: Payor: Kathryn Sky /  /  / ,   Secondary: Agustín Mazariegos Information: No precert required    Visit # 6, 6/10 for progress note   Visits Allowed: Unlimited visits based on medical necessity    Recertification Date:    Physician Follow-Up: 23 f/u with Dr. Sanaz Landon   Physician Orders: Deloras Shelling and treat   Pertinent History: Patient reports she had a TAVR completed on 23 at [facility]. Following this procedure, patient reports she had a horrible headache that was not relieved by Tylenol. She was discharged a day later. She noticed after this procedure she had difficulty preforming certain tasks such as texting on her phone and writing a check. Noted confusion often with ADL tasks. She went to Dr. Jaun Chang office (PCP) one week after her TAVR due to having these confusion events. He sent her to [facility]. During hospital stay, patient was identified to have a previous cerebellar stroke, but no focal neurological deficits. Since discharge from the hospital, patient reports she is able to operate her phone, the computer and do online banking. Does note difficulty with spelling and math computation. Patient is very concerned as she is having severe headaches and migraines often. She is unable to identify any triggers to these headaches.  She does have a history of

## 2023-04-10 ENCOUNTER — APPOINTMENT (OUTPATIENT)
Dept: CARDIAC REHAB | Age: 73
End: 2023-04-10
Payer: MEDICARE

## 2023-04-17 ENCOUNTER — HOSPITAL ENCOUNTER (OUTPATIENT)
Dept: CARDIAC REHAB | Age: 73
Setting detail: THERAPIES SERIES
Discharge: HOME | End: 2023-04-17
Payer: MEDICARE

## 2023-04-17 PROCEDURE — G0422 INTENS CARDIAC REHAB W/EXERC: HCPCS

## 2023-04-17 PROCEDURE — G0423 INTENS CARDIAC REHAB NO EXER: HCPCS

## 2023-04-17 NOTE — PROGRESS NOTES
Video Education Report - ICR/CR  Name:  Pablo Osman     Date:  4/17/2023  MRN: [id number]     Session #:    Session Length: 40 min    Core Videos        []Heart Disease Risk Reduction       []Overview of Pritikin Eating Plan      [x]Move it          []Calorie Density         []Healthy Minds, Bodies, Hearts        []Label Reading - Part 1       []Metabolic Syndrome and Belly Fat        []How Our Thoughts Can Heal Our Hearts   []Dining Out - Part 1      []Biomechanical Limitations  []Facts on Fat        []Hypertension & Heart Disease    []Diseases of Our Time - Focusing on Diabetes   []Body Composition  []Nutrition Action Plan    []Exercise Action Plan      Comments:  Video completed, discussion

## 2023-04-18 ENCOUNTER — HOSPITAL ENCOUNTER (OUTPATIENT)
Dept: SPEECH THERAPY | Age: 73
Setting detail: THERAPIES SERIES
Discharge: HOME | End: 2023-04-18
Payer: MEDICARE

## 2023-04-18 PROCEDURE — 97129 THER IVNTJ 1ST 15 MIN: CPT

## 2023-04-18 PROCEDURE — 97130 THER IVNTJ EA ADDL 15 MIN: CPT

## 2023-04-18 NOTE — PROGRESS NOTES
[address] [facility] THERAPY  [] SPEECH-LANGUAGE COGNITIVE EVALUATION  [x] DAILY NOTE   [] PROGRESS NOTE [] DISCHARGE NOTE    [x] [facility] [place]   [] [place] [address]    [] [facility]   [] Reynaldo Pacheco    Date: 2023  Patient Name:  Serene Lawrence  : 1950  MRN: [id number]  CSN: [id number]    Referring Practitioner Agustina Reina MD   Diagnosis Acute memory impairment [R41.3]    Treatment Diagnosis Cognitive   Date of Evaluation 3/15/23      Functional Outcome Measure Used [facility] Memory   Functional Outcome Score 4 (3/15/23)       Insurance: Primary: Payor: Kristinna Sheen /  /  / ,   Secondary: [facility]   Authorization Information: No precert required    Visit # 9, 9/10 for progress note   Visits Allowed: Unlimited visits based on medical necessity    Recertification Date: 3/77/3746   Physician Follow-Up: 23 f/u with Dr. Jose Enrique Malagon   Physician Orders: Edelmira Castillo and treat   Pertinent History: Patient reports she had a TAVR completed on 23 at [address] [place] [address]. Following this procedure, patient reports she had a horrible headache that was not relieved by Tylenol. She was discharged a day later. She noticed after this procedure she had difficulty preforming certain tasks such as texting on her phone and writing a check. Noted confusion often with ADL tasks. She went to Dr. Cunningham Medico office (PCP) one week after her TAVR due to having these confusion events. He sent her to [facility]. During hospital stay, patient was identified to have a previous cerebellar stroke, but no focal neurological deficits. Since discharge from the hospital, patient reports she is able to operate her phone, the computer and do online banking. Does note difficulty with spelling and math computation. Patient is very concerned as she is having severe headaches and migraines often. She is unable to identify any triggers to these headaches.  She does have a history of

## 2023-04-19 ENCOUNTER — HOSPITAL ENCOUNTER (OUTPATIENT)
Dept: CARDIAC REHAB | Age: 73
Setting detail: THERAPIES SERIES
Discharge: HOME | End: 2023-04-19
Payer: MEDICARE

## 2023-04-19 ENCOUNTER — OFFICE VISIT (OUTPATIENT)
Dept: FAMILY MEDICINE CLINIC | Age: 73
End: 2023-04-19

## 2023-04-19 VITALS
HEIGHT: 62 IN | OXYGEN SATURATION: 95 % | HEART RATE: 84 BPM | BODY MASS INDEX: 34.78 KG/M2 | RESPIRATION RATE: 18 BRPM | SYSTOLIC BLOOD PRESSURE: 128 MMHG | TEMPERATURE: 97.8 F | DIASTOLIC BLOOD PRESSURE: 66 MMHG | WEIGHT: 189 LBS

## 2023-04-19 DIAGNOSIS — M17.12 ARTHRITIS OF LEFT KNEE: Primary | ICD-10-CM

## 2023-04-19 DIAGNOSIS — G89.29 CHRONIC PAIN OF LEFT KNEE: ICD-10-CM

## 2023-04-19 DIAGNOSIS — M25.562 CHRONIC PAIN OF LEFT KNEE: ICD-10-CM

## 2023-04-19 PROCEDURE — G0423 INTENS CARDIAC REHAB NO EXER: HCPCS

## 2023-04-19 PROCEDURE — G0422 INTENS CARDIAC REHAB W/EXERC: HCPCS

## 2023-04-19 RX ORDER — LIDOCAINE HYDROCHLORIDE 10 MG/ML
4 INJECTION, SOLUTION INFILTRATION; PERINEURAL ONCE
Status: COMPLETED | OUTPATIENT
Start: 2023-04-19 | End: 2023-04-19

## 2023-04-19 RX ORDER — TRIAMCINOLONE ACETONIDE 40 MG/ML
80 INJECTION, SUSPENSION INTRA-ARTICULAR; INTRAMUSCULAR ONCE
Status: COMPLETED | OUTPATIENT
Start: 2023-04-19 | End: 2023-04-19

## 2023-04-19 RX ORDER — BUPIVACAINE HYDROCHLORIDE 5 MG/ML
4 INJECTION, SOLUTION PERINEURAL ONCE
Status: COMPLETED | OUTPATIENT
Start: 2023-04-19 | End: 2023-04-19

## 2023-04-19 RX ADMIN — TRIAMCINOLONE ACETONIDE 80 MG: 40 INJECTION, SUSPENSION INTRA-ARTICULAR; INTRAMUSCULAR at 13:18

## 2023-04-19 RX ADMIN — BUPIVACAINE HYDROCHLORIDE 20 MG: 5 INJECTION, SOLUTION PERINEURAL at 13:17

## 2023-04-19 RX ADMIN — LIDOCAINE HYDROCHLORIDE 4 ML: 10 INJECTION, SOLUTION INFILTRATION; PERINEURAL at 13:19

## 2023-04-19 NOTE — PROGRESS NOTES
Lindsey VALENCIA.:  1950    Acct Number: [de-identified]   MRN:  [id number]                             [facility] HEALTHY MIND-SET WORKSHOP             Date: 2023        Session #________    Jocelyn Boxer class covered:    [x]  New Thoughts New Behaviors Workshop:  Patient will learn and practice techniques for developing effective health and lifestyle goals. Patient will be able to effectively apply the goal setting process learned to develop at least one new personal goal.     []  Managing Moods & Relationships Workshop:  Patient will learn how emotional and chronic stress factors can impact their hearts. They will learn healthy ways to handle stress and utilize positive coping mechanisms. In addition, [facility] patient will learn ways to improve communication skills. []  Healthy Sleep for a healthy Heart:  Patients will learn the importance of sleep to overall health, well-being, and quality of life. They will understand the symptoms of, and treatments for, common sleep disorders. Patients will also be able to identify daytime and nighttime behaviors which impact sleep, and they will be able to apply these tools to help manage sleep-related challenges. []  Recognizing and Reducing Stress:  Patients will learn about stress and how to recognize stress. Patients will gain insight into the toll that chronic stress takes on their health, both emotionally and physically. Patients will learn and practice a variety of stress management techniques. Patients will be able to effectively apply coping mechanisms in perceived stressful situations. April Mcdaniel actively participated and verbalized understanding. Total time in the Healthy Mind-Set class was 60 minutes.     Electronically signed by JAJA Gallardo on 2023 at 11:57 AM

## 2023-04-19 NOTE — PROGRESS NOTES
[facility]  [address]  [facility] [address]  Dept: [phone]  Loc: [phone]      Chief Complaint   Patient presents with    Other     Left knee cortisone injection       Procedure Note Left Knee Injection    Diagnosis:  left knee OA    Discussed risks and benefits of steroid injection, including but not limited to infection, skin discoloration, pain, and bleeding. With the patient's verbal informed consent, her left knee was prepped  in standard sterile fashion with Betadine and Alcohol. Ethyl chloride was used to anesthetize the skin. A mixture of 4 cc of 0.5% Bupivacaine, 4 cc of 1% Lidocaine,  and 2 cc of Kenalog 40 mg was injected into the left anterior lateral joint space. The patient tolerated this well without difficulty. A band-aid was applied and the patient was advised to ice the knee for 15-20 minutes to relieve any injection site related pain. Follow Up: As Directed.

## 2023-04-20 ENCOUNTER — HOSPITAL ENCOUNTER (OUTPATIENT)
Dept: SPEECH THERAPY | Age: 73
Setting detail: THERAPIES SERIES
Discharge: HOME | End: 2023-04-20
Payer: MEDICARE

## 2023-04-20 PROCEDURE — 97130 THER IVNTJ EA ADDL 15 MIN: CPT | Performed by: SPEECH-LANGUAGE PATHOLOGIST

## 2023-04-20 PROCEDURE — 97129 THER IVNTJ 1ST 15 MIN: CPT | Performed by: SPEECH-LANGUAGE PATHOLOGIST

## 2023-04-20 NOTE — PROGRESS NOTES
** PLEASE SIGN, DATE AND TIME CERTIFICATION BELOW AND RETURN TO [facility] OUTPATIENT REHABILITATION (FAX #: [phone]). ATTEST/CO-SIGN IF ACCESSING VIA INDataguise. THANK YOU.**    I certify that I have examined the patient below and determined that Physical Medicine and Rehabilitation service is necessary and that I approve the established plan of care for up to 90 days or as specifically noted. Attestation, signature or co-signature of physician indicates approval of certification requirements.    ________________________ ____________ __________  Physician Signature   Date   Time     [address]  [] SPEECH-LANGUAGE COGNITIVE EVALUATION  [] DAILY NOTE   [x] PROGRESS NOTE [] DISCHARGE NOTE    [x] [facility]   [] [address]    [] [address]   [] [facility]    Date: 2023  Patient Name:  Lizbeth Treviño  : 1950  MRN: [id number]  CSN: [id number]    Referring Practitioner Minda Orourke MD   Diagnosis Acute memory impairment [R41.3]    Treatment Diagnosis Cognitive   Date of Evaluation 3/15/23      Functional Outcome Measure Used [facility] Memory   Functional Outcome Score 4 (3/15/23)       Insurance: Primary: Payor: locr /  /  / ,   Secondary: Luis Magojohn Information: No precert required    Visit # 10, 10/10 for progress note   Visits Allowed: Unlimited visits based on medical necessity    Recertification Date:    Physician Follow-Up: 23 f/u with Dr. Lisseth Mcrae   Physician Orders: Srinivas Cordova and treat   Pertinent History: Patient reports she had a TAVR completed on 23 at [facility]. Following this procedure, patient reports she had a horrible headache that was not relieved by Tylenol. She was discharged a day later. She noticed after this procedure she had difficulty preforming certain tasks such as texting on her phone and writing a check.  Noted confusion often with ADL

## 2023-04-21 ENCOUNTER — HOSPITAL ENCOUNTER (OUTPATIENT)
Dept: CARDIAC REHAB | Age: 73
Setting detail: THERAPIES SERIES
Discharge: HOME | End: 2023-04-21
Payer: MEDICARE

## 2023-04-21 PROCEDURE — G0422 INTENS CARDIAC REHAB W/EXERC: HCPCS

## 2023-04-21 PROCEDURE — G0423 INTENS CARDIAC REHAB NO EXER: HCPCS

## 2023-04-21 NOTE — PROGRESS NOTES
He VALENCIA.:  1950  Acct Number: [de-identified]  MRN:  [id number]                  [facility] COOKING SCHOOL WORKSHOP             Date: 2023        Session # ________   [place] Fat class covered:    [facility]    [] Adding Flavor - Sodium-Free  [] Fast & Healthy Breakfasts    [] Powerhouse Plant-Based Proteins [] Satisfying Salads and Dressings    [] Simple Sides & Sauces   [] Personalizing Your Plate    [] Delicious Desserts    [] Savory Soups    [] Efficiency Cooking - Meals in a Snap [] Tasty Appetizers & Snacks     [] Comforting Weekend Breakfasts  [] One-Pot Wonders    [] Fast Evening Meals   [] Easy Entertaining    [x]  International Cuisine Spotlight on the [place]          Patients were shown how to choose, prep, and cook; substitutions and other options were given. Samples were offered. Recipes were given and questions answered. The patient above was in the [employer] for 50 minutes.       Electronically signed by Emir Ellsworth RD on 2023 at 11:14 AM

## 2023-04-24 ENCOUNTER — HOSPITAL ENCOUNTER (OUTPATIENT)
Dept: CARDIAC REHAB | Age: 73
Setting detail: THERAPIES SERIES
Discharge: HOME | End: 2023-04-24
Payer: MEDICARE

## 2023-04-24 PROCEDURE — G0423 INTENS CARDIAC REHAB NO EXER: HCPCS

## 2023-04-24 PROCEDURE — G0422 INTENS CARDIAC REHAB W/EXERC: HCPCS

## 2023-04-24 NOTE — PLAN OF CARE
increasing workloads  [] associated symptoms  Monitored telemetry has revealed    [] documented arrhythmia at increasing workloads  [] associated symptoms  Monitored telemetry has revealed     [] documented arrhythmia at increasing workloads  [] associated symptoms  Monitored telemetry has revealed     [] documented arrhythmia at increasing workloads  [] associated symptoms  Monitored telemetry has revealed     [] documented arrhythmia at increasing workloads  [] associated symptoms    Physician Response    [x] Cardiac rehab is reasonably and medically necessary for continuous cardiac monitoring surveillance  of patient's cardiac activity  [x] Initiate continuous telemetry monitoring and notify me with any concerns  [] Other   Physician Response    [x] Cardiac rehab is reasonably and medically necessary for continuous cardiac monitoring surveillance  of patient's cardiac activity  [x] Continue continuous telemetry monitoring and notify me with any concerns  [] Other     Physician Response    [x] Cardiac rehab is reasonably and medically necessary for continuous cardiac monitoring surveillance  of patient's cardiac activity  [x] Continue continuous telemetry monitoring and notify me with any concerns   [] Other     Physician Response    [x] Cardiac rehab is reasonably and medically necessary for continuous cardiac monitoring surveillance  of patient's cardiac activity  [x] Continue continuous telemetry monitoring and notify me with any concerns   [] Other     Physician Response    [x] Cardiac rehab is reasonably and medically necessary for continuous cardiac monitoring surveillance  of patient's cardiac activity  [x] Continue continuous telemetry monitoring and notify me with any concerns   [] Other       Cosigned by: Suyapa Vicente MD at 3/31/2023  5:25 AM     Revision History    Date/Time User Provider Type Action   3/31/2023  5:25 AM Suyapa Vicente MD Physician Cosign   3/30/2023  1:49 PM Sandy JARAMILLO

## 2023-04-24 NOTE — PROGRESS NOTES
Tatyana Richard YOB: 1950    MRN:  [id number]    Date: 2023      Session Length:  40 min   Session # _______    EXERCISE WORKSHOP:  Understanding the Benefits of Exercise                        Todays class addressed ways to build on the foundation of patient's core exercise program to achieve greater health benefits. Discussion of the SAID (Specific Adaptation to Imposed Demands) and FITT (Frequency, Intensity, Time, and Type) principles, and how these principles impact fitness levels. Techniques on overcoming muscle soreness and safety concerns was also addressed. In addition, the importance of a varied weekly exercise routine in order to improve overall fitness level was also discussed. Readiness to change:    ( ) Pre-contemplative   (x) Contemplative - ambivalent about change    ( ) Action - ready to set action plan and implement   ( ) Maintenance - has made change and is trying, and or practicing different alternative behaviors     Additional Notes:      Jaswinder Acosta was in the Workshop with the Exercise Physiologist for 40 minutes. The content was presented via Powerpoint, lecture, and patient participation based format. Motivational interviewing was utilized when needed, to promote change. Patient voiced understanding.     Electronically signed by VIRA Germain on 2023 at 12:12 PM

## 2023-04-25 ENCOUNTER — HOSPITAL ENCOUNTER (OUTPATIENT)
Dept: SPEECH THERAPY | Age: 73
Setting detail: THERAPIES SERIES
Discharge: HOME | End: 2023-04-25
Payer: MEDICARE

## 2023-04-25 PROCEDURE — 97130 THER IVNTJ EA ADDL 15 MIN: CPT | Performed by: SPEECH-LANGUAGE PATHOLOGIST

## 2023-04-25 PROCEDURE — 97129 THER IVNTJ 1ST 15 MIN: CPT | Performed by: SPEECH-LANGUAGE PATHOLOGIST

## 2023-04-25 NOTE — PROGRESS NOTES
[address] [facility]  [] SPEECH-LANGUAGE COGNITIVE EVALUATION  [x] DAILY NOTE   [] PROGRESS NOTE [] DISCHARGE NOTE    [x] [facility] [place]   [] [place] [address]    [] [facility]   [] Malcolm Irizarry    Date: 2023  Patient Name:  Wicho Douglas  : 1950  MRN: [id number]  CSN: [id number]    Referring Practitioner Maribel Gale MD   Diagnosis Acute memory impairment [R41.3]    Treatment Diagnosis Cognitive   Date of Evaluation 3/15/23      Functional Outcome Measure Used [facility] Memory   Functional Outcome Score 4 (3/15/23)       Insurance: Primary: Payor: [facility] /  /  / ,   Secondary: [facility]   Authorization Information: No precert required    Visit # 11, 1/10 for progress note   Visits Allowed: Unlimited visits based on medical necessity    Recertification Date:    Physician Follow-Up: 23 f/u with Dr. Lupis Rueda   Physician Orders: Sumaya Johansen and treat   Pertinent History: Patient reports she had a TAVR completed on 23 at [facility]. Following this procedure, patient reports she had a horrible headache that was not relieved by Tylenol. She was discharged a day later. She noticed after this procedure she had difficulty preforming certain tasks such as texting on her phone and writing a check. Noted confusion often with ADL tasks. She went to Dr. Severa Santee office (PCP) one week after her TAVR due to having these confusion events. He sent her to [facility]. During hospital stay, patient was identified to have a previous cerebellar stroke, but no focal neurological deficits. Since discharge from the hospital, patient reports she is able to operate her phone, the computer and do online banking. Does note difficulty with spelling and math computation. Patient is very concerned as she is having severe headaches and migraines often. She is unable to identify any triggers to these headaches.  She does have a history of

## 2023-04-26 ENCOUNTER — HOSPITAL ENCOUNTER (OUTPATIENT)
Dept: CARDIAC REHAB | Age: 73
Setting detail: THERAPIES SERIES
Discharge: HOME | End: 2023-04-26
Payer: MEDICARE

## 2023-04-26 ENCOUNTER — HOSPITAL ENCOUNTER (OUTPATIENT)
Dept: CARDIAC REHAB | Age: 73
Setting detail: THERAPIES SERIES
End: 2023-04-26
Payer: MEDICARE

## 2023-04-26 PROCEDURE — G0422 INTENS CARDIAC REHAB W/EXERC: HCPCS

## 2023-04-27 ENCOUNTER — HOSPITAL ENCOUNTER (OUTPATIENT)
Dept: SPEECH THERAPY | Age: 73
Setting detail: THERAPIES SERIES
Discharge: HOME | End: 2023-04-27
Payer: MEDICARE

## 2023-04-27 PROCEDURE — 97129 THER IVNTJ 1ST 15 MIN: CPT | Performed by: SPEECH-LANGUAGE PATHOLOGIST

## 2023-04-27 PROCEDURE — 97130 THER IVNTJ EA ADDL 15 MIN: CPT | Performed by: SPEECH-LANGUAGE PATHOLOGIST

## 2023-04-27 NOTE — DISCHARGE SUMMARY
Given that the patient has returned to completing all prior activities and IADL's, recommend discharge from speech therapy at this time. Activity/Treatment Tolerance:  [x]  Patient tolerated treatment well []  Patient limited by fatigue  []  Patient limited by pain   []  Patient limited by other medical complications  []  Other:     Patient Education:   [x]  HEP/Education Completed: Discharge this date, results of testing this date, progress overall, recommendation to continue use of compensatory memory strategies. []  No new Education completed  [x]  Reviewed Prior HEP      [x]  Patient verbalized and/or demonstrated understanding of education provided. []  Patient unable to verbalize and/or demonstrate understanding of education provided. Will continue education. []  Barriers to learning:     PLAN:  Treatment Recommendations:     []  Plan of care initiated. Plan to see patient 2 times per week for 8 weeks to address the treatment planned outlined above.   []  Continue with current plan of care  []  Progress note: 2 x per week up to 3 weeks  []  Hold pending physician visit  [x]  Discharge    Time In 1138   Time Out 1216   Timed Code Minutes: 38 min   Total Treatment Time: 45 min         Santa Khanna M.S. [address] [place] [address]

## 2023-04-28 ENCOUNTER — HOSPITAL ENCOUNTER (OUTPATIENT)
Dept: CARDIAC REHAB | Age: 73
Setting detail: THERAPIES SERIES
Discharge: HOME | End: 2023-04-28
Payer: MEDICARE

## 2023-04-28 PROCEDURE — G0422 INTENS CARDIAC REHAB W/EXERC: HCPCS

## 2023-04-28 PROCEDURE — G0423 INTENS CARDIAC REHAB NO EXER: HCPCS

## 2023-04-28 NOTE — PROGRESS NOTES
Merced VALENCIA.:  1950  Acct Number: [de-identified]  MRN:  [id number]                  [facility] COOKING SCHOOL WORKSHOP             Date: 2023        Session # ________   Escobar James class covered:    [address]    [] Adding Flavor - Sodium-Free  [] Fast & Healthy Breakfasts    [] Powerhouse Plant-Based Proteins [] Satisfying Salads and Dressings    [] Simple Sides & Sauces   [] Personalizing Your Plate    [x] Delicious Desserts    [] Savory Soups    [] Efficiency Cooking - Meals in a Snap [] Tasty Appetizers & Snacks     [] Comforting Weekend Breakfasts  [] One-Pot Wonders    [] Fast Evening Meals   [] Easy Entertaining    []  International Cuisine Spotlight on the [place]          Patients were shown how to choose, prep, and cook; substitutions and other options were given. Samples were offered. Recipes were given and questions answered. The patient above was in the [employer] for 45 minutes.       Electronically signed by Mitchell Solis RD on 2023 at 10:55 AM

## 2023-05-01 ENCOUNTER — HOSPITAL ENCOUNTER (OUTPATIENT)
Dept: CARDIAC REHAB | Age: 73
Setting detail: THERAPIES SERIES
Discharge: HOME | End: 2023-05-01
Payer: MEDICARE

## 2023-05-01 PROCEDURE — G0422 INTENS CARDIAC REHAB W/EXERC: HCPCS

## 2023-05-01 PROCEDURE — G0423 INTENS CARDIAC REHAB NO EXER: HCPCS

## 2023-05-01 NOTE — PROGRESS NOTES
Video Education Report - ICR/CR  Name:  Josie Little     Date:  5/1/2023  MRN: [id number]     Session #:    Session Length: 40 min    Core Videos        []Heart Disease Risk Reduction       []Overview of Pritikin Eating Plan      []Move it          []Calorie Density         []Healthy Minds, Bodies, Hearts        []Label Reading - Part 1       []Metabolic Syndrome and Belly Fat        []How Our Thoughts Can Heal Our Hearts   []Dining Out - Part 1      []Biomechanical Limitations  []Facts on Fat        []Hypertension & Heart Disease    []Diseases of Our Time - Focusing on Diabetes   [x]Body Composition  []Nutrition Action Plan    []Exercise Action Plan      Comments:  Video completed, group discussion

## 2023-05-03 ENCOUNTER — HOSPITAL ENCOUNTER (OUTPATIENT)
Dept: CARDIAC REHAB | Age: 73
Setting detail: THERAPIES SERIES
Discharge: HOME | End: 2023-05-03
Payer: MEDICARE

## 2023-05-03 PROCEDURE — G0423 INTENS CARDIAC REHAB NO EXER: HCPCS

## 2023-05-03 PROCEDURE — G0422 INTENS CARDIAC REHAB W/EXERC: HCPCS

## 2023-05-03 NOTE — PROGRESS NOTES
Jacob VALENCIA.:  1950    Acct Number: [de-identified]   MRN:  [id number]                             [facility] NUTRITION WORKSHOP             Date: 5/3/2023        Session # _______    Jessie Gray class covered:    []   Fueling a Healthy Body  []   Mindful Eating  [x]   Targeting Nutrition Priorities  []   Dining Out :  Making the Most of a Menu  []   Label Reading    Readiness to change:    []  Pre-contemplative   []  Contemplative - ambivalent about change    []  Action - ready to set action plan and implement   []  Maintenance - has made change and is trying, and or practicing different alternative         behaviors     Saray Infante was in the Workshop with the Dietitian for 45 minutes. The content was presented via Powerpoint, lecture, and patient participation based format. Motivational interviewing was utilized when needed, to promote change. Patient voiced understanding.     Electronically signed by Sanket Mcguire RD on 5/3/2023 at 11:47 AM

## 2023-05-05 ENCOUNTER — HOSPITAL ENCOUNTER (OUTPATIENT)
Dept: CARDIAC REHAB | Age: 73
Setting detail: THERAPIES SERIES
Discharge: HOME | End: 2023-05-05
Payer: MEDICARE

## 2023-05-05 PROCEDURE — G0422 INTENS CARDIAC REHAB W/EXERC: HCPCS

## 2023-05-05 PROCEDURE — G0423 INTENS CARDIAC REHAB NO EXER: HCPCS

## 2023-05-05 NOTE — PROGRESS NOTES
Maria Elena VALENCIA.:  1950  Acct Number: [de-identified]  MRN:  [id number]                  [facility] COOKING SCHOOL WORKSHOP             Date: 2023        Session # ________   Itzel Ripper class covered:    [address] [facility]    [] Adding Flavor - Sodium-Free  [] Fast & Healthy Breakfasts    [] Powerhouse Plant-Based Proteins [] Satisfying Salads and Dressings    [] Simple Sides & Sauces   [] Personalizing Your Plate    [] Delicious Desserts    [x] Savory Soups    [] Efficiency Cooking - Meals in a Snap [] Tasty Appetizers & Snacks     [] Comforting Weekend Breakfasts  [] One-Pot Wonders    [] Fast Evening Meals   [] Easy Entertaining    []  International Cuisine Spotlight on the [place]          Patients were shown how to choose, prep, and cook; substitutions and other options were given. Samples were offered. Recipes were given and questions answered. The patient above was in the [employer] for 40 minutes.       Electronically signed by Rossy Mcclendon RD on 2023 at 10:47 AM

## 2023-05-08 ENCOUNTER — HOSPITAL ENCOUNTER (OUTPATIENT)
Dept: CARDIAC REHAB | Age: 73
Setting detail: THERAPIES SERIES
Discharge: HOME | End: 2023-05-08
Payer: MEDICARE

## 2023-05-08 PROCEDURE — G0422 INTENS CARDIAC REHAB W/EXERC: HCPCS

## 2023-05-08 PROCEDURE — G0423 INTENS CARDIAC REHAB NO EXER: HCPCS

## 2023-05-08 NOTE — PROGRESS NOTES
Video Education Report - ICR/CR  Name:  Alexsandra Mccray     Date:  5/8/2023  MRN: [id number]     Session #:  15  Session Length: 40 min    Core Videos        []Heart Disease Risk Reduction       []Overview of Pritikin Eating Plan      []Move it          [x]Calorie Density         []Healthy Minds, Bodies, Hearts        []Label Reading - Part 1       []Metabolic Syndrome and Belly Fat        []How Our Thoughts Can Heal Our Hearts   []Dining Out - Part 1      []Biomechanical Limitations  []Facts on Fat        []Hypertension & Heart Disease    []Diseases of Our Time - Focusing on Diabetes   []Body Composition  []Nutrition Action Plan    []Exercise Action Plan      Comments:  Video completed, group discussion

## 2023-05-09 ENCOUNTER — OFFICE VISIT (OUTPATIENT)
Dept: FAMILY MEDICINE CLINIC | Age: 73
End: 2023-05-09
Payer: MEDICARE

## 2023-05-09 VITALS
BODY MASS INDEX: 34.08 KG/M2 | HEIGHT: 62 IN | OXYGEN SATURATION: 98 % | RESPIRATION RATE: 16 BRPM | WEIGHT: 185.2 LBS | TEMPERATURE: 97.5 F | DIASTOLIC BLOOD PRESSURE: 62 MMHG | HEART RATE: 75 BPM | SYSTOLIC BLOOD PRESSURE: 128 MMHG

## 2023-05-09 DIAGNOSIS — G43.009 MIGRAINE WITHOUT AURA AND WITHOUT STATUS MIGRAINOSUS, NOT INTRACTABLE: ICD-10-CM

## 2023-05-09 PROCEDURE — 1123F ACP DISCUSS/DSCN MKR DOCD: CPT | Performed by: FAMILY MEDICINE

## 2023-05-09 PROCEDURE — 99213 OFFICE O/P EST LOW 20 MIN: CPT | Performed by: FAMILY MEDICINE

## 2023-05-09 PROCEDURE — G8399 PT W/DXA RESULTS DOCUMENT: HCPCS | Performed by: FAMILY MEDICINE

## 2023-05-09 PROCEDURE — 1036F TOBACCO NON-USER: CPT | Performed by: FAMILY MEDICINE

## 2023-05-09 PROCEDURE — G8417 CALC BMI ABV UP PARAM F/U: HCPCS | Performed by: FAMILY MEDICINE

## 2023-05-09 PROCEDURE — 1090F PRES/ABSN URINE INCON ASSESS: CPT | Performed by: FAMILY MEDICINE

## 2023-05-09 PROCEDURE — 3017F COLORECTAL CA SCREEN DOC REV: CPT | Performed by: FAMILY MEDICINE

## 2023-05-09 PROCEDURE — G8427 DOCREV CUR MEDS BY ELIG CLIN: HCPCS | Performed by: FAMILY MEDICINE

## 2023-05-09 RX ORDER — LAMOTRIGINE 25 MG/1
50 TABLET ORAL 2 TIMES DAILY
Qty: 180 TABLET | Refills: 3 | Status: SHIPPED | OUTPATIENT
Start: 2023-05-09 | End: 2023-05-09 | Stop reason: SDUPTHER

## 2023-05-09 RX ORDER — LAMOTRIGINE 25 MG/1
25 TABLET ORAL 2 TIMES DAILY
Qty: 180 TABLET | Refills: 3 | Status: SHIPPED | OUTPATIENT
Start: 2023-05-09

## 2023-05-09 NOTE — PROGRESS NOTES
History     Tobacco Use    Smoking status: Never    Smokeless tobacco: Never   Substance Use Topics    Alcohol use: Yes     Alcohol/week: 0.0 standard drinks     Comment: Socially      Current Outpatient Medications   Medication Sig Dispense Refill    benazepril (LOTENSIN) 20 MG tablet TAKE 1 TABLET BY MOUTH 2 TIMES DAILY 180 tablet 3    lamoTRIgine (LAMICTAL) 25 MG tablet Take 2 tablets by mouth daily 60 tablet 0    Misc. Devices (CANE) MISC Disp:  1 cane. Use cane daily for ambulation 1 each 0    Ubrogepant 100 MG TABS Take 1 tablet by mouth daily as needed (migraine) 16 tablet 1    clopidogrel (PLAVIX) 75 MG tablet Take 1 tablet by mouth daily 30 tablet 3    atorvastatin (LIPITOR) 40 MG tablet Take 1 tablet by mouth nightly 90 tablet 1    amLODIPine (NORVASC) 5 MG tablet take 1 tablet by mouth once daily      omeprazole (PRILOSEC OTC) 20 MG tablet Take 1 tablet by mouth every other day      Evening Primrose Oil 1000 MG CAPS       magnesium (MAGNESIUM-OXIDE) 250 MG TABS tablet Take 2 tablets by mouth daily      anastrozole (ARIMIDEX) 1 MG tablet Take 1 tablet by mouth daily 90 tablet 3    Handicap Placard MISC by Does not apply route Duration:  5 years; dx:  osteoarthritis 1 each 0    empagliflozin (JARDIANCE) 25 MG tablet Take 1 tablet by mouth daily      insulin lispro, 1 Unit Dial, 100 UNIT/ML SOPN Inject into the skin 3 times daily Indications: inject 0-12 unitsinto the skin 3 times daily-per sliding scale      EASY TOUCH PEN NEEDLES 31G X 6 MM MISC       LANTUS SOLOSTAR 100 UNIT/ML injection pen Inject 39 Units into the skin nightly      Calcium Carb-Cholecalciferol 600-500 MG-UNIT CAPS Take by mouth daily      aspirin 81 MG chewable tablet Take 1 tablet by mouth daily      therapeutic multivitamin-minerals (THERAGRAN-M) tablet Take 1 tablet by mouth daily       No current facility-administered medications for this visit.      Allergies   Allergen Reactions    Amoxicillin     Bactrim

## 2023-05-10 ENCOUNTER — HOSPITAL ENCOUNTER (OUTPATIENT)
Dept: CARDIAC REHAB | Age: 73
Setting detail: THERAPIES SERIES
Discharge: HOME | End: 2023-05-10
Payer: MEDICARE

## 2023-05-10 PROCEDURE — G0423 INTENS CARDIAC REHAB NO EXER: HCPCS

## 2023-05-10 PROCEDURE — G0422 INTENS CARDIAC REHAB W/EXERC: HCPCS

## 2023-05-10 NOTE — PROGRESS NOTES
Duncan VALENCIA.:  1950    Acct Number: [de-identified]   MRN:  [id number]                             [facility] HEALTHY MIND-SET WORKSHOP             Date: 5/10/2023        Session #________    [facility] class covered:    []  New Thoughts New Behaviors Workshop:  Patient will learn and practice techniques for developing effective health and lifestyle goals. Patient will be able to effectively apply the goal setting process learned to develop at least one new personal goal.     [x]  Managing Moods & Relationships Workshop:  Patient will learn how emotional and chronic stress factors can impact their hearts. They will learn healthy ways to handle stress and utilize positive coping mechanisms. In addition, [facility] patient will learn ways to improve communication skills. []  Healthy Sleep for a healthy Heart:  Patients will learn the importance of sleep to overall health, well-being, and quality of life. They will understand the symptoms of, and treatments for, common sleep disorders. Patients will also be able to identify daytime and nighttime behaviors which impact sleep, and they will be able to apply these tools to help manage sleep-related challenges. []  Recognizing and Reducing Stress:  Patients will learn about stress and how to recognize stress. Patients will gain insight into the toll that chronic stress takes on their health, both emotionally and physically. Patients will learn and practice a variety of stress management techniques. Patients will be able to effectively apply coping mechanisms in perceived stressful situations. Flavia Kim actively participated and verbalized understanding. Total time in the Healthy Mind-Set class was 60 minutes.     Electronically signed by JAJA Ren on 5/10/2023 at 12:52 PM

## 2023-05-12 ENCOUNTER — APPOINTMENT (OUTPATIENT)
Dept: CARDIAC REHAB | Age: 73
End: 2023-05-12
Payer: MEDICARE

## 2023-05-15 ENCOUNTER — HOSPITAL ENCOUNTER (OUTPATIENT)
Dept: CARDIAC REHAB | Age: 73
Setting detail: THERAPIES SERIES
Discharge: HOME | End: 2023-05-15
Payer: MEDICARE

## 2023-05-15 PROCEDURE — G0423 INTENS CARDIAC REHAB NO EXER: HCPCS

## 2023-05-15 PROCEDURE — G0422 INTENS CARDIAC REHAB W/EXERC: HCPCS

## 2023-05-17 ENCOUNTER — HOSPITAL ENCOUNTER (OUTPATIENT)
Dept: CARDIAC REHAB | Age: 73
Setting detail: THERAPIES SERIES
Discharge: HOME | End: 2023-05-17
Payer: MEDICARE

## 2023-05-17 PROCEDURE — G0423 INTENS CARDIAC REHAB NO EXER: HCPCS

## 2023-05-17 PROCEDURE — G0422 INTENS CARDIAC REHAB W/EXERC: HCPCS

## 2023-05-17 NOTE — PROGRESS NOTES
Bronwyn VALENCIA.:  1950    Acct Number: [de-identified]   MRN:  [id number]                             ICR NUTRITION 1:1 Counseling             Date: 2023       Session # _______   1:1 Counseling Session    GOALS:  To be fit for her age (or better)  2. Lose weight  Luis Daniel Bhatia reports making some changes since starting Pritikin ICR including exercising more, eating smaller portions and needing less insulin, doing more boiled eggs and blueberries. Less reza and eggs, is starting to use oatmeal too. Reported Weight Trends: BMI 33.87  Edema:   Lab Trends: 2018 chol 217,  Tl chol 123, HDL 45, LDL 53, Tga 127, A1C 7.1, (was 6.2 four wks ago). Rate Your Plate: 52    Receptiveness to education/goals:  (X) Agreeable ( ) No Interest   ( ) Refused  Evaluation of education:  (X) Indicates understanding   ( ) Needs reinforcement     () Unsuccessful     Readiness to change:    ( ) Pre-contemplative   ( ) Contemplative - ambivalent about change    ( ) Action - ready to set action plan and implement   (X) Maintenance - has made change and is trying, and or practicing different alternative behaviors     Exercise Habits:  Luis Daniel Bhatia reports on days off she does yardwork and housework and counts her steps (~8000/day)    Food Recall:  Breakfast:  Egg, 1/2 strip reza  Lunch:  1/2 grilled cheese  Dinner:  Grilled chicken salad w/ mixed greens, green peppers, tomato, onion, \"russian\" dressing (small amount)  Snacks:  n/a  Main Beverages:  water \"a lot\", Diet Mountain Dew (1 cup at breakfast 4-5Xs/wk)    Grocery Shopping: self w/   Meal Prep/Cooking:self  Dining Out: during weekends when traveling for granddaughter's softball games--anywhere can get grilled chicken. Luis Daniel Bhatia was counseled by the Dietitian for 55 minutes. Motivational Interviewing was used to help promote change. Patient voiced understanding.      Electronically signed by Enrique Valerio RD on 2023 at 11:57 AM

## 2023-05-19 ENCOUNTER — HOSPITAL ENCOUNTER (OUTPATIENT)
Dept: CARDIAC REHAB | Age: 73
Setting detail: THERAPIES SERIES
Discharge: HOME | End: 2023-05-19
Payer: MEDICARE

## 2023-05-19 PROCEDURE — G0423 INTENS CARDIAC REHAB NO EXER: HCPCS

## 2023-05-19 PROCEDURE — G0422 INTENS CARDIAC REHAB W/EXERC: HCPCS

## 2023-05-19 NOTE — PROGRESS NOTES
Jean-Pierre VALENCIA.:  1950  Acct Number: [de-identified]  MRN:  [id number]                  [facility] COOKING SCHOOL WORKSHOP             Date: 2023        Session # ________   Carol Hendersonbury class covered:    [address]    [] Adding Flavor - Sodium-Free  [] Fast & Healthy Breakfasts    [] Powerhouse Plant-Based Proteins [] Satisfying Salads and Dressings    [] Simple Sides & Sauces   [] Personalizing Your Plate    [] Delicious Desserts    [] Savory Soups    [] Efficiency Cooking - Meals in a Snap [x] Tasty Appetizers & Snacks     [] Comforting Weekend Breakfasts  [] One-Pot Wonders    [] Fast Evening Meals   [] Easy Entertaining    []  International Cuisine Spotlight on the [place]          Patients were shown how to choose, prep, and cook; substitutions and other options were given. Samples were offered. Recipes were given and questions answered. The patient above was in the [employer] for 40 minutes.       Electronically signed by Paty Dailey RD on 2023 at 9:45 AM

## 2023-05-22 ENCOUNTER — HOSPITAL ENCOUNTER (OUTPATIENT)
Dept: CARDIAC REHAB | Age: 73
Setting detail: THERAPIES SERIES
Discharge: HOME | End: 2023-05-22
Payer: MEDICARE

## 2023-05-22 PROCEDURE — G0422 INTENS CARDIAC REHAB W/EXERC: HCPCS

## 2023-05-22 PROCEDURE — G0423 INTENS CARDIAC REHAB NO EXER: HCPCS

## 2023-05-22 NOTE — PLAN OF CARE
[address] [place] [address] Facility-Based Program  Individualized Cardiac Treatment Plan    Patient Name:  Erin Dorsey  :  1950  Age:  74 y.o. MRN:  [id number]  Diagnosis: TAV  Date of Event: 23   Physician:  Brianna Bernard MD  Next Office Visit:  May 2023  Date Entered Program: 3/28/23  Risk Stratifications: [] Low [x] Intermediate [] High  Allergies:    Allergies   Allergen Reactions    Amoxicillin     Bactrim [Sulfamethoxazole-Trimethoprim] Itching    Donnatal [Phenobarbital-Belladonna Alk] Other (See Comments)     palpitations    Lovastatin Other (See Comments)     myalgia    Metformin And Related Diarrhea    Vioxx Other (See Comments)     Liver problems    Dilaudid [Hydromorphone Hcl] Nausea And Vomiting    Fentanyl Nausea And Vomiting     Severe Nausea and vomitting       Individual Cardiac Treatment Plan -EXERCISE  INITIAL 30 DAY 60 DAY 90  DAY FINAL DAY   EXERCISE  ASSESSMENT/PLAN EXERCISE  REASSESSMENT EXERCISE   REASSESSMENT EXERCISE   REASSESSMENT EXERCISE   REASSESSMENT EXERCISE  DISCHARGE/FOLLOW-UP   Date: 3/28/23 Date: 23 Date: 23 Date: Date: Date:   Session #1   Total Session #18  First Exercise Session:  4/3/23 Total Session # 42 Total Session #  Total Session #  Total Session #   Last Exercise Session:     Stages of Change Stages of Change Stages of Change Stages of Change Stages of Change Stages of Change   [] Pre Contemplation  [] Contemplation  [x] Preparation  [] Action  [] Maintenance  [] Relapse [] Pre Contemplation  [] Contemplation  [x] Preparation  [] Action  [] Maintenance  [] Relapse [] Pre Contemplation  [] Contemplation  [] Preparation  [x] Action  [] Maintenance  [] Relapse [] Pre Contemplation  [] Contemplation  [] Preparation  [] Action  [] Maintenance  [] Relapse [] Pre Contemplation  [] Contemplation  [] Preparation  [] Action  [] Maintenance  [] Relapse [] Pre Contemplation  [] Contemplation  []

## 2023-05-22 NOTE — PROGRESS NOTES
Santa HOO.B.:  1950    Acct Number: [de-identified]   MRN:  [id number]                             [facility] NUTRITION WORKSHOP             Date: 2023        Session # _______    Fabiola Ashley class covered:    []   Fueling a Healthy Body  []   Mindful Eating  []   Targeting Nutrition Priorities  [x]   Dining Out :  Making the Most of a Menu  []   Label Reading    Readiness to change:    []  Pre-contemplative   []  Contemplative - ambivalent about change    []  Action - ready to set action plan and implement   []  Maintenance - has made change and is trying, and or practicing different alternative         behaviors     Sea Cary was in the Workshop with the Dietitian for 45 minutes. The content was presented via Powerpoint, lecture, and patient participation based format. Motivational interviewing was utilized when needed, to promote change. Patient voiced understanding.     Electronically signed by Tereso Salinas RD on 2023 at 10:48 AM

## 2023-05-22 NOTE — PROGRESS NOTES
Lit VALENCIA.:  1950    Acct Number: [de-identified]   MRN:  [id number]                             [facility] NUTRITION WORKSHOP             Date: 2023        Session # _______    Jay [place] class covered:    []   Fueling a Healthy Body  []   Mindful Eating  []   Targeting Nutrition Priorities  [x]   Dining Out :  Making the Most of a Menu  []   Label Reading    Readiness to change:    []  Pre-contemplative   []  Contemplative - ambivalent about change    []  Action - ready to set action plan and implement   [x]  Maintenance - has made change and is trying, and or practicing different alternative         behaviors     Apolonia Valenzuela was in the Workshop with the Dietitian for 45 minutes. The content was presented via Powerpoint, lecture, and patient participation based format. Motivational interviewing was utilized when needed, to promote change. Patient voiced understanding.     Electronically signed by Jonelle Cain RD on 2023 at 10:49 AM

## 2023-05-24 ENCOUNTER — HOSPITAL ENCOUNTER (OUTPATIENT)
Dept: CARDIAC REHAB | Age: 73
Setting detail: THERAPIES SERIES
Discharge: HOME | End: 2023-05-24
Payer: MEDICARE

## 2023-05-24 PROCEDURE — G0423 INTENS CARDIAC REHAB NO EXER: HCPCS

## 2023-05-24 PROCEDURE — G0422 INTENS CARDIAC REHAB W/EXERC: HCPCS

## 2023-05-24 NOTE — PROGRESS NOTES
Video Education Report - ICR/CR  Name:  Mylene Gurrola     Date:  5/24/2023  MRN: [id number]     Session #:  21  Session Length: 40 min    Core Videos        []Heart Disease Risk Reduction       []Overview of Pritikin Eating Plan      []Move it          []Calorie Density         [x]Healthy Minds, Bodies, Hearts        []Label Reading - Part 1       []Metabolic Syndrome and Belly Fat        []How Our Thoughts Can Heal Our Hearts   []Dining Out - Part 1      []Biomechanical Limitations  []Facts on Fat        []Hypertension & Heart Disease    []Diseases of Our Time - Focusing on Diabetes   []Body Composition  []Nutrition Action Plan    []Exercise Action Plan      Comments:  Video completed, group discussion

## 2023-05-26 ENCOUNTER — HOSPITAL ENCOUNTER (OUTPATIENT)
Dept: CARDIAC REHAB | Age: 73
Setting detail: THERAPIES SERIES
Discharge: HOME | End: 2023-05-26
Payer: MEDICARE

## 2023-05-26 PROCEDURE — G0423 INTENS CARDIAC REHAB NO EXER: HCPCS

## 2023-05-26 PROCEDURE — G0422 INTENS CARDIAC REHAB W/EXERC: HCPCS

## 2023-05-26 NOTE — PROGRESS NOTES
Video Education Report - ICR/CR  Name:  Vincenzo Booker     Date:  5/26/2023  MRN: [id number]     Session #:    Session Length: 36 min      Medical      Culinary  []Aging-Enhancing the Quality of Your Life  []Becoming a [de-identified]   []Biology of Weight Control    []Cooking Breakfasts   []Decoding Lab Results    and Snacks  []Diseases of Our Time - Overview   [x]Cooking Dinner and   []Sleep Disorders     Sides  []Targeting Your Nutrition Priorities   []Healthy Salads &   Dressings  Nutrition      []Cooking Soups and   []Dining Out - Part 2     Desserts  []Fueling a Healthy Body   []Menu Workshop     Overview  []Planning Your Eating Strategy   []The Pritikin Solution  []Vitamins and Minerals    Comments:  Video completed, group discussion

## 2023-05-31 ENCOUNTER — APPOINTMENT (OUTPATIENT)
Dept: CARDIAC REHAB | Age: 73
End: 2023-05-31
Payer: MEDICARE

## 2023-05-31 PROCEDURE — G0423 INTENS CARDIAC REHAB NO EXER: HCPCS

## 2023-05-31 PROCEDURE — G0422 INTENS CARDIAC REHAB W/EXERC: HCPCS

## 2023-05-31 NOTE — PROGRESS NOTES
Carol VALENCIA.:  1950    Acct Number: [de-identified]   MRN:  [id number]                             [facility] HEALTHY MIND-SET WORKSHOP             Date: 2023        Session #________    Dione Bess class covered:    []  New Thoughts New Behaviors Workshop:  Patient will learn and practice techniques for developing effective health and lifestyle goals. Patient will be able to effectively apply the goal setting process learned to develop at least one new personal goal.     []  Managing Moods & Relationships Workshop:  Patient will learn how emotional and chronic stress factors can impact their hearts. They will learn healthy ways to handle stress and utilize positive coping mechanisms. In addition, [facility] patient will learn ways to improve communication skills. [x]  Healthy Sleep for a healthy Heart:  Patients will learn the importance of sleep to overall health, well-being, and quality of life. They will understand the symptoms of, and treatments for, common sleep disorders. Patients will also be able to identify daytime and nighttime behaviors which impact sleep, and they will be able to apply these tools to help manage sleep-related challenges. []  Recognizing and Reducing Stress:  Patients will learn about stress and how to recognize stress. Patients will gain insight into the toll that chronic stress takes on their health, both emotionally and physically. Patients will learn and practice a variety of stress management techniques. Patients will be able to effectively apply coping mechanisms in perceived stressful situations. Gabrielle Yadav actively participated and verbalized understanding. Total time in the Healthy Mind-Set class was 60 minutes.     Electronically signed by JAJA Wilder on 2023 at 11:36 AM

## 2023-06-02 ENCOUNTER — HOSPITAL ENCOUNTER (OUTPATIENT)
Dept: CARDIAC REHAB | Age: 73
Setting detail: THERAPIES SERIES
Discharge: HOME | End: 2023-06-02
Payer: MEDICARE

## 2023-06-02 PROCEDURE — G0423 INTENS CARDIAC REHAB NO EXER: HCPCS

## 2023-06-02 PROCEDURE — G0422 INTENS CARDIAC REHAB W/EXERC: HCPCS

## 2023-06-02 NOTE — PROGRESS NOTES
Daisy Langley YOB: 1950  Acct Number: [de-identified]  MRN:  [id number]                  [facility] COOKING SCHOOL WORKSHOP             Date: 2023        Session # ________   Ford Buchanan class covered:    [facility]    [] Adding Flavor - Sodium-Free  [] Fast & Healthy Breakfasts    [] Powerhouse Plant-Based Proteins [] Satisfying Salads and Dressings    [] Simple Sides & Sauces   [] Personalizing Your Plate    [] Delicious Desserts    [] Savory Soups    [] Efficiency Cooking - Meals in a Snap [] Tasty Appetizers & Snacks     [] Comforting Weekend Breakfasts  [x] One-Pot Wonders    [] Fast Evening Meals   [] Easy Entertaining    []  International Cuisine Spotlight on the Blue Zone          Patients were shown how to choose, prep, and cook; substitutions and other options were given. Samples were offered. Recipes were given and questions answered. The patient above was in the [employer] for 45 minutes.       Electronically signed by Carolyn Fletcher RD on 2023 at 11:55 AM

## 2023-06-05 ENCOUNTER — HOSPITAL ENCOUNTER (OUTPATIENT)
Dept: CARDIAC REHAB | Age: 73
Setting detail: THERAPIES SERIES
End: 2023-06-05
Payer: MEDICARE

## 2023-06-07 ENCOUNTER — HOSPITAL ENCOUNTER (OUTPATIENT)
Dept: CARDIAC REHAB | Age: 73
Setting detail: THERAPIES SERIES
Discharge: HOME | End: 2023-06-07
Payer: MEDICARE

## 2023-06-07 PROCEDURE — G0423 INTENS CARDIAC REHAB NO EXER: HCPCS

## 2023-06-07 PROCEDURE — G0422 INTENS CARDIAC REHAB W/EXERC: HCPCS

## 2023-06-07 NOTE — PROGRESS NOTES
Video Education Report - ICR/CR  Name:  Thea Castillo     Date:  6/7/2023  MRN: [id number]     Session #:    Session Length: 40 min    Core Videos        []Heart Disease Risk Reduction       []Overview of Pritikin Eating Plan      []Move it          []Calorie Density         []Healthy Minds, Bodies, Hearts        [x]Label Reading - Part 1       []Metabolic Syndrome and Belly Fat        []How Our Thoughts Can Heal Our Hearts   []Dining Out - Part 1      []Biomechanical Limitations  []Facts on Fat        []Hypertension & Heart Disease    []Diseases of Our Time - Focusing on Diabetes   []Body Composition  []Nutrition Action Plan    []Exercise Action Plan    Comments:  Video completed, group discussion

## 2023-06-09 ENCOUNTER — HOSPITAL ENCOUNTER (OUTPATIENT)
Dept: CARDIAC REHAB | Age: 73
Setting detail: THERAPIES SERIES
Discharge: HOME | End: 2023-06-09
Payer: MEDICARE

## 2023-06-09 PROCEDURE — G0423 INTENS CARDIAC REHAB NO EXER: HCPCS

## 2023-06-09 PROCEDURE — G0422 INTENS CARDIAC REHAB W/EXERC: HCPCS

## 2023-06-09 NOTE — PROGRESS NOTES
Peter VALENCIA.:  1950  Acct Number: [de-identified]  MRN:  [id number]                  [facility] COOKING SCHOOL WORKSHOP             Date: 2023        Session # ________   Meir Baptise class covered:    [address] [facility]    [] Adding Flavor - Sodium-Free  [] Fast & Healthy Breakfasts    [] Powerhouse Plant-Based Proteins [] Satisfying Salads and Dressings    [] Simple Sides & Sauces   [] Personalizing Your Plate    [] Delicious Desserts    [] Savory Soups    [] Efficiency Cooking - Meals in a Snap [] Tasty Appetizers & Snacks     [] Comforting Weekend Breakfasts  [] One-Pot Wonders    [x] Fast Evening Meals   [] Easy Entertaining    []  International Cuisine Spotlight on the Blue Zone          Patients were shown how to choose, prep, and cook; substitutions and other options were given. Samples were offered. Recipes were given and questions answered. The patient above was in the [employer] for 45 minutes.       Electronically signed by Duane Shark, RD on 2023 at 10:17 AM

## 2023-06-10 ENCOUNTER — HOSPITAL ENCOUNTER (OUTPATIENT)
Age: 73
Discharge: HOME | End: 2023-06-10
Payer: MEDICARE

## 2023-06-10 DIAGNOSIS — I25.10 ASCVD (ARTERIOSCLEROTIC CARDIOVASCULAR DISEASE): ICD-10-CM

## 2023-06-10 LAB
CHOLEST SERPL-MCNC: 104 MG/DL (ref 100–199)
HDLC SERPL-MCNC: 37 MG/DL
LDLC SERPL CALC-MCNC: 41 MG/DL
TRIGL SERPL-MCNC: 130 MG/DL (ref 0–199)

## 2023-06-10 PROCEDURE — 36415 COLL VENOUS BLD VENIPUNCTURE: CPT

## 2023-06-10 PROCEDURE — 80061 LIPID PANEL: CPT

## 2023-06-19 ENCOUNTER — HOSPITAL ENCOUNTER (OUTPATIENT)
Dept: CARDIAC REHAB | Age: 73
Setting detail: THERAPIES SERIES
End: 2023-06-19
Payer: MEDICARE

## 2023-06-19 ENCOUNTER — HOSPITAL ENCOUNTER (OUTPATIENT)
Dept: CARDIAC REHAB | Age: 73
Setting detail: THERAPIES SERIES
Discharge: HOME | End: 2023-06-19
Payer: MEDICARE

## 2023-06-19 NOTE — PLAN OF CARE
10=very:   Rate your depression: 0    Rate your anxiety:  0 Using a scale of 0-10, 0=none, 10=very:   Rate your depression:0    Rate your anxiety:  0 Using a scale of 0-10, 0=none, 10=very:   Rate your depression: 0    Rate your anxiety:  0 Using a scale of 0-10, 0=none, 10=very:   Rate your depression:     Rate your anxiety:   Using a scale of 0-10, 0=none, 10=very:   Rate your depression:     Rate your anxiety:     Signs and Symptoms of Depression Present? [] Yes      [x] No Signs and Symptoms of Depression Present? [] Yes      [x] No    Signs and Symptoms of Depression Present? [] Yes      [x] No    Signs and Symptoms of Depression Present? [] Yes      [x] No Signs and Symptoms of Depression Present? [] Yes      [] No  If yes, please explain:   Signs and Symptoms of Depression Present? [] Yes      [] No  If yes, please explain:     Signs and Symptoms of Anxiety Present? [] Yes      [x] No Signs and Symptoms of Anxiety Present? [] Yes      [x] No    Signs and Symptoms of Anxiety Present? [] Yes      [x] No    Signs and Symptoms of Anxiety Present? [] Yes      [x] No Signs and Symptoms of Anxiety Present? [] Yes      [] No  If yes, please explain:   Signs and Symptoms of Anxiety Present? [] Yes      [] No  If yes, please explain:     [] Patient has poor eye contact   [] Flat affect present. [] Signs of anxiety, anger or hostility    [] Signs social isolation present.    []  Signs of alcohol or substance abuse        PSYCHOSOCIAL PLAN PSYCHOSOCIAL PLAN PSYCHOSOCIAL PLAN PSYCHOSOCIAL PLAN PSYCHOSOCIAL PLAN PSYCHOSOCIAL PLAN   *Interventions* *Interventions* *Interventions* *Interventions* *Interventions* *Interventions*   *Please check if needed  [] Psych Consult  [] Physician Referral  [x] Stress Management Skills *Please check if needed  [] Psych Consult  [] Physician Referral  [x] Stress Management Skills *Please check if needed  [] Psych Consult  [] Physician Referral  [x]

## 2023-06-21 ENCOUNTER — HOSPITAL ENCOUNTER (OUTPATIENT)
Dept: CARDIAC REHAB | Age: 73
Setting detail: THERAPIES SERIES
End: 2023-06-21
Payer: MEDICARE

## 2023-06-21 ENCOUNTER — APPOINTMENT (OUTPATIENT)
Dept: CARDIAC REHAB | Age: 73
End: 2023-06-21
Payer: MEDICARE

## 2023-06-23 ENCOUNTER — PATIENT MESSAGE (OUTPATIENT)
Dept: FAMILY MEDICINE CLINIC | Age: 73
End: 2023-06-23

## 2023-06-26 ENCOUNTER — APPOINTMENT (OUTPATIENT)
Dept: CARDIAC REHAB | Age: 73
End: 2023-06-26
Payer: MEDICARE

## 2023-06-26 RX ORDER — CLOPIDOGREL BISULFATE 75 MG/1
75 TABLET ORAL DAILY
Qty: 90 TABLET | Refills: 3 | Status: SHIPPED | OUTPATIENT
Start: 2023-06-26

## 2023-06-28 ENCOUNTER — APPOINTMENT (OUTPATIENT)
Dept: CARDIAC REHAB | Age: 73
End: 2023-06-28
Payer: MEDICARE

## 2023-06-29 ENCOUNTER — HOSPITAL ENCOUNTER (OUTPATIENT)
Dept: MRI IMAGING | Age: 73
Discharge: HOME | End: 2023-06-29
Attending: FAMILY MEDICINE
Payer: MEDICARE

## 2023-06-29 DIAGNOSIS — I63.9 CEREBELLAR STROKE (HCC): ICD-10-CM

## 2023-06-29 LAB — POC CREATININE WHOLE BLOOD: 0.7 MG/DL (ref 0.5–1.2)

## 2023-06-29 PROCEDURE — 6360000004 HC RX CONTRAST MEDICATION: Performed by: FAMILY MEDICINE

## 2023-06-29 PROCEDURE — 82565 ASSAY OF CREATININE: CPT

## 2023-06-29 PROCEDURE — 70553 MRI BRAIN STEM W/O & W/DYE: CPT

## 2023-06-29 PROCEDURE — A9579 GAD-BASE MR CONTRAST NOS,1ML: HCPCS | Performed by: FAMILY MEDICINE

## 2023-06-29 RX ADMIN — GADOTERIDOL 15 ML: 279.3 INJECTION, SOLUTION INTRAVENOUS at 06:32

## 2023-06-30 ENCOUNTER — HOSPITAL ENCOUNTER (OUTPATIENT)
Dept: CARDIAC REHAB | Age: 73
Setting detail: THERAPIES SERIES
Discharge: HOME | End: 2023-06-30
Payer: MEDICARE

## 2023-06-30 PROCEDURE — G0423 INTENS CARDIAC REHAB NO EXER: HCPCS

## 2023-06-30 PROCEDURE — G0422 INTENS CARDIAC REHAB W/EXERC: HCPCS

## 2023-07-03 ENCOUNTER — HOSPITAL ENCOUNTER (OUTPATIENT)
Dept: CARDIAC REHAB | Age: 73
Setting detail: THERAPIES SERIES
Discharge: HOME | End: 2023-07-03
Payer: MEDICARE

## 2023-07-03 PROCEDURE — G0423 INTENS CARDIAC REHAB NO EXER: HCPCS

## 2023-07-03 PROCEDURE — G0422 INTENS CARDIAC REHAB W/EXERC: HCPCS

## 2023-07-03 NOTE — PROGRESS NOTES
Video Education Report - ICR/CR  Name:  Giselle Santos     Date:  7/3/2023  MRN: [id number]     Session #:    Session Length: 40 min    Core Videos        []Heart Disease Risk Reduction       []Overview of Pritikin Eating Plan      []Move it          []Calorie Density         []Healthy Minds, Bodies, Hearts        []Label Reading - Part 1       []Metabolic Syndrome and Belly Fat        [x]How Our Thoughts Can Heal Our Hearts   []Dining Out - Part 1      []Biomechanical Limitations  []Facts on Fat        []Hypertension & Heart Disease    []Diseases of Our Time - Focusing on Diabetes   []Body Composition  []Nutrition Action Plan    []Exercise Action Plan    Exercise      Healthy Mind-Set  []Improving Performance    []Smoking Cessation    []Introduction to [address]  []Aging-Enhancing the Quality of Your Life  []Becoming a Pritikin   []Biology of Weight Control    []Cooking Breakfasts   []Decoding Lab Results    and Snacks  []Diseases of Our Time - Overview   []Cooking Dinner and   []Sleep Disorders     Sides  []Targeting Your Nutrition Priorities   []Healthy Salads &   Dressings  Nutrition      []Cooking Soups and   []Dining Out - Part 2     Desserts  []Fueling a Healthy Body   []Menu Workshop     Overview  []Planning Your Eating Strategy   []The Pritikin Solution  []Vitamins and Minerals    Comments:  Video completed, group discussion

## 2023-07-06 ENCOUNTER — HOSPITAL ENCOUNTER (OUTPATIENT)
Dept: INFUSION THERAPY | Age: 73
Discharge: HOME | End: 2023-07-06
Payer: MEDICARE

## 2023-07-06 ENCOUNTER — OFFICE VISIT (OUTPATIENT)
Dept: ONCOLOGY | Age: 73
End: 2023-07-06
Payer: MEDICARE

## 2023-07-06 VITALS
SYSTOLIC BLOOD PRESSURE: 127 MMHG | TEMPERATURE: 98 F | HEIGHT: 65 IN | RESPIRATION RATE: 20 BRPM | OXYGEN SATURATION: 95 % | HEART RATE: 78 BPM | WEIGHT: 183.6 LBS | BODY MASS INDEX: 30.59 KG/M2 | DIASTOLIC BLOOD PRESSURE: 60 MMHG

## 2023-07-06 VITALS
SYSTOLIC BLOOD PRESSURE: 127 MMHG | RESPIRATION RATE: 20 BRPM | DIASTOLIC BLOOD PRESSURE: 60 MMHG | WEIGHT: 183.6 LBS | HEART RATE: 78 BPM | TEMPERATURE: 98 F | HEIGHT: 65 IN | OXYGEN SATURATION: 95 % | BODY MASS INDEX: 30.59 KG/M2

## 2023-07-06 DIAGNOSIS — Z17.0 MALIGNANT NEOPLASM OF UPPER-OUTER QUADRANT OF LEFT BREAST IN FEMALE, ESTROGEN RECEPTOR POSITIVE (HCC): ICD-10-CM

## 2023-07-06 DIAGNOSIS — M81.8 OTHER OSTEOPOROSIS WITHOUT CURRENT PATHOLOGICAL FRACTURE: Primary | ICD-10-CM

## 2023-07-06 DIAGNOSIS — Z12.31 ENCOUNTER FOR SCREENING MAMMOGRAM FOR MALIGNANT NEOPLASM OF BREAST: ICD-10-CM

## 2023-07-06 DIAGNOSIS — C50.412 MALIGNANT NEOPLASM OF UPPER-OUTER QUADRANT OF LEFT BREAST IN FEMALE, ESTROGEN RECEPTOR POSITIVE (HCC): ICD-10-CM

## 2023-07-06 PROCEDURE — G8399 PT W/DXA RESULTS DOCUMENT: HCPCS | Performed by: INTERNAL MEDICINE

## 2023-07-06 PROCEDURE — 99215 OFFICE O/P EST HI 40 MIN: CPT | Performed by: INTERNAL MEDICINE

## 2023-07-06 PROCEDURE — G8417 CALC BMI ABV UP PARAM F/U: HCPCS | Performed by: INTERNAL MEDICINE

## 2023-07-06 PROCEDURE — 1090F PRES/ABSN URINE INCON ASSESS: CPT | Performed by: INTERNAL MEDICINE

## 2023-07-06 PROCEDURE — 99211 OFF/OP EST MAY X REQ PHY/QHP: CPT

## 2023-07-06 PROCEDURE — 1123F ACP DISCUSS/DSCN MKR DOCD: CPT | Performed by: INTERNAL MEDICINE

## 2023-07-06 PROCEDURE — 3017F COLORECTAL CA SCREEN DOC REV: CPT | Performed by: INTERNAL MEDICINE

## 2023-07-06 PROCEDURE — G8427 DOCREV CUR MEDS BY ELIG CLIN: HCPCS | Performed by: INTERNAL MEDICINE

## 2023-07-06 PROCEDURE — 1036F TOBACCO NON-USER: CPT | Performed by: INTERNAL MEDICINE

## 2023-07-06 RX ORDER — SODIUM CHLORIDE 9 MG/ML
5-250 INJECTION, SOLUTION INTRAVENOUS PRN
OUTPATIENT
Start: 2023-07-06

## 2023-07-06 RX ORDER — HEPARIN SODIUM (PORCINE) LOCK FLUSH IV SOLN 100 UNIT/ML 100 UNIT/ML
500 SOLUTION INTRAVENOUS PRN
OUTPATIENT
Start: 2023-07-06

## 2023-07-06 RX ORDER — SODIUM CHLORIDE 0.9 % (FLUSH) 0.9 %
5-40 SYRINGE (ML) INJECTION PRN
OUTPATIENT
Start: 2023-07-06

## 2023-07-06 RX ORDER — ZOLEDRONIC ACID 5 MG/100ML
5 INJECTION, SOLUTION INTRAVENOUS ONCE
OUTPATIENT
Start: 2023-07-06 | End: 2023-07-06

## 2023-07-06 RX ORDER — ACETAMINOPHEN 325 MG/1
650 TABLET ORAL
OUTPATIENT
Start: 2023-07-06

## 2023-07-06 RX ORDER — EPINEPHRINE 1 MG/ML
0.3 INJECTION, SOLUTION, CONCENTRATE INTRAVENOUS PRN
OUTPATIENT
Start: 2023-07-06

## 2023-07-06 RX ORDER — DIPHENHYDRAMINE HYDROCHLORIDE 50 MG/ML
50 INJECTION INTRAMUSCULAR; INTRAVENOUS
OUTPATIENT
Start: 2023-07-06

## 2023-07-06 RX ORDER — ONDANSETRON 2 MG/ML
8 INJECTION INTRAMUSCULAR; INTRAVENOUS
OUTPATIENT
Start: 2023-07-06

## 2023-07-06 RX ORDER — ALBUTEROL SULFATE 90 UG/1
4 AEROSOL, METERED RESPIRATORY (INHALATION) PRN
OUTPATIENT
Start: 2023-07-06

## 2023-07-06 RX ORDER — FAMOTIDINE 10 MG/ML
20 INJECTION, SOLUTION INTRAVENOUS
OUTPATIENT
Start: 2023-07-06

## 2023-07-06 RX ORDER — SODIUM CHLORIDE 9 MG/ML
INJECTION, SOLUTION INTRAVENOUS CONTINUOUS
OUTPATIENT
Start: 2023-07-06

## 2023-07-06 ASSESSMENT — ENCOUNTER SYMPTOMS
SHORTNESS OF BREATH: 0
COUGH: 0

## 2023-07-06 NOTE — PATIENT INSTRUCTIONS
Reviewed labs and recent medical history. Discussed her Arimidex and the side effects. She continues to take as directed with plan to stop in October. Due for her Reclast in September. New order placed. Return to see PA in September with her Reclast appt. Due for Mammogram in October. Order placed.   Return to see MD/PA in 6 months

## 2023-07-06 NOTE — PROGRESS NOTES
Findings: Bruising (on legs with scratches.) present. Neurological:      General: No focal deficit present. Mental Status: She is alert and oriented to person, place, and time. Mental status is at baseline. Cranial Nerves: No cranial nerve deficit. Motor: No weakness. Gait: Gait normal.   Psychiatric:         Mood and Affect: Mood normal.         Behavior: Behavior normal.         Thought Content: Thought content normal.         Judgment: Judgment normal.   She has no significant palpable adenopathy in the head neck region, axillary or supraclavicular regions bilaterally    ECOG STATUS    1:  Restricted in physically strenuous activity but ambulatory and able to carry out work of a light or sedentary nature, e.g., light house work, office work    LABS/IMAGING    MRI BRAIN W WO CONTRAST    Result Date: 6/29/2023   1. Resolved enhancement in the left cerebellum. There has been interval development of volume loss. 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 2. Moderate severity chronic small vessel ischemic changes. **This report has been created using voice recognition software. It may contain minor errors which are inherent in voice recognition technology. ** Final report electronically signed by Dr. Stephanie Butcher on 6/29/2023 8:23 AM             PATHOLOGY/GENETICS    Left-sided breast cancer, invasive ductal carcinoma, 7 mm, grade 1, ER positive in 100% of the cells, KY positive in 70% of the cells, HER2 negative. Colo nodes 3 out of 3 negative. No post lumpectomy radiation. Started on adjuvant hormonal therapy with Arimidex in October 2018. ASSESSMENT and PLAN    1.  Left-sided breast cancer, invasive ductal carcinoma, 7 mm, grade 1, ER positive in 100% of the cells, KY positive in 70% of the cells, HER2 negative. Colo nodes 3 out of 3 negative. No post lumpectomy radiation. Started on adjuvant hormonal therapy with Arimidex in October 2018.   She is

## 2023-07-07 ENCOUNTER — HOSPITAL ENCOUNTER (OUTPATIENT)
Dept: CARDIAC REHAB | Age: 73
Setting detail: THERAPIES SERIES
Discharge: HOME | End: 2023-07-07
Payer: MEDICARE

## 2023-07-07 PROCEDURE — G0422 INTENS CARDIAC REHAB W/EXERC: HCPCS

## 2023-07-07 PROCEDURE — G0423 INTENS CARDIAC REHAB NO EXER: HCPCS

## 2023-07-07 NOTE — PROGRESS NOTES
Amador VALENCIA.:  1950  Acct Number: [de-identified]  MRN:  [id number]                  [facility] COOKING SCHOOL WORKSHOP             Date: 2023        Session # ________   Ilene Kussmaul class covered:    [address]    [] Adding Flavor - Sodium-Free  [x] Fast & Healthy Breakfasts    [] Powerhouse Plant-Based Proteins [] Satisfying Salads and Dressings    [] Simple Sides & Sauces   [] Personalizing Your Plate    [] Delicious Desserts    [] Savory Soups    [] Efficiency Cooking - Meals in a Snap [] Tasty Appetizers & Snacks     [] Comforting Weekend Breakfasts  [] One-Pot Wonders    [] Fast Evening Meals   [] Easy Entertaining    []  International Cuisine Spotlight on the Blue Zone          Patients were shown how to choose, prep, and cook; substitutions and other options were given. Samples were offered. Recipes were given and questions answered. The patient above was in the [employer] for 40 minutes.       Electronically signed by Katie Blue RD on 2023 at 10:16 AM

## 2023-07-07 NOTE — PROGRESS NOTES
Misa VALENCIA.:  1950  Acct Number: [de-identified]  MRN:  [id number]                  [facility] COOKING SCHOOL WORKSHOP             Date: 2023        Session # ________   Star Record class covered:    [address]    [] Adding Flavor - Sodium-Free  [] Fast & Healthy Breakfasts    [] Powerhouse Plant-Based Proteins [] Satisfying Salads and Dressings    [] Simple Sides & Sauces   [] Personalizing Your Plate    [] Delicious Desserts    [] Savory Soups    [] Efficiency Cooking - Meals in a Snap [] Tasty Appetizers & Snacks     [] Comforting Weekend Breakfasts  [] One-Pot Wonders    [] Fast Evening Meals   [] Easy Entertaining    []  International Cuisine Spotlight on the Blue Zone          Patients were shown how to choose, prep, and cook; substitutions and other options were given. Samples were offered. Recipes were given and questions answered. The patient above was in the [employer] for 40 minutes.       Electronically signed by Jose Hunter RD on 2023 at 10:15 AM

## 2023-07-09 ASSESSMENT — ENCOUNTER SYMPTOMS
VOMITING: 0
TROUBLE SWALLOWING: 0
NAUSEA: 0
SORE THROAT: 0
ABDOMINAL PAIN: 0
DIARRHEA: 0
CONSTIPATION: 0

## 2023-07-10 ENCOUNTER — HOSPITAL ENCOUNTER (OUTPATIENT)
Dept: CARDIAC REHAB | Age: 73
Setting detail: THERAPIES SERIES
Discharge: HOME | End: 2023-07-10
Payer: MEDICARE

## 2023-07-10 ENCOUNTER — OFFICE VISIT (OUTPATIENT)
Dept: FAMILY MEDICINE CLINIC | Age: 73
End: 2023-07-10
Payer: MEDICARE

## 2023-07-10 VITALS
HEIGHT: 65 IN | OXYGEN SATURATION: 95 % | HEART RATE: 64 BPM | BODY MASS INDEX: 30.92 KG/M2 | WEIGHT: 185.6 LBS | SYSTOLIC BLOOD PRESSURE: 122 MMHG | RESPIRATION RATE: 16 BRPM | TEMPERATURE: 98.7 F | DIASTOLIC BLOOD PRESSURE: 70 MMHG

## 2023-07-10 DIAGNOSIS — I10 ESSENTIAL HYPERTENSION: ICD-10-CM

## 2023-07-10 DIAGNOSIS — E11.40 TYPE 2 DIABETES MELLITUS WITH DIABETIC NEUROPATHY, WITH LONG-TERM CURRENT USE OF INSULIN (HCC): Primary | ICD-10-CM

## 2023-07-10 DIAGNOSIS — Z79.4 TYPE 2 DIABETES MELLITUS WITH DIABETIC NEUROPATHY, WITH LONG-TERM CURRENT USE OF INSULIN (HCC): Primary | ICD-10-CM

## 2023-07-10 DIAGNOSIS — I25.10 ASCVD (ARTERIOSCLEROTIC CARDIOVASCULAR DISEASE): ICD-10-CM

## 2023-07-10 DIAGNOSIS — E78.00 PURE HYPERCHOLESTEROLEMIA: ICD-10-CM

## 2023-07-10 PROCEDURE — G0423 INTENS CARDIAC REHAB NO EXER: HCPCS

## 2023-07-10 PROCEDURE — 1036F TOBACCO NON-USER: CPT | Performed by: FAMILY MEDICINE

## 2023-07-10 PROCEDURE — 2022F DILAT RTA XM EVC RTNOPTHY: CPT | Performed by: FAMILY MEDICINE

## 2023-07-10 PROCEDURE — 99214 OFFICE O/P EST MOD 30 MIN: CPT | Performed by: FAMILY MEDICINE

## 2023-07-10 PROCEDURE — 3078F DIAST BP <80 MM HG: CPT | Performed by: FAMILY MEDICINE

## 2023-07-10 PROCEDURE — 3074F SYST BP LT 130 MM HG: CPT | Performed by: FAMILY MEDICINE

## 2023-07-10 PROCEDURE — 1123F ACP DISCUSS/DSCN MKR DOCD: CPT | Performed by: FAMILY MEDICINE

## 2023-07-10 PROCEDURE — G8417 CALC BMI ABV UP PARAM F/U: HCPCS | Performed by: FAMILY MEDICINE

## 2023-07-10 PROCEDURE — 3046F HEMOGLOBIN A1C LEVEL >9.0%: CPT | Performed by: FAMILY MEDICINE

## 2023-07-10 PROCEDURE — G8427 DOCREV CUR MEDS BY ELIG CLIN: HCPCS | Performed by: FAMILY MEDICINE

## 2023-07-10 PROCEDURE — G8399 PT W/DXA RESULTS DOCUMENT: HCPCS | Performed by: FAMILY MEDICINE

## 2023-07-10 PROCEDURE — 3017F COLORECTAL CA SCREEN DOC REV: CPT | Performed by: FAMILY MEDICINE

## 2023-07-10 PROCEDURE — G0422 INTENS CARDIAC REHAB W/EXERC: HCPCS

## 2023-07-10 PROCEDURE — 1090F PRES/ABSN URINE INCON ASSESS: CPT | Performed by: FAMILY MEDICINE

## 2023-07-10 RX ORDER — PHENOL 1.4 %
10 AEROSOL, SPRAY (ML) MUCOUS MEMBRANE DAILY
COMMUNITY

## 2023-07-10 RX ORDER — ZOLEDRONIC ACID 5 MG/100ML
5 INJECTION, SOLUTION INTRAVENOUS ONCE
COMMUNITY

## 2023-07-10 RX ORDER — INSULIN LISPRO 100 [IU]/ML
INJECTION, SOLUTION INTRAVENOUS; SUBCUTANEOUS
COMMUNITY

## 2023-07-10 RX ORDER — ATORVASTATIN CALCIUM 40 MG/1
40 TABLET, FILM COATED ORAL NIGHTLY
Qty: 90 TABLET | Refills: 3 | Status: SHIPPED | OUTPATIENT
Start: 2023-07-10

## 2023-07-10 ASSESSMENT — ENCOUNTER SYMPTOMS
WHEEZING: 0
SHORTNESS OF BREATH: 0

## 2023-07-10 NOTE — PROGRESS NOTES
Video Education Report - ICR/CR  Name:  Koffi Jara     Date:  7/10/2023  MRN: [id number]     Session #:    Session Length: 40 min    Core Videos        []Heart Disease Risk Reduction       []Overview of Pritikin Eating Plan      []Move it          []Calorie Density         []Healthy Minds, Bodies, Hearts        []Label Reading - Part 1       []Metabolic Syndrome and Belly Fat        []How Our Thoughts Can Heal Our Hearts   [x]Dining Out - Part 1      []Biomechanical Limitations  []Facts on Fat        []Hypertension & Heart Disease    []Diseases of Our Time - Focusing on Diabetes   []Body Composition  []Nutrition Action Plan    []Exercise Action Plan    Exercise      Healthy Mind-Set  []Improving Performance    []Smoking Cessation    []Introduction to [address]  []Aging-Enhancing the Quality of Your Life  []Becoming a Pritikin   []Biology of Weight Control    []Cooking Breakfasts   []Decoding Lab Results    and Snacks  []Diseases of Our Time - Overview   []Cooking Dinner and   []Sleep Disorders     Sides  []Targeting Your Nutrition Priorities   []Healthy Salads &   Dressings  Nutrition      []Cooking Soups and   []Dining Out - Part 2     Desserts  []Fueling a Healthy Body   []Menu Workshop     Overview  []Planning Your Eating Strategy   []The Pritikin Solution  []Vitamins and Minerals    Comments:  Video completed, group discussion

## 2023-07-10 NOTE — PROGRESS NOTES
[facility] PROFESSIONAL SERVS  [facility] MEDICINE  [address] [address]  Dept: [phone]  Dept Fax: [phone]  Loc: [phone]  PROGRESS NOTE      Visit Date: 7/10/2023    Daniel Rodriguez is a 74 y.o. female who presents today for:  Chief Complaint   Patient presents with    Diabetes     F/U DM. Blood sugars at home usually runs good and doesn't need to use Insulin Lispro. A1C was 6.2 checked 6/27/2023. Subjective:  HPI    6-month follow-up    Type 2 diabetes: On Jardiance, Lantus, and insulin lispro. She follows with internal medicine clinic. A1c 6.2% in June 2023. Hypertension: On metoprolol, Benazepril, and amlodipine. Hyperlipidemia: On Lipitor. Had acute memory impairment with associated migraine/headaches: Status post left cerebellar infarct seen on MRI in March 2023 with repeat MRI performed in June. Seen by Dr. Carissa Alarcon, neurology, in [facility]. Has not needed ubrogepant. On lamictal 25 mg twice daily. cardiac rehab ends this friday    Review of Systems   Constitutional:  Negative for chills and fever. Respiratory:  Negative for shortness of breath and wheezing. Cardiovascular:  Positive for leg swelling. Negative for chest pain. Neurological:  Negative for headaches.    Patient Active Problem List   Diagnosis    Hyperlipidemia    Asthma    Polyneuropathy    AS (aortic stenosis)    Hiatal hernia    Hypothyroid    Type 2 diabetes mellitus without complication, without long-term current use of insulin (HCC)    Osteoarthritis of multiple joints    Ear canal mass    Diabetic ketoacidosis without coma associated with type 2 diabetes mellitus (HCC)    Malignant neoplasm of left breast in female, estrogen receptor positive ([address])    Localized osteoarthritis of left knee    Other osteoporosis without current pathological fracture    S/P repair of ventral hernia    Cervical stenosis of spinal canal    Tear of left supraspinatus

## 2023-07-12 ENCOUNTER — HOSPITAL ENCOUNTER (OUTPATIENT)
Dept: CARDIAC REHAB | Age: 73
Setting detail: THERAPIES SERIES
Discharge: HOME | End: 2023-07-12
Payer: MEDICARE

## 2023-07-12 PROCEDURE — G0423 INTENS CARDIAC REHAB NO EXER: HCPCS

## 2023-07-12 PROCEDURE — G0422 INTENS CARDIAC REHAB W/EXERC: HCPCS

## 2023-07-12 NOTE — PROGRESS NOTES
Video Education Report - ICR/CR  Name:  Dana Rhoades     Date:  7/12/2023  MRN: [id number]     Session #:    Session Length: 40 min    Core Videos        []Heart Disease Risk Reduction       []Overview of Pritikin Eating Plan      []Move it          []Calorie Density         []Healthy Minds, Bodies, Hearts        []Label Reading - Part 1       []Metabolic Syndrome and Belly Fat        []How Our Thoughts Can Heal Our Hearts   []Dining Out - Part 1      []Biomechanical Limitations  []Facts on Fat        []Hypertension & Heart Disease    []Diseases of Our Time - Focusing on Diabetes   []Body Composition  [x]Nutrition Action Plan    []Exercise Action Plan        Comments:  Video completed, group discussion

## 2023-07-14 ENCOUNTER — HOSPITAL ENCOUNTER (OUTPATIENT)
Dept: CARDIAC REHAB | Age: 73
Setting detail: THERAPIES SERIES
Discharge: HOME | End: 2023-07-14
Payer: MEDICARE

## 2023-07-14 VITALS — BODY MASS INDEX: 34.19 KG/M2 | WEIGHT: 185.8 LBS | HEIGHT: 62 IN

## 2023-07-14 PROCEDURE — G0422 INTENS CARDIAC REHAB W/EXERC: HCPCS

## 2023-07-14 PROCEDURE — G0423 INTENS CARDIAC REHAB NO EXER: HCPCS

## 2023-07-14 NOTE — PROGRESS NOTES
Video Education Report - ICR/CR  Name:  Deven Magallanes     Date:  7/14/2023  MRN: [id number]     Session #:  28  Session Length: 40 min      Exercise      Healthy Mind-Set  []Improving Performance    []Smoking Cessation    []Introduction to [facility]  []Aging-Enhancing the Quality of Your Life  []Becoming a Pritikin   []Biology of Weight Control    []Cooking Breakfasts   []Decoding Lab Results    and Snacks  []Diseases of Our Time - Overview   []Cooking Dinner and   []Sleep Disorders     Sides  []Targeting Your Nutrition Priorities   [x]Healthy Salads &   Dressings  Nutrition      []Cooking Soups and   []Dining Out - Part 2     Desserts  []Fueling a Healthy Body   []Menu Workshop     Overview  []Planning Your Eating Strategy   []The Pritikin Solution  []Vitamins and Minerals    Comments:  Video completed, group discussion

## 2023-07-14 NOTE — PLAN OF CARE
[facility] Facility-Based Program  Individualized Cardiac Treatment Plan    Patient Name:  Mendel Fix  :  1950  Age:  74 y.o. MRN:  [id number]  Diagnosis: TAV  Date of Event: 23   Physician:  Christiano Augustine MD  Next Office Visit:  May 2023  Date Entered Program: 3/28/23  Risk Stratifications: [] Low [x] Intermediate [] High  Allergies:    Allergies   Allergen Reactions    Amoxicillin     Bactrim [Sulfamethoxazole-Trimethoprim] Itching    Donnatal [Phenobarbital-Belladonna Alk] Other (See Comments)     palpitations    Lovastatin Other (See Comments)     myalgia    Metformin And Related Diarrhea    Vioxx Other (See Comments)     Liver problems    Dilaudid [Hydromorphone Hcl] Nausea And Vomiting    Fentanyl Nausea And Vomiting     Severe Nausea and vomitting       Individual Cardiac Treatment Plan -EXERCISE  INITIAL 30 DAY 60 DAY 90 DAY FINAL DAY   EXERCISE  ASSESSMENT/PLAN EXERCISE  REASSESSMENT EXERCISE   REASSESSMENT EXERCISE   REASSESSMENT EXERCISE  DISCHARGE/FOLLOW-UP   Date: 3/28/23 Date: 23 Date: 23 Date: 23 Date: 23   Session #1   Total Session #18  First Exercise Session:  4/3/23 Total Session # 42 Total Session # 60 Total Session # 72  Last Exercise Session:  23   Stages of Change Stages of Change Stages of Change Stages of Change Stages of Change   [] Pre Contemplation  [] Contemplation  [x] Preparation  [] Action  [] Maintenance  [] Relapse [] Pre Contemplation  [] Contemplation  [x] Preparation  [] Action  [] Maintenance  [] Relapse [] Pre Contemplation  [] Contemplation  [] Preparation  [x] Action  [] Maintenance  [] Relapse [] Pre Contemplation  [] Contemplation  [] Preparation  [x] Action  [] Maintenance  [] Relapse [] Pre Contemplation  [] Contemplation  [] Preparation  [x] Action  [] Maintenance  [] Relapse   EXERCISE ASSESSMENT EXERCISE ASSESSMENT EXERCISE ASSESSMENT EXERCISE ASSESSMENT EXERCISE

## 2023-09-15 ENCOUNTER — OFFICE VISIT (OUTPATIENT)
Dept: ONCOLOGY | Age: 73
End: 2023-09-15
Payer: MEDICARE

## 2023-09-15 ENCOUNTER — HOSPITAL ENCOUNTER (OUTPATIENT)
Dept: INFUSION THERAPY | Age: 73
Discharge: HOME | End: 2023-09-15
Payer: MEDICARE

## 2023-09-15 VITALS
OXYGEN SATURATION: 95 % | SYSTOLIC BLOOD PRESSURE: 134 MMHG | HEIGHT: 62 IN | DIASTOLIC BLOOD PRESSURE: 68 MMHG | WEIGHT: 184.2 LBS | BODY MASS INDEX: 33.9 KG/M2 | HEART RATE: 61 BPM | TEMPERATURE: 98.1 F

## 2023-09-15 VITALS
SYSTOLIC BLOOD PRESSURE: 141 MMHG | RESPIRATION RATE: 18 BRPM | OXYGEN SATURATION: 98 % | HEART RATE: 58 BPM | HEIGHT: 62 IN | DIASTOLIC BLOOD PRESSURE: 65 MMHG | WEIGHT: 184.2 LBS | BODY MASS INDEX: 33.9 KG/M2 | TEMPERATURE: 98.1 F

## 2023-09-15 DIAGNOSIS — M81.8 OTHER OSTEOPOROSIS WITHOUT CURRENT PATHOLOGICAL FRACTURE: ICD-10-CM

## 2023-09-15 DIAGNOSIS — Z17.0 MALIGNANT NEOPLASM OF UPPER-OUTER QUADRANT OF LEFT BREAST IN FEMALE, ESTROGEN RECEPTOR POSITIVE (HCC): Primary | ICD-10-CM

## 2023-09-15 DIAGNOSIS — C50.412 MALIGNANT NEOPLASM OF UPPER-OUTER QUADRANT OF LEFT BREAST IN FEMALE, ESTROGEN RECEPTOR POSITIVE (HCC): Primary | ICD-10-CM

## 2023-09-15 LAB
ABSOLUTE IMMATURE GRANULOCYTE: 0.01 THOU/MM3 (ref 0–0.07)
ALBUMIN SERPL BCG-MCNC: 4.3 G/DL (ref 3.5–5.1)
ALP SERPL-CCNC: 79 U/L (ref 38–126)
ALT SERPL W/O P-5'-P-CCNC: 21 U/L (ref 11–66)
AST SERPL-CCNC: 21 U/L (ref 5–40)
BASOPHILS ABSOLUTE: 0 THOU/MM3 (ref 0–0.1)
BASOPHILS NFR BLD AUTO: 0 % (ref 0–3)
BILIRUB CONJ SERPL-MCNC: < 0.2 MG/DL (ref 0–0.3)
BILIRUB SERPL-MCNC: 1.4 MG/DL (ref 0.3–1.2)
BUN BLDP-MCNC: 21 MG/DL (ref 8–26)
CHLORIDE BLD-SCNC: 107 MEQ/L (ref 98–109)
CREAT BLD-MCNC: 0.6 MG/DL (ref 0.5–1.2)
EOSINOPHIL NFR BLD AUTO: 2 % (ref 0–4)
EOSINOPHILS ABSOLUTE: 0.1 THOU/MM3 (ref 0–0.4)
ERYTHROCYTE [DISTWIDTH] IN BLOOD BY AUTOMATED COUNT: 12.1 % (ref 11.5–14.5)
GFR SERPL CREATININE-BSD FRML MDRD: > 60 ML/MIN/1.73M2
GLUCOSE BLD-MCNC: 76 MG/DL (ref 70–108)
HCT VFR BLD AUTO: 46.8 % (ref 37–47)
HGB BLD-MCNC: 14.5 GM/DL (ref 12–16)
IMMATURE GRANULOCYTES: 0 %
IONIZED CALCIUM, WHOLE BLOOD: 1.13 MMOL/L (ref 1.12–1.32)
LYMPHOCYTES ABSOLUTE: 1.3 THOU/MM3 (ref 1–4.8)
LYMPHOCYTES NFR BLD AUTO: 23 % (ref 15–47)
MCH RBC QN AUTO: 30 PG (ref 26–33)
MCHC RBC AUTO-ENTMCNC: 31 GM/DL (ref 32.2–35.5)
MCV RBC AUTO: 97 FL (ref 81–99)
MONOCYTES ABSOLUTE: 0.5 THOU/MM3 (ref 0.4–1.3)
MONOCYTES NFR BLD AUTO: 8 % (ref 0–12)
NEUTROPHILS NFR BLD AUTO: 67 % (ref 43–75)
PLATELET # BLD AUTO: 170 THOU/MM3 (ref 130–400)
PMV BLD AUTO: 9.5 FL (ref 9.4–12.4)
POTASSIUM BLD-SCNC: 4.8 MEQ/L (ref 3.5–4.9)
PROT SERPL-MCNC: 6.8 G/DL (ref 6.1–8)
RBC # BLD AUTO: 4.84 MILL/MM3 (ref 4.2–5.4)
SEGMENTED NEUTROPHILS ABSOLUTE COUNT: 3.9 THOU/MM3 (ref 1.8–7.7)
SODIUM BLD-SCNC: 143 MEQ/L (ref 138–146)
TOTAL CO2, WHOLE BLOOD: 28 MEQ/L (ref 23–33)
WBC # BLD AUTO: 5.9 THOU/MM3 (ref 4.8–10.8)

## 2023-09-15 PROCEDURE — G8417 CALC BMI ABV UP PARAM F/U: HCPCS | Performed by: INTERNAL MEDICINE

## 2023-09-15 PROCEDURE — 99213 OFFICE O/P EST LOW 20 MIN: CPT | Performed by: INTERNAL MEDICINE

## 2023-09-15 PROCEDURE — 1123F ACP DISCUSS/DSCN MKR DOCD: CPT | Performed by: INTERNAL MEDICINE

## 2023-09-15 PROCEDURE — 85025 COMPLETE CBC W/AUTO DIFF WBC: CPT

## 2023-09-15 PROCEDURE — 2580000003 HC RX 258: Performed by: INTERNAL MEDICINE

## 2023-09-15 PROCEDURE — 36415 COLL VENOUS BLD VENIPUNCTURE: CPT

## 2023-09-15 PROCEDURE — 6360000002 HC RX W HCPCS: Performed by: INTERNAL MEDICINE

## 2023-09-15 PROCEDURE — G8427 DOCREV CUR MEDS BY ELIG CLIN: HCPCS | Performed by: INTERNAL MEDICINE

## 2023-09-15 PROCEDURE — G8399 PT W/DXA RESULTS DOCUMENT: HCPCS | Performed by: INTERNAL MEDICINE

## 2023-09-15 PROCEDURE — 3017F COLORECTAL CA SCREEN DOC REV: CPT | Performed by: INTERNAL MEDICINE

## 2023-09-15 PROCEDURE — 1036F TOBACCO NON-USER: CPT | Performed by: INTERNAL MEDICINE

## 2023-09-15 PROCEDURE — 80047 BASIC METABLC PNL IONIZED CA: CPT

## 2023-09-15 PROCEDURE — 96365 THER/PROPH/DIAG IV INF INIT: CPT

## 2023-09-15 PROCEDURE — 1090F PRES/ABSN URINE INCON ASSESS: CPT | Performed by: INTERNAL MEDICINE

## 2023-09-15 PROCEDURE — 80076 HEPATIC FUNCTION PANEL: CPT

## 2023-09-15 RX ORDER — HEPARIN 100 UNIT/ML
500 SYRINGE INTRAVENOUS PRN
OUTPATIENT
Start: 2024-09-08

## 2023-09-15 RX ORDER — SODIUM CHLORIDE 9 MG/ML
5-250 INJECTION, SOLUTION INTRAVENOUS PRN
OUTPATIENT
Start: 2024-09-08

## 2023-09-15 RX ORDER — ZOLEDRONIC ACID 5 MG/100ML
5 INJECTION, SOLUTION INTRAVENOUS ONCE
OUTPATIENT
Start: 2024-09-08 | End: 2024-09-08

## 2023-09-15 RX ORDER — SODIUM CHLORIDE 0.9 % (FLUSH) 0.9 %
5-40 SYRINGE (ML) INJECTION PRN
OUTPATIENT
Start: 2024-09-08

## 2023-09-15 RX ORDER — ACETAMINOPHEN 325 MG/1
650 TABLET ORAL
OUTPATIENT
Start: 2024-09-08

## 2023-09-15 RX ORDER — SODIUM CHLORIDE 9 MG/ML
5-250 INJECTION, SOLUTION INTRAVENOUS PRN
Status: DISCONTINUED | OUTPATIENT
Start: 2023-09-15 | End: 2023-09-16 | Stop reason: HOSPADM

## 2023-09-15 RX ORDER — ONDANSETRON 2 MG/ML
8 INJECTION INTRAMUSCULAR; INTRAVENOUS
OUTPATIENT
Start: 2024-09-08

## 2023-09-15 RX ORDER — ZOLEDRONIC ACID 5 MG/100ML
5 INJECTION, SOLUTION INTRAVENOUS ONCE
Status: COMPLETED | OUTPATIENT
Start: 2023-09-15 | End: 2023-09-15

## 2023-09-15 RX ORDER — DIPHENHYDRAMINE HYDROCHLORIDE 50 MG/ML
50 INJECTION INTRAMUSCULAR; INTRAVENOUS
OUTPATIENT
Start: 2024-09-08

## 2023-09-15 RX ORDER — EPINEPHRINE 1 MG/ML
0.3 INJECTION, SOLUTION INTRAMUSCULAR; SUBCUTANEOUS PRN
OUTPATIENT
Start: 2024-09-08

## 2023-09-15 RX ORDER — ALBUTEROL SULFATE 90 UG/1
4 AEROSOL, METERED RESPIRATORY (INHALATION) PRN
OUTPATIENT
Start: 2024-09-08

## 2023-09-15 RX ORDER — SODIUM CHLORIDE 9 MG/ML
INJECTION, SOLUTION INTRAVENOUS CONTINUOUS
OUTPATIENT
Start: 2024-09-08

## 2023-09-15 RX ADMIN — ZOLEDRONIC ACID 5 MG: 5 INJECTION, SOLUTION INTRAVENOUS at 11:21

## 2023-09-15 RX ADMIN — SODIUM CHLORIDE 20 ML/HR: 9 INJECTION, SOLUTION INTRAVENOUS at 11:17

## 2023-09-15 NOTE — PROGRESS NOTES
[facility] [address] [place] [address] Chidi Rawls [phone] [facility] [address] [address]  Dept: [phone]  Dept Fax: [phone]: [phone]      Visit Date:9/15/2023     Deven Magallanes is a 74 y.o. female who presents today for:   Chief Complaint   Patient presents with    Follow-up     History of right breast cancer        HPI:   Deven Magallanes is a 74 y.o. female referred to Hematology/Oncology clinic for evaluation of breast cancer. The patient originally presented in September 2018 with a new area of clustered calcifications in the left breast.  Needle biopsy demonstrated 7 mm invasive ductal carcinoma ER/DE positive HER2/oleg negative. Patient underwent a left sided lumpectomy and sentinel lymph node biopsy in October 2018. Patient did not receive adjuvant radiation therapy but did start adjuvant hormonal therapy in October 2018 with Arimidex. The patient did have osteoporosis and was started on Zometa. She has had improvement to osteopenia. She has now completed 5 years of treatment with Arimidex. She reports she currently feels well she denies fevers chills nausea vomiting changes in bowel or bladder habits no headaches no breast pain or new breast masses.   Mammogram was done in October of last year that was normal            Past Medical History:   Diagnosis Date    Arthritis     Asthma     Brain cyst     benign    Breast cancer ([address]) 09/18/2018    Left breast, 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    Cancer ([address]) 09/1918    Left Breast    Chronic sinus complaints     Diverticulosis of colon     DKA (diabetic ketoacidoses) 05/2018    Elevated TSH     Hypertension     Osteoarthritis     Polyneuropathy     PONV (postoperative nausea and vomiting)     Spinal headache     Type 2 diabetes mellitus ([address]) 1990's      Past Surgical History:   Procedure Laterality Date    AORTIC VALVE REPAIR  02/22/2023    osu    BLADDER SUSPENSION      times 2    BREAST LUMPECTOMY Left 10/10/2018

## 2023-09-15 NOTE — DISCHARGE INSTRUCTIONS
Please contact your Oncologist if you have any questions regarding the Reclast that you received today. You are instructed to call the office or go to the Emergency Dept. If you experience any of the following symptoms:    Dizziness/lightheadedness   Acute nausea or vomiting-not relieved by medications  Headaches-not relieved by medications  New chest pain or pressure  New rash /itching  New shortness of breath  Fever,chills or signs or symptoms of infection    Make sure you are drinking 48 to 64 ounces of water daily-if you are unable to drink fluids let us know right away.

## 2023-10-09 ENCOUNTER — HOSPITAL ENCOUNTER (OUTPATIENT)
Dept: WOMENS IMAGING | Age: 73
Discharge: HOME | End: 2023-10-09
Attending: INTERNAL MEDICINE
Payer: MEDICARE

## 2023-10-09 VITALS — BODY MASS INDEX: 33.86 KG/M2 | WEIGHT: 184 LBS | HEIGHT: 62 IN

## 2023-10-09 DIAGNOSIS — C50.412 MALIGNANT NEOPLASM OF UPPER-OUTER QUADRANT OF LEFT BREAST IN FEMALE, ESTROGEN RECEPTOR POSITIVE (HCC): ICD-10-CM

## 2023-10-09 DIAGNOSIS — Z17.0 MALIGNANT NEOPLASM OF UPPER-OUTER QUADRANT OF LEFT BREAST IN FEMALE, ESTROGEN RECEPTOR POSITIVE (HCC): ICD-10-CM

## 2023-10-09 DIAGNOSIS — Z12.31 VISIT FOR SCREENING MAMMOGRAM: ICD-10-CM

## 2023-10-09 DIAGNOSIS — Z12.31 ENCOUNTER FOR SCREENING MAMMOGRAM FOR MALIGNANT NEOPLASM OF BREAST: ICD-10-CM

## 2023-10-09 PROCEDURE — 77063 BREAST TOMOSYNTHESIS BI: CPT

## 2024-01-07 SDOH — HEALTH STABILITY: PHYSICAL HEALTH: ON AVERAGE, HOW MANY DAYS PER WEEK DO YOU ENGAGE IN MODERATE TO STRENUOUS EXERCISE (LIKE A BRISK WALK)?: 1 DAY

## 2024-01-07 SDOH — HEALTH STABILITY: PHYSICAL HEALTH: ON AVERAGE, HOW MANY MINUTES DO YOU ENGAGE IN EXERCISE AT THIS LEVEL?: 10 MIN

## 2024-01-07 ASSESSMENT — PATIENT HEALTH QUESTIONNAIRE - PHQ9
SUM OF ALL RESPONSES TO PHQ QUESTIONS 1-9: 0
SUM OF ALL RESPONSES TO PHQ9 QUESTIONS 1 & 2: 0
2. FEELING DOWN, DEPRESSED OR HOPELESS: 0
SUM OF ALL RESPONSES TO PHQ QUESTIONS 1-9: 0
1. LITTLE INTEREST OR PLEASURE IN DOING THINGS: 0

## 2024-01-07 ASSESSMENT — LIFESTYLE VARIABLES
HOW MANY STANDARD DRINKS CONTAINING ALCOHOL DO YOU HAVE ON A TYPICAL DAY: 1
HOW OFTEN DO YOU HAVE A DRINK CONTAINING ALCOHOL: MONTHLY OR LESS
HOW OFTEN DO YOU HAVE SIX OR MORE DRINKS ON ONE OCCASION: 1
HOW MANY STANDARD DRINKS CONTAINING ALCOHOL DO YOU HAVE ON A TYPICAL DAY: 1 OR 2
HOW OFTEN DO YOU HAVE A DRINK CONTAINING ALCOHOL: 2

## 2024-01-10 ENCOUNTER — OFFICE VISIT (OUTPATIENT)
Dept: FAMILY MEDICINE CLINIC | Age: 74
End: 2024-01-10
Payer: MEDICARE

## 2024-01-10 VITALS
DIASTOLIC BLOOD PRESSURE: 70 MMHG | SYSTOLIC BLOOD PRESSURE: 110 MMHG | OXYGEN SATURATION: 95 % | TEMPERATURE: 98 F | HEART RATE: 65 BPM | BODY MASS INDEX: 35.55 KG/M2 | RESPIRATION RATE: 16 BRPM | HEIGHT: 62 IN | WEIGHT: 193.2 LBS

## 2024-01-10 DIAGNOSIS — F39 MOOD DISORDER (HCC): ICD-10-CM

## 2024-01-10 DIAGNOSIS — M25.562 CHRONIC PAIN OF LEFT KNEE: ICD-10-CM

## 2024-01-10 DIAGNOSIS — E66.01 SEVERE OBESITY (BMI 35.0-39.9) WITH COMORBIDITY (HCC): ICD-10-CM

## 2024-01-10 DIAGNOSIS — Z00.00 MEDICARE ANNUAL WELLNESS VISIT, SUBSEQUENT: Primary | ICD-10-CM

## 2024-01-10 DIAGNOSIS — Z79.4 TYPE 2 DIABETES MELLITUS WITH DIABETIC NEUROPATHY, WITH LONG-TERM CURRENT USE OF INSULIN (HCC): ICD-10-CM

## 2024-01-10 DIAGNOSIS — E11.40 TYPE 2 DIABETES MELLITUS WITH DIABETIC NEUROPATHY, WITH LONG-TERM CURRENT USE OF INSULIN (HCC): ICD-10-CM

## 2024-01-10 DIAGNOSIS — G89.29 CHRONIC PAIN OF LEFT KNEE: ICD-10-CM

## 2024-01-10 PROCEDURE — G8484 FLU IMMUNIZE NO ADMIN: HCPCS | Performed by: FAMILY MEDICINE

## 2024-01-10 PROCEDURE — 3046F HEMOGLOBIN A1C LEVEL >9.0%: CPT | Performed by: FAMILY MEDICINE

## 2024-01-10 PROCEDURE — 1123F ACP DISCUSS/DSCN MKR DOCD: CPT | Performed by: FAMILY MEDICINE

## 2024-01-10 PROCEDURE — G0439 PPPS, SUBSEQ VISIT: HCPCS | Performed by: FAMILY MEDICINE

## 2024-01-10 PROCEDURE — 3017F COLORECTAL CA SCREEN DOC REV: CPT | Performed by: FAMILY MEDICINE

## 2024-01-10 RX ORDER — IBUPROFEN 600 MG/1
600 TABLET ORAL EVERY 6 HOURS PRN
COMMUNITY

## 2024-01-10 RX ORDER — UBIDECARENONE 50 MG
CAPSULE ORAL
COMMUNITY

## 2024-01-10 NOTE — PROGRESS NOTES
Medicare Annual Wellness Visit    Celia Perkins is here for Medicare AWV (Patient has something's to discuss with provider )    Assessment & Plan   Medicare annual wellness visit, subsequent  Type 2 diabetes mellitus with diabetic neuropathy, with long-term current use of insulin (HCC)  -     Lipid Panel; Future  -     Comprehensive Metabolic Panel; Future  -     CBC; Future  Mood disorder (HCC)  Severe obesity (BMI 35.0-39.9) with comorbidity (HCC)  Chronic pain of left knee  -     XR KNEE LEFT (MIN 4 VIEWS); Future      Type 2 diabetes: Chronic.  Likely controlled.  Follows with internal medicine clinic.  Planning to either follow in this office or with endocrinology after her internal medicine visit tomorrow.  A1c to be done tomorrow at internal medicine clinic.  On Lantus, Humalog and Jardiance    Severe obesity: Chronic.  Worse.  Encouraged weight loss.  Recommend healthy diet and physical activity    Left knee pain secondary to arthritis.  Will plan on steroid injection in February prior to her trip to [facility]    Recommendations for Preventive Services Due: see orders and patient instructions/AVS.  Recommended screening schedule for the next 5-10 years is provided to the patient in written form: see Patient Instructions/AVS.     Return in about 6 weeks (around 2/21/2024) for left knee steroid injection; .     Subjective       Left knee pain has been worsening.  Has OA    DM type 2:  Uses [facility]. Has been seen at internal medicine    Has been in [place] and tx in nov and dec.   Going to [facility] in march.    Patient's complete Health Risk Assessment and screening values have been reviewed and are found in Flowsheets. The following problems were reviewed today and where indicated follow up appointments were made and/or referrals ordered.    Positive Risk Factor Screenings with Interventions:                Activity, Diet, and Weight:  On average, how many days per week do you engage in moderate to strenuous exercise

## 2024-01-11 ENCOUNTER — TELEPHONE (OUTPATIENT)
Dept: FAMILY MEDICINE CLINIC | Age: 74
End: 2024-01-11

## 2024-01-11 NOTE — TELEPHONE ENCOUNTER
Patient wanted me to let you know that she has decided to have you manage her Diabetes. She scheduled for May 13th for Diabetes follow up.

## 2024-02-09 ENCOUNTER — HOSPITAL ENCOUNTER (OUTPATIENT)
Dept: GENERAL RADIOLOGY | Age: 74
Discharge: HOME | End: 2024-02-09
Payer: MEDICARE

## 2024-02-09 ENCOUNTER — HOSPITAL ENCOUNTER (OUTPATIENT)
Age: 74
Discharge: HOME | End: 2024-02-09
Payer: MEDICARE

## 2024-02-09 DIAGNOSIS — M25.562 CHRONIC PAIN OF LEFT KNEE: ICD-10-CM

## 2024-02-09 DIAGNOSIS — G89.29 CHRONIC PAIN OF LEFT KNEE: ICD-10-CM

## 2024-02-09 PROCEDURE — 73564 X-RAY EXAM KNEE 4 OR MORE: CPT

## 2024-02-13 ENCOUNTER — OFFICE VISIT (OUTPATIENT)
Dept: FAMILY MEDICINE CLINIC | Age: 74
End: 2024-02-13
Payer: MEDICARE

## 2024-02-13 VITALS
BODY MASS INDEX: 35.88 KG/M2 | DIASTOLIC BLOOD PRESSURE: 70 MMHG | TEMPERATURE: 97.3 F | WEIGHT: 195 LBS | SYSTOLIC BLOOD PRESSURE: 102 MMHG | HEIGHT: 62 IN | RESPIRATION RATE: 16 BRPM | OXYGEN SATURATION: 95 % | HEART RATE: 66 BPM

## 2024-02-13 DIAGNOSIS — R89.9 ABNORMAL LABORATORY TEST RESULT: Primary | ICD-10-CM

## 2024-02-13 DIAGNOSIS — M17.12 ARTHRITIS OF LEFT KNEE: ICD-10-CM

## 2024-02-13 DIAGNOSIS — M25.562 CHRONIC PAIN OF LEFT KNEE: ICD-10-CM

## 2024-02-13 DIAGNOSIS — G89.29 CHRONIC PAIN OF LEFT KNEE: ICD-10-CM

## 2024-02-13 PROCEDURE — 99212 OFFICE O/P EST SF 10 MIN: CPT | Performed by: FAMILY MEDICINE

## 2024-02-13 PROCEDURE — 1123F ACP DISCUSS/DSCN MKR DOCD: CPT | Performed by: FAMILY MEDICINE

## 2024-02-13 PROCEDURE — 3017F COLORECTAL CA SCREEN DOC REV: CPT | Performed by: FAMILY MEDICINE

## 2024-02-13 PROCEDURE — G8417 CALC BMI ABV UP PARAM F/U: HCPCS | Performed by: FAMILY MEDICINE

## 2024-02-13 PROCEDURE — G8484 FLU IMMUNIZE NO ADMIN: HCPCS | Performed by: FAMILY MEDICINE

## 2024-02-13 PROCEDURE — 20610 DRAIN/INJ JOINT/BURSA W/O US: CPT | Performed by: FAMILY MEDICINE

## 2024-02-13 PROCEDURE — 1036F TOBACCO NON-USER: CPT | Performed by: FAMILY MEDICINE

## 2024-02-13 PROCEDURE — G8427 DOCREV CUR MEDS BY ELIG CLIN: HCPCS | Performed by: FAMILY MEDICINE

## 2024-02-13 PROCEDURE — G8399 PT W/DXA RESULTS DOCUMENT: HCPCS | Performed by: FAMILY MEDICINE

## 2024-02-13 PROCEDURE — 1090F PRES/ABSN URINE INCON ASSESS: CPT | Performed by: FAMILY MEDICINE

## 2024-02-13 RX ORDER — LIDOCAINE HYDROCHLORIDE 10 MG/ML
6 INJECTION, SOLUTION INFILTRATION; PERINEURAL ONCE
Status: COMPLETED | OUTPATIENT
Start: 2024-02-13 | End: 2024-02-13

## 2024-02-13 RX ORDER — OMEPRAZOLE 20 MG/1
CAPSULE, DELAYED RELEASE ORAL
COMMUNITY
End: 2024-02-13

## 2024-02-13 RX ORDER — TRIAMCINOLONE ACETONIDE 40 MG/ML
80 INJECTION, SUSPENSION INTRA-ARTICULAR; INTRAMUSCULAR ONCE
Status: COMPLETED | OUTPATIENT
Start: 2024-02-13 | End: 2024-02-13

## 2024-02-13 RX ORDER — PEN NEEDLE, DIABETIC 29 G X1/2"
NEEDLE, DISPOSABLE MISCELLANEOUS
COMMUNITY
End: 2024-02-15

## 2024-02-13 RX ORDER — PEN NEEDLE, DIABETIC 29 G X1/2"
NEEDLE, DISPOSABLE MISCELLANEOUS
COMMUNITY
Start: 2024-01-22 | End: 2024-02-15 | Stop reason: CLARIF

## 2024-02-13 RX ADMIN — LIDOCAINE HYDROCHLORIDE 6 ML: 10 INJECTION, SOLUTION INFILTRATION; PERINEURAL at 08:02

## 2024-02-13 RX ADMIN — TRIAMCINOLONE ACETONIDE 80 MG: 40 INJECTION, SUSPENSION INTRA-ARTICULAR; INTRAMUSCULAR at 08:03

## 2024-02-13 NOTE — PROGRESS NOTES
[facility] PROFESSIONAL [facility]  [address]  [facility] [address]  Dept: [phone]  Dept Fax: [phone]  Loc: [phone]  PROGRESS NOTE      Visit Date: 2/13/2024    Celia Perkins is a 73 y.o. female who presents today for:  Chief Complaint   Patient presents with    Injections     L knee injection    Abnormal Lab     Pt states she had abnormal labs from [facility]. States some of her labs are high       Chief complaint: Abnormal lab    Subjective:  HPI    Elevated BUN and hematocrit.  Discussed these results and compared to previous.  Hematocrit is actually very similar less than previous labs.  Discussed how different lab testing sites have different reference ranges for labs.  BUN is elevated.  Recommend staying hydrated.  Creatinine is good.  No further testing indicated based on these labs.    CHEM 6 (LYTES, BUN CREA)  Order: [id number]  Component  Ref Range & Units 2/8/24 1315   BUN  7 - 25 mg/dL 31 High    Sodium  135 - 145 mmol/L 143   Potassium  3.5 - 5.0 mmol/L 4.3   Chloride  98 - 108 mmol/L 105   CO2  21 - 31 mmol/L 26   Creatinine  0.50 - 1.20 mg/dL 0.69   Bun/Crea Ratio 45   Anion Gap  7 - 17 mmol/L 16   eGFR, CKD-EPI, Female  >=60 mL/min/1.73m2 90      Contains abnormal data CBC,PLATELETS  Order: [id number]  Component  Ref Range & Units 2/8/24 1315   WBC Count  3.99 - 11.19 K/uL 6.08   RBC Count  3.91 - 5.04 M/uL 4.97   Hemoglobin  11.4 - 15.2 g/dL 15.0   Hematocrit  34.9 - 44.3 % 46.3 High    Mean Cell Volume  79.6 - 97.7 fL 93.2   Mean Cell Hgb  25.9 - 33.9 pg 30.2   Mean Cell Hgb Conc  31.4 - 35.9 g/dL 32.4   RBC Distribution  10.8 - 14.9 % 12.3   Platelet Count  150 - 393 K/uL 188   Mean Platelet Volume  8.5 - 12.2 fL 10.1       Review of Systems  Patient Active Problem List   Diagnosis    Hyperlipidemia    Asthma    Polyneuropathy    AS (aortic stenosis)    Hiatal hernia    Hypothyroid    Osteoarthritis of multiple joints    Ear canal mass

## 2024-02-15 DIAGNOSIS — E11.40 TYPE 2 DIABETES MELLITUS WITH DIABETIC NEUROPATHY, WITH LONG-TERM CURRENT USE OF INSULIN (HCC): Primary | ICD-10-CM

## 2024-02-15 DIAGNOSIS — Z79.4 TYPE 2 DIABETES MELLITUS WITH DIABETIC NEUROPATHY, WITH LONG-TERM CURRENT USE OF INSULIN (HCC): Primary | ICD-10-CM

## 2024-02-15 NOTE — TELEPHONE ENCOUNTER
Celia Perkins called requesting a refill on the following medications:  Requested Prescriptions     Pending Prescriptions Disp Refills    Insulin Pen Needle 29G X 12MM MISC 100 each 3     Si each by Does not apply route 4 times daily       Date of last visit: 2024  Date of next visit (if applicable):2024  Date of last refill:   Pharmacy Name: [facility] [place]    Rx pended      Thanks,  Jazmine Tapia LPN

## 2024-02-15 NOTE — TELEPHONE ENCOUNTER
Received denial from insurance for droplet pen needle.    Please see if we need to send a different order for a different type of pen needle to the pharmacy    Please advise patient.  Yash Nelson MD

## 2024-02-28 ENCOUNTER — HOSPITAL ENCOUNTER (OUTPATIENT)
Dept: PHYSICAL THERAPY | Age: 74
Setting detail: THERAPIES SERIES
Discharge: HOME | End: 2024-02-28
Payer: MEDICARE

## 2024-02-28 PROCEDURE — 97162 PT EVAL MOD COMPLEX 30 MIN: CPT

## 2024-02-28 PROCEDURE — 97140 MANUAL THERAPY 1/> REGIONS: CPT

## 2024-02-28 NOTE — PROGRESS NOTES
** PLEASE SIGN, DATE AND TIME CERTIFICATION BELOW AND RETURN TO [facility] OUTPATIENT REHABILITATION (FAX #: [phone]).  ATTEST/CO-SIGN IF ACCESSING VIA INPoke[employer]n Call.  THANK YOU.**    I certify that I have examined the patient below and determined that Physical Medicine and Rehabilitation service is necessary and that I approve the established plan of care for up to 90 days or as specifically noted.  Attestation, signature or co-signature of physician indicates approval of certification requirements.    ________________________ ____________ __________  Physician Signature   Date   Time  [facility]  PHYSICAL THERAPY  [x] EVALUATION  [] DAILY NOTE (LAND) [] DAILY NOTE (AQUATIC ) [] PROGRESS NOTE [] DISCHARGE NOTE    [x] OUTPATIENT REHABILITATION [facility]   [] [facility]    [] [facility]   [] [facility]    Date: 2024  Patient Name:  Celia Perkins  : 1950  MRN: [id number]  CSN: [id number]    Referring Practitioner Jalyn Yusuf APRN - CNP    Diagnosis Cervicalgia    Treatment Diagnosis R20.9 Unspecified Disturbance of Skin Sensation and M54.2, G89.29  Chronic Neck Pain  R29.3 Abnormal Posture  M62.81 Generalized Muscle Weakness  M25.60 Stiffness of Unspecified Joint   Date of Evaluation 24    Additional Pertinent History Aortic valve replacement, subsequent cerebellar stroke in  - cardiac rehab and speech therapy a great deal last year. Flared migraine/ headache symptoms all through last year. Takes blood thinners, prescribed migraine medication but it suppressed her appetite.   Referred to neurologist - takes migraine medication (not daily, just PRN for headaches)  [place] - ortho injections cortisone for left wrist and left knee       Functional Outcome Measure Used Neck Disability Index    Functional Outcome Score 13/50 (24)       Insurance: Primary: Payor: MEDICARE /  /  / ,   Secondary: [facility]

## 2024-02-29 ENCOUNTER — PATIENT MESSAGE (OUTPATIENT)
Dept: FAMILY MEDICINE CLINIC | Age: 74
End: 2024-02-29

## 2024-02-29 NOTE — TELEPHONE ENCOUNTER
From: Celia Perkins  To: Dr. Yash Nelson  Sent: 2/29/2024 1:25 PM EST  Subject: sudden severe left knee pain without injury    I'm having a lot of discomfort weight bearing. Pain came on suddenly and is in different area from where I have the bone on bone. It's the outside area and I'm wondering if I tore a ligament because it's painful like when I tore the ligament in my arm. I took Ibuprofen, iced and elevated. What should I do Doctor Omar, we leave for [place] in 10 days?

## 2024-03-01 ENCOUNTER — HOSPITAL ENCOUNTER (OUTPATIENT)
Dept: PHYSICAL THERAPY | Age: 74
Setting detail: THERAPIES SERIES
Discharge: HOME | End: 2024-03-01
Payer: MEDICARE

## 2024-03-04 ENCOUNTER — PATIENT MESSAGE (OUTPATIENT)
Dept: FAMILY MEDICINE CLINIC | Age: 74
End: 2024-03-04

## 2024-03-04 DIAGNOSIS — E11.40 TYPE 2 DIABETES MELLITUS WITH DIABETIC NEUROPATHY, WITH LONG-TERM CURRENT USE OF INSULIN (HCC): Primary | ICD-10-CM

## 2024-03-04 DIAGNOSIS — Z79.4 TYPE 2 DIABETES MELLITUS WITH DIABETIC NEUROPATHY, WITH LONG-TERM CURRENT USE OF INSULIN (HCC): Primary | ICD-10-CM

## 2024-03-04 NOTE — TELEPHONE ENCOUNTER
Celia Perkins called requesting a refill on the following medications:  Requested Prescriptions     Pending Prescriptions Disp Refills    blood glucose test strips (ASCENSIA AUTODISC VI;ONE TOUCH ULTRA TEST VI) strip 100 each 3     Sig: [facility]ocate Redi-Code test strips. Tests once daily.       Date of last visit: 2/13/2024  Date of next visit (if applicable):5/13/2024  Date of last refill:   [facility] Name: [facility],  Sarah Nolen MA    PATIENT REQUESTING STRIPS

## 2024-03-04 NOTE — TELEPHONE ENCOUNTER
From: Celia Perkins  To: Dr. Yash Nelson  Sent: 3/4/2024 1:29 PM EST  Subject: New script     I need Advocate Blood Glucose Test Strips, am completely out. Even though I have a Free Style [facility] Blood Glucose Monitoring System I still check my b/s occasionally with test strips. Thank you!

## 2024-03-05 ENCOUNTER — HOSPITAL ENCOUNTER (OUTPATIENT)
Dept: PHYSICAL THERAPY | Age: 74
Setting detail: THERAPIES SERIES
Discharge: HOME | End: 2024-03-05
Payer: MEDICARE

## 2024-03-05 PROCEDURE — 97110 THERAPEUTIC EXERCISES: CPT

## 2024-03-05 NOTE — PROGRESS NOTES
rotation, and 0-30 deg B sidebend in order to turn head comfortably while driving and improve quality of sleeping.    Patient will demonstrate good cervical and scapular retraction posture during therapy exercises in order to stabilize cervical spine with lifting activities.    Patient will be IND with HEP in order to meet long term goals.      Long Term Goals:  Time Frame: 12 weeks    Patient will report ability to sit in static positions while reading or driving for more than 30 minutes without provoking any headache symptoms.     Patient will improve score of Neck Disability Index from baseline 13/50 to less than 6/50 in order to improve sleep comfort and reduce headache intensity.     Patient will report decreased headache frequency from \"Moderate\" to \"slight\" in order to reduce visual disturbances and radiating left arm weakness, numbness.        Patient Education:   [x]  HEP/Education Completed: Plan of Care, Goals, dry needling handout provided, educated on cervical retraction posture  [facility]:  []  No new Education completed  []  Reviewed Prior HEP      [x]  Patient verbalized and/or demonstrated understanding of education provided.  []  Patient unable to verbalize and/or demonstrate understanding of education provided.  Will continue education.  []  Barriers to learning:     PLAN:  Treatment Recommendations: Strengthening, Range of Motion, Neuromuscular Re-education, Manual Therapy - Soft Tissue Mobilization, Manual Therapy - Joint Manipulation, Pain Management, Home Exercise Program, Patient Education, Integrative Dry Needling, Vestibular Rehabilitation, Aquatics, and Modalities    []  Plan of care initiated.  Plan to see patient 2 times per week for 12 weeks to address the treatment planned outlined above.  [x]  Continue with current plan of care  []  Modify plan of care as follows:    []  Hold pending physician visit  []  Discharge    Time In 1338   Time Out 1415   Timed Code Minutes: 27

## 2024-03-08 ENCOUNTER — APPOINTMENT (OUTPATIENT)
Dept: PHYSICAL THERAPY | Age: 74
End: 2024-03-08
Payer: MEDICARE

## 2024-03-13 ENCOUNTER — HOSPITAL ENCOUNTER (OUTPATIENT)
Dept: CT IMAGING | Age: 74
Discharge: HOME | End: 2024-03-13
Payer: MEDICARE

## 2024-03-13 ENCOUNTER — OFFICE VISIT (OUTPATIENT)
Dept: FAMILY MEDICINE CLINIC | Age: 74
End: 2024-03-13

## 2024-03-13 VITALS
HEIGHT: 62 IN | HEART RATE: 63 BPM | RESPIRATION RATE: 16 BRPM | SYSTOLIC BLOOD PRESSURE: 119 MMHG | BODY MASS INDEX: 34.96 KG/M2 | OXYGEN SATURATION: 96 % | DIASTOLIC BLOOD PRESSURE: 60 MMHG | TEMPERATURE: 98.8 F | WEIGHT: 190 LBS

## 2024-03-13 DIAGNOSIS — R41.82 ALTERED MENTAL STATUS, UNSPECIFIED ALTERED MENTAL STATUS TYPE: ICD-10-CM

## 2024-03-13 DIAGNOSIS — S32.2XXA CLOSED FRACTURE OF COCCYX, INITIAL ENCOUNTER (HCC): ICD-10-CM

## 2024-03-13 DIAGNOSIS — R55 SYNCOPE, UNSPECIFIED SYNCOPE TYPE: ICD-10-CM
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
## 2024-03-13 DIAGNOSIS — G43.E09 CHRONIC MIGRAINE WITH AURA WITHOUT STATUS MIGRAINOSUS, NOT INTRACTABLE: ICD-10-CM

## 2024-03-13 DIAGNOSIS — R55 SYNCOPE, UNSPECIFIED SYNCOPE TYPE: Primary | ICD-10-CM

## 2024-03-13 DIAGNOSIS — S09.90XA TRAUMATIC INJURY OF HEAD, INITIAL ENCOUNTER: ICD-10-CM

## 2024-03-13 LAB
ALBUMIN SERPL BCG-MCNC: 4.6 G/DL (ref 3.5–5.1)
ALP SERPL-CCNC: 59 U/L (ref 38–126)
ALT SERPL W/O P-5'-P-CCNC: 19 U/L (ref 11–66)
ANION GAP SERPL CALC-SCNC: 16 MEQ/L (ref 8–16)
AST SERPL-CCNC: 21 U/L (ref 5–40)
BASOPHILS ABSOLUTE: 0 THOU/MM3 (ref 0–0.1)
BASOPHILS NFR BLD AUTO: 0.3 %
BILIRUB SERPL-MCNC: 1.7 MG/DL (ref 0.3–1.2)
BUN SERPL-MCNC: 22 MG/DL (ref 7–22)
CALCIUM SERPL-MCNC: 10.4 MG/DL (ref 8.5–10.5)
CHLORIDE SERPL-SCNC: 104 MEQ/L (ref 98–111)
CO2 SERPL-SCNC: 23 MEQ/L (ref 23–33)
CREAT SERPL-MCNC: 0.6 MG/DL (ref 0.4–1.2)
DEPRECATED RDW RBC AUTO: 43.8 FL (ref 35–45)
EKG ATRIAL RATE: 63 BPM
EKG P AXIS: 56 DEGREES
EKG P-R INTERVAL: 226 MS
EKG Q-T INTERVAL: 390 MS
EKG QRS DURATION: 88 MS
EKG QTC CALCULATION (BAZETT): 399 MS
EKG R AXIS: 34 DEGREES
EKG T AXIS: 63 DEGREES
EKG VENTRICULAR RATE: 63 BPM
EOSINOPHIL NFR BLD AUTO: 1.7 %
EOSINOPHILS ABSOLUTE: 0.1 THOU/MM3 (ref 0–0.4)
ERYTHROCYTE [DISTWIDTH] IN BLOOD BY AUTOMATED COUNT: 12.9 % (ref 11.5–14.5)
GFR SERPL CREATININE-BSD FRML MDRD: > 60 ML/MIN/1.73M2
GLUCOSE SERPL-MCNC: 116 MG/DL (ref 70–108)
HCT VFR BLD AUTO: 48.7 % (ref 37–47)
HGB BLD-MCNC: 16 GM/DL (ref 12–16)
IMM GRANULOCYTES # BLD AUTO: 0.01 THOU/MM3 (ref 0–0.07)
IMM GRANULOCYTES NFR BLD AUTO: 0.2 %
LYMPHOCYTES ABSOLUTE: 1.5 THOU/MM3 (ref 1–4.8)
LYMPHOCYTES NFR BLD AUTO: 26.7 %
MCH RBC QN AUTO: 30.8 PG (ref 26–33)
MCHC RBC AUTO-ENTMCNC: 32.9 GM/DL (ref 32.2–35.5)
MCV RBC AUTO: 93.7 FL (ref 81–99)
MONOCYTES ABSOLUTE: 0.5 THOU/MM3 (ref 0.4–1.3)
MONOCYTES NFR BLD AUTO: 8 %
NEUTROPHILS NFR BLD AUTO: 63.1 %
NRBC BLD AUTO-RTO: 0 /100 WBC
PLATELET # BLD AUTO: 215 THOU/MM3 (ref 130–400)
PMV BLD AUTO: 9.8 FL (ref 9.4–12.4)
POTASSIUM SERPL-SCNC: 4.1 MEQ/L (ref 3.5–5.2)
PROT SERPL-MCNC: 7.4 G/DL (ref 6.1–8)
RBC # BLD AUTO: 5.2 MILL/MM3 (ref 4.2–5.4)
SEGMENTED NEUTROPHILS ABSOLUTE COUNT: 3.6 THOU/MM3 (ref 1.8–7.7)
SODIUM SERPL-SCNC: 143 MEQ/L (ref 135–145)
TSH SERPL DL<=0.005 MIU/L-ACNC: 1.91 UIU/ML (ref 0.4–4.2)
WBC # BLD AUTO: 5.7 THOU/MM3 (ref 4.8–10.8)

## 2024-03-13 PROCEDURE — 93005 ELECTROCARDIOGRAM TRACING: CPT | Performed by: NURSE PRACTITIONER

## 2024-03-13 PROCEDURE — 70450 CT HEAD/BRAIN W/O DYE: CPT

## 2024-03-13 PROCEDURE — 85025 COMPLETE CBC W/AUTO DIFF WBC: CPT

## 2024-03-13 PROCEDURE — 84443 ASSAY THYROID STIM HORMONE: CPT

## 2024-03-13 PROCEDURE — 80053 COMPREHEN METABOLIC PANEL: CPT

## 2024-03-13 PROCEDURE — 36415 COLL VENOUS BLD VENIPUNCTURE: CPT

## 2024-03-13 SDOH — ECONOMIC STABILITY: FOOD INSECURITY: WITHIN THE PAST 12 MONTHS, THE FOOD YOU BOUGHT JUST DIDN'T LAST AND YOU DIDN'T HAVE MONEY TO GET MORE.: NEVER TRUE

## 2024-03-13 SDOH — ECONOMIC STABILITY: FOOD INSECURITY: WITHIN THE PAST 12 MONTHS, YOU WORRIED THAT YOUR FOOD WOULD RUN OUT BEFORE YOU GOT MONEY TO BUY MORE.: NEVER TRUE

## 2024-03-13 SDOH — ECONOMIC STABILITY: TRANSPORTATION INSECURITY
IN THE PAST 12 MONTHS, HAS LACK OF TRANSPORTATION KEPT YOU FROM MEETINGS, WORK, OR FROM GETTING THINGS NEEDED FOR DAILY LIVING?: NO

## 2024-03-13 SDOH — ECONOMIC STABILITY: INCOME INSECURITY: HOW HARD IS IT FOR YOU TO PAY FOR THE VERY BASICS LIKE FOOD, HOUSING, MEDICAL CARE, AND HEATING?: NOT HARD AT ALL

## 2024-03-13 ASSESSMENT — ENCOUNTER SYMPTOMS
COUGH: 0
SHORTNESS OF BREATH: 0
VOMITING: 0
SORE THROAT: 0
NAUSEA: 0
ABDOMINAL PAIN: 0
EYE PAIN: 0
CHEST TIGHTNESS: 0

## 2024-03-13 NOTE — PROGRESS NOTES
[facility]  [address]  [facility] [address]  Dept: [phone]  Loc: [phone]     Celia Perkins (:  1950) is a 74 y.o. female, here for evaluation of the following chief complaint(s):  Fall (On 3/6 fainted and fell, broke tail bone. Cardiology is wanting an EKG and monitor. No other episodes. States she is having some migraine. Patient is taking Nurtec every other day for migraines. )      ASSESSMENT/PLAN:  1. Syncope, unspecified syncope type  -     EKG 12 Lead  -     Comprehensive Metabolic Panel; Future  -     CBC with Auto Differential; Future  -     TSH with Reflex; Future  -     CT HEAD WO CONTRAST; Future  -     Longterm Continuous Cardia Event Monitor; Future  2. Fall, initial encounter  -     Comprehensive Metabolic Panel; Future  -     CBC with Auto Differential; Future  -     TSH with Reflex; Future  -     CT HEAD WO CONTRAST; Future  -     Longterm Continuous Cardia Event Monitor; Future  3. Altered mental status, unspecified altered mental status type  -     Comprehensive Metabolic Panel; Future  -     CBC with Auto Differential; Future  -     TSH with Reflex; Future  -     CT HEAD WO CONTRAST; Future  -     Longterm Continuous Cardia Event Monitor; Future  4. Chronic migraine with aura without status migrainosus, not intractable  -     Comprehensive Metabolic Panel; Future  -     CBC with Auto Differential; Future  -     TSH with Reflex; Future  -     CT HEAD WO CONTRAST; Future  -     Longterm Continuous Cardia Event Monitor; Future  5. Traumatic injury of head, initial encounter  -     Comprehensive Metabolic Panel; Future  -     CBC with Auto Differential; Future  -     TSH with Reflex; Future  -     CT HEAD WO CONTRAST; Future  -     Longterm Continuous Cardia Event Monitor; Future  6. Closed fracture of coccyx, initial encounter ([facility])    Consulted Dr Nelson regarding plan of care. Will obtain EKG and 30 day

## 2024-03-20 ENCOUNTER — APPOINTMENT (OUTPATIENT)
Dept: PHYSICAL THERAPY | Age: 74
End: 2024-03-20
Payer: MEDICARE

## 2024-04-02 ENCOUNTER — HOSPITAL ENCOUNTER (OUTPATIENT)
Age: 74
Discharge: HOME | End: 2024-04-04
Payer: MEDICARE

## 2024-04-02 DIAGNOSIS — S09.90XA TRAUMATIC INJURY OF HEAD, INITIAL ENCOUNTER: ICD-10-CM
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
## 2024-04-02 DIAGNOSIS — R55 SYNCOPE, UNSPECIFIED SYNCOPE TYPE: ICD-10-CM

## 2024-04-02 DIAGNOSIS — G43.E09 CHRONIC MIGRAINE WITH AURA WITHOUT STATUS MIGRAINOSUS, NOT INTRACTABLE: ICD-10-CM

## 2024-04-02 DIAGNOSIS — R41.82 ALTERED MENTAL STATUS, UNSPECIFIED ALTERED MENTAL STATUS TYPE: ICD-10-CM

## 2024-04-02 PROCEDURE — 93270 REMOTE 30 DAY ECG REV/REPORT: CPT

## 2024-04-03 DIAGNOSIS — Z79.4 TYPE 2 DIABETES MELLITUS WITH DIABETIC NEUROPATHY, WITH LONG-TERM CURRENT USE OF INSULIN (HCC): ICD-10-CM

## 2024-04-03 DIAGNOSIS — E11.40 TYPE 2 DIABETES MELLITUS WITH DIABETIC NEUROPATHY, WITH LONG-TERM CURRENT USE OF INSULIN (HCC): ICD-10-CM

## 2024-04-03 NOTE — TELEPHONE ENCOUNTER
Celia Perkins called requesting a refill on the following medications:  Requested Prescriptions     Pending Prescriptions Disp Refills    blood glucose test strips (ASCENSIA AUTODISC VI;ONE TOUCH ULTRA TEST VI) strip 100 each 3     Sig: [facility] Redi-Code test strips. Tests once daily.       Date of last visit: 3/13/2024  Date of next visit (if applicable):5/13/2024  Date of last refill: 3/4/24  Pharmacy Name: [facility] [place]    Original prescription was sent to [facility] here in [place] and they are unable to fill it and instructed patient to have the office send new prescription to [facility] as they carry these strips.       Thanks,  Lo Abbasi, RN

## 2024-04-03 NOTE — DISCHARGE SUMMARY
[facility]  PHYSICAL THERAPY QUICK DISCHARGE NOTE  OUTPATIENT  [facility]    Patient Name: Celia Perkins        CSN: [id number]   YOB: 1950  Gender: female  Jalyn Yusuf, APR*,    Cervicalgia [M54.2] ,      Patient is discharged from Physical Therapy services at this time.  See last note for details related to results of therapy and goal achievement.    Reason for discharge: Other medical issues impacting ability to participate in Outpatient Therapy.. Patient attended PT evaluation for neck pain and dry needling on 2/28/24, and then on 3/5/24 she presented with knee injury and wearing immobilizer. Patient then cancelled all visits on 3/8/24 due to falling and coccyx fracture. Patient has been on hold one month, so spoke with her today and she agreed to discontinue neck physical therapy, scheduled for a new PT eval for knee and coccyx fracture this week.       Ivette Valdez, PT, DPT

## 2024-04-05 ENCOUNTER — HOSPITAL ENCOUNTER (OUTPATIENT)
Dept: PHYSICAL THERAPY | Age: 74
Setting detail: THERAPIES SERIES
Discharge: HOME | End: 2024-04-05
Payer: MEDICARE

## 2024-04-05 PROCEDURE — 97110 THERAPEUTIC EXERCISES: CPT

## 2024-04-05 PROCEDURE — 97162 PT EVAL MOD COMPLEX 30 MIN: CPT

## 2024-04-05 NOTE — PROGRESS NOTES
Physical Therapy    ** PLEASE SIGN, DATE AND TIME CERTIFICATION BELOW AND RETURN TO [facility] OUTPATIENT REHABILITATION (FAX #: [phone]).  ATTEST/CO-SIGN IF ACCESSING VIA INHealthUnlocked.  THANK YOU.**    I certify that I have examined the patient below and determined that Physical Medicine and Rehabilitation service is necessary and that I approve the established plan of care for up to 90 days or as specifically noted.  Attestation, signature or co-signature of physician indicates approval of certification requirements.    ________________________ ____________ __________  Physician Signature   Date   Time  [facility]  PHYSICAL THERAPY  [x] EVALUATION  [] DAILY NOTE (LAND) [] DAILY NOTE (AQUATIC ) [] PROGRESS NOTE [] DISCHARGE NOTE    [x] OUTPATIENT REHABILITATION [facility]   [] [facility] CARE [place]    [] [facility]   [] [facility]    Date: 2024  Patient Name:  Celia Perkins  : 1950  MRN: [id number]  CSN: [id number]    Referring Practitioner Kale Lowry MD    Diagnosis Unspecified fracture of sacrum, initial encounter for closed fracture  Hemarthrosis, left knee  Unilateral primary osteoarthritis, left knee    Treatment Diagnosis M25.562  Left Knee Pain  M79.605  Pain in left leg  M25.362 Other instability, left knee  R53.1 Weakness  M62.81 Generalized Muscle Weakness  R26.2 Difficulty in walking   Date of Evaluation 24    Additional Pertinent History DM@, Polyneuropathy, OA, HTN, L Breast CA, Brain cyst, Asthma , s/p TAVR, R total knee arthroplasty.  L knee Arthrotomy in .       Functional Outcome Measure Used LEFS    Functional Outcome Score 12 (24)       Insurance: Primary: Payor: MEDICARE /  /  / ,   Secondary: [facility]   Authorization Information: PRECERTIFICATION REQUIRED:  No  INSURANCE THERAPY BENEFIT:  Patient has unlimited visits based on medical necessity  Benefit will not cover maintenance or preventative

## 2024-04-09 ENCOUNTER — HOSPITAL ENCOUNTER (OUTPATIENT)
Dept: PHYSICAL THERAPY | Age: 74
Setting detail: THERAPIES SERIES
Discharge: HOME | End: 2024-04-09
Payer: MEDICARE

## 2024-04-09 PROCEDURE — 97110 THERAPEUTIC EXERCISES: CPT

## 2024-04-09 NOTE — PROGRESS NOTES
[facility]  PHYSICAL THERAPY  [] EVALUATION  [x] DAILY NOTE (LAND) [] DAILY NOTE (AQUATIC ) [] PROGRESS NOTE [] DISCHARGE NOTE    [x] OUTPATIENT REHABILITATION CENTER [facility]   [] [facility] [facility]    [] [facility]   [] [facility]    Date: 2024  Patient Name:  Celia Perkins  : 1950  MRN: [id number]  CSN: [id number]    Referring Practitioner Kale Lowry MD    Diagnosis Unspecified fracture of sacrum, initial encounter for closed fracture  Hemarthrosis, left knee  Unilateral primary osteoarthritis, left knee    Treatment Diagnosis M25.562  Left Knee Pain  M79.605  Pain in left leg  M25.362 Other instability, left knee  R53.1 Weakness  M62.81 Generalized Muscle Weakness  R26.2 Difficulty in walking   Date of Evaluation 24    Additional Pertinent History DM@, Polyneuropathy, OA, HTN, L Breast CA, Brain cyst, Asthma , s/p TAVR, R total knee arthroplasty.  L knee Arthrotomy in .       Functional Outcome Measure Used LEFS    Functional Outcome Score 12 (24)       Insurance: Primary: Payor: MEDICARE /  /  / ,   Secondary: [facility]   Authorization Information: PRECERTIFICATION REQUIRED:  No  INSURANCE THERAPY BENEFIT:  Patient has unlimited visits based on medical necessity  Benefit will not cover maintenance or preventative treatment.  AQUATIC THERAPY COVERED:   Yes  MODALITIES COVERED:  Yes, with the exception of iontophoresis and hot packs/cold packs  TELEHEALTH COVERED: Yes  REFERENCE #:    Approved Procedure Codes: Authorization of Specific CPT Codes Not Required  (Codes requested indicated by red font, codes approved indicated by black font)   Visit # 2, 2/10 for progress note (Reporting Period: 24 to     )   Visits Allowed: Based on medical necessity   Recertification Date: 24   Physician Follow-Up: 2 weeks   Physician Orders: Evaluate and Treat L knee 2-3 times per week for 4-6 weeks    History of Present Illness:

## 2024-04-11 ENCOUNTER — HOSPITAL ENCOUNTER (OUTPATIENT)
Dept: PHYSICAL THERAPY | Age: 74
Setting detail: THERAPIES SERIES
Discharge: HOME | End: 2024-04-11
Payer: MEDICARE

## 2024-04-11 PROCEDURE — 97113 AQUATIC THERAPY/EXERCISES: CPT

## 2024-04-11 NOTE — PROGRESS NOTES
[facility]  PHYSICAL THERAPY  [] EVALUATION  [] DAILY NOTE (LAND) [x] DAILY NOTE (AQUATIC ) [] PROGRESS NOTE [] DISCHARGE NOTE    [x] OUTPATIENT REHABILITATION CENTER [facility]   [] [facility] [facility]    [] [facility]   [] [facility]    Date: 2024  Patient Name:  Celia Perkins  : 1950  MRN: [id number]  CSN: [id number]    Referring Practitioner Kale Lowry MD    Diagnosis Unspecified fracture of sacrum, initial encounter for closed fracture  Hemarthrosis, left knee  Unilateral primary osteoarthritis, left knee    Treatment Diagnosis M25.562  Left Knee Pain  M79.605  Pain in left leg  M25.362 Other instability, left knee  R53.1 Weakness  M62.81 Generalized Muscle Weakness  R26.2 Difficulty in walking   Date of Evaluation 24    Additional Pertinent History DM@, Polyneuropathy, OA, HTN, L Breast CA, Brain cyst, Asthma , s/p TAVR, R total knee arthroplasty.  L knee Arthrotomy in .       Functional Outcome Measure Used LEFS    Functional Outcome Score 12 (24)       Insurance: Primary: Payor: MEDICARE /  /  / ,   Secondary: [facility]   Authorization Information: PRECERTIFICATION REQUIRED:  No  INSURANCE THERAPY BENEFIT:  Patient has unlimited visits based on medical necessity  Benefit will not cover maintenance or preventative treatment.  AQUATIC THERAPY COVERED:   Yes  MODALITIES COVERED:  Yes, with the exception of iontophoresis and hot packs/cold packs  TELEHEALTH COVERED: Yes  REFERENCE #:    Approved Procedure Codes: Authorization of Specific CPT Codes Not Required  (Codes requested indicated by red font, codes approved indicated by black font)   Visit # 3, 3/10 for progress note (Reporting Period: 24 to     )   Visits Allowed: Based on medical necessity   Recertification Date: 24   Physician Follow-Up: 2 weeks   Physician Orders: Evaluate and Treat L knee 2-3 times per week for 4-6 weeks    History of Present

## 2024-04-15 ENCOUNTER — HOSPITAL ENCOUNTER (OUTPATIENT)
Dept: PHYSICAL THERAPY | Age: 74
Setting detail: THERAPIES SERIES
Discharge: HOME | End: 2024-04-15
Payer: MEDICARE

## 2024-04-15 PROCEDURE — 97113 AQUATIC THERAPY/EXERCISES: CPT

## 2024-04-15 NOTE — PROGRESS NOTES
[facility]  PHYSICAL THERAPY  [] EVALUATION  [] DAILY NOTE (LAND) [x] DAILY NOTE (AQUATIC ) [] PROGRESS NOTE [] DISCHARGE NOTE    [x] OUTPATIENT REHABILITATION CENTER [facility]   [] [place] [facility]    [] [facility]   [] [facility]    Date: 4/15/2024  Patient Name:  Celia Perkins  : 1950  MRN: [id number]  CSN: [id number]    Referring Practitioner Kale Lowry MD    Diagnosis Unspecified fracture of sacrum, initial encounter for closed fracture  Hemarthrosis, left knee  Unilateral primary osteoarthritis, left knee    Treatment Diagnosis M25.562  Left Knee Pain  M79.605  Pain in left leg  M25.362 Other instability, left knee  R53.1 Weakness  M62.81 Generalized Muscle Weakness  R26.2 Difficulty in walking   Date of Evaluation 24    Additional Pertinent History DM@, Polyneuropathy, OA, HTN, L Breast CA, Brain cyst, Asthma , s/p TAVR, R total knee arthroplasty.  L knee Arthrotomy in .       Functional Outcome Measure Used LEFS    Functional Outcome Score 12 (24)       Insurance: Primary: Payor: MEDICARE /  /  / ,   Secondary: [facility]   Authorization Information: PRECERTIFICATION REQUIRED:  No  INSURANCE THERAPY BENEFIT:  Patient has unlimited visits based on medical necessity  Benefit will not cover maintenance or preventative treatment.  AQUATIC THERAPY COVERED:   Yes  MODALITIES COVERED:  Yes, with the exception of iontophoresis and hot packs/cold packs  TELEHEALTH COVERED: Yes  REFERENCE #:    Approved Procedure Codes: Authorization of Specific CPT Codes Not Required  (Codes requested indicated by red font, codes approved indicated by black font)   Visit # 4, 4/10 for progress note (Reporting Period: 24 to     )   Visits Allowed: Based on medical necessity   Recertification Date: 24   Physician Follow-Up: 2 weeks   Physician Orders: Evaluate and Treat L knee 2-3 times per week for 4-6 weeks    History of Present

## 2024-04-18 ENCOUNTER — HOSPITAL ENCOUNTER (OUTPATIENT)
Age: 74
Discharge: HOME | End: 2024-04-18
Payer: MEDICARE

## 2024-04-18 DIAGNOSIS — Z79.4 TYPE 2 DIABETES MELLITUS WITH DIABETIC NEUROPATHY, WITH LONG-TERM CURRENT USE OF INSULIN (HCC): ICD-10-CM

## 2024-04-18 DIAGNOSIS — E11.40 TYPE 2 DIABETES MELLITUS WITH DIABETIC NEUROPATHY, WITH LONG-TERM CURRENT USE OF INSULIN (HCC): ICD-10-CM

## 2024-04-18 LAB
ALBUMIN SERPL BCG-MCNC: 4.4 G/DL (ref 3.5–5.1)
ALP SERPL-CCNC: 63 U/L (ref 38–126)
ALT SERPL W/O P-5'-P-CCNC: 24 U/L (ref 11–66)
ANION GAP SERPL CALC-SCNC: 17 MEQ/L (ref 8–16)
AST SERPL-CCNC: 23 U/L (ref 5–40)
BILIRUB SERPL-MCNC: 2.1 MG/DL (ref 0.3–1.2)
BUN SERPL-MCNC: 23 MG/DL (ref 7–22)
CALCIUM SERPL-MCNC: 9.4 MG/DL (ref 8.5–10.5)
CHLORIDE SERPL-SCNC: 102 MEQ/L (ref 98–111)
CHOLEST SERPL-MCNC: 127 MG/DL (ref 100–199)
CO2 SERPL-SCNC: 23 MEQ/L (ref 23–33)
CREAT SERPL-MCNC: 0.6 MG/DL (ref 0.4–1.2)
DEPRECATED RDW RBC AUTO: 43.6 FL (ref 35–45)
ERYTHROCYTE [DISTWIDTH] IN BLOOD BY AUTOMATED COUNT: 12.7 % (ref 11.5–14.5)
GFR SERPL CREATININE-BSD FRML MDRD: > 90 ML/MIN/1.73M2
GLUCOSE SERPL-MCNC: 136 MG/DL (ref 70–108)
HCT VFR BLD AUTO: 47.7 % (ref 37–47)
HDLC SERPL-MCNC: 43 MG/DL
HGB BLD-MCNC: 16 GM/DL (ref 12–16)
LDLC SERPL CALC-MCNC: 59 MG/DL
MCH RBC QN AUTO: 31.6 PG (ref 26–33)
MCHC RBC AUTO-ENTMCNC: 33.5 GM/DL (ref 32.2–35.5)
MCV RBC AUTO: 94.3 FL (ref 81–99)
PLATELET # BLD AUTO: 173 THOU/MM3 (ref 130–400)
PMV BLD AUTO: 10 FL (ref 9.4–12.4)
POTASSIUM SERPL-SCNC: 4.6 MEQ/L (ref 3.5–5.2)
PROT SERPL-MCNC: 7.1 G/DL (ref 6.1–8)
RBC # BLD AUTO: 5.06 MILL/MM3 (ref 4.2–5.4)
SODIUM SERPL-SCNC: 142 MEQ/L (ref 135–145)
TRIGL SERPL-MCNC: 127 MG/DL (ref 0–199)
WBC # BLD AUTO: 4.9 THOU/MM3 (ref 4.8–10.8)

## 2024-04-18 PROCEDURE — 85027 COMPLETE CBC AUTOMATED: CPT

## 2024-04-18 PROCEDURE — 36415 COLL VENOUS BLD VENIPUNCTURE: CPT

## 2024-04-18 PROCEDURE — 80053 COMPREHEN METABOLIC PANEL: CPT

## 2024-04-18 PROCEDURE — 80061 LIPID PANEL: CPT

## 2024-04-19 ENCOUNTER — APPOINTMENT (OUTPATIENT)
Dept: PHYSICAL THERAPY | Age: 74
End: 2024-04-19
Payer: MEDICARE

## 2024-04-19 ENCOUNTER — HOSPITAL ENCOUNTER (OUTPATIENT)
Dept: PHYSICAL THERAPY | Age: 74
Setting detail: THERAPIES SERIES
Discharge: HOME | End: 2024-04-19
Payer: MEDICARE

## 2024-04-19 PROCEDURE — 97113 AQUATIC THERAPY/EXERCISES: CPT

## 2024-04-19 NOTE — PROGRESS NOTES
[facility]  PHYSICAL THERAPY  [] EVALUATION  [] DAILY NOTE (LAND) [x] DAILY NOTE (AQUATIC ) [] PROGRESS NOTE [] DISCHARGE NOTE    [x] OUTPATIENT REHABILITATION CENTER [facility]   [] [facility]    [] [facility]   [] [facility]    Date: 2024  Patient Name:  Celia Perkins  : 1950  MRN: [id number]  CSN: [id number]    Referring Practitioner Kale Lowry MD    Diagnosis Unspecified fracture of sacrum, initial encounter for closed fracture  Hemarthrosis, left knee  Unilateral primary osteoarthritis, left knee    Treatment Diagnosis M25.562  Left Knee Pain  M79.605  Pain in left leg  M25.362 Other instability, left knee  R53.1 Weakness  M62.81 Generalized Muscle Weakness  R26.2 Difficulty in walking   Date of Evaluation 24    Additional Pertinent History DM@, Polyneuropathy, OA, HTN, L Breast CA, Brain cyst, Asthma , s/p TAVR, R total knee arthroplasty.  L knee Arthrotomy in .       Functional Outcome Measure Used LEFS    Functional Outcome Score 12 (24)       Insurance: Primary: Payor: MEDICARE /  /  / ,   Secondary: [facility]   Authorization Information: PRECERTIFICATION REQUIRED:  No  INSURANCE THERAPY BENEFIT:  Patient has unlimited visits based on medical necessity  Benefit will not cover maintenance or preventative treatment.  AQUATIC THERAPY COVERED:   Yes  MODALITIES COVERED:  Yes, with the exception of iontophoresis and hot packs/cold packs  TELEHEALTH COVERED: Yes  REFERENCE #:    Approved Procedure Codes: Authorization of Specific CPT Codes Not Required  (Codes requested indicated by red font, codes approved indicated by black font)   Visit # 5, 5/10 for progress note (Reporting Period: 24 to     )   Visits Allowed: Based on medical necessity   Recertification Date: 24   Physician Follow-Up: 2 weeks   Physician Orders: Evaluate and Treat L knee 2-3 times per week for 4-6 weeks    History of Present

## 2024-04-22 ENCOUNTER — HOSPITAL ENCOUNTER (OUTPATIENT)
Dept: PHYSICAL THERAPY | Age: 74
Setting detail: THERAPIES SERIES
Discharge: HOME | End: 2024-04-22
Payer: MEDICARE

## 2024-04-22 PROCEDURE — 97113 AQUATIC THERAPY/EXERCISES: CPT

## 2024-04-22 NOTE — PROGRESS NOTES
[facility]  PHYSICAL THERAPY  [] EVALUATION  [] DAILY NOTE (LAND) [x] DAILY NOTE (AQUATIC ) [] PROGRESS NOTE [] DISCHARGE NOTE    [x] OUTPATIENT REHABILITATION CENTER [facility]   [] [place] [facility]    [] [facility]   [] [facility]    Date: 2024  Patient Name:  Celia Perkins  : 1950  MRN: [id number]  CSN: [id number]    Referring Practitioner Kale Lowry MD    Diagnosis Unspecified fracture of sacrum, initial encounter for closed fracture  Hemarthrosis, left knee  Unilateral primary osteoarthritis, left knee    Treatment Diagnosis M25.562  Left Knee Pain  M79.605  Pain in left leg  M25.362 Other instability, left knee  R53.1 Weakness  M62.81 Generalized Muscle Weakness  R26.2 Difficulty in walking   Date of Evaluation 24    Additional Pertinent History DM@, Polyneuropathy, OA, HTN, L Breast CA, Brain cyst, Asthma , s/p TAVR, R total knee arthroplasty.  L knee Arthrotomy in .       Functional Outcome Measure Used LEFS    Functional Outcome Score 12 (24)       Insurance: Primary: Payor: MEDICARE /  /  / ,   Secondary: [facility]   Authorization Information: PRECERTIFICATION REQUIRED:  No  INSURANCE THERAPY BENEFIT:  Patient has unlimited visits based on medical necessity  Benefit will not cover maintenance or preventative treatment.  AQUATIC THERAPY COVERED:   Yes  MODALITIES COVERED:  Yes, with the exception of iontophoresis and hot packs/cold packs  TELEHEALTH COVERED: Yes  REFERENCE #:    Approved Procedure Codes: Authorization of Specific CPT Codes Not Required  (Codes requested indicated by red font, codes approved indicated by black font)   Visit # 6, 6/10 for progress note (Reporting Period: 24 to     )   Visits Allowed: Based on medical necessity   Recertification Date: 24   Physician Follow-Up: 2 weeks   Physician Orders: Evaluate and Treat L knee 2-3 times per week for 4-6 weeks    History of Present

## 2024-04-25 ENCOUNTER — HOSPITAL ENCOUNTER (OUTPATIENT)
Dept: PHYSICAL THERAPY | Age: 74
Setting detail: THERAPIES SERIES
Discharge: HOME | End: 2024-04-25
Payer: MEDICARE

## 2024-04-25 PROCEDURE — 97113 AQUATIC THERAPY/EXERCISES: CPT

## 2024-04-25 NOTE — PROGRESS NOTES
Mobility Training, Transfer Training, Endurance Training, Gait Training, Stair Training, Neuromuscular Re-education, Manual Therapy - Soft Tissue Mobilization, Pain Management, Home Exercise Program, Patient Education, Safety Education and Training, Self-Care Education and Training, Positioning, Integrative Dry Needling, Aquatics, and Modalities    []  Plan of care initiated.  Plan to see patient 2-3 times per week for 4-6 weeks to address the treatment planned outlined above.  [x]  Continue with current plan of care  []  Modify plan of care as follows:    []  Hold pending physician visit  []  Discharge    Time In 1048   Time Out 1132   Timed Code Minutes: 44 min   Total Treatment Time: 44 min     Electronically Signed by: Gladis Stephens PTA

## 2024-04-26 NOTE — PROGRESS NOTES
Subjective:      Patient ID: Caitlin Lugo is a 77 y.o. female.     HPI    Review of Systems    Objective:   Physical Exam    Assessment:            Plan:          appointment cancelled     Belkis Singh
Quality 130: Documentation Of Current Medications In The Medical Record: Current Medications Documented
Detail Level: Zone
Quality 431: Preventive Care And Screening: Unhealthy Alcohol Use - Screening: Patient not identified as an unhealthy alcohol user when screened for unhealthy alcohol use using a systematic screening method
Quality 226: Preventive Care And Screening: Tobacco Use: Screening And Cessation Intervention: Patient screened for tobacco use and is an ex/non-smoker

## 2024-04-29 ENCOUNTER — HOSPITAL ENCOUNTER (OUTPATIENT)
Dept: PHYSICAL THERAPY | Age: 74
Setting detail: THERAPIES SERIES
Discharge: HOME | End: 2024-04-29
Payer: MEDICARE

## 2024-04-29 PROCEDURE — 97113 AQUATIC THERAPY/EXERCISES: CPT

## 2024-04-29 NOTE — PROGRESS NOTES
[facility]  PHYSICAL THERAPY  [] EVALUATION  [] DAILY NOTE (LAND) [x] DAILY NOTE (AQUATIC ) [] PROGRESS NOTE [] DISCHARGE NOTE    [x] OUTPATIENT REHABILITATION CENTER [facility]   [] [place] [facility]    [] [facility]   [] [facility]    Date: 2024  Patient Name:  Celia Perkins  : 1950  MRN: [id number]  CSN: [id number]    Referring Practitioner Kale Lowry MD    Diagnosis Unspecified fracture of sacrum, initial encounter for closed fracture  Hemarthrosis, left knee  Unilateral primary osteoarthritis, left knee    Treatment Diagnosis M25.562  Left Knee Pain  M79.605  Pain in left leg  M25.362 Other instability, left knee  R53.1 Weakness  M62.81 Generalized Muscle Weakness  R26.2 Difficulty in walking   Date of Evaluation 24    Additional Pertinent History DM@, Polyneuropathy, OA, HTN, L Breast CA, Brain cyst, Asthma , s/p TAVR, R total knee arthroplasty.  L knee Arthrotomy in .       Functional Outcome Measure Used LEFS    Functional Outcome Score 12 (24)       Insurance: Primary: Payor: MEDICARE /  /  / ,   Secondary: [facility]   Authorization Information: PRECERTIFICATION REQUIRED:  No  INSURANCE THERAPY BENEFIT:  Patient has unlimited visits based on medical necessity  Benefit will not cover maintenance or preventative treatment.  AQUATIC THERAPY COVERED:   Yes  MODALITIES COVERED:  Yes, with the exception of iontophoresis and hot packs/cold packs  TELEHEALTH COVERED: Yes  REFERENCE #:    Approved Procedure Codes: Authorization of Specific CPT Codes Not Required  (Codes requested indicated by red font, codes approved indicated by black font)   Visit # 8, 8/10 for progress note (Reporting Period: 24 to     )   Visits Allowed: Based on medical necessity   Recertification Date: 24   Physician Follow-Up: 2 weeks   Physician Orders: Evaluate and Treat L knee 2-3 times per week for 4-6 weeks    History of Present Illness:

## 2024-05-02 ENCOUNTER — APPOINTMENT (OUTPATIENT)
Dept: PHYSICAL THERAPY | Age: 74
End: 2024-05-02
Payer: MEDICARE

## 2024-05-08 ENCOUNTER — TELEPHONE (OUTPATIENT)
Dept: FAMILY MEDICINE CLINIC | Age: 74
End: 2024-05-08

## 2024-05-08 NOTE — TELEPHONE ENCOUNTER
Pat is in for some clarifications on her heart monitor. She is wanting robotic surgery and believes this will clear her at [facility]. Please also fax a copy to [facility] Dr. Ta Fan.

## 2024-05-09 ENCOUNTER — HOSPITAL ENCOUNTER (OUTPATIENT)
Dept: PHYSICAL THERAPY | Age: 74
Setting detail: THERAPIES SERIES
Discharge: HOME | End: 2024-05-09
Payer: MEDICARE

## 2024-05-09 PROCEDURE — 97110 THERAPEUTIC EXERCISES: CPT

## 2024-05-09 NOTE — DISCHARGE SUMMARY
[facility]  PHYSICAL THERAPY  [] EVALUATION  [] DAILY NOTE (LAND) [] DAILY NOTE (AQUATIC ) [] PROGRESS NOTE [] DISCHARGE NOTE    [x] OUTPATIENT REHABILITATION CENTER [facility]   [] [place] [facility]    [] [facility]   [] [facility]    Date: 2024  Patient Name:  Celia Perkins  : 1950  MRN: [id number]  CSN: [id number]    Referring Practitioner Kale Lowry MD    Diagnosis Unspecified fracture of sacrum, initial encounter for closed fracture  Hemarthrosis, left knee  Unilateral primary osteoarthritis, left knee    Treatment Diagnosis M25.562  Left Knee Pain  M79.605  Pain in left leg  M25.362 Other instability, left knee  R53.1 Weakness  M62.81 Generalized Muscle Weakness  R26.2 Difficulty in walking   Date of Evaluation 24    Additional Pertinent History DM@, Polyneuropathy, OA, HTN, L Breast CA, Brain cyst, Asthma , s/p TAVR, R total knee arthroplasty.  L knee Arthrotomy in .       Functional Outcome Measure Used LEFS    Functional Outcome Score 12 (24)  20 24       Insurance: Primary: Payor: MEDICARE /  /  / ,   Secondary: [facility]   Authorization Information: PRECERTIFICATION REQUIRED:  No  INSURANCE THERAPY BENEFIT:  Patient has unlimited visits based on medical necessity  Benefit will not cover maintenance or preventative treatment.  AQUATIC THERAPY COVERED:   Yes  MODALITIES COVERED:  Yes, with the exception of iontophoresis and hot packs/cold packs  TELEHEALTH COVERED: Yes  REFERENCE #:    Approved Procedure Codes: Authorization of Specific CPT Codes Not Required  (Codes requested indicated by red font, codes approved indicated by black font)   Visit # 9, 9/10 for progress note (Reporting Period: 24 to 24     )   Visits Allowed: Based on medical necessity   Recertification Date: 24   Physician Follow-Up: 2 weeks   Physician Orders: Evaluate and Treat L knee 2-3 times per week for 4-6 weeks    History of

## 2024-05-09 NOTE — TELEPHONE ENCOUNTER
Event monitor showed mostly normal sinus rhythm.  No atrial fibrillation.  She had 1 episode of nonsustained V. tach that lasted 4 beats.    Please advise patient  Please send/fax copy to [facility] Dr. Ta Rogers for his opinion as he is an interventional cardiologist    Yash Nelson MD

## 2024-05-10 DIAGNOSIS — E11.65 TYPE 2 DIABETES MELLITUS WITH HYPERGLYCEMIA, WITHOUT LONG-TERM CURRENT USE OF INSULIN (HCC): ICD-10-CM

## 2024-05-10 DIAGNOSIS — I10 ESSENTIAL HYPERTENSION: ICD-10-CM

## 2024-05-10 NOTE — TELEPHONE ENCOUNTER
Patient notified  States she also sent a message to Dr Rogers and he said it looked okay    She has follow up appointment 5/13/24 and will discuss further at that time

## 2024-05-13 ENCOUNTER — TELEPHONE (OUTPATIENT)
Dept: FAMILY MEDICINE CLINIC | Age: 74
End: 2024-05-13

## 2024-05-13 ENCOUNTER — OFFICE VISIT (OUTPATIENT)
Dept: FAMILY MEDICINE CLINIC | Age: 74
End: 2024-05-13
Payer: MEDICARE

## 2024-05-13 VITALS
HEIGHT: 62 IN | RESPIRATION RATE: 16 BRPM | HEART RATE: 79 BPM | SYSTOLIC BLOOD PRESSURE: 118 MMHG | TEMPERATURE: 97.6 F | BODY MASS INDEX: 35.77 KG/M2 | DIASTOLIC BLOOD PRESSURE: 60 MMHG | OXYGEN SATURATION: 98 % | WEIGHT: 194.4 LBS

## 2024-05-13 DIAGNOSIS — I10 ESSENTIAL HYPERTENSION: ICD-10-CM

## 2024-05-13 DIAGNOSIS — E11.649 TYPE 2 DIABETES MELLITUS WITH HYPOGLYCEMIA WITHOUT COMA, WITHOUT LONG-TERM CURRENT USE OF INSULIN (HCC): Primary | ICD-10-CM

## 2024-05-13 DIAGNOSIS — K21.9 GASTROESOPHAGEAL REFLUX DISEASE WITHOUT ESOPHAGITIS: ICD-10-CM

## 2024-05-13 DIAGNOSIS — I25.10 ASCVD (ARTERIOSCLEROTIC CARDIOVASCULAR DISEASE): ICD-10-CM

## 2024-05-13 LAB — HBA1C MFR BLD: 6.2 %

## 2024-05-13 PROCEDURE — 1036F TOBACCO NON-USER: CPT | Performed by: FAMILY MEDICINE

## 2024-05-13 PROCEDURE — G8417 CALC BMI ABV UP PARAM F/U: HCPCS | Performed by: FAMILY MEDICINE

## 2024-05-13 PROCEDURE — G8399 PT W/DXA RESULTS DOCUMENT: HCPCS | Performed by: FAMILY MEDICINE

## 2024-05-13 PROCEDURE — 99214 OFFICE O/P EST MOD 30 MIN: CPT | Performed by: FAMILY MEDICINE

## 2024-05-13 PROCEDURE — 3078F DIAST BP <80 MM HG: CPT | Performed by: FAMILY MEDICINE

## 2024-05-13 PROCEDURE — 1123F ACP DISCUSS/DSCN MKR DOCD: CPT | Performed by: FAMILY MEDICINE

## 2024-05-13 PROCEDURE — 83036 HEMOGLOBIN GLYCOSYLATED A1C: CPT | Performed by: FAMILY MEDICINE

## 2024-05-13 PROCEDURE — 1090F PRES/ABSN URINE INCON ASSESS: CPT | Performed by: FAMILY MEDICINE

## 2024-05-13 PROCEDURE — G8427 DOCREV CUR MEDS BY ELIG CLIN: HCPCS | Performed by: FAMILY MEDICINE

## 2024-05-13 PROCEDURE — 3044F HG A1C LEVEL LT 7.0%: CPT | Performed by: FAMILY MEDICINE

## 2024-05-13 PROCEDURE — 3074F SYST BP LT 130 MM HG: CPT | Performed by: FAMILY MEDICINE

## 2024-05-13 RX ORDER — CLOPIDOGREL BISULFATE 75 MG/1
75 TABLET ORAL DAILY
Qty: 90 TABLET | Refills: 3 | Status: SHIPPED | OUTPATIENT
Start: 2024-05-13

## 2024-05-13 RX ORDER — BENAZEPRIL HYDROCHLORIDE 20 MG/1
20 TABLET ORAL 2 TIMES DAILY
Qty: 180 TABLET | Refills: 3 | OUTPATIENT
Start: 2024-05-13

## 2024-05-13 RX ORDER — CLINDAMYCIN HYDROCHLORIDE 150 MG/1
CAPSULE ORAL
COMMUNITY
Start: 2024-05-06

## 2024-05-13 RX ORDER — PANTOPRAZOLE SODIUM 20 MG/1
20 TABLET, DELAYED RELEASE ORAL
Qty: 90 TABLET | Refills: 3 | Status: SHIPPED | OUTPATIENT
Start: 2024-05-13

## 2024-05-13 RX ORDER — INSULIN GLARGINE 100 [IU]/ML
48 INJECTION, SOLUTION SUBCUTANEOUS NIGHTLY
Qty: 15 ADJUSTABLE DOSE PRE-FILLED PEN SYRINGE | Refills: 3 | Status: SHIPPED | OUTPATIENT
Start: 2024-05-13

## 2024-05-13 RX ORDER — INSULIN LISPRO 100 [IU]/ML
INJECTION, SOLUTION INTRAVENOUS; SUBCUTANEOUS
Qty: 12 ADJUSTABLE DOSE PRE-FILLED PEN SYRINGE | Refills: 3 | Status: SHIPPED | OUTPATIENT
Start: 2024-05-13

## 2024-05-13 RX ORDER — ATORVASTATIN CALCIUM 40 MG/1
40 TABLET, FILM COATED ORAL NIGHTLY
Qty: 90 TABLET | Refills: 3 | Status: SHIPPED | OUTPATIENT
Start: 2024-05-13

## 2024-05-13 RX ORDER — AMLODIPINE BESYLATE 2.5 MG/1
2.5 TABLET ORAL DAILY
Qty: 90 TABLET | Refills: 3 | Status: SHIPPED | OUTPATIENT
Start: 2024-05-13

## 2024-05-13 RX ORDER — BENAZEPRIL HYDROCHLORIDE 20 MG/1
20 TABLET ORAL 2 TIMES DAILY
Qty: 180 TABLET | Refills: 3 | Status: SHIPPED | OUTPATIENT
Start: 2024-05-13

## 2024-05-13 ASSESSMENT — ENCOUNTER SYMPTOMS: WHEEZING: 0

## 2024-05-13 NOTE — TELEPHONE ENCOUNTER
Celia Perkins called requesting a refill on the following medications:  Requested Prescriptions     Pending Prescriptions Disp Refills    benazepril (LOTENSIN) 20 MG tablet [Pharmacy Med Name: BENAZEPRIL HCL 20 MG TABLET] 180 tablet 3     Sig: TAKE 1 TABLET BY MOUTH 2 TIMES DAILY       Date of last visit: 3/13/2024  Date of next visit (if applicable):5/13/2024  Date of last refill: ***  Pharmacy Name: ***      Giuliana Sparks LPN

## 2024-05-13 NOTE — PROGRESS NOTES
[facility]  [address]  [facility] [address]  Dept: [phone]  Dept Fax: [phone]  Loc: [phone]  PROGRESS NOTE      Visit Date: 5/13/2024    Celia Perkins is a 74 y.o. female who presents today for:  Chief Complaint   Patient presents with    Diabetes     Pt denies any concerns today.        Chief complaint:  DM    Subjective:  Diabetes  Hypoglycemia symptoms include dizziness. Pertinent negatives for diabetes include no chest pain.       4 month f/u  Type 2 DM:  has neuropathy.  Jardiance, lantus 39-49 units daily, humalog ISS 9-14 units 3x per day.  sept 2023 A1c 6.7%. she gets hypoglycemic episodes. Barron CGM    Mild leg swelling.  Hx of PE.  On eliquis.    Appt with neurologist on may 24th Dr. Mendoza.     Awaiting to schedule knee replacement with Dr. Lowry    Review of Systems   Respiratory:  Negative for shortness of breath and wheezing.    Cardiovascular:  Positive for leg swelling. Negative for chest pain.   Neurological:  Positive for dizziness.     Patient Active Problem List   Diagnosis    Hyperlipidemia    Asthma    Polyneuropathy    AS (aortic stenosis)    Hiatal hernia    Hypothyroid    Osteoarthritis of multiple joints    Ear canal mass    Diabetic ketoacidosis without coma associated with type 2 diabetes mellitus (HCC)    Malignant neoplasm of left breast in female, estrogen receptor positive (HCC)    Localized osteoarthritis of left knee    Other osteoporosis without current pathological fracture    S/P repair of ventral hernia    Cervical stenosis of spinal canal    Tear of left supraspinatus tendon    Morbidly obese (HCC)    Type 2 diabetes mellitus with diabetic neuropathy    Mood disorder (HCC)     Past Medical History:   Diagnosis Date    Arthritis     Asthma     Brain cyst     benign    Breast cancer (HCC) 09/18/2018    Left breast, 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    Cancer

## 2024-05-13 NOTE — TELEPHONE ENCOUNTER
Notes were faxed 5/10/24  Fax confirmation received    
Rosanne is wanting us to get the clinical notes to [facility]. She uses this place for her [employer] 14 day. She is currently using her last one. She states Dr. Nelson seen the fax in her chart. She says they need clinical notes as she had her first visit with him today.   
- - -

## 2024-06-05 DIAGNOSIS — K21.9 GASTROESOPHAGEAL REFLUX DISEASE WITHOUT ESOPHAGITIS: ICD-10-CM

## 2024-06-05 RX ORDER — PANTOPRAZOLE SODIUM 20 MG/1
20 TABLET, DELAYED RELEASE ORAL
Qty: 90 TABLET | Refills: 3 | OUTPATIENT
Start: 2024-06-05

## 2024-06-19 ENCOUNTER — TELEPHONE (OUTPATIENT)
Dept: FAMILY MEDICINE CLINIC | Age: 74
End: 2024-06-19

## 2024-06-20 PROBLEM — C50.912 BREAST CANCER, LEFT (HCC): Status: ACTIVE | Noted: 2018-09-01

## 2024-06-20 PROBLEM — M81.0 OSTEOPOROSIS: Status: ACTIVE | Noted: 2019-04-10

## 2024-06-20 RX ORDER — ATOGEPANT 60 MG/1
TABLET ORAL
COMMUNITY
Start: 2024-05-31

## 2024-06-24 ENCOUNTER — TELEPHONE (OUTPATIENT)
Dept: FAMILY MEDICINE CLINIC | Age: 74
End: 2024-06-24

## 2024-06-25 ENCOUNTER — OFFICE VISIT (OUTPATIENT)
Dept: FAMILY MEDICINE CLINIC | Age: 74
End: 2024-06-25
Payer: MEDICARE

## 2024-06-25 VITALS
OXYGEN SATURATION: 96 % | DIASTOLIC BLOOD PRESSURE: 78 MMHG | WEIGHT: 191.6 LBS | RESPIRATION RATE: 16 BRPM | SYSTOLIC BLOOD PRESSURE: 126 MMHG | HEIGHT: 62 IN | TEMPERATURE: 98.6 F | BODY MASS INDEX: 35.26 KG/M2 | HEART RATE: 70 BPM

## 2024-06-25 DIAGNOSIS — E66.01 CLASS 2 SEVERE OBESITY DUE TO EXCESS CALORIES WITH SERIOUS COMORBIDITY AND BODY MASS INDEX (BMI) OF 35.0 TO 35.9 IN ADULT (HCC): ICD-10-CM

## 2024-06-25 DIAGNOSIS — Z95.2 S/P TAVR (TRANSCATHETER AORTIC VALVE REPLACEMENT): ICD-10-CM

## 2024-06-25 DIAGNOSIS — M17.12 ARTHRITIS OF LEFT KNEE: Primary | ICD-10-CM

## 2024-06-25 DIAGNOSIS — E11.40 TYPE 2 DIABETES MELLITUS WITH DIABETIC NEUROPATHY, WITH LONG-TERM CURRENT USE OF INSULIN (HCC): ICD-10-CM

## 2024-06-25 DIAGNOSIS — Z79.4 TYPE 2 DIABETES MELLITUS WITH DIABETIC NEUROPATHY, WITH LONG-TERM CURRENT USE OF INSULIN (HCC): ICD-10-CM

## 2024-06-25 DIAGNOSIS — Z01.818 PRE-OP EVALUATION: ICD-10-CM

## 2024-06-25 PROBLEM — E11.10 DIABETIC KETOACIDOSIS WITHOUT COMA ASSOCIATED WITH TYPE 2 DIABETES MELLITUS (HCC): Status: RESOLVED | Noted: 2018-05-18 | Resolved: 2024-06-25

## 2024-06-25 PROBLEM — C50.912 BREAST CANCER, LEFT (HCC): Status: RESOLVED | Noted: 2018-09-01 | Resolved: 2024-06-25

## 2024-06-25 PROCEDURE — G8399 PT W/DXA RESULTS DOCUMENT: HCPCS | Performed by: FAMILY MEDICINE

## 2024-06-25 PROCEDURE — 1036F TOBACCO NON-USER: CPT | Performed by: FAMILY MEDICINE

## 2024-06-25 PROCEDURE — 1123F ACP DISCUSS/DSCN MKR DOCD: CPT | Performed by: FAMILY MEDICINE

## 2024-06-25 PROCEDURE — 1090F PRES/ABSN URINE INCON ASSESS: CPT | Performed by: FAMILY MEDICINE

## 2024-06-25 PROCEDURE — G8417 CALC BMI ABV UP PARAM F/U: HCPCS | Performed by: FAMILY MEDICINE

## 2024-06-25 PROCEDURE — 99214 OFFICE O/P EST MOD 30 MIN: CPT | Performed by: FAMILY MEDICINE

## 2024-06-25 PROCEDURE — 3044F HG A1C LEVEL LT 7.0%: CPT | Performed by: FAMILY MEDICINE

## 2024-06-25 PROCEDURE — G8427 DOCREV CUR MEDS BY ELIG CLIN: HCPCS | Performed by: FAMILY MEDICINE

## 2024-06-25 ASSESSMENT — ENCOUNTER SYMPTOMS
CONSTIPATION: 1
SHORTNESS OF BREATH: 0
WHEEZING: 0

## 2024-06-25 NOTE — PROGRESS NOTES
diabetes mellitus with diabetic neuropathy, with long-term current use of insulin (HCC)  Chronic.  Controlled with A1c of 6.5%.  Continue Jardiance, Humalog, and Lantus    3. Class 2 severe obesity due to excess calories with serious comorbidity and body mass index (BMI) of 35.0 to 35.9 in adult (HCC)  Chronic.  Stable.    4. Pre-op evaluation    5. S/P TAVR (transcatheter aortic valve replacement)  Had echo in February 2024 that was acceptable that showed normal ejection fraction without significant aortic regurgitation.    Pre-Operative Risk assessment using 2014 ACC/AHA guidelines     Emergent procedure No  Active Cardiac Condition No (decompensated HF, Arrhythmia, MI <3 weeks, severe valve disease)  Risk Level of Procedure Intermediate Risk (intraperitoneal, intrathoracic, HENT, orthopedic, or carotid endarterectomy, etc.)  Revised Cardiac Risk Index Risk factors: History of cerebrovascular disease  Diabetic treated with insulin  Measurement of Exercise Tolerance before Surgery >4 Yes    According to the 2014 ACC/AHA pre-operative risk assessment guidelines Celia Perkins is at medium risk for major cardiac complications during a intermediate risk procedure and may continue as planned. Specific medication recommendations are listed below. Medications recommended to continue should be taken with a sip of water even when NPO.     Further recommendations from consultants: None    They voiced understanding.  All questions answered. They agreed with treatment plan.   See patient instructions for any educational materials that may have been given.  Discussed use, benefit, and side effects of prescribed medications.     Reviewed health maintenance.    (Please note that portions of this note may have been completed with a voice recognition program.  Efforts were made to edit the dictation but occasionally words are mis-transcribed.)    Return if symptoms worsen or fail to improve.      Electronically signed by Yash ANNA

## 2024-07-01 ENCOUNTER — TELEPHONE (OUTPATIENT)
Dept: FAMILY MEDICINE CLINIC | Age: 74
End: 2024-07-01

## 2024-07-01 ENCOUNTER — CLINICAL DOCUMENTATION (OUTPATIENT)
Dept: SPIRITUAL SERVICES | Facility: CLINIC | Age: 74
End: 2024-07-01

## 2024-07-01 NOTE — TELEPHONE ENCOUNTER
Patient having surgery 7/10 at [facility]. Facility would like to know When should patient stop plavix?

## 2024-07-02 NOTE — ACP (ADVANCE CARE PLANNING)
Advance Care Planning   Advance Care Planning Note  Ambulatory Spiritual Care Services    Date:  7/1/2024    Received request from patient.    Consultation conversation participants:   Patient who understands ACP conversation  Spouse     Goals of ACP Conversation:  Discuss advance care planning documents    Health Care Decision Makers:      Primary Decision Maker: Anders Perkins Jr. - Spouse - [phone]    Secondary Decision Maker: GregorioMoon - Child - [phone]   Summary:  Completed New Documents  Verified Healthcare Decision Maker  Updated Healthcare Decision Maker    Advance Care Planning Documents (Patient Wishes)  Currently on file:   Healthcare Power of /Advance Directive Appointment of Health Care Agent  Living Will/Advance Directive    Assessment:   Pt is a 74y.o. female, meeting in-office with spouse and , to updated AD's. Documents successfully processed to chart.    Interventions:  Provided education on documents for clarity and greater understanding  Assisted in the completion of documents according to patient's wishes at this time    Care Preferences Communicated:   No    Outcomes:  Returned original document(s) to patient, as well as copies for distribution to appointed agents  Copy of advance directive given to staff to scan into medical record.  Routed ACP note to attending provider or other IDT member.      Electronically signed by Chaplain Elba on 7/2/2024 at 4:06 PM.

## 2024-07-02 NOTE — FLOWSHEET NOTE
Pt is a 74y.o. female, in-office at [facility]. Please refer to ACP note for context, re: AD's.     07/01/24 1335   Encounter Summary   Encounter Overview/Reason Advance Care Planning   Service Provided For Patient and family together   Referral/Consult From Patient   Support System Spouse;Children   Last Encounter  07/01/24   Complexity of Encounter Moderate   Begin Time 1335   End Time  1425   Total Time Calculated 50 min   Advance Care Planning   Type Completed AD/ACP document(s)   Assessment/Intervention/Outcome   Assessment Calm;Coping;Peaceful   Intervention Active listening;Explored/Affirmed feelings, thoughts, concerns;Discussed belief system/Scientologist practices/amanuel   Outcome Engaged in conversation;Expressed feelings, needs, and concerns;Expressed Gratitude;Receptive

## 2024-07-16 ENCOUNTER — APPOINTMENT (OUTPATIENT)
Dept: GENERAL RADIOLOGY | Age: 74
DRG: 092 | End: 2024-07-16
Payer: MEDICARE

## 2024-07-16 ENCOUNTER — APPOINTMENT (OUTPATIENT)
Dept: CT IMAGING | Age: 74
DRG: 092 | End: 2024-07-16
Payer: MEDICARE

## 2024-07-16 ENCOUNTER — HOSPITAL ENCOUNTER (INPATIENT)
Age: 74
LOS: 2 days | Discharge: NURSING, REHAB OR LONG-TERM CARE | DRG: 092 | End: 2024-07-18
Attending: EMERGENCY MEDICINE | Admitting: STUDENT IN AN ORGANIZED HEALTH CARE EDUCATION/TRAINING PROGRAM
Payer: MEDICARE

## 2024-07-16 DIAGNOSIS — R41.82 ALTERED MENTAL STATUS, UNSPECIFIED ALTERED MENTAL STATUS TYPE: Primary | ICD-10-CM

## 2024-07-16 DIAGNOSIS — R06.02 SHORTNESS OF BREATH: ICD-10-CM

## 2024-07-16 DIAGNOSIS — R47.01 APHASIA: ICD-10-CM

## 2024-07-16 PROBLEM — G93.40 ACUTE ENCEPHALOPATHY: Status: ACTIVE | Noted: 2024-07-16

## 2024-07-16 LAB
ANION GAP SERPL CALC-SCNC: 13 MEQ/L (ref 8–16)
BASE EXCESS BLDA CALC-SCNC: 2.8 MMOL/L (ref -2–3)
BASE EXCESS BLDA CALC-SCNC: 3.1 MMOL/L (ref -2–3)
BASOPHILS ABSOLUTE: 0 THOU/MM3 (ref 0–0.1)
BASOPHILS NFR BLD AUTO: 0.3 %
BILIRUB UR QL STRIP.AUTO: NEGATIVE
BUN SERPL-MCNC: 22 MG/DL (ref 7–22)
CA-I BLD ISE-SCNC: 1.14 MMOL/L (ref 1.12–1.32)
CALCIUM SERPL-MCNC: 9.5 MG/DL (ref 8.5–10.5)
CHARACTER UR: CLEAR
CHLORIDE BLD-SCNC: 101 MEQ/L (ref 98–109)
CHLORIDE SERPL-SCNC: 97 MEQ/L (ref 98–111)
CO2 SERPL-SCNC: 24 MEQ/L (ref 23–33)
COLLECTED BY:: ABNORMAL
COLLECTED BY:: ABNORMAL
COLOR, UA: YELLOW
CREAT SERPL-MCNC: 0.6 MG/DL (ref 0.4–1.2)
CRP SERPL-MCNC: 2.59 MG/DL (ref 0–1)
D DIMER PPP IA.FEU-MCNC: 2089 NG/ML FEU (ref 0–500)
DEPRECATED RDW RBC AUTO: 42.2 FL (ref 35–45)
DEVICE: ABNORMAL
DEVICE: ABNORMAL
EKG ATRIAL RATE: 77 BPM
EKG P AXIS: 12 DEGREES
EKG P-R INTERVAL: 198 MS
EKG Q-T INTERVAL: 376 MS
EKG QRS DURATION: 78 MS
EKG QTC CALCULATION (BAZETT): 425 MS
EKG R AXIS: 8 DEGREES
EKG T AXIS: 15 DEGREES
EKG VENTRICULAR RATE: 77 BPM
EOSINOPHIL NFR BLD AUTO: 2.2 %
EOSINOPHILS ABSOLUTE: 0.1 THOU/MM3 (ref 0–0.4)
ERYTHROCYTE [DISTWIDTH] IN BLOOD BY AUTOMATED COUNT: 12.8 % (ref 11.5–14.5)
ERYTHROCYTE [SEDIMENTATION RATE] IN BLOOD BY WESTERGREN METHOD: 39 MM/HR (ref 0–20)
GFR SERPL CREATININE-BSD FRML MDRD: > 90 ML/MIN/1.73M2
GLUCOSE BLD STRIP.AUTO-MCNC: 104 MG/DL (ref 70–108)
GLUCOSE BLD STRIP.AUTO-MCNC: 69 MG/DL (ref 70–108)
GLUCOSE BLD STRIP.AUTO-MCNC: 83 MG/DL (ref 70–108)
GLUCOSE BLD STRIP.AUTO-MCNC: 91 MG/DL (ref 70–108)
GLUCOSE BLD-MCNC: 106 MG/DL (ref 70–108)
GLUCOSE SERPL-MCNC: 87 MG/DL (ref 70–108)
GLUCOSE UR QL STRIP.AUTO: >= 1000 MG/DL
HCO3 BLDA-SCNC: 26 MMOL/L (ref 23–28)
HCO3 BLDA-SCNC: 26 MMOL/L (ref 23–28)
HCT VFR BLD AUTO: 39.4 % (ref 37–47)
HGB BLD-MCNC: 13.2 GM/DL (ref 12–16)
HGB UR QL STRIP.AUTO: NEGATIVE
IMM GRANULOCYTES # BLD AUTO: 0.02 THOU/MM3 (ref 0–0.07)
IMM GRANULOCYTES NFR BLD AUTO: 0.3 %
INR PPP: 1.08 (ref 0.85–1.13)
KETONES UR QL STRIP.AUTO: 40
LACTATE BLD-SCNC: 4.4 MMOL/L (ref 0.5–1.9)
LACTIC ACID, SEPSIS: 1.3 MMOL/L (ref 0.5–1.9)
LYMPHOCYTES ABSOLUTE: 1.1 THOU/MM3 (ref 1–4.8)
LYMPHOCYTES NFR BLD AUTO: 18.6 %
MCH RBC QN AUTO: 31 PG (ref 26–33)
MCHC RBC AUTO-ENTMCNC: 33.5 GM/DL (ref 32.2–35.5)
MCV RBC AUTO: 92.5 FL (ref 81–99)
MONOCYTES ABSOLUTE: 0.7 THOU/MM3 (ref 0.4–1.3)
MONOCYTES NFR BLD AUTO: 12 %
NEUTROPHILS ABSOLUTE: 4 THOU/MM3 (ref 1.8–7.7)
NEUTROPHILS NFR BLD AUTO: 66.6 %
NITRITE UR QL STRIP: NEGATIVE
NRBC BLD AUTO-RTO: 0 /100 WBC
NT-PROBNP SERPL IA-MCNC: < 36 PG/ML (ref 0–449)
OSMOLALITY SERPL CALC.SUM OF ELEC: 270.9 MOSMOL/KG (ref 275–300)
PCO2 TEMP ADJ BLDMV: 32 MMHG (ref 41–51)
PCO2 TEMP ADJ BLDMV: 36 MMHG (ref 41–51)
PH BLDMV: 7.47 [PH] (ref 7.31–7.41)
PH BLDMV: 7.51 [PH] (ref 7.31–7.41)
PH UR STRIP.AUTO: 7.5 [PH] (ref 5–9)
PLATELET # BLD AUTO: 195 THOU/MM3 (ref 130–400)
PMV BLD AUTO: 9 FL (ref 9.4–12.4)
PO2 BLDMV: 18 MMHG (ref 25–40)
PO2 BLDMV: 34 MMHG (ref 25–40)
POC CREATININE WHOLE BLOOD: 0.6 MG/DL (ref 0.5–1.2)
POTASSIUM BLD-SCNC: 5.6 MEQ/L (ref 3.5–4.9)
POTASSIUM SERPL-SCNC: 4.2 MEQ/L (ref 3.5–5.2)
PROCALCITONIN SERPL IA-MCNC: 0.05 NG/ML (ref 0.01–0.09)
PROT UR STRIP.AUTO-MCNC: NEGATIVE MG/DL
RBC # BLD AUTO: 4.26 MILL/MM3 (ref 4.2–5.4)
SAO2 % BLDMV: 34 %
SAO2 % BLDMV: 69 %
SITE: ABNORMAL
SITE: ABNORMAL
SODIUM BLD-SCNC: 136 MEQ/L (ref 138–146)
SODIUM SERPL-SCNC: 134 MEQ/L (ref 135–145)
SP GR UR REFRACT.AUTO: > 1.03 (ref 1–1.03)
TROPONIN, HIGH SENSITIVITY: 19 NG/L (ref 0–12)
TSH SERPL DL<=0.005 MIU/L-ACNC: 2.49 UIU/ML (ref 0.4–4.2)
UROBILINOGEN, URINE: 0.2 EU/DL (ref 0–1)
VENTILATION MODE VENT: ABNORMAL
VENTILATION MODE VENT: ABNORMAL
WBC # BLD AUTO: 6 THOU/MM3 (ref 4.8–10.8)
WBC #/AREA URNS HPF: NEGATIVE /[HPF]

## 2024-07-16 PROCEDURE — 83880 ASSAY OF NATRIURETIC PEPTIDE: CPT

## 2024-07-16 PROCEDURE — 6360000002 HC RX W HCPCS: Performed by: PHYSICIAN ASSISTANT

## 2024-07-16 PROCEDURE — 81003 URINALYSIS AUTO W/O SCOPE: CPT

## 2024-07-16 PROCEDURE — 2580000003 HC RX 258: Performed by: PHYSICIAN ASSISTANT

## 2024-07-16 PROCEDURE — 80048 BASIC METABOLIC PNL TOTAL CA: CPT

## 2024-07-16 PROCEDURE — 99223 1ST HOSP IP/OBS HIGH 75: CPT | Performed by: PHYSICIAN ASSISTANT

## 2024-07-16 PROCEDURE — 84484 ASSAY OF TROPONIN QUANT: CPT

## 2024-07-16 PROCEDURE — 6360000002 HC RX W HCPCS: Performed by: EMERGENCY MEDICINE

## 2024-07-16 PROCEDURE — 84132 ASSAY OF SERUM POTASSIUM: CPT

## 2024-07-16 PROCEDURE — 85610 PROTHROMBIN TIME: CPT

## 2024-07-16 PROCEDURE — 93010 ELECTROCARDIOGRAM REPORT: CPT | Performed by: INTERNAL MEDICINE

## 2024-07-16 PROCEDURE — 6360000004 HC RX CONTRAST MEDICATION: Performed by: EMERGENCY MEDICINE

## 2024-07-16 PROCEDURE — 70450 CT HEAD/BRAIN W/O DYE: CPT

## 2024-07-16 PROCEDURE — 99223 1ST HOSP IP/OBS HIGH 75: CPT | Performed by: NURSE PRACTITIONER

## 2024-07-16 PROCEDURE — 82565 ASSAY OF CREATININE: CPT

## 2024-07-16 PROCEDURE — 82947 ASSAY GLUCOSE BLOOD QUANT: CPT

## 2024-07-16 PROCEDURE — 71045 X-RAY EXAM CHEST 1 VIEW: CPT

## 2024-07-16 PROCEDURE — 83605 ASSAY OF LACTIC ACID: CPT

## 2024-07-16 PROCEDURE — 84443 ASSAY THYROID STIM HORMONE: CPT

## 2024-07-16 PROCEDURE — 85379 FIBRIN DEGRADATION QUANT: CPT

## 2024-07-16 PROCEDURE — 84145 PROCALCITONIN (PCT): CPT

## 2024-07-16 PROCEDURE — 93005 ELECTROCARDIOGRAM TRACING: CPT | Performed by: EMERGENCY MEDICINE

## 2024-07-16 PROCEDURE — 82948 REAGENT STRIP/BLOOD GLUCOSE: CPT

## 2024-07-16 PROCEDURE — 85651 RBC SED RATE NONAUTOMATED: CPT

## 2024-07-16 PROCEDURE — 96374 THER/PROPH/DIAG INJ IV PUSH: CPT

## 2024-07-16 PROCEDURE — 73564 X-RAY EXAM KNEE 4 OR MORE: CPT

## 2024-07-16 PROCEDURE — 96376 TX/PRO/DX INJ SAME DRUG ADON: CPT

## 2024-07-16 PROCEDURE — 87040 BLOOD CULTURE FOR BACTERIA: CPT

## 2024-07-16 PROCEDURE — 2580000003 HC RX 258: Performed by: EMERGENCY MEDICINE

## 2024-07-16 PROCEDURE — 85025 COMPLETE CBC W/AUTO DIFF WBC: CPT

## 2024-07-16 PROCEDURE — 84295 ASSAY OF SERUM SODIUM: CPT

## 2024-07-16 PROCEDURE — 82803 BLOOD GASES ANY COMBINATION: CPT

## 2024-07-16 PROCEDURE — 99285 EMERGENCY DEPT VISIT HI MDM: CPT

## 2024-07-16 PROCEDURE — 82330 ASSAY OF CALCIUM: CPT

## 2024-07-16 PROCEDURE — 71275 CT ANGIOGRAPHY CHEST: CPT

## 2024-07-16 PROCEDURE — 86140 C-REACTIVE PROTEIN: CPT

## 2024-07-16 PROCEDURE — 2060000000 HC ICU INTERMEDIATE R&B

## 2024-07-16 PROCEDURE — 82435 ASSAY OF BLOOD CHLORIDE: CPT

## 2024-07-16 PROCEDURE — 36415 COLL VENOUS BLD VENIPUNCTURE: CPT

## 2024-07-16 RX ORDER — METOPROLOL SUCCINATE 25 MG/1
12.5 TABLET, EXTENDED RELEASE ORAL DAILY
Status: DISCONTINUED | OUTPATIENT
Start: 2024-07-17 | End: 2024-07-18 | Stop reason: HOSPADM

## 2024-07-16 RX ORDER — SODIUM CHLORIDE 0.9 % (FLUSH) 0.9 %
5-40 SYRINGE (ML) INJECTION PRN
Status: DISCONTINUED | OUTPATIENT
Start: 2024-07-16 | End: 2024-07-18 | Stop reason: HOSPADM

## 2024-07-16 RX ORDER — ACETAMINOPHEN 325 MG/1
650 TABLET ORAL EVERY 6 HOURS PRN
Status: DISCONTINUED | OUTPATIENT
Start: 2024-07-16 | End: 2024-07-18 | Stop reason: HOSPADM

## 2024-07-16 RX ORDER — 0.9 % SODIUM CHLORIDE 0.9 %
1000 INTRAVENOUS SOLUTION INTRAVENOUS ONCE
Status: COMPLETED | OUTPATIENT
Start: 2024-07-16 | End: 2024-07-16

## 2024-07-16 RX ORDER — POTASSIUM CHLORIDE 20 MEQ/1
40 TABLET, EXTENDED RELEASE ORAL PRN
Status: DISCONTINUED | OUTPATIENT
Start: 2024-07-16 | End: 2024-07-18 | Stop reason: HOSPADM

## 2024-07-16 RX ORDER — ACETAMINOPHEN 650 MG/1
650 SUPPOSITORY RECTAL EVERY 6 HOURS PRN
Status: DISCONTINUED | OUTPATIENT
Start: 2024-07-16 | End: 2024-07-18 | Stop reason: HOSPADM

## 2024-07-16 RX ORDER — ONDANSETRON 2 MG/ML
4 INJECTION INTRAMUSCULAR; INTRAVENOUS EVERY 6 HOURS PRN
Status: DISCONTINUED | OUTPATIENT
Start: 2024-07-16 | End: 2024-07-18 | Stop reason: HOSPADM

## 2024-07-16 RX ORDER — CLOPIDOGREL BISULFATE 75 MG/1
75 TABLET ORAL DAILY
Status: DISCONTINUED | OUTPATIENT
Start: 2024-07-16 | End: 2024-07-18 | Stop reason: HOSPADM

## 2024-07-16 RX ORDER — ATORVASTATIN CALCIUM 20 MG/1
20 TABLET, FILM COATED ORAL NIGHTLY
Status: DISCONTINUED | OUTPATIENT
Start: 2024-07-16 | End: 2024-07-18 | Stop reason: HOSPADM

## 2024-07-16 RX ORDER — ONDANSETRON 4 MG/1
4 TABLET, ORALLY DISINTEGRATING ORAL EVERY 8 HOURS PRN
Status: DISCONTINUED | OUTPATIENT
Start: 2024-07-16 | End: 2024-07-18 | Stop reason: HOSPADM

## 2024-07-16 RX ORDER — SODIUM CHLORIDE 9 MG/ML
INJECTION, SOLUTION INTRAVENOUS PRN
Status: DISCONTINUED | OUTPATIENT
Start: 2024-07-16 | End: 2024-07-18 | Stop reason: HOSPADM

## 2024-07-16 RX ORDER — POLYETHYLENE GLYCOL 3350 17 G/17G
17 POWDER, FOR SOLUTION ORAL DAILY PRN
Status: DISCONTINUED | OUTPATIENT
Start: 2024-07-16 | End: 2024-07-18 | Stop reason: HOSPADM

## 2024-07-16 RX ORDER — KETOROLAC TROMETHAMINE 30 MG/ML
15 INJECTION, SOLUTION INTRAMUSCULAR; INTRAVENOUS EVERY 6 HOURS PRN
Status: DISCONTINUED | OUTPATIENT
Start: 2024-07-16 | End: 2024-07-18 | Stop reason: HOSPADM

## 2024-07-16 RX ORDER — INSULIN LISPRO 100 [IU]/ML
5 INJECTION, SOLUTION INTRAVENOUS; SUBCUTANEOUS
Status: DISCONTINUED | OUTPATIENT
Start: 2024-07-16 | End: 2024-07-18 | Stop reason: HOSPADM

## 2024-07-16 RX ORDER — LORAZEPAM 2 MG/ML
1 INJECTION INTRAMUSCULAR ONCE
Status: COMPLETED | OUTPATIENT
Start: 2024-07-16 | End: 2024-07-16

## 2024-07-16 RX ORDER — SODIUM CHLORIDE 0.9 % (FLUSH) 0.9 %
5-40 SYRINGE (ML) INJECTION EVERY 12 HOURS SCHEDULED
Status: DISCONTINUED | OUTPATIENT
Start: 2024-07-16 | End: 2024-07-18 | Stop reason: HOSPADM

## 2024-07-16 RX ORDER — AMLODIPINE BESYLATE 5 MG/1
5 TABLET ORAL DAILY
Status: DISCONTINUED | OUTPATIENT
Start: 2024-07-16 | End: 2024-07-17

## 2024-07-16 RX ORDER — INSULIN LISPRO 100 [IU]/ML
0-4 INJECTION, SOLUTION INTRAVENOUS; SUBCUTANEOUS NIGHTLY
Status: DISCONTINUED | OUTPATIENT
Start: 2024-07-16 | End: 2024-07-18 | Stop reason: HOSPADM

## 2024-07-16 RX ORDER — MAGNESIUM SULFATE IN WATER 40 MG/ML
2000 INJECTION, SOLUTION INTRAVENOUS PRN
Status: DISCONTINUED | OUTPATIENT
Start: 2024-07-16 | End: 2024-07-18 | Stop reason: HOSPADM

## 2024-07-16 RX ORDER — HALOPERIDOL 5 MG/ML
0.5 INJECTION INTRAMUSCULAR EVERY 6 HOURS PRN
Status: DISCONTINUED | OUTPATIENT
Start: 2024-07-16 | End: 2024-07-18 | Stop reason: HOSPADM

## 2024-07-16 RX ORDER — DEXTROSE MONOHYDRATE 100 MG/ML
INJECTION, SOLUTION INTRAVENOUS CONTINUOUS PRN
Status: DISCONTINUED | OUTPATIENT
Start: 2024-07-16 | End: 2024-07-18 | Stop reason: HOSPADM

## 2024-07-16 RX ORDER — ENOXAPARIN SODIUM 100 MG/ML
40 INJECTION SUBCUTANEOUS DAILY
Status: DISCONTINUED | OUTPATIENT
Start: 2024-07-16 | End: 2024-07-18 | Stop reason: HOSPADM

## 2024-07-16 RX ORDER — LISINOPRIL 40 MG/1
40 TABLET ORAL DAILY
Status: DISCONTINUED | OUTPATIENT
Start: 2024-07-16 | End: 2024-07-18 | Stop reason: HOSPADM

## 2024-07-16 RX ORDER — SODIUM CHLORIDE 9 MG/ML
INJECTION, SOLUTION INTRAVENOUS CONTINUOUS
Status: ACTIVE | OUTPATIENT
Start: 2024-07-16 | End: 2024-07-17

## 2024-07-16 RX ORDER — POTASSIUM CHLORIDE 7.45 MG/ML
10 INJECTION INTRAVENOUS PRN
Status: DISCONTINUED | OUTPATIENT
Start: 2024-07-16 | End: 2024-07-18 | Stop reason: HOSPADM

## 2024-07-16 RX ORDER — LORAZEPAM 2 MG/ML
2 INJECTION INTRAMUSCULAR EVERY 6 HOURS PRN
Status: DISCONTINUED | OUTPATIENT
Start: 2024-07-16 | End: 2024-07-18 | Stop reason: HOSPADM

## 2024-07-16 RX ORDER — INSULIN LISPRO 100 [IU]/ML
0-4 INJECTION, SOLUTION INTRAVENOUS; SUBCUTANEOUS
Status: DISCONTINUED | OUTPATIENT
Start: 2024-07-16 | End: 2024-07-18 | Stop reason: HOSPADM

## 2024-07-16 RX ORDER — PANTOPRAZOLE SODIUM 40 MG/1
40 TABLET, DELAYED RELEASE ORAL
Status: DISCONTINUED | OUTPATIENT
Start: 2024-07-17 | End: 2024-07-18 | Stop reason: HOSPADM

## 2024-07-16 RX ORDER — GLUCAGON 1 MG/ML
1 KIT INJECTION PRN
Status: DISCONTINUED | OUTPATIENT
Start: 2024-07-16 | End: 2024-07-18 | Stop reason: HOSPADM

## 2024-07-16 RX ORDER — INSULIN GLARGINE 100 [IU]/ML
15 INJECTION, SOLUTION SUBCUTANEOUS NIGHTLY
Status: DISCONTINUED | OUTPATIENT
Start: 2024-07-16 | End: 2024-07-18 | Stop reason: HOSPADM

## 2024-07-16 RX ADMIN — LORAZEPAM 2 MG: 2 INJECTION INTRAMUSCULAR; INTRAVENOUS at 14:50

## 2024-07-16 RX ADMIN — DEXTROSE MONOHYDRATE 125 ML: 100 INJECTION, SOLUTION INTRAVENOUS at 20:28

## 2024-07-16 RX ADMIN — LORAZEPAM 1 MG: 2 INJECTION, SOLUTION INTRAMUSCULAR; INTRAVENOUS at 16:21

## 2024-07-16 RX ADMIN — SODIUM CHLORIDE 1000 ML: 9 INJECTION, SOLUTION INTRAVENOUS at 17:52

## 2024-07-16 RX ADMIN — IOPAMIDOL 80 ML: 755 INJECTION, SOLUTION INTRAVENOUS at 15:29

## 2024-07-16 RX ADMIN — LORAZEPAM 2 MG: 2 INJECTION INTRAMUSCULAR; INTRAVENOUS at 22:21

## 2024-07-16 RX ADMIN — SODIUM CHLORIDE 1000 ML: 9 INJECTION, SOLUTION INTRAVENOUS at 15:35

## 2024-07-16 RX ADMIN — KETOROLAC TROMETHAMINE 15 MG: 30 INJECTION, SOLUTION INTRAMUSCULAR at 22:21

## 2024-07-16 RX ADMIN — SODIUM CHLORIDE: 9 INJECTION, SOLUTION INTRAVENOUS at 20:55

## 2024-07-16 ASSESSMENT — LIFESTYLE VARIABLES: HOW OFTEN DO YOU HAVE A DRINK CONTAINING ALCOHOL: NEVER

## 2024-07-16 ASSESSMENT — PAIN SCALES - GENERAL: PAINLEVEL_OUTOF10: 3

## 2024-07-16 ASSESSMENT — PAIN - FUNCTIONAL ASSESSMENT
PAIN_FUNCTIONAL_ASSESSMENT: 0-10
PAIN_FUNCTIONAL_ASSESSMENT: NONE - DENIES PAIN

## 2024-07-16 ASSESSMENT — PAIN DESCRIPTION - LOCATION: LOCATION: NECK

## 2024-07-16 NOTE — ED NOTES
ED nurse-to-nurse bedside report    Chief Complaint   Patient presents with    Shortness of Breath    Dizziness      LOC: alert and orientated to name, place, date  Vital signs   Vitals:    07/16/24 1511 07/16/24 1514 07/16/24 1643 07/16/24 1748   BP:   112/77 127/75   Pulse: 93  79 82   Resp: 16 14 16   Temp:       TempSrc:       SpO2: (!) 88% 96% 96% 99%   Weight:       Height:          Pain:    Pain Interventions: see MAR  Pain Goal: 0  Oxygen: No    Current needs required RA   Telemetry: Yes  LDAs:   Peripheral IV 07/16/24 Left;Posterior Hand (Active)   Site Assessment Clean, dry & intact 07/16/24 1430   Line Status Blood return noted;Flushed 07/16/24 1430   Dressing Status Clean, dry & intact 07/16/24 1430       Peripheral IV 07/16/24 Posterior;Right Wrist (Active)   Site Assessment Clean, dry & intact 07/16/24 1458   Line Status Blood return noted;Flushed 07/16/24 1458   Phlebitis Assessment No symptoms 07/16/24 1458   Infiltration Assessment 0 07/16/24 1458   Dressing Status Clean, dry & intact 07/16/24 1458     Continuous Infusions:    sodium chloride      sodium chloride      dextrose       Mobility: Requires assistance * 2  Silva Fall Risk Score:       1/7/2024     9:10 AM 12/30/2022     8:19 AM 12/20/2021    11:33 AM 12/16/2020    10:55 AM 6/16/2020    10:23 AM 5/6/2019     8:51 AM 5/14/2018     1:58 PM   Fall Risk   2 or more falls in past year? no no no no no no no   Fall with injury in past year? no no no no no no no     Fall Interventions: call light in reach, bed in low position with wheels locked, side rails up x2  Report given to: SALIMA Gonzalez

## 2024-07-16 NOTE — H&P
not lost consciousness or hit her head or fallen.  This morning, the patient went to therapy and was feeling lightheaded/dizzy at that time.  Her blood pressure was attempted to be taken, but they were unable to get a reading on the blood pressure cuff.  Shortly thereafter, the patient was noted to be, agitated and as such, she was brought to the emergency room for further evaluation.  The patient's  has not noticed any facial weakness, slurred speech, or unilateral arm or leg weakness.  The patient's  states she has not been complaining of any urinary symptoms, headache, neck stiffness, or photophobia.  In the emergency department, the patient was a stroke alert activation.  Imaging was unremarkable and an MRI of the brain was negative for stroke.  The patient was given 3 mg of Ativan in the emergency department due to agitation.  At this time, she is somnolent.  She will arouse to voice and is able to answer some simple questions and follows some simple commands, but is unable to provide any history.  She is oriented to herself, but is not oriented to place, time, or age.  She is able to identify her  who is present in the room.    Review of Systems: Pertinent positives as noted in the HPI. All other systems reviewed and negative.    Past Medical History:        Diagnosis Date    Arthritis     Asthma     Brain cyst     benign    Breast cancer (HCC) 09/18/2018    Left breast, 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    Cancer ([facility]) 09/1918    Left Breast    Cerebrovascular accident (CVA) ([facility]) 10/14/2015    Chronic sinus complaints     Diverticulosis of colon     DKA (diabetic ketoacidoses) 05/2018    Elevated TSH     Hypertension     Osteoarthritis     Polyneuropathy     PONV (postoperative nausea and vomiting)     Spinal headache     Type 2 diabetes mellitus (HCC) 1990's       Past Surgical History:        Procedure Laterality Date    AORTIC VALVE REPAIR  02/22/2023    [facility]     reconstructions were performed on the scanner to include MIP images through the right and left pulmonary arteries and sagittal images through the chest. Isovue was the intravenous contrast utilized. All CT scans at this facility use dose modulation, iterative reconstruction, and/or weight-based dosing when appropriate to reduce radiation dose to as low as reasonably achievable. FINDINGS: The thyroid gland is unremarkable. The central airways are patent. There is cardiomegaly. There is a prosthetic aortic valve. There is atherosclerosis in the thoracic aorta. There is no aneurysmal dilatation. The pulmonary arteries are adequately opacified. There are no pulmonary emboli. There are no pathologically enlarged lymph nodes in the mediastinum, hilar or axillary regions. There is bibasilar atelectasis. There is no pleural effusion or pneumothorax. There is a calcified granuloma in the right lower lobe. Calcified granulomas are in the spleen. The gallbladder surgically absent. The bones are intact. There is no acute fracture.     No pulmonary emboli or pulmonary infiltrates. **This report has been created using voice recognition software. It may contain minor errors which are inherent in voice recognition technology.** Electronically signed by Dr Keon Yu    XR CHEST PORTABLE    Result Date: 7/16/2024  PROCEDURE: XR CHEST PORTABLE CLINICAL INFORMATION: 74-year-old female with shortness of breath. COMPARISON: Radiographs 03/03/2023 TECHNIQUE: AP upright view of the chest was obtained. FINDINGS: There are low lung volumes. There is a prosthetic aortic valve. Surgical clips project over the left axillary region. There is cardiomegaly. Patchy opacities are in the right hilar region. There is no significant pleural effusion or pneumothorax. Visualized portions of the upper abdomen are within normal limits. The osseous structures are intact. No acute fractures or suspicious osseous lesions.     Prominence in the right

## 2024-07-16 NOTE — ED TRIAGE NOTES
Pt presents to the ED from home with complaints of shortness of breath and dizziness.  at bedside states they were at outpatient rehab due to pt have a total knee replacement on 7/10/24. At rehab, pt became short of breath. On arrival to ER pt is 98% on room air. Denies any chest pain.  states pt had been complaining of feeling dizzy for the past few days. EKG completed.

## 2024-07-16 NOTE — ED NOTES
ED to inpatient nurses report      Chief Complaint:  Chief Complaint   Patient presents with    Shortness of Breath    Dizziness     Present to ED from: home    MOA:     LOC: alert to only name  Mobility: Requires assistance * 2  Oxygen Baseline: 96%    Current needs required: RA     Code Status:   Full Code    What abnormal results were found and what did you give/do to treat them? Elevated lactic acid  Any procedures or intervention occur? See MAR; CT; MRI/MRA    Mental Status:  Level of Consciousness: Alert (0)    Psych Assessment:        Vitals:  Patient Vitals for the past 24 hrs:   BP Temp Temp src Pulse Resp SpO2 Height Weight   07/16/24 1748 127/75 -- -- 82 16 99 % -- --   07/16/24 1643 112/77 -- -- 79 14 96 % -- --   07/16/24 1514 -- -- -- -- -- 96 % -- --   07/16/24 1511 -- -- -- 93 16 (!) 88 % -- --   07/16/24 1423 129/63 97.9 °F (36.6 °C) Oral 83 24 98 % 1.575 m (5' 2\") 86.6 kg (191 lb)        LDAs:   Peripheral IV 07/16/24 Left;Posterior Hand (Active)   Site Assessment Clean, dry & intact 07/16/24 1430   Line Status Blood return noted;Flushed 07/16/24 1430   Dressing Status Clean, dry & intact 07/16/24 1430       Peripheral IV 07/16/24 Posterior;Right Wrist (Active)   Site Assessment Clean, dry & intact 07/16/24 1458   Line Status Blood return noted;Flushed 07/16/24 1458   Phlebitis Assessment No symptoms 07/16/24 1458   Infiltration Assessment 0 07/16/24 1458   Dressing Status Clean, dry & intact 07/16/24 1458       Ambulatory Status:  No data recorded    Diagnosis:  DISPOSITION Admitted 07/16/2024 04:55:09 PM   Final diagnoses:   None        Consults:  IP CONSULT TO NEUROLOGY     Pain Score:  Pain Assessment  Pain Assessment: None - Denies Pain  Pain Level: 3  Patient's Stated Pain Goal: 0 - No pain  Pain Location: Neck    C-SSRS:   Risk of Suicide: No Risk    Sepsis Screening:       Maybee Fall Risk:       Swallow Screening        Preferred Language:   English      ALLERGIES     Amoxicillin, Bactrim  [sulfamethoxazole-trimethoprim], Donnatal [phenobarbital-belladonna alk], Lovastatin, Metformin and related, Vioxx, Dilaudid [hydromorphone hcl], and Fentanyl    SURGICAL HISTORY       Past Surgical History:   Procedure Laterality Date    AORTIC VALVE REPAIR  02/22/2023    osu    BLADDER SUSPENSION      times 2    BREAST LUMPECTOMY Left 10/10/2018    CARPAL TUNNEL RELEASE Bilateral 02/2020    CHOLECYSTECTOMY      DILATION AND CURETTAGE OF UTERUS      x2    EYE SURGERY      HAND SURGERY Right     thumb-revision of suspensionplasty    HERNIA REPAIR  06/18/2019    HYSTERECTOMY (CERVIX STATUS UNKNOWN)  1995    JOINT REPLACEMENT Right 2007    Rt total knee    KNEE ARTHROSCOPY  1967, 2001    RUDY STEROTACTIC LOC BREAST BIOPSY LEFT Left 09/19/2018    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    MANDIBLE FRACTURE SURGERY      OTHER SURGICAL HISTORY Right 12/30/2015    Excision of Sebaceous Cyst Right Ear     OVARY REMOVAL  1995    IA OFFICE/OUTPT VISIT,PROCEDURE ONLY Left 10/10/2018    LEFT BREAST LUMPECTOMY WITH SENTINEL LYMPH NODE BIOPSY performed by Ed Bean MD at [facility] OR    SINUS SURGERY      SKIN BIOPSY      TONSILLECTOMY AND ADENOIDECTOMY  age 6    VENTRAL HERNIA REPAIR N/A 06/14/2019    COMBINED LAPAROSCOPIC AND OPEN VENTRAL HERNIA REPAIR WITH MESH performed by Ed Bean MD at [facility] OR    WRIST SURGERY Right     right-excision        PAST MEDICAL HISTORY       Past Medical History:   Diagnosis Date    Arthritis     Asthma     Brain cyst     benign    Breast cancer (HCC) 09/18/2018    Left breast, 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    Cancer (HCC) 09/1918    Left Breast    Cerebrovascular accident (CVA) (HCC) 10/14/2015    Chronic sinus complaints     Diverticulosis of colon     DKA (diabetic ketoacidoses) 05/2018    Elevated TSH     Hypertension     Osteoarthritis     Polyneuropathy     PONV (postoperative nausea and vomiting)     Spinal headache     Type 2

## 2024-07-16 NOTE — ED NOTES
Report received from SALIMA Saldivar. Patient resting in bed. Respirations easy and unlabored. No distress noted. Call light within reach. Family at the bedside.

## 2024-07-16 NOTE — ED NOTES
Pt resting on cot. Family at bedside. Call light in reach. RR even and unlabored. No distress noted. VSS.

## 2024-07-16 NOTE — ED NOTES
Pt is very hysteric. Pt not holding still for CT scans. 2mg ativan given at this time. Verbal dr. Caldwell.

## 2024-07-16 NOTE — CONSULTS
Neurology Stroke Alert Note    Date:7/16/2024       Room:[address] [place] [address]  Patient Name:Celia Perkins     YOB: 1950     Age:74 y.o.    Requesting Physician: Skinny Goldsmith PA-C     Reason for Consult:  Evaluate for stroke alert    Subjective       HPI: Celia Perkins who is a 74 y.o. female with a history of IDDM,HLD, Left knee replacement on 7/10/24. She was at outpatient rehab and suddenly became agitated with incomprehensible speech and confusion. She would not follow commands and was reported to be short of breath. Her  had also informed the rehab staff that she had been dizzy for a couple of days. BP was 120s systolic in ED. She was combative and would not answer questions or follow commands. CTH completed - no acute findings. CTA - H/N was not done so CT of chest with PE protocol could be completed, which was normal. She was moving all extremities with some limitation of LLE due to recent knee replacement - incisional dressing intact. She would not state her name and was very agitated. She did have some perseveration - \"my right side, my right side.\"      She was given Ativan 2 mg IV by ED MD and became calmer. She remained alert and arousing easily. Resp regular and even.          Last known well:  1400.  Time of stroke alert:  1445.  Time of neurology arrival:  1446.  Vascular risk factors:  DM, HTN.  Initial glucose: 76 .  Old deficits from prior stroke:  None.  INR in ED if anticoagulated:  not applicable.  Initial blood pressure:  .  Initial NIHSS: 129/63.  Pre-morbid Modified Dawn Scale:  1.   Time of initial imaging read: 1509 .  Thrombolytic administered:  not indicated/contraindicated.  Thrombectomy performed:  not indicated.  Puncture time:  not applicable.       Review of Systems     I was unable to complete a comprehensive ROS due to patient condition - as described above.     Medications   Scheduled Meds:    sodium chloride flush  5-40 mL IntraVENous 2 times per day    [Held  mastoid air cells are normally aerated. There is no gross calvarial abnormality. There is no significant change in the appearance of the brain compared to the prior study.     Motion-degraded examination. No acute findings. These findings were telephoned to Jessika, with the stroke service at 3:09 p.m. on 7/16/2024. **This report has been created using voice recognition software. It may contain minor errors which are inherent in voice recognition technology.** Electronically signed by Dr. Sarah Martínez        Assessment and Plan:        Altered Mental Status - CTH - no acute findings. MRI - brain - no acute findings; MRA - Head - no acute findings - will review with Dr. Vigil. Etiology of AMS unclear. Will defer to primary team for metabolic/infectious workup. Will continue to follow. Neuro checks q 4. Hydrate. Patient is afebrile/ WBC - normal.     Assessment and plan of care discussed with Dr. Vigil and he is in agreement.      NAV Bui, ANP-BC  Neurology

## 2024-07-16 NOTE — ED PROVIDER NOTES
STRZ ICU STEPDOWN TELEMETRY 4K  EMERGENCY DEPARTMENT ENCOUNTER      Pt Name: Celia Perkins  MRN: [id number]  Birthdate 1950  Date of evaluation: 7/16/2024  Provider: Maynor Caldwell DO  11:24 AM    CHIEF COMPLAINT       Chief Complaint   Patient presents with    Shortness of Breath    Dizziness         HISTORY OF PRESENT ILLNESS        Patient is a 74-year-old female who presents from rehab status post right knee replacement, who presents with a chief complaint shortness of breath.  On my initial encounter with the patient, she appears very confused, she does not answer questions appropriately.   states that this started about an hour ago.  Cannot identify any inciting factors, no aggravating or alleviating factors.  Timing constant.  Concern for stroke given that patient is not answering questions appropriately.  I asked her what her name was and initially she said \"yea.\"  With repeated questioning, the patient tells me her name is Rosanne.  I asked where she was today and she again said \"Yea.\"           Nursing Notes were reviewed.    REVIEW OF SYSTEMS       Review of Systems   Unable to perform ROS: Acuity of condition       Except as noted above the remainder of the review of systems was reviewed and negative.       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

## 2024-07-17 PROBLEM — I10 HYPERTENSION: Status: ACTIVE | Noted: 2024-07-17

## 2024-07-17 LAB
ALBUMIN SERPL BCG-MCNC: 3.5 G/DL (ref 3.5–5.1)
ALP SERPL-CCNC: 61 U/L (ref 38–126)
ALT SERPL W/O P-5'-P-CCNC: 15 U/L (ref 11–66)
AMMONIA PLAS-MCNC: < 10 UMOL/L (ref 11–60)
ANION GAP SERPL CALC-SCNC: 11 MEQ/L (ref 8–16)
ANION GAP SERPL CALC-SCNC: 11 MEQ/L (ref 8–16)
AST SERPL-CCNC: 14 U/L (ref 5–40)
BASOPHILS ABSOLUTE: 0 THOU/MM3 (ref 0–0.1)
BASOPHILS NFR BLD AUTO: 0.3 %
BILIRUB SERPL-MCNC: 2.2 MG/DL (ref 0.3–1.2)
BUN SERPL-MCNC: 16 MG/DL (ref 7–22)
BUN SERPL-MCNC: 19 MG/DL (ref 7–22)
CALCIUM SERPL-MCNC: 8.4 MG/DL (ref 8.5–10.5)
CALCIUM SERPL-MCNC: 8.5 MG/DL (ref 8.5–10.5)
CHLORIDE SERPL-SCNC: 103 MEQ/L (ref 98–111)
CHLORIDE SERPL-SCNC: 105 MEQ/L (ref 98–111)
CO2 SERPL-SCNC: 23 MEQ/L (ref 23–33)
CO2 SERPL-SCNC: 24 MEQ/L (ref 23–33)
CREAT SERPL-MCNC: 0.4 MG/DL (ref 0.4–1.2)
CREAT SERPL-MCNC: 0.4 MG/DL (ref 0.4–1.2)
DEPRECATED RDW RBC AUTO: 44.7 FL (ref 35–45)
EOSINOPHIL NFR BLD AUTO: 3.9 %
EOSINOPHILS ABSOLUTE: 0.2 THOU/MM3 (ref 0–0.4)
ERYTHROCYTE [DISTWIDTH] IN BLOOD BY AUTOMATED COUNT: 12.9 % (ref 11.5–14.5)
FLUAV RNA RESP QL NAA+PROBE: NOT DETECTED
FLUBV RNA RESP QL NAA+PROBE: NOT DETECTED
GFR SERPL CREATININE-BSD FRML MDRD: > 90 ML/MIN/1.73M2
GFR SERPL CREATININE-BSD FRML MDRD: > 90 ML/MIN/1.73M2
GLUCOSE BLD STRIP.AUTO-MCNC: 105 MG/DL (ref 70–108)
GLUCOSE BLD STRIP.AUTO-MCNC: 144 MG/DL (ref 70–108)
GLUCOSE BLD STRIP.AUTO-MCNC: 75 MG/DL (ref 70–108)
GLUCOSE BLD STRIP.AUTO-MCNC: 77 MG/DL (ref 70–108)
GLUCOSE BLD STRIP.AUTO-MCNC: 86 MG/DL (ref 70–108)
GLUCOSE SERPL-MCNC: 111 MG/DL (ref 70–108)
GLUCOSE SERPL-MCNC: 83 MG/DL (ref 70–108)
HCT VFR BLD AUTO: 40.1 % (ref 37–47)
HGB BLD-MCNC: 12.7 GM/DL (ref 12–16)
IMM GRANULOCYTES # BLD AUTO: 0.01 THOU/MM3 (ref 0–0.07)
IMM GRANULOCYTES NFR BLD AUTO: 0.2 %
LYMPHOCYTES ABSOLUTE: 1.1 THOU/MM3 (ref 1–4.8)
LYMPHOCYTES NFR BLD AUTO: 18.4 %
MCH RBC QN AUTO: 30.8 PG (ref 26–33)
MCHC RBC AUTO-ENTMCNC: 31.7 GM/DL (ref 32.2–35.5)
MCV RBC AUTO: 97.3 FL (ref 81–99)
MONOCYTES ABSOLUTE: 0.7 THOU/MM3 (ref 0.4–1.3)
MONOCYTES NFR BLD AUTO: 12.3 %
NEUTROPHILS ABSOLUTE: 3.8 THOU/MM3 (ref 1.8–7.7)
NEUTROPHILS NFR BLD AUTO: 64.9 %
NRBC BLD AUTO-RTO: 0 /100 WBC
OSMOLALITY SERPL CALC.SUM OF ELEC: 278.9 MOSMOL/KG (ref 275–300)
PLATELET # BLD AUTO: 165 THOU/MM3 (ref 130–400)
PMV BLD AUTO: 9 FL (ref 9.4–12.4)
POTASSIUM SERPL-SCNC: 4.6 MEQ/L (ref 3.5–5.2)
POTASSIUM SERPL-SCNC: 4.8 MEQ/L (ref 3.5–5.2)
PROT SERPL-MCNC: 5.8 G/DL (ref 6.1–8)
RBC # BLD AUTO: 4.12 MILL/MM3 (ref 4.2–5.4)
SARS-COV-2 RNA RESP QL NAA+PROBE: NOT DETECTED
SODIUM SERPL-SCNC: 138 MEQ/L (ref 135–145)
SODIUM SERPL-SCNC: 139 MEQ/L (ref 135–145)
TROPONIN, HIGH SENSITIVITY: 16 NG/L (ref 0–12)
WBC # BLD AUTO: 5.9 THOU/MM3 (ref 4.8–10.8)

## 2024-07-17 PROCEDURE — 97530 THERAPEUTIC ACTIVITIES: CPT

## 2024-07-17 PROCEDURE — 99232 SBSQ HOSP IP/OBS MODERATE 35: CPT | Performed by: NURSE PRACTITIONER

## 2024-07-17 PROCEDURE — 92610 EVALUATE SWALLOWING FUNCTION: CPT

## 2024-07-17 PROCEDURE — 6370000000 HC RX 637 (ALT 250 FOR IP): Performed by: INTERNAL MEDICINE

## 2024-07-17 PROCEDURE — 82140 ASSAY OF AMMONIA: CPT

## 2024-07-17 PROCEDURE — 6370000000 HC RX 637 (ALT 250 FOR IP): Performed by: PHYSICIAN ASSISTANT

## 2024-07-17 PROCEDURE — 97116 GAIT TRAINING THERAPY: CPT

## 2024-07-17 PROCEDURE — 97535 SELF CARE MNGMENT TRAINING: CPT

## 2024-07-17 PROCEDURE — 85025 COMPLETE CBC W/AUTO DIFF WBC: CPT

## 2024-07-17 PROCEDURE — 82948 REAGENT STRIP/BLOOD GLUCOSE: CPT

## 2024-07-17 PROCEDURE — 92523 SPEECH SOUND LANG COMPREHEN: CPT

## 2024-07-17 PROCEDURE — 99232 SBSQ HOSP IP/OBS MODERATE 35: CPT | Performed by: INTERNAL MEDICINE

## 2024-07-17 PROCEDURE — 97162 PT EVAL MOD COMPLEX 30 MIN: CPT

## 2024-07-17 PROCEDURE — 84484 ASSAY OF TROPONIN QUANT: CPT

## 2024-07-17 PROCEDURE — 97166 OT EVAL MOD COMPLEX 45 MIN: CPT

## 2024-07-17 PROCEDURE — 2580000003 HC RX 258: Performed by: PHYSICIAN ASSISTANT

## 2024-07-17 PROCEDURE — 36415 COLL VENOUS BLD VENIPUNCTURE: CPT

## 2024-07-17 PROCEDURE — 97129 THER IVNTJ 1ST 15 MIN: CPT

## 2024-07-17 PROCEDURE — 2060000000 HC ICU INTERMEDIATE R&B

## 2024-07-17 PROCEDURE — 95819 EEG AWAKE AND ASLEEP: CPT

## 2024-07-17 PROCEDURE — 87636 SARSCOV2 & INF A&B AMP PRB: CPT

## 2024-07-17 PROCEDURE — 80053 COMPREHEN METABOLIC PANEL: CPT

## 2024-07-17 PROCEDURE — 95819 EEG AWAKE AND ASLEEP: CPT | Performed by: PSYCHIATRY & NEUROLOGY

## 2024-07-17 RX ORDER — AMLODIPINE BESYLATE 5 MG/1
5 TABLET ORAL DAILY
Status: DISCONTINUED | OUTPATIENT
Start: 2024-07-17 | End: 2024-07-18 | Stop reason: HOSPADM

## 2024-07-17 RX ADMIN — PANTOPRAZOLE SODIUM 40 MG: 40 TABLET, DELAYED RELEASE ORAL at 06:11

## 2024-07-17 RX ADMIN — SODIUM CHLORIDE: 9 INJECTION, SOLUTION INTRAVENOUS at 06:14

## 2024-07-17 RX ADMIN — AMLODIPINE BESYLATE 5 MG: 5 TABLET ORAL at 18:33

## 2024-07-17 RX ADMIN — ATORVASTATIN CALCIUM 20 MG: 20 TABLET, FILM COATED ORAL at 20:35

## 2024-07-17 RX ADMIN — SODIUM CHLORIDE, PRESERVATIVE FREE 10 ML: 5 INJECTION INTRAVENOUS at 20:35

## 2024-07-17 RX ADMIN — CLOPIDOGREL BISULFATE 75 MG: 75 TABLET ORAL at 18:33

## 2024-07-17 ASSESSMENT — PAIN SCALES - GENERAL: PAINLEVEL_OUTOF10: 0

## 2024-07-17 NOTE — PROGRESS NOTES
Neurology Progress Note    Date:7/17/2024       Room:[facility]  Patient Name:Celia Perkins     YOB: 1950     Age:74 y.o.    CC:   Chief Complaint   Patient presents with    Shortness of Breath    Dizziness        Subjective    History - Taken From Neurology Consult - 7/16/24    HPI: Celia Perkins who is a 74 y.o. female with a history of IDDM,HLD, Left knee replacement on 7/10/24. She was at outpatient rehab and suddenly became agitated with incomprehensible speech and confusion. She would not follow commands and was reported to be short of breath. Her  had also informed the rehab staff that she had been dizzy for a couple of days. BP was 120s systolic in ED. She was combative and would not answer questions or follow commands. CTH completed - no acute findings. CTA - H/N was not done so CT of chest with PE protocol could be completed, which was normal. She was moving all extremities with some limitation of LLE due to recent knee replacement - incisional dressing intact. She would not state her name and was very agitated. She did have some perseveration - \"my right side, my right side.\"        She was given Ativan 2 mg IV by ED MD and became calmer. She remained alert and arousing easily. Resp regular and even.        Last known well:  1400.  Time of stroke alert:  1445.  Time of neurology arrival:  1446.  Vascular risk factors:  DM, HTN.  Initial glucose: 76 .  Old deficits from prior stroke:  None.  INR in ED if anticoagulated:  not applicable.  Initial blood pressure:  .  Initial NIHSS: 129/63.  Pre-morbid Modified Ch[place] Scale:  1.   Time of initial imaging read: 1509 .  Thrombolytic administered:  not indicated/contraindicated.  Thrombectomy performed:  not indicated.  Puncture time:  not applicable.    Interval History - 7/17/24    Much improved overnight. Awake, alert and oriented to person; disoriented to person and place. No longer agitated and screaming; very calm. Following  cell - face & scalp    Diabetes Maternal Grandmother     Heart Disease Maternal Grandfather     Cancer Paternal Aunt          multiple myeloma    Cancer Paternal Uncle         Lymphatic Leukemia    Breast Cancer Maternal Cousin         unsure on age, had breast cancer twice unsure if bilateral or if reoccurence    Heart Disease Maternal Uncle     Lupus Maternal Uncle     Other Paternal Grandfather         Brain aneurysm       Physical Examination      Vitals:  BP (!) 145/70   Pulse (!) 110   Temp 98.2 °F (36.8 °C) (Oral)   Resp 16   Ht 1.575 m (5' 2\")   Wt 86.6 kg (190 lb 14.7 oz)   SpO2 100%   BMI 34.92 kg/m²   Temp (24hrs), Av °F (36.7 °C), Min:97.7 °F (36.5 °C), Max:98.4 °F (36.9 °C)      I/O (24Hr):    Intake/Output Summary (Last 24 hours) at 2024 1510  Last data filed at 2024 0739  Gross per 24 hour   Intake 1200 ml   Output 600 ml   Net 600 ml         Physical Exam    General: No distress noted. Awake, alert and oriented to person; disoriented to place and time  Eyes:  PERRL. Conjunctivae clear.  HENT: Head normal appearing. Nares normal. Oral mucosa moist.  Hearing intact.   Neck: Supple, with full range of motion. Trachea midline.  No gross JVD appreciated.  Respiratory:  Normal effort. Clear to auscultation, without rales or wheezes or rhonchi.  Cardiovascular: Normal rate, regular rhythm with normal S1/S2 without murmurs.  No lower extremity edema.   Abdomen: Soft, non-tender, non-distended with normal bowel sounds.  Musculoskeletal: No joint swelling or tenderness. Normal tone. No abnormal movements. Dressing intact - left knee incision   Skin: Warm and dry. No rashes or lesions.  Neurologic:  See below    Psychiatric: Alert and oriented to person. Disoriented to place and time; Patient tearful because she cannot remember what happened yesterday and why she is confused.  Peripheral pulses/Cap Refill: DP pulses palpable      Neurological Exam    Mental Status: Awake, alert and oriented

## 2024-07-17 NOTE — PLAN OF CARE
Problem: Neurosensory - Adult  Goal: Achieves stable or improved neurological status  Outcome: Not Progressing  Flowsheets (Taken 7/17/2024 0433)  Achieves stable or improved neurological status:   Assess for and report changes in neurological status   Monitor temperature, glucose, and sodium. Initiate appropriate interventions as ordered     Problem: Respiratory - Adult  Goal: Achieves optimal ventilation and oxygenation  Outcome: Progressing  Flowsheets (Taken 7/17/2024 0433)  Achieves optimal ventilation and oxygenation:   Assess for changes in respiratory status   Assess for changes in mentation and behavior   Position to facilitate oxygenation and minimize respiratory effort   Oxygen supplementation based on oxygen saturation or arterial blood gases     Problem: Neurosensory - Adult  Goal: Achieves stable or improved neurological status  Outcome: Not Progressing  Flowsheets (Taken 7/17/2024 0433)  Achieves stable or improved neurological status:   Assess for and report changes in neurological status   Monitor temperature, glucose, and sodium. Initiate appropriate interventions as ordered

## 2024-07-17 NOTE — PLAN OF CARE
Problem: Discharge Planning  Goal: Discharge to home or other facility with appropriate resources  Outcome: Progressing     Problem: Safety - Adult  Goal: Free from fall injury  Outcome: Progressing     Problem: Neurosensory - Adult  Goal: Achieves stable or improved neurological status  7/17/2024 1622 by Julio Woodruff RN  Outcome: Progressing  7/17/2024 0433 by Tere Orozco RN  Outcome: Not Progressing  Flowsheets (Taken 7/17/2024 0433)  Achieves stable or improved neurological status:   Assess for and report changes in neurological status   Monitor temperature, glucose, and sodium. Initiate appropriate interventions as ordered     Problem: Respiratory - Adult  Goal: Achieves optimal ventilation and oxygenation  7/17/2024 1622 by Julio Woodruff RN  Outcome: Progressing  7/17/2024 0433 by Tere Orozco RN  Outcome: Progressing  Flowsheets (Taken 7/17/2024 0433)  Achieves optimal ventilation and oxygenation:   Assess for changes in respiratory status   Assess for changes in mentation and behavior   Position to facilitate oxygenation and minimize respiratory effort   Oxygen supplementation based on oxygen saturation or arterial blood gases     Problem: Cardiovascular - Adult  Goal: Maintains optimal cardiac output and hemodynamic stability  7/17/2024 1622 by Julio Woodruff RN  Outcome: Progressing  Problem: Musculoskeletal - Adult  Goal: Return mobility to safest level of function  Outcome: Progressing     Problem: Chronic Conditions and Co-morbidities  Goal: Patient's chronic conditions and co-morbidity symptoms are monitored and maintained or improved  Outcome: Progressing     Problem: Neurosensory - Adult  Goal: Achieves stable or improved neurological status  7/17/2024 1622 by Julio Woodruff RN  Outcome: Progressing    Care plan reviewed with patient spouse.  Patient and spouse verbalize understanding of the plan of care and contribute to goal setting.

## 2024-07-17 NOTE — PROGRESS NOTES
mellitus (HCC) 's       Pain: No pain reported.    Subjective:  Spoke with RN Julio for approval of evaluation. Upon arrival, patient laying in bed. Alert and cooperative. Tearful and emotional throughout as patient upset that she cannot understand what is going on.    SOCIAL HISTORY:   Living Arrangements: Elena with   Work History: Retired  Education Level: High school   Driving Status: Does not drive  Finance Management: Independent  Medication Management: Independent  ADL's: Independent.   Hobbies: yard work  Vision Status: Glasses  Hearing: WFL  Type of Home: House  Home Layout: One level  Home Access: Stairs to enter with rails  Entrance Stairs - Number of Steps: 5  Entrance Stairs - Rails: Both  Home Equipment: Walker - Standard, Wheelchair - Manual    SPEECH / VOICE:  Speech and Voice appear to be grossly intact for basic and complex daily communication    LANGUAGE:  Receptive:  1 Step Commands: 2/2  2 Step Commands: 3/3  Simple Yes/No Questions: 2/2  Complex Yes/No Questions: 2/2  Following Verbal Instructions: 1/1    Expressive:  Automatic Speech: 3/3  Sentence Completion (automatic): 3/3  Confrontational Namin/3  Responsive Namin/2    COGNITION:  [place] Cognitive Assessment (MOCA) version 7.11 completed.  Patient scored 2/30.  Normal is greater than or equal to 26/30.  Inclusion of +1 point given highest level of education achieved less than/equal to 12th grade or GED with limited-0 post-secondary schooling   Orientation: 0/6  Immediate Recall: 1/5  Short-Term Recall: 0/5  Divergent Namin/11 members  Reasonin/2  Sequencin/2  Thought Organization: Impaired  Insight: Impaired  Attention: Impaired  Math Computation: 0/5  Executive Functionin/5    SWALLOWING:    Respiratory Status: Nasal Canula: 3LPM        Behavioral Observation: Alert, Confused, and Limited Direction Following    CRANIAL NERVE ASSESSMENT   CN V (Trigeminal) Closes and Opens Mandible WFL    Rotary Jaw  orlando.  Goal 5: Patient will consume regular diet, thin liquids with stable pulmonary status and use of trained compensatory strategies to assist with nutrition/hydration    LONG TERM GOALS:  No LTGs established due to short ELOS.       Ana Barrow M.A., CCC-SLP [id number]

## 2024-07-17 NOTE — PROGRESS NOTES
[facility]  INPATIENT OCCUPATIONAL THERAPY  STRZ ICU STEPDOWN TELEMETRY 4K  EVALUATION    Time:   Time In: 1150  Time Out: 1222  Timed Code Treatment Minutes: 24 Minutes  Minutes: 32          Date: 2024  Patient Name: Celia Perkins,   Gender: female      MRN: [id number]  : 1950  (74 y.o.)  Referring Practitioner: Skinny Goldsmith PA-C  Diagnosis: Acute encephalopathy  Additional Pertinent Hx: 74 y.o. female with a history of aortic stenosis status post TAVR, stroke, insulin-dependent type 2 diabetes mellitus, who presented to [facility] with chief complaint of dizziness, shortness of breath, and confusion. The patient recently had a left total knee arthroplasty on 7/10/2024 and was d/c home. Per pt's , pt has been sleeping excessively with poor appeite. Pt was at therapy PTA when she began feeling light headed/dizzy. Her blood pressure was attempted to be taken, but they were unable to get a reading on the blood pressure cuff. Shortly thereafter, the patient was noted to be, agitated and as such, she was brought to the emergency room for further evaluation. In the emergency department, the patient was a stroke alert activation. Imaging was unremarkable and an MRI of the brain was negative for stroke.    Restrictions/Precautions:  Restrictions/Precautions: General Precautions, Fall Risk  Position Activity Restriction  Other position/activity restrictions: Pt disoriented, monitor O2, and past L TKA 7/10/24.    Subjective  Chart Reviewed: Yes, Orders, History and Physical, Imaging  Patient assessed for rehabilitation services?: Yes  Family / Caregiver Present: Yes    Subjective: Pt sleeping in bed with  present. Pt difficulty to arouse but consents to OT eval/tx. After t/f to chair, pt becomes tearful and states, \"I don't understand why I am here.\" Therapist asks pt if she knows where she is. Pt states, \"I was at therapy this morning and now I don't know.\" Therapist provides

## 2024-07-17 NOTE — PROGRESS NOTES
[facility]     Neurodiagnostic Laboratory Technician Worksheet      EEG Date: 2024    Name: Celia Perkins  : 1950  Age: 74 y.o.  Sex: female  MRN: [id number]  CSN: [id number]    Room/Location: [facility]-A  Ordering Provider: LISSY carreon    EEG Number: [id number]    Time In: 0941  Time Out: 1001  Total Treatment Time: 20 mins    Clinical History: pt has a hx of a stroke and TAVR. Here for encephalopathy w/ hyperactive delirium.    Hand Dominance: Left   Sedation: No   H.V. Completed: No, age protocol   Photic: Yes   Sleep: Yes   Drowsy: Yes   Sleep Deprived: No   Seizures Observed: No   Mentality: alert     Technician: Pat Morales 2024

## 2024-07-17 NOTE — PROGRESS NOTES
[facility]--HOSPITALIST GROUP    Hospitalist PROGRESS NOTE dictated by Cayden Reynoso MD on 2024    Patient ID: Celia Perkins is 74 y.o. and presently in room [facility]  : 1950 MRN: [id number]    Admit date: 2024  Primary Care Physician: Yash Nelson MD   Patient Care Team:  Yash Nelson MD as PCP - General (Family Medicine)  Yash Nelson MD as PCP - Empaneled Provider  Haritha Alatorre MD as Consulting Physician (Oncology)  Tel: [phone]  IP CONSULT TO NEUROLOGY  Admitting Physician: Monisha Muro MD   Code Status:  Full Code    Celia Perkins is a 74 y.o.  female who presented with Shortness of Breath and Dizziness    Admit date: 2024  Room: [facility]    History of Present Illness:  Celia Perkins is a 74 y.o. female with a history of aortic stenosis status post TAVR, stroke, gastroesophageal reflux disease, osteoarthritis of the left knee status post recent arthroplasty on 7/10/2024, insulin-dependent type 2 diabetes mellitus, hypertension, hyperlipidemia, who presented to [facility] with chief complaint of dizziness, shortness of breath, and confusion.  History is provided by the patient's  who is present at bedside as well as her daughter.  The patient recently had surgical intervention in the form of a left total knee arthroplasty on 7/10/2024.  She had a relatively benign postoperative course and was in the hospital for a couple of days after.  The patient's family do report that she had some diarrhea prior to the hospitalization, but has not had any since.  She was discharged home with prescriptions for opioid pain medications as well as a muscle relaxer.  The patient's  states that she has been sleeping excessively since being discharged.  She has not been eating well.  They have not noticed any purulent drainage from her surgical incision or any associated adjacent erythema.  The patient has been having a

## 2024-07-17 NOTE — PROGRESS NOTES
Pt was in bed as her  was with her. She was dealing with acute encephalopathy. She was encouraged and anointed.    07/17/24 1725   Encounter Summary   Encounter Overview/Reason Initial Encounter   Service Provided For Patient and family together   Referral/Consult From [facility]   Support System Spouse   Last Encounter  07/17/24  (Anointed)   Complexity of Encounter Low   Begin Time 1230   End Time  1236   Total Time Calculated 6 min   Spiritual/Emotional needs   Type Spiritual Support   Rituals, Rites and Sacraments   Type Anointing   Assessment/Intervention/Outcome   Assessment Fearful   Intervention Empowerment

## 2024-07-17 NOTE — PROGRESS NOTES
[facility]  INPATIENT PHYSICAL THERAPY  EVALUATION  [facility] ICU STEPDOWN TELEMETRY 4K - 4K-02/002-A    Time In: 0750  Time Out: 0828  Timed Code Treatment Minutes: 30 Minutes  Minutes: 38          Date: 2024  Patient Name: Celia Perkins,  Gender:  female        MRN: [id number]  : 1950  (74 y.o.)      Referring Practitioner: Skinny Goldsmith PA-C  Diagnosis: Acute encephalopathy  Additional Pertinent Hx: Per EMR \"Celia Perkins is a 74 y.o. female with a history of aortic stenosis status post TAVR, stroke, gastroesophageal reflux disease, osteoarthritis of the left knee status post recent arthroplasty on 7/10/2024, insulin-dependent type 2 diabetes mellitus, hypertension, hyperlipidemia, who presented to [facility] with chief complaint of dizziness, shortness of breath, and confusion.  History is provided by the patient's  who is present at bedside as well as her daughter.  Had surgical intervention in the form of a left total knee arthroplasty on 7/10/2024.  She had a relatively benign postoperative course and was in the hospital for a couple of days after.  The pt's  states that she has been sleeping excessively since being discharged.  She has not been eating well.  They have not noticed any purulent drainage from her surgical incision or any associated adjacent erythema.  The pt has been having a significant amount of pain in the left lower extremity.  Over the last couple of days, the patient's  reports that the patient had been complaining of some lightheadedness/dizziness.  This primarily occurred when she was moving from a sitting to a standing position.  She has not lost consciousness or hit her head or fallen.  This morning, the patient went to therapy and was feeling lightheaded/dizzy at that time. Shortly thereafter, the patient was noted to be, agitated and as such, she was brought to the emergency room for further evaluation.  The patient's  has not  Goals: By discharge  Short Term Goal 1: Pt will demo bed mobility modified indepednently to inc functional independence.  Short Term Goal 2: Pt will demo sit to/from stand with RW and S to inc functional independence.  Short Term Goal 3: Pt will amb 50 ft wiht RW modified independently to inc functional independence.  Long Term Goals  Time Frame for Long Term Goals : NA due to short ELOS    Following session, patient left in safe position with all fall risk precautions in place.    Moon Fulton , licensed Therapist was present and directly responsible for all treatments provided by, Julio Acharya, AGNES.

## 2024-07-17 NOTE — PROCEDURES
EEG REPORT       Patient: Celia Perkins Age: 74 y.o.  MRN: [id number]      Referring Provider: No ref. provider found    History: This routine 30 minute scalp EEG was recorded with video- monitoring for a 74 y.o.. female who presented with encephalopathy. This EEG was performed to evaluate for focal and epileptiform abnormalities.     Celia Perkins   Current Facility-Administered Medications   Medication Dose Route Frequency Provider Last Rate Last Admin    LORazepam (ATIVAN) injection 2 mg  2 mg IntraVENous Q6H PRN Maynor Caldwell, DO   2 mg at 07/16/24 2221    sodium chloride flush 0.9 % injection 5-40 mL  5-40 mL IntraVENous 2 times per day Ohlemacher, Skinny, PA-C        sodium chloride flush 0.9 % injection 5-40 mL  5-40 mL IntraVENous PRN Ohlemacher, Skinny, PA-C        0.9 % sodium chloride infusion   IntraVENous PRN Ohlemacher, Skinny, PA-C        potassium chloride (KLOR-CON M) extended release tablet 40 mEq  40 mEq Oral PRN Ohlemacher, Skinny, PA-C        Or    potassium bicarb-citric acid (EFFER-K) effervescent tablet 40 mEq  40 mEq Oral PRN Ohlemacher, Skinny, PA-C        Or    potassium chloride 10 mEq/100 mL IVPB (Peripheral Line)  10 mEq IntraVENous PRN Ohlemacher, Skinny, PA-C        magnesium sulfate 2000 mg in 50 mL IVPB premix  2,000 mg IntraVENous PRN Ohlemacher, Skinny, PA-C        [Held by provider] enoxaparin (LOVENOX) injection 40 mg  40 mg SubCUTAneous Daily Ohlemacher Skinny, PA-C        ondansetron (ZOFRAN-ODT) disintegrating tablet 4 mg  4 mg Oral Q8H PRN Ohlemacher, Skinny, PA-C        Or    ondansetron (ZOFRAN) injection 4 mg  4 mg IntraVENous Q6H PRN Ohlemacher, Skinny, PA-C        polyethylene glycol (GLYCOLAX) packet 17 g  17 g Oral Daily PRN Ohlemacher, Skinny, PA-C        acetaminophen (TYLENOL) tablet 650 mg  650 mg Oral Q6H PRN Ohlemacher, Skinny, PA-C        Or    acetaminophen (TYLENOL) suppository 650 mg  650 mg Rectal Q6H PRN Ohlemacher, Skinny, PA-C

## 2024-07-18 ENCOUNTER — APPOINTMENT (OUTPATIENT)
Dept: MRI IMAGING | Age: 74
DRG: 092 | End: 2024-07-18
Payer: MEDICARE

## 2024-07-18 ENCOUNTER — HOSPITAL ENCOUNTER (INPATIENT)
Age: 74
LOS: 8 days | Discharge: HOME | End: 2024-07-26
Attending: STUDENT IN AN ORGANIZED HEALTH CARE EDUCATION/TRAINING PROGRAM | Admitting: STUDENT IN AN ORGANIZED HEALTH CARE EDUCATION/TRAINING PROGRAM
Payer: MEDICARE

## 2024-07-18 VITALS
DIASTOLIC BLOOD PRESSURE: 69 MMHG | OXYGEN SATURATION: 99 % | BODY MASS INDEX: 35.54 KG/M2 | TEMPERATURE: 98.4 F | HEIGHT: 62 IN | SYSTOLIC BLOOD PRESSURE: 136 MMHG | RESPIRATION RATE: 16 BRPM | HEART RATE: 97 BPM | WEIGHT: 193.12 LBS

## 2024-07-18 DIAGNOSIS — I25.10 ASCVD (ARTERIOSCLEROTIC CARDIOVASCULAR DISEASE): ICD-10-CM

## 2024-07-18 DIAGNOSIS — E11.649 TYPE 2 DIABETES MELLITUS WITH HYPOGLYCEMIA WITHOUT COMA, WITHOUT LONG-TERM CURRENT USE OF INSULIN (HCC): ICD-10-CM

## 2024-07-18 DIAGNOSIS — I10 ESSENTIAL HYPERTENSION: ICD-10-CM

## 2024-07-18 DIAGNOSIS — G93.41 METABOLIC ENCEPHALOPATHY: Primary | ICD-10-CM

## 2024-07-18 LAB
GLUCOSE BLD STRIP.AUTO-MCNC: 100 MG/DL (ref 70–108)
GLUCOSE BLD STRIP.AUTO-MCNC: 110 MG/DL (ref 70–108)
GLUCOSE BLD STRIP.AUTO-MCNC: 111 MG/DL (ref 70–108)
GLUCOSE BLD STRIP.AUTO-MCNC: 96 MG/DL (ref 70–108)

## 2024-07-18 PROCEDURE — 6360000002 HC RX W HCPCS: Performed by: PHYSICIAN ASSISTANT

## 2024-07-18 PROCEDURE — 97110 THERAPEUTIC EXERCISES: CPT

## 2024-07-18 PROCEDURE — 6370000000 HC RX 637 (ALT 250 FOR IP): Performed by: NURSE PRACTITIONER

## 2024-07-18 PROCEDURE — 1180000000 HC REHAB R&B

## 2024-07-18 PROCEDURE — 6370000000 HC RX 637 (ALT 250 FOR IP): Performed by: INTERNAL MEDICINE

## 2024-07-18 PROCEDURE — 99222 1ST HOSP IP/OBS MODERATE 55: CPT | Performed by: NURSE PRACTITIONER

## 2024-07-18 PROCEDURE — 70547 MR ANGIOGRAPHY NECK W/O DYE: CPT

## 2024-07-18 PROCEDURE — 82948 REAGENT STRIP/BLOOD GLUCOSE: CPT

## 2024-07-18 PROCEDURE — 6370000000 HC RX 637 (ALT 250 FOR IP): Performed by: STUDENT IN AN ORGANIZED HEALTH CARE EDUCATION/TRAINING PROGRAM

## 2024-07-18 PROCEDURE — 99239 HOSP IP/OBS DSCHRG MGMT >30: CPT | Performed by: INTERNAL MEDICINE

## 2024-07-18 PROCEDURE — 2580000003 HC RX 258: Performed by: NURSE PRACTITIONER

## 2024-07-18 PROCEDURE — 97530 THERAPEUTIC ACTIVITIES: CPT

## 2024-07-18 PROCEDURE — 6370000000 HC RX 637 (ALT 250 FOR IP): Performed by: PHYSICIAN ASSISTANT

## 2024-07-18 RX ORDER — ACETAMINOPHEN 325 MG/1
650 TABLET ORAL EVERY 4 HOURS PRN
Status: DISCONTINUED | OUTPATIENT
Start: 2024-07-18 | End: 2024-07-26 | Stop reason: HOSPADM

## 2024-07-18 RX ORDER — CEPHALEXIN 500 MG/1
500 CAPSULE ORAL EVERY 12 HOURS SCHEDULED
Status: COMPLETED | OUTPATIENT
Start: 2024-07-18 | End: 2024-07-22

## 2024-07-18 RX ORDER — ENOXAPARIN SODIUM 100 MG/ML
40 INJECTION SUBCUTANEOUS DAILY
Status: CANCELLED | OUTPATIENT
Start: 2024-07-19

## 2024-07-18 RX ORDER — POTASSIUM CHLORIDE 20 MEQ/1
40 TABLET, EXTENDED RELEASE ORAL PRN
Status: DISCONTINUED | OUTPATIENT
Start: 2024-07-18 | End: 2024-07-26 | Stop reason: HOSPADM

## 2024-07-18 RX ORDER — METOPROLOL SUCCINATE 25 MG/1
12.5 TABLET, EXTENDED RELEASE ORAL DAILY
Status: DISCONTINUED | OUTPATIENT
Start: 2024-07-19 | End: 2024-07-22

## 2024-07-18 RX ORDER — POLYETHYLENE GLYCOL 3350 17 G/17G
17 POWDER, FOR SOLUTION ORAL DAILY PRN
Status: CANCELLED | OUTPATIENT
Start: 2024-07-18

## 2024-07-18 RX ORDER — GLUCAGON 1 MG/ML
1 KIT INJECTION PRN
Status: DISCONTINUED | OUTPATIENT
Start: 2024-07-18 | End: 2024-07-26 | Stop reason: HOSPADM

## 2024-07-18 RX ORDER — SODIUM CHLORIDE 9 MG/ML
INJECTION, SOLUTION INTRAVENOUS PRN
Status: CANCELLED | OUTPATIENT
Start: 2024-07-18

## 2024-07-18 RX ORDER — ONDANSETRON 4 MG/1
4 TABLET, ORALLY DISINTEGRATING ORAL EVERY 8 HOURS PRN
Status: DISCONTINUED | OUTPATIENT
Start: 2024-07-18 | End: 2024-07-26 | Stop reason: HOSPADM

## 2024-07-18 RX ORDER — AMLODIPINE BESYLATE 5 MG/1
5 TABLET ORAL DAILY
Status: CANCELLED | OUTPATIENT
Start: 2024-07-19

## 2024-07-18 RX ORDER — MAGNESIUM SULFATE IN WATER 40 MG/ML
2000 INJECTION, SOLUTION INTRAVENOUS PRN
Status: DISCONTINUED | OUTPATIENT
Start: 2024-07-18 | End: 2024-07-26 | Stop reason: HOSPADM

## 2024-07-18 RX ORDER — KETOROLAC TROMETHAMINE 30 MG/ML
15 INJECTION, SOLUTION INTRAMUSCULAR; INTRAVENOUS EVERY 6 HOURS PRN
Status: DISPENSED | OUTPATIENT
Start: 2024-07-18 | End: 2024-07-21

## 2024-07-18 RX ORDER — POLYETHYLENE GLYCOL 3350 17 G/17G
17 POWDER, FOR SOLUTION ORAL DAILY PRN
Status: DISCONTINUED | OUTPATIENT
Start: 2024-07-18 | End: 2024-07-26 | Stop reason: HOSPADM

## 2024-07-18 RX ORDER — ONDANSETRON 4 MG/1
4 TABLET, ORALLY DISINTEGRATING ORAL EVERY 8 HOURS PRN
Status: CANCELLED | OUTPATIENT
Start: 2024-07-18

## 2024-07-18 RX ORDER — AMLODIPINE BESYLATE 5 MG/1
5 TABLET ORAL DAILY
Status: DISCONTINUED | OUTPATIENT
Start: 2024-07-19 | End: 2024-07-26 | Stop reason: HOSPADM

## 2024-07-18 RX ORDER — INSULIN LISPRO 100 [IU]/ML
0-4 INJECTION, SOLUTION INTRAVENOUS; SUBCUTANEOUS
Status: CANCELLED | OUTPATIENT
Start: 2024-07-18

## 2024-07-18 RX ORDER — LISINOPRIL 40 MG/1
40 TABLET ORAL DAILY
Status: DISCONTINUED | OUTPATIENT
Start: 2024-07-19 | End: 2024-07-26 | Stop reason: HOSPADM

## 2024-07-18 RX ORDER — KETOROLAC TROMETHAMINE 30 MG/ML
15 INJECTION, SOLUTION INTRAMUSCULAR; INTRAVENOUS EVERY 6 HOURS PRN
Status: CANCELLED | OUTPATIENT
Start: 2024-07-18 | End: 2024-07-21

## 2024-07-18 RX ORDER — GLUCAGON 1 MG/ML
1 KIT INJECTION PRN
Status: CANCELLED | OUTPATIENT
Start: 2024-07-18

## 2024-07-18 RX ORDER — POTASSIUM CHLORIDE 7.45 MG/ML
10 INJECTION INTRAVENOUS PRN
Status: DISCONTINUED | OUTPATIENT
Start: 2024-07-18 | End: 2024-07-26 | Stop reason: HOSPADM

## 2024-07-18 RX ORDER — SODIUM CHLORIDE 0.9 % (FLUSH) 0.9 %
5-40 SYRINGE (ML) INJECTION PRN
Status: DISCONTINUED | OUTPATIENT
Start: 2024-07-18 | End: 2024-07-26 | Stop reason: HOSPADM

## 2024-07-18 RX ORDER — CLOPIDOGREL BISULFATE 75 MG/1
75 TABLET ORAL DAILY
Status: CANCELLED | OUTPATIENT
Start: 2024-07-19

## 2024-07-18 RX ORDER — POTASSIUM CHLORIDE 20 MEQ/1
40 TABLET, EXTENDED RELEASE ORAL PRN
Status: CANCELLED | OUTPATIENT
Start: 2024-07-18

## 2024-07-18 RX ORDER — SODIUM CHLORIDE 0.9 % (FLUSH) 0.9 %
5-40 SYRINGE (ML) INJECTION EVERY 12 HOURS SCHEDULED
Status: DISCONTINUED | OUTPATIENT
Start: 2024-07-18 | End: 2024-07-21

## 2024-07-18 RX ORDER — DEXTROSE MONOHYDRATE 100 MG/ML
INJECTION, SOLUTION INTRAVENOUS CONTINUOUS PRN
Status: CANCELLED | OUTPATIENT
Start: 2024-07-18

## 2024-07-18 RX ORDER — INSULIN LISPRO 100 [IU]/ML
0-4 INJECTION, SOLUTION INTRAVENOUS; SUBCUTANEOUS NIGHTLY
Status: DISCONTINUED | OUTPATIENT
Start: 2024-07-18 | End: 2024-07-19

## 2024-07-18 RX ORDER — SODIUM CHLORIDE 0.9 % (FLUSH) 0.9 %
5-40 SYRINGE (ML) INJECTION PRN
Status: CANCELLED | OUTPATIENT
Start: 2024-07-18

## 2024-07-18 RX ORDER — PANTOPRAZOLE SODIUM 40 MG/1
40 TABLET, DELAYED RELEASE ORAL
Status: DISCONTINUED | OUTPATIENT
Start: 2024-07-19 | End: 2024-07-26 | Stop reason: HOSPADM

## 2024-07-18 RX ORDER — ATORVASTATIN CALCIUM 20 MG/1
20 TABLET, FILM COATED ORAL NIGHTLY
Status: DISCONTINUED | OUTPATIENT
Start: 2024-07-18 | End: 2024-07-26 | Stop reason: HOSPADM

## 2024-07-18 RX ORDER — ACETAMINOPHEN 325 MG/1
650 TABLET ORAL EVERY 4 HOURS PRN
Status: CANCELLED | OUTPATIENT
Start: 2024-07-18

## 2024-07-18 RX ORDER — POTASSIUM CHLORIDE 7.45 MG/ML
10 INJECTION INTRAVENOUS PRN
Status: CANCELLED | OUTPATIENT
Start: 2024-07-18

## 2024-07-18 RX ORDER — LISINOPRIL 40 MG/1
40 TABLET ORAL DAILY
Status: CANCELLED | OUTPATIENT
Start: 2024-07-19

## 2024-07-18 RX ORDER — ENOXAPARIN SODIUM 100 MG/ML
40 INJECTION SUBCUTANEOUS DAILY
Status: DISCONTINUED | OUTPATIENT
Start: 2024-07-19 | End: 2024-07-26 | Stop reason: HOSPADM

## 2024-07-18 RX ORDER — INSULIN LISPRO 100 [IU]/ML
0-4 INJECTION, SOLUTION INTRAVENOUS; SUBCUTANEOUS
Status: DISCONTINUED | OUTPATIENT
Start: 2024-07-18 | End: 2024-07-19

## 2024-07-18 RX ORDER — SODIUM CHLORIDE 0.9 % (FLUSH) 0.9 %
5-40 SYRINGE (ML) INJECTION EVERY 12 HOURS SCHEDULED
Status: CANCELLED | OUTPATIENT
Start: 2024-07-18

## 2024-07-18 RX ORDER — MAGNESIUM SULFATE IN WATER 40 MG/ML
2000 INJECTION, SOLUTION INTRAVENOUS PRN
Status: CANCELLED | OUTPATIENT
Start: 2024-07-18

## 2024-07-18 RX ORDER — INSULIN LISPRO 100 [IU]/ML
0-4 INJECTION, SOLUTION INTRAVENOUS; SUBCUTANEOUS NIGHTLY
Status: CANCELLED | OUTPATIENT
Start: 2024-07-18

## 2024-07-18 RX ORDER — DEXTROSE MONOHYDRATE 100 MG/ML
INJECTION, SOLUTION INTRAVENOUS CONTINUOUS PRN
Status: DISCONTINUED | OUTPATIENT
Start: 2024-07-18 | End: 2024-07-26 | Stop reason: HOSPADM

## 2024-07-18 RX ORDER — METOPROLOL SUCCINATE 25 MG/1
12.5 TABLET, EXTENDED RELEASE ORAL DAILY
Status: CANCELLED | OUTPATIENT
Start: 2024-07-19

## 2024-07-18 RX ORDER — PANTOPRAZOLE SODIUM 40 MG/1
40 TABLET, DELAYED RELEASE ORAL
Status: CANCELLED | OUTPATIENT
Start: 2024-07-19

## 2024-07-18 RX ORDER — SODIUM CHLORIDE 9 MG/ML
INJECTION, SOLUTION INTRAVENOUS PRN
Status: DISCONTINUED | OUTPATIENT
Start: 2024-07-18 | End: 2024-07-26 | Stop reason: HOSPADM

## 2024-07-18 RX ORDER — ATORVASTATIN CALCIUM 20 MG/1
20 TABLET, FILM COATED ORAL NIGHTLY
Status: CANCELLED | OUTPATIENT
Start: 2024-07-18

## 2024-07-18 RX ORDER — CLOPIDOGREL BISULFATE 75 MG/1
75 TABLET ORAL DAILY
Status: DISCONTINUED | OUTPATIENT
Start: 2024-07-19 | End: 2024-07-26 | Stop reason: HOSPADM

## 2024-07-18 RX ADMIN — ATORVASTATIN CALCIUM 20 MG: 20 TABLET, FILM COATED ORAL at 20:20

## 2024-07-18 RX ADMIN — AMLODIPINE BESYLATE 5 MG: 5 TABLET ORAL at 08:43

## 2024-07-18 RX ADMIN — PANTOPRAZOLE SODIUM 40 MG: 40 TABLET, DELAYED RELEASE ORAL at 08:43

## 2024-07-18 RX ADMIN — CLOPIDOGREL BISULFATE 75 MG: 75 TABLET ORAL at 08:42

## 2024-07-18 RX ADMIN — LISINOPRIL 40 MG: 40 TABLET ORAL at 08:46

## 2024-07-18 RX ADMIN — ACETAMINOPHEN 650 MG: 325 TABLET ORAL at 16:14

## 2024-07-18 RX ADMIN — ACETAMINOPHEN 650 MG: 325 TABLET ORAL at 23:46

## 2024-07-18 RX ADMIN — SODIUM CHLORIDE, PRESERVATIVE FREE 10 ML: 5 INJECTION INTRAVENOUS at 20:20

## 2024-07-18 RX ADMIN — CEPHALEXIN 500 MG: 500 CAPSULE ORAL at 20:20

## 2024-07-18 RX ADMIN — ACETAMINOPHEN 650 MG: 325 TABLET ORAL at 20:19

## 2024-07-18 RX ADMIN — METOPROLOL SUCCINATE 12.5 MG: 25 TABLET, FILM COATED, EXTENDED RELEASE ORAL at 08:43

## 2024-07-18 RX ADMIN — KETOROLAC TROMETHAMINE 15 MG: 30 INJECTION, SOLUTION INTRAMUSCULAR at 01:33

## 2024-07-18 RX ADMIN — ENOXAPARIN SODIUM 40 MG: 100 INJECTION SUBCUTANEOUS at 08:42

## 2024-07-18 ASSESSMENT — PAIN DESCRIPTION - LOCATION
LOCATION: KNEE
LOCATION: KNEE

## 2024-07-18 ASSESSMENT — PAIN DESCRIPTION - DESCRIPTORS
DESCRIPTORS: GNAWING
DESCRIPTORS: ACHING
DESCRIPTORS: ACHING

## 2024-07-18 ASSESSMENT — PAIN SCALES - GENERAL
PAINLEVEL_OUTOF10: 3
PAINLEVEL_OUTOF10: 5
PAINLEVEL_OUTOF10: 2
PAINLEVEL_OUTOF10: 1
PAINLEVEL_OUTOF10: 4
PAINLEVEL_OUTOF10: 6
PAINLEVEL_OUTOF10: 4

## 2024-07-18 ASSESSMENT — PAIN - FUNCTIONAL ASSESSMENT: PAIN_FUNCTIONAL_ASSESSMENT: ACTIVITIES ARE NOT PREVENTED

## 2024-07-18 ASSESSMENT — PAIN DESCRIPTION - ORIENTATION
ORIENTATION: LEFT

## 2024-07-18 NOTE — DISCHARGE SUMMARY
reports that she does not use drugs.    FAMILY HISTORY     She indicated that her mother is . She indicated that her father is . She indicated that her maternal grandmother is . She indicated that her maternal grandfather is . She indicated that her paternal grandfather is . She indicated that her maternal uncle is . She indicated that her paternal aunt is . She indicated that her paternal uncle is . She indicated that her maternal cousin is .     family history includes Breast Cancer in her maternal cousin; Cancer in her father, paternal aunt, and paternal uncle; Diabetes in her maternal grandmother and mother; Heart Disease in her father, maternal grandfather, maternal uncle, and mother; Lupus in her maternal uncle and mother; Other in her paternal grandfather.          Recent Labs     24  1530   INR 1.08     Lab Results   Component Value Date    DDIMER .00 (H) 2024   No results found for: \"BNP\"  troponins  Lab Results   Component Value Date    CKTOTAL 38 2012    TROPONINI <0.006 2012          Recent Labs     24  1530 24  0437 24  1206   * 139 138   K 4.2 4.6 4.8   CL 97* 105 103   CO2 24 23 24   BUN 22 19 16   CREATININE 0.6 0.4 0.4   GLUCOSE 87 83 111*   CALCIUM 9.5 8.4* 8.5   BILITOT  --   --  2.2*   ALKPHOS  --   --  61   AST  --   --  14   ALT  --   --  15         Recent Labs     24  1530 24  0437   WBC 6.0 5.9   RBC 4.26 4.12*   HGB 13.2 12.7   HCT 39.4 40.1   MCV 92.5 97.3   MCH 31.0 30.8   MCHC 33.5 31.7*    165   MPV 9.0* 9.0*      Recent Labs     24  1530   INR 1.08         No results for input(s): \"OCCULTBLDFEC\" in the last 72 hours.    PT/INR:   Recent Labs     24  1530   INR 1.08     APTT: No results for input(s): \"APTT\" in the last 72 hours.      ESR:   Lab Results   Component Value Date    SEDRATE 39 (H) 2024     CRP:   Lab Results  editing.    Electronically signed by Cayden Reynoso MD  on 7/18/2024 at 11:31 AM

## 2024-07-18 NOTE — PROGRESS NOTES
[facility]  INPATIENT PHYSICAL THERAPY  DAILY NOTE  [facility] ICU STEPDOWN TELEMETRY 4K - 4K-02/002-A    Time In: 0755  Time Out: 0820  Timed Code Treatment Minutes: 25 Minutes  Minutes: 25          Date: 2024  Patient Name: Celia Perkins,  Gender:  female        MRN: [id number]  : 1950  (74 y.o.)     Referring Practitioner: Skinny Goldsmith PA-C  Diagnosis: Acute encephalopathy  Additional Pertinent Hx: Per EMR \"Celia Perkins is a 74 y.o. female with a history of aortic stenosis status post TAVR, stroke, gastroesophageal reflux disease, osteoarthritis of the left knee status post recent arthroplasty on 7/10/2024, insulin-dependent type 2 diabetes mellitus, hypertension, hyperlipidemia, who presented to [facility] with chief complaint of dizziness, shortness of breath, and confusion.  History is provided by the patient's  who is present at bedside as well as her daughter.  Had surgical intervention in the form of a left total knee arthroplasty on 7/10/2024.  She had a relatively benign postoperative course and was in the hospital for a couple of days after.  The pt's  states that she has been sleeping excessively since being discharged.  She has not been eating well.  They have not noticed any purulent drainage from her surgical incision or any associated adjacent erythema.  The pt has been having a significant amount of pain in the left lower extremity.  Over the last couple of days, the patient's  reports that the patient had been complaining of some lightheadedness/dizziness.  This primarily occurred when she was moving from a sitting to a standing position.  She has not lost consciousness or hit her head or fallen.  This morning, the patient went to therapy and was feeling lightheaded/dizzy at that time. Shortly thereafter, the patient was noted to be, agitated and as such, she was brought to the emergency room for further evaluation.  The patient's  has not  Modifier : CK        Modified Dawn Scale:  Not Applicable    ASSESSMENT:  Assessment: Patient progressing toward established goals. Skilled PT interventions provided for functional mobility and L knee strengthening. Pt requires frequent verbal cueing for proper hand placement during STSs. She was able to tolerate added strengthening intervention and while she reported minimal pain, there was observation of pain behaviors including sad expression and mild wincing. Seated rest breaks were provided PRN. Pt able to maintain O2 WNL while on 2L throughout session. She would benefit from continued PT interventions for improved endurance and ability to wean O2, global strengthening, standing balance and L knee ROM. Continue per plan of care.   Activity Tolerance:  Patient tolerance of  treatment: good. Pt tolerated interventions well and interventions performed without incident.   Equipment Recommendations:Equipment Needed: No (Continue to asses needs)  Discharge Recommendations: Continue to assess pending progress and Patient would benefit from continued PT at discharge  Plan: Current Treatment Recommendations: Strengthening, Balance training, Functional mobility training, Transfer training, Endurance training, Cognitive reorientation, Neuromuscular re-education, Gait training, Stair training, Return to work related activity, Home exercise program, Safety education & training, Patient/Caregiver education & training, Equipment evaluation, education, & procurement, Therapeutic activities  General Plan:  (5X N)    Education:  Learners: Patient  Patient Education: Plan of Care, Education Related to Diagnosis, Transfers    Goals:  Patient Goals : To go home safely.  Short Term Goals  Time Frame for Short Term Goals: By discharge  Short Term Goal 1: Pt will demo bed mobility modified indepednently to inc functional independence.  Short Term Goal 2: Pt will demo sit to/from stand with RW and S to inc functional

## 2024-07-18 NOTE — PROGRESS NOTES
[facility]  Acute Inpatient Rehab Preadmission Assessment    Patient Name: Celia Perkins        Ethnicity:Not of , /a, or Divehi origin  Race:White  MRN: [id number]    : 1950  (74 y.o.)  Gender: female     Admitted from:[facility]  Initial Assessment    Date of admission to the hospital: 2024  2:18 PM  Date patient eligible for admission:2024    Primary Diagnosis: metabolic encephalopathy      Did patient have surgery?  no    Physicians: Cayden Reynoso MD, Dr Byrnes, Dr Colbert, Dr Vigil    Risk for clinical complications/co-morbidities:   Past Medical History:   Diagnosis Date    Arthritis     Asthma     Brain cyst     benign    Breast cancer (HCC) 2018    Left breast, 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    Cancer (HCC) 1918    Left Breast    Cerebrovascular accident (CVA) (HCC) 10/14/2015    Chronic sinus complaints     Diverticulosis of colon     DKA (diabetic ketoacidoses) 2018    Elevated TSH     Hypertension     Osteoarthritis     Polyneuropathy     PONV (postoperative nausea and vomiting)     Spinal headache     Type 2 diabetes mellitus (HCC)        Financial Information  Primary insurance: Medicare    Secondary Insurance:   [employer]    Has the patient had two or more falls in the past year or any fall with injury in the past year?   no    Did the patient have major surgery during the 100 days prior to admission?  no    Precautions:   falls  Restrictions/Precautions: General Precautions, Fall Risk  Other position/activity restrictions: Pt disoriented, monitor O2, and past L TKA 7/10/24.    Isolation Precautions: None       Physiatrist:  Dr Byrnes    Patients Occupation: Retired  Reviewed Lab and Diagnostic reports from Current Admission: Yes    Patients Prior Functional  Level: Prior Function  Receives Help From: Family  ADL Assistance: Independent  Homemaking Assistance:  Retired  Additional Comments: Per pt's , pt was Mod I for use of cane prior to sx. Since sx, pt has been using a standard walker. Pt's  has been assisting with ADLs post op but pt is able to walk to bathroom and complete toileting with Mod I.    Acute Inpatient Rehabilitation Disclosure Statement provided to patient.  Patient verbalized understanding.     Patient requires an intensive and coordinate interdisciplinary team approach to the delivery of rehabilitation care including PT/OT/ST and 24 hour nursing care and physician supervision to safely return to home setting with family.      I have reviewed and concur with the findings and results of the pre-admission screening assessment completed by the Inpatient Rehabilitation Admissions Coordinator.       Electronically signed by Vita Byrnes DO on 7/18/2024 at 12:08 PM

## 2024-07-18 NOTE — PROGRESS NOTES
Physician Progress Note      PATIENT:               RAEANN DASH  [facility] #:                  [id number]  :                       1950  ADMIT DATE:       2024 2:18 PM  DISCH DATE:        2024 1:59 PM  RESPONDING  PROVIDER #:        Cayden Montemayor MD          QUERY TEXT:    Pt admitted with altered mental status and has encephalopathy documented.   Discharge summary notes, \"metabolic encephalopathy\" and neurology consult   notes, \"Toxic metabolic encephalopathy.\" If possible, please document in   progress notes and discharge summary further specificity regarding the type of   encephalopathy:    The medical record reflects the following:  Risk Factors: Dehydration, pain medication and muscle relaxer use    Clinical Indicators: Discharge summary notes, \"Per neurology muscle relaxants   and pain medications may have had an effect on patient's mentation causing   metabolic encephalopathy.  Also mild dehydration may have contributed.\"   Neurology consult note, \"Altered Mental Status, Most likely due to Toxic   Metabolic Encephalopathy, Multifactorial, mild dehydration, pain med/muscle   relaxers, etc. Will defer to primary team to rule out any infectious process.\"    Treatment: MRI brain, EEG, Ativan, Neurology consult    Thank you,  John Hogan RN CDI  [email or web address]  Options provided:  -- Toxic metabolic encephalopathy  -- Metabolic encephalopathy without toxic encephalopathy  -- Other - I will add my own diagnosis  -- Disagree - Not applicable / Not valid  -- Disagree - Clinically unable to determine / Unknown  -- Refer to Clinical Documentation Reviewer    PROVIDER RESPONSE TEXT:    This patient has toxic metabolic encephalopathy.    Query created by: John Hogan on 2024 12:52 PM      Electronically signed by:  Cayden Montemayor MD 2024 7:24 PM

## 2024-07-18 NOTE — CARE COORDINATION
Case Management Assessment Initial Evaluation    Date/Time of Evaluation: 7/18/2024 7:20 AM  Assessment Completed by: Anders Robles RN    If patient is discharged prior to next notation, then this note serves as note for discharge by case management.    Patient Name: Celia Perkins                   YOB: 1950  Diagnosis: Acute encephalopathy [G93.40]                   Date / Time: 7/16/2024  2:18 PM  Location: [address] [place] [address]     Patient Admission Status: Inpatient   Readmission Risk Low 0-14, Mod 15-19), High > 20: Readmission Risk Score: 9.3    Current PCP: Yash Nelson MD    Additional Case Management Notes:   Encephalopathy  History: 7/10 LKR, Breast Cancer, DM, DKA, CVA, TAVR  Oxygen 1L  Await Cultures    Procedures:   7/17 MOCA Cognition Score 2/30    Patient Goals/Plan/Treatment Preferences: lives w spouse/POA Anders who transports, has cane, walker, WC; therapy recommends IPR; messaged Attending, tele-sitter in room      7/18/24, 9:55 AM EDT    Patient goals/plan/ treatment preferences discussed by  and .  Patient goals/plan/ treatment preferences reviewed with patient/ family.  Patient/ family verbalize understanding of discharge plan and are in agreement with goal/plan/treatment preferences.  Understanding was demonstrated using the teach back method.  AVS provided by RN at time of discharge, which includes all necessary medical information pertaining to the patients current course of illness, treatment, post-discharge goals of care, and treatment preferences.     Services At/After Discharge: Inpatient rehab            07/18/24 0716   Service Assessment   Patient Orientation Other (see comment)  (confused)   Cognition Other (see comment)  (confused)   History Provided By Spouse   Primary Caregiver Spouse   Accompanied By/Relationship spouse   Support Systems Spouse/Significant Other   Patient's Healthcare Decision Maker is: Named in Scanned ACP Document    PCP Verified by CM Yes   Last Visit to PCP Within last 3 months   Prior Functional Level Assistance with the following:;Mobility;Shopping;Housework   Current Functional Level Assistance with the following:;Mobility;Shopping;Housework   Can patient return to prior living arrangement Unknown at present   Ability to make needs known: Unable   Family able to assist with home care needs: Yes   Would you like for me to discuss the discharge plan with any other family members/significant others, and if so, who? Yes  (spouse)   Financial Resources Medicare   Community Resources None   CM/SW Referral ADLs/IADLs   Social/Functional History   Lives With Spouse   Type of Home House   Home Layout One level   Home Equipment Walker - Standard;Wheelchair - Manual   Receives Help From Family   Active  No   Patient's  Info family   Discharge Planning   Type of Residence House   Living Arrangements Spouse/Significant Other   Current Services Prior To Admission Durable Medical Equipment   Current DME Prior to Arrival Wheelchair;Walker;Cane   Potential Assistance Needed Home Care   DME Ordered? No   Potential Assistance Purchasing Medications No   Type of Home Care Services None   Patient expects to be discharged to: House   Follow Up Appointment: Best Day/Time  Monday AM   One/Two Story Residence One story   History of falls? 1   Services At/After Discharge   Transition of Care Consult (CM Consult) Acute Rehab;Discharge Planning;SNF   Services At/After Discharge None    Resource Information Provided? No   Mode of Transport at Discharge Other (see comment)  (family)   Confirm Follow Up Transport Family   Condition of Participation: Discharge Planning   The Plan for Transition of Care is related to the following treatment goals: Encephalopathy Treatment   Freedom of Choice list was provided with basic dialogue that supports the patient's individualized plan of care/goals, treatment preferences, and shares the quality

## 2024-07-18 NOTE — PROGRESS NOTES
Hysterectomy (1995); Wrist surgery (Right); Hand surgery (Right); and Aortic valve repair (02/22/2023).    CURRENT MEDICATIONS     amLODIPine, 5 mg, Daily  sodium chloride flush, 5-40 mL, 2 times per day  enoxaparin, 40 mg, Daily  lisinopril, 40 mg, Daily  clopidogrel, 75 mg, Daily  [Held by provider] insulin lispro, 5 Units, TID WC  insulin lispro, 0-4 Units, TID WC  insulin lispro, 0-4 Units, Nightly  [Held by provider] insulin glargine, 15 Units, Nightly  pantoprazole, 40 mg, QAM AC  metoprolol succinate, 12.5 mg, Daily  atorvastatin, 20 mg, Nightly        Continuous Infusions:    sodium chloride      dextrose         PRN:  LORazepam, 2 mg, Q6H PRN  sodium chloride flush, 5-40 mL, PRN  sodium chloride, , PRN  potassium chloride, 40 mEq, PRN   Or  potassium alternative oral replacement, 40 mEq, PRN   Or  potassium chloride, 10 mEq, PRN  magnesium sulfate, 2,000 mg, PRN  ondansetron, 4 mg, Q8H PRN   Or  ondansetron, 4 mg, Q6H PRN  polyethylene glycol, 17 g, Daily PRN  acetaminophen, 650 mg, Q6H PRN   Or  acetaminophen, 650 mg, Q6H PRN  haloperidol lactate, 0.5 mg, Q6H PRN  glucose, 4 tablet, PRN  dextrose bolus, 125 mL, PRN   Or  dextrose bolus, 250 mL, PRN  glucagon (rDNA), 1 mg, PRN  dextrose, , Continuous PRN  ketorolac, 15 mg, Q6H PRN          HOME MEDICATIONS     Medications Prior to Admission: QULIPTA 60 MG TABS, take 1 tablet by mouth once daily FOR MIGRAINE PREVENTION. (Patient not taking: Reported on 7/16/2024)  clindamycin (CLEOCIN) 150 MG capsule, take 4 capsules by mouth 1 HOUR PRIOR TO dental appointment  atorvastatin (LIPITOR) 40 MG tablet, Take 1 tablet by mouth nightly (Patient taking differently: Take 0.5 tablets by mouth nightly)  benazepril (LOTENSIN) 20 MG tablet, Take 1 tablet by mouth 2 times daily  clopidogrel (PLAVIX) 75 MG tablet, Take 1 tablet by mouth daily  empagliflozin (JARDIANCE) 25 MG tablet, Take 1 tablet by mouth daily  LANTUS SOLOSTAR 100 UNIT/ML injection pen, Inject 48 Units  modulation, iterative reconstruction, and/or weight-based dosing when appropriate to reduce radiation dose to as low as reasonably achievable. FINDINGS: Motion artifact does degrade the quality of some of these images. These are the best images possible at this time. There is no hemorrhage. There are no intra-or extra-axial collections.  There is no hydrocephalus, midline shift or mass effect.  The gray-white matter differentiation is grossly preserved. The paranasal sinuses and mastoid air cells are normally aerated. There is no gross calvarial abnormality. There is no significant change in the appearance of the brain compared to the prior study.     Motion-degraded examination. No acute findings. These findings were telephoned to Jessika, with the stroke service at 3:09 p.m. on 7/16/2024. **This report has been created using voice recognition software. It may contain minor errors which are inherent in voice recognition technology.** Electronically signed by Dr. Sarah Martínez      This note was dictated using M*Modal Fluency Direct so please excuse any grammatical or syntax errors as no guarantees can be provided that every mistake has been identified and corrected by editing.      Electronically signed by Cayden Reynoso MD on 7/18/2024 at 7:55 AM

## 2024-07-18 NOTE — PROGRESS NOTES
[facility]  INPATIENT REHABILITATION  ADMISSIONS COORDINATOR CONSULT    Referral Type: internal    Patient Name: Celia Perkins      MRN: [id number]    : 1950  (74 y.o.)  Gender: female   Race:White (non-)     Payor Source: Payor: MEDICARE / Plan: MEDICARE PART A AND B / Product Type: *No Product type* /   Secondary Payor Source:  [facility]    Isolation Status: No active isolations    Lives With: Spouse  Type of Home: House  Home Layout: One level  Home Access: Stairs to enter with rails  Entrance Stairs - Number of Steps: 5  Receives Help From: Family  Occupation: Retired  Additional Comments: Per pt's , pt was Mod I for use of cane prior to sx. Since sx, pt has been using a standard walker. Pt's  has been assisting with ADLs post op but pt is able to walk to bathroom and complete toileting with Mod I.    What is treatment plan?  Disciplines Required upon Admission to Inpatient Rehabilitation: Physical Therapy, Occupational Therapy, and Speech Therapy  Post operative: No  Fall: No  Dialysis: No  Diet: ADULT DIET; Regular  Discussed patient with  and PM&R provider: Await medical work up completion, await PMR consult for recommendations

## 2024-07-18 NOTE — PROGRESS NOTES
Admitted to the Inpatient Rehabilitation Unit via wheelchair.  Patient was then oriented to room and unit.  Education provided on the rehabilitation routine: three hours of therapy five days per week.      Explained patients right to have family, representative or physician notified of their admission.  Patient has Declined for physician to be notified.  Patient has Declined for family/representative to be notified.    Admitting medication orders compared with acute stay medications; home medication list reviewed with patient/family.  Medication issues identified No      Transportation:   Has transportation kept you from medical appointments, meetings, work, or from getting things needed for daily living? (Check all that apply)  No.      Health Literacy:   How often do you need to have someone help you when you read instructions, pamphlets, or other written material from your doctor or pharmacy?  0. - Never    Social Isolation:  How often do you feel lonely or isolated from those around you?  0. Never    Patient Mood Interview (PHQ-2 to 9) (from [employer]©)   Say to Patient: \"Over the last 2 weeks, have you been bothered by any of the following problems?\"   If symptom is present, enter yes in column 1 (Symptom Presence)  If yes in column 1, then ask the patient: “About how often have you been bothered by this?” Indicate response in column 2, Symptom Frequency.   Symptom Presence  No    Yes   9.    No response  Symptom Frequency  Never or 1 day  2-6 days (several days)  7-11 days (half or more of the days)  12-14 days (nearly every day)    Symptom Presence Symptom Frequency   Little interest or pleasure in doing things 0. No 0. Never or 1 day   Feeling down, depressed, or hopeless 0. No 0. Never or 1 day   If either A or B above has symptom frequency coded 2 or 3, CONTINUE asking questions below.      If not, END the interview  and right click on next table to delete.         Trouble falling or staying asleep, or

## 2024-07-18 NOTE — PLAN OF CARE
Problem: Discharge Planning  Goal: Discharge to home or other facility with appropriate resources  7/18/2024 0205 by Ladonna Pinto, RN  Outcome: Progressing  Flowsheets  Taken 7/18/2024 0205  Discharge to home or other facility with appropriate resources: Identify barriers to discharge with patient and caregiver  Taken 7/17/2024 2015  Discharge to home or other facility with appropriate resources: Identify barriers to discharge with patient and caregiver  7/17/2024 1622 by Julio Woodruff, RN  Outcome: Progressing     Problem: Safety - Adult  Goal: Free from fall injury  7/18/2024 0205 by Ladonna Pinto, RN  Outcome: Progressing  Flowsheets (Taken 7/18/2024 0205)  Free From Fall Injury: Instruct family/caregiver on patient safety  7/17/2024 1622 by Julio Woodruff, RN  Outcome: Progressing     Problem: Neurosensory - Adult  Goal: Achieves stable or improved neurological status  7/18/2024 0205 by Ladonna Pinto, RN  Outcome: Progressing  Flowsheets  Taken 7/18/2024 0205  Achieves stable or improved neurological status: Assess for and report changes in neurological status  Taken 7/17/2024 2015  Achieves stable or improved neurological status: Assess for and report changes in neurological status  7/17/2024 1622 by Julio Woodruff, RN  Outcome: Progressing     Problem: Respiratory - Adult  Goal: Achieves optimal ventilation and oxygenation  7/18/2024 0205 by Ladonna Pinto, RN  Outcome: Progressing  Flowsheets  Taken 7/18/2024 0205  Achieves optimal ventilation and oxygenation: Assess for changes in respiratory status  Taken 7/17/2024 2015  Achieves optimal ventilation and oxygenation: Assess for changes in respiratory status  7/17/2024 1622 by Julio Woodruff, RN  Outcome: Progressing     Problem: Cardiovascular - Adult  Goal: Maintains optimal cardiac output and hemodynamic stability  7/18/2024 0205 by Ladonna Pinto, RN  Outcome: Progressing  Flowsheets  Taken 7/18/2024 0205  Maintains optimal

## 2024-07-18 NOTE — CONSULTS
are normal.  The pituitary gland and brainstem are normal.   1. No evidence of an acute infarct. 2. Moderate severity chronic small vessel ischemic changes. 3. Old infarct in the inferior left cerebellum.       CTA CHEST W WO CONTRAST  Result Date: 7/16/2024  FINDINGS: The thyroid gland is unremarkable. The central airways are patent. There is cardiomegaly. There is a prosthetic aortic valve. There is atherosclerosis in the thoracic aorta. There is no aneurysmal dilatation. The pulmonary arteries are adequately opacified. There are no pulmonary emboli. There are no pathologically enlarged lymph nodes in the mediastinum, hilar or axillary regions. There is bibasilar atelectasis. There is no pleural effusion or pneumothorax. There is a calcified granuloma in the right lower lobe. Calcified granulomas are in the spleen. The gallbladder surgically absent. The bones are intact. There is no acute fracture.   No pulmonary emboli or pulmonary infiltrates.       XR CHEST PORTABLE  Result Date: 7/16/2024  FINDINGS: There are low lung volumes. There is a prosthetic aortic valve. Surgical clips project over the left axillary region. There is cardiomegaly. Patchy opacities are in the right hilar region. There is no significant pleural effusion or pneumothorax. Visualized portions of the upper abdomen are within normal limits. The osseous structures are intact. No acute fractures or suspicious osseous lesions.   Prominence in the right hilar region. This could represent infiltrates or atelectasis.       CT HEAD WO CONTRAST  Result Date: 7/16/2024  FINDINGS: Motion artifact does degrade the quality of some of these images. These are the best images possible at this time. There is no hemorrhage. There are no intra-or extra-axial collections.  There is no hydrocephalus, midline shift or mass effect.  The gray-white matter differentiation is grossly preserved. The paranasal sinuses and mastoid air cells are normally aerated. There is  no gross calvarial abnormality. There is no significant change in the appearance of the brain compared to the prior study.   Motion-degraded examination. No acute findings. These findings were telephoned to Jessika, with the stroke service at 3:09 p.m. on 7/16/2024.       Impression:  Acute metabolic encephalopathy  Emotional lability  Significant cognitive deficits  Gait disturbance  Osteoarthritis of the left knee  S/p left total knee replacement on 7/10/2024 with Dr. Lowry  Hypotension  Lactic acidosis  Hypertension  Hyperlipidemia  Insulin-dependent type 2 diabetes mellitus  Gastroesophageal reflux disease  History of stroke, left cerebellar 2/2023  History of aortic stenosis, TAVR 2/2023     Recommendations:  Continue current therapies.  Patient was admitted for acute metabolic encephalopathy 6 days post left total knee replacement.  Patient with a significant decline in mobility and cognition.  Patient is appropriate for IPR for the diagnosis of metabolic encephalopathy  Patient require active and ongoing intervention of multiple therapy disciplines of PT, OT, and SLP   Patient can participate in and does require an intensive rehabilitation therapy program, generally consisting of 3 hours of therapy per day at least 5 days per week  Patient is expected to actively participate in, and benefit significantly from, the intensive rehabilitation therapy program (the patient’s condition and functional status are such that the patient can reasonably be expected to make measurable improvement, expected to be made within a prescribed period of time and as a result of the intensive rehabilitation therapy program, that will be of practical value to improve the patient’s functional capacity or adaptation to impairments)  Patient requires physician supervision by a rehabilitation physician, with face-to-face visits at least 3 days per week to assess the patient both medically and functionally and to modify the course of

## 2024-07-18 NOTE — H&P
Physical Medicine & Rehabilitation   History and Physical    Chief Complaint and Reason for Rehabilitation Admission: Acute metabolic encephalopathy    History of Present Illness:  Celia Perkins  is a 74 y.o. female with PMH significant for asthma, breast cancer, CVA, hypertension, and type 2 diabetes, who is being admitted to the inpatient rehabilitation unit on 7/18/2024.  History obtained via: ED documentation, acute care documentation, patient    Patient initially presented to [facility] ED on 7/16/2024. Patient reportedly recently underwent a left TKA on 7/10/2024.  She reportedly had a relatively uncomplicated postoperative course was discharged home from the hospital couple days later.  She was reportedly discharged home with prescriptions for opioid pain medications and a muscle relaxer.  Patient's family reported sleeping excessively since discharge home.  In the days leading up to presentation, patient spouse reported some increased reports of lightheadedness/dizziness that occurred primarily with position changes.  No recent falls.  On the morning of presentation, patient went to therapy and was feeling lightheaded/dizzy.  Per report, they were unable to get a reading on her blood pressure.  Shortly after, patient became agitated and was brought to the ED for further evaluation.    In the ED, patient with increased confusion and agitation.  A stroke alert was called.  CT head was negative for any acute intracranial abnormalities.  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  Patient did receive Ativan in ED due to agitation.  She was admitted for further evaluation management.    On admission, EEG was obtained and reportedly revealed disorganized and slow background suggesting a mild to moderate encephalopathy of nonspecific etiology.  Neurology evaluated and followed patient and felt that altered mental status was  Lantus 39 units at bedtime, and Humalog sliding scale.  Recent Left TKA: 7/10/2024- it appears that she was prescribed 1 week abx post op surgical prophylaxis. Will restart to complete course.   History of migraines:  to bring in home medications   Prophylaxis:  DVT: Lovenox.  GI: Pantoprazole.  Pain: Continue as needed Tylenol.  Patient was also on IV Toradol as needed in acute care. Patient with a lot of pain with initiation of therapies. Will start low dose Norco 2.5mg/ 5mg and monitor closely. Caution with use of pain medications given concerns of contribution to her encephalopathy.  Sleep: Will start melatonin 3 mg QHS  Mood: Psychology consulted   Nutrition:  Consultation to dietician for nutritional counseling and recommendations.  Prealbumin 12.6 on admission.    Bladder: Will monitor for signs and symptoms of infection/retention  Bowel: As needed MiraLAX  Admission labs reviewed (CBC and CMP and stable)   and case management consultations for coordination of care and discharge planning    The main medical problem(s) and comorbidities being actively managed by the physicians and requiring 24 hour rehabilitation nursing care during this stay include  Post knee replacement management, hypertension, diabetes and hyperglycemia management, severe cognitive impairment, impaired mobility and ADLs .      The domains of functional impairment present in this patient which will require an intensive and interdisciplinary rehabilitation environment include self care, mobility, motor dysfunction, bowel/bladder management, pain management, safety, and cognitive function.    Estimated length of stay for this admission 2 weeks    Anticipated disposition: Home.  The potential to achieve that is good.    The post admission physician evaluation (SANDRA) is consistent with the pre-admission assessment.  See above findings to reflect the elements required in the SANDRA.  Patient's admitting condition is

## 2024-07-19 PROBLEM — E44.1 MILD MALNUTRITION (HCC): Status: ACTIVE | Noted: 2024-07-19

## 2024-07-19 LAB
ALBUMIN SERPL BCG-MCNC: 3.7 G/DL (ref 3.5–5.1)
ALP SERPL-CCNC: 71 U/L (ref 38–126)
ALT SERPL W/O P-5'-P-CCNC: 17 U/L (ref 11–66)
ANION GAP SERPL CALC-SCNC: 15 MEQ/L (ref 8–16)
AST SERPL-CCNC: 18 U/L (ref 5–40)
BASOPHILS ABSOLUTE: 0 THOU/MM3 (ref 0–0.1)
BASOPHILS NFR BLD AUTO: 0.8 %
BILIRUB CONJ SERPL-MCNC: 0.5 MG/DL (ref 0.1–13.8)
BILIRUB SERPL-MCNC: 2 MG/DL (ref 0.3–1.2)
BUN SERPL-MCNC: 21 MG/DL (ref 7–22)
CALCIUM SERPL-MCNC: 9.1 MG/DL (ref 8.5–10.5)
CHLORIDE SERPL-SCNC: 103 MEQ/L (ref 98–111)
CO2 SERPL-SCNC: 20 MEQ/L (ref 23–33)
CREAT SERPL-MCNC: 0.4 MG/DL (ref 0.4–1.2)
DEPRECATED RDW RBC AUTO: 43.4 FL (ref 35–45)
EOSINOPHIL NFR BLD AUTO: 4.9 %
EOSINOPHILS ABSOLUTE: 0.2 THOU/MM3 (ref 0–0.4)
ERYTHROCYTE [DISTWIDTH] IN BLOOD BY AUTOMATED COUNT: 12.8 % (ref 11.5–14.5)
GFR SERPL CREATININE-BSD FRML MDRD: > 90 ML/MIN/1.73M2
GLUCOSE BLD STRIP.AUTO-MCNC: 115 MG/DL (ref 70–108)
GLUCOSE BLD STRIP.AUTO-MCNC: 135 MG/DL (ref 70–108)
GLUCOSE BLD STRIP.AUTO-MCNC: 159 MG/DL (ref 70–108)
GLUCOSE SERPL-MCNC: 107 MG/DL (ref 70–108)
HCT VFR BLD AUTO: 40.7 % (ref 37–47)
HGB BLD-MCNC: 13.2 GM/DL (ref 12–16)
IMM GRANULOCYTES # BLD AUTO: 0.01 THOU/MM3 (ref 0–0.07)
IMM GRANULOCYTES NFR BLD AUTO: 0.2 %
LYMPHOCYTES ABSOLUTE: 1 THOU/MM3 (ref 1–4.8)
LYMPHOCYTES NFR BLD AUTO: 18.7 %
MCH RBC QN AUTO: 30.7 PG (ref 26–33)
MCHC RBC AUTO-ENTMCNC: 32.4 GM/DL (ref 32.2–35.5)
MCV RBC AUTO: 94.7 FL (ref 81–99)
MONOCYTES ABSOLUTE: 0.7 THOU/MM3 (ref 0.4–1.3)
MONOCYTES NFR BLD AUTO: 14.2 %
NEUTROPHILS ABSOLUTE: 3.1 THOU/MM3 (ref 1.8–7.7)
NEUTROPHILS NFR BLD AUTO: 61.2 %
NRBC BLD AUTO-RTO: 0 /100 WBC
PHOSPHATE SERPL-MCNC: 2.9 MG/DL (ref 2.4–4.7)
PLATELET # BLD AUTO: 203 THOU/MM3 (ref 130–400)
PMV BLD AUTO: 8.9 FL (ref 9.4–12.4)
POTASSIUM SERPL-SCNC: 4.4 MEQ/L (ref 3.5–5.2)
PREALB SERPL-MCNC: 12.6 MG/DL (ref 20–40)
PROT SERPL-MCNC: 6.4 G/DL (ref 6.1–8)
RBC # BLD AUTO: 4.3 MILL/MM3 (ref 4.2–5.4)
SODIUM SERPL-SCNC: 138 MEQ/L (ref 135–145)
WBC # BLD AUTO: 5.1 THOU/MM3 (ref 4.8–10.8)

## 2024-07-19 PROCEDURE — 97110 THERAPEUTIC EXERCISES: CPT

## 2024-07-19 PROCEDURE — 85025 COMPLETE CBC W/AUTO DIFF WBC: CPT

## 2024-07-19 PROCEDURE — 97535 SELF CARE MNGMENT TRAINING: CPT

## 2024-07-19 PROCEDURE — 1180000000 HC REHAB R&B

## 2024-07-19 PROCEDURE — 6370000000 HC RX 637 (ALT 250 FOR IP): Performed by: NURSE PRACTITIONER

## 2024-07-19 PROCEDURE — 2580000003 HC RX 258: Performed by: NURSE PRACTITIONER

## 2024-07-19 PROCEDURE — 6360000002 HC RX W HCPCS: Performed by: NURSE PRACTITIONER

## 2024-07-19 PROCEDURE — 84134 ASSAY OF PREALBUMIN: CPT

## 2024-07-19 PROCEDURE — 92610 EVALUATE SWALLOWING FUNCTION: CPT | Performed by: SPEECH-LANGUAGE PATHOLOGIST

## 2024-07-19 PROCEDURE — 90791 PSYCH DIAGNOSTIC EVALUATION: CPT | Performed by: PSYCHOLOGIST

## 2024-07-19 PROCEDURE — 80076 HEPATIC FUNCTION PANEL: CPT

## 2024-07-19 PROCEDURE — 84100 ASSAY OF PHOSPHORUS: CPT

## 2024-07-19 PROCEDURE — 97116 GAIT TRAINING THERAPY: CPT

## 2024-07-19 PROCEDURE — 97162 PT EVAL MOD COMPLEX 30 MIN: CPT

## 2024-07-19 PROCEDURE — 97166 OT EVAL MOD COMPLEX 45 MIN: CPT

## 2024-07-19 PROCEDURE — 92523 SPEECH SOUND LANG COMPREHEN: CPT | Performed by: SPEECH-LANGUAGE PATHOLOGIST

## 2024-07-19 PROCEDURE — 36415 COLL VENOUS BLD VENIPUNCTURE: CPT

## 2024-07-19 PROCEDURE — 82948 REAGENT STRIP/BLOOD GLUCOSE: CPT

## 2024-07-19 PROCEDURE — 97530 THERAPEUTIC ACTIVITIES: CPT

## 2024-07-19 PROCEDURE — 80048 BASIC METABOLIC PNL TOTAL CA: CPT

## 2024-07-19 PROCEDURE — 6370000000 HC RX 637 (ALT 250 FOR IP): Performed by: STUDENT IN AN ORGANIZED HEALTH CARE EDUCATION/TRAINING PROGRAM

## 2024-07-19 RX ORDER — HYDROCODONE BITARTRATE AND ACETAMINOPHEN 5; 325 MG/1; MG/1
0.5 TABLET ORAL EVERY 6 HOURS PRN
Status: DISCONTINUED | OUTPATIENT
Start: 2024-07-19 | End: 2024-07-26 | Stop reason: HOSPADM

## 2024-07-19 RX ORDER — LANOLIN ALCOHOL/MO/W.PET/CERES
3 CREAM (GRAM) TOPICAL NIGHTLY
Status: DISCONTINUED | OUTPATIENT
Start: 2024-07-19 | End: 2024-07-26 | Stop reason: HOSPADM

## 2024-07-19 RX ORDER — HYDROCODONE BITARTRATE AND ACETAMINOPHEN 5; 325 MG/1; MG/1
1 TABLET ORAL EVERY 6 HOURS PRN
Status: DISCONTINUED | OUTPATIENT
Start: 2024-07-19 | End: 2024-07-26 | Stop reason: HOSPADM

## 2024-07-19 RX ADMIN — ENOXAPARIN SODIUM 40 MG: 100 INJECTION SUBCUTANEOUS at 10:23

## 2024-07-19 RX ADMIN — ACETAMINOPHEN 650 MG: 325 TABLET ORAL at 10:24

## 2024-07-19 RX ADMIN — ACETAMINOPHEN 650 MG: 325 TABLET ORAL at 06:37

## 2024-07-19 RX ADMIN — CEPHALEXIN 500 MG: 500 CAPSULE ORAL at 10:24

## 2024-07-19 RX ADMIN — PANTOPRAZOLE SODIUM 40 MG: 40 TABLET, DELAYED RELEASE ORAL at 06:37

## 2024-07-19 RX ADMIN — SODIUM CHLORIDE, PRESERVATIVE FREE 10 ML: 5 INJECTION INTRAVENOUS at 21:59

## 2024-07-19 RX ADMIN — HYDROCODONE BITARTRATE AND ACETAMINOPHEN 0.5 TABLET: 5; 325 TABLET ORAL at 21:29

## 2024-07-19 RX ADMIN — ATORVASTATIN CALCIUM 20 MG: 20 TABLET, FILM COATED ORAL at 21:55

## 2024-07-19 RX ADMIN — AMLODIPINE BESYLATE 5 MG: 5 TABLET ORAL at 10:23

## 2024-07-19 RX ADMIN — SODIUM CHLORIDE, PRESERVATIVE FREE 10 ML: 5 INJECTION INTRAVENOUS at 10:34

## 2024-07-19 RX ADMIN — HYDROCODONE BITARTRATE AND ACETAMINOPHEN 0.5 TABLET: 5; 325 TABLET ORAL at 12:23

## 2024-07-19 RX ADMIN — CEPHALEXIN 500 MG: 500 CAPSULE ORAL at 21:54

## 2024-07-19 RX ADMIN — CLOPIDOGREL BISULFATE 75 MG: 75 TABLET ORAL at 10:23

## 2024-07-19 RX ADMIN — Medication 3 MG: at 21:55

## 2024-07-19 ASSESSMENT — PAIN SCALES - GENERAL
PAINLEVEL_OUTOF10: 5
PAINLEVEL_OUTOF10: 4
PAINLEVEL_OUTOF10: 6
PAINLEVEL_OUTOF10: 5
PAINLEVEL_OUTOF10: 1
PAINLEVEL_OUTOF10: 7

## 2024-07-19 ASSESSMENT — PAIN DESCRIPTION - ONSET: ONSET: ON-GOING

## 2024-07-19 ASSESSMENT — PAIN DESCRIPTION - FREQUENCY: FREQUENCY: CONTINUOUS

## 2024-07-19 ASSESSMENT — PAIN DESCRIPTION - ORIENTATION
ORIENTATION: LEFT

## 2024-07-19 ASSESSMENT — PAIN DESCRIPTION - LOCATION
LOCATION: KNEE
LOCATION: COCCYX;KNEE

## 2024-07-19 ASSESSMENT — PAIN DESCRIPTION - PAIN TYPE: TYPE: SURGICAL PAIN

## 2024-07-19 ASSESSMENT — PAIN - FUNCTIONAL ASSESSMENT
PAIN_FUNCTIONAL_ASSESSMENT: ACTIVITIES ARE NOT PREVENTED

## 2024-07-19 ASSESSMENT — PAIN DESCRIPTION - DESCRIPTORS
DESCRIPTORS: ACHING;DISCOMFORT
DESCRIPTORS: ACHING;DISCOMFORT
DESCRIPTORS: ACHING
DESCRIPTORS: GNAWING

## 2024-07-19 NOTE — PROGRESS NOTES
[facility]  SPEECH THERAPY  [facility]  Speech - Language - Cognitive Evaluation + Clinical Swallow Evaluation    SLP Individual Minutes  Time In: 0730  Time Out: 0800  Minutes: 30  Timed Code Treatment Minutes: 0 Minutes     Speech, Language, Cognitive Evaluation: 22 minutes   Clinical Swallow Evaluation: 8 minutes     DIET ORDER RECOMMENDATIONS AFTER EVALUATION: Regular with Thin liquids     Date: 2024  Patient Name: Celia Perkins      CSN: [id number]   : 1950  (74 y.o.)  Gender: female   Referring Physician:  Dr. Vita Byrnes DO  Diagnosis: Metabolic Encephalopathy   Precautions: Fall risk   History of Present Illness/Injury: Patient admitted with the above diagnosis. Per physician H&P: \"Celia Perkins  is a 74 y.o. female with PMH significant for asthma, breast cancer, CVA, hypertension, and type 2 diabetes, who is being admitted to the inpatient rehabilitation unit on 2024.  History obtained via: ED documentation, acute care documentation, patient     Patient initially presented to [facility] ED on 2024. Patient reportedly recently underwent a left TKA on 7/10/2024.  She reportedly had a relatively uncomplicated postoperative course was discharged home from the hospital couple days later.  She was reportedly discharged home with prescriptions for opioid pain medications and a muscle relaxer.  Patient's family reported sleeping excessively since discharge home.  In the days leading up to presentation, patient spouse reported some increased reports of lightheadedness/dizziness that occurred primarily with position changes.  No recent falls.  On the morning of presentation, patient went to therapy and was feeling lightheaded/dizzy.  Per report, they were unable to get a reading on her blood pressure.  Shortly after, patient became agitated and was brought to the ED for further evaluation.     In the ED, patient with increased confusion and agitation.   reported.    Subjective:  Patient resting in bed upon SLP arrival. Pleasant and agreeable to upright positioning and completion of clinical swallow and cognitive evaluations. No family present at time of evaluation.    SOCIAL HISTORY:   Living Arrangements: Lives with - Has 3 children   Work History: Retired-    Education Level: 12th grade  Driving Status: Active   Finance Management: Independent  Medication Management: Independent- Utilizes pill box system  ADL's: Independent.   Hobbies: [employer] work, Gardening, Going to grand kids "[employer] games  Vision Status: Wears corrective lenses   Hearing: No hearing loss  Type of Home: House  Home Layout: One level, Laundry in basement  Home Access: Stairs to enter with rails  Entrance Stairs - Number of Steps: 5  Entrance Stairs - Rails: Both  Home Equipment: Walker - Standard, Sock aid, Reacher, Grab bars (has wheels for walker, has transport chair that she is borrowing)    SPEECH / VOICE:  Speech and Voice appear to be grossly intact for basic and complex daily communication    LANGUAGE:  Receptive:  Receptive language skills appear to be grossly intact for basic and complex daily communication.    Expressive:  Expressive language skills appear to be grossly intact for basic and complex daily communication.    COGNITION:  Aubrey Cognitive Assessment (MOCA) version 7.3 completed.  Patient scored 15/30.  Normal is greater than or equal to 26/30.  Inclusion of +1 point given highest level of education achieved less than/equal to 12th grade or GED with limited-0 post-secondary schooling     Orientation: 6/6  Immediate Recall: 5/5  Short-Term Recall: 0/5  Divergent Namin in one minute (Target =11+)  Problem Solvin/3 for call light use  Reasonin/2  Sequencing: 3/5   Thought Organization: Mild-moderate impairments   Insight: Fair-good   Attention: 1/6  Math Computation: 0/5 for serial subtraction  Executive Functioning:

## 2024-07-19 NOTE — PLAN OF CARE
Assistance: Independent  Homemaking Responsibilities: Yes  Ambulation Assistance: Independent  Transfer Assistance: Independent  Active : Yes  Patient's  Info: Pt states yes to driving, but unable to recall last time she drove. Pt reports driving within the past year.  Occupation: Retired  Type of Occupation: 4E ; [employer], [employer],  Additional Comments: Pt states she was Mod I for use of cane prior to sx. Since sx, pt has been using a standard walker. Pt's  has been assisting with ADLs post op but pt is able to walk to bathroom and complete toileting with Mod I.    Contact/Guardian Information: Raymon Perkins (spouse) [phone]    Community Resources Utilized: Patient was not using community resources prior to admission.    Sexuality/Intimacy: Patient did not disclose sexuality/intimacy concerns during SW assessment.     Complementary Health Approaches: Patient did not disclose desires towards complementary health approaches during SW assessment.     Anticipated Needs/Discharge Plans: SW will follow and maintain involvement in discharge planning.     SW met with patient and her , Raymon, on this date to introduce self, complete SW assessment and initiate discharge planning. Prior to admission, patient was living with her , Raymon. Patient reported being independent at home. Patient was completing her ADLs, housekeeping, meal prep, errands, finances and driving. Patient did not need some physical assistance at home after her most recent surgery. Raymon was able to provide assistance. Patient is retired. Patient's main support is Raymon and their daughter, Moon, who lives locally. Patient has two sons who live in [place] and [place]. Patient feels well supported by family and friends. Patient's family doctor is Yash Nelson MD. Patient prefers [facility]. Patient reports having a walker, cane and shower chair at home. Patient is motivated to participate in therapy. SW educated patient  on Tuesday, 7/23 team conference. SW will follow and maintain involvement in discharge planning.        Read-Only, Retired: Discharge Planning  Living Arrangements: Spouse/Significant Other  Support Systems: Spouse/Significant Other, Children  Current DME Prior to Arrival: Cane, Glucometer  Potential Assistance Needed: Home Care  Potential Assistance Purchasing Medications: No  M2B Y/N: Yes  Type of Home Care Services: None  Patient expects to be discharged to:: House  Expected Discharge Date:  (Undetermined)  Follow Up Appointment: Best Day/Time : Monday AM      AMMON Broussard 7/19/2024 12:37 PM

## 2024-07-19 NOTE — PLAN OF CARE
Problem: Discharge Planning  Goal: Discharge to home or other facility with appropriate resources  Outcome: Progressing  Flowsheets  Taken 7/18/2024 2000 by Kale Li RN  Discharge to home or other facility with appropriate resources: Identify barriers to discharge with patient and caregiver  Taken 7/18/2024 1548 by Александр Ruiz RN  Discharge to home or other facility with appropriate resources: Identify barriers to discharge with patient and caregiver  Taken 7/18/2024 1405 by Александр Ruiz RN  Discharge to home or other facility with appropriate resources: Identify barriers to discharge with patient and caregiver     Problem: Safety - Adult  Goal: Free from fall injury  7/19/2024 0136 by Kale Li RN  Outcome: Progressing     Problem: ABCDS Injury Assessment  Goal: Absence of physical injury  7/19/2024 0136 by Klae Li RN  Outcome: Progressing     Problem: Respiratory - Adult  Goal: Achieves optimal ventilation and oxygenation  7/19/2024 0136 by Kale Li RN  Outcome: Progressing  Flowsheets (Taken 7/18/2024 2000)  Achieves optimal ventilation and oxygenation: Assess for changes in respiratory status     Problem: Skin/Tissue Integrity - Adult  Goal: Incisions, wounds, or drain sites healing without S/S of infection  7/19/2024 0136 by Kale Li RN  Outcome: Progressing  Flowsheets  Taken 7/19/2024 0035  Incisions, Wounds, or Drain Sites Healing Without Sign and Symptoms of Infection: ADMISSION and DAILY: Assess and document risk factors for pressure ulcer development  Taken 7/18/2024 2000  Incisions, Wounds, or Drain Sites Healing Without Sign and Symptoms of Infection: TWICE DAILY: Assess and document skin integrity     Care plan reviewed with patient and patient verbalized understanding.

## 2024-07-19 NOTE — PROGRESS NOTES
[facility]  INPATIENT OCCUPATIONAL THERAPY  [facility]  EVALUATION    Time:    Time In: 907  Time Out: 1037  Timed Code Treatment Minutes: 74 Minutes  Minutes: 90          Date: 2024  Patient Name: Celia Perkins,   Gender: female      MRN: [id number]  : 1950  (74 y.o.)  Referring Practitioner: Cathi Rocha APRN - CNP (ordering)  Dr. Byrnes (attending0  Diagnosis: Metabolic encephalopathy  Additional Pertinent Hx: 74 y.o. female with a history of aortic stenosis status post TAVR, stroke, insulin-dependent type 2 diabetes mellitus, who presented to [facility] with chief complaint of dizziness, shortness of breath, and confusion. The patient recently had a left total knee arthroplasty on 7/10/2024 and was d/c home. Per pt's , pt has been sleeping excessively with poor appeite. Pt was at therapy PTA when she began feeling light headed/dizzy. Her blood pressure was attempted to be taken, but they were unable to get a reading on the blood pressure cuff. Shortly thereafter, the patient was noted to be, agitated and as such, she was brought to the emergency room for further evaluation. In the emergency department, the patient was a stroke alert activation. Imaging was unremarkable and an MRI of the brain was negative for stroke.  Unknown true etiology of encephalopathy, however use of opioid pain medication and muscle relaxer was a likely cause.  Patient worked with therapy and was noted to require increased assistance in bed mobility transfers and functional mobility.  Patient also underwent a cognitive evaluation in which she scored 2 out of 30 on her MoCA, her last MoCA was completed in 2023 and was 19 out of 30 at that time, this was at the time of her CVA which was after her TAVR.  Pt presents for OT to improve ADL independence to return home with spouse.    Restrictions/Precautions:  Restrictions/Precautions: General Precautions, Fall Risk,

## 2024-07-19 NOTE — PROGRESS NOTES
[facility]  INPATIENT PHYSICAL THERAPY  DAILY NOTE  [facility] - [id number]    Time In: 1330  Time Out: 1400  Timed Code Treatment Minutes: 30 Minutes  Minutes: 30          Date: 2024  Patient Name: Celia Perkins,  Gender:  female        MRN: [id number]  : 1950  (74 y.o.)  Referral Date : 24  Referring Practitioner: Cathi Rocha APRN - CNP (referring), Vita Byrnes DO (attending)  Diagnosis: Metabolic encephalopathy  Additional Pertinent Hx: Per EMR: Celia Perkins  is a 74 y.o. female with PMH significant for asthma, breast cancer, CVA, hypertension, and type 2 diabetes, who is being admitted to the inpatient rehabilitation unit on 2024.Patient initially presented to [facility] ED on 2024.In the ED, patient with increased confusion and agitation.  A stroke alert was called.  CT head was negative for any acute intracranial abnormalities.  MRI of the brain was also reportedly negative for an acute CVA, however, did demonstrate an old infarct in the inferior left cerebellum.On admission, EEG was obtained and reportedly revealed disorganized and slow background suggesting a mild to moderate encephalopathy of nonspecific etiology.  Neurology evaluated and followed patient and felt that altered mental status was most likely due to toxic metabolic encephalopathy which was felt to be multifactorial in nature related to mild dehydration and pain medication/muscle relaxers.     Prior Level of Function:  Lives With: Spouse  Type of Home: House  Home Layout: One level, Laundry in basement  Home Access: Stairs to enter with rails  Entrance Stairs - Number of Steps: 5  Entrance Stairs - Rails: Both  Home Equipment: Walker - Standard, Sock aid, Reacher, Grab bars (has wheels for walker, has transport chair that she is borrowing)   Bathroom Shower/Tub: Tub/Shower unit  Bathroom Toilet: Standard (bedside commode over toilet)  Bathroom Equipment: Grab bars

## 2024-07-19 NOTE — PROGRESS NOTES
[facility]  INPATIENT PHYSICAL THERAPY  EVALUATION  [facility] - 8K-07/007-A    Time In: 1101  Time Out: 1202  Timed Code Treatment Minutes: 41 Minutes  Minutes: 61          Date: 2024  Patient Name: Celia Perkins,  Gender:  female        MRN: [id number]  : 1950  (74 y.o.)  Referral Date : 24   Referring Practitioner: Cathi Rocha APRN - CNP (referring), Vita Byrnes DO (attending)  Diagnosis: Metabolic encephalopathy  Additional Pertinent Hx: Per EMR: Celia Perkins  is a 74 y.o. female with PMH significant for asthma, breast cancer, CVA, hypertension, and type 2 diabetes, who is being admitted to the inpatient rehabilitation unit on 2024.Patient initially presented to [facility] ED on 2024.In the ED, patient with increased confusion and agitation.  A stroke alert was called.  CT head was negative for any acute intracranial abnormalities.  MRI of the brain was also reportedly negative for an acute CVA, however, did demonstrate an old infarct in the inferior left cerebellum.On admission, EEG was obtained and reportedly revealed disorganized and slow background suggesting a mild to moderate encephalopathy of nonspecific etiology.  Neurology evaluated and followed patient and felt that altered mental status was most likely due to toxic metabolic encephalopathy which was felt to be multifactorial in nature related to mild dehydration and pain medication/muscle relaxers.     Restrictions/Precautions:  Restrictions/Precautions: General Precautions, Fall Risk, Weight Bearing  Left Lower Extremity Weight Bearing: Weight Bearing As Tolerated  Position Activity Restriction  Other position/activity restrictions: L TKA 7/10/24- per conversation with Dr. Lowry on - keep knee dry with aquaguards during showers until seen in office for zipline removal.    Subjective:  Chart Reviewed: Yes  Patient assessed for rehabilitation services?: Yes  Family /

## 2024-07-19 NOTE — PLAN OF CARE
Problem: Discharge Planning  Goal: Discharge to home or other facility with appropriate resources  7/19/2024 1230 by Tipton, Rylee, LPN  Outcome: Progressing  Flowsheets (Taken 7/19/2024 1227)  Discharge to home or other facility with appropriate resources:   Identify barriers to discharge with patient and caregiver   Arrange for needed discharge resources and transportation as appropriate   Identify discharge learning needs (meds, wound care, etc)     Problem: Safety - Adult  Goal: Free from fall injury  7/19/2024 1230 by Tipton, Rylee, LPN  Outcome: Progressing  Flowsheets (Taken 7/19/2024 1230)  Free From Fall Injury: Instruct family/caregiver on patient safety     Problem: ABCDS Injury Assessment  Goal: Absence of physical injury  7/19/2024 1230 by Tipton, Rylee, LPN  Outcome: Progressing  Flowsheets (Taken 7/19/2024 1230)  Absence of Physical Injury: Implement safety measures based on patient assessment     Problem: Neurosensory - Adult  Goal: Achieves maximal functionality and self care  7/19/2024 1230 by Tipton, Rylee, LPN  Outcome: Progressing  Flowsheets (Taken 7/19/2024 1227)  Achieves maximal functionality and self care:   Encourage visually impaired, hearing impaired and aphasic patients to use assistive/communication devices   Monitor swallowing and airway patency with patient fatigue and changes in neurological status   Encourage and assist patient to increase activity and self care with guidance from physical therapy/occupational therapy     Problem: Respiratory - Adult  Goal: Achieves optimal ventilation and oxygenation  7/19/2024 1230 by Tipton, Rylee, LPN  Outcome: Progressing  Flowsheets (Taken 7/19/2024 1227)  Achieves optimal ventilation and oxygenation:   Assess for changes in mentation and behavior   Assess for changes in respiratory status     Problem: Cardiovascular - Adult  Goal: Maintains optimal cardiac output and hemodynamic stability  7/19/2024 1230 by Tipton, Rylee, LPN  Outcome:

## 2024-07-19 NOTE — PLAN OF CARE
Individualized Plan of Care  [facility] Inpatient Rehabilitation Unit    Rehabilitation physician: Dr. Byrnes    Admit Date: 7/18/2024     Rehabilitation Diagnosis: Metabolic encephalopathy [G93.41]      Rehabilitation impairments: cognition, self care, mobility, motor dysfunction, bowel/bladder management, pain management, and safety    Factors facilitating achievement of predicted outcomes: Family support, Cooperative, and Pleasant  Barriers to the achievement of predicted outcomes: Pain, Decreased endurance, generalized weakness, congnition    Patient Goals: Improve independence with mobility, Improvement of mobility at a wheelchair level, Increase overall strength and endurance, Increase balance, Increase endurance, Increase independence with activities of daily living, Improve cognition, Increase self-awareness, Increase safety awareness, Increase community integration, Increase socialization, Functional communication with caregivers, Integrate appropriate pain management plan, Assure adequate nutritional option for discharge, Continence of bowel and bladder, and Provide appropriate patient and family education      NURSING:  Nursing goals for Celia Perkins while on the rehabilitation unit will include:  Continence of bowel and bladder, Adequate number of bowel movements, Urinate with no urinary retention >300ml in bladder, Maintain O2 SATs at an acceptable level during stay, Effective pain management while on the rehabilitation unit, Establish adequate pain control plan for discharge, Absence of skin breakdown while on the rehabilitation unit, Improved skin integrity via assessments including wound measurements, Avoidance of any hospital acquired infections, No signs/symptoms of infection at the wound site, Freedom from injury during hospitalization, and Complete education with patient/family with understanding demonstrated regarding disease process and resultant impairment     In order to  in and out of vehicle for transfer to home.    Plan of Care: Patient to be seen by physical therapy services  (5x/wk 90min)       Anticipated interventions may include therapeutic exercises, gait training, neuromuscular re-ed, transfer training, community reintegration, bed mobility, w/c mobility and training.      OCCUPATIONAL THERAPY:  Goals:             Short Term Goals  Time Frame for Short Term Goals: 1 week  Short Term Goal 1: Pt will utilize tub transfer bench with SBA to access her tub/shower to improve ADL performance within her home set up.  Short Term Goal 2: Pt will navigate RW to retrieve 2 items with SBA to improve indep with gathering clothing from closet at home.  Short Term Goal 3: Pt will tolerate 4 minutes dynamic standing task with SBA to improve indep with sinkside grooming and haircare routine.  Short Term Goal 4: Pt will complete LB dressing tasks with SBA to improve indep with daily dressing tasks.  Short Term Goal 5: Pt will use RW to complete simple meal prep tasks with SBA to improve indep with preparing meals at home.  Long Term Goals  Time Frame for Long Term Goals : 2 weeks from OT evaluation  Long Term Goal 1: Pt will perform BADL routine with modified independence using LHAE prn to improve indep with daily self care routine at home.  Long Term Goal 2: Pt will utilize RW to perform light IADL tasks with modified independence and 0 vcs for TKR precautions or walker safety to improve indep with preparing dinner at home.  Long Term Goal 3: Pt will safely use RW to complete community re-integration tasks with modified independence to improve indep with shopping and going to appts.    Plan of Care: Patient to be seen by occupational therapy services 5x/wk for 90 min     Anticipated interventions may include ADL and IADL retraining, strengthening, safety education and training, patient/caregiver education and training, equipment evaluation/ training/procurement, neuromuscular reeducation,

## 2024-07-19 NOTE — PROGRESS NOTES
Comprehensive Nutrition Assessment    Type and Reason for Visit:  Initial, Consult (ONS)    Nutrition Recommendations/Plan:   Continue diet per provider/SLP and encourage adequate PO intake at best efforts  Modify ONS: Yogurt (BID) - does not like shakes  Recommend MVI and probiotic  Pt actively going to Prit[facility]in program per EMR (starting 4/7/24)      Malnutrition Assessment:  Malnutrition Status:  Mild malnutrition (07/19/24 1507)    Context:  Acute Illness     Findings of the 6 clinical characteristics of malnutrition:  Energy Intake:  50% or less of estimated energy requirements for 5 or more days (since TKA 7/10/24)  Weight Loss:   (some weight loss noted; difficult to assess with hx/o edema)     Body Fat Loss:  No significant body fat loss     Muscle Mass Loss:  No significant muscle mass loss    Fluid Accumulation:  Mild Extremities   Strength:  Not Performed    Nutrition Assessment:     Pt. mildy malnourished AEB criteria as listed above.  At risk for further nutrition compromise r/t metabolic encephalopathy s/p TKA 7/10/24, and underlying medical condition (PMHx: arthritis, brain cyst - benign, breast CA, CVA 2015, diverticulosis, DKA, HTN, OA, T2DM, s/p TAVR 2023 - CVA).        Nutrition Related Findings:    Pt. Report/Treatments/Miscellaneous: Pt seen, endorses poor appetite since TKA 7/10/24. She is unable to recall much prior and does not know how much she has been eating since but states it has not been much. She does not know if she has lost any weight and is unable to recall UBW. Per EMR she has been going through the P[facility]in program (starting 4/7/24) but was not able to endorse that she has been in attendance every week since. She does not like Glucerna - does like yogurt and agreed to supplementation.  GI Status: BM - 7/16  Pertinent Labs: Reviewed BMP from 7/19; POC glucose 135, 159  Pertinent Meds: lipitor, keflex, humalog, melatonin, protonix     Wound Type:  (surgical incision s/p TKA  Planning:    Too soon to determine     Arturo Baires RD, LD  Contact: [phone]

## 2024-07-19 NOTE — CONSULTS
[place], [place]     PSYCHOLOGY CONSULTATION REPORT    TO:Vita Byrnes DO  PATIENT: Celia Perkins  : 1950   MR NUMBER: [id number]  FROM: Maddy Alberts, Ph. D.   DATE: 2024      REPORT OF CONSULTATION: FINDINGS, OPINIONS and RECOMMENDATIONS     REASON FOR REFERRAL: The patient was referred for evaluation due to concerns about emotional status and coping in the wake of recent admission. This occurred after patient had a left knee arthroscopy and was having follow up PT when her dizziness was apparent and her blood pressure was not readable. She had been becoming increasingly lightheaded and dizzy, and was admitted through the ED with confusion and agitation, eventually given a diagnosis of metabolic encephalopathy.    Patient presents with the following presenting problems:   Patient Active Problem List   Diagnosis    Hyperlipidemia    Asthma    Polyneuropathy    Aortic stenosis    GERD (gastroesophageal reflux disease)    Hiatal hernia    Hypothyroid    Type 2 diabetes mellitus without complication, without long-term current use of insulin (HCC)    Osteoarthrosis    Ear canal mass    Primary osteoarthritis of left knee    Osteoporosis    S/P repair of ventral hernia    Cervical stenosis of spinal canal    Tear of left supraspinatus tendon    Morbidly obese (HCC)    Type 2 diabetes mellitus with diabetic neuropathy    Mood disorder (HCC)    Acute encephalopathy    Altered mental status    Hypertension    Metabolic encephalopathy       BASIS OF EVALUATION:   Clinical assessment of the patient, review of medical records, consultation with Nursing and with attending physician, (phone) conversation with family member, daughter Moon Perkins.      BEHAVIORAL OBSERVATIONS: The patient presents as a 74 y.o.-year-old female of  descent. She appears considerably younger than her chronological age. Grooming was fair. Wearing hospital gown. Interpersonally, the patient was  pleasant. Cooperation with assessment was WNL. Level of consciousness was fluctuating, somewhat reduced at times.    Affect was anxious, sad, a little tearful, mood-congruent. Mood was anxious. Memory is intact for periods up to the onset of the delirium, then very poor for the intervening time. Receptive language was intact, and expressive language was intact. Attention/concentration was reduced. Judgment and problem solving were not tested.  Frustration tolerance was WNL.    She is making rapid progress on clearing from the delirium, per ST who have given her the MOCA.    During my time with her, I handed her the water jug. She took a tiny sip and set it aside, seeming to forget about it. With repeated cuing, she drank the remainder of the jug (about 8 ounces).      PRESENTING PROBLEM: The patient acknowledged having difficulty with anxiety and worries about what she did when confused. Primary coping strategies involve reliance on friends, active problem solving. Support systems are family, friends.    Staff report noticing difficulties with anxiety     MEDICAL HISTORY:   Past Medical History:   Diagnosis Date    Arthritis     Asthma     Brain cyst     benign    Breast cancer (HCC) 09/18/2018    Left breast, 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    Cancer ([facility]) 09/1918    Left Breast    Cerebrovascular accident (CVA) ([facility]) 10/14/2015    Chronic sinus complaints     Diverticulosis of colon     DKA (diabetic ketoacidoses) 05/2018    Elevated TSH     Hypertension     Osteoarthritis     Polyneuropathy     PONV (postoperative nausea and vomiting)     Spinal headache     Type 2 diabetes mellitus ([facility]) 1990's        SOCIAL HISTORY:   Social History     Socioeconomic History    Marital status:      Spouse name: Not on file    Number of children: 3    Years of education: 12    Highest education level: Not on file   Occupational History    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 Spent ten

## 2024-07-19 NOTE — PROGRESS NOTES
[facility]  Recreational Therapy  Inpatient Rehabilitation Evaluation        Time Spent with Patient: 25 minutes    Date:  7/19/2024       Patient Name: Celia Perkins      MRN: [id number]       YOB: 1950 (74 y.o.)       Gender: female  Diagnosis: Metabolic encephalopathy  Referring Practitioner: Cathi Rocha APRN - CNP (ordering)  Dr. Byrnes (attending0    RESTRICTIONS/PRECAUTIONS:  Restrictions/Precautions: General Precautions, Fall Risk, Weight Bearing     Hearing: Within functional limits    PAIN: 6-L knee    SUBJECTIVE:  pt lives with her  in [place], they have 3 children with 1 in [place], 1 in [place] and 1 in [place] -they have 4 grandchildren and a bonus grandchild     VISION:  Glasses    HEARING: Within Normal Limit    LEISURE INTERESTS:   Pt had L TKR 7-10-24-states her pain while resting in bed is 6/10 in L knee- is present too and very supportive-pt states she likes to work in her flowers, she does the laundry, she enjoys baking, she and  dine out(stating more than they should)she plays bid [employer] with friends and enjoys word games on her phone-gave pt some word find to use in free time-very pleasant and social-    BARRIERS TO LEISURE INTERESTS:    Decreased endurance and Pain           Patient Education  New Education Provided: Importance of Leisure, RT Plan of Care    Plan:  Continue to follow patient through this admission  Include patient in appropriate groups  See patient individually    Electronically signed by: Lila Mixon, CTRS  Date: 7/19/2024

## 2024-07-20 LAB
25(OH)D3 SERPL-MCNC: 79 NG/ML (ref 30–100)
GLUCOSE BLD STRIP.AUTO-MCNC: 138 MG/DL (ref 70–108)

## 2024-07-20 PROCEDURE — 36415 COLL VENOUS BLD VENIPUNCTURE: CPT

## 2024-07-20 PROCEDURE — 2580000003 HC RX 258: Performed by: NURSE PRACTITIONER

## 2024-07-20 PROCEDURE — 6370000000 HC RX 637 (ALT 250 FOR IP): Performed by: NURSE PRACTITIONER

## 2024-07-20 PROCEDURE — 82306 VITAMIN D 25 HYDROXY: CPT

## 2024-07-20 PROCEDURE — 97530 THERAPEUTIC ACTIVITIES: CPT

## 2024-07-20 PROCEDURE — 6360000002 HC RX W HCPCS: Performed by: NURSE PRACTITIONER

## 2024-07-20 PROCEDURE — 82948 REAGENT STRIP/BLOOD GLUCOSE: CPT

## 2024-07-20 PROCEDURE — 97116 GAIT TRAINING THERAPY: CPT

## 2024-07-20 PROCEDURE — 97129 THER IVNTJ 1ST 15 MIN: CPT

## 2024-07-20 PROCEDURE — 97110 THERAPEUTIC EXERCISES: CPT

## 2024-07-20 PROCEDURE — 6370000000 HC RX 637 (ALT 250 FOR IP): Performed by: STUDENT IN AN ORGANIZED HEALTH CARE EDUCATION/TRAINING PROGRAM

## 2024-07-20 PROCEDURE — 97535 SELF CARE MNGMENT TRAINING: CPT

## 2024-07-20 PROCEDURE — 1180000000 HC REHAB R&B

## 2024-07-20 PROCEDURE — 97130 THER IVNTJ EA ADDL 15 MIN: CPT

## 2024-07-20 RX ADMIN — CEPHALEXIN 500 MG: 500 CAPSULE ORAL at 20:20

## 2024-07-20 RX ADMIN — Medication 3 MG: at 20:20

## 2024-07-20 RX ADMIN — CEPHALEXIN 500 MG: 500 CAPSULE ORAL at 09:39

## 2024-07-20 RX ADMIN — CLOPIDOGREL BISULFATE 75 MG: 75 TABLET ORAL at 09:40

## 2024-07-20 RX ADMIN — PANTOPRAZOLE SODIUM 40 MG: 40 TABLET, DELAYED RELEASE ORAL at 06:50

## 2024-07-20 RX ADMIN — METOPROLOL SUCCINATE 12.5 MG: 25 TABLET, FILM COATED, EXTENDED RELEASE ORAL at 09:37

## 2024-07-20 RX ADMIN — AMLODIPINE BESYLATE 5 MG: 5 TABLET ORAL at 09:38

## 2024-07-20 RX ADMIN — HYDROCODONE BITARTRATE AND ACETAMINOPHEN 1 TABLET: 5; 325 TABLET ORAL at 12:03

## 2024-07-20 RX ADMIN — ENOXAPARIN SODIUM 40 MG: 100 INJECTION SUBCUTANEOUS at 09:40

## 2024-07-20 RX ADMIN — ATORVASTATIN CALCIUM 20 MG: 20 TABLET, FILM COATED ORAL at 20:20

## 2024-07-20 RX ADMIN — SODIUM CHLORIDE, PRESERVATIVE FREE 10 ML: 5 INJECTION INTRAVENOUS at 20:20

## 2024-07-20 RX ADMIN — KETOROLAC TROMETHAMINE 15 MG: 30 INJECTION, SOLUTION INTRAMUSCULAR at 03:38

## 2024-07-20 RX ADMIN — SODIUM CHLORIDE, PRESERVATIVE FREE 10 ML: 5 INJECTION INTRAVENOUS at 09:39

## 2024-07-20 RX ADMIN — LISINOPRIL 40 MG: 40 TABLET ORAL at 09:38

## 2024-07-20 ASSESSMENT — PAIN DESCRIPTION - LOCATION
LOCATION: COCCYX;KNEE
LOCATION: KNEE

## 2024-07-20 ASSESSMENT — PAIN DESCRIPTION - DESCRIPTORS
DESCRIPTORS: ACHING
DESCRIPTORS: GNAWING

## 2024-07-20 ASSESSMENT — PAIN SCALES - GENERAL
PAINLEVEL_OUTOF10: 10
PAINLEVEL_OUTOF10: 5

## 2024-07-20 ASSESSMENT — PAIN DESCRIPTION - ORIENTATION
ORIENTATION: LEFT
ORIENTATION: LEFT

## 2024-07-20 NOTE — CONSULTS
Oral, PRN **OR** potassium bicarb-citric acid (EFFER-K) effervescent tablet 40 mEq, 40 mEq, Oral, PRN **OR** potassium chloride 10 mEq/100 mL IVPB (Peripheral Line), 10 mEq, IntraVENous, PRN  polyethylene glycol (GLYCOLAX) packet 17 g, 17 g, Oral, Daily PRN  pantoprazole (PROTONIX) tablet 40 mg, 40 mg, Oral, QAM AC  ondansetron (ZOFRAN-ODT) disintegrating tablet 4 mg, 4 mg, Oral, Q8H PRN  metoprolol succinate (TOPROL XL) extended release tablet 12.5 mg, 12.5 mg, Oral, Daily  magnesium sulfate 2000 mg in 50 mL IVPB premix, 2,000 mg, IntraVENous, PRN  lisinopril (PRINIVIL;ZESTRIL) tablet 40 mg, 40 mg, Oral, Daily  ketorolac (TORADOL) injection 15 mg, 15 mg, IntraVENous, Q6H PRN  glucose chewable tablet 16 g, 4 tablet, Oral, PRN  glucagon injection 1 mg, 1 mg, SubCUTAneous, PRN  enoxaparin (LOVENOX) injection 40 mg, 40 mg, SubCUTAneous, Daily  dextrose bolus 10% 125 mL, 125 mL, IntraVENous, PRN **OR** dextrose bolus 10% 250 mL, 250 mL, IntraVENous, PRN  dextrose 10 % infusion, , IntraVENous, Continuous PRN  clopidogrel (PLAVIX) tablet 75 mg, 75 mg, Oral, Daily  atorvastatin (LIPITOR) tablet 20 mg, 20 mg, Oral, Nightly  amLODIPine (NORVASC) tablet 5 mg, 5 mg, Oral, Daily  0.9 % sodium chloride infusion, , IntraVENous, PRN  acetaminophen (TYLENOL) tablet 650 mg, 650 mg, Oral, Q4H PRN  cephALEXin (KEFLEX) capsule 500 mg, 500 mg, Oral, 2 times per day  Allergies:  Amoxicillin, Bactrim [sulfamethoxazole-trimethoprim], Donnatal [phenobarbital-belladonna alk], Lovastatin, Metformin and related, Vioxx, Dilaudid [hydromorphone hcl], and Fentanyl    Social History:   MARITAL STATUS:      Family History:       Problem Relation Age of Onset    Diabetes Mother     Heart Disease Mother     Lupus Mother     Heart Disease Father     Cancer Father         Squamous cell - face & scalp    Diabetes Maternal Grandmother     Heart Disease Maternal Grandfather     Cancer Paternal Aunt          multiple myeloma    Cancer Paternal Uncle  carbs  Make the CS daily  Hold DM meds and replace based on CS results

## 2024-07-20 NOTE — PROGRESS NOTES
[facility]  INPATIENT PHYSICAL THERAPY  DAILY NOTE  [facility] - 8K-07/007-A    Time In: 1330  Time Out: 1400  Timed Code Treatment Minutes: 30 Minutes  Minutes: 30          Date: 2024  Patient Name: Celia Perkins,  Gender:  female        MRN: [id number]  : 1950  (74 y.o.)  Referral Date : 24  Referring Practitioner: Cathi Rocha APRN - CNP (referring), Vita Byrnes DO (attending)  Diagnosis: Metabolic encephalopathy  Additional Pertinent Hx: Per EMR: Celia Perkins  is a 74 y.o. female with PMH significant for asthma, breast cancer, CVA, hypertension, and type 2 diabetes, who is being admitted to the inpatient rehabilitation unit on 2024.Patient initially presented to [facility] ED on 2024.In the ED, patient with increased confusion and agitation.  A stroke alert was called.  CT head was negative for any acute intracranial abnormalities.  MRI of the brain was also reportedly negative for an acute CVA, however, did demonstrate an old infarct in the inferior left cerebellum.On admission, EEG was obtained and reportedly revealed disorganized and slow background suggesting a mild to moderate encephalopathy of nonspecific etiology.  Neurology evaluated and followed patient and felt that altered mental status was most likely due to toxic metabolic encephalopathy which was felt to be multifactorial in nature related to mild dehydration and pain medication/muscle relaxers.     Prior Level of Function:  Lives With: Spouse  Type of Home: House  Home Layout: One level, Laundry in basement  Home Access: Stairs to enter with rails  Entrance Stairs - Number of Steps: 5  Entrance Stairs - Rails: Both  Home Equipment: Walker - Standard, Sock aid, Reacher, Grab bars (has wheels for walker, has transport chair that she is borrowing)   Bathroom Shower/Tub: Tub/Shower unit  Bathroom Toilet: Standard (bedside commode over toilet)  Bathroom Equipment: Grab bars

## 2024-07-20 NOTE — PLAN OF CARE
Problem: Discharge Planning  Goal: Discharge to home or other facility with appropriate resources  Outcome: Progressing  Flowsheets (Taken 7/19/2024 2130)  Discharge to home or other facility with appropriate resources: Identify barriers to discharge with patient and caregiver     Problem: Safety - Adult  Goal: Free from fall injury  Outcome: Progressing     Problem: ABCDS Injury Assessment  Goal: Absence of physical injury  Outcome: Progressing     Problem: Neurosensory - Adult  Goal: Achieves maximal functionality and self care  Outcome: Progressing  Flowsheets (Taken 7/19/2024 2130)  Achieves maximal functionality and self care: Encourage and assist patient to increase activity and self care with guidance from physical therapy/occupational therapy     Problem: Respiratory - Adult  Goal: Achieves optimal ventilation and oxygenation  Outcome: Progressing  Flowsheets (Taken 7/19/2024 2130)  Achieves optimal ventilation and oxygenation: Assess for changes in respiratory status     Problem: Cardiovascular - Adult  Goal: Maintains optimal cardiac output and hemodynamic stability  Outcome: Progressing  Flowsheets (Taken 7/19/2024 2130)  Maintains optimal cardiac output and hemodynamic stability: Monitor blood pressure and heart rate     Problem: Cardiovascular - Adult  Goal: Absence of cardiac dysrhythmias or at baseline  Outcome: Progressing  Flowsheets (Taken 7/19/2024 2130)  Absence of cardiac dysrhythmias or at baseline: Monitor cardiac rate and rhythm     Problem: Skin/Tissue Integrity - Adult  Goal: Incisions, wounds, or drain sites healing without S/S of infection  Outcome: Progressing  Flowsheets (Taken 7/19/2024 2130)  Incisions, Wounds, or Drain Sites Healing Without Sign and Symptoms of Infection: TWICE DAILY: Assess and document skin integrity     Problem: Musculoskeletal - Adult  Goal: Return mobility to safest level of function  Outcome: Progressing  Flowsheets (Taken 7/19/2024 2130)  Return Mobility to

## 2024-07-20 NOTE — PROGRESS NOTES
[facility]  Occupational Therapy  Daily Note  Time:   Time In: 1100  Time Out: 1230  Timed Code Treatment Minutes: 90 Minutes  Minutes: 90          Date: 2024  Patient Name: Celia Perkins,   Gender: female      Room: Counts include 234 beds at the [facility]07/007-A  MRN: [id number]  : 1950  (74 y.o.)  Referring Practitioner: Cathi Rocha APRN - CNP (ordering)  Dr. Byrnes (attending0  Diagnosis: Metabolic encephalopathy  Additional Pertinent Hx: 74 y.o. female with a history of aortic stenosis status post TAVR, stroke, insulin-dependent type 2 diabetes mellitus, who presented to [facility] with chief complaint of dizziness, shortness of breath, and confusion. The patient recently had a left total knee arthroplasty on 7/10/2024 and was d/c home. Per pt's , pt has been sleeping excessively with poor appeite. Pt was at therapy PTA when she began feeling light headed/dizzy. Her blood pressure was attempted to be taken, but they were unable to get a reading on the blood pressure cuff. Shortly thereafter, the patient was noted to be, agitated and as such, she was brought to the emergency room for further evaluation. In the emergency department, the patient was a stroke alert activation. Imaging was unremarkable and an MRI of the brain was negative for stroke.  Unknown true etiology of encephalopathy, however use of opioid pain medication and muscle relaxer was a likely cause.  Patient worked with therapy and was noted to require increased assistance in bed mobility transfers and functional mobility.  Patient also underwent a cognitive evaluation in which she scored 2 out of 30 on her MoCA, her last MoCA was completed in 2023 and was 19 out of 30 at that time, this was at the time of her CVA which was after her TAVR.  Pt presents for OT to improve ADL independence to return home with spouse.    Restrictions/Precautions:  Restrictions/Precautions: General Precautions, Fall Risk,

## 2024-07-20 NOTE — PROGRESS NOTES
[facility]  INPATIENT SPEECH THERAPY  [facility]  DAILY NOTE    TIME   SLP Individual Minutes  Time In: 731  Time Out: 801  Minutes: 30  Timed Code Treatment Minutes: 30 Minutes       Date: 2024  Patient Name: Celia Perkins      CSN: [id number]   : 1950  (74 y.o.)  Gender: female   Referring Physician:  Vita Byrnes DO  Diagnosis: Metabolic encephalopathy  Precautions: Fall precautions  Current Diet: Regular Diet and Thin Liquids  Respiratory Status: Room Air  Swallowing Strategies: Standard Universal Swallow Precautions  Date of Last MBS/FEES:  2021    Pain:  Patient reported knee pain but did not rate - RN aware    Subjective:  Patient seen resting in bed upon ST arrival. Alert and agreeable to ST interventions; pleasant and cooperative throughout. Per patient report, she is experiencing improved mentation and reduced delirium this date. No family present.    Short-Term Goals:  SHORT TERM GOAL #1:  Goal 1: Patient will complete functional memory tasks (delayed, prospective, working) with 70% accuracy given mod cues to improve retention of novel information.  INTERVENTIONS: ST provided comprehensive education about WRAP memory strategies (write it, repeat it, associate it, picture it), providing concrete examples for strategy implementation. Patient verbalized receptiveness, reporting that she keeps 2 calendars at home. Patient also with excellent memory of WRAP strategies, reporting that she remembers the strategies from when she received OP speech therapy services. Patient independently remembered \"picture it\" before ST introduced strategy.    Recall WRAP Memory Strategies  Immediate Recall: 3/4 independent, 1/4 min logical cue  ~15-Minute Delayed Recall: 4/4 independent    Recall  Emilia - Sue, , [place], 5 sisters, plays piano  Immediate Recall: 5/5 independent  ~15-Minute Delayed Recall: 5/5 independent  *Excellent, timely recall  *Patient

## 2024-07-20 NOTE — PROGRESS NOTES
[facility]  INPATIENT PHYSICAL THERAPY  DAILY NOTE  [facility] - [id number]    Time In: 0930  Time Out: 1030  Timed Code Treatment Minutes: 60 Minutes  Minutes: 60          Date: 2024  Patient Name: Celia Perkins,  Gender:  female        MRN: [id number]  : 1950  (74 y.o.)  Referral Date : 24  Referring Practitioner: Cathi Rocha APRN - CNP (referring), Vita Byrnes DO (attending)  Diagnosis: Metabolic encephalopathy  Additional Pertinent Hx: Per EMR: Celia Perkins  is a 74 y.o. female with PMH significant for asthma, breast cancer, CVA, hypertension, and type 2 diabetes, who is being admitted to the inpatient rehabilitation unit on 2024.Patient initially presented to [facility] ED on 2024.In the ED, patient with increased confusion and agitation.  A stroke alert was called.  CT head was negative for any acute intracranial abnormalities.  MRI of the brain was also reportedly negative for an acute CVA, however, did demonstrate an old infarct in the inferior left cerebellum.On admission, EEG was obtained and reportedly revealed disorganized and slow background suggesting a mild to moderate encephalopathy of nonspecific etiology.  Neurology evaluated and followed patient and felt that altered mental status was most likely due to toxic metabolic encephalopathy which was felt to be multifactorial in nature related to mild dehydration and pain medication/muscle relaxers.     Prior Level of Function:  Lives With: Spouse  Type of Home: House  Home Layout: One level, Laundry in basement  Home Access: Stairs to enter with rails  Entrance Stairs - Number of Steps: 5  Entrance Stairs - Rails: Both  Home Equipment: Walker - Standard, Sock aid, Reacher, Grab bars (has wheels for walker, has transport chair that she is borrowing)   Bathroom Shower/Tub: Tub/Shower unit  Bathroom Toilet: Standard (bedside commode over toilet)  Bathroom Equipment: Grab bars

## 2024-07-21 LAB
BACTERIA BLD AEROBE CULT: NORMAL
BACTERIA BLD AEROBE CULT: NORMAL
GLUCOSE BLD STRIP.AUTO-MCNC: 215 MG/DL (ref 70–108)

## 2024-07-21 PROCEDURE — 6360000002 HC RX W HCPCS: Performed by: NURSE PRACTITIONER

## 2024-07-21 PROCEDURE — 2580000003 HC RX 258: Performed by: NURSE PRACTITIONER

## 2024-07-21 PROCEDURE — 6370000000 HC RX 637 (ALT 250 FOR IP): Performed by: NURSE PRACTITIONER

## 2024-07-21 PROCEDURE — 1180000000 HC REHAB R&B

## 2024-07-21 PROCEDURE — 82948 REAGENT STRIP/BLOOD GLUCOSE: CPT

## 2024-07-21 PROCEDURE — 6370000000 HC RX 637 (ALT 250 FOR IP): Performed by: STUDENT IN AN ORGANIZED HEALTH CARE EDUCATION/TRAINING PROGRAM

## 2024-07-21 RX ADMIN — PANTOPRAZOLE SODIUM 40 MG: 40 TABLET, DELAYED RELEASE ORAL at 06:36

## 2024-07-21 RX ADMIN — ATORVASTATIN CALCIUM 20 MG: 20 TABLET, FILM COATED ORAL at 22:04

## 2024-07-21 RX ADMIN — ENOXAPARIN SODIUM 40 MG: 100 INJECTION SUBCUTANEOUS at 10:06

## 2024-07-21 RX ADMIN — CLOPIDOGREL BISULFATE 75 MG: 75 TABLET ORAL at 08:00

## 2024-07-21 RX ADMIN — METOPROLOL SUCCINATE 12.5 MG: 25 TABLET, FILM COATED, EXTENDED RELEASE ORAL at 07:59

## 2024-07-21 RX ADMIN — Medication 3 MG: at 22:04

## 2024-07-21 RX ADMIN — SODIUM CHLORIDE, PRESERVATIVE FREE 10 ML: 5 INJECTION INTRAVENOUS at 08:01

## 2024-07-21 RX ADMIN — AMLODIPINE BESYLATE 5 MG: 5 TABLET ORAL at 07:59

## 2024-07-21 RX ADMIN — CEPHALEXIN 500 MG: 500 CAPSULE ORAL at 07:59

## 2024-07-21 RX ADMIN — CEPHALEXIN 500 MG: 500 CAPSULE ORAL at 22:04

## 2024-07-21 RX ADMIN — LISINOPRIL 40 MG: 40 TABLET ORAL at 07:59

## 2024-07-21 ASSESSMENT — PAIN SCALES - GENERAL
PAINLEVEL_OUTOF10: 0
PAINLEVEL_OUTOF10: 0

## 2024-07-21 NOTE — PLAN OF CARE
Problem: Discharge Planning  Goal: Discharge to home or other facility with appropriate resources  7/21/2024 1032 by Martha Vale RN  Outcome: Progressing  Flowsheets (Taken 7/20/2024 2051 by Randa Mcknight RN)  Discharge to home or other facility with appropriate resources: Identify barriers to discharge with patient and caregiver  7/21/2024 0822 by Randa Mcknight RN  Outcome: Progressing  Flowsheets (Taken 7/20/2024 2051)  Discharge to home or other facility with appropriate resources: Identify barriers to discharge with patient and caregiver     Problem: Safety - Adult  Goal: Free from fall injury  7/21/2024 1032 by Martha Vale RN  Outcome: Progressing  Flowsheets (Taken 7/19/2024 1230 by Tipton, Rylee, LPN)  Free From Fall Injury: Instruct family/caregiver on patient safety  7/21/2024 0822 by Randa Mcknight RN  Outcome: Progressing     Problem: ABCDS Injury Assessment  Goal: Absence of physical injury  7/21/2024 1032 by Martha Vale RN  Outcome: Progressing  Flowsheets (Taken 7/19/2024 1230 by Tipton, Rylee, LPN)  Absence of Physical Injury: Implement safety measures based on patient assessment  7/21/2024 0822 by Randa Mcknight RN  Outcome: Progressing     Problem: Neurosensory - Adult  Goal: Achieves maximal functionality and self care  7/21/2024 1032 by Martha Vale RN  Outcome: Progressing  Flowsheets (Taken 7/20/2024 2051 by Randa Mcknight RN)  Achieves maximal functionality and self care: Encourage and assist patient to increase activity and self care with guidance from physical therapy/occupational therapy  7/21/2024 0822 by Randa Mcknight RN  Outcome: Progressing  Flowsheets (Taken 7/20/2024 2051)  Achieves maximal functionality and self care: Encourage and assist patient to increase activity and self care with guidance from physical therapy/occupational therapy     Problem: Respiratory - Adult  Goal: Achieves optimal ventilation and  pain scale   Administer analgesics based on type and severity of pain and evaluate response   Implement non-pharmacological measures as appropriate and evaluate response  7/21/2024 0822 by Randa Mcknight, RN  Outcome: Progressing     Problem: Nutrition Deficit:  Goal: Optimize nutritional status  7/21/2024 1032 by Martha Vale, RN  Outcome: Progressing  Flowsheets (Taken 7/21/2024 1032)  Nutrient intake appropriate for improving, restoring, or maintaining nutritional needs: Assess nutritional status and recommend course of action  7/21/2024 0822 by Randa Mcknight, RN  Outcome: Progressing

## 2024-07-21 NOTE — PLAN OF CARE
Problem: Discharge Planning  Goal: Discharge to home or other facility with appropriate resources  Outcome: Progressing  Flowsheets (Taken 7/20/2024 2051)  Discharge to home or other facility with appropriate resources: Identify barriers to discharge with patient and caregiver     Problem: Safety - Adult  Goal: Free from fall injury  Outcome: Progressing     Problem: ABCDS Injury Assessment  Goal: Absence of physical injury  Outcome: Progressing     Problem: Neurosensory - Adult  Goal: Achieves maximal functionality and self care  Outcome: Progressing  Flowsheets (Taken 7/20/2024 2051)  Achieves maximal functionality and self care: Encourage and assist patient to increase activity and self care with guidance from physical therapy/occupational therapy     Problem: Respiratory - Adult  Goal: Achieves optimal ventilation and oxygenation  Outcome: Progressing  Flowsheets (Taken 7/20/2024 2051)  Achieves optimal ventilation and oxygenation: Assess for changes in respiratory status     Problem: Cardiovascular - Adult  Goal: Maintains optimal cardiac output and hemodynamic stability  Outcome: Progressing  Flowsheets (Taken 7/20/2024 2051)  Maintains optimal cardiac output and hemodynamic stability: Monitor blood pressure and heart rate     Problem: Cardiovascular - Adult  Goal: Absence of cardiac dysrhythmias or at baseline  Outcome: Progressing  Flowsheets (Taken 7/20/2024 2051)  Absence of cardiac dysrhythmias or at baseline: Monitor cardiac rate and rhythm     Problem: Skin/Tissue Integrity - Adult  Goal: Incisions, wounds, or drain sites healing without S/S of infection  Outcome: Progressing  Flowsheets (Taken 7/20/2024 2051)  Incisions, Wounds, or Drain Sites Healing Without Sign and Symptoms of Infection: TWICE DAILY: Assess and document skin integrity     Problem: Musculoskeletal - Adult  Goal: Return mobility to safest level of function  Outcome: Progressing  Flowsheets (Taken 7/20/2024 2051)  Return Mobility to  Safest Level of Function: Assess patient stability and activity tolerance for standing, transferring and ambulating with or without assistive devices     Problem: Musculoskeletal - Adult  Goal: Maintain proper alignment of affected body part  Outcome: Progressing  Flowsheets (Taken 7/20/2024 2051)  Maintain proper alignment of affected body part: Instruct and reinforce with patient and family use of appropriate assistive device and precautions (e.g. spinal or hip dislocation precautions)     Problem: Musculoskeletal - Adult  Goal: Return ADL status to a safe level of function  Outcome: Progressing  Flowsheets (Taken 7/20/2024 2051)  Return ADL Status to a Safe Level of Function: Assess activities of daily living deficits and provide assistive devices as needed     Problem: Gastrointestinal - Adult  Goal: Minimal or absence of nausea and vomiting  Outcome: Progressing  Flowsheets (Taken 7/20/2024 2051)  Minimal or absence of nausea and vomiting: Provide nonpharmacologic comfort measures as appropriate     Problem: Gastrointestinal - Adult  Goal: Maintains or returns to baseline bowel function  Outcome: Progressing  Flowsheets (Taken 7/20/2024 2051)  Maintains or returns to baseline bowel function: Assess bowel function     Problem: Genitourinary - Adult  Goal: Absence of urinary retention  Outcome: Progressing     Problem: Infection - Adult  Goal: Absence of infection at discharge  Outcome: Progressing  Flowsheets (Taken 7/20/2024 2051)  Absence of infection at discharge: Assess and monitor for signs and symptoms of infection     Problem: Metabolic/Fluid and Electrolytes - Adult  Goal: Glucose maintained within prescribed range  Outcome: Progressing  Flowsheets (Taken 7/20/2024 2051)  Glucose maintained within prescribed range: Monitor blood glucose as ordered     Problem: Chronic Conditions and Co-morbidities  Goal: Patient's chronic conditions and co-morbidity symptoms are monitored and maintained or

## 2024-07-21 NOTE — CARE COORDINATION
Patient walked unit with this nurse twice. Family in to visit with patient throughout the day. Family took patient via wheelchair to second floor garden. Patient tolerated well.

## 2024-07-21 NOTE — PROGRESS NOTES
[facility]  Recreational Therapy  Daily Note  [facility]    Time Spent with Patient: 25 minutes    Date:  7/21/2024       Patient Name: Celia Perkins      MRN: [id number]      YOB: 1950 (74 y.o.)       Gender: female  Diagnosis: Metabolic encephalopathy  Referring Practitioner: Cathi oRcha APRN - CNP (ordering)  Dr. Byrnes (attending0    RESTRICTIONS/PRECAUTIONS:  Restrictions/Precautions: General Precautions, Fall Risk, Weight Bearing     Hearing: Within functional limits    PAIN: 0-no c/o pain     SUBJECTIVE:  I got a shower this morning and I feel so much better    OBJECTIVE:  Upon arrival pt resting in recliner-states she had a shower this am and feels so much better-affect bright and social-reminisced about the times growing up in [place] and pt and this worker know a lot of the same people and enjoyed catching up-pt receiving her breakfast at this time-supportive contact given         Patient Education  New Education Provided: Importance of Leisure,     Electronically signed by: Lila Mixon, CTRS  Date: 7/21/2024

## 2024-07-22 LAB
ANION GAP SERPL CALC-SCNC: 16 MEQ/L (ref 8–16)
BUN SERPL-MCNC: 20 MG/DL (ref 7–22)
CALCIUM SERPL-MCNC: 9.4 MG/DL (ref 8.5–10.5)
CHLORIDE SERPL-SCNC: 100 MEQ/L (ref 98–111)
CO2 SERPL-SCNC: 20 MEQ/L (ref 23–33)
CREAT SERPL-MCNC: 0.6 MG/DL (ref 0.4–1.2)
DEPRECATED RDW RBC AUTO: 43 FL (ref 35–45)
ERYTHROCYTE [DISTWIDTH] IN BLOOD BY AUTOMATED COUNT: 13.1 % (ref 11.5–14.5)
GFR SERPL CREATININE-BSD FRML MDRD: > 90 ML/MIN/1.73M2
GLUCOSE BLD STRIP.AUTO-MCNC: 199 MG/DL (ref 70–108)
GLUCOSE BLD STRIP.AUTO-MCNC: 219 MG/DL (ref 70–108)
GLUCOSE BLD STRIP.AUTO-MCNC: 222 MG/DL (ref 70–108)
GLUCOSE BLD STRIP.AUTO-MCNC: 243 MG/DL (ref 70–108)
GLUCOSE SERPL-MCNC: 204 MG/DL (ref 70–108)
HCT VFR BLD AUTO: 42.6 % (ref 37–47)
HGB BLD-MCNC: 14.1 GM/DL (ref 12–16)
MAGNESIUM SERPL-MCNC: 1.6 MG/DL (ref 1.6–2.4)
MCH RBC QN AUTO: 30.6 PG (ref 26–33)
MCHC RBC AUTO-ENTMCNC: 33.1 GM/DL (ref 32.2–35.5)
MCV RBC AUTO: 92.4 FL (ref 81–99)
PLATELET # BLD AUTO: 254 THOU/MM3 (ref 130–400)
PMV BLD AUTO: 9 FL (ref 9.4–12.4)
POTASSIUM SERPL-SCNC: 4.2 MEQ/L (ref 3.5–5.2)
RBC # BLD AUTO: 4.61 MILL/MM3 (ref 4.2–5.4)
SODIUM SERPL-SCNC: 136 MEQ/L (ref 135–145)
WBC # BLD AUTO: 7.5 THOU/MM3 (ref 4.8–10.8)

## 2024-07-22 PROCEDURE — 1180000000 HC REHAB R&B

## 2024-07-22 PROCEDURE — 6370000000 HC RX 637 (ALT 250 FOR IP): Performed by: STUDENT IN AN ORGANIZED HEALTH CARE EDUCATION/TRAINING PROGRAM

## 2024-07-22 PROCEDURE — 83735 ASSAY OF MAGNESIUM: CPT

## 2024-07-22 PROCEDURE — 6370000000 HC RX 637 (ALT 250 FOR IP): Performed by: NURSE PRACTITIONER

## 2024-07-22 PROCEDURE — 82948 REAGENT STRIP/BLOOD GLUCOSE: CPT

## 2024-07-22 PROCEDURE — 80048 BASIC METABOLIC PNL TOTAL CA: CPT

## 2024-07-22 PROCEDURE — 99232 SBSQ HOSP IP/OBS MODERATE 35: CPT | Performed by: NURSE PRACTITIONER

## 2024-07-22 PROCEDURE — 97116 GAIT TRAINING THERAPY: CPT

## 2024-07-22 PROCEDURE — 97530 THERAPEUTIC ACTIVITIES: CPT

## 2024-07-22 PROCEDURE — 97130 THER IVNTJ EA ADDL 15 MIN: CPT

## 2024-07-22 PROCEDURE — 97110 THERAPEUTIC EXERCISES: CPT

## 2024-07-22 PROCEDURE — 97535 SELF CARE MNGMENT TRAINING: CPT

## 2024-07-22 PROCEDURE — 6360000002 HC RX W HCPCS: Performed by: NURSE PRACTITIONER

## 2024-07-22 PROCEDURE — 85027 COMPLETE CBC AUTOMATED: CPT

## 2024-07-22 PROCEDURE — 6370000000 HC RX 637 (ALT 250 FOR IP): Performed by: FAMILY MEDICINE

## 2024-07-22 PROCEDURE — 36415 COLL VENOUS BLD VENIPUNCTURE: CPT

## 2024-07-22 PROCEDURE — 93005 ELECTROCARDIOGRAM TRACING: CPT | Performed by: NURSE PRACTITIONER

## 2024-07-22 PROCEDURE — 97129 THER IVNTJ 1ST 15 MIN: CPT

## 2024-07-22 RX ORDER — INSULIN GLARGINE 100 [IU]/ML
39 INJECTION, SOLUTION SUBCUTANEOUS NIGHTLY
Status: DISCONTINUED | OUTPATIENT
Start: 2024-07-22 | End: 2024-07-22

## 2024-07-22 RX ORDER — INSULIN GLARGINE 100 [IU]/ML
43 INJECTION, SOLUTION SUBCUTANEOUS NIGHTLY
Status: DISCONTINUED | OUTPATIENT
Start: 2024-07-22 | End: 2024-07-22

## 2024-07-22 RX ORDER — METOPROLOL SUCCINATE 25 MG/1
25 TABLET, EXTENDED RELEASE ORAL DAILY
Status: DISCONTINUED | OUTPATIENT
Start: 2024-07-23 | End: 2024-07-23

## 2024-07-22 RX ORDER — METOPROLOL SUCCINATE 25 MG/1
12.5 TABLET, EXTENDED RELEASE ORAL DAILY
Status: DISCONTINUED | OUTPATIENT
Start: 2024-07-22 | End: 2024-07-22

## 2024-07-22 RX ORDER — METOPROLOL SUCCINATE 25 MG/1
12.5 TABLET, EXTENDED RELEASE ORAL ONCE
Status: COMPLETED | OUTPATIENT
Start: 2024-07-22 | End: 2024-07-22

## 2024-07-22 RX ORDER — INSULIN LISPRO 100 [IU]/ML
0-4 INJECTION, SOLUTION INTRAVENOUS; SUBCUTANEOUS NIGHTLY
Status: DISCONTINUED | OUTPATIENT
Start: 2024-07-22 | End: 2024-07-23

## 2024-07-22 RX ORDER — INSULIN LISPRO 100 [IU]/ML
0-4 INJECTION, SOLUTION INTRAVENOUS; SUBCUTANEOUS
Status: DISCONTINUED | OUTPATIENT
Start: 2024-07-22 | End: 2024-07-23

## 2024-07-22 RX ORDER — INSULIN GLARGINE 100 [IU]/ML
39 INJECTION, SOLUTION SUBCUTANEOUS NIGHTLY
Status: DISCONTINUED | OUTPATIENT
Start: 2024-07-22 | End: 2024-07-26 | Stop reason: HOSPADM

## 2024-07-22 RX ADMIN — PANTOPRAZOLE SODIUM 40 MG: 40 TABLET, DELAYED RELEASE ORAL at 06:56

## 2024-07-22 RX ADMIN — CLOPIDOGREL BISULFATE 75 MG: 75 TABLET ORAL at 09:10

## 2024-07-22 RX ADMIN — CEPHALEXIN 500 MG: 500 CAPSULE ORAL at 09:09

## 2024-07-22 RX ADMIN — ATORVASTATIN CALCIUM 20 MG: 20 TABLET, FILM COATED ORAL at 21:56

## 2024-07-22 RX ADMIN — INSULIN LISPRO 1 UNITS: 100 INJECTION, SOLUTION INTRAVENOUS; SUBCUTANEOUS at 15:08

## 2024-07-22 RX ADMIN — METOPROLOL SUCCINATE 12.5 MG: 25 TABLET, FILM COATED, EXTENDED RELEASE ORAL at 15:03

## 2024-07-22 RX ADMIN — Medication 3 MG: at 21:56

## 2024-07-22 RX ADMIN — CEPHALEXIN 500 MG: 500 CAPSULE ORAL at 21:56

## 2024-07-22 RX ADMIN — HYDROCODONE BITARTRATE AND ACETAMINOPHEN 0.5 TABLET: 5; 325 TABLET ORAL at 07:56

## 2024-07-22 RX ADMIN — HYDROCODONE BITARTRATE AND ACETAMINOPHEN 0.5 TABLET: 5; 325 TABLET ORAL at 15:04

## 2024-07-22 RX ADMIN — METOPROLOL SUCCINATE 12.5 MG: 25 TABLET, FILM COATED, EXTENDED RELEASE ORAL at 09:08

## 2024-07-22 RX ADMIN — AMLODIPINE BESYLATE 5 MG: 5 TABLET ORAL at 09:08

## 2024-07-22 RX ADMIN — LISINOPRIL 40 MG: 40 TABLET ORAL at 09:09

## 2024-07-22 RX ADMIN — INSULIN GLARGINE 39 UNITS: 100 INJECTION, SOLUTION SUBCUTANEOUS at 21:55

## 2024-07-22 RX ADMIN — ENOXAPARIN SODIUM 40 MG: 100 INJECTION SUBCUTANEOUS at 09:10

## 2024-07-22 ASSESSMENT — PAIN DESCRIPTION - ORIENTATION
ORIENTATION: LEFT
ORIENTATION: LEFT

## 2024-07-22 ASSESSMENT — PAIN SCALES - GENERAL
PAINLEVEL_OUTOF10: 0
PAINLEVEL_OUTOF10: 0
PAINLEVEL_OUTOF10: 4
PAINLEVEL_OUTOF10: 5

## 2024-07-22 ASSESSMENT — PAIN DESCRIPTION - LOCATION
LOCATION: KNEE
LOCATION: KNEE

## 2024-07-22 ASSESSMENT — PAIN DESCRIPTION - DESCRIPTORS
DESCRIPTORS: SHARP
DESCRIPTORS: STABBING

## 2024-07-22 ASSESSMENT — PAIN - FUNCTIONAL ASSESSMENT
PAIN_FUNCTIONAL_ASSESSMENT: ACTIVITIES ARE NOT PREVENTED
PAIN_FUNCTIONAL_ASSESSMENT: ACTIVITIES ARE NOT PREVENTED

## 2024-07-22 NOTE — PROCEDURES
PROCEDURE NOTE  Date: 7/22/2024   Name: Celia Perkins  YOB: 1950    Procedures EKG completed, Alejandra RN notified

## 2024-07-22 NOTE — PROGRESS NOTES
1  Completed 1 minute of warm up then 5 reps of bending left knee as far as tolerated and hold for 15-20 seconds then another 2 minutes of continuous peddling followed by reps of bending left knee and holding L knee in flexion and then another round of continuous motion.  Completed to increase left knee flexion ROM and increase strength    Functional Outcome Measures:  Not completed  Modified [place] Scale:  Not Applicable    ASSESSMENT:  Assessment: Patient progressing toward established goals.  Activity Tolerance:  Patient tolerance of  treatment: good.   Equipment Recommendations:Equipment Needed: No  Discharge Recommendations: Continue to assess pending progress and Patient would benefit from continued PT at discharge  Plan: Current Treatment Recommendations: Strengthening, Balance training, Functional mobility training, Transfer training, Neuromuscular re-education, Stair training, Gait training, Endurance training, Safety education & training, Home exercise program, Pain management, Patient/Caregiver education & training, Equipment evaluation, education, & procurement, Therapeutic activities, Manual, ROM  General Plan:  (5x/wk 90min)    Education:  Learners: Patient  Patient Education: Gait, Car Transfers, Verbal Exercise Instruction,  - Patient Verbalized Understanding, - Patient Requires Continued Education    Goals:  Patient Goals : go home  Short Term Goals  Time Frame for Short Term Goals: 1 week  Short Term Goal 1: Patient will complete sit < > stand with SBA to stand to ambulate with decreased difficulty. GOAL MET, SEE LTG  Short Term Goal 2: Patient will complete supine < > sit with head of bed flat and no rails with modified independence to transfer in and out of bed safely. GOAL MET, SEE LTG  Short Term Goal 3: Patient will ambulate 75' with a RW with SBA to progress towards navigating home safely. GOAL MET, SEE LTG  Short Term Goal 4: Patient will improve tinetti to greater than or equal to 20/28 to  achieve MDC and reduce risk for falls. GOAL MET, SEE LTG  Short Term Goal 5: Patient will ascend/descend 4 steps with 2 hand rails with SBA to progress towards accessing/leaving home safely. GOAL MET, SEE LTG  Additional Goals?: Yes  Short Term Goal 6: Patient will improve left knee flexion to at least 95 degrees seated to decrease difficulty with functional transfers and improve gait.  Long Term Goals  Time Frame for Long Term Goals : 2 weeks  Long Term Goal 1: Patient will complete sit < > stand from various surfaces with modified independene to stand to ambulate safely.  Long Term Goal 2: Patient will ambulate 200' with a RW with modified independence, good left knee flexion during swing phase to navigate home and to/from appointments safely.  Long Term Goal 3: Patient will ascend/descend 5 steps with 2 hand rails with modified independence to access/leave home safely.  Long Term Goal 4: Patient will complete car transfer with modified independence to transfer in and out of vehicle for transfer to home.    Following session, patient left in safe position with all fall risk precautions in place.

## 2024-07-22 NOTE — PROGRESS NOTES
[facility]  Occupational Therapy  Daily Note  Time:   Time In: 0700  Time Out: 0730  Timed Code Treatment Minutes: 30 Minutes  Minutes: 30          Date: 2024  Patient Name: Celia Perkins,   Gender: female      Room: [id number]-A  MRN: [id number]  : 1950  (74 y.o.)  Referring Practitioner: Cathi Rocha APRN - CNP (ordering)  Dr. Byrnes (attending0  Diagnosis: Metabolic encephalopathy  Additional Pertinent Hx: 74 y.o. female with a history of aortic stenosis status post TAVR, stroke, insulin-dependent type 2 diabetes mellitus, who presented to [facility] with chief complaint of dizziness, shortness of breath, and confusion. The patient recently had a left total knee arthroplasty on 7/10/2024 and was d/c home. Per pt's , pt has been sleeping excessively with poor appeite. Pt was at therapy PTA when she began feeling light headed/dizzy. Her blood pressure was attempted to be taken, but they were unable to get a reading on the blood pressure cuff. Shortly thereafter, the patient was noted to be, agitated and as such, she was brought to the emergency room for further evaluation. In the emergency department, the patient was a stroke alert activation. Imaging was unremarkable and an MRI of the brain was negative for stroke.  Unknown true etiology of encephalopathy, however use of opioid pain medication and muscle relaxer was a likely cause.  Patient worked with therapy and was noted to require increased assistance in bed mobility transfers and functional mobility.  Patient also underwent a cognitive evaluation in which she scored 2 out of 30 on her MoCA, her last MoCA was completed in 2023 and was 19 out of 30 at that time, this was at the time of her CVA which was after her TAVR.  Pt presents for OT to improve ADL independence to return home with spouse.    Restrictions/Precautions:  Restrictions/Precautions: General Precautions, Fall Risk,

## 2024-07-22 NOTE — CARE COORDINATION
Patient had tachycardia. EKG obtained. Dr Colbert adjusted metoprolol. Norco given/ice applied to left knee pain. Patient dicussed home insulin sliding scale dose 12 units of humalog for chem 199. Dr notified of patient request. No new orders at this time.

## 2024-07-22 NOTE — PROGRESS NOTES
[facility]  Diagnosis List for Inpatient Rehab facility (IRF) - Patient Assessment Instrument (NIRMAL)    Patient Name: Celia Perkins        MRN: [id number]    : 1950  (74 y.o.)  Gender: female     Primary impairment requiring rehabilitation: 2.1 Non- traumatic brain dysfunction      Etiologic Diagnosis that led to the condition: Acute metabolic encephalopathy    Comorbid conditions affecting rehabilitation:  Acute metabolic encephalopathy  Emotional lability  Significant cognitive deficits  Gait disturbance  Osteoarthritis of the left knee  S/p left total knee replacement on 7/10/2024 with Dr. Lowry  Lactic acidosis  Hypertension  Hyperlipidemia  Insulin-dependent type 2 diabetes mellitus  Gastroesophageal reflux disease  History of stroke, left cerebellar 2023  History of aortic stenosis, TAVR 2023     Electronically signed by Vita Byrnes DO on 2024 at 10:06 AM

## 2024-07-22 NOTE — PLAN OF CARE
Problem: Discharge Planning  Goal: Discharge to home or other facility with appropriate resources  Outcome: Progressing  Flowsheets (Taken 7/22/2024 0828)  Discharge to home or other facility with appropriate resources:   Identify barriers to discharge with patient and caregiver   Arrange for needed discharge resources and transportation as appropriate   Identify discharge learning needs (meds, wound care, etc)     Problem: Safety - Adult  Goal: Free from fall injury  Outcome: Progressing  Flowsheets (Taken 7/22/2024 0828)  Free From Fall Injury:   Instruct family/caregiver on patient safety   Based on caregiver fall risk screen, instruct family/caregiver to ask for assistance with transferring infant if caregiver noted to have fall risk factors     Problem: Neurosensory - Adult  Goal: Achieves maximal functionality and self care  Outcome: Progressing  Flowsheets (Taken 7/22/2024 0828)  Achieves maximal functionality and self care:   Monitor swallowing and airway patency with patient fatigue and changes in neurological status   Encourage and assist patient to increase activity and self care with guidance from physical therapy/occupational therapy   Encourage visually impaired, hearing impaired and aphasic patients to use assistive/communication devices     Problem: Respiratory - Adult  Goal: Achieves optimal ventilation and oxygenation  Outcome: Progressing  Flowsheets (Taken 7/22/2024 0828)  Achieves optimal ventilation and oxygenation:   Assess for changes in respiratory status   Assess for changes in mentation and behavior   Position to facilitate oxygenation and minimize respiratory effort     Problem: Cardiovascular - Adult  Goal: Maintains optimal cardiac output and hemodynamic stability  Outcome: Progressing  Flowsheets (Taken 7/22/2024 0828)  Maintains optimal cardiac output and hemodynamic stability:   Monitor blood pressure and heart rate   Monitor urine output and notify Licensed Independent

## 2024-07-22 NOTE — PROGRESS NOTES
[facility]  INPATIENT REHABILITATION  TEAM CONFERENCE NOTE    Conference Date: 2024  Admit Date:  2024  2:52 PM  Patient Name: Celia Perkins    MRN: [id number]    : 1950  (74 y.o.)  Rehabilitation Admitting Diagnosis:  Metabolic encephalopathy [G93.41]  Referring Practitioner: Cathi Rocha APRN - CNP (referring), Vita Byrnes DO (attending)      CASE MANAGEMENT  Current issues/needs regarding patient and family discharge status: Prior to admission, patient was living with her , Raymon. Patient reported being independent at home. Patient was completing her ADLs, housekeeping, meal prep, errands, finances and driving. Patient did not need some physical assistance at home after her most recent surgery. Raymon was able to provide assistance. Patient is retired. Patient's main support is Raymon and their daughter, Moon, who lives locally. Patient has two sons who live in [place] and [place]. Patient feels well supported by family and friends. Patient's family doctor is Yash Nelson MD. Patient prefers [facility]. Patient reports having a walker, cane and shower chair at home. Patient is motivated to participate in therapy.     PHYSICAL THERAPY   Mrs Perkins met 5/6 STG's and 0/4 LTG's.  Patient is demonstrating excellent progress towards goals in PT despite short length of stay.  Pt has progressed sit < > stand transfer from CGA to supervision, gait from CGA to SBA with a RW and is ambulating increased distances and improved tinetti from 16 to 22/28 decreasing her fall risk from high to moderate.  Patient is demonstrating gradual left knee flexion ROM improvements.  Pat will benefit from continued skilled PT services to continue to improve her ability to complete functional mobility, reduce her risk for falls and allow patient to return to home safely.   Equipment Needed: No    SPEECH THERAPY  Patient achieved 2/4 STGs and 0/1 LTGs this therapy reporting period; suspect limited  progress given short LOS. Patient with demonstrated gains in recall with and without use of compensatory strategies, mental flexibility, problem solving, and basic money management tasks. Patient continues to present with deficits in complex executive functioning, visual/verbal reasoning, and complex attention. Patient expressive and receptive language WFL. Patient speech and voice WFL. Patient currently safely consuming regular diet with thin liquids; no dysphagia management services rendered. Concerns remaining for safety with ADL/IADL completion, with no barriers to making further progress across cognitive domains. Anticipate further progress with ongoing, intensive, skilled ST services via IPR setting. WithOUT ongoing, skilled ST services, suspect patient may demonstrate difficulty making successful return to PLOF. Patient would benefit from skilled ST services to address aforementioned skills with recommendations for OP ST vs. ST HEP at discharge, clearance from physician and/or driving evaluation prior to return to driving, and supervision with IADLs.    OCCUPATIONAL THERAPY  Patient, Celia, is making good steady progress towards therapy goals during short stay on inpatient rehab. Celia is able to complete bathing tasks with assist to wash B feet due to pain with knee flexion and SBA for remaining areas. She is able to complete UB dressing with SBA and LB dressing with CGA. Celia is able to complete high level IADL tasks with SBA tolerating task for >10 minutes requiring min vcs for sequencing and safety. She continues to demo decreased strength, balance, and activity tolerance. Celia also has been having increased heart rate fluctuating between 110-120s during therapy sessions. At times patient can be limited by decreased cognition including decreased recall, problem solving, and safety awareness. Patient would benefit from continued skilled OT services to increase strength, activity tolerance,

## 2024-07-22 NOTE — PROGRESS NOTES
[facility]  Inpatient Rehabilitation  Occupational Therapy  Progress Note  Time:  Time In: 1400  Time Out: 1500  Timed Code Treatment Minutes: 60 Minutes  Minutes: 60          Date: 2024  Patient Name: Celia Perkins,   Gender: female      Room: [facility]007-A  MRN: [id number]  : 1950  (74 y.o.)  Referring Practitioner: Cathi Rocha APRN - CNP (ordering)  Dr. Byrnes (attending0  Diagnosis: Metabolic encephalopathy  Additional Pertinent Hx: 74 y.o. female with a history of aortic stenosis status post TAVR, stroke, insulin-dependent type 2 diabetes mellitus, who presented to [facility] with chief complaint of dizziness, shortness of breath, and confusion. The patient recently had a left total knee arthroplasty on 7/10/2024 and was d/c home. Per pt's , pt has been sleeping excessively with poor appeite. Pt was at therapy PTA when she began feeling light headed/dizzy. Her blood pressure was attempted to be taken, but they were unable to get a reading on the blood pressure cuff. Shortly thereafter, the patient was noted to be, agitated and as such, she was brought to the emergency room for further evaluation. In the emergency department, the patient was a stroke alert activation. Imaging was unremarkable and an MRI of the brain was negative for stroke.  Unknown true etiology of encephalopathy, however use of opioid pain medication and muscle relaxer was a likely cause.  Patient worked with therapy and was noted to require increased assistance in bed mobility transfers and functional mobility.  Patient also underwent a cognitive evaluation in which she scored 2 out of 30 on her MoCA, her last MoCA was completed in 2023 and was 19 out of 30 at that time, this was at the time of her CVA which was after her TAVR.  Pt presents for OT to improve ADL independence to return home with spouse.    Restrictions/Precautions:  Restrictions/Precautions: General Precautions, Fall Risk, Weight  high level IADL tasks with SBA tolerating task for >10 minutes requiring min vcs for sequencing and safety. She continues to demo decreased strength, balance, and activity tolerance. Celia also has been having increased heart rate fluctuating between 110-120s during therapy sessions. At times patient can be limited by decreased cognition including decreased recall, problem solving, and safety awareness. Patient would benefit from continued skilled OT services to increase strength, activity tolerance, balance and improve ADL/IADL performance. Without skilled services patient is at risk of falls, decrease in function and increased dependency on others.     Discharge Recommendations: Home with Home health OT, Home with nursing aide  Equipment Recommendations: Other: Pt has a tub transfer bench for her tub/shower with grab bars. Pt has BSC set up over her toilet.  Pt has LHAE kit.  No further AE recommended.  Plan: Times Per Week: 5x/wk for 90 min  Current Treatment Recommendations: Strengthening, Balance training, Functional mobility training, Cognitive reorientation, Safety education & training, Patient/Caregiver education & training, Self-Care / ADL, Cognitive/Perceptual training, Equipment evaluation, education, & procurement, Home management training, Endurance training    Education:  Learners: Patient  Safety with transfers and mobility, IADL strategies, BUE exercises     Goals  Short Term Goals  Time Frame for Short Term Goals: 1 week  Short Term Goal 1: Pt will utilize tub transfer bench with SBA to access her tub/shower to improve ADL performance within her home set up.-goal not met, continue   Short Term Goal 2: Pt will navigate RW to retrieve 2 items with SBA to improve indep with gathering clothing from closet at home.-goal met, revise   Short Term Goal 3: Pt will tolerate 4 minutes dynamic standing task with SBA to improve indep with sinkside grooming and haircare routine.-goal met, revise  Short Term

## 2024-07-22 NOTE — PROGRESS NOTES
[facility]  INPATIENT PHYSICAL THERAPY  PROGRESS NOTE  [facility] - 8K-07/007-A    Time In: 0900  Time Out: 1000  Timed Code Treatment Minutes: 60 Minutes  Minutes: 60          Date: 2024  Patient Name: Celia Perkins,  Gender:  female        MRN: [id number]  : 1950  (74 y.o.)  Referral Date : 24  Referring Practitioner: Cathi oRcha APRN - CNP (referring), Vita Byrnes DO (attending)  Diagnosis: Metabolic encephalopathy  Additional Pertinent Hx: Per EMR: Celia Perkins  is a 74 y.o. female with PMH significant for asthma, breast cancer, CVA, hypertension, and type 2 diabetes, who is being admitted to the inpatient rehabilitation unit on 2024.Patient initially presented to [facility] ED on 2024.In the ED, patient with increased confusion and agitation.  A stroke alert was called.  CT head was negative for any acute intracranial abnormalities.  MRI of the brain was also reportedly negative for an acute CVA, however, did demonstrate an old infarct in the inferior left cerebellum.On admission, EEG was obtained and reportedly revealed disorganized and slow background suggesting a mild to moderate encephalopathy of nonspecific etiology.  Neurology evaluated and followed patient and felt that altered mental status was most likely due to toxic metabolic encephalopathy which was felt to be multifactorial in nature related to mild dehydration and pain medication/muscle relaxers.     Prior Level of Function:  Lives With: Spouse  Type of Home: House  Home Layout: One level, Laundry in basement  Home Access: Stairs to enter with rails  Entrance Stairs - Number of Steps: 5  Entrance Stairs - Rails: Both  Home Equipment: Walker - Standard, Sock aid, Reacher, Grab bars (has wheels for walker, has transport chair that she is borrowing)   Bathroom Shower/Tub: Tub/Shower unit  Bathroom Toilet: Standard (bedside commode over toilet)  Bathroom Equipment: Grab  with 2 hand rails with SBA to progress towards accessing/leaving home safely. GOAL MET, SEE LTG  Additional Goals?: Yes  Short Term Goal 6: Patient will improve left knee flexion to at least 95 degrees seated to decrease difficulty with functional transfers and improve gait.     Revised Long-Term Goals  Long Term Goals  Time Frame for Long Term Goals : 2 weeks  Long Term Goal 1: Patient will complete sit < > stand from various surfaces with modified independene to stand to ambulate safely.  Long Term Goal 2: Patient will ambulate 200' with a RW with modified independence, good left knee flexion during swing phase to navigate home and to/from appointments safely.  Long Term Goal 3: Patient will ascend/descend 5 steps with 2 hand rails with modified independence to access/leave home safely.  Long Term Goal 4: Patient will complete car transfer with modified independence to transfer in and out of vehicle for transfer to home.                           Following session, patient left in safe position with all fall risk precautions in place.

## 2024-07-22 NOTE — PROGRESS NOTES
distress, appearing stated age     Memory: WNL, much improved  Attention/Concentration: attentive and engaging in conversation  Language:  normal     Cranial Nerves:  CN II-XII grossly intact  ROM:  abnormal - limited left knee  Motor Exam:  5/5 throughout BUE and RLE. LLE: at least antigravity HF, 5/5 ankle DF (knee not tested)  Tone:  normal  Muscle bulk: within normal limits  Sensory:  Sensory intact to light touch     Heart: tachycardia with regular rhythm, no m/r/g noted  Lungs: CTAB, Breathing comfortably on room air without any increased WOB  Abdomen: soft, non-tender, non-distended  Skin: warm and dry, no rash or erythema and left knee with dry dressing in place      Diagnostics:  Recent Results (from the past 24 hour(s))   POCT Glucose    Collection Time: 07/22/24  8:04 AM   Result Value Ref Range    POC Glucose 243 (H) 70 - 108 mg/dl       Impression:  Acute metabolic encephalopathy  Emotional lability  Significant cognitive deficits  Gait disturbance  Osteoarthritis of the left knee  S/p left total knee replacement on 7/10/2024 with Dr. Lwory  Lactic acidosis  Hypertension  Tachycardia   Hyperlipidemia  Insulin-dependent type 2 diabetes mellitus, with hyperglycemia   Gastroesophageal reflux disease  History of stroke, left cerebellar 2/2023  History of aortic stenosis, TAVR 2/2023      Plan:   Continue to participate in an inpatient rehabilitation program involving at least 3 hours per day, 5 days per week of intensive rehabilitation.  Rehabilitation services will include PT, OT, and SLP/RT in order to improve functional status prior to discharge.  Family education and training will be completed.  Equipment evaluations and recommendations will be completed as appropriate.       Rehabilitation nursing will be involved for bowel, bladder, skin, and pain management.  Nursing will also provide education and training to patient and family.    Dr. Colbert consulted for medical management  Acute  encephalopathy: Thought to be multifactorial related to pain medication/muscle relaxers and dehydration  Hypertension: Continue amlodipine 5 mg daily, lisinopril 40 mg daily, and metoprolol XL 12.5 mg daily  Tachycardia: EKG today. History of tachycardia, was started on toprol. Rhythm regular, await EKG.   History of CVA: Continue Plavix 75 mg daily and atorvastatin 20 mg nightly for secondary stroke prophylaxis  Hyperlipidemia: Continue atorvastatin 20 mg nightly  Type 2 diabetes: Continue sliding scale insulin.  Per home med list supplied by , patient was on Jardiance 25 mg daily, Lantus 39 units at bedtime, and Humalog sliding scale.  Recent Left TKA: 7/10/2024- it appears that she was prescribed 1 week abx post op surgical prophylaxis. Will restart to complete course.   History of migraines:  to bring in home medications   Prophylaxis:  DVT: Lovenox.  GI: Pantoprazole.  Pain: Continue as needed Tylenol.  Patient was also on IV Toradol as needed in acute care. Patient with a lot of pain with initiation of therapies. Will start low dose Norco 2.5mg/ 5mg and monitor closely. Caution with use of pain medications given concerns of contribution to her encephalopathy.  Sleep: Melatonin 3 mg QHS  Mood: Psychology consulted   Nutrition:  Consultation to dietician for nutritional counseling and recommendations.  Prealbumin 12.6 on admission.    Bladder: Will monitor for signs and symptoms of infection/retention  Bowel: As needed MiraLAX   and case management consultations for coordination of care and discharge planning. Team conference Tuesday.       Cathi Rocha, APRN - CNP

## 2024-07-22 NOTE — PROGRESS NOTES
[facility]  INPATIENT SPEECH THERAPY  [facility]  PROGRESS NOTE    TIME   SLP Individual Minutes  Time In: 0830  Time Out: 0900  Minutes: 30  Timed Code Treatment Minutes: 30 Minutes      Date: 2024  Patient Name: Celia Perkins      CSN: [id number]   : 1950  (74 y.o.)  Gender: female   Referring Physician:  Vita Byrnes DO  Diagnosis: Metabolic encephalopathy  Precautions: Fall precautions  Current Diet: Regular Diet and Thin Liquids  Respiratory Status: Room Air  Swallowing Strategies: Standard Universal Swallow Precautions  Date of Last MBS/FEES:  2021    Pain:  Patient reported knee pain but did not rate; RN aware with provision of pain medication prior to ST session per patient report    Subjective:  Patient seen sitting upright in recliner upon ST arrival; alert and agreeable to ST interventions with consumption of breakfast meal. Patient pleasant and cooperative throughout. No family present.    Short-Term Goals:  SHORT TERM GOAL #1:  Goal 1: Patient will complete functional memory tasks (delayed, prospective, working) with 70% accuracy given mod cues to improve retention of novel information. GOAL MET  REVISED GOAL 1: Patient will complete functional memory tasks (delayed, prospective, working) with 80% accuracy given mod cues to improve retention of novel information.   INTERVENTIONS:   48 Hour Delayed Recall WRAP: 4/4 independent  *Patient with immediate provision of independently written visual aid with information; not written visual aid created with ST in previous session    48 Hour Delayed Recall of ST Information (Sue, ST, Lindsay, 5 sisters, plays piano): 1/5 independent, 2/5 with logical cuing, 2/5 with multiple choice cuing  *NO recall or utilization of written visual aid in patient room    Generation and Recall of Items Needed for Painting Kitchen (Primer, Paint, Ceiling Paint, Pan, Ladder, Roller, Roller Extension, Roller Brush, Drop

## 2024-07-23 LAB
EKG ATRIAL RATE: 112 BPM
EKG P AXIS: 58 DEGREES
EKG P-R INTERVAL: 194 MS
EKG Q-T INTERVAL: 322 MS
EKG QRS DURATION: 90 MS
EKG QTC CALCULATION (BAZETT): 439 MS
EKG R AXIS: 43 DEGREES
EKG T AXIS: 45 DEGREES
EKG VENTRICULAR RATE: 112 BPM
GLUCOSE BLD STRIP.AUTO-MCNC: 111 MG/DL (ref 70–108)
GLUCOSE BLD STRIP.AUTO-MCNC: 184 MG/DL (ref 70–108)
GLUCOSE BLD STRIP.AUTO-MCNC: 210 MG/DL (ref 70–108)
GLUCOSE BLD STRIP.AUTO-MCNC: 214 MG/DL (ref 70–108)

## 2024-07-23 PROCEDURE — 6370000000 HC RX 637 (ALT 250 FOR IP): Performed by: NURSE PRACTITIONER

## 2024-07-23 PROCEDURE — 99232 SBSQ HOSP IP/OBS MODERATE 35: CPT | Performed by: NURSE PRACTITIONER

## 2024-07-23 PROCEDURE — 97129 THER IVNTJ 1ST 15 MIN: CPT

## 2024-07-23 PROCEDURE — 6370000000 HC RX 637 (ALT 250 FOR IP): Performed by: FAMILY MEDICINE

## 2024-07-23 PROCEDURE — 97116 GAIT TRAINING THERAPY: CPT

## 2024-07-23 PROCEDURE — 93010 ELECTROCARDIOGRAM REPORT: CPT | Performed by: INTERNAL MEDICINE

## 2024-07-23 PROCEDURE — 97130 THER IVNTJ EA ADDL 15 MIN: CPT

## 2024-07-23 PROCEDURE — 82948 REAGENT STRIP/BLOOD GLUCOSE: CPT

## 2024-07-23 PROCEDURE — 97110 THERAPEUTIC EXERCISES: CPT

## 2024-07-23 PROCEDURE — 97530 THERAPEUTIC ACTIVITIES: CPT

## 2024-07-23 PROCEDURE — 97535 SELF CARE MNGMENT TRAINING: CPT

## 2024-07-23 PROCEDURE — 6360000002 HC RX W HCPCS: Performed by: NURSE PRACTITIONER

## 2024-07-23 PROCEDURE — 6370000000 HC RX 637 (ALT 250 FOR IP): Performed by: STUDENT IN AN ORGANIZED HEALTH CARE EDUCATION/TRAINING PROGRAM

## 2024-07-23 PROCEDURE — 1180000000 HC REHAB R&B

## 2024-07-23 RX ORDER — METOPROLOL SUCCINATE 50 MG/1
50 TABLET, EXTENDED RELEASE ORAL DAILY
Status: DISCONTINUED | OUTPATIENT
Start: 2024-07-24 | End: 2024-07-26 | Stop reason: HOSPADM

## 2024-07-23 RX ORDER — INSULIN LISPRO 100 [IU]/ML
0-4 INJECTION, SOLUTION INTRAVENOUS; SUBCUTANEOUS NIGHTLY
Status: DISCONTINUED | OUTPATIENT
Start: 2024-07-23 | End: 2024-07-26 | Stop reason: HOSPADM

## 2024-07-23 RX ORDER — METOPROLOL SUCCINATE 25 MG/1
25 TABLET, EXTENDED RELEASE ORAL ONCE
Status: COMPLETED | OUTPATIENT
Start: 2024-07-23 | End: 2024-07-23

## 2024-07-23 RX ORDER — INSULIN LISPRO 100 [IU]/ML
0-16 INJECTION, SOLUTION INTRAVENOUS; SUBCUTANEOUS
Status: DISCONTINUED | OUTPATIENT
Start: 2024-07-23 | End: 2024-07-26 | Stop reason: HOSPADM

## 2024-07-23 RX ADMIN — PANTOPRAZOLE SODIUM 40 MG: 40 TABLET, DELAYED RELEASE ORAL at 05:41

## 2024-07-23 RX ADMIN — Medication 3 MG: at 21:37

## 2024-07-23 RX ADMIN — INSULIN LISPRO 1 UNITS: 100 INJECTION, SOLUTION INTRAVENOUS; SUBCUTANEOUS at 09:10

## 2024-07-23 RX ADMIN — CLOPIDOGREL BISULFATE 75 MG: 75 TABLET ORAL at 09:12

## 2024-07-23 RX ADMIN — HYDROCODONE BITARTRATE AND ACETAMINOPHEN 0.5 TABLET: 5; 325 TABLET ORAL at 12:38

## 2024-07-23 RX ADMIN — ENOXAPARIN SODIUM 40 MG: 100 INJECTION SUBCUTANEOUS at 09:12

## 2024-07-23 RX ADMIN — AMLODIPINE BESYLATE 5 MG: 5 TABLET ORAL at 09:04

## 2024-07-23 RX ADMIN — INSULIN LISPRO 1 UNITS: 100 INJECTION, SOLUTION INTRAVENOUS; SUBCUTANEOUS at 12:35

## 2024-07-23 RX ADMIN — ATORVASTATIN CALCIUM 20 MG: 20 TABLET, FILM COATED ORAL at 21:37

## 2024-07-23 RX ADMIN — INSULIN GLARGINE 39 UNITS: 100 INJECTION, SOLUTION SUBCUTANEOUS at 21:37

## 2024-07-23 RX ADMIN — METOPROLOL SUCCINATE 25 MG: 25 TABLET, EXTENDED RELEASE ORAL at 18:20

## 2024-07-23 RX ADMIN — INSULIN LISPRO 4 UNITS: 100 INJECTION, SOLUTION INTRAVENOUS; SUBCUTANEOUS at 13:16

## 2024-07-23 RX ADMIN — HYDROCODONE BITARTRATE AND ACETAMINOPHEN 1 TABLET: 5; 325 TABLET ORAL at 23:20

## 2024-07-23 RX ADMIN — LISINOPRIL 40 MG: 40 TABLET ORAL at 09:04

## 2024-07-23 RX ADMIN — METOPROLOL SUCCINATE 25 MG: 25 TABLET, FILM COATED, EXTENDED RELEASE ORAL at 09:03

## 2024-07-23 ASSESSMENT — PAIN DESCRIPTION - DESCRIPTORS
DESCRIPTORS: OTHER (COMMENT)
DESCRIPTORS: SHARP
DESCRIPTORS: SHARP

## 2024-07-23 ASSESSMENT — PAIN - FUNCTIONAL ASSESSMENT
PAIN_FUNCTIONAL_ASSESSMENT: ACTIVITIES ARE NOT PREVENTED

## 2024-07-23 ASSESSMENT — PAIN SCALES - GENERAL
PAINLEVEL_OUTOF10: 5
PAINLEVEL_OUTOF10: 1
PAINLEVEL_OUTOF10: 7

## 2024-07-23 ASSESSMENT — PAIN DESCRIPTION - LOCATION
LOCATION: KNEE

## 2024-07-23 ASSESSMENT — PAIN DESCRIPTION - ORIENTATION
ORIENTATION: LEFT

## 2024-07-23 NOTE — PLAN OF CARE
Problem: Discharge Planning  Goal: Discharge to home or other facility with appropriate resources  Outcome: Progressing  Flowsheets (Taken 7/23/2024 0038)  Discharge to home or other facility with appropriate resources: Identify barriers to discharge with patient and caregiver     Problem: Safety - Adult  Goal: Free from fall injury  Outcome: Progressing  Flowsheets (Taken 7/23/2024 0038)  Free From Fall Injury: Instruct family/caregiver on patient safety  Note: Patient has remained free of physical injury during this shift. Safe environment provided, call light within reach, and hourly rounding completed.      Problem: ABCDS Injury Assessment  Goal: Absence of physical injury  Outcome: Progressing  Flowsheets (Taken 7/23/2024 0038)  Absence of Physical Injury: Implement safety measures based on patient assessment  Note: Patient has remained free of physical injury during this shift. Safe environment provided, call light within reach, and hourly rounding completed.      Problem: Chronic Conditions and Co-morbidities  Goal: Patient's chronic conditions and co-morbidity symptoms are monitored and maintained or improved  Outcome: Progressing     Problem: Pain  Goal: Verbalizes/displays adequate comfort level or baseline comfort level  Outcome: Progressing  Flowsheets (Taken 7/23/2024 0038)  Verbalizes/displays adequate comfort level or baseline comfort level:   Encourage patient to monitor pain and request assistance   Administer analgesics based on type and severity of pain and evaluate response   Assess pain using appropriate pain scale   Implement non-pharmacological measures as appropriate and evaluate response   Care plan reviewed with patient.  Patient  verbalize understanding of the plan of care and contribute to goal setting.

## 2024-07-23 NOTE — PROGRESS NOTES
[facility]  Recreational Therapy  Daily Note  [facility]    Time Spent with Patient: 0 minutes    Date:  7/23/2024       Patient Name: Celia Perkins      MRN: [id number]      YOB: 1950 (74 y.o.)       Gender: female  Diagnosis: Metabolic encephalopathy  Referring Practitioner: Cathi Rocha APRN - CNP (ordering)  Dr. Byrnes (attending0    RESTRICTIONS/PRECAUTIONS:  Restrictions/Precautions: General Precautions, Fall Risk, Weight Bearing     Hearing: Within functional limits    PAIN: 0    SUBJECTIVE:  I would like that     OBJECTIVE:  Pt would like to get her hair washed and styled by our beautician tomorrow -affect bright and appreciative          Patient Education  New Education Provided: Importance of Leisure,     Electronically signed by: Lila Mixon, CTRS  Date: 7/23/2024

## 2024-07-23 NOTE — PROGRESS NOTES
Patient: Celia JARAMILLO Long  Unit/Bed: 8K-07/007-A  YOB: 1950  MRN: [id number] Acct: [id number]   Admitting Diagnosis: Metabolic encephalopathy [G93.41]  Admit Date:  7/18/2024  Hospital Day: 4    Assessment:     Principal Problem:    Metabolic encephalopathy  Active Problems:    Hyperlipidemia    GERD (gastroesophageal reflux disease)    Hypothyroid    Type 2 diabetes mellitus without complication, without long-term current use of insulin (HCC)    Mild malnutrition (HCC)  Resolved Problems:    * No resolved hospital problems. *      Plan:     Resume the lantus as at home as the CS have now began to elevate.  The pulse has been elevated. Increase the metoprolol        Subjective:     Patient has no complaint of CP or SOB  Medication side effects: none    Scheduled Meds:   insulin lispro  0-4 Units SubCUTAneous TID WC    insulin lispro  0-4 Units SubCUTAneous Nightly    [START ON 7/23/2024] metoprolol succinate  25 mg Oral Daily    insulin glargine  39 Units SubCUTAneous Nightly    NONFORMULARY 60 mg atogepant (Qulipta) (Patient Supplied)  60 mg Oral Daily    melatonin  3 mg Oral Nightly    pantoprazole  40 mg Oral QAM AC    lisinopril  40 mg Oral Daily    enoxaparin  40 mg SubCUTAneous Daily    clopidogrel  75 mg Oral Daily    atorvastatin  20 mg Oral Nightly    amLODIPine  5 mg Oral Daily    cephALEXin  500 mg Oral 2 times per day     Continuous Infusions:   dextrose      sodium chloride       PRN Meds:HYDROcodone 5 mg - acetaminophen **OR** HYDROcodone 5 mg - acetaminophen, sodium chloride flush, potassium chloride **OR** potassium alternative oral replacement **OR** potassium chloride, polyethylene glycol, ondansetron, magnesium sulfate, glucose, glucagon (rDNA), dextrose bolus **OR** dextrose bolus, dextrose, sodium chloride, acetaminophen    Review of Systems  Pertinent items are noted in HPI.    Objective:     Patient Vitals for the past 8 hrs:   BP Pulse   07/22/24 1503 112/66 97     I/O last 3

## 2024-07-23 NOTE — PROGRESS NOTES
[facility]  INPATIENT SPEECH THERAPY  [facility]  DAILY NOTE    TIME   SLP Individual Minutes  Time In: 0930  Time Out: 1000  Minutes: 30  Timed Code Treatment Minutes: 30 Minutes      Date: 2024  Patient Name: Celia Perkins      CSN: [id number]   : 1950  (74 y.o.)  Gender: female   Referring Physician:  Vita Byrnes DO  Diagnosis: Metabolic encephalopathy  Precautions: Fall precautions  Current Diet: Regular Diet and Thin Liquids  Respiratory Status: Room Air  Swallowing Strategies: Standard Universal Swallow Precautions  Date of Last MBS/FEES:  2021    Pain:  No pain reported.    Subjective:  Patient seen sitting upright in recliner upon ST arrival; alert and agreeable to ST interventions. Patient pleasant and cooperative throughout. No family present.    Short-Term Goals:  SHORT TERM GOAL #1:  Goal 1: Patient will complete functional memory tasks (delayed, prospective, working) with 80% accuracy given mod cues to improve retention of novel information.   INTERVENTIONS:   24 Hour Delayed Recall of Items Needed for Painting Kitchen (Primer, Paint, Ceiling Paint, Pan, Ladder, Roller, Roller Extension, Roller Brush, Drop Cloths, Tape/Edger): 10/10 independent  *Patient reviewing written visual aid upon ST arrival with NO utilization during recall task; excellent success    SHORT TERM GOAL #2:  Goal 2: Patient will complete problem solving, verbal/visual reasoning, and executive functioning tasks (household obligations) with 80% accuracy given mod cues to enhance success within ADLs/IADLs.   INTERVENTIONS:  DNT due to focus on additional STGs.    SHORT TERM GOAL #3:  Goal 3: Patient will complete sequencing and thought organization tasks with 70% accuracy given mod cues to improve mental flexibility for home scenarios.   INTERVENTIONS:   Writing Daily Schedule: independent for generation, maximum cuing for generation of times to improve

## 2024-07-23 NOTE — PROGRESS NOTES
[facility]  Occupational Therapy  Daily Note  Time:    Time In: 0800  Time Out: 0830  Timed Code Treatment Minutes: 30 Minutes  Minutes: 30          Date: 2024  Patient Name: Celia Perkins,   Gender: female      Room: [facility]07007-A  MRN: [id number]  : 1950  (74 y.o.)  Referring Practitioner: Cathi Rocha APRN - CNP (ordering)  Dr. Byrnes (attending0  Diagnosis: Metabolic encephalopathy  Additional Pertinent Hx: 74 y.o. female with a history of aortic stenosis status post TAVR, stroke, insulin-dependent type 2 diabetes mellitus, who presented to [facility] with chief complaint of dizziness, shortness of breath, and confusion. The patient recently had a left total knee arthroplasty on 7/10/2024 and was d/c home. Per pt's , pt has been sleeping excessively with poor appeite. Pt was at therapy PTA when she began feeling light headed/dizzy. Her blood pressure was attempted to be taken, but they were unable to get a reading on the blood pressure cuff. Shortly thereafter, the patient was noted to be, agitated and as such, she was brought to the emergency room for further evaluation. In the emergency department, the patient was a stroke alert activation. Imaging was unremarkable and an MRI of the brain was negative for stroke.  Unknown true etiology of encephalopathy, however use of opioid pain medication and muscle relaxer was a likely cause.  Patient worked with therapy and was noted to require increased assistance in bed mobility transfers and functional mobility.  Patient also underwent a cognitive evaluation in which she scored 2 out of 30 on her MoCA, her last MoCA was completed in 2023 and was 19 out of 30 at that time, this was at the time of her CVA which was after her TAVR.  Pt presents for OT to improve ADL independence to return home with spouse.    Restrictions/Precautions:  Restrictions/Precautions: General Precautions, Fall  Per Week: 5x/wk for 90 min  Current Treatment Recommendations: Strengthening, Balance training, Functional mobility training, Cognitive reorientation, Safety education & training, Patient/Caregiver education & training, Self-Care / ADL, Cognitive/Perceptual training, Equipment evaluation, education, & procurement, Home management training, Endurance training    Education:  Learners: Patient d/c recommendations, IADLS    Goals  Short Term Goals  Time Frame for Short Term Goals: 1 week  Short Term Goal 1: Pt will utilize tub transfer bench with SBA to access her tub/shower to improve ADL performance within her home set up.  Short Term Goal 2: Pt will navigate RW to retrieve 2 items with S to improve indep with gathering clothing from closet at home.  Short Term Goal 3: Pt will tolerate 4 minutes dynamic standing task with S to improve indep with sinkside grooming and haircare routine.  Short Term Goal 4: Pt will complete LB dressing tasks with SBA to improve indep with daily dressing tasks.  Short Term Goal 5: Pt will use RW to complete simple meal prep tasks with S to improve indep with preparing meals at home.  Long Term Goals  Time Frame for Long Term Goals : 2 weeks from OT evaluation  Long Term Goal 1: Pt will perform BADL routine with modified independence using LHAE prn to improve indep with daily self care routine at home.  Long Term Goal 2: Pt will utilize RW to perform light IADL tasks with modified independence and 0 vcs for TKR precautions or walker safety to improve indep with preparing dinner at home.  Long Term Goal 3: Pt will safely use RW to complete community re-integration tasks with modified independence to improve indep with shopping and going to appts.    Following session, patient left in safe position with all fall risk precautions in place.

## 2024-07-23 NOTE — PROGRESS NOTES
Patient slept well this shift no complaints of pain or discomfort. Mentioned she did not want tape on her dressing just wants cheri hose to hold dressing in place. Passed along to oncoming nurse.

## 2024-07-23 NOTE — PROGRESS NOTES
Risk, Weight Bearing  Left Lower Extremity Weight Bearing: Weight Bearing As Tolerated  Position Activity Restriction  Other position/activity restrictions: L TKA 7/10/24- per conversation with Dr. Lowry on 7/19- keep knee dry with aquaguards during showers until seen in office for zipline removal.      Social/Functional History:  Lives With: Spouse  Type of Home: House  Home Layout: One level, Laundry in basement  Home Access: Stairs to enter with rails  Entrance Stairs - Number of Steps: 5  Entrance Stairs - Rails: Both  Home Equipment: Walker - Standard, Sock aid, Reacher, Grab bars (has wheels for walker, has transport chair that she is borrowing)   Bathroom Shower/Tub: Tub/Shower unit  Bathroom Toilet: Standard (bedside commode over toilet)  Bathroom Equipment: Grab bars in shower, Commode, Tub transfer bench  Bathroom Accessibility: Walker accessible    Receives Help From: Family  ADL Assistance: Independent  Homemaking Assistance: Independent  Homemaking Responsibilities: Yes  Ambulation Assistance: Independent  Transfer Assistance: Independent    Active : Yes  Patient's  Info: Pt states yes to driving, but unable to recall last time she drove. Pt reports driving within the past year.  Occupation: Retired  Type of Occupation: 4E ; [employer], [employer],  Additional Comments: Pt states she was Mod I for use of cane prior to sx. Since sx, pt has been using a standard walker. Pt's  has been assisting with ADLs post op but pt is able to walk to bathroom and complete toileting with Mod I.    SUBJECTIVE: Pt. Resting in bedside chair and agreeable to OT session.  Pt. Pleasant throughout and engaged in all tasks to best of ability, Pt. Eager to return home this week.     PAIN: 1/10 L knee    Vitals: Vitals not assessed per clinical judgement, see nursing flowsheet    COGNITION: WFL    ADL:   Feeding: Modified Independent.  For beverage management throughout session.   Grooming: Modified  to perform light IADL tasks with modified independence and 0 vcs for TKR precautions or walker safety to improve indep with preparing dinner at home.  Long Term Goal 3: Pt will safely use RW to complete community re-integration tasks with modified independence to improve indep with shopping and going to appts.    Following session, patient left in safe position with all fall risk precautions in place.

## 2024-07-23 NOTE — PLAN OF CARE
Problem: Discharge Planning  Goal: Discharge to home or other facility with appropriate resources    Team conference held Tuesday, 07/23/2024. Recommendations of the team were explained to the patient by JAJA Juarez, MSW, and Cathi Rocha CNP. Team is recommending that patient continue on acute inpatient rehab for three more days, with expected discharge date of Friday, 07/26/2024. Following discharge, team is recommending continued therapy with outpatient PT/ST. Patient verbalizes preference to continue outpatient therapy through [facility] building with Ellen Bland (PT) and ST services. Care plan reviewed with patient.  Patient verbalized understanding of the plan of care and contributed to goal setting. SW to follow and maintain involvement in discharge planning.

## 2024-07-23 NOTE — PROGRESS NOTES
[facility]  INPATIENT PHYSICAL THERAPY  DAILY NOTE  [facility] - 8K-07/007-A    Time In: 1200  Time Out: 1230  Timed Code Treatment Minutes: 30 Minutes  Minutes: 30          Date: 2024  Patient Name: Celia Perkins,  Gender:  female        MRN: [id number]  : 1950  (74 y.o.)  Referral Date : 24  Referring Practitioner: Cathi Rocha APRN - CNP (referring), Vita Byrnes DO (attending)  Diagnosis: Metabolic encephalopathy  Additional Pertinent Hx: Per EMR: Celia Perkins  is a 74 y.o. female with PMH significant for asthma, breast cancer, CVA, hypertension, and type 2 diabetes, who is being admitted to the inpatient rehabilitation unit on 2024.Patient initially presented to [facility] ED on 2024.In the ED, patient with increased confusion and agitation.  A stroke alert was called.  CT head was negative for any acute intracranial abnormalities.  MRI of the brain was also reportedly negative for an acute CVA, however, did demonstrate an old infarct in the inferior left cerebellum.On admission, EEG was obtained and reportedly revealed disorganized and slow background suggesting a mild to moderate encephalopathy of nonspecific etiology.  Neurology evaluated and followed patient and felt that altered mental status was most likely due to toxic metabolic encephalopathy which was felt to be multifactorial in nature related to mild dehydration and pain medication/muscle relaxers.     Prior Level of Function:  Lives With: Spouse  Type of Home: House  Home Layout: One level, Laundry in basement  Home Access: Stairs to enter with rails  Entrance Stairs - Number of Steps: 5  Entrance Stairs - Rails: Both  Home Equipment: Walker - Standard, Sock aid, Reacher, Grab bars (has wheels for walker, has transport chair that she is borrowing)   Bathroom Shower/Tub: Tub/Shower unit  Bathroom Toilet: Standard (bedside commode over toilet)  Bathroom Equipment: Grab bars  Patient will improve left knee flexion to at least 95 degrees seated to decrease difficulty with functional transfers and improve gait.  Long Term Goals  Time Frame for Long Term Goals : 2 weeks  Long Term Goal 1: Patient will complete sit < > stand from various surfaces with modified independene to stand to ambulate safely.  Long Term Goal 2: Patient will ambulate 200' with a RW with modified independence, good left knee flexion during swing phase to navigate home and to/from appointments safely.  Long Term Goal 3: Patient will ascend/descend 5 steps with 2 hand rails with modified independence to access/leave home safely.  Long Term Goal 4: Patient will complete car transfer with modified independence to transfer in and out of vehicle for transfer to home.    Following session, patient left in safe position with all fall risk precautions in place.

## 2024-07-23 NOTE — PROGRESS NOTES
Patient: Celia JARAMILLO Long  Unit/Bed: 8K-07/007-A  YOB: 1950  MRN: [id number] Acct: [id number]   Admitting Diagnosis: Metabolic encephalopathy [G93.41]  Admit Date:  7/18/2024  Hospital Day: 5    Assessment:     Principal Problem:    Metabolic encephalopathy  Active Problems:    Hyperlipidemia    GERD (gastroesophageal reflux disease)    Hypothyroid    Type 2 diabetes mellitus without complication, without long-term current use of insulin (HCC)    Mild malnutrition (HCC)  Resolved Problems:    * No resolved hospital problems. *      Plan:     Increase the metoprolol to 50 mg daily  She states that previously before the knee surgery it was high  The correction scale was changed to the high dose earlier today        Subjective:     Patient has no complaint of CP, or SOB..   Medication side effects: none    Scheduled Meds:   insulin lispro  0-16 Units SubCUTAneous TID WC    insulin lispro  0-4 Units SubCUTAneous Nightly    [START ON 7/24/2024] metoprolol succinate  50 mg Oral Daily    insulin glargine  39 Units SubCUTAneous Nightly    NONFORMULARY 60 mg atogepant (Qulipta) (Patient Supplied)  60 mg Oral Daily    melatonin  3 mg Oral Nightly    pantoprazole  40 mg Oral QAM AC    lisinopril  40 mg Oral Daily    enoxaparin  40 mg SubCUTAneous Daily    clopidogrel  75 mg Oral Daily    atorvastatin  20 mg Oral Nightly    amLODIPine  5 mg Oral Daily     Continuous Infusions:   dextrose      sodium chloride       PRN Meds:HYDROcodone 5 mg - acetaminophen **OR** HYDROcodone 5 mg - acetaminophen, sodium chloride flush, potassium chloride **OR** potassium alternative oral replacement **OR** potassium chloride, polyethylene glycol, ondansetron, magnesium sulfate, glucose, glucagon (rDNA), dextrose bolus **OR** dextrose bolus, dextrose, sodium chloride, acetaminophen    Review of Systems  Pertinent items are noted in HPI.    Objective:     Patient Vitals for the past 8 hrs:   BP Pulse SpO2   07/23/24 1820 112/60 92

## 2024-07-23 NOTE — PROGRESS NOTES
Physical Medicine & Rehabilitation   Progress Note    Chief Complaint: Acute metabolic encephalopathy    History of Present Illness:  Celia Perkins  is a 74 y.o. female with PMH significant for asthma, breast cancer, CVA, hypertension, and type 2 diabetes, who is being admitted to the inpatient rehabilitation unit on 7/18/2024.  History obtained via: ED documentation, acute care documentation, patient    Patient initially presented to [facility] ED on 7/16/2024. Patient reportedly recently underwent a left TKA on 7/10/2024.  She reportedly had a relatively uncomplicated postoperative course was discharged home from the hospital couple days later.  She was reportedly discharged home with prescriptions for opioid pain medications and a muscle relaxer.  Patient's family reported sleeping excessively since discharge home.  In the days leading up to presentation, patient spouse reported some increased reports of lightheadedness/dizziness that occurred primarily with position changes.  No recent falls.  On the morning of presentation, patient went to therapy and was feeling lightheaded/dizzy.  Per report, they were unable to get a reading on her blood pressure.  Shortly after, patient became agitated and was brought to the ED for further evaluation.    In the ED, patient with increased confusion and agitation.  A stroke alert was called.  CT head was negative for any acute intracranial abnormalities.  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  Patient did receive Ativan in ED due to agitation.  She was admitted for further evaluation management.    On admission, EEG was obtained and reportedly revealed disorganized and slow background suggesting a mild to moderate encephalopathy of nonspecific etiology.  Neurology evaluated and followed patient and felt that altered mental status was most likely due to toxic metabolic

## 2024-07-23 NOTE — PROGRESS NOTES
[facility]  INPATIENT PHYSICAL THERAPY  DAILY NOTE  [facility] - 8K-07/007-A    Time In: 1330  Time Out: 1430  Timed Code Treatment Minutes: 60 Minutes  Minutes: 60          Date: 2024  Patient Name: Celia Perkins,  Gender:  female        MRN: [id number]  : 1950  (74 y.o.)  Referral Date : 24  Referring Practitioner: Cathi Rocha APRN - CNP (referring), Vita Byrnes DO (attending)  Diagnosis: Metabolic encephalopathy  Additional Pertinent Hx: Per EMR: Celia Perkins  is a 74 y.o. female with PMH significant for asthma, breast cancer, CVA, hypertension, and type 2 diabetes, who is being admitted to the inpatient rehabilitation unit on 2024.Patient initially presented to [facility] ED on 2024.In the ED, patient with increased confusion and agitation.  A stroke alert was called.  CT head was negative for any acute intracranial abnormalities.  MRI of the brain was also reportedly negative for an acute CVA, however, did demonstrate an old infarct in the inferior left cerebellum.On admission, EEG was obtained and reportedly revealed disorganized and slow background suggesting a mild to moderate encephalopathy of nonspecific etiology.  Neurology evaluated and followed patient and felt that altered mental status was most likely due to toxic metabolic encephalopathy which was felt to be multifactorial in nature related to mild dehydration and pain medication/muscle relaxers.     Prior Level of Function:  Lives With: Spouse  Type of Home: House  Home Layout: One level, Laundry in basement  Home Access: Stairs to enter with rails  Entrance Stairs - Number of Steps: 5  Entrance Stairs - Rails: Both  Home Equipment: Walker - Standard, Sock aid, Reacher, Grab bars (has wheels for walker, has transport chair that she is borrowing)   Bathroom Shower/Tub: Tub/Shower unit  Bathroom Toilet: Standard (bedside commode over toilet)  Bathroom Equipment: Grab bars

## 2024-07-24 LAB
GLUCOSE BLD STRIP.AUTO-MCNC: 131 MG/DL (ref 70–108)
GLUCOSE BLD STRIP.AUTO-MCNC: 153 MG/DL (ref 70–108)
GLUCOSE BLD STRIP.AUTO-MCNC: 205 MG/DL (ref 70–108)
GLUCOSE BLD STRIP.AUTO-MCNC: 212 MG/DL (ref 70–108)

## 2024-07-24 PROCEDURE — 82948 REAGENT STRIP/BLOOD GLUCOSE: CPT

## 2024-07-24 PROCEDURE — 97130 THER IVNTJ EA ADDL 15 MIN: CPT

## 2024-07-24 PROCEDURE — 97129 THER IVNTJ 1ST 15 MIN: CPT

## 2024-07-24 PROCEDURE — 6370000000 HC RX 637 (ALT 250 FOR IP): Performed by: NURSE PRACTITIONER

## 2024-07-24 PROCEDURE — 6360000002 HC RX W HCPCS: Performed by: NURSE PRACTITIONER

## 2024-07-24 PROCEDURE — 6370000000 HC RX 637 (ALT 250 FOR IP): Performed by: STUDENT IN AN ORGANIZED HEALTH CARE EDUCATION/TRAINING PROGRAM

## 2024-07-24 PROCEDURE — 1180000000 HC REHAB R&B

## 2024-07-24 PROCEDURE — 99232 SBSQ HOSP IP/OBS MODERATE 35: CPT | Performed by: STUDENT IN AN ORGANIZED HEALTH CARE EDUCATION/TRAINING PROGRAM

## 2024-07-24 PROCEDURE — 97530 THERAPEUTIC ACTIVITIES: CPT

## 2024-07-24 PROCEDURE — 6370000000 HC RX 637 (ALT 250 FOR IP): Performed by: FAMILY MEDICINE

## 2024-07-24 PROCEDURE — 97116 GAIT TRAINING THERAPY: CPT

## 2024-07-24 PROCEDURE — 97110 THERAPEUTIC EXERCISES: CPT

## 2024-07-24 RX ADMIN — CLOPIDOGREL BISULFATE 75 MG: 75 TABLET ORAL at 08:39

## 2024-07-24 RX ADMIN — INSULIN LISPRO 4 UNITS: 100 INJECTION, SOLUTION INTRAVENOUS; SUBCUTANEOUS at 08:39

## 2024-07-24 RX ADMIN — ATORVASTATIN CALCIUM 20 MG: 20 TABLET, FILM COATED ORAL at 21:27

## 2024-07-24 RX ADMIN — METOPROLOL SUCCINATE 50 MG: 50 TABLET, EXTENDED RELEASE ORAL at 08:39

## 2024-07-24 RX ADMIN — Medication 3 MG: at 21:28

## 2024-07-24 RX ADMIN — AMLODIPINE BESYLATE 5 MG: 5 TABLET ORAL at 08:39

## 2024-07-24 RX ADMIN — HYDROCODONE BITARTRATE AND ACETAMINOPHEN 0.5 TABLET: 5; 325 TABLET ORAL at 15:30

## 2024-07-24 RX ADMIN — INSULIN GLARGINE 39 UNITS: 100 INJECTION, SOLUTION SUBCUTANEOUS at 21:28

## 2024-07-24 RX ADMIN — PANTOPRAZOLE SODIUM 40 MG: 40 TABLET, DELAYED RELEASE ORAL at 07:02

## 2024-07-24 RX ADMIN — ACETAMINOPHEN 650 MG: 325 TABLET ORAL at 22:43

## 2024-07-24 RX ADMIN — ENOXAPARIN SODIUM 40 MG: 100 INJECTION SUBCUTANEOUS at 08:39

## 2024-07-24 RX ADMIN — LISINOPRIL 40 MG: 40 TABLET ORAL at 08:39

## 2024-07-24 RX ADMIN — ACETAMINOPHEN 650 MG: 325 TABLET ORAL at 08:44

## 2024-07-24 ASSESSMENT — PAIN DESCRIPTION - DESCRIPTORS
DESCRIPTORS: ACHING;DISCOMFORT
DESCRIPTORS: ACHING;DISCOMFORT

## 2024-07-24 ASSESSMENT — PAIN SCALES - GENERAL
PAINLEVEL_OUTOF10: 3
PAINLEVEL_OUTOF10: 5
PAINLEVEL_OUTOF10: 3

## 2024-07-24 ASSESSMENT — PAIN DESCRIPTION - LOCATION
LOCATION: KNEE
LOCATION: KNEE

## 2024-07-24 ASSESSMENT — PAIN DESCRIPTION - ORIENTATION
ORIENTATION: LEFT
ORIENTATION: LEFT

## 2024-07-24 NOTE — PROGRESS NOTES
Physical Medicine & Rehabilitation   Progress Note    Chief Complaint: Acute metabolic encephalopathy      Subjective: Patient seen and examined. No acute events overnight. Overall, she reports that she is doing well. She is looking forward to anticipated DC home later this week. She reports some ongoing throbbing knee pain. She is hesitant to take Norco as she doesn't want to become dependent.  She does report some mild nausea this morning which she attributes to taking Norco on the stomach last night and no food yet today.  No reports of any chest pain or shortness of breath.  No reports of any significant changes to bowel or bladder.      Current Rehabilitation Assessments:  PT 07/23/2024:  Transfers:  Sit to Stand: Modified Independent  Stand to Sit:Modified Independent  To/From Bed and Chair: Modified Independent     Ambulation:  Modified Independent  Distance: 60' x 2, 30' x 1, multiple shorter distances.   Surface: Level Tile  Device: Rolling Walker  Gait Deviations:  Slow Kandy, Decreased Weight Shift Left, Decreased Gait Speed, Decreased Heel Strike on Left, Decreased Terminal Knee Extension, and Increased reliance on assistive device     Stairs:  Platform:  6\" platform X 1 using Rolling Walker and Stand By Assistance.     Balance:  Pt. Completed standing dynamic balance activity: Ring toss with Narrow QUE on Airex balance pad and No UE support with Contact Guard Assistance. Activity completed to improve balance, enhance functional mobility, and reduce risk of falls.      Exercise:  Patient was guided in 1 set(s) 10 reps of exercise to left lower extremities.  Quad sets, Heelslides, Short arc quads, Hip abduction/adduction, Straight leg raises, and positional knee extension stretch with foot propped up on bolster-2# ankle weight placed on knee to promote increased extension .  Exercises were completed for increased independence with functional mobility.     ROM  L knee flexion: 97 degrees AAROM  L knee

## 2024-07-24 NOTE — PROGRESS NOTES
[facility]ATION [facility]  Occupational Therapy  Daily Note  Time:   Time In: 1300  Time Out: 1400  Timed Code Treatment Minutes: 60 Minutes  Minutes: 60          Date: 2024  Patient Name: Celia Perkins,   Gender: female      Room: [facility]07/007-A  MRN: [id number]  : 1950  (74 y.o.)  Referring Practitioner: Cathi Rocha APRN - CNP (ordering)  Dr. Byrnes (attending0  Diagnosis: Metabolic encephalopathy  Additional Pertinent Hx: 74 y.o. female with a history of aortic stenosis status post TAVR, stroke, insulin-dependent type 2 diabetes mellitus, who presented to [facility] with chief complaint of dizziness, shortness of breath, and confusion. The patient recently had a left total knee arthroplasty on 7/10/2024 and was d/c home. Per pt's , pt has been sleeping excessively with poor appeite. Pt was at therapy PTA when she began feeling light headed/dizzy. Her blood pressure was attempted to be taken, but they were unable to get a reading on the blood pressure cuff. Shortly thereafter, the patient was noted to be, agitated and as such, she was brought to the emergency room for further evaluation. In the emergency department, the patient was a stroke alert activation. Imaging was unremarkable and an MRI of the brain was negative for stroke.  Unknown true etiology of encephalopathy, however use of opioid pain medication and muscle relaxer was a likely cause.  Patient worked with therapy and was noted to require increased assistance in bed mobility transfers and functional mobility.  Patient also underwent a cognitive evaluation in which she scored 2 out of 30 on her MoCA, her last MoCA was completed in 2023 and was 19 out of 30 at that time, this was at the time of her CVA which was after her TAVR.  Pt presents for OT to improve ADL independence to return home with spouse.    Restrictions/Precautions:  Restrictions/Precautions: General Precautions, Fall Risk,      Basic Housekeeping: Patient completed mobility around kitchen to put away groceries in proper place including refrigerator, freezer, drawers, and cupboards using RW with walker tray on top to assist with transporting items safely. Patient required Tony for balance/mobility with no LOB noted. Tolerated task for approx 8 minutes before requiring seated rest break. Patient able to complete with no cues provided for safety    BALANCE:  Sitting Balance:  Modified Independent.   Standing Balance: Modified Independent.     Patient completed dynamic standing task that facilitated 1-2 UE release from walker. Patient required Tony for balance, and demo'ed an endurance of 1-2 minutes x5 trials. Demo fair tolerance with lengthy seated rest break after task. Lengthy rest break required after last stand as patient verbalized not feeling well. Obtained vitals, noted above. Standing task completed to challenge endurance and balance required for ADL and IADL skills.     BED MOBILITY:  Not Tested    TRANSFERS:  Sit to Stand:  Modified Independent. From various surfaces  Stand to Sit: Modified Independent. To various surfaces    FUNCTIONAL MOBILITY:  Assistive Device: Rolling Walker  Assist Level:  Modified Independent.   Distance: To therapy gym from room and within room, throughout therapy kitchen  Lengthy seated rest break required after long distance mobility with fatigue noted        Modified [place] Scale:  Not Applicable    ASSESSMENT:     Activity Tolerance:  Patient tolerance of  treatment: Good treatment tolerance. Limited by decreased activity tolerance      Discharge Recommendations: Home with Home Exercise Program  Equipment Recommendations: Other: Pt has a tub transfer bench for her tub/shower with grab bars. Pt has BSC set up over her toilet.  Pt has LHAE kit.  No further AE recommended.  Plan: Times Per Week: 5x/wk for 90 min  Current Treatment Recommendations: Strengthening, Balance training, Functional mobility

## 2024-07-24 NOTE — PROGRESS NOTES
[facility]ATION [facility]  Occupational Therapy  Daily Note  Time:   Time In: 1430  Time Out: 1500  Timed Code Treatment Minutes: 30 Minutes  Minutes: 30          Date: 2024  Patient Name: Celia Perkins,   Gender: female      Room: [facility]07/007-A  MRN: [id number]  : 1950  (74 y.o.)  Referring Practitioner: Cathi Rocha APRN - CNP (ordering)  Dr. Byrnes (attending0  Diagnosis: Metabolic encephalopathy  Additional Pertinent Hx: 74 y.o. female with a history of aortic stenosis status post TAVR, stroke, insulin-dependent type 2 diabetes mellitus, who presented to [facility] with chief complaint of dizziness, shortness of breath, and confusion. The patient recently had a left total knee arthroplasty on 7/10/2024 and was d/c home. Per pt's , pt has been sleeping excessively with poor appeite. Pt was at therapy PTA when she began feeling light headed/dizzy. Her blood pressure was attempted to be taken, but they were unable to get a reading on the blood pressure cuff. Shortly thereafter, the patient was noted to be, agitated and as such, she was brought to the emergency room for further evaluation. In the emergency department, the patient was a stroke alert activation. Imaging was unremarkable and an MRI of the brain was negative for stroke.  Unknown true etiology of encephalopathy, however use of opioid pain medication and muscle relaxer was a likely cause.  Patient worked with therapy and was noted to require increased assistance in bed mobility transfers and functional mobility.  Patient also underwent a cognitive evaluation in which she scored 2 out of 30 on her MoCA, her last MoCA was completed in 2023 and was 19 out of 30 at that time, this was at the time of her CVA which was after her TAVR.  Pt presents for OT to improve ADL independence to return home with spouse.    Restrictions/Precautions:  Restrictions/Precautions: General Precautions, Fall Risk,

## 2024-07-24 NOTE — PROGRESS NOTES
[facility]  INPATIENT PHYSICAL THERAPY  DAILY NOTE  [facility] - 8K-07/007-A    Time In: 0730  Time Out: 0830  Timed Code Treatment Minutes: 60 Minutes  Minutes: 60          Date: 2024  Patient Name: Celia Perkins,  Gender:  female        MRN: [id number]  : 1950  (74 y.o.)  Referral Date : 24  Referring Practitioner: Cathi Rocha APRN - CNP (referring), Vita Byrnes DO (attending)  Diagnosis: Metabolic encephalopathy  Additional Pertinent Hx: Per EMR: Celia Perkins  is a 74 y.o. female with PMH significant for asthma, breast cancer, CVA, hypertension, and type 2 diabetes, who is being admitted to the inpatient rehabilitation unit on 2024.Patient initially presented to [facility] ED on 2024.In the ED, patient with increased confusion and agitation.  A stroke alert was called.  CT head was negative for any acute intracranial abnormalities.  MRI of the brain was also reportedly negative for an acute CVA, however, did demonstrate an old infarct in the inferior left cerebellum.On admission, EEG was obtained and reportedly revealed disorganized and slow background suggesting a mild to moderate encephalopathy of nonspecific etiology.  Neurology evaluated and followed patient and felt that altered mental status was most likely due to toxic metabolic encephalopathy which was felt to be multifactorial in nature related to mild dehydration and pain medication/muscle relaxers.     Prior Level of Function:  Lives With: Spouse  Type of Home: House  Home Layout: One level, Laundry in basement  Home Access: Stairs to enter with rails  Entrance Stairs - Number of Steps: 5  Entrance Stairs - Rails: Both  Home Equipment: Walker - Standard, Sock aid, Reacher, Grab bars (has wheels for walker, has transport chair that she is borrowing)   Bathroom Shower/Tub: Tub/Shower unit  Bathroom Toilet: Standard (bedside commode over toilet)  Bathroom Equipment: Grab bars

## 2024-07-24 NOTE — PLAN OF CARE
Problem: Discharge Planning  Goal: Discharge to home or other facility with appropriate resources  Outcome: Progressing  Flowsheets (Taken 7/23/2024 2233)  Discharge to home or other facility with appropriate resources: Identify barriers to discharge with patient and caregiver     Problem: Safety - Adult  Goal: Free from fall injury  Outcome: Progressing  Flowsheets (Taken 7/23/2024 2233)  Free From Fall Injury: Instruct family/caregiver on patient safety     Problem: Neurosensory - Adult  Goal: Achieves maximal functionality and self care  Outcome: Progressing  Flowsheets (Taken 7/23/2024 2233)  Achieves maximal functionality and self care: Encourage and assist patient to increase activity and self care with guidance from physical therapy/occupational therapy     Problem: Skin/Tissue Integrity - Adult  Goal: Incisions, wounds, or drain sites healing without S/S of infection  Outcome: Progressing  Flowsheets (Taken 7/23/2024 2232)  Incisions, Wounds, or Drain Sites Healing Without Sign and Symptoms of Infection: TWICE DAILY: Assess and document skin integrity     Problem: Pain  Goal: Verbalizes/displays adequate comfort level or baseline comfort level  Outcome: Progressing  Flowsheets (Taken 7/23/2024 2233)  Verbalizes/displays adequate comfort level or baseline comfort level:   Encourage patient to monitor pain and request assistance   Assess pain using appropriate pain scale

## 2024-07-24 NOTE — PROGRESS NOTES
Physical Medicine & Rehabilitation   Progress Note    Chief Complaint: Acute metabolic encephalopathy      Subjective: Patient seen and examined. No acute events overnight. Working with OT this morning and noting the onset of a migraine. On Qulipta daily for preventative. Takes Ubrelvy as an abortive agent at home. Patient thought it had been brought to the hospital, however, called  and this medication is at home. He will bring in this morning. Will need to place a non-formulary home med order once it arrives. She reports typically she has approx 1 migraine monthly on her current regimen. She reports headache with visual disturbance, photophobia, phonophobia, and at times word finding difficulties with her typical migraines. No reports of CP or SOB. No reports of any significant changes to bowel or bladder.       Current Rehabilitation Assessments:  PT 07/24/2024:  Transfers:  Sit to Stand: Modified Independent  Stand to Sit:Modified Independent     Ambulation:  Modified Independent  Distance: 140ft, 80ft, 10ft, 80ft, 140ft   Surface: Level Tile  Device: Rolling Walker  Gait Deviations:  Forward Flexed Posture, Slow Kandy, Decreased Weight Shift Left, and Decreased Gait Speed     Dynamic gait: CGA, no LOB            -toe taps on balance pods and cones in forward ambulation with RW, fair SLS with BUE support            -toe taps onto cones in given sequence (right foot, left foot, side step left/right), used BUE support at hallway rail     Stairs:  Supervision  Number of Steps: 10  Height: 6\" step with Bilateral Handrails  *instructed in non-reciprocal pattern, fair reciprocal pattern     Exercise:    Pt participated with NuStep activity completing 10 mins, used BUE/BLE, decreased seat placement 2x to increase knee flexion ROM.   Exercises were completed for increased independence with functional mobility.      OT 07/24/2024:  ADL:   Toileting: Modified Independent.     Toilet Transfer: Modified

## 2024-07-24 NOTE — PLAN OF CARE
Problem: Discharge Planning  Goal: Discharge to home or other facility with appropriate resources  7/24/2024 1143 by Tipton, Rylee, LPN  Outcome: Progressing  Flowsheets (Taken 7/24/2024 1007)  Discharge to home or other facility with appropriate resources:   Identify barriers to discharge with patient and caregiver   Identify discharge learning needs (meds, wound care, etc)     Problem: Safety - Adult  Goal: Free from fall injury  7/24/2024 1143 by Tipton, Rylee, LPN  Outcome: Progressing  Flowsheets (Taken 7/24/2024 1143)  Free From Fall Injury: Instruct family/caregiver on patient safety     Problem: ABCDS Injury Assessment  Goal: Absence of physical injury  Outcome: Progressing  Flowsheets (Taken 7/24/2024 1143)  Absence of Physical Injury: Implement safety measures based on patient assessment     Problem: Neurosensory - Adult  Goal: Achieves maximal functionality and self care  7/24/2024 1143 by Tipton, Rylee, LPN  Outcome: Progressing  Flowsheets (Taken 7/24/2024 1007)  Achieves maximal functionality and self care:   Monitor swallowing and airway patency with patient fatigue and changes in neurological status   Encourage and assist patient to increase activity and self care with guidance from physical therapy/occupational therapy     Problem: Respiratory - Adult  Goal: Achieves optimal ventilation and oxygenation  Outcome: Progressing  Flowsheets (Taken 7/24/2024 1007)  Achieves optimal ventilation and oxygenation:   Assess for changes in mentation and behavior   Assess for changes in respiratory status     Problem: Cardiovascular - Adult  Goal: Maintains optimal cardiac output and hemodynamic stability  Outcome: Progressing  Flowsheets (Taken 7/24/2024 1007)  Maintains optimal cardiac output and hemodynamic stability: Monitor blood pressure and heart rate     Problem: Cardiovascular - Adult  Goal: Absence of cardiac dysrhythmias or at baseline  Outcome: Progressing  Flowsheets (Taken 7/24/2024

## 2024-07-24 NOTE — PROGRESS NOTES
[facility]  Recreational Therapy  Daily Note  [facility]    Time Spent with Patient: 30 minutes    Date:  7/24/2024       Patient Name: Celia Perkins      MRN: [id number]      YOB: 1950 (74 y.o.)       Gender: female  Diagnosis: Metabolic encephalopathy  Referring Practitioner: Cathi Rocha APRN - CNP (ordering)  Dr. Byrnes (attending0    RESTRICTIONS/PRECAUTIONS:  Restrictions/Precautions: General Precautions, Fall Risk, Weight Bearing     Hearing: Within functional limits    PAIN: 0-no c/o pain     SUBJECTIVE:  this felt so good     OBJECTIVE:  Pt enjoyed getting her hair washed, trimmed and styled by our beautician-affect bright and social-states she is interested in painting a sign tomorrow afternoon with peers and will ask  for more money-pt upbeat and social        Patient Education  New Education Provided: Importance of Leisure,     Electronically signed by: Lila Mixon, CTRS  Date: 7/24/2024

## 2024-07-24 NOTE — DISCHARGE INSTR - COC
Continuity of Care Form    Patient Name: Celia Perkins   :  1950  MRN:  [id number]    Admit date:  2024  Discharge date:  ***    Code Status Order: Full Code   Advance Directives:     Admitting Physician:  Vita Byrnes DO  PCP: Yash Nelson MD    Discharging Nurse: ***  Discharging Hospital Unit/Room#: 8K-07/007-A  Discharging Unit Phone Number: ***    Emergency Contact:   Extended Emergency Contact Information  Primary Emergency Contact: Anders Perkins Jr.  Address: [address] [place] [id number] [facility]  Home Phone: [phone]  Mobile Phone: [phone]  Relation: Spouse   needed? No  Secondary Emergency Contact: Moon Perkins  Address: [address] [place], [place] [id number] [facility]  Home Phone: [phone]  Mobile Phone: [phone]  Relation: Child   needed? No    Past Surgical History:  Past Surgical History:   Procedure Laterality Date    AORTIC VALVE REPAIR  2023    osu    BLADDER SUSPENSION      times 2    BREAST LUMPECTOMY Left 10/10/2018    CARPAL TUNNEL RELEASE Bilateral 2020    CHOLECYSTECTOMY      DILATION AND CURETTAGE OF UTERUS      x2    EYE SURGERY      HAND SURGERY Right     thumb-revision of suspensionplasty    HERNIA REPAIR  2019    HYSTERECTOMY (CERVIX STATUS UNKNOWN)  1995    JOINT REPLACEMENT Right     Rt total knee    KNEE ARTHROSCOPY  ,     RUDY STEROTACTIC LOC BREAST BIOPSY LEFT Left 2018    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    MANDIBLE FRACTURE SURGERY      OTHER SURGICAL HISTORY Right 2015    Excision of Sebaceous Cyst Right Ear     OVARY REMOVAL      MD OFFICE/OUTPT VISIT,PROCEDURE ONLY Left 10/10/2018    LEFT BREAST LUMPECTOMY WITH SENTINEL LYMPH NODE BIOPSY performed by Ed Bean MD at [facility] OR    SINUS SURGERY      SKIN BIOPSY      TONSILLECTOMY AND ADENOIDECTOMY  age 6    VENTRAL HERNIA REPAIR N/A 2019    COMBINED  Type 2 diabetes mellitus with diabetic neuropathy E11.40    Mood disorder (HCC) F39    Acute encephalopathy G93.40    Altered mental status R41.82    Hypertension I10    Metabolic encephalopathy G93.41    Mild malnutrition (HCC) E44.1       Isolation/Infection:   Isolation            No Isolation          Patient Infection Status       Infection Onset Added Last Indicated Last Indicated By Review Planned Expiration Resolved Resolved By    None active    Resolved    COVID-19 06/15/22 06/15/22 06/15/22 COVID-19, Rapid   22 Infection                        Nurse Assessment:  Last Vital Signs: BP (!) 113/59   Pulse 92   Temp 98.6 °F (37 °C) (Oral)   Resp 16   Ht 1.575 m (5' 2.01\")   Wt 72.3 kg (159 lb 6.3 oz)   SpO2 99%   BMI 29.15 kg/m²     Last documented pain score (0-10 scale): Pain Level: 3  Last Weight:   Wt Readings from Last 1 Encounters:   24 72.3 kg (159 lb 6.3 oz)     Mental Status:  {IP PT MENTAL STATUS:}    IV Access:  { SAKSHI IV ACCESS:440480601}    Nursing Mobility/ADLs:  Walking   {CHP DME ADLs:837190734}  Transfer  {CHP DME ADLs:197264235}  Bathing  {CHP DME ADLs:096239858}  Dressing  {CHP DME ADLs:630712411}  Toileting  {CHP DME ADLs:185261882}  Feeding  {CHP DME ADLs:813856520}  Med Admin  {P DME ADLs:395934851}  Med Delivery   {[facility] MED Delivery:407516705}    Wound Care Documentation and Therapy:  Incision 24 Knee Left;Anterior;Mid (Active)   Wound Image   24 1840   Dressing Status Dry;Clean;Intact 24 1007   Dressing/Treatment Dry dressing;CARLEEN hose 24 1007   Closure Other (Comment) 24 1007   Margins Approximated 24 1007   Incision Assessment Other (Comment) 24 205   Drainage Amount None (dry) 24 1007   Drainage Description Serosanguinous 24 1840   Odor None 24 1007   Malathi-incision Assessment Ecchymosis 24 1820   Number of days: 7        Elimination:  Continence:   Bowel: {YES / NO:}  Bladder:

## 2024-07-24 NOTE — PROGRESS NOTES
Patient: Celia JARAMILLO Long  Unit/Bed: 8K-07/007-A  YOB: 1950  MRN: [id number] Acct: [id number]   Admitting Diagnosis: Metabolic encephalopathy [G93.41]  Admit Date:  7/18/2024  Hospital Day: 6    Assessment:     Principal Problem:    Metabolic encephalopathy  Active Problems:    Hyperlipidemia    GERD (gastroesophageal reflux disease)    Hypothyroid    Type 2 diabetes mellitus without complication, without long-term current use of insulin (HCC)    Mild malnutrition (HCC)  Resolved Problems:    * No resolved hospital problems. *      Plan:     She states a lightheaded episode with therapy this AM before she ate breakfast. She mentioned the BP was into the 90's.  She feels fine now.  We reviewed the CS being up some and how she is not on the jardiance as at home.        Subjective:     Patient has no complaint of CP or SOB..   Medication side effects: none    Scheduled Meds:   insulin lispro  0-16 Units SubCUTAneous TID WC    insulin lispro  0-4 Units SubCUTAneous Nightly    metoprolol succinate  50 mg Oral Daily    insulin glargine  39 Units SubCUTAneous Nightly    NONFORMULARY 60 mg atogepant (Qulipta) (Patient Supplied)  60 mg Oral Daily    melatonin  3 mg Oral Nightly    pantoprazole  40 mg Oral QAM AC    lisinopril  40 mg Oral Daily    enoxaparin  40 mg SubCUTAneous Daily    clopidogrel  75 mg Oral Daily    atorvastatin  20 mg Oral Nightly    amLODIPine  5 mg Oral Daily     Continuous Infusions:   dextrose      sodium chloride       PRN Meds:HYDROcodone 5 mg - acetaminophen **OR** HYDROcodone 5 mg - acetaminophen, sodium chloride flush, potassium chloride **OR** potassium alternative oral replacement **OR** potassium chloride, polyethylene glycol, ondansetron, magnesium sulfate, glucose, glucagon (rDNA), dextrose bolus **OR** dextrose bolus, dextrose, sodium chloride, acetaminophen    Review of Systems  Pertinent items are noted in HPI.    Objective:     Patient Vitals for the past 8 hrs:   BP Temp

## 2024-07-24 NOTE — PROGRESS NOTES
[facility]  INPATIENT SPEECH THERAPY  [facility]  DAILY NOTE    TIME   SLP Individual Minutes  Time In: 1400  Time Out: 1430  Minutes: 30  Timed Code Treatment Minutes: 30 Minutes      Date: 2024  Patient Name: Celia Perkins      CSN: [id number]   : 1950  (74 y.o.)  Gender: female   Referring Physician:  Vita Byrnes DO  Diagnosis: Metabolic encephalopathy  Precautions: Fall precautions  Current Diet: Regular Diet and Thin Liquids  Respiratory Status: Room Air  Swallowing Strategies: Standard Universal Swallow Precautions  Date of Last MBS/FEES:  2021    Pain:  No pain reported.    Subjective:  Patient seen sitting upright in recliner upon ST arrival; alert and agreeable to ST interventions. Patient pleasant and cooperative throughout. Patient's family present.    Short-Term Goals:  SHORT TERM GOAL #1:  Goal 1: Patient will complete functional memory tasks (delayed, prospective, working) with 80% accuracy given mod cues to improve retention of novel information.   INTERVENTIONS:   Recall of Real Appointment (, Diabetes f/u with Yash Nelson)  Immediate Recall: 2/5 independent, 2/5 with logical cuing, 1/5 with maximum cuing  15 Minute Delayed Recall: 5/5 independent  *Excellent success with task with additional time/repetition    SHORT TERM GOAL #2:  Goal 2: Patient will complete problem solving, verbal/visual reasoning, and executive functioning tasks (household obligations) with 80% accuracy given mod cues to enhance success within ADLs/IADLs.   INTERVENTIONS:    Medication Management with Pill Box  Prescription 1: One Tablet Twice Daily   Comprehension: independent  Completion: independent    Prescription 2: Two Tablets Twice Daily   Comprehension: self correction  Completion: self correction    Prescription 3: One Tablet Once Daily   Comprehension: minimal cuing for correct dosage  Completion: minimal cuing for correct  dosage    Prescription 4: One Tablet Once Daily   Comprehension: independent  Completion: independent    *Patient would benefit from initial supervision with medication management at discharge    SHORT TERM GOAL #3:  Goal 3: Patient will complete sequencing and thought organization tasks with 70% accuracy given mod cues to improve mental flexibility for home scenarios.   INTERVENTIONS:  ST reviewed \"Be Fast\" with patient given concerns for word finding difficulty this AM; patient highly receptive to education, however, reported, \"I didn't have any of those symptoms when I had my stroke.\" ST provided education with verbal receptiveness noted.    SHORT TERM GOAL #4:  Goal 4: Patient will complete selective and divided attention tasks with no more than 3 errors until task completion to permit potential return to multi-tasking, IADLs, driving, and occupational hobbies.   INTERVENTIONS:  DNT due to focus on additional STGs.    Long-Term Goals:  Time Frame for Long Term Goals: 2 weeks from IPR evaluation    LONG TERM GOAL #1:  Goal 1: Patient will improve cognitive functioning to a modified independent level (FIM 6) to optimize independence in the home environment with ADL tasks.     Comprehension: 6 - Complex ideas 90% or device (hearing aid or glasses- if patient is primarily a visual learner)  Expression: 6 - Device used to express complex ideas/needs  Social Interaction: 6 - Patient requires medication for mood and/or effect  Problem Solvin - Patient solves simple/routine tasks 75-90%+   Memory: 5 - Patient requires prompting with stress/unfamiliar situations    Functional Oral Intake Scale: Total Oral Intake: 7.  Total oral intake with no restrictions    EDUCATION:  Learner: Patient  Education:  Reviewed ST goals and Plan of Care, Reviewed recommendations for follow-up, Education Related to Health Promotion and Wellness, and Home Safety Education  Evaluation of Education: Verbalizes understanding and Demonstrates

## 2024-07-24 NOTE — PROGRESS NOTES
[facility]  INPATIENT PHYSICAL THERAPY  DAILY NOTE  [facility] - 8K-07/007-A    Time In: 1100  Time Out: 1130  Timed Code Treatment Minutes: 30 Minutes  Minutes: 30          Date: 2024  Patient Name: Celia Perkins,  Gender:  female        MRN: [id number]  : 1950  (74 y.o.)  Referral Date : 24  Referring Practitioner: Cathi Rocha APRN - CNP (referring), Vita Byrnes DO (attending)  Diagnosis: Metabolic encephalopathy  Additional Pertinent Hx: Per EMR: Celia Perkins  is a 74 y.o. female with PMH significant for asthma, breast cancer, CVA, hypertension, and type 2 diabetes, who is being admitted to the inpatient rehabilitation unit on 2024.Patient initially presented to [facility] ED on 2024.In the ED, patient with increased confusion and agitation.  A stroke alert was called.  CT head was negative for any acute intracranial abnormalities.  MRI of the brain was also reportedly negative for an acute CVA, however, did demonstrate an old infarct in the inferior left cerebellum.On admission, EEG was obtained and reportedly revealed disorganized and slow background suggesting a mild to moderate encephalopathy of nonspecific etiology.  Neurology evaluated and followed patient and felt that altered mental status was most likely due to toxic metabolic encephalopathy which was felt to be multifactorial in nature related to mild dehydration and pain medication/muscle relaxers.     Prior Level of Function:  Lives With: Spouse  Type of Home: House  Home Layout: One level, Laundry in basement  Home Access: Stairs to enter with rails  Entrance Stairs - Number of Steps: 5  Entrance Stairs - Rails: Both  Home Equipment: Walker - Standard, Sock aid, Reacher, Grab bars (has wheels for walker, has transport chair that she is borrowing)   Bathroom Shower/Tub: Tub/Shower unit  Bathroom Toilet: Standard (bedside commode over toilet)  Bathroom Equipment: Grab bars  in shower, Commode, Tub transfer bench  Bathroom Accessibility: Walker accessible    Receives Help From: Family  ADL Assistance: Independent  Homemaking Assistance: Independent  Homemaking Responsibilities: Yes  Ambulation Assistance: Independent  Transfer Assistance: Independent  Active : Yes  Additional Comments: Pt states she was Mod I for use of cane prior to sx. Since sx, pt has been using a standard walker. Pt's  has been assisting with ADLs post op but pt is able to walk to bathroom and complete toileting with Mod I.    Restrictions/Precautions:  Restrictions/Precautions: General Precautions, Fall Risk, Weight Bearing  Left Lower Extremity Weight Bearing: Weight Bearing As Tolerated  Position Activity Restriction  Other position/activity restrictions: L TKA 7/10/24- per conversation with Dr. Lowry on 7/19- keep knee dry with aquaguards during showers until seen in office for zipline removal.     SUBJECTIVE: Patient in bathroom upon arrival and agreeable to therapy. Patient reports feeling much better this session compared to earlier PT session.     PAIN: denies pain, reports knee tightness    Vitals: Vitals not assessed per clinical judgement, see nursing flowsheet    OBJECTIVE:  Bed Mobility:  Not Tested    Transfers:  Sit to Stand: Modified Independent  Stand to Sit:Modified Independent    Ambulation:  Modified Independent  Distance: 140ft, 80ft, 10ft, 80ft, 140ft   Surface: Level Tile  Device: Rolling Walker  Gait Deviations:  Forward Flexed Posture, Slow Kandy, Decreased Weight Shift Left, and Decreased Gait Speed    Dynamic gait: CGA, no LOB            -toe taps on balance pods and cones in forward ambulation with RW, fair SLS with BUE support            -toe taps onto cones in given sequence (right foot, left foot, side step left/right), used BUE support at hallway rail    Stairs:  Supervision  Number of Steps: 10  Height: 6\" step with Bilateral Handrails  *instructed in non-reciprocal

## 2024-07-25 LAB
GLUCOSE BLD STRIP.AUTO-MCNC: 171 MG/DL (ref 70–108)
GLUCOSE BLD STRIP.AUTO-MCNC: 189 MG/DL (ref 70–108)
GLUCOSE BLD STRIP.AUTO-MCNC: 210 MG/DL (ref 70–108)
GLUCOSE BLD STRIP.AUTO-MCNC: 220 MG/DL (ref 70–108)

## 2024-07-25 PROCEDURE — 97130 THER IVNTJ EA ADDL 15 MIN: CPT | Performed by: SPEECH-LANGUAGE PATHOLOGIST

## 2024-07-25 PROCEDURE — 97110 THERAPEUTIC EXERCISES: CPT

## 2024-07-25 PROCEDURE — 1180000000 HC REHAB R&B

## 2024-07-25 PROCEDURE — 6360000002 HC RX W HCPCS: Performed by: NURSE PRACTITIONER

## 2024-07-25 PROCEDURE — 97530 THERAPEUTIC ACTIVITIES: CPT

## 2024-07-25 PROCEDURE — 6370000000 HC RX 637 (ALT 250 FOR IP): Performed by: FAMILY MEDICINE

## 2024-07-25 PROCEDURE — 99232 SBSQ HOSP IP/OBS MODERATE 35: CPT | Performed by: STUDENT IN AN ORGANIZED HEALTH CARE EDUCATION/TRAINING PROGRAM

## 2024-07-25 PROCEDURE — 97535 SELF CARE MNGMENT TRAINING: CPT

## 2024-07-25 PROCEDURE — 6370000000 HC RX 637 (ALT 250 FOR IP): Performed by: NURSE PRACTITIONER

## 2024-07-25 PROCEDURE — 97116 GAIT TRAINING THERAPY: CPT

## 2024-07-25 PROCEDURE — 6370000000 HC RX 637 (ALT 250 FOR IP): Performed by: STUDENT IN AN ORGANIZED HEALTH CARE EDUCATION/TRAINING PROGRAM

## 2024-07-25 PROCEDURE — 97129 THER IVNTJ 1ST 15 MIN: CPT | Performed by: SPEECH-LANGUAGE PATHOLOGIST

## 2024-07-25 PROCEDURE — 82948 REAGENT STRIP/BLOOD GLUCOSE: CPT

## 2024-07-25 RX ORDER — ATORVASTATIN CALCIUM 40 MG/1
20 TABLET, FILM COATED ORAL NIGHTLY
Qty: 15 TABLET | Refills: 1 | Status: CANCELLED
Start: 2024-07-25

## 2024-07-25 RX ORDER — AMLODIPINE BESYLATE 2.5 MG/1
5 TABLET ORAL DAILY
Qty: 60 TABLET | Refills: 1 | Status: CANCELLED
Start: 2024-07-25

## 2024-07-25 RX ORDER — INSULIN GLARGINE 100 [IU]/ML
39 INJECTION, SOLUTION SUBCUTANEOUS NIGHTLY
Status: CANCELLED
Start: 2024-07-25

## 2024-07-25 RX ADMIN — INSULIN LISPRO 4 UNITS: 100 INJECTION, SOLUTION INTRAVENOUS; SUBCUTANEOUS at 12:54

## 2024-07-25 RX ADMIN — ATORVASTATIN CALCIUM 20 MG: 20 TABLET, FILM COATED ORAL at 20:40

## 2024-07-25 RX ADMIN — HYDROCODONE BITARTRATE AND ACETAMINOPHEN 1 TABLET: 5; 325 TABLET ORAL at 01:25

## 2024-07-25 RX ADMIN — HYDROCODONE BITARTRATE AND ACETAMINOPHEN 1 TABLET: 5; 325 TABLET ORAL at 09:55

## 2024-07-25 RX ADMIN — INSULIN LISPRO 4 UNITS: 100 INJECTION, SOLUTION INTRAVENOUS; SUBCUTANEOUS at 09:03

## 2024-07-25 RX ADMIN — CLOPIDOGREL BISULFATE 75 MG: 75 TABLET ORAL at 09:04

## 2024-07-25 RX ADMIN — LISINOPRIL 40 MG: 40 TABLET ORAL at 09:05

## 2024-07-25 RX ADMIN — ENOXAPARIN SODIUM 40 MG: 100 INJECTION SUBCUTANEOUS at 09:04

## 2024-07-25 RX ADMIN — AMLODIPINE BESYLATE 5 MG: 5 TABLET ORAL at 09:05

## 2024-07-25 RX ADMIN — METOPROLOL SUCCINATE 50 MG: 50 TABLET, EXTENDED RELEASE ORAL at 09:05

## 2024-07-25 RX ADMIN — HYDROCODONE BITARTRATE AND ACETAMINOPHEN 0.5 TABLET: 5; 325 TABLET ORAL at 23:14

## 2024-07-25 RX ADMIN — ACETAMINOPHEN 650 MG: 325 TABLET ORAL at 15:42

## 2024-07-25 RX ADMIN — INSULIN GLARGINE 39 UNITS: 100 INJECTION, SOLUTION SUBCUTANEOUS at 20:40

## 2024-07-25 RX ADMIN — PANTOPRAZOLE SODIUM 40 MG: 40 TABLET, DELAYED RELEASE ORAL at 06:11

## 2024-07-25 ASSESSMENT — PAIN DESCRIPTION - DESCRIPTORS
DESCRIPTORS: STABBING
DESCRIPTORS: ACHING

## 2024-07-25 ASSESSMENT — PAIN SCALES - GENERAL
PAINLEVEL_OUTOF10: 8
PAINLEVEL_OUTOF10: 1
PAINLEVEL_OUTOF10: 0
PAINLEVEL_OUTOF10: 5
PAINLEVEL_OUTOF10: 7

## 2024-07-25 ASSESSMENT — PAIN DESCRIPTION - ORIENTATION: ORIENTATION: LEFT

## 2024-07-25 ASSESSMENT — PAIN DESCRIPTION - LOCATION
LOCATION: KNEE
LOCATION: KNEE

## 2024-07-25 NOTE — PLAN OF CARE
Problem: Discharge Planning  Goal: Discharge to home or other facility with appropriate resources  7/24/2024 2254 by Renee Jones RN  Outcome: Progressing  Flowsheets (Taken 7/24/2024 2254)  Discharge to home or other facility with appropriate resources: Identify barriers to discharge with patient and caregiver     Problem: Safety - Adult  Goal: Free from fall injury  7/24/2024 2254 by Renee Jones RN  Outcome: Progressing  Flowsheets (Taken 7/24/2024 2254)  Free From Fall Injury: Instruct family/caregiver on patient safety  Note: Patient has remained free of physical injury during this shift. Safe environment provided, call light within reach, and hourly rounding completed.      Problem: ABCDS Injury Assessment  Goal: Absence of physical injury  7/24/2024 2254 by Renee Jones RN  Outcome: Progressing  Flowsheets (Taken 7/24/2024 2254)  Absence of Physical Injury: Implement safety measures based on patient assessment  Note: Patient has remained free of physical injury during this shift. Safe environment provided, call light within reach, and hourly rounding completed.   7/24/2024 1143 by Tipton, Rylee, LPN  Outcome: Progressing  Flowsheets (Taken 7/24/2024 1143)  Absence of Physical Injury: Implement safety measures based on patient assessment     Problem: Musculoskeletal - Adult  Goal: Return mobility to safest level of function  7/24/2024 2254 by Renee Jones RN  Outcome: Progressing     Problem: Pain  Goal: Verbalizes/displays adequate comfort level or baseline comfort level  7/24/2024 2254 by Renee Jones RN  Outcome: Progressing  Flowsheets (Taken 7/24/2024 2254)  Verbalizes/displays adequate comfort level or baseline comfort level:   Administer analgesics based on type and severity of pain and evaluate response   Assess pain using appropriate pain scale   Encourage patient to monitor pain and request assistance   Implement non-pharmacological measures as appropriate and

## 2024-07-25 NOTE — DISCHARGE INSTR - DIET

## 2024-07-25 NOTE — CARE COORDINATION
Patient requested Tylenol at 2243 last night for pain in left knee. Called out at 0120 am requesting pain medication, Norco given for left knee pain. Patient alternates sleeping in chair and bed. Patient in bed resting quietly with eyes closed at this present time. No complains of pain or discomfort at this time.

## 2024-07-25 NOTE — PROGRESS NOTES
Preparedness Assessment for the Transition Home (PATH-25) Instrument   (Yana Blake & Whitney Gomez, 2018)    1. How much do you understand about the patient's expected recovery over the next 6 months? I have some understanding about the patient's expected recovery over the next 6 months. (3)   2. How much do you understand about how the patient's injury/illness will affect your lives over the next 6 months? I understand some about how the injury/illness will affect our lives over the next 6 months. (3)    3. How much do you understand about what you need to do to get things ready before the patient goes home? I understand some about what I need to do to get ready before the patient goes home. (3)    4. How much do you understand about what assistance the patient will need with personal care (such as bathing, using the toilet, dressing, and moving around) when he/she goes home? I understand a lot about what assistance the patient will need with personal care when he/she goes home. (4)   5. How much experience have you had providing physical help with personal care (such as bathing, using the toilet, dressing and moving around) for someone who has an injury/illness or other disability? I have at least one month but less than a year experience providing physical help with personal care for someone who has an injury/illness or other disability. (3)    6. How prepared are you to provide the patient assistance with personal care (such as bathing, using the toilet, dressing and moving around) when he/she goes home? I am somewhat prepared to provide the patient assistance with personal care when he/she goes home. (3)   7. How willing are you to provide personal care (such as bathing, using the toilet, dressing and moving around) for the patient when he/she goes home? I am willing to provide a lot of personal care for the patient. (4)   8. How much time will you have to provide personal care for the patient when

## 2024-07-25 NOTE — PROGRESS NOTES
[facility]  INPATIENT PHYSICAL THERAPY  DAILY NOTE  [facility] - [id number]    Time In: 1400  Time Out: 1500  Timed Code Treatment Minutes: 60 Minutes  Minutes: 60          Date: 2024  Patient Name: Celia Perkins,  Gender:  female        MRN: [id number]  : 1950  (74 y.o.)  Referral Date : 24  Referring Practitioner: Cathi Rocha APRN - CNP (referring), Vita Byrnes DO (attending)  Diagnosis: Metabolic encephalopathy  Additional Pertinent Hx: Per EMR: Celia Perkins  is a 74 y.o. female with PMH significant for asthma, breast cancer, CVA, hypertension, and type 2 diabetes, who is being admitted to the inpatient rehabilitation unit on 2024.Patient initially presented to [facility] ED on 2024.In the ED, patient with increased confusion and agitation.  A stroke alert was called.  CT head was negative for any acute intracranial abnormalities.  MRI of the brain was also reportedly negative for an acute CVA, however, did demonstrate an old infarct in the inferior left cerebellum.On admission, EEG was obtained and reportedly revealed disorganized and slow background suggesting a mild to moderate encephalopathy of nonspecific etiology.  Neurology evaluated and followed patient and felt that altered mental status was most likely due to toxic metabolic encephalopathy which was felt to be multifactorial in nature related to mild dehydration and pain medication/muscle relaxers.     Prior Level of Function:  Lives With: Spouse  Type of Home: House  Home Layout: One level, Laundry in basement  Home Access: Stairs to enter with rails  Entrance Stairs - Number of Steps: 5  Entrance Stairs - Rails: Both  Home Equipment: Walker - Standard, Sock aid, Reacher, Grab bars (has wheels for walker, has transport chair that she is borrowing)   Bathroom Shower/Tub: Tub/Shower unit  Bathroom Toilet: Standard (bedside commode over toilet)  Bathroom Equipment: Grab bars

## 2024-07-25 NOTE — PROGRESS NOTES
Comprehensive Nutrition Assessment    Type and Reason for Visit:  Reassess    Nutrition Recommendations/Plan:   Recommend diet as per SLP.  Trial Glucerna ONS TID.  Continue yogurt BID per pt. Request.  Pt. Attending Pri[place] program per EMR (started 4/7/24).  Encouraged po, good nutrition at best efforts.  Encouraged food from home, outside as desired.  Discussed appropriate use of ONS at discharge as needed.     Malnutrition Assessment:  Malnutrition Status:  Mild malnutrition (07/19/24 1507)    Context:  Acute Illness     Findings of the 6 clinical characteristics of malnutrition:  Energy Intake:  50% or less of estimated energy requirements for 5 or more days (since TKA 7/10/24)  Weight Loss:   (some weight loss noted; difficult to assess with hx/o edema)     Body Fat Loss:  No significant body fat loss     Muscle Mass Loss:  No significant muscle mass loss    Fluid Accumulation:  Mild Extremities   Strength:  Not Performed    Nutrition Assessment:     Pt. mildy malnourished AEB criteria as listed above.  At risk for further nutrition compromise r/t metabolic encephalopathy s/p TKA 7/10/24, and underlying medical condition (PMHx: arthritis, brain cyst - benign, breast CA, CVA 2015, diverticulosis, DKA, HTN, OA, T2DM, s/p TAVR 2023 - CVA).        Nutrition Related Findings:    Pt. Report/Treatments/Miscellaneous:   7/25: pt. Seen w/ spouse; reports poor appetite and intake; food not sounding good; denies any nausea or difficulty tolerating diet; c/o migraine today; states liking the yogurt; states she would like to trial ONS again; family offering to bring in food from home/outside; SLP following  7/19: Pt seen, endorses poor appetite since TKA 7/10/24. She is unable to recall much prior and does not know how much she has been eating since but states it has not been much. She does not know if she has lost any weight and is unable to recall UBW. Per EMR she has been going through the Pritikin program (starting  importance of adequate PO intake at best efforts and ONS options)  Coordination of Nutrition Care: Continue to monitor while inpatient       Goals:  Previous Goal Met: Progressing toward Goal(s)  Goals: Meet at least 75% of estimated needs, by next RD assessment       Nutrition Monitoring and Evaluation:      Food/Nutrient Intake Outcomes: Diet Advancement/Tolerance, Food and Nutrient Intake, Supplement Intake  Physical Signs/Symptoms Outcomes: Biochemical Data, Chewing or Swallowing, GI Status, Fluid Status or Edema, Nutrition Focused Physical Findings, Skin, Weight    Discharge Planning:    Continue current diet     Yana Srerato RD, LD  Contact: [phone]

## 2024-07-25 NOTE — DISCHARGE SUMMARY
Physical Medicine & Rehabilitation   Discharge Summary     Patient Identification:  Celia Perkins  : 1950  Admit date: 2024  Discharge date: 2024   Attending provider: Vita Byrnes DO        Primary care provider: Yash Nelson MD     Discharge Diagnoses:   Acute metabolic encephalopathy  Emotional lability  Significant cognitive deficits  Gait disturbance  Osteoarthritis of the left knee  S/p left total knee replacement on 7/10/2024 with Dr. Lowry  Lactic acidosis  Hypertension  Hyperlipidemia  Insulin-dependent type 2 diabetes mellitus  Gastroesophageal reflux disease  History of stroke, left cerebellar 2023  History of aortic stenosis, TAVR 2023     Consults:   [facility]      Inpatient Acute Hospital Course:   Celia Perkins  is a 74 y.o. female with PMH significant for asthma, breast cancer, CVA, hypertension, and type 2 diabetes, who is being admitted to the inpatient rehabilitation unit on 2024.  History obtained via: ED documentation, acute care documentation, patient     Patient initially presented to [facility] ED on 2024. Patient reportedly recently underwent a left TKA on 7/10/2024.  She reportedly had a relatively uncomplicated postoperative course was discharged home from the hospital couple days later.  She was reportedly discharged home with prescriptions for opioid pain medications and a muscle relaxer.  Patient's family reported sleeping excessively since discharge home.  In the days leading up to presentation, patient spouse reported some increased reports of lightheadedness/dizziness that occurred primarily with position changes.  No recent falls.  On the morning of presentation, patient went to therapy and was feeling lightheaded/dizzy.  Per report, they were unable to get a reading on her blood pressure.  Shortly after, patient became agitated and was brought to the ED for further evaluation.     In the ED, patient with increased confusion and  female admitted to inpatient rehabilitation from 7/18/2024-07/26/2024 for acute metabolic encephalopathy. During this time, the patient participated in an aggressive multidisciplinary inpatient rehabilitation program involving 3 hours per day, 5 days per week of rehabilitation. Dr Colbert followed during the IPR stay for medical management. Patient's rehabilitation stay was complicated by migraine and pain. Despite these complications, patient progressed well with therapies making significant functional gains throughout her stay.     Patient was discharged Home in Stable condition.     See below for problem list addressed during rehab stay and for a functional status update.    Problem List:  Acute encephalopathy: Resolved. Thought to be multifactorial related to pain medication/muscle relaxers and dehydration  Hypertension: Continue amlodipine 2.5 mg daily, lisinopril 40 mg daily, and metoprolol XL 50 mg daily  Was previously on amlodipine 5 mg daily but states that PCP cut it down to 2.5 due to fainting at home. Recommend continuing home dose of 2.5 mg and monitoring BP regularly. Patient has PCP follow up scheduled for next week   Tachycardia: Continue metoprolol 50 mg daily (increased from home dose)  History of CVA: Continue Plavix 75 mg daily and atorvastatin 20 mg nightly for secondary stroke prophylaxis  Hyperlipidemia: Continue atorvastatin 20 mg nightly  Type 2 diabetes: Resume home jardiance 25 mg daily, Lantus 39 units QHS and SSI   Recent Left TKA: 7/10/2024  History of migraines: On qulipta daily as a preventative and Ubrelvy as abortive agent  GERD: continue pantoprazole  Pain: Continue as needed Tylenol and low dose Norco 2.5mg/ 5mg and monitor closely. Discussed ongoing weaning of Norco  Sleep: Melatonin 3 mg QHS PRN      Functional Status at time of Discharge:  Occupational Therapy:  EATING:Independent.   .  CARE Score: 6.     ORAL HYGIENE:Independent (per staff report, patient Tony within room).

## 2024-07-25 NOTE — PROGRESS NOTES
[facility]  INPATIENT SPEECH THERAPY  [facility]  DAILY NOTE    TIME   SLP Individual Minutes  Time In: 1037  Time Out: 1107  Minutes: 30  Timed Code Treatment Minutes: 30 Minutes      Date: 2024  Patient Name: Celia Perkins      CSN: [id number]   : 1950  (74 y.o.)  Gender: female   Referring Physician:  Vita Byrnes DO  Diagnosis: Metabolic encephalopathy  Precautions: Fall precautions  Current Diet: Regular Diet and Thin Liquids  Respiratory Status: Room Air  Swallowing Strategies: Standard Universal Swallow Precautions  Date of Last MBS/FEES:  2021    Pain:  No pain reported.    Subjective:  Patient positioned upright in bed. Session initiated 7 minutes late given orthopedic PA present to change dressing on surgical site. Patient pleasant and cooperative throughout session.  present and actively engaged.     Short-Term Goals:  SHORT TERM GOAL #1:  Goal 1: Patient will complete functional memory tasks (delayed, prospective, working) with 80% accuracy given mod cues to improve retention of novel information.   INTERVENTIONS:   Recall of information pertaining to ST from prior session:  indep    SHORT TERM GOAL #2:  Goal 2: Patient will complete problem solving, verbal/visual reasoning, and executive functioning tasks (household obligations) with 80% accuracy given mod cues to enhance success within ADLs/IADLs.   INTERVENTIONS:    Check writin/6 indep  Checkbook register organization:  indep  Math computation for determining balance (paper/pen math): 3/3 indep    SHORT TERM GOAL #3:  Goal 3: Patient will complete sequencing and thought organization tasks with 70% accuracy given mod cues to improve mental flexibility for home scenarios.   INTERVENTIONS:  Calendar navigation ( snack schedule):  indep  *Appropriate reasoning, thought organization and selective attention.    SHORT TERM GOAL #4:  Goal 4: Patient will complete

## 2024-07-25 NOTE — PLAN OF CARE
Problem: Discharge Planning  Goal: Discharge to home or other facility with appropriate resources  7/25/2024 1523 by Glenis Krishnan LISW  Note: SW met with patient and her , Raymon, on this date to follow up on discharge planning. SW updated patient on the progress of outpatient therapy. Patient is happy with this referral, and the care she has receiving on IPR. Patient feels she is ambulating better now then prior to admission. Patient and Raymon had no outstanding needs. SW will follow and maintain involvement in discharge planning.

## 2024-07-25 NOTE — PROGRESS NOTES
RECREATIONAL THERAPY  [facility]      Date:  7/25/2024            Patient Name: Celia ePrkins           MRN: [id number]  Acct: [id number]          YOB: 1950 (74 y.o.)       Gender: female   Diagnosis: Metabolic encephalopathy  Physician: Referring Practitioner: Cathi Rocha APRN - CNP (ordering)  Dr. Byrnes (attending0    REASON FOR MISSED TREATMENT:  Pt declined painting a sign with peers due to her  Coming in this afternoon     Lila Mixon, CTRS    7/25/2024

## 2024-07-25 NOTE — PLAN OF CARE
Problem: Discharge Planning  Goal: Discharge to home or other facility with appropriate resources  7/25/2024 1518 by Glenis Krishnan LISW  Note: Transportation:   Has transportation kept you from medical appointments, meetings, work, or from getting things needed for daily living? (Check all that apply)  No.      Health Literacy:   How often do you need to have someone help you when you read instructions, pamphlets, or other written material from your doctor or pharmacy?  0. - Never    Social Isolation:  How often do you feel lonely or isolated from those around you?  0. Never      Patient Mood Interview (PHQ-2 to 9) (from [employer]©)   Say to Patient: \"Over the last 2 weeks, have you been bothered by any of the following problems?\"   If symptom is present, enter 1 (yes) in column 1 (Symptom Presence)  If yes in column 1, then ask the patient: “About how often have you been bothered by this?”  Read and show the patient a card with the symptom frequency choices.    Indicate response in column 2, Symptom Frequency.   Symptom Presence  No (enter 0 in column 2)   Yes (enter 0-3 in column 2)  9.    No response (leave column 2 blank) Symptom Frequency  Never or 1 day  2-6 days (several days)  7-11 days (half or more of the days)  12-14 days (nearly every day    Symptom Presence Symptom Frequency   Little interest or pleasure in doing things 0. No 0. Never or 1 day   Feeling down, depressed, or hopeless 0. No 0. Never or 1 day

## 2024-07-25 NOTE — PLAN OF CARE
Problem: Discharge Planning  Goal: Discharge to home or other facility with appropriate resources  7/25/2024 1108 by Martha Vale RN  Outcome: Progressing  Flowsheets (Taken 7/24/2024 2254 by Renee Jones RN)  Discharge to home or other facility with appropriate resources: Identify barriers to discharge with patient and caregiver  7/24/2024 2254 by Renee Jones RN  Outcome: Progressing  Flowsheets (Taken 7/24/2024 2254)  Discharge to home or other facility with appropriate resources: Identify barriers to discharge with patient and caregiver     Problem: Safety - Adult  Goal: Free from fall injury  7/25/2024 1108 by Martha Vale RN  Outcome: Progressing  Flowsheets (Taken 7/24/2024 2254 by Renee Jones RN)  Free From Fall Injury: Instruct family/caregiver on patient safety  7/24/2024 2254 by Renee Jones RN  Outcome: Progressing  Flowsheets (Taken 7/24/2024 2254)  Free From Fall Injury: Instruct family/caregiver on patient safety  Note: Patient has remained free of physical injury during this shift. Safe environment provided, call light within reach, and hourly rounding completed.      Problem: ABCDS Injury Assessment  Goal: Absence of physical injury  7/25/2024 1108 by Martha Vale RN  Outcome: Progressing  Flowsheets (Taken 7/24/2024 2254 by Renee Jones RN)  Absence of Physical Injury: Implement safety measures based on patient assessment  7/24/2024 2254 by Renee Jones RN  Outcome: Progressing  Flowsheets (Taken 7/24/2024 2254)  Absence of Physical Injury: Implement safety measures based on patient assessment  Note: Patient has remained free of physical injury during this shift. Safe environment provided, call light within reach, and hourly rounding completed.      Problem: Neurosensory - Adult  Goal: Achieves maximal functionality and self care  7/25/2024 1108 by Martha Vale RN  Outcome: Progressing  Flowsheets (Taken 7/24/2024 1007 by  vomiting: Provide nonpharmacologic comfort measures as appropriate  7/24/2024 2254 by Renee Jones RN  Outcome: Progressing  Goal: Maintains or returns to baseline bowel function  7/25/2024 1108 by Martha Vale RN  Outcome: Progressing  Flowsheets (Taken 7/24/2024 1007 by Tipton, Rylee, LPN)  Maintains or returns to baseline bowel function:   Assess bowel function   Encourage oral fluids to ensure adequate hydration   Administer ordered medications as needed   Encourage mobilization and activity  7/24/2024 2254 by Renee Jones RN  Outcome: Progressing     Problem: Genitourinary - Adult  Goal: Absence of urinary retention  7/25/2024 1108 by Martha Vale RN  Outcome: Progressing  Flowsheets (Taken 7/24/2024 1007 by Tipton, Rylee, LPN)  Absence of urinary retention:   Assess patient’s ability to void and empty bladder   Monitor intake/output and perform bladder scan as needed  7/24/2024 2254 by Renee Jones RN  Outcome: Progressing     Problem: Infection - Adult  Goal: Absence of infection at discharge  7/25/2024 1108 by Martha Vale RN  Outcome: Progressing  Flowsheets (Taken 7/24/2024 1007 by Tipton, Rylee, LPN)  Absence of infection at discharge:   Assess and monitor for signs and symptoms of infection   Monitor lab/diagnostic results   Monitor all insertion sites i.e., indwelling lines, tubes and drains   Instruct and encourage patient and family to use good hand hygiene technique   Administer medications as ordered  7/24/2024 2254 by Renee Jonse RN  Outcome: Progressing     Problem: Metabolic/Fluid and Electrolytes - Adult  Goal: Electrolytes maintained within normal limits  7/25/2024 1108 by Martha Vale RN  Outcome: Progressing  Flowsheets (Taken 7/24/2024 1007 by Tipton, Rylee, LPN)  Electrolytes maintained within normal limits:   Monitor labs and assess patient for signs and symptoms of electrolyte imbalances   Administer electrolyte replacement as

## 2024-07-25 NOTE — PROGRESS NOTES
[facility]  Occupational Therapy  Daily Note  Time:   Time In: 0700  Time Out: 0824  Timed Code Treatment Minutes: 84 Minutes  Minutes: 84  Variance: -6 (Due to patient having migraine, Dr. Byrnes notified and ok'd missed minutes)     Date: 2024  Patient Name: Celia Perkins,   Gender: female      [facility]  MRN: [id number]  : 1950  (74 y.o.)  Referring Practitioner: Cathi Rocha APRN - CNP (ordering)  Dr. Byrnes (attending0  Diagnosis: Metabolic encephalopathy  Additional Pertinent Hx: 74 y.o. female with a history of aortic stenosis status post TAVR, stroke, insulin-dependent type 2 diabetes mellitus, who presented to [facility] with chief complaint of dizziness, shortness of breath, and confusion. The patient recently had a left total knee arthroplasty on 7/10/2024 and was d/c home. Per pt's , pt has been sleeping excessively with poor appeite. Pt was at therapy PTA when she began feeling light headed/dizzy. Her blood pressure was attempted to be taken, but they were unable to get a reading on the blood pressure cuff. Shortly thereafter, the patient was noted to be, agitated and as such, she was brought to the emergency room for further evaluation. In the emergency department, the patient was a stroke alert activation. Imaging was unremarkable and an MRI of the brain was negative for stroke.  Unknown true etiology of encephalopathy, however use of opioid pain medication and muscle relaxer was a likely cause.  Patient worked with therapy and was noted to require increased assistance in bed mobility transfers and functional mobility.  Patient also underwent a cognitive evaluation in which she scored 2 out of 30 on her MoCA, her last MoCA was completed in 2023 and was 19 out of 30 at that time, this was at the time of her CVA which was after her TAVR.  Pt presents for OT to improve ADL independence to return home with  Independent.   Distance:  To/from bathroom, to therapy gym, throughout therapy apartment and kitchen  Fair pace, no LOB noted. Seated rest break required after long distance mobility. Min fatigue noted     ADDITIONAL ACTIVITIES:  Patient completed BUE exercises x15 reps x1 set with chest press and elbow flexion/extension using 2# dumb bell seated in w/c. Required brief rest breaks. Completed to increase UE strength and maintain joint integrity/ROM required for ADLs  Unable to complete remaining exercises due to patient beginning to have migraine and brought back to room.       Modified Paradise Scale:  Not Applicable    ASSESSMENT:     Activity Tolerance:  Patient tolerance of  treatment: Good treatment tolerance      Discharge Recommendations: Home with Home Exercise Program  Equipment Recommendations: Other: Pt has a tub transfer bench for her tub/shower with grab bars. Pt has BSC set up over her toilet.  Pt has LHAE kit.  No further AE recommended.  Plan: Times Per Week: 5x/wk for 90 min  Current Treatment Recommendations: Strengthening, Balance training, Functional mobility training, Cognitive reorientation, Safety education & training, Patient/Caregiver education & training, Self-Care / ADL, Cognitive/Perceptual training, Equipment evaluation, education, & procurement, Home management training, Endurance training    Education:  Learners: Patient  Safety with transfers and mobility, UB exercises, ADL strategies, IADL strategies     Goals  Short Term Goals  Time Frame for Short Term Goals: 1 week  Short Term Goal 1: Pt will utilize tub transfer bench with SBA to access her tub/shower to improve ADL performance within her home set up.  Short Term Goal 2: Pt will navigate RW to retrieve 2 items with S to improve indep with gathering clothing from closet at home.  Short Term Goal 3: Pt will tolerate 4 minutes dynamic standing task with S to improve indep with sinkside grooming and haircare routine.  Short Term Goal 4:

## 2024-07-25 NOTE — PROGRESS NOTES
[facility]  INPATIENT PHYSICAL THERAPY  DAILY NOTE  [facility] - 8K-07/007-A    Time In: 1000  Time Out: 1030  Timed Code Treatment Minutes: 30 Minutes  Minutes: 30          Date: 2024  Patient Name: Celia Perkins,  Gender:  female        MRN: [id number]  : 1950  (74 y.o.)  Referral Date : 24  Referring Practitioner: Cathi Rocha APRN - CNP (referring), Vita Byrnes DO (attending)  Diagnosis: Metabolic encephalopathy  Additional Pertinent Hx: Per EMR: Celia Perkins  is a 74 y.o. female with PMH significant for asthma, breast cancer, CVA, hypertension, and type 2 diabetes, who is being admitted to the inpatient rehabilitation unit on 2024.Patient initially presented to [facility] ED on 2024.In the ED, patient with increased confusion and agitation.  A stroke alert was called.  CT head was negative for any acute intracranial abnormalities.  MRI of the brain was also reportedly negative for an acute CVA, however, did demonstrate an old infarct in the inferior left cerebellum.On admission, EEG was obtained and reportedly revealed disorganized and slow background suggesting a mild to moderate encephalopathy of nonspecific etiology.  Neurology evaluated and followed patient and felt that altered mental status was most likely due to toxic metabolic encephalopathy which was felt to be multifactorial in nature related to mild dehydration and pain medication/muscle relaxers.     Prior Level of Function:  Lives With: Spouse  Type of Home: House  Home Layout: One level, Laundry in basement  Home Access: Stairs to enter with rails  Entrance Stairs - Number of Steps: 5  Entrance Stairs - Rails: Both  Home Equipment: Walker - Standard, Sock aid, Reacher, Grab bars (has wheels for walker, has transport chair that she is borrowing)   Bathroom Shower/Tub: Tub/Shower unit  Bathroom Toilet: Standard (bedside commode over toilet)  Bathroom Equipment: Grab bars  home safely. GOAL MET, SEE LTG  Short Term Goal 4: Patient will improve tinetti to greater than or equal to 20/28 to achieve MDC and reduce risk for falls. GOAL MET, SEE LTG  Short Term Goal 5: Patient will ascend/descend 4 steps with 2 hand rails with SBA to progress towards accessing/leaving home safely. GOAL MET, SEE LTG  Additional Goals?: Yes  Short Term Goal 6: Patient will improve left knee flexion to at least 95 degrees seated to decrease difficulty with functional transfers and improve gait.  Long Term Goals  Time Frame for Long Term Goals : 2 weeks  Long Term Goal 1: Patient will complete sit < > stand from various surfaces with modified independene to stand to ambulate safely.  Long Term Goal 2: Patient will ambulate 200' with a RW with modified independence, good left knee flexion during swing phase to navigate home and to/from appointments safely.  Long Term Goal 3: Patient will ascend/descend 5 steps with 2 hand rails with modified independence to access/leave home safely.  Long Term Goal 4: Patient will complete car transfer with modified independence to transfer in and out of vehicle for transfer to home.    Following session, patient left in safe position with all fall risk precautions in place.

## 2024-07-26 VITALS
DIASTOLIC BLOOD PRESSURE: 71 MMHG | RESPIRATION RATE: 18 BRPM | SYSTOLIC BLOOD PRESSURE: 113 MMHG | TEMPERATURE: 97.5 F | OXYGEN SATURATION: 100 % | BODY MASS INDEX: 29.33 KG/M2 | WEIGHT: 159.39 LBS | HEART RATE: 103 BPM | HEIGHT: 62 IN

## 2024-07-26 LAB — GLUCOSE BLD STRIP.AUTO-MCNC: 174 MG/DL (ref 70–108)

## 2024-07-26 PROCEDURE — 97530 THERAPEUTIC ACTIVITIES: CPT

## 2024-07-26 PROCEDURE — 6370000000 HC RX 637 (ALT 250 FOR IP): Performed by: NURSE PRACTITIONER

## 2024-07-26 PROCEDURE — 97130 THER IVNTJ EA ADDL 15 MIN: CPT

## 2024-07-26 PROCEDURE — 97116 GAIT TRAINING THERAPY: CPT

## 2024-07-26 PROCEDURE — 99239 HOSP IP/OBS DSCHRG MGMT >30: CPT | Performed by: STUDENT IN AN ORGANIZED HEALTH CARE EDUCATION/TRAINING PROGRAM

## 2024-07-26 PROCEDURE — 97129 THER IVNTJ 1ST 15 MIN: CPT

## 2024-07-26 PROCEDURE — 6360000002 HC RX W HCPCS: Performed by: NURSE PRACTITIONER

## 2024-07-26 PROCEDURE — 82948 REAGENT STRIP/BLOOD GLUCOSE: CPT

## 2024-07-26 PROCEDURE — 6370000000 HC RX 637 (ALT 250 FOR IP): Performed by: STUDENT IN AN ORGANIZED HEALTH CARE EDUCATION/TRAINING PROGRAM

## 2024-07-26 PROCEDURE — 6370000000 HC RX 637 (ALT 250 FOR IP): Performed by: FAMILY MEDICINE

## 2024-07-26 RX ORDER — INSULIN GLARGINE 100 [IU]/ML
39 INJECTION, SOLUTION SUBCUTANEOUS NIGHTLY
Qty: 5 ADJUSTABLE DOSE PRE-FILLED PEN SYRINGE | Refills: 1
Start: 2024-07-26

## 2024-07-26 RX ORDER — POLYETHYLENE GLYCOL 3350 17 G/17G
17 POWDER, FOR SOLUTION ORAL DAILY PRN
Qty: 527 G | Refills: 1 | COMMUNITY
Start: 2024-07-26 | End: 2024-09-26

## 2024-07-26 RX ORDER — LANOLIN ALCOHOL/MO/W.PET/CERES
3 CREAM (GRAM) TOPICAL NIGHTLY
Refills: 3 | COMMUNITY
Start: 2024-07-26

## 2024-07-26 RX ORDER — HYDROCODONE BITARTRATE AND ACETAMINOPHEN 5; 325 MG/1; MG/1
1 TABLET ORAL EVERY 12 HOURS PRN
Qty: 14 TABLET | Refills: 0 | Status: CANCELLED | OUTPATIENT
Start: 2024-07-26 | End: 2024-08-02

## 2024-07-26 RX ORDER — INSULIN GLARGINE 100 [IU]/ML
39 INJECTION, SOLUTION SUBCUTANEOUS NIGHTLY
Qty: 5 ADJUSTABLE DOSE PRE-FILLED PEN SYRINGE | Refills: 1 | Status: CANCELLED
Start: 2024-07-26

## 2024-07-26 RX ORDER — METOPROLOL SUCCINATE 50 MG/1
50 TABLET, EXTENDED RELEASE ORAL DAILY
Qty: 30 TABLET | Refills: 1 | Status: SHIPPED | OUTPATIENT
Start: 2024-07-26

## 2024-07-26 RX ADMIN — AMLODIPINE BESYLATE 5 MG: 5 TABLET ORAL at 07:55

## 2024-07-26 RX ADMIN — ENOXAPARIN SODIUM 40 MG: 100 INJECTION SUBCUTANEOUS at 09:01

## 2024-07-26 RX ADMIN — CLOPIDOGREL BISULFATE 75 MG: 75 TABLET ORAL at 07:55

## 2024-07-26 RX ADMIN — PANTOPRAZOLE SODIUM 40 MG: 40 TABLET, DELAYED RELEASE ORAL at 06:38

## 2024-07-26 RX ADMIN — HYDROCODONE BITARTRATE AND ACETAMINOPHEN 0.5 TABLET: 5; 325 TABLET ORAL at 09:01

## 2024-07-26 RX ADMIN — LISINOPRIL 40 MG: 40 TABLET ORAL at 07:55

## 2024-07-26 RX ADMIN — METOPROLOL SUCCINATE 50 MG: 50 TABLET, EXTENDED RELEASE ORAL at 07:55

## 2024-07-26 ASSESSMENT — PAIN SCALES - GENERAL
PAINLEVEL_OUTOF10: 6
PAINLEVEL_OUTOF10: 0

## 2024-07-26 NOTE — PLAN OF CARE
Problem: Discharge Planning  Goal: Discharge to home or other facility with appropriate resources  7/26/2024 0823 by Glenis Krishnan LISW  Note: Patient to be discharged on Friday, 7/26 to home. Patient will be under the supervision of her , Raymon. Family education will be provided on Friday, 7/26. Patient will be receiving services through [facility]. SW provided information to Zhanna on 7/24 via Epic.

## 2024-07-26 NOTE — PLAN OF CARE
Problem: Discharge Planning  Goal: Discharge to home or other facility with appropriate resources  7/26/2024 1005 by Martha Vale RN  Outcome: Adequate for Discharge  7/26/2024 0823 by Glenis Krishnan LISW  Note: Patient to be discharged on Friday, 7/26 to home. Patient will be under the supervision of her , Raymon. Family education will be provided on Friday, 7/26. Patient will be receiving services through [facility]. SW provided information to Zhanna on 7/24 via Epic.    7/26/2024 0221 by Randa Mcknight RN  Outcome: Progressing  Flowsheets (Taken 7/25/2024 2030)  Discharge to home or other facility with appropriate resources: Identify barriers to discharge with patient and caregiver     Problem: Safety - Adult  Goal: Free from fall injury  7/26/2024 1005 by Martha Vale RN  Outcome: Adequate for Discharge  7/26/2024 0221 by Randa Mcknight RN  Outcome: Progressing     Problem: ABCDS Injury Assessment  Goal: Absence of physical injury  7/26/2024 1005 by Martha Vale RN  Outcome: Adequate for Discharge  7/26/2024 0221 by Randa Mcknight RN  Outcome: Progressing     Problem: Neurosensory - Adult  Goal: Achieves maximal functionality and self care  7/26/2024 1005 by Martha Vale RN  Outcome: Adequate for Discharge  7/26/2024 0221 by Randa Mcknight RN  Outcome: Progressing  Flowsheets (Taken 7/25/2024 2030)  Achieves maximal functionality and self care: Monitor swallowing and airway patency with patient fatigue and changes in neurological status     Problem: Respiratory - Adult  Goal: Achieves optimal ventilation and oxygenation  7/26/2024 1005 by Martha Vale RN  Outcome: Adequate for Discharge  7/26/2024 0221 by Randa Mcknight RN  Outcome: Progressing  Flowsheets (Taken 7/25/2024 2030)  Achieves optimal ventilation and oxygenation: Assess for changes in mentation and behavior     Problem: Cardiovascular - Adult  Goal: Maintains optimal

## 2024-07-26 NOTE — CARE COORDINATION
Patient had difficulty falling asleep. She requested a half of her pain medication. She thinks perhaps she over did it in therapy and is anxious about going home tomorrow.

## 2024-07-26 NOTE — PROGRESS NOTES
[facility]  Inpatient Rehabilitation  Occupational Therapy  Discharge Note  Time:  Time In: 930  Time Out: 1000  Timed Code Treatment Minutes: 30 Minutes  Minutes: 30          Date: 2024  Patient Name: Celia Perkins,   Gender: female      [facility]  MRN: [id number]  : 1950  (74 y.o.)  Referring Practitioner: Cathi Rocha APRN - CNP (ordering)  Dr. Byrnes (attending0  Diagnosis: Metabolic encephalopathy  Additional Pertinent Hx: 74 y.o. female with a history of aortic stenosis status post TAVR, stroke, insulin-dependent type 2 diabetes mellitus, who presented to [facility] with chief complaint of dizziness, shortness of breath, and confusion. The patient recently had a left total knee arthroplasty on 7/10/2024 and was d/c home. Per pt's , pt has been sleeping excessively with poor appeite. Pt was at therapy PTA when she began feeling light headed/dizzy. Her blood pressure was attempted to be taken, but they were unable to get a reading on the blood pressure cuff. Shortly thereafter, the patient was noted to be, agitated and as such, she was brought to the emergency room for further evaluation. In the emergency department, the patient was a stroke alert activation. Imaging was unremarkable and an MRI of the brain was negative for stroke.  Unknown true etiology of encephalopathy, however use of opioid pain medication and muscle relaxer was a likely cause.  Patient worked with therapy and was noted to require increased assistance in bed mobility transfers and functional mobility.  Patient also underwent a cognitive evaluation in which she scored 2 out of 30 on her MoCA, her last MoCA was completed in 2023 and was 19 out of 30 at that time, this was at the time of her CVA which was after her TAVR.  Pt presents for OT to improve ADL independence to return home with spouse.    Restrictions/Precautions:  Restrictions/Precautions: General Precautions, Fall Risk, Weight

## 2024-07-26 NOTE — PROGRESS NOTES
Patient discharged in stable condition as per order of attending physician to Home per staff at Time: 1122 and Via Wheelchair to car.    AVS provided by RN at time of discharge, which includes all necessary medical information pertaining to the patients current course of illness, treatment, medications, post-discharge goals of care, and treatment preferences.     Patient/ family verbalize understanding of discharge plan and are in agreement with goal/plan/treatment preferences.     Belongings including Glasses sent with patient.      Home medications sent home with patient yes    Availability of \"My Chart\" offered to patient as a tool for updated health record.  Steps for activation discussed with patient as mentioned on AVS.

## 2024-07-26 NOTE — RESEARCH
Preparedness Assessment for the Transition Home (PATH-25) Instrument   (Yana Blake & Whitney Gomez, 2018)    1. How much do you understand about the patient's expected recovery over the next 6 months? I have a lot of understanding about the patient's expected recovery over the next 6 months. (4)   2. How much do you understand about how the patient's injury/illness will affect your lives over the next 6 months? I understand a lot about how the injury/illness will affect our lives over the next 6 months. (4)   3. How much do you understand about what you need to do to get things ready before the patient goes home? I understand a lot about what I need to do to get ready before the patient goes home. (4)   4. How much do you understand about what assistance the patient will need with personal care (such as bathing, using the toilet, dressing, and moving around) when he/she goes home? I understand a lot about what assistance the patient will need with personal care when he/she goes home. (4)   5. How much experience have you had providing physical help with personal care (such as bathing, using the toilet, dressing and moving around) for someone who has an injury/illness or other disability? I have at least one month but less than a year experience providing physical help with personal care for someone who has an injury/illness or other disability. (3)    6. How prepared are you to provide the patient assistance with personal care (such as bathing, using the toilet, dressing and moving around) when he/she goes home? I am very prepared to provide the patient assistance with personal care when he/she goes home. (4)   7. How willing are you to provide personal care (such as bathing, using the toilet, dressing and moving around) for the patient when he/she goes home? I am willing to provide a lot of personal care for the patient. (4)   8. How much time will you have to provide personal care for the patient when  patient will have accessible transportation that he/she can use to go places. (4)   24. Thinking over the past year, how much conflict have you had in your relationship with the patient? We do not have conflict between us. (4)   25. How mentally prepared are you to be a caregiver? I am very mentally prepared to be a caregiver. (4)    TOTAL SCORE 97.1       Instructions for Use/Scoring: The PATH-25 was designed to be a self-administered instrument to assess caregiver preparation/readiness prior to a patient's discharge from an inpatient rehabilitation facility. The sum score (1-100) or average score (1-4) of the items can be used to identify level of caregiver preparedness. Scores with \"not applicable\" are scored either 1.1 or 4.1.  For research purposes, this allows the \"not applicable ratings\" to be distinguished from a 1 or a 4 rating for data analyses.  For clinical purposes, a score of 1.1 = 1; a score of 4.1 = 4.  For clinical practice: After the caregiver completes the PATH-25, the nurse, therapist, or other clinician should review each item. A score of 2 or lower on any item indicates that the caregiver may not be prepared in that domain. Interventions should be tailored to address those items where the caregiver scores 2 or lower.

## 2024-07-26 NOTE — PROGRESS NOTES
given mod cues to enhance success within ADLs/IADLs. GOAL MET  INTERVENTIONS:      Previous Session:    Check writin/6 indep  Checkbook register organization:  indep  Math computation for determining balance (paper/pen math): 3/3 indep    SHORT TERM GOAL #3:  Goal 3: Patient will complete sequencing and thought organization tasks with 70% accuracy given mod cues to improve mental flexibility for home scenarios. GOAL MET  INTERVENTIONS:    Previous Session:    Calendar navigation ( snack schedule):  indep  *Appropriate reasoning, thought organization and selective attention.    SHORT TERM GOAL #4:  Goal 4: Patient will complete selective and divided attention tasks with no more than 3 errors until task completion to permit potential return to multi-tasking, IADLs, driving, and occupational hobbies. GOAL MET  INTERVENTIONS:      Previous Session:    Selective attention:  -Hospital directory navigation: 10/10 indep  -Prescription label: 10/10 indep  *Appropriate reasoning, thought organization and selective attention    Long-Term Goals:  Time Frame for Long Term Goals: 2 weeks from IPR evaluation    LONG TERM GOAL #1:  Goal 1: Patient will improve cognitive functioning to a modified independent level (FIM 6) to optimize independence in the home environment with ADL tasks. GOAL NOT MET    Comprehension: 6 - Complex ideas 90% or device (hearing aid or glasses- if patient is primarily a visual learner)  Expression: 6 - Device used to express complex ideas/needs  Social Interaction: 6 - Patient requires medication for mood and/or effect  Problem Solvin - Patient able to solve simple/routine tasks  Memory: 6 - Patient requires device to recall (e.g. memory book)    Functional Oral Intake Scale: Total Oral Intake: 7.  Total oral intake with no restrictions    EDUCATION:  Learner: Patient and Significant Other  Education:  Reviewed ST goals and Plan of Care, Reviewed recommendations for follow-up,

## 2024-07-26 NOTE — PLAN OF CARE
Problem: Discharge Planning  Goal: Discharge to home or other facility with appropriate resources  7/26/2024 0221 by Randa Mcknight RN  Outcome: Progressing  Flowsheets (Taken 7/25/2024 2030)  Discharge to home or other facility with appropriate resources: Identify barriers to discharge with patient and caregiver     Problem: Safety - Adult  Goal: Free from fall injury  Outcome: Progressing     Problem: ABCDS Injury Assessment  Goal: Absence of physical injury  Outcome: Progressing     Problem: Neurosensory - Adult  Goal: Achieves maximal functionality and self care  Outcome: Progressing  Flowsheets (Taken 7/25/2024 2030)  Achieves maximal functionality and self care: Monitor swallowing and airway patency with patient fatigue and changes in neurological status     Problem: Respiratory - Adult  Goal: Achieves optimal ventilation and oxygenation  Outcome: Progressing  Flowsheets (Taken 7/25/2024 2030)  Achieves optimal ventilation and oxygenation: Assess for changes in mentation and behavior     Problem: Cardiovascular - Adult  Goal: Maintains optimal cardiac output and hemodynamic stability  Outcome: Progressing  Flowsheets (Taken 7/25/2024 2030)  Maintains optimal cardiac output and hemodynamic stability: Monitor blood pressure and heart rate     Problem: Cardiovascular - Adult  Goal: Absence of cardiac dysrhythmias or at baseline  Outcome: Progressing  Flowsheets (Taken 7/25/2024 2030)  Absence of cardiac dysrhythmias or at baseline: Monitor cardiac rate and rhythm     Problem: Musculoskeletal - Adult  Goal: Return mobility to safest level of function  Outcome: Progressing  Flowsheets (Taken 7/25/2024 2030)  Return Mobility to Safest Level of Function: Assess patient stability and activity tolerance for standing, transferring and ambulating with or without assistive devices     Problem: Musculoskeletal - Adult  Goal: Maintain proper alignment of affected body part  Outcome: Progressing  Flowsheets (Taken  Progressing  Flowsheets (Taken 7/25/2024 2030)  Care Plan - Patient's Chronic Conditions and Co-Morbidity Symptoms are Monitored and Maintained or Improved: Monitor and assess patient's chronic conditions and comorbid symptoms for stability, deterioration, or improvement     Problem: Pain  Goal: Verbalizes/displays adequate comfort level or baseline comfort level  Outcome: Progressing  Flowsheets (Taken 7/25/2024 2030)  Verbalizes/displays adequate comfort level or baseline comfort level: Encourage patient to monitor pain and request assistance     Problem: Nutrition Deficit:  Goal: Optimize nutritional status  Outcome: Progressing   Care plan reviewed with patient.  Patient verbalizes understanding of the plan of care and contributes to goal setting.

## 2024-07-26 NOTE — PROGRESS NOTES
[facility]  INPATIENT PHYSICAL THERAPY  Discharge Note  [facility] - 8K-07/007-A    Time In: 09  Time Out: 930  Timed Code Treatment Minutes: 25 Minutes  Minutes: 25          Date: 2024  Patient Name: Celia Perkins,  Gender:  female        MRN: [id number]  : 1950  (74 y.o.)  Referral Date : 24  Referring Practitioner: Cathi Rocha APRN - CNP (referring), Vita Byrnes DO (attending)  Diagnosis: Metabolic encephalopathy  Additional Pertinent Hx: Per EMR: Celia Perkins  is a 74 y.o. female with PMH significant for asthma, breast cancer, CVA, hypertension, and type 2 diabetes, who is being admitted to the inpatient rehabilitation unit on 2024.Patient initially presented to [facility] ED on 2024.In the ED, patient with increased confusion and agitation.  A stroke alert was called.  CT head was negative for any acute intracranial abnormalities.  MRI of the brain was also reportedly negative for an acute CVA, however, did demonstrate an old infarct in the inferior left cerebellum.On admission, EEG was obtained and reportedly revealed disorganized and slow background suggesting a mild to moderate encephalopathy of nonspecific etiology.  Neurology evaluated and followed patient and felt that altered mental status was most likely due to toxic metabolic encephalopathy which was felt to be multifactorial in nature related to mild dehydration and pain medication/muscle relaxers.     Prior Level of Function:  Lives With: Spouse  Type of Home: House  Home Layout: One level, Laundry in basement  Home Access: Stairs to enter with rails  Entrance Stairs - Number of Steps: 5  Entrance Stairs - Rails: Both  Home Equipment: Walker - Standard, Sock aid, Reacher, Grab bars (has wheels for walker, has transport chair that she is borrowing)    Vitals: Vitals not assessed per clinical judgement, see nursing  ambulate safely. GOAL MET  Long Term Goal 2: Patient will ambulate 200' with a RW with modified independence, good left knee flexion during swing phase to navigate home and to/from appointments safely. GOAL MET  Long Term Goal 3: Patient will ascend/descend 5 steps with 2 hand rails with modified independence to access/leave home safely. GOAL MET  Long Term Goal 4: Patient will complete car transfer with modified independence to transfer in and out of vehicle for transfer to home. GOAL MET                              Following session, patient left in safe position with all fall risk precautions in place.

## 2024-07-26 NOTE — PROGRESS NOTES
[facility]  Recreational Therapy  Discharge Note  Inpatient Rehabilitation Unit         Date:  7/26/2024       Patient Name: Celia Perkins      MRN: [id number]       YOB: 1950 (74 y.o.)       Gender: female  Diagnosis: Metabolic encephalopathy  Referring Practitioner: Cathi Rocha APRN - CNP (ordering)  Dr. Byrnes (attending0    Patient discharged from Recreational Therapy at this time.  See recreational therapy notes for details.    Electronically signed by: Lila Mixon, CTRS  Date: 7/26/2024

## 2024-07-29 ENCOUNTER — CARE COORDINATION (OUTPATIENT)
Dept: CASE MANAGEMENT | Age: 74
End: 2024-07-29

## 2024-07-29 DIAGNOSIS — G93.40 ACUTE ENCEPHALOPATHY: Primary | ICD-10-CM

## 2024-07-29 PROCEDURE — 1111F DSCHRG MED/CURRENT MED MERGE: CPT | Performed by: FAMILY MEDICINE

## 2024-07-29 NOTE — CARE COORDINATION
call.    Assessments:  Care Transitions 24 Hour Call    Do you have a copy of your discharge instructions?: Yes  Do you have all of your prescriptions and are they filled?: Yes  Have you been contacted by a [facility] Pharmacist?: No  Have you scheduled your follow up appointment?: Yes  How are you going to get to your appointment?: Car - family or friend to transport  Do you feel like you have everything you need to keep you well at home?: Yes  Care Transitions Interventions          Follow Up Appointment:   Discussed follow up appointments. Patient has hospital follow up appointment scheduled within 7 days of discharge.   Future Appointments         Provider Specialty Dept Phone    7/31/2024 1:15 PM Ellen Bland, PT Physical Therapy [phone]    7/31/2024 2:15 PM Courtney Silva, SLP Speech Therapy [phone]    8/5/2024 10:45 AM Yash Nelson MD Family Medicine [phone]    9/17/2024 7:45 AM Yash Nelson MD Family Medicine [phone]            Care Transition Nurse provided contact information.  Plan for follow-up call in 6-10 days based on severity of symptoms and risk factors.  Plan for next call: symptom management-dizziness/lightheadedness, migraine, SOB, CP, any new or worsening symptoms, review PCP juant      Carol Cabral RN

## 2024-07-31 ENCOUNTER — HOSPITAL ENCOUNTER (OUTPATIENT)
Dept: SPEECH THERAPY | Age: 74
Setting detail: THERAPIES SERIES
Discharge: HOME | End: 2024-07-31
Payer: MEDICARE

## 2024-07-31 ENCOUNTER — HOSPITAL ENCOUNTER (OUTPATIENT)
Dept: PHYSICAL THERAPY | Age: 74
Setting detail: THERAPIES SERIES
Discharge: HOME | End: 2024-07-31
Payer: MEDICARE

## 2024-07-31 PROCEDURE — 97162 PT EVAL MOD COMPLEX 30 MIN: CPT

## 2024-07-31 PROCEDURE — 92523 SPEECH SOUND LANG COMPREHEN: CPT | Performed by: SPEECH-LANGUAGE PATHOLOGIST

## 2024-07-31 PROCEDURE — 97116 GAIT TRAINING THERAPY: CPT

## 2024-07-31 PROCEDURE — 97110 THERAPEUTIC EXERCISES: CPT

## 2024-07-31 NOTE — DISCHARGE SUMMARY
limited-0 post-secondary schooling       Visuospatial/Executive 5/5   Naming 3/3   Memory   Immediate 2/5   Short-Term 1/5   Attention   Digit Repetition 2/2   Sustained/Selective 1/1   Mental Math 3/3   Language   Sentence Repetition 2/2   Fluency Naming 3 items in 60 seconds   Abstraction 2/2   Orientation 6/6   Total: 29/30     **Patient arrived to this session and stated that she really liked the SLP that she worked with on inpatient and then went on to state x 5 things about the SLP that she was asked to recall on her last treatment session on 7/26/24.  These things were confirmed from the last treatment note.  100% accuracy.    **Patient independently recalled WRAP memory strategies  that she remembered learning about when she came to OP speech therapy in March/April of 2023 and reports she \"lives by them.\"     **Patient reports she plays solitaire, word scapes and word finds almost daily at home.     SWALLOWING:  Patient is on a regular diet with thin liquids and denies any overt difficulty, however, reports she has had a poor appetite and has lost about 20 pounds since she had her knee surgery.    IMPRESSIONS: Patient presents with cognition that is WFL with a score of 29/30 on the MOCA (this is improved from 15/30 on 7/19/24).  Patient's expressive and receptive language skills also appear within functional limits.  Patient reports she is managing her finances, medications and daily schedule independently and without difficulty.  Patient reports she notices mild difficulty with completing mental math computation, however, this has been since her CVA in 2023.  Educated the patient on an juan with math facts that she can practice her mental math computation on (patient prefers computer games, etc).    Activity/Treatment Tolerance:  [x]  Patient tolerated treatment well  []  Patient limited by fatigue  []  Patient limited by pain   []  Patient limited by other medical complications  []  Other:     Patient

## 2024-07-31 NOTE — PROGRESS NOTES
admission, EEG was obtained and reportedly revealed disorganized and slow background suggesting a mild to moderate encephalopathy of nonspecific etiology.  Neurology evaluated and followed patient and felt that altered mental status was most likely due to toxic metabolic encephalopathy which was felt to be multifactorial in nature related to mild dehydration and pain medication/muscle relaxers.  Anticoagulation was initially held for possible lumbar puncture, however, this was not pursued and Plavix was resumed on 7/17 as patient showed some improvements.  Neurology signed off.  Infectious workup thus far has been unrevealing.  .  CTA of the chest was obtained and negative for PE     Of note patient does have history of a CVA following TAVR in 2023.  Patient was discharged from inpatient rehab on 7/26/24     SUBJECTIVE: Patient reports increased fatigue from returning home from inpatient rehab.     Social/Functional History and Current Status:  Medications and Allergies have been reviewed and are listed on Medical History Questionnaire.    Celia JARAMILLO Long lives with spouse in a single story home with stairs and a handrail to enter.    Task Previous Current   ADL’s  Independent Modified Independent   IADL's Independent Assistance Required   Ambulation Independent Modified Independent   Transfers Independent Modified Independent   Recreation Independent Assistance Required   Community Integration Independent Assistance Required   Driving Active  Active  but not at this time due to post-op restrictions   Work Retired  Retired     OBJECTIVE:    OBSERVATION    Comments   Pain 4/10    Posture Slight forward head    Palpation Tenderness at distal patellar pole    Sensation wfl   Observation Incision well healing no redness noted    Edema +1   Patellar Mobility Limited superior and medial/lateral glide    Patellar Orientation Midline        LOWER EXTREMITY RANGE OF MOTION    Left Right Comments   Hip Flexion

## 2024-08-01 ASSESSMENT — KOOS JR
BENDING TO THE FLOOR TO PICK UP OBJECT: SEVERE
STRAIGHTENING KNEE FULLY: MODERATE
STANDING UPRIGHT: MODERATE
RISING FROM SITTING: SEVERE
HOW SEVERE IS YOUR KNEE STIFFNESS AFTER FIRST WAKING IN MORNING: MODERATE
GOING UP OR DOWN STAIRS: SEVERE
KOOS JR TOTAL INTERVAL SCORE: 44.905
TWISING OR PIVOTING ON KNEE: MODERATE

## 2024-08-05 ENCOUNTER — OFFICE VISIT (OUTPATIENT)
Dept: FAMILY MEDICINE CLINIC | Age: 74
End: 2024-08-05

## 2024-08-05 ENCOUNTER — APPOINTMENT (OUTPATIENT)
Dept: PHYSICAL THERAPY | Age: 74
End: 2024-08-05
Payer: MEDICARE

## 2024-08-05 VITALS
BODY MASS INDEX: 31.34 KG/M2 | HEART RATE: 91 BPM | OXYGEN SATURATION: 98 % | DIASTOLIC BLOOD PRESSURE: 78 MMHG | RESPIRATION RATE: 18 BRPM | SYSTOLIC BLOOD PRESSURE: 104 MMHG | WEIGHT: 171.4 LBS

## 2024-08-05 DIAGNOSIS — R53.81 PHYSICAL DECONDITIONING: ICD-10-CM

## 2024-08-05 DIAGNOSIS — Z96.652 STATUS POST LEFT KNEE REPLACEMENT: ICD-10-CM

## 2024-08-05 DIAGNOSIS — Z09 HOSPITAL DISCHARGE FOLLOW-UP: ICD-10-CM

## 2024-08-05 DIAGNOSIS — E11.649 TYPE 2 DIABETES MELLITUS WITH HYPOGLYCEMIA WITHOUT COMA, WITH LONG-TERM CURRENT USE OF INSULIN (HCC): ICD-10-CM

## 2024-08-05 DIAGNOSIS — Z79.4 TYPE 2 DIABETES MELLITUS WITH HYPOGLYCEMIA WITHOUT COMA, WITH LONG-TERM CURRENT USE OF INSULIN (HCC): ICD-10-CM

## 2024-08-05 DIAGNOSIS — I10 ESSENTIAL HYPERTENSION: Primary | ICD-10-CM

## 2024-08-05 PROBLEM — G93.40 ACUTE ENCEPHALOPATHY: Status: RESOLVED | Noted: 2024-07-16 | Resolved: 2024-08-05

## 2024-08-05 PROBLEM — G93.41 METABOLIC ENCEPHALOPATHY: Status: RESOLVED | Noted: 2024-07-18 | Resolved: 2024-08-05

## 2024-08-05 PROBLEM — R41.82 ALTERED MENTAL STATUS: Status: RESOLVED | Noted: 2024-07-16 | Resolved: 2024-08-05

## 2024-08-05 RX ORDER — UBROGEPANT 100 MG/1
TABLET ORAL
COMMUNITY

## 2024-08-05 ASSESSMENT — ENCOUNTER SYMPTOMS: SHORTNESS OF BREATH: 1

## 2024-08-05 NOTE — PATIENT INSTRUCTIONS
Sliding scale with Humalog 3x per day prior to meals  Glucose  Insulin units  100-150  0  151-200  2  201-250  4  251-300  6  301-350  8

## 2024-08-05 NOTE — PROGRESS NOTES
Post-Discharge Transitional Care Follow Up      Celia Perkins   YOB: 1950    Date of Office Visit:  8/5/2024  Date of Hospital Admission: 7/18/24  Date of Hospital Discharge: 7/26/24  Readmission Risk Score (high >=14%. Medium >=10%):Readmission Risk Score: 12.2      Care management risk score Rising risk (score 2-5) and Complex Care (Scores >=6): No Risk Score On File     Non face to face  following discharge, date last encounter closed (first attempt may have been earlier): 07/29/2024     Call initiated 2 business days of discharge: Yes     Essential hypertension  Type 2 diabetes mellitus with hypoglycemia without coma, with long-term current use of insulin (HCC)  Physical deconditioning  Status post left knee replacement  Hospital discharge follow-up  -     NC DISCHARGE MEDS RECONCILED W/ CURRENT OUTPATIENT MED LIST      Medical Decision Making: moderate complexity  Return if symptoms worsen or fail to improve.         Hypertension: Chronic.  Overcontrolled.  Stop amlodipine.  Continue Lotensin and metoprolol    Type 2 diabetes with hyperglycemia: Chronic.  Holding Lantus.  On Jardiance.  Humalog scale was adjusted today.  Check glucose 3 times per day at meals with CGM  Sliding scale with Humalog 3x per day prior to meals  Glucose  Insulin units  100-150  0  151-200  2  201-250  4  251-300  6  301-350  8      Physical deconditioning secondary to knee surgery and hospitalization.  Continue therapy.  Follow-up with Ortho surgeon    Subjective:   Weight Loss  Pertinent negatives include no chest pain, chills or fever.       Inpatient course: Discharge summary reviewed- see chart.    Interval history/Current status:     S/p left TKA in mid July.  Admission for acute encephalopathy on 7/16 and then to inpatient rehab floor    Home  Doing PT  Not sleeping well.   Not eating.  Losing weight.  Deconditioned.     Metoprolol was increased from 12.5 mg to 50 mg during admission.  Dizziness at PT with SBP

## 2024-08-06 ENCOUNTER — CARE COORDINATION (OUTPATIENT)
Dept: CASE MANAGEMENT | Age: 74
End: 2024-08-06

## 2024-08-06 NOTE — CARE COORDINATION
Care Transitions Note    Follow Up Call     Attempted to reach patient for transitions of care follow up.  Unable to reach patient.      Outreach Attempts:   HIPAA compliant voicemail left for patient.     Care Summary Note: 1st attempt to contact pt for care transition follow up.  Unable to reach pt.  Left message with contact information and request     Follow Up Appointment:   Future Appointments         Provider Specialty Dept Phone    8/7/2024 2:00 PM Margy Blandna, PT Physical Therapy [phone]    8/12/2024 10:30 AM Margy Blandna, PT Physical Therapy [phone]    8/14/2024 10:00 AM Caity Spears, PTA Physical Therapy [phone]    8/19/2024 10:15 AM Caity Spears, PTA Physical Therapy [phone]    8/21/2024 9:30 AM Gladis Stephens, PTA Physical Therapy [phone]    8/26/2024 11:45 AM Margy Blandna, PT Physical Therapy [phone]    8/28/2024 11:45 AM Gladis Stephens, PTA Physical Therapy [phone]    9/3/2024 11:00 AM Margy Blandna, PT Physical Therapy [phone]    9/5/2024 10:15 AM Caity Spears, PTA Physical Therapy [phone]    9/9/2024 11:00 AM Caity Spears, PTA Physical Therapy [phone]    9/11/2024 11:45 AM Margy Blandna, PT Physical Therapy [phone]    9/16/2024 11:00 AM Caity Spears, PTA Physical Therapy [phone]    9/17/2024 7:45 AM Yash Nelson MD Family Medicine [phone]    9/18/2024 11:45 AM Ellen Bland, PT Physical Therapy [phone]            Plan for follow-up call in 2-5 days based on severity of symptoms and risk factors. Plan for next call: symptom management- dizziness/lightheadedness, migraine, SOB, CP, any new or worsening symptoms, review PCP appt       Carol Cabral RN

## 2024-08-07 ENCOUNTER — TELEPHONE (OUTPATIENT)
Dept: FAMILY MEDICINE CLINIC | Age: 74
End: 2024-08-07

## 2024-08-07 ENCOUNTER — NURSE ONLY (OUTPATIENT)
Dept: FAMILY MEDICINE CLINIC | Age: 74
End: 2024-08-07

## 2024-08-07 ENCOUNTER — HOSPITAL ENCOUNTER (OUTPATIENT)
Dept: PHYSICAL THERAPY | Age: 74
Setting detail: THERAPIES SERIES
Discharge: HOME | End: 2024-08-07
Payer: MEDICARE

## 2024-08-07 VITALS — SYSTOLIC BLOOD PRESSURE: 112 MMHG | DIASTOLIC BLOOD PRESSURE: 62 MMHG

## 2024-08-07 PROCEDURE — 97110 THERAPEUTIC EXERCISES: CPT

## 2024-08-07 RX ORDER — BENAZEPRIL HYDROCHLORIDE 10 MG/1
10 TABLET ORAL 2 TIMES DAILY
Qty: 180 TABLET | Refills: 3 | Status: SHIPPED | OUTPATIENT
Start: 2024-08-07

## 2024-08-07 NOTE — PROGRESS NOTES
Patient was sent over to office while being in physical therapy. PT reports that patient has been dizzy. Patient presented her blood pressure log. Reported concerns with Dr Nelson. Dr Nelson advised to hold Benazepril tonight and to decrease Benazepril to 10mg twice a day starting tomorrow.

## 2024-08-07 NOTE — PROGRESS NOTES
[facility]  PHYSICAL THERAPY  [] EVALUATION  [x] DAILY NOTE (LAND) [] DAILY NOTE (AQUATIC ) [] PROGRESS NOTE [] DISCHARGE NOTE    [x] OUTPATIENT REHABILITATION CENTER [facility]   [] [place] [facility]    [] [facility]   [] [facility]    Date: 2024  Patient Name:  Celia Perkins  : 1950  MRN: [id number]  CSN: [id number]    Referring Practitioner Yash Nelson MD    Diagnosis Metabolic encephalopathy  Left Total knee replacement 7/10/24   Treatment Diagnosis M25.562  Left Knee Pain  R53.1 Weakness  M62.81 Generalized Muscle Weakness  R26.2 Difficulty in walking  Z47.1 Aftercare following joint replacement surgery  R26.89  Abnormalities of gait and mobility  R26.81  Unsteadiness on feet  R27.9  Unspecified lack of coordination  R27.9 Unspecified Lack of Coordination   Date of Evaluation 24    Additional Pertinent History Metabolic encephalopathy, DM2, Spinal headaches, polyneuropathy, Osteoarthritis, HTN, CVA , Breast CA , Asthma, arthritis   Aortic valve repair, R total knee       Functional Outcome Measure Used KOOS     Functional Outcome Score 17 (24)       Insurance: Primary: Payor: MEDICARE /  /  / ,   Secondary: [facility]   Authorization Information: PRECERTIFICATION REQUIRED:  No  INSURANCE THERAPY BENEFIT:  Patient has unlimited visits based on medical necessity  Benefit will not cover maintenance or preventative treatment.  AQUATIC THERAPY COVERED:   Yes  MODALITIES COVERED:  Yes, with the exception of iontophoresis and hot packs/cold packs  TELEHEALTH COVERED: Yes  REFERENCE #: NA   Approved Procedure Codes: Authorization of Specific CPT Codes Not Required  (Codes requested indicated by red font, codes approved indicated by black font)   Visit # 2, 2/10 for progress note (Reporting Period: 24 to     )   Visits Allowed: Based on Medical Necessity    Recertification Date: 24   Physician Follow-Up: To be

## 2024-08-07 NOTE — TELEPHONE ENCOUNTER
Patient was sent over to office while being in physical therapy. PT reports that patient has been dizzy. Patient presented her blood pressure log. Reported concerns with Dr Nelson. Dr Nelson advised to hold Benazepril tonight and to decrease Benazepril to 10mg twice a day starting tomorrow.       Flagged for removal and new order pended.

## 2024-08-08 ENCOUNTER — CARE COORDINATION (OUTPATIENT)
Dept: CASE MANAGEMENT | Age: 74
End: 2024-08-08

## 2024-08-08 NOTE — CARE COORDINATION
Care Transitions Note    Follow Up Call     Attempted to reach patient for transitions of care follow up.  Unable to reach patient.      Outreach Attempts:   HIPAA compliant voicemail left for patient.     Care Summary Note: 2nd attempt to contact pt for care transition follow up.  Unable to reach pt.  Left message with contact information and request for call back.      Program will be closed and CTN to sign off if return call is not received from the patient.      Follow Up Appointment:   Future Appointments         Provider Specialty Dept Phone    8/12/2024 10:30 AM Ellen Bland, PT Physical Therapy [phone]    8/14/2024 10:00 AM Caity Spears, PTA Physical Therapy [phone]    8/19/2024 10:15 AM Caity Spears, PTA Physical Therapy [phone]    8/21/2024 9:30 AM Gladis Stephens, PTA Physical Therapy [phone]    8/26/2024 11:45 AM Ellen Bland, PT Physical Therapy [phone]    8/28/2024 11:45 AM Gladis Stephens, PTA Physical Therapy [phone]    9/3/2024 11:00 AM Ellen Bland, PT Physical Therapy [phone]    9/5/2024 10:15 AM Caity Spears, PTA Physical Therapy [phone]    9/9/2024 11:00 AM Caity Spears, PTA Physical Therapy [phone]    9/11/2024 11:45 AM Ellen Bland, PT Physical Therapy [phone]    9/16/2024 11:00 AM Caity Spears, PTA Physical Therapy [phone]    9/17/2024 7:45 AM Yash Nelson MD Family Medicine [phone]    9/18/2024 11:45 AM Ellen Bland, PT Physical Therapy [phone]            No further follow-up call indicated    Carol Cabral RN

## 2024-08-12 ENCOUNTER — HOSPITAL ENCOUNTER (OUTPATIENT)
Dept: PHYSICAL THERAPY | Age: 74
Setting detail: THERAPIES SERIES
Discharge: HOME | End: 2024-08-12
Payer: MEDICARE

## 2024-08-12 PROCEDURE — 97110 THERAPEUTIC EXERCISES: CPT

## 2024-08-12 NOTE — PROGRESS NOTES
[facility]  PHYSICAL THERAPY  [] EVALUATION  [x] DAILY NOTE (LAND) [] DAILY NOTE (AQUATIC ) [] PROGRESS NOTE [] DISCHARGE NOTE    [x] OUTPATIENT REHABILITATION CENTER [facility]   [] [place] [facility]    [] [facility]   [] [facility]    Date: 2024  Patient Name:  Celia Perkins  : 1950  MRN: [id number]  CSN: [id number]    Referring Practitioner Yash Nelson MD    Diagnosis Metabolic encephalopathy  Left Total knee replacement 7/10/24   Treatment Diagnosis M25.562  Left Knee Pain  R53.1 Weakness  M62.81 Generalized Muscle Weakness  R26.2 Difficulty in walking  Z47.1 Aftercare following joint replacement surgery  R26.89  Abnormalities of gait and mobility  R26.81  Unsteadiness on feet  R27.9  Unspecified lack of coordination  R27.9 Unspecified Lack of Coordination   Date of Evaluation 24    Additional Pertinent History Metabolic encephalopathy, DM2, Spinal headaches, polyneuropathy, Osteoarthritis, HTN, CVA , Breast CA , Asthma, arthritis   Aortic valve repair, R total knee       Functional Outcome Measure Used KOOS     Functional Outcome Score 17 (24)       Insurance: Primary: Payor: MEDICARE /  /  / ,   Secondary: [facility]   Authorization Information: PRECERTIFICATION REQUIRED:  No  INSURANCE THERAPY BENEFIT:  Patient has unlimited visits based on medical necessity  Benefit will not cover maintenance or preventative treatment.  AQUATIC THERAPY COVERED:   Yes  MODALITIES COVERED:  Yes, with the exception of iontophoresis and hot packs/cold packs  TELEHEALTH COVERED: Yes  REFERENCE #: NA   Approved Procedure Codes: Authorization of Specific CPT Codes Not Required  (Codes requested indicated by red font, codes approved indicated by black font)   Visit # 3, 3/10 for progress note (Reporting Period: 24 to     )   Visits Allowed: Based on Medical Necessity    Recertification Date: 24   Physician Follow-Up: To be

## 2024-08-14 ENCOUNTER — HOSPITAL ENCOUNTER (OUTPATIENT)
Dept: PHYSICAL THERAPY | Age: 74
Setting detail: THERAPIES SERIES
Discharge: HOME | End: 2024-08-14
Payer: MEDICARE

## 2024-08-14 PROCEDURE — 97110 THERAPEUTIC EXERCISES: CPT

## 2024-08-14 NOTE — PROGRESS NOTES
Goal: To walk without anything     Short Term Goals: 4 weeks  Patient will improve L knee AROM  extension to 0 degrees to normalize gait pattern.   Patient will report 2/10 Lknee pain at worst to tolerate functional mobility.   Patient will improve L knee AROM flexion to 120 degrees to assist with donning LE garments/shoes independently   Long Term Goals: 8 weeks  Patient will improve L hip and knee strength to 4+/5 to negotiate stairs reciprocally with minimal to no UE support.  Patient will ambulate without AD with good heel/toe pattern community distances for appointments, grocery shopping, etc.   Patient will perform L single limb stance x15 seconds to tolerate gait on uneven surfaces.   Patient to demonstrate knowledge and compliance of HEP       Patient Education:   [x]  HEP/Education Completed: HEP, Use of ice for edema management, work on ROM  [employer] Access Code: Reviewed previous HEP   []  No new Education completed  []  Reviewed Prior HEP      [x]  Patient verbalized and/or demonstrated understanding of education provided.  []  Patient unable to verbalize and/or demonstrate understanding of education provided.  Will continue education.  []  Barriers to learning:     PLAN:  Treatment Recommendations: Strengthening, Range of Motion, Balance Training, Functional Mobility Training, Transfer Training, Endurance Training, Gait Training, Stair Training, Neuromuscular Re-education, Manual Therapy - Soft Tissue Mobilization, Manual Therapy - Joint Manipulation, Pain Management, Home Exercise Program, Patient Education, Safety Education and Training, Self-Care Education and Training, Integrative Dry Needling, Vestibular Rehabilitation, Aquatics, and Modalities    []  Plan of care initiated.  Plan to see patient 2-3 times per week for 6-8 weeks to address the treatment planned outlined above.  [x]  Continue with current plan of care  []  Modify plan of care as follows:    []  Hold pending physician visit  []

## 2024-08-19 ENCOUNTER — HOSPITAL ENCOUNTER (OUTPATIENT)
Dept: PHYSICAL THERAPY | Age: 74
Setting detail: THERAPIES SERIES
Discharge: HOME | End: 2024-08-19
Payer: MEDICARE

## 2024-08-19 PROCEDURE — 97116 GAIT TRAINING THERAPY: CPT

## 2024-08-19 PROCEDURE — 97110 THERAPEUTIC EXERCISES: CPT

## 2024-08-19 NOTE — PROGRESS NOTES
side to decrease WB on involved LE , as patient tries to use SPC on L UE.    GOALS:  Patient Goal: To walk without anything     Short Term Goals: 4 weeks  Patient will improve L knee AROM  extension to 0 degrees to normalize gait pattern.   Patient will report 2/10 Lknee pain at worst to tolerate functional mobility.   Patient will improve L knee AROM flexion to 120 degrees to assist with donning LE garments/shoes independently   Long Term Goals: 8 weeks  Patient will improve L hip and knee strength to 4+/5 to negotiate stairs reciprocally with minimal to no UE support.  Patient will ambulate without AD with good heel/toe pattern community distances for appointments, grocery shopping, etc.   Patient will perform L single limb stance x15 seconds to tolerate gait on uneven surfaces.   Patient to demonstrate knowledge and compliance of HEP       Patient Education:   [x]  HEP/Education Completed: HEP, Use of ice for edema management, work on ROM  [employer] Access Code: Reviewed previous HEP   []  No new Education completed  []  Reviewed Prior HEP      [x]  Patient verbalized and/or demonstrated understanding of education provided.  []  Patient unable to verbalize and/or demonstrate understanding of education provided.  Will continue education.  []  Barriers to learning:     PLAN:  Treatment Recommendations: Strengthening, Range of Motion, Balance Training, Functional Mobility Training, Transfer Training, Endurance Training, Gait Training, Stair Training, Neuromuscular Re-education, Manual Therapy - Soft Tissue Mobilization, Manual Therapy - Joint Manipulation, Pain Management, Home Exercise Program, Patient Education, Safety Education and Training, Self-Care Education and Training, Integrative Dry Needling, Vestibular Rehabilitation, Aquatics, and Modalities    []  Plan of care initiated.  Plan to see patient 2-3 times per week for 6-8 weeks to address the treatment planned outlined above.  [x]  Continue with current

## 2024-08-21 ENCOUNTER — HOSPITAL ENCOUNTER (OUTPATIENT)
Dept: PHYSICAL THERAPY | Age: 74
Setting detail: THERAPIES SERIES
Discharge: HOME | End: 2024-08-21
Payer: MEDICARE

## 2024-08-21 PROCEDURE — 97110 THERAPEUTIC EXERCISES: CPT

## 2024-08-21 NOTE — PROGRESS NOTES
[facility]  PHYSICAL THERAPY  [] EVALUATION  [x] DAILY NOTE (LAND) [] DAILY NOTE (AQUATIC ) [] PROGRESS NOTE [] DISCHARGE NOTE    [x] OUTPATIENT REHABILITATION CENTER [facility]   [] [place] [facility]    [] [facility]   [] [facility]    Date: 2024  Patient Name:  Celia Perkins  : 1950  MRN: [id number]  CSN: [id number]    Referring Practitioner Yash Nelson MD    Diagnosis Metabolic encephalopathy  Left Total knee replacement 7/10/24   Treatment Diagnosis M25.562  Left Knee Pain  R53.1 Weakness  M62.81 Generalized Muscle Weakness  R26.2 Difficulty in walking  Z47.1 Aftercare following joint replacement surgery  R26.89  Abnormalities of gait and mobility  R26.81  Unsteadiness on feet  R27.9  Unspecified lack of coordination  R27.9 Unspecified Lack of Coordination   Date of Evaluation 24    Additional Pertinent History Metabolic encephalopathy, DM2, Spinal headaches, polyneuropathy, Osteoarthritis, HTN, CVA , Breast CA , Asthma, arthritis   Aortic valve repair, R total knee       Functional Outcome Measure Used KOOS     Functional Outcome Score 17 (24)       Insurance: Primary: Payor: MEDICARE /  /  / ,   Secondary: [facility]   Authorization Information: PRECERTIFICATION REQUIRED:  No  INSURANCE THERAPY BENEFIT:  Patient has unlimited visits based on medical necessity  Benefit will not cover maintenance or preventative treatment.  AQUATIC THERAPY COVERED:   Yes  MODALITIES COVERED:  Yes, with the exception of iontophoresis and hot packs/cold packs  TELEHEALTH COVERED: Yes  REFERENCE #: NA   Approved Procedure Codes: Authorization of Specific CPT Codes Not Required  (Codes requested indicated by red font, codes approved indicated by black font)   Visit # 5, 5/10 for progress note (Reporting Period: 24 to     )   Visits Allowed: Based on Medical Necessity    Recertification Date: 24   Physician Follow-Up: To be

## 2024-08-26 ENCOUNTER — HOSPITAL ENCOUNTER (OUTPATIENT)
Dept: PHYSICAL THERAPY | Age: 74
Setting detail: THERAPIES SERIES
Discharge: HOME | End: 2024-08-26
Payer: MEDICARE

## 2024-08-26 PROCEDURE — 97110 THERAPEUTIC EXERCISES: CPT

## 2024-08-26 PROCEDURE — 97112 NEUROMUSCULAR REEDUCATION: CPT

## 2024-08-26 PROCEDURE — 97116 GAIT TRAINING THERAPY: CPT

## 2024-08-26 NOTE — PROGRESS NOTES
[facility]  PHYSICAL THERAPY  [] EVALUATION  [x] DAILY NOTE (LAND) [] DAILY NOTE (AQUATIC ) [] PROGRESS NOTE [] DISCHARGE NOTE    [x] OUTPATIENT REHABILITATION CENTER [facility]   [] [place] [facility]    [] [facility]   [] [facility]    Date: 2024  Patient Name:  Celia Perkins  : 1950  MRN: [id number]  CSN: [id number]    Referring Practitioner Yash Nelson MD    Diagnosis Metabolic encephalopathy  Left Total knee replacement 7/10/24   Treatment Diagnosis M25.562  Left Knee Pain  R53.1 Weakness  M62.81 Generalized Muscle Weakness  R26.2 Difficulty in walking  Z47.1 Aftercare following joint replacement surgery  R26.89  Abnormalities of gait and mobility  R26.81  Unsteadiness on feet  R27.9  Unspecified lack of coordination  R27.9 Unspecified Lack of Coordination   Date of Evaluation 24    Additional Pertinent History Metabolic encephalopathy, DM2, Spinal headaches, polyneuropathy, Osteoarthritis, HTN, CVA , Breast CA , Asthma, arthritis   Aortic valve repair, R total knee       Functional Outcome Measure Used KOOS     Functional Outcome Score 17 (24)       Insurance: Primary: Payor: MEDICARE /  /  / ,   Secondary: [facility]   Authorization Information: PRECERTIFICATION REQUIRED:  No  INSURANCE THERAPY BENEFIT:  Patient has unlimited visits based on medical necessity  Benefit will not cover maintenance or preventative treatment.  AQUATIC THERAPY COVERED:   Yes  MODALITIES COVERED:  Yes, with the exception of iontophoresis and hot packs/cold packs  TELEHEALTH COVERED: Yes  REFERENCE #: NA   Approved Procedure Codes: Authorization of Specific CPT Codes Not Required  (Codes requested indicated by red font, codes approved indicated by black font)   Visit # 7, 7/10 for progress note (Reporting Period: 24 to     )   Visits Allowed: Based on Medical Necessity    Recertification Date: 24   Physician Follow-Up: To be  planned outlined above.  [x]  Continue with current plan of care  []  Modify plan of care as follows:    []  Hold pending physician visit  []  Discharge    Time In 1145   Time Out 1230   Timed Code Minutes: 45 min   Total Treatment Time: 45 min     Electronically Signed by: Ellen Bland PT

## 2024-08-28 ENCOUNTER — HOSPITAL ENCOUNTER (OUTPATIENT)
Dept: PHYSICAL THERAPY | Age: 74
Setting detail: THERAPIES SERIES
Discharge: HOME | End: 2024-08-28
Payer: MEDICARE

## 2024-08-28 PROCEDURE — 97110 THERAPEUTIC EXERCISES: CPT

## 2024-08-28 NOTE — PROGRESS NOTES
Physician Orders: Evaluate and Treat    History of Present Illness: Patient initially presented to [facility] ED on 7/16/2024. Patient reportedly recently underwent a left TKA on 7/10/2024.  She reportedly had a relatively uncomplicated postoperative course was discharged home from the hospital couple days later.  She was reportedly discharged home with prescriptions for opioid pain medications and a muscle relaxer.  Patient's family reported sleeping excessively since discharge home.  In the days leading up to presentation, patient spouse reported some increased reports of lightheadedness/dizziness that occurred primarily with position changes.  No recent falls.  On the morning of presentation, patient went to therapy and was feeling lightheaded/dizzy.  Per report, they were unable to get a reading on her blood pressure.  Shortly after, patient became agitated and was brought to the ED for further evaluation.     In the ED, patient with increased confusion and agitation.  A stroke alert was called.  CT head was negative for any acute intracranial abnormalities.  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  Patient did receive Ativan in ED due to agitation.  She was admitted for further evaluation management.     On admission, EEG was obtained and reportedly revealed disorganized and slow background suggesting a mild to moderate encephalopathy of nonspecific etiology.  Neurology evaluated and followed patient and felt that altered mental status was most likely due to toxic metabolic encephalopathy which was felt to be multifactorial in nature related to mild dehydration and pain medication/muscle relaxers.  Anticoagulation was initially held for possible lumbar puncture, however, this was not pursued and Plavix was resumed on 7/17 as patient showed some improvements.  Neurology signed off.  Infectious workup thus far has been  progressions  [facility]: Reviewed previous HEP   []  No new Education completed  []  Reviewed Prior HEP      [x]  Patient verbalized and/or demonstrated understanding of education provided.  []  Patient unable to verbalize and/or demonstrate understanding of education provided.  Will continue education.  []  Barriers to learning:     PLAN:  Treatment Recommendations: Strengthening, Range of Motion, Balance Training, Functional Mobility Training, Transfer Training, Endurance Training, Gait Training, Stair Training, Neuromuscular Re-education, Manual Therapy - Soft Tissue Mobilization, Manual Therapy - Joint Manipulation, Pain Management, Home Exercise Program, Patient Education, Safety Education and Training, Self-Care Education and Training, Integrative Dry Needling, Vestibular Rehabilitation, Aquatics, and Modalities    []  Plan of care initiated.  Plan to see patient 2-3 times per week for 6-8 weeks to address the treatment planned outlined above.  [x]  Continue with current plan of care  []  Modify plan of care as follows:    []  Hold pending physician visit  []  Discharge    Time In 1144   Time Out 1230   Timed Code Minutes: 46 min   Total Treatment Time: 46 min     Electronically Signed by: Gladis Stephens PTA

## 2024-09-03 ENCOUNTER — HOSPITAL ENCOUNTER (OUTPATIENT)
Dept: PHYSICAL THERAPY | Age: 74
Setting detail: THERAPIES SERIES
Discharge: HOME | End: 2024-09-03
Payer: MEDICARE

## 2024-09-03 PROCEDURE — 97110 THERAPEUTIC EXERCISES: CPT

## 2024-09-03 ASSESSMENT — KOOS JR
BENDING TO THE FLOOR TO PICK UP OBJECT: EXTREME
RISING FROM SITTING: MILD
STRAIGHTENING KNEE FULLY: MILD
STANDING UPRIGHT: MILD
GOING UP OR DOWN STAIRS: MODERATE
HOW SEVERE IS YOUR KNEE STIFFNESS AFTER FIRST WAKING IN MORNING: MODERATE
KOOS JR TOTAL INTERVAL SCORE: 59.381

## 2024-09-03 NOTE — PROGRESS NOTES
[facility]  PHYSICAL THERAPY  [] EVALUATION  [x] DAILY NOTE (LAND) [] DAILY NOTE (AQUATIC ) [x] PROGRESS NOTE [] DISCHARGE NOTE    [x] OUTPATIENT REHABILITATION CENTER [facility]   [] [place] [facility] [place]    [] [facility]   [] [facility]    Date: 9/3/2024  Patient Name:  Celia Perkins  : 1950  MRN: [id number]  CSN: [id number]    Referring Practitioner Yash Nelson MD    Diagnosis Metabolic encephalopathy  Left Total knee replacement 7/10/24   Treatment Diagnosis M25.562  Left Knee Pain  R53.1 Weakness  M62.81 Generalized Muscle Weakness  R26.2 Difficulty in walking  Z47.1 Aftercare following joint replacement surgery  R26.89  Abnormalities of gait and mobility  R26.81  Unsteadiness on feet  R27.9  Unspecified lack of coordination  R27.9 Unspecified Lack of Coordination   Date of Evaluation 24    Additional Pertinent History Metabolic encephalopathy, DM2, Spinal headaches, polyneuropathy, Osteoarthritis, HTN, CVA , Breast CA , Asthma, arthritis   Aortic valve repair, R total knee       Functional Outcome Measure Used KOOS JR    Functional Outcome Score 17 (24)  11 (9/3/24)       Insurance: Primary: Payor: MEDICARE /  /  / ,   Secondary: [facility]   Authorization Information: PRECERTIFICATION REQUIRED:  No  INSURANCE THERAPY BENEFIT:  Patient has unlimited visits based on medical necessity  Benefit will not cover maintenance or preventative treatment.  AQUATIC THERAPY COVERED:   Yes  MODALITIES COVERED:  Yes, with the exception of iontophoresis and hot packs/cold packs  TELEHEALTH COVERED: Yes  REFERENCE #: NA   Approved Procedure Codes: Authorization of Specific CPT Codes Not Required  (Codes requested indicated by red font, codes approved indicated by black font)   Visit # 9, 9/10 for progress note (Reporting Period: 24 to   9/3/24  )   Visits Allowed: Based on Medical Necessity    Recertification Date: 24   Physician

## 2024-09-05 ENCOUNTER — APPOINTMENT (OUTPATIENT)
Dept: PHYSICAL THERAPY | Age: 74
End: 2024-09-05
Payer: MEDICARE

## 2024-09-09 ENCOUNTER — HOSPITAL ENCOUNTER (OUTPATIENT)
Dept: PHYSICAL THERAPY | Age: 74
Setting detail: THERAPIES SERIES
Discharge: HOME | End: 2024-09-09
Payer: MEDICARE

## 2024-09-09 ENCOUNTER — TELEPHONE (OUTPATIENT)
Dept: FAMILY MEDICINE CLINIC | Age: 74
End: 2024-09-09

## 2024-09-09 DIAGNOSIS — Z79.4 TYPE 2 DIABETES MELLITUS WITH DIABETIC NEUROPATHY, WITH LONG-TERM CURRENT USE OF INSULIN (HCC): ICD-10-CM

## 2024-09-09 DIAGNOSIS — E11.40 TYPE 2 DIABETES MELLITUS WITH DIABETIC NEUROPATHY, WITH LONG-TERM CURRENT USE OF INSULIN (HCC): ICD-10-CM

## 2024-09-09 DIAGNOSIS — R63.4 WEIGHT LOSS: Primary | ICD-10-CM

## 2024-09-09 PROCEDURE — 97112 NEUROMUSCULAR REEDUCATION: CPT

## 2024-09-09 PROCEDURE — 97110 THERAPEUTIC EXERCISES: CPT

## 2024-09-10 ENCOUNTER — HOSPITAL ENCOUNTER (OUTPATIENT)
Age: 74
Discharge: HOME | End: 2024-09-10
Payer: MEDICARE

## 2024-09-10 ENCOUNTER — OFFICE VISIT (OUTPATIENT)
Dept: FAMILY MEDICINE CLINIC | Age: 74
End: 2024-09-10

## 2024-09-10 VITALS
DIASTOLIC BLOOD PRESSURE: 78 MMHG | SYSTOLIC BLOOD PRESSURE: 108 MMHG | TEMPERATURE: 97.5 F | HEART RATE: 106 BPM | OXYGEN SATURATION: 97 % | WEIGHT: 155 LBS | BODY MASS INDEX: 28.52 KG/M2 | RESPIRATION RATE: 18 BRPM | HEIGHT: 62 IN

## 2024-09-10 DIAGNOSIS — E11.40 TYPE 2 DIABETES MELLITUS WITH DIABETIC NEUROPATHY, WITH LONG-TERM CURRENT USE OF INSULIN (HCC): ICD-10-CM

## 2024-09-10 DIAGNOSIS — R39.16 STRAINING ON URINATION: ICD-10-CM

## 2024-09-10 DIAGNOSIS — R63.4 WEIGHT LOSS: ICD-10-CM

## 2024-09-10 DIAGNOSIS — K44.9 HIATAL HERNIA: ICD-10-CM

## 2024-09-10 DIAGNOSIS — I10 ESSENTIAL HYPERTENSION: ICD-10-CM

## 2024-09-10 DIAGNOSIS — R63.4 UNINTENTIONAL WEIGHT LOSS: Primary | ICD-10-CM

## 2024-09-10 DIAGNOSIS — R68.81 EARLY SATIETY: ICD-10-CM

## 2024-09-10 DIAGNOSIS — Z79.4 TYPE 2 DIABETES MELLITUS WITH DIABETIC NEUROPATHY, WITH LONG-TERM CURRENT USE OF INSULIN (HCC): ICD-10-CM

## 2024-09-10 LAB
ALBUMIN SERPL BCG-MCNC: 4.4 G/DL (ref 3.5–5.1)
ALP SERPL-CCNC: 79 U/L (ref 38–126)
ALT SERPL W/O P-5'-P-CCNC: 27 U/L (ref 11–66)
ANION GAP SERPL CALC-SCNC: 23 MEQ/L (ref 8–16)
AST SERPL-CCNC: 29 U/L (ref 5–40)
BASOPHILS ABSOLUTE: 0 THOU/MM3 (ref 0–0.1)
BASOPHILS NFR BLD AUTO: 0.5 %
BILIRUB SERPL-MCNC: 1.9 MG/DL (ref 0.3–1.2)
BUN SERPL-MCNC: 18 MG/DL (ref 7–22)
CALCIUM SERPL-MCNC: 9.9 MG/DL (ref 8.5–10.5)
CHLORIDE SERPL-SCNC: 95 MEQ/L (ref 98–111)
CO2 SERPL-SCNC: 20 MEQ/L (ref 23–33)
CREAT SERPL-MCNC: 0.7 MG/DL (ref 0.4–1.2)
DEPRECATED RDW RBC AUTO: 47.8 FL (ref 35–45)
EOSINOPHIL NFR BLD AUTO: 1.5 %
EOSINOPHILS ABSOLUTE: 0.1 THOU/MM3 (ref 0–0.4)
ERYTHROCYTE [DISTWIDTH] IN BLOOD BY AUTOMATED COUNT: 13.9 % (ref 11.5–14.5)
GFR SERPL CREATININE-BSD FRML MDRD: 89 ML/MIN/1.73M2
GLUCOSE SERPL-MCNC: 200 MG/DL (ref 70–108)
HCT VFR BLD AUTO: 51.3 % (ref 37–47)
HGB BLD-MCNC: 17.3 GM/DL (ref 12–16)
IMM GRANULOCYTES # BLD AUTO: 0.01 THOU/MM3 (ref 0–0.07)
IMM GRANULOCYTES NFR BLD AUTO: 0.2 %
LYMPHOCYTES ABSOLUTE: 1.6 THOU/MM3 (ref 1–4.8)
LYMPHOCYTES NFR BLD AUTO: 26.6 %
MCH RBC QN AUTO: 31.6 PG (ref 26–33)
MCHC RBC AUTO-ENTMCNC: 33.7 GM/DL (ref 32.2–35.5)
MCV RBC AUTO: 93.6 FL (ref 81–99)
MONOCYTES ABSOLUTE: 0.7 THOU/MM3 (ref 0.4–1.3)
MONOCYTES NFR BLD AUTO: 11 %
NEUTROPHILS ABSOLUTE: 3.7 THOU/MM3 (ref 1.8–7.7)
NEUTROPHILS NFR BLD AUTO: 60.2 %
NRBC BLD AUTO-RTO: 0 /100 WBC
PLATELET # BLD AUTO: 186 THOU/MM3 (ref 130–400)
PMV BLD AUTO: 10.7 FL (ref 9.4–12.4)
POTASSIUM SERPL-SCNC: 3.6 MEQ/L (ref 3.5–5.2)
PREALB SERPL-MCNC: 18.4 MG/DL (ref 20–40)
PROT SERPL-MCNC: 6.9 G/DL (ref 6.1–8)
RBC # BLD AUTO: 5.48 MILL/MM3 (ref 4.2–5.4)
SODIUM SERPL-SCNC: 138 MEQ/L (ref 135–145)
T4 FREE SERPL-MCNC: 1.34 NG/DL (ref 0.93–1.68)
TSH SERPL DL<=0.005 MIU/L-ACNC: 3.55 UIU/ML (ref 0.4–4.2)
WBC # BLD AUTO: 6.1 THOU/MM3 (ref 4.8–10.8)

## 2024-09-10 PROCEDURE — 84134 ASSAY OF PREALBUMIN: CPT

## 2024-09-10 PROCEDURE — 36415 COLL VENOUS BLD VENIPUNCTURE: CPT

## 2024-09-10 PROCEDURE — 80053 COMPREHEN METABOLIC PANEL: CPT

## 2024-09-10 PROCEDURE — 84439 ASSAY OF FREE THYROXINE: CPT

## 2024-09-10 PROCEDURE — 85025 COMPLETE CBC W/AUTO DIFF WBC: CPT

## 2024-09-10 PROCEDURE — 84443 ASSAY THYROID STIM HORMONE: CPT

## 2024-09-10 RX ORDER — BENAZEPRIL HYDROCHLORIDE 5 MG/1
5 TABLET ORAL 2 TIMES DAILY
Qty: 180 TABLET | Refills: 3 | Status: SHIPPED | OUTPATIENT
Start: 2024-09-10

## 2024-09-10 ASSESSMENT — ENCOUNTER SYMPTOMS
SHORTNESS OF BREATH: 1
WHEEZING: 0

## 2024-09-11 ENCOUNTER — HOSPITAL ENCOUNTER (INPATIENT)
Age: 74
LOS: 2 days | Discharge: HOME | End: 2024-09-13
Attending: EMERGENCY MEDICINE
Payer: MEDICARE

## 2024-09-11 ENCOUNTER — HOSPITAL ENCOUNTER (OUTPATIENT)
Dept: PHYSICAL THERAPY | Age: 74
Setting detail: THERAPIES SERIES
Discharge: HOME | End: 2024-09-11
Payer: MEDICARE

## 2024-09-11 ENCOUNTER — APPOINTMENT (OUTPATIENT)
Dept: GENERAL RADIOLOGY | Age: 74
End: 2024-09-11
Payer: MEDICARE

## 2024-09-11 ENCOUNTER — TELEPHONE (OUTPATIENT)
Dept: FAMILY MEDICINE CLINIC | Age: 74
End: 2024-09-11

## 2024-09-11 DIAGNOSIS — K21.9 GASTROESOPHAGEAL REFLUX DISEASE WITHOUT ESOPHAGITIS: ICD-10-CM

## 2024-09-11 DIAGNOSIS — E87.29 KETOACIDOSIS: Primary | ICD-10-CM

## 2024-09-11 PROBLEM — E13.10 SECONDARY DM WITH DKA (HCC): Status: ACTIVE | Noted: 2024-09-11

## 2024-09-11 LAB
ALBUMIN SERPL BCG-MCNC: 4.3 G/DL (ref 3.5–5.1)
ALP SERPL-CCNC: 73 U/L (ref 38–126)
ALT SERPL W/O P-5'-P-CCNC: 26 U/L (ref 11–66)
ANION GAP SERPL CALC-SCNC: 16 MEQ/L (ref 8–16)
ANION GAP SERPL CALC-SCNC: 24 MEQ/L (ref 8–16)
AST SERPL-CCNC: 28 U/L (ref 5–40)
B-OH-BUTYR SERPL-MSCNC: 59.09 MG/DL (ref 0.2–2.81)
BACTERIA URNS QL MICRO: ABNORMAL /HPF
BASE EXCESS BLDA CALC-SCNC: -4.7 MMOL/L (ref -2–3)
BASOPHILS ABSOLUTE: 0 THOU/MM3 (ref 0–0.1)
BASOPHILS NFR BLD AUTO: 0.5 %
BILIRUB SERPL-MCNC: 1.8 MG/DL (ref 0.3–1.2)
BILIRUB UR QL STRIP.AUTO: NEGATIVE
BUN SERPL-MCNC: 14 MG/DL (ref 7–22)
BUN SERPL-MCNC: 18 MG/DL (ref 7–22)
CALCIUM SERPL-MCNC: 8.5 MG/DL (ref 8.5–10.5)
CALCIUM SERPL-MCNC: 9.7 MG/DL (ref 8.5–10.5)
CASTS #/AREA URNS LPF: ABNORMAL /LPF
CASTS 2: ABNORMAL /LPF
CHARACTER UR: CLEAR
CHLORIDE SERPL-SCNC: 101 MEQ/L (ref 98–111)
CHLORIDE SERPL-SCNC: 92 MEQ/L (ref 98–111)
CO2 SERPL-SCNC: 20 MEQ/L (ref 23–33)
CO2 SERPL-SCNC: 23 MEQ/L (ref 23–33)
COLLECTED BY:: ABNORMAL
COLOR, UA: YELLOW
CREAT SERPL-MCNC: 0.5 MG/DL (ref 0.4–1.2)
CREAT SERPL-MCNC: 0.5 MG/DL (ref 0.4–1.2)
CRYSTALS URNS MICRO: ABNORMAL
DEPRECATED MEAN GLUCOSE BLD GHB EST-ACNC: 135 MG/DL (ref 70–126)
DEPRECATED RDW RBC AUTO: 46.3 FL (ref 35–45)
DEVICE: ABNORMAL
EKG ATRIAL RATE: 78 BPM
EKG P AXIS: 66 DEGREES
EKG P-R INTERVAL: 198 MS
EKG Q-T INTERVAL: 370 MS
EKG QRS DURATION: 94 MS
EKG QTC CALCULATION (BAZETT): 421 MS
EKG R AXIS: 36 DEGREES
EKG T AXIS: -9 DEGREES
EKG VENTRICULAR RATE: 78 BPM
EOSINOPHIL NFR BLD AUTO: 0.8 %
EOSINOPHILS ABSOLUTE: 0.1 THOU/MM3 (ref 0–0.4)
EPITHELIAL CELLS, UA: ABNORMAL /HPF
ERYTHROCYTE [DISTWIDTH] IN BLOOD BY AUTOMATED COUNT: 13.8 % (ref 11.5–14.5)
FLUAV RNA RESP QL NAA+PROBE: NOT DETECTED
FLUBV RNA RESP QL NAA+PROBE: NOT DETECTED
GFR SERPL CREATININE-BSD FRML MDRD: > 90 ML/MIN/1.73M2
GFR SERPL CREATININE-BSD FRML MDRD: > 90 ML/MIN/1.73M2
GLUCOSE BLD STRIP.AUTO-MCNC: 117 MG/DL (ref 70–108)
GLUCOSE SERPL-MCNC: 113 MG/DL (ref 70–108)
GLUCOSE SERPL-MCNC: 179 MG/DL (ref 70–108)
GLUCOSE UR QL STRIP.AUTO: >= 1000 MG/DL
HBA1C MFR BLD HPLC: 6.5 % (ref 4.4–6.4)
HCO3 BLDA-SCNC: 19 MMOL/L (ref 23–28)
HCT VFR BLD AUTO: 46.7 % (ref 37–47)
HGB BLD-MCNC: 15.9 GM/DL (ref 12–16)
HGB UR QL STRIP.AUTO: NEGATIVE
IMM GRANULOCYTES # BLD AUTO: 0.01 THOU/MM3 (ref 0–0.07)
IMM GRANULOCYTES NFR BLD AUTO: 0.2 %
KETONES UR QL STRIP.AUTO: >= 160
LIPASE SERPL-CCNC: 19 U/L (ref 5.6–51.3)
LYMPHOCYTES ABSOLUTE: 1.4 THOU/MM3 (ref 1–4.8)
LYMPHOCYTES NFR BLD AUTO: 20.5 %
MAGNESIUM SERPL-MCNC: 1.3 MG/DL (ref 1.6–2.4)
MCH RBC QN AUTO: 31.3 PG (ref 26–33)
MCHC RBC AUTO-ENTMCNC: 34 GM/DL (ref 32.2–35.5)
MCV RBC AUTO: 91.9 FL (ref 81–99)
MISCELLANEOUS 2: ABNORMAL
MONOCYTES ABSOLUTE: 0.8 THOU/MM3 (ref 0.4–1.3)
MONOCYTES NFR BLD AUTO: 11.6 %
NEUTROPHILS ABSOLUTE: 4.4 THOU/MM3 (ref 1.8–7.7)
NEUTROPHILS NFR BLD AUTO: 66.4 %
NITRITE UR QL STRIP: NEGATIVE
NRBC BLD AUTO-RTO: 0 /100 WBC
NT-PROBNP SERPL IA-MCNC: 112.6 PG/ML (ref 0–449)
OSMOLALITY SERPL CALC.SUM OF ELEC: 278.3 MOSMOL/KG (ref 275–300)
PCO2 TEMP ADJ BLDMV: 33 MMHG (ref 41–51)
PH BLDMV: 7.38 [PH] (ref 7.31–7.41)
PH UR STRIP.AUTO: 5.5 [PH] (ref 5–9)
PHOSPHATE SERPL-MCNC: 3.2 MG/DL (ref 2.4–4.7)
PLATELET # BLD AUTO: 168 THOU/MM3 (ref 130–400)
PMV BLD AUTO: 10.4 FL (ref 9.4–12.4)
PO2 BLDMV: 42 MMHG (ref 25–40)
POTASSIUM SERPL-SCNC: 3.3 MEQ/L (ref 3.5–5.2)
POTASSIUM SERPL-SCNC: 3.8 MEQ/L (ref 3.5–5.2)
PROT SERPL-MCNC: 6.3 G/DL (ref 6.1–8)
PROT UR STRIP.AUTO-MCNC: ABNORMAL MG/DL
RBC # BLD AUTO: 5.08 MILL/MM3 (ref 4.2–5.4)
RBC URINE: ABNORMAL /HPF
RENAL EPI CELLS #/AREA URNS HPF: ABNORMAL /[HPF]
SAO2 % BLDMV: 77 %
SARS-COV-2 RNA RESP QL NAA+PROBE: NOT DETECTED
SITE: ABNORMAL
SODIUM SERPL-SCNC: 136 MEQ/L (ref 135–145)
SODIUM SERPL-SCNC: 140 MEQ/L (ref 135–145)
SP GR UR REFRACT.AUTO: > 1.03 (ref 1–1.03)
TROPONIN, HIGH SENSITIVITY: 24 NG/L (ref 0–12)
UROBILINOGEN, URINE: 0.2 EU/DL (ref 0–1)
VENTILATION MODE VENT: ABNORMAL
WBC # BLD AUTO: 6.6 THOU/MM3 (ref 4.8–10.8)
WBC #/AREA URNS HPF: ABNORMAL /HPF
WBC #/AREA URNS HPF: NEGATIVE /[HPF]
YEAST LIKE FUNGI URNS QL MICRO: ABNORMAL

## 2024-09-11 PROCEDURE — 97110 THERAPEUTIC EXERCISES: CPT

## 2024-09-11 PROCEDURE — 87636 SARSCOV2 & INF A&B AMP PRB: CPT

## 2024-09-11 PROCEDURE — 96361 HYDRATE IV INFUSION ADD-ON: CPT

## 2024-09-11 PROCEDURE — 83880 ASSAY OF NATRIURETIC PEPTIDE: CPT

## 2024-09-11 PROCEDURE — 2580000003 HC RX 258: Performed by: STUDENT IN AN ORGANIZED HEALTH CARE EDUCATION/TRAINING PROGRAM

## 2024-09-11 PROCEDURE — 71045 X-RAY EXAM CHEST 1 VIEW: CPT

## 2024-09-11 PROCEDURE — 2060000000 HC ICU INTERMEDIATE R&B

## 2024-09-11 PROCEDURE — 93010 ELECTROCARDIOGRAM REPORT: CPT | Performed by: NUCLEAR MEDICINE

## 2024-09-11 PROCEDURE — 83735 ASSAY OF MAGNESIUM: CPT

## 2024-09-11 PROCEDURE — 82803 BLOOD GASES ANY COMBINATION: CPT

## 2024-09-11 PROCEDURE — 80053 COMPREHEN METABOLIC PANEL: CPT

## 2024-09-11 PROCEDURE — 96374 THER/PROPH/DIAG INJ IV PUSH: CPT

## 2024-09-11 PROCEDURE — 93005 ELECTROCARDIOGRAM TRACING: CPT | Performed by: EMERGENCY MEDICINE

## 2024-09-11 PROCEDURE — 83036 HEMOGLOBIN GLYCOSYLATED A1C: CPT

## 2024-09-11 PROCEDURE — 82010 KETONE BODYS QUAN: CPT

## 2024-09-11 PROCEDURE — 99285 EMERGENCY DEPT VISIT HI MDM: CPT

## 2024-09-11 PROCEDURE — 83690 ASSAY OF LIPASE: CPT

## 2024-09-11 PROCEDURE — 99223 1ST HOSP IP/OBS HIGH 75: CPT | Performed by: STUDENT IN AN ORGANIZED HEALTH CARE EDUCATION/TRAINING PROGRAM

## 2024-09-11 PROCEDURE — 2580000003 HC RX 258: Performed by: EMERGENCY MEDICINE

## 2024-09-11 PROCEDURE — 85025 COMPLETE CBC W/AUTO DIFF WBC: CPT

## 2024-09-11 PROCEDURE — 6360000002 HC RX W HCPCS: Performed by: EMERGENCY MEDICINE

## 2024-09-11 PROCEDURE — 6370000000 HC RX 637 (ALT 250 FOR IP): Performed by: STUDENT IN AN ORGANIZED HEALTH CARE EDUCATION/TRAINING PROGRAM

## 2024-09-11 PROCEDURE — 6360000002 HC RX W HCPCS: Performed by: STUDENT IN AN ORGANIZED HEALTH CARE EDUCATION/TRAINING PROGRAM

## 2024-09-11 PROCEDURE — 82948 REAGENT STRIP/BLOOD GLUCOSE: CPT

## 2024-09-11 PROCEDURE — 81001 URINALYSIS AUTO W/SCOPE: CPT

## 2024-09-11 PROCEDURE — 84484 ASSAY OF TROPONIN QUANT: CPT

## 2024-09-11 PROCEDURE — 36415 COLL VENOUS BLD VENIPUNCTURE: CPT

## 2024-09-11 PROCEDURE — 84100 ASSAY OF PHOSPHORUS: CPT

## 2024-09-11 RX ORDER — DEXTROSE MONOHYDRATE AND SODIUM CHLORIDE 5; .45 G/100ML; G/100ML
INJECTION, SOLUTION INTRAVENOUS CONTINUOUS PRN
Status: DISCONTINUED | OUTPATIENT
Start: 2024-09-11 | End: 2024-09-11

## 2024-09-11 RX ORDER — POTASSIUM CHLORIDE 7.45 MG/ML
10 INJECTION INTRAVENOUS
Status: COMPLETED | OUTPATIENT
Start: 2024-09-11 | End: 2024-09-12

## 2024-09-11 RX ORDER — POTASSIUM CHLORIDE 7.45 MG/ML
10 INJECTION INTRAVENOUS PRN
Status: DISCONTINUED | OUTPATIENT
Start: 2024-09-11 | End: 2024-09-11

## 2024-09-11 RX ORDER — ACETAMINOPHEN 325 MG/1
650 TABLET ORAL EVERY 6 HOURS PRN
Status: DISCONTINUED | OUTPATIENT
Start: 2024-09-11 | End: 2024-09-11

## 2024-09-11 RX ORDER — PANTOPRAZOLE SODIUM 40 MG/1
40 TABLET, DELAYED RELEASE ORAL
Status: DISCONTINUED | OUTPATIENT
Start: 2024-09-12 | End: 2024-09-13 | Stop reason: HOSPADM

## 2024-09-11 RX ORDER — POTASSIUM CHLORIDE 7.45 MG/ML
10 INJECTION INTRAVENOUS PRN
Status: DISCONTINUED | OUTPATIENT
Start: 2024-09-11 | End: 2024-09-12

## 2024-09-11 RX ORDER — 0.9 % SODIUM CHLORIDE 0.9 %
1000 INTRAVENOUS SOLUTION INTRAVENOUS ONCE
Status: COMPLETED | OUTPATIENT
Start: 2024-09-11 | End: 2024-09-11

## 2024-09-11 RX ORDER — SODIUM CHLORIDE 9 MG/ML
INJECTION, SOLUTION INTRAVENOUS PRN
Status: DISCONTINUED | OUTPATIENT
Start: 2024-09-11 | End: 2024-09-13 | Stop reason: HOSPADM

## 2024-09-11 RX ORDER — ATORVASTATIN CALCIUM 40 MG/1
40 TABLET, FILM COATED ORAL NIGHTLY
Status: DISCONTINUED | OUTPATIENT
Start: 2024-09-12 | End: 2024-09-13 | Stop reason: HOSPADM

## 2024-09-11 RX ORDER — MAGNESIUM SULFATE IN WATER 40 MG/ML
2000 INJECTION, SOLUTION INTRAVENOUS PRN
Status: DISCONTINUED | OUTPATIENT
Start: 2024-09-11 | End: 2024-09-11

## 2024-09-11 RX ORDER — INSULIN GLARGINE 100 [IU]/ML
5 INJECTION, SOLUTION SUBCUTANEOUS NIGHTLY
Status: DISCONTINUED | OUTPATIENT
Start: 2024-09-11 | End: 2024-09-13 | Stop reason: HOSPADM

## 2024-09-11 RX ORDER — POTASSIUM CHLORIDE 1500 MG/1
40 TABLET, EXTENDED RELEASE ORAL PRN
Status: DISCONTINUED | OUTPATIENT
Start: 2024-09-11 | End: 2024-09-11

## 2024-09-11 RX ORDER — METOPROLOL SUCCINATE 25 MG/1
25 TABLET, EXTENDED RELEASE ORAL DAILY
Status: DISCONTINUED | OUTPATIENT
Start: 2024-09-12 | End: 2024-09-13 | Stop reason: HOSPADM

## 2024-09-11 RX ORDER — CLOPIDOGREL BISULFATE 75 MG/1
75 TABLET ORAL DAILY
Status: DISCONTINUED | OUTPATIENT
Start: 2024-09-12 | End: 2024-09-13 | Stop reason: HOSPADM

## 2024-09-11 RX ORDER — POLYETHYLENE GLYCOL 3350 17 G/17G
17 POWDER, FOR SOLUTION ORAL DAILY PRN
Status: DISCONTINUED | OUTPATIENT
Start: 2024-09-11 | End: 2024-09-11

## 2024-09-11 RX ORDER — ONDANSETRON 4 MG/1
4 TABLET, ORALLY DISINTEGRATING ORAL EVERY 8 HOURS PRN
Status: DISCONTINUED | OUTPATIENT
Start: 2024-09-11 | End: 2024-09-11

## 2024-09-11 RX ORDER — SODIUM CHLORIDE 0.9 % (FLUSH) 0.9 %
5-40 SYRINGE (ML) INJECTION PRN
Status: DISCONTINUED | OUTPATIENT
Start: 2024-09-11 | End: 2024-09-11

## 2024-09-11 RX ORDER — THIAMINE HYDROCHLORIDE 100 MG/ML
100 INJECTION, SOLUTION INTRAMUSCULAR; INTRAVENOUS ONCE
Status: COMPLETED | OUTPATIENT
Start: 2024-09-11 | End: 2024-09-11

## 2024-09-11 RX ORDER — SODIUM CHLORIDE 0.9 % (FLUSH) 0.9 %
5-40 SYRINGE (ML) INJECTION EVERY 12 HOURS SCHEDULED
Status: DISCONTINUED | OUTPATIENT
Start: 2024-09-11 | End: 2024-09-13 | Stop reason: HOSPADM

## 2024-09-11 RX ORDER — ONDANSETRON 2 MG/ML
4 INJECTION INTRAMUSCULAR; INTRAVENOUS ONCE
Status: COMPLETED | OUTPATIENT
Start: 2024-09-11 | End: 2024-09-11

## 2024-09-11 RX ORDER — ONDANSETRON 2 MG/ML
4 INJECTION INTRAMUSCULAR; INTRAVENOUS EVERY 6 HOURS PRN
Status: DISCONTINUED | OUTPATIENT
Start: 2024-09-11 | End: 2024-09-11

## 2024-09-11 RX ORDER — ENOXAPARIN SODIUM 100 MG/ML
40 INJECTION SUBCUTANEOUS EVERY 24 HOURS
Status: DISCONTINUED | OUTPATIENT
Start: 2024-09-11 | End: 2024-09-13 | Stop reason: HOSPADM

## 2024-09-11 RX ORDER — POTASSIUM CHLORIDE 1500 MG/1
40 TABLET, EXTENDED RELEASE ORAL PRN
Status: DISCONTINUED | OUTPATIENT
Start: 2024-09-11 | End: 2024-09-12

## 2024-09-11 RX ORDER — SODIUM CHLORIDE, SODIUM LACTATE, POTASSIUM CHLORIDE, CALCIUM CHLORIDE 600; 310; 30; 20 MG/100ML; MG/100ML; MG/100ML; MG/100ML
INJECTION, SOLUTION INTRAVENOUS CONTINUOUS
Status: DISCONTINUED | OUTPATIENT
Start: 2024-09-11 | End: 2024-09-12

## 2024-09-11 RX ORDER — MAGNESIUM SULFATE IN WATER 40 MG/ML
4000 INJECTION, SOLUTION INTRAVENOUS ONCE
Status: COMPLETED | OUTPATIENT
Start: 2024-09-11 | End: 2024-09-12

## 2024-09-11 RX ORDER — ENOXAPARIN SODIUM 100 MG/ML
40 INJECTION SUBCUTANEOUS DAILY
Status: DISCONTINUED | OUTPATIENT
Start: 2024-09-11 | End: 2024-09-11 | Stop reason: SDUPTHER

## 2024-09-11 RX ORDER — SODIUM CHLORIDE 9 MG/ML
INJECTION, SOLUTION INTRAVENOUS CONTINUOUS
Status: DISCONTINUED | OUTPATIENT
Start: 2024-09-11 | End: 2024-09-11

## 2024-09-11 RX ORDER — ACETAMINOPHEN 650 MG/1
650 SUPPOSITORY RECTAL EVERY 6 HOURS PRN
Status: DISCONTINUED | OUTPATIENT
Start: 2024-09-11 | End: 2024-09-11

## 2024-09-11 RX ORDER — LISINOPRIL 5 MG/1
5 TABLET ORAL 2 TIMES DAILY
Status: DISCONTINUED | OUTPATIENT
Start: 2024-09-12 | End: 2024-09-13 | Stop reason: HOSPADM

## 2024-09-11 RX ADMIN — SODIUM CHLORIDE 1000 ML: 9 INJECTION, SOLUTION INTRAVENOUS at 14:42

## 2024-09-11 RX ADMIN — THIAMINE HYDROCHLORIDE 100 MG: 100 INJECTION, SOLUTION INTRAMUSCULAR; INTRAVENOUS at 23:44

## 2024-09-11 RX ADMIN — INSULIN GLARGINE 5 UNITS: 100 INJECTION, SOLUTION SUBCUTANEOUS at 20:35

## 2024-09-11 RX ADMIN — SODIUM CHLORIDE, POTASSIUM CHLORIDE, SODIUM LACTATE AND CALCIUM CHLORIDE: 600; 310; 30; 20 INJECTION, SOLUTION INTRAVENOUS at 18:38

## 2024-09-11 RX ADMIN — SODIUM CHLORIDE 1000 ML: 9 INJECTION, SOLUTION INTRAVENOUS at 12:11

## 2024-09-11 RX ADMIN — ENOXAPARIN SODIUM 40 MG: 100 INJECTION SUBCUTANEOUS at 20:35

## 2024-09-11 RX ADMIN — SODIUM CHLORIDE, PRESERVATIVE FREE 10 ML: 5 INJECTION INTRAVENOUS at 20:36

## 2024-09-11 RX ADMIN — POTASSIUM CHLORIDE 10 MEQ: 7.46 INJECTION, SOLUTION INTRAVENOUS at 22:53

## 2024-09-11 RX ADMIN — ONDANSETRON 4 MG: 2 INJECTION INTRAMUSCULAR; INTRAVENOUS at 12:12

## 2024-09-11 RX ADMIN — ATORVASTATIN CALCIUM 40 MG: 40 TABLET, FILM COATED ORAL at 23:44

## 2024-09-11 RX ADMIN — MAGNESIUM SULFATE HEPTAHYDRATE 4000 MG: 40 INJECTION, SOLUTION INTRAVENOUS at 22:59

## 2024-09-11 ASSESSMENT — PAIN - FUNCTIONAL ASSESSMENT: PAIN_FUNCTIONAL_ASSESSMENT: NONE - DENIES PAIN

## 2024-09-12 PROBLEM — E87.29 KETOACIDOSIS: Status: ACTIVE | Noted: 2024-09-12

## 2024-09-12 LAB
ANION GAP SERPL CALC-SCNC: 12 MEQ/L (ref 8–16)
ANION GAP SERPL CALC-SCNC: 14 MEQ/L (ref 8–16)
BUN SERPL-MCNC: 10 MG/DL (ref 7–22)
BUN SERPL-MCNC: 10 MG/DL (ref 7–22)
CA-I BLD ISE-SCNC: 1.12 MMOL/L (ref 1.12–1.32)
CALCIUM SERPL-MCNC: 8.3 MG/DL (ref 8.5–10.5)
CALCIUM SERPL-MCNC: 8.4 MG/DL (ref 8.5–10.5)
CHLORIDE SERPL-SCNC: 102 MEQ/L (ref 98–111)
CHLORIDE SERPL-SCNC: 102 MEQ/L (ref 98–111)
CO2 SERPL-SCNC: 21 MEQ/L (ref 23–33)
CO2 SERPL-SCNC: 25 MEQ/L (ref 23–33)
CREAT SERPL-MCNC: 0.5 MG/DL (ref 0.4–1.2)
CREAT SERPL-MCNC: 0.5 MG/DL (ref 0.4–1.2)
DEPRECATED RDW RBC AUTO: 46.4 FL (ref 35–45)
ERYTHROCYTE [DISTWIDTH] IN BLOOD BY AUTOMATED COUNT: 13.8 % (ref 11.5–14.5)
GFR SERPL CREATININE-BSD FRML MDRD: > 90 ML/MIN/1.73M2
GFR SERPL CREATININE-BSD FRML MDRD: > 90 ML/MIN/1.73M2
GLUCOSE BLD STRIP.AUTO-MCNC: 105 MG/DL (ref 70–108)
GLUCOSE BLD STRIP.AUTO-MCNC: 124 MG/DL (ref 70–108)
GLUCOSE BLD STRIP.AUTO-MCNC: 124 MG/DL (ref 70–108)
GLUCOSE BLD STRIP.AUTO-MCNC: 133 MG/DL (ref 70–108)
GLUCOSE SERPL-MCNC: 102 MG/DL (ref 70–108)
GLUCOSE SERPL-MCNC: 128 MG/DL (ref 70–108)
HCT VFR BLD AUTO: 36.5 % (ref 37–47)
HGB BLD-MCNC: 12.3 GM/DL (ref 12–16)
MAGNESIUM SERPL-MCNC: 2.8 MG/DL (ref 1.6–2.4)
MCH RBC QN AUTO: 31.5 PG (ref 26–33)
MCHC RBC AUTO-ENTMCNC: 33.7 GM/DL (ref 32.2–35.5)
MCV RBC AUTO: 93.4 FL (ref 81–99)
PLATELET # BLD AUTO: 111 THOU/MM3 (ref 130–400)
PMV BLD AUTO: 10.5 FL (ref 9.4–12.4)
POTASSIUM SERPL-SCNC: 3.7 MEQ/L (ref 3.5–5.2)
POTASSIUM SERPL-SCNC: 3.8 MEQ/L (ref 3.5–5.2)
RBC # BLD AUTO: 3.91 MILL/MM3 (ref 4.2–5.4)
SODIUM SERPL-SCNC: 137 MEQ/L (ref 135–145)
SODIUM SERPL-SCNC: 139 MEQ/L (ref 135–145)
WBC # BLD AUTO: 3.8 THOU/MM3 (ref 4.8–10.8)

## 2024-09-12 PROCEDURE — 2580000003 HC RX 258: Performed by: STUDENT IN AN ORGANIZED HEALTH CARE EDUCATION/TRAINING PROGRAM

## 2024-09-12 PROCEDURE — 97161 PT EVAL LOW COMPLEX 20 MIN: CPT

## 2024-09-12 PROCEDURE — 99233 SBSQ HOSP IP/OBS HIGH 50: CPT | Performed by: INTERNAL MEDICINE

## 2024-09-12 PROCEDURE — 80048 BASIC METABOLIC PNL TOTAL CA: CPT

## 2024-09-12 PROCEDURE — 97116 GAIT TRAINING THERAPY: CPT

## 2024-09-12 PROCEDURE — 1200000003 HC TELEMETRY R&B

## 2024-09-12 PROCEDURE — 6370000000 HC RX 637 (ALT 250 FOR IP)

## 2024-09-12 PROCEDURE — 36415 COLL VENOUS BLD VENIPUNCTURE: CPT

## 2024-09-12 PROCEDURE — 82330 ASSAY OF CALCIUM: CPT

## 2024-09-12 PROCEDURE — 83735 ASSAY OF MAGNESIUM: CPT

## 2024-09-12 PROCEDURE — 2580000003 HC RX 258

## 2024-09-12 PROCEDURE — 6370000000 HC RX 637 (ALT 250 FOR IP): Performed by: STUDENT IN AN ORGANIZED HEALTH CARE EDUCATION/TRAINING PROGRAM

## 2024-09-12 PROCEDURE — 6360000002 HC RX W HCPCS: Performed by: STUDENT IN AN ORGANIZED HEALTH CARE EDUCATION/TRAINING PROGRAM

## 2024-09-12 PROCEDURE — 85027 COMPLETE CBC AUTOMATED: CPT

## 2024-09-12 PROCEDURE — 97165 OT EVAL LOW COMPLEX 30 MIN: CPT

## 2024-09-12 PROCEDURE — 82948 REAGENT STRIP/BLOOD GLUCOSE: CPT

## 2024-09-12 PROCEDURE — 97110 THERAPEUTIC EXERCISES: CPT

## 2024-09-12 RX ORDER — POTASSIUM CHLORIDE 1500 MG/1
20 TABLET, EXTENDED RELEASE ORAL ONCE
Status: COMPLETED | OUTPATIENT
Start: 2024-09-12 | End: 2024-09-12

## 2024-09-12 RX ORDER — METOCLOPRAMIDE 10 MG/1
5 TABLET ORAL
Status: DISCONTINUED | OUTPATIENT
Start: 2024-09-12 | End: 2024-09-13 | Stop reason: HOSPADM

## 2024-09-12 RX ORDER — POTASSIUM CHLORIDE 7.45 MG/ML
10 INJECTION INTRAVENOUS
Status: DISCONTINUED | OUTPATIENT
Start: 2024-09-12 | End: 2024-09-12

## 2024-09-12 RX ORDER — SODIUM CHLORIDE, SODIUM LACTATE, POTASSIUM CHLORIDE, CALCIUM CHLORIDE 600; 310; 30; 20 MG/100ML; MG/100ML; MG/100ML; MG/100ML
INJECTION, SOLUTION INTRAVENOUS CONTINUOUS
Status: DISCONTINUED | OUTPATIENT
Start: 2024-09-12 | End: 2024-09-13

## 2024-09-12 RX ADMIN — POTASSIUM CHLORIDE 10 MEQ: 7.46 INJECTION, SOLUTION INTRAVENOUS at 00:04

## 2024-09-12 RX ADMIN — CLOPIDOGREL BISULFATE 75 MG: 75 TABLET ORAL at 08:36

## 2024-09-12 RX ADMIN — SODIUM CHLORIDE, PRESERVATIVE FREE 10 ML: 5 INJECTION INTRAVENOUS at 08:35

## 2024-09-12 RX ADMIN — METOCLOPRAMIDE 5 MG: 10 TABLET ORAL at 18:18

## 2024-09-12 RX ADMIN — SODIUM CHLORIDE, PRESERVATIVE FREE 10 ML: 5 INJECTION INTRAVENOUS at 20:42

## 2024-09-12 RX ADMIN — ENOXAPARIN SODIUM 40 MG: 100 INJECTION SUBCUTANEOUS at 18:19

## 2024-09-12 RX ADMIN — POTASSIUM CHLORIDE 10 MEQ: 7.46 INJECTION, SOLUTION INTRAVENOUS at 01:08

## 2024-09-12 RX ADMIN — INSULIN GLARGINE 5 UNITS: 100 INJECTION, SOLUTION SUBCUTANEOUS at 20:42

## 2024-09-12 RX ADMIN — POTASSIUM CHLORIDE 10 MEQ: 7.46 INJECTION, SOLUTION INTRAVENOUS at 02:07

## 2024-09-12 RX ADMIN — PANTOPRAZOLE SODIUM 40 MG: 40 TABLET, DELAYED RELEASE ORAL at 06:29

## 2024-09-12 RX ADMIN — POTASSIUM CHLORIDE 20 MEQ: 1500 TABLET, EXTENDED RELEASE ORAL at 08:36

## 2024-09-12 RX ADMIN — ATORVASTATIN CALCIUM 40 MG: 40 TABLET, FILM COATED ORAL at 20:42

## 2024-09-12 RX ADMIN — SODIUM CHLORIDE, POTASSIUM CHLORIDE, SODIUM LACTATE AND CALCIUM CHLORIDE: 600; 310; 30; 20 INJECTION, SOLUTION INTRAVENOUS at 20:09

## 2024-09-12 ASSESSMENT — PAIN SCALES - GENERAL: PAINLEVEL_OUTOF10: 0

## 2024-09-13 VITALS
SYSTOLIC BLOOD PRESSURE: 128 MMHG | OXYGEN SATURATION: 97 % | WEIGHT: 163.3 LBS | RESPIRATION RATE: 16 BRPM | HEIGHT: 62 IN | BODY MASS INDEX: 30.05 KG/M2 | TEMPERATURE: 97.6 F | HEART RATE: 72 BPM | DIASTOLIC BLOOD PRESSURE: 69 MMHG

## 2024-09-13 LAB
ANION GAP SERPL CALC-SCNC: 13 MEQ/L (ref 8–16)
BUN SERPL-MCNC: 9 MG/DL (ref 7–22)
CALCIUM SERPL-MCNC: 8.3 MG/DL (ref 8.5–10.5)
CHLORIDE SERPL-SCNC: 105 MEQ/L (ref 98–111)
CO2 SERPL-SCNC: 23 MEQ/L (ref 23–33)
CREAT SERPL-MCNC: 0.4 MG/DL (ref 0.4–1.2)
DEPRECATED RDW RBC AUTO: 47.5 FL (ref 35–45)
ERYTHROCYTE [DISTWIDTH] IN BLOOD BY AUTOMATED COUNT: 13.8 % (ref 11.5–14.5)
GFR SERPL CREATININE-BSD FRML MDRD: > 90 ML/MIN/1.73M2
GLUCOSE BLD STRIP.AUTO-MCNC: 142 MG/DL (ref 70–108)
GLUCOSE SERPL-MCNC: 116 MG/DL (ref 70–108)
HCT VFR BLD AUTO: 39.1 % (ref 37–47)
HGB BLD-MCNC: 13.2 GM/DL (ref 12–16)
MAGNESIUM SERPL-MCNC: 1.8 MG/DL (ref 1.6–2.4)
MCH RBC QN AUTO: 31.6 PG (ref 26–33)
MCHC RBC AUTO-ENTMCNC: 33.8 GM/DL (ref 32.2–35.5)
MCV RBC AUTO: 93.5 FL (ref 81–99)
PLATELET # BLD AUTO: 104 THOU/MM3 (ref 130–400)
PMV BLD AUTO: 10.7 FL (ref 9.4–12.4)
POTASSIUM SERPL-SCNC: 3.6 MEQ/L (ref 3.5–5.2)
RBC # BLD AUTO: 4.18 MILL/MM3 (ref 4.2–5.4)
SODIUM SERPL-SCNC: 141 MEQ/L (ref 135–145)
WBC # BLD AUTO: 3.2 THOU/MM3 (ref 4.8–10.8)

## 2024-09-13 PROCEDURE — 6370000000 HC RX 637 (ALT 250 FOR IP): Performed by: STUDENT IN AN ORGANIZED HEALTH CARE EDUCATION/TRAINING PROGRAM

## 2024-09-13 PROCEDURE — 85027 COMPLETE CBC AUTOMATED: CPT

## 2024-09-13 PROCEDURE — 82948 REAGENT STRIP/BLOOD GLUCOSE: CPT

## 2024-09-13 PROCEDURE — 2580000003 HC RX 258: Performed by: STUDENT IN AN ORGANIZED HEALTH CARE EDUCATION/TRAINING PROGRAM

## 2024-09-13 PROCEDURE — 6360000002 HC RX W HCPCS

## 2024-09-13 PROCEDURE — 6370000000 HC RX 637 (ALT 250 FOR IP)

## 2024-09-13 PROCEDURE — 99239 HOSP IP/OBS DSCHRG MGMT >30: CPT | Performed by: INTERNAL MEDICINE

## 2024-09-13 PROCEDURE — 36415 COLL VENOUS BLD VENIPUNCTURE: CPT

## 2024-09-13 PROCEDURE — 80048 BASIC METABOLIC PNL TOTAL CA: CPT

## 2024-09-13 PROCEDURE — 83735 ASSAY OF MAGNESIUM: CPT

## 2024-09-13 RX ORDER — POTASSIUM CHLORIDE 1500 MG/1
40 TABLET, EXTENDED RELEASE ORAL ONCE
Status: COMPLETED | OUTPATIENT
Start: 2024-09-13 | End: 2024-09-13

## 2024-09-13 RX ORDER — POTASSIUM CHLORIDE 1500 MG/1
20 TABLET, EXTENDED RELEASE ORAL DAILY
Qty: 90 TABLET | Refills: 1 | Status: SHIPPED | OUTPATIENT
Start: 2024-09-13 | End: 2024-09-13 | Stop reason: HOSPADM

## 2024-09-13 RX ORDER — PANTOPRAZOLE SODIUM 40 MG/1
40 TABLET, DELAYED RELEASE ORAL
Qty: 30 TABLET | Refills: 1 | Status: SHIPPED | OUTPATIENT
Start: 2024-09-13

## 2024-09-13 RX ORDER — METOCLOPRAMIDE 5 MG/1
5 TABLET ORAL
Qty: 45 TABLET | Refills: 1 | Status: SHIPPED | OUTPATIENT
Start: 2024-09-13

## 2024-09-13 RX ORDER — METOPROLOL SUCCINATE 25 MG/1
25 TABLET, EXTENDED RELEASE ORAL DAILY
Qty: 30 TABLET | Refills: 1 | Status: SHIPPED | OUTPATIENT
Start: 2024-09-13

## 2024-09-13 RX ORDER — MAGNESIUM SULFATE 1 G/100ML
1000 INJECTION INTRAVENOUS ONCE
Status: COMPLETED | OUTPATIENT
Start: 2024-09-13 | End: 2024-09-13

## 2024-09-13 RX ADMIN — MAGNESIUM SULFATE HEPTAHYDRATE 1000 MG: 1 INJECTION, SOLUTION INTRAVENOUS at 09:55

## 2024-09-13 RX ADMIN — METOCLOPRAMIDE 5 MG: 10 TABLET ORAL at 06:24

## 2024-09-13 RX ADMIN — POTASSIUM CHLORIDE 40 MEQ: 1500 TABLET, EXTENDED RELEASE ORAL at 09:01

## 2024-09-13 RX ADMIN — SODIUM CHLORIDE, PRESERVATIVE FREE 10 ML: 5 INJECTION INTRAVENOUS at 07:47

## 2024-09-13 RX ADMIN — PANTOPRAZOLE SODIUM 40 MG: 40 TABLET, DELAYED RELEASE ORAL at 06:25

## 2024-09-13 RX ADMIN — CLOPIDOGREL BISULFATE 75 MG: 75 TABLET ORAL at 09:01

## 2024-09-16 ENCOUNTER — APPOINTMENT (OUTPATIENT)
Dept: PHYSICAL THERAPY | Age: 74
End: 2024-09-16
Payer: MEDICARE

## 2024-09-16 ENCOUNTER — CARE COORDINATION (OUTPATIENT)
Dept: CASE MANAGEMENT | Age: 74
End: 2024-09-16

## 2024-09-16 ENCOUNTER — HOSPITAL ENCOUNTER (OUTPATIENT)
Age: 74
Discharge: HOME | End: 2024-09-16
Payer: MEDICARE

## 2024-09-16 DIAGNOSIS — E87.29 KETOACIDOSIS: ICD-10-CM

## 2024-09-16 DIAGNOSIS — E87.29 KETOACIDOSIS: Primary | ICD-10-CM

## 2024-09-16 LAB
ANION GAP SERPL CALC-SCNC: 14 MEQ/L (ref 8–16)
BUN SERPL-MCNC: 17 MG/DL (ref 7–22)
CALCIUM SERPL-MCNC: 9.5 MG/DL (ref 8.5–10.5)
CHLORIDE SERPL-SCNC: 103 MEQ/L (ref 98–111)
CO2 SERPL-SCNC: 26 MEQ/L (ref 23–33)
CREAT SERPL-MCNC: 0.5 MG/DL (ref 0.4–1.2)
GFR SERPL CREATININE-BSD FRML MDRD: > 90 ML/MIN/1.73M2
GLUCOSE SERPL-MCNC: 185 MG/DL (ref 70–108)
MAGNESIUM SERPL-MCNC: 1.7 MG/DL (ref 1.6–2.4)
POTASSIUM SERPL-SCNC: 4.7 MEQ/L (ref 3.5–5.2)
SODIUM SERPL-SCNC: 143 MEQ/L (ref 135–145)

## 2024-09-16 PROCEDURE — 80048 BASIC METABOLIC PNL TOTAL CA: CPT

## 2024-09-16 PROCEDURE — 36415 COLL VENOUS BLD VENIPUNCTURE: CPT

## 2024-09-16 PROCEDURE — 1111F DSCHRG MED/CURRENT MED MERGE: CPT | Performed by: FAMILY MEDICINE

## 2024-09-16 PROCEDURE — 83735 ASSAY OF MAGNESIUM: CPT

## 2024-09-17 ENCOUNTER — OFFICE VISIT (OUTPATIENT)
Dept: FAMILY MEDICINE CLINIC | Age: 74
End: 2024-09-17

## 2024-09-17 VITALS
RESPIRATION RATE: 18 BRPM | DIASTOLIC BLOOD PRESSURE: 68 MMHG | OXYGEN SATURATION: 98 % | WEIGHT: 160.3 LBS | HEIGHT: 62 IN | BODY MASS INDEX: 29.5 KG/M2 | HEART RATE: 84 BPM | SYSTOLIC BLOOD PRESSURE: 114 MMHG

## 2024-09-17 DIAGNOSIS — E13.10 SECONDARY DM WITH DKA (HCC): Primary | ICD-10-CM

## 2024-09-17 DIAGNOSIS — E44.1 MILD MALNUTRITION (HCC): ICD-10-CM

## 2024-09-17 DIAGNOSIS — Z09 HOSPITAL DISCHARGE FOLLOW-UP: ICD-10-CM

## 2024-09-17 PROBLEM — E87.29 KETOACIDOSIS: Status: RESOLVED | Noted: 2024-09-12 | Resolved: 2024-09-17

## 2024-09-17 RX ORDER — INSULIN LISPRO 100 [IU]/ML
INJECTION, SOLUTION INTRAVENOUS; SUBCUTANEOUS
COMMUNITY

## 2024-09-17 RX ORDER — MULTIVITAMIN WITH IRON
1 TABLET ORAL DAILY
COMMUNITY

## 2024-09-17 RX ORDER — INSULIN GLARGINE 100 [IU]/ML
39 INJECTION, SOLUTION SUBCUTANEOUS NIGHTLY
COMMUNITY

## 2024-09-17 ASSESSMENT — ENCOUNTER SYMPTOMS
WHEEZING: 0
SHORTNESS OF BREATH: 0

## 2024-09-18 ENCOUNTER — HOSPITAL ENCOUNTER (OUTPATIENT)
Dept: PHYSICAL THERAPY | Age: 74
Setting detail: THERAPIES SERIES
Discharge: HOME | End: 2024-09-18
Payer: MEDICARE

## 2024-09-18 PROCEDURE — 97110 THERAPEUTIC EXERCISES: CPT

## 2024-09-20 ENCOUNTER — CARE COORDINATION (OUTPATIENT)
Dept: CASE MANAGEMENT | Age: 74
End: 2024-09-20

## 2024-09-23 ENCOUNTER — HOSPITAL ENCOUNTER (OUTPATIENT)
Dept: PHYSICAL THERAPY | Age: 74
Setting detail: THERAPIES SERIES
Discharge: HOME | End: 2024-09-23
Payer: MEDICARE

## 2024-09-24 ENCOUNTER — HOSPITAL ENCOUNTER (OUTPATIENT)
Age: 74
End: 2024-09-24
Payer: MEDICARE

## 2024-09-24 ENCOUNTER — HOSPITAL ENCOUNTER (OUTPATIENT)
Dept: CT IMAGING | Age: 74
Discharge: HOME | End: 2024-09-24
Payer: MEDICARE

## 2024-09-24 DIAGNOSIS — K21.9 CHRONIC GERD: ICD-10-CM

## 2024-09-24 DIAGNOSIS — R11.2 NAUSEA AND VOMITING, UNSPECIFIED VOMITING TYPE: ICD-10-CM

## 2024-09-24 DIAGNOSIS — R63.4 WEIGHT LOSS: ICD-10-CM

## 2024-09-24 PROCEDURE — 74177 CT ABD & PELVIS W/CONTRAST: CPT

## 2024-09-24 PROCEDURE — 6360000004 HC RX CONTRAST MEDICATION: Performed by: NURSE PRACTITIONER

## 2024-09-24 RX ORDER — IOPAMIDOL 755 MG/ML
85 INJECTION, SOLUTION INTRAVASCULAR
Status: COMPLETED | OUTPATIENT
Start: 2024-09-24 | End: 2024-09-24

## 2024-09-24 RX ADMIN — IOPAMIDOL 85 ML: 755 INJECTION, SOLUTION INTRAVENOUS at 10:31

## 2024-09-25 ENCOUNTER — APPOINTMENT (OUTPATIENT)
Dept: PHYSICAL THERAPY | Age: 74
End: 2024-09-25
Payer: MEDICARE

## 2024-09-30 ENCOUNTER — APPOINTMENT (OUTPATIENT)
Dept: PHYSICAL THERAPY | Age: 74
End: 2024-09-30
Payer: MEDICARE

## 2024-10-01 ENCOUNTER — CARE COORDINATION (OUTPATIENT)
Dept: CASE MANAGEMENT | Age: 74
End: 2024-10-01

## 2024-10-01 NOTE — CARE COORDINATION
Yes  Patient-reported symptoms: Other, Constipation  Have your medications changed?: No  Do you have any questions related to your medications?: No  Do you currently have any active services?: No  Are you currently active with any services?: Outpatient/Community Services  Do you have any needs or concerns that I can assist you with?: No  Care Transitions Interventions  Other Interventions:              Follow Up Appointment:   Reviewed upcoming appointment(s). and AYO appointment attended as scheduled   Future Appointments         Provider Specialty Dept Phone    10/2/2024 11:00 AM Ellen Bland, PT Physical Therapy [phone]    10/11/2024 10:00 AM (Arrive by 9:40 AM) [facility] MAMMOGRAPHY RM1 Radiology [phone]    10/16/2024 9:45 AM Yash Nelson MD Family Medicine [phone]            Care Transition Nurse provided contact information.  Plan for follow-up call in 6-10 days based on severity of symptoms and risk factors.  Plan for next call: symptom management-FBS, bowels (constipation), results of CT and EGD? Any new or worsening symptoms      Carol Cabral RN

## 2024-10-02 ENCOUNTER — HOSPITAL ENCOUNTER (OUTPATIENT)
Age: 74
Discharge: HOME | End: 2024-10-02
Payer: MEDICARE

## 2024-10-02 ENCOUNTER — APPOINTMENT (OUTPATIENT)
Dept: PHYSICAL THERAPY | Age: 74
End: 2024-10-02
Payer: MEDICARE

## 2024-10-02 DIAGNOSIS — E13.10 SECONDARY DM WITH DKA (HCC): ICD-10-CM

## 2024-10-02 LAB
ANION GAP SERPL CALC-SCNC: 17 MEQ/L (ref 8–16)
BUN SERPL-MCNC: 12 MG/DL (ref 7–22)
CALCIUM SERPL-MCNC: 9.5 MG/DL (ref 8.5–10.5)
CHLORIDE SERPL-SCNC: 101 MEQ/L (ref 98–111)
CO2 SERPL-SCNC: 23 MEQ/L (ref 23–33)
CREAT SERPL-MCNC: 0.5 MG/DL (ref 0.4–1.2)
GFR SERPL CREATININE-BSD FRML MDRD: > 90 ML/MIN/1.73M2
GLUCOSE SERPL-MCNC: 165 MG/DL (ref 70–108)
POTASSIUM SERPL-SCNC: 4.1 MEQ/L (ref 3.5–5.2)
SODIUM SERPL-SCNC: 141 MEQ/L (ref 135–145)

## 2024-10-02 PROCEDURE — 36415 COLL VENOUS BLD VENIPUNCTURE: CPT

## 2024-10-02 PROCEDURE — 80048 BASIC METABOLIC PNL TOTAL CA: CPT

## 2024-10-08 ENCOUNTER — CARE COORDINATION (OUTPATIENT)
Dept: CASE MANAGEMENT | Age: 74
End: 2024-10-08

## 2024-10-08 ENCOUNTER — HOSPITAL ENCOUNTER (OUTPATIENT)
Dept: PHYSICAL THERAPY | Age: 74
Setting detail: THERAPIES SERIES
Discharge: HOME | End: 2024-10-08
Payer: MEDICARE

## 2024-10-08 PROCEDURE — 97110 THERAPEUTIC EXERCISES: CPT

## 2024-10-08 NOTE — THERAPY DISCHARGE
Manual Therapy - Soft Tissue Mobilization, Manual Therapy - Joint Manipulation, Pain Management, Home Exercise Program, Patient Education, Safety Education and Training, Self-Care Education and Training, Integrative Dry Needling, Vestibular Rehabilitation, Aquatics, and Modalities    []  Plan of care initiated.  Plan to see patient 2-3 times per week for 6-8 weeks to address the treatment planned outlined above.  [x]  Continue with current plan of care  []  Modify plan of care as follows:    []  Hold pending physician visit  []  Discharge    Time In 1115   Time Out 1145   Timed Code Minutes: 30 min   Total Treatment Time: 30 min     Electronically Signed by: Ellen Bland PT

## 2024-10-08 NOTE — CARE COORDINATION
Care Transitions Note    Follow Up Call     Patient Current Location:  Home: [place] [address]    Care Transition Nurse contacted the patient by telephone. Verified name and  as identifiers.    Additional needs identified to be addressed with provider   No needs identified                 Method of communication with provider: none.    Care Summary Note: Contacted pt for care transition follow up.  Spoke briefly with pt.  Pat states she is doing really good.  Denies having any further nausea/vomiting.  Reports she is moving her bowels but not every day.  Has stopped taking 3 stool softeners per day d/t stools being looser on some days.  She plans to start taking Metamucil daily.  Denies having any abdominal pain/discomfort or any other s/s currently.  Reports she received a letter in the mail regarding her results from the CT scan.  States that as far as the EGD results, she was told by the office that they will discuss the results in December.  She continues to monitor her blood sugars.  As of 10/2/24 labs, glucose was 165.  She states her blood sugars are lower than 165 at home.  She states she has not had to take any insulin when checking her blood sugars at home.  She is currently in the car.  She is aware of when to contact her providers.  No questions or concerns at this time.      Plan of care updates since last contact:  Education: continue to monitor BS, s/s       Advance Care Planning:   Does patient have an Advance Directive:  on file .    Medication Review:  No changes since last call.     Remote Patient Monitoring:  Offered patient enrollment in the Remote Patient Monitoring (RPM) program for in-home monitoring: Yes, but did not enroll at this time: controlled chronic disease management.    Assessments:  Care Transitions Subsequent and Final Call    Subsequent and Final Calls  Do you have any ongoing symptoms?: No  Have your medications changed?: No  Do you have any questions

## 2024-10-11 ENCOUNTER — HOSPITAL ENCOUNTER (OUTPATIENT)
Dept: MAMMOGRAPHY | Age: 74
Discharge: HOME | End: 2024-10-11
Payer: MEDICARE

## 2024-10-11 VITALS — BODY MASS INDEX: 28.34 KG/M2 | WEIGHT: 154 LBS | HEIGHT: 62 IN

## 2024-10-11 DIAGNOSIS — Z12.31 VISIT FOR SCREENING MAMMOGRAM: ICD-10-CM

## 2024-10-11 PROCEDURE — 77063 BREAST TOMOSYNTHESIS BI: CPT

## 2024-10-15 ENCOUNTER — CARE COORDINATION (OUTPATIENT)
Dept: CASE MANAGEMENT | Age: 74
End: 2024-10-15

## 2024-10-15 NOTE — CARE COORDINATION
Care Transitions Note    Final Call     Patient Current Location:  Home: [place] [address]    Care Transition Nurse contacted the patient by telephone. Verified name and  as identifiers.    Patient graduated from the Care Transitions program on 10/15/2024.  Patient/family has the ability to self manage at this time..      Advance Care Planning:   Does patient have an Advance Directive:  on file .    Handoff:   Patient was not referred to the ACM team due to no additional needs identified.       Care Summary Note: Contacted pt for care transition follow up.  Pat states she is doing pretty good now.  Reports blood sugars have been running \"pretty good\".  She did not give CTN any specific blood sugar readings but states it has been running below 130 and sometimes much lower than that.  She is still checking her blood sugars 4 times per day.  Has not had to take her insulin.  She informed CTN that she has an appt with GI this afternoon because they had a cancellation.  Reports her oral intake has improved.  She is avoiding sugar and monitoring her carb intake.  Denies having any nausea/vomiting.  Bowels have improved since starting the Metamucil.  She has an appt with PCP tomorrow.  She will also be following up with orthopedic for her knee.  She will continue to monitor her s/s and aware of when to contact providers.  No questions or concerns at this time.  Final call.  No further outreach.      Assessments:  Care Transitions Subsequent and Final Call    Subsequent and Final Calls  Do you have any ongoing symptoms?: No  Have your medications changed?: No  Do you have any questions related to your medications?: No  Do you currently have any active services?: Yes  Are you currently active with any services?: Outpatient/Community Services  Do you have any needs or concerns that I can assist you with?: No  Care Transitions Interventions  Other Interventions:              Upcoming Appointments:

## 2024-10-16 ENCOUNTER — OFFICE VISIT (OUTPATIENT)
Dept: FAMILY MEDICINE CLINIC | Age: 74
End: 2024-10-16
Payer: MEDICARE

## 2024-10-16 VITALS
HEART RATE: 57 BPM | RESPIRATION RATE: 16 BRPM | TEMPERATURE: 97.5 F | SYSTOLIC BLOOD PRESSURE: 136 MMHG | DIASTOLIC BLOOD PRESSURE: 82 MMHG | OXYGEN SATURATION: 97 % | WEIGHT: 157 LBS | HEIGHT: 62 IN | BODY MASS INDEX: 28.89 KG/M2

## 2024-10-16 DIAGNOSIS — E11.40 TYPE 2 DIABETES MELLITUS WITH DIABETIC NEUROPATHY, WITH LONG-TERM CURRENT USE OF INSULIN (HCC): Primary | ICD-10-CM

## 2024-10-16 DIAGNOSIS — Z79.4 TYPE 2 DIABETES MELLITUS WITH DIABETIC NEUROPATHY, WITH LONG-TERM CURRENT USE OF INSULIN (HCC): Primary | ICD-10-CM

## 2024-10-16 PROBLEM — E44.1 MILD MALNUTRITION (HCC): Status: RESOLVED | Noted: 2024-07-19 | Resolved: 2024-10-16

## 2024-10-16 PROBLEM — E13.10 SECONDARY DM WITH DKA (HCC): Status: RESOLVED | Noted: 2024-09-11 | Resolved: 2024-10-16

## 2024-10-16 PROCEDURE — 1036F TOBACCO NON-USER: CPT | Performed by: FAMILY MEDICINE

## 2024-10-16 PROCEDURE — 1090F PRES/ABSN URINE INCON ASSESS: CPT | Performed by: FAMILY MEDICINE

## 2024-10-16 PROCEDURE — G8399 PT W/DXA RESULTS DOCUMENT: HCPCS | Performed by: FAMILY MEDICINE

## 2024-10-16 PROCEDURE — G8417 CALC BMI ABV UP PARAM F/U: HCPCS | Performed by: FAMILY MEDICINE

## 2024-10-16 PROCEDURE — 1123F ACP DISCUSS/DSCN MKR DOCD: CPT | Performed by: FAMILY MEDICINE

## 2024-10-16 PROCEDURE — 3044F HG A1C LEVEL LT 7.0%: CPT | Performed by: FAMILY MEDICINE

## 2024-10-16 PROCEDURE — G8427 DOCREV CUR MEDS BY ELIG CLIN: HCPCS | Performed by: FAMILY MEDICINE

## 2024-10-16 PROCEDURE — G8484 FLU IMMUNIZE NO ADMIN: HCPCS | Performed by: FAMILY MEDICINE

## 2024-10-16 PROCEDURE — 99213 OFFICE O/P EST LOW 20 MIN: CPT | Performed by: FAMILY MEDICINE

## 2024-10-16 NOTE — PROGRESS NOTES
[facility]  [address]  [facility] [address]  Dept: [phone]  Dept Fax: [phone]  Loc: [phone]  PROGRESS NOTE      Visit Date: 10/16/2024    Celia Perkins is a 74 y.o. female who presents today for:  Chief Complaint   Patient presents with    Diabetes     4 week follow up        Chief complaint:  DM    Subjective:  Diabetes        4 week f/u    DM.  Humalog prn.  On jardiance. Not needing humalog.  Highest glucose 145.  A1c 6.5% in sept.  Checks glucose 4x per day. Has freestyle mel.     3 lb wt loss since sept. Eating well.   Exercising.  Rehabbing left knee.     Seen by GI yesterday.  Had egd since last appt.  Benign polyp of stomach.      is present    Review of Systems  Patient Active Problem List   Diagnosis    Hyperlipidemia    Asthma    Polyneuropathy    Aortic stenosis    GERD (gastroesophageal reflux disease)    Hiatal hernia    Hypothyroid    Type 2 diabetes mellitus without complication, without long-term current use of insulin (HCC)    Osteoarthrosis    Ear canal mass    Primary osteoarthritis of left knee    Osteoporosis    S/P repair of ventral hernia    Cervical stenosis of spinal canal    Tear of left supraspinatus tendon    Morbidly obese    Type 2 diabetes mellitus with diabetic neuropathy    Mood disorder (HCC)    Mild malnutrition (HCC)    Secondary DM with DKA ([facility])     Past Medical History:   Diagnosis Date    Arthritis     Asthma     Brain cyst     benign    Breast cancer ([facility]) 09/18/2018    Left breast, 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    Cancer ([facility]) 09/1918    Left Breast    Cerebrovascular accident (CVA) ([facility]) 10/14/2015    Chronic sinus complaints     Diverticulosis of colon     DKA (diabetic ketoacidoses) 05/2018    Elevated TSH     Hypertension     Osteoarthritis     Polyneuropathy     PONV (postoperative nausea and vomiting)     Spinal headache     Type 2 diabetes

## 2024-11-13 ENCOUNTER — OFFICE VISIT (OUTPATIENT)
Dept: FAMILY MEDICINE CLINIC | Age: 74
End: 2024-11-13

## 2024-11-13 VITALS
OXYGEN SATURATION: 98 % | RESPIRATION RATE: 17 BRPM | SYSTOLIC BLOOD PRESSURE: 118 MMHG | DIASTOLIC BLOOD PRESSURE: 66 MMHG | HEART RATE: 67 BPM | HEIGHT: 62 IN | BODY MASS INDEX: 27.97 KG/M2 | WEIGHT: 152 LBS

## 2024-11-13 DIAGNOSIS — K21.9 GASTROESOPHAGEAL REFLUX DISEASE, UNSPECIFIED WHETHER ESOPHAGITIS PRESENT: Primary | ICD-10-CM

## 2024-11-13 RX ORDER — FAMOTIDINE 40 MG/1
40 TABLET, FILM COATED ORAL EVERY EVENING
Qty: 30 TABLET | Refills: 3 | Status: SHIPPED | OUTPATIENT
Start: 2024-11-13

## 2024-11-13 RX ORDER — ESOMEPRAZOLE MAGNESIUM 40 MG/1
40 CAPSULE, DELAYED RELEASE ORAL
Qty: 90 CAPSULE | Refills: 1 | Status: SHIPPED | OUTPATIENT
Start: 2024-11-13 | End: 2024-11-13 | Stop reason: CLARIF

## 2024-11-13 ASSESSMENT — ENCOUNTER SYMPTOMS
HEARTBURN: 1
RESPIRATORY NEGATIVE: 1
RHINORRHEA: 1
EYES NEGATIVE: 1

## 2024-11-13 NOTE — PROGRESS NOTES
[facility]  [address]  [facility] [address]  Dept: [phone]  Loc: [phone]     Celia Perkins (:  1950) is a 74 y.o. female, here for evaluation of the following chief complaint(s):  OTHER (Started  when eating peanut M&M. States that she had bad acid reflux. Sore throat and cough ever since. //Has GI Dr)      ASSESSMENT/PLAN:  1. Gastroesophageal reflux disease, unspecified whether esophagitis present  Assessment & Plan:   Chronic, not at goal (unstable), Continue pantoprazole, taking it twice a day and will add famotidine.  Orders:  -     famotidine (PEPCID) 40 MG tablet; Take 1 tablet by mouth every evening, Disp-30 tablet, R-3Normal      Return for Regular follow up as scheduled and as needed.    SUBJECTIVE/OBJECTIVE:  States  night was eating peanut m&m's when she started to feel the acid reflux flare up. Sees GI and has been taking Protonix for GERD.  has been taking tums and bernie seltzer otc to help with symptom mangement with no relief. States nothing has not helped. States eating does help some.  has burning sensation in throat. Throat is sore due to irritation.     Heartburn  She complains of heartburn. This is a chronic (new onset creating difficulties) problem. The current episode started in the past 7 days. The problem occurs constantly. The problem has been unchanged. The heartburn is of moderate intensity. The heartburn wakes her from sleep. The heartburn limits her activity. The heartburn changes with position. The symptoms are aggravated by certain foods. Risk factors include hiatal hernia. She has tried an antacid and a PPI for the symptoms. The treatment provided no relief. Past procedures include an EGD.       Review of Systems   Constitutional: Negative.    HENT:  Positive for rhinorrhea.    Eyes: Negative.    Respiratory: Negative.     Cardiovascular: Negative.

## 2024-11-14 NOTE — ASSESSMENT & PLAN NOTE
Chronic, not at goal (unstable), Continue pantoprazole, taking it twice a day and will add famotidine.

## 2024-11-15 ENCOUNTER — OFFICE VISIT (OUTPATIENT)
Dept: FAMILY MEDICINE CLINIC | Age: 74
End: 2024-11-15

## 2024-11-15 ENCOUNTER — HOSPITAL ENCOUNTER (OUTPATIENT)
Age: 74
Discharge: HOME | End: 2024-11-15
Payer: MEDICARE

## 2024-11-15 ENCOUNTER — HOSPITAL ENCOUNTER (OUTPATIENT)
Dept: GENERAL RADIOLOGY | Age: 74
Discharge: HOME | End: 2024-11-15
Payer: MEDICARE

## 2024-11-15 VITALS
OXYGEN SATURATION: 98 % | DIASTOLIC BLOOD PRESSURE: 72 MMHG | TEMPERATURE: 97.8 F | WEIGHT: 151.2 LBS | HEIGHT: 62 IN | RESPIRATION RATE: 16 BRPM | BODY MASS INDEX: 27.82 KG/M2 | HEART RATE: 76 BPM | SYSTOLIC BLOOD PRESSURE: 124 MMHG

## 2024-11-15 DIAGNOSIS — E11.40 TYPE 2 DIABETES MELLITUS WITH DIABETIC NEUROPATHY, WITH LONG-TERM CURRENT USE OF INSULIN (HCC): ICD-10-CM

## 2024-11-15 DIAGNOSIS — J40 SINOBRONCHITIS: Primary | ICD-10-CM

## 2024-11-15 DIAGNOSIS — Z79.4 TYPE 2 DIABETES MELLITUS WITH DIABETIC NEUROPATHY, WITH LONG-TERM CURRENT USE OF INSULIN (HCC): ICD-10-CM

## 2024-11-15 DIAGNOSIS — R05.1 ACUTE COUGH: ICD-10-CM

## 2024-11-15 DIAGNOSIS — J32.9 SINOBRONCHITIS: Primary | ICD-10-CM

## 2024-11-15 LAB
Lab: NORMAL
QC PASS/FAIL: NORMAL
SARS-COV-2 RDRP RESP QL NAA+PROBE: NEGATIVE

## 2024-11-15 PROCEDURE — 71046 X-RAY EXAM CHEST 2 VIEWS: CPT

## 2024-11-15 RX ORDER — DOXYCYCLINE HYCLATE 100 MG
100 TABLET ORAL 2 TIMES DAILY
Qty: 28 TABLET | Refills: 0 | Status: SHIPPED | OUTPATIENT
Start: 2024-11-15 | End: 2024-11-29

## 2024-11-15 RX ORDER — PREDNISONE 20 MG/1
40 TABLET ORAL DAILY
Qty: 10 TABLET | Refills: 0 | Status: SHIPPED | OUTPATIENT
Start: 2024-11-15 | End: 2024-11-20

## 2024-11-15 ASSESSMENT — ENCOUNTER SYMPTOMS
NAUSEA: 0
ABDOMINAL PAIN: 0
RHINORRHEA: 1
SORE THROAT: 1
CHEST TIGHTNESS: 1
SINUS PAIN: 1
SINUS PRESSURE: 1
EYE PAIN: 0
COUGH: 1
VOMITING: 0
SHORTNESS OF BREATH: 1

## 2024-11-15 NOTE — PROGRESS NOTES
[facility] PROFESSIONAL [facility]  [address]  [facility] [address]  Dept: [phone]  Dept Fax: [phone]  Loc: [phone]     Visit Date:  11/15/2024    Provider: NAV Abbott - CNP        Patient:  Celia Perkins  YOB: 1950    HPI:     Chief Complaint   Patient presents with    Illness     Pt feels like she has a sinus infection. Having head, ear and facial pressure,cough that gets worse at night, yellow mucus x 5 days       History of Present Illness  The patient is a 74-year-old female who presents today with concerns for a possible sinus infection. She is accompanied by her .    She reports a sensation of fullness in her head, accompanied by chills but no fever. She experiences headaches, nasal congestion, and drainage down the back of her throat. Ear pain and a sore throat are also present. She has a cough and occasionally feels as though she is suffocating. Shortness of breath and wheezing occur during the day. She spent the previous night sitting up in a recliner due to discomfort when lying down. She reports no recent exposure to any sick individuals.    Her symptoms began on Sunday night, initially presenting as acid reflux. She has been experiencing a runny nose for the past 2 months, which has recently worsened. She notes that her nasal discharge has changed from clear to yellow.    She has not taken insulin since July 2024. She has previously used prednisone and has a history of asthma, for which she used an inhaler for several years.       Medications    Current Outpatient Medications:     doxycycline hyclate (VIBRA-TABS) 100 MG tablet, Take 1 tablet by mouth 2 times daily for 14 days, Disp: 28 tablet, Rfl: 0    predniSONE (DELTASONE) 20 MG tablet, Take 2 tablets by mouth daily for 5 days, Disp: 10 tablet, Rfl: 0    famotidine (PEPCID) 40 MG tablet, Take 1 tablet by mouth every evening, Disp: 30 tablet, Rfl:

## 2024-11-19 ENCOUNTER — PATIENT MESSAGE (OUTPATIENT)
Dept: FAMILY MEDICINE CLINIC | Age: 74
End: 2024-11-19

## 2024-11-19 RX ORDER — PREDNISONE 20 MG/1
40 TABLET ORAL DAILY
Qty: 10 TABLET | Refills: 0 | Status: SHIPPED | OUTPATIENT
Start: 2024-11-19 | End: 2024-11-24

## 2025-01-14 ENCOUNTER — TRANSCRIBE ORDERS (OUTPATIENT)
Dept: ADMINISTRATIVE | Age: 75
End: 2025-01-14

## 2025-01-14 DIAGNOSIS — K21.9 GASTROESOPHAGEAL REFLUX DISEASE, UNSPECIFIED WHETHER ESOPHAGITIS PRESENT: ICD-10-CM

## 2025-01-14 DIAGNOSIS — R63.4 LOSS OF WEIGHT: Primary | ICD-10-CM

## 2025-01-21 ENCOUNTER — HOSPITAL ENCOUNTER (OUTPATIENT)
Dept: NUCLEAR MEDICINE | Age: 75
Discharge: HOME | End: 2025-01-21
Payer: MEDICARE

## 2025-01-21 DIAGNOSIS — K21.9 GASTROESOPHAGEAL REFLUX DISEASE, UNSPECIFIED WHETHER ESOPHAGITIS PRESENT: ICD-10-CM

## 2025-01-21 DIAGNOSIS — R63.4 LOSS OF WEIGHT: ICD-10-CM

## 2025-01-21 PROCEDURE — 78264 GASTRIC EMPTYING IMG STUDY: CPT

## 2025-01-21 PROCEDURE — A9541 TC99M SULFUR COLLOID: HCPCS | Performed by: NURSE PRACTITIONER

## 2025-01-21 PROCEDURE — 3430000000 HC RX DIAGNOSTIC RADIOPHARMACEUTICAL: Performed by: NURSE PRACTITIONER

## 2025-01-21 RX ADMIN — Medication 1 MILLICURIE: at 07:37

## 2025-03-01 ASSESSMENT — PATIENT HEALTH QUESTIONNAIRE - PHQ9
1. LITTLE INTEREST OR PLEASURE IN DOING THINGS: NOT AT ALL
SUM OF ALL RESPONSES TO PHQ9 QUESTIONS 1 & 2: 0
1. LITTLE INTEREST OR PLEASURE IN DOING THINGS: NOT AT ALL
SUM OF ALL RESPONSES TO PHQ QUESTIONS 1-9: 0
2. FEELING DOWN, DEPRESSED OR HOPELESS: NOT AT ALL
2. FEELING DOWN, DEPRESSED OR HOPELESS: NOT AT ALL
SUM OF ALL RESPONSES TO PHQ QUESTIONS 1-9: 0

## 2025-03-06 DIAGNOSIS — K21.9 GASTROESOPHAGEAL REFLUX DISEASE, UNSPECIFIED WHETHER ESOPHAGITIS PRESENT: ICD-10-CM

## 2025-03-06 RX ORDER — FAMOTIDINE 40 MG/1
40 TABLET, FILM COATED ORAL EVERY EVENING
Qty: 30 TABLET | Refills: 0 | Status: SHIPPED | OUTPATIENT
Start: 2025-03-06 | End: 2025-03-07 | Stop reason: SDUPTHER

## 2025-03-06 NOTE — TELEPHONE ENCOUNTER
Celia Perkins called requesting a refill on the following medications:  Requested Prescriptions     Pending Prescriptions Disp Refills    famotidine (PEPCID) 40 MG tablet [Pharmacy Med Name: Famotidine Oral Tablet 40 MG] 30 tablet 0     Sig: TAKE 1 TABLET BY MOUTH IN THE EVENING       Date of last visit: 11/15/2024  Date of next visit (if applicable):3/7/2025  Date of last refill: 11/13/24  Pharmacy Name: Amee Rhodes MA  //

## 2025-03-07 ENCOUNTER — OFFICE VISIT (OUTPATIENT)
Dept: FAMILY MEDICINE CLINIC | Age: 75
End: 2025-03-07

## 2025-03-07 VITALS
DIASTOLIC BLOOD PRESSURE: 72 MMHG | OXYGEN SATURATION: 100 % | SYSTOLIC BLOOD PRESSURE: 114 MMHG | TEMPERATURE: 97.4 F | RESPIRATION RATE: 16 BRPM | HEART RATE: 76 BPM | WEIGHT: 143.6 LBS | BODY MASS INDEX: 26.43 KG/M2 | HEIGHT: 62 IN

## 2025-03-07 DIAGNOSIS — K21.9 GASTROESOPHAGEAL REFLUX DISEASE, UNSPECIFIED WHETHER ESOPHAGITIS PRESENT: ICD-10-CM

## 2025-03-07 DIAGNOSIS — I25.10 ASCVD (ARTERIOSCLEROTIC CARDIOVASCULAR DISEASE): ICD-10-CM

## 2025-03-07 DIAGNOSIS — E11.40 TYPE 2 DIABETES MELLITUS WITH DIABETIC NEUROPATHY, WITH LONG-TERM CURRENT USE OF INSULIN (HCC): Primary | ICD-10-CM

## 2025-03-07 DIAGNOSIS — Z79.4 TYPE 2 DIABETES MELLITUS WITH DIABETIC NEUROPATHY, WITH LONG-TERM CURRENT USE OF INSULIN (HCC): Primary | ICD-10-CM

## 2025-03-07 LAB — HBA1C MFR BLD: 8.7 %

## 2025-03-07 RX ORDER — FAMOTIDINE 40 MG/1
40 TABLET, FILM COATED ORAL EVERY EVENING
Qty: 90 TABLET | Refills: 3 | Status: SHIPPED | OUTPATIENT
Start: 2025-03-07

## 2025-03-07 RX ORDER — CLOPIDOGREL BISULFATE 75 MG/1
75 TABLET ORAL DAILY
Qty: 90 TABLET | Refills: 3 | Status: SHIPPED | OUTPATIENT
Start: 2025-03-07

## 2025-03-07 RX ORDER — ATORVASTATIN CALCIUM 40 MG/1
40 TABLET, FILM COATED ORAL NIGHTLY
Qty: 90 TABLET | Refills: 3 | Status: SHIPPED | OUTPATIENT
Start: 2025-03-07

## 2025-03-07 ASSESSMENT — ENCOUNTER SYMPTOMS
SHORTNESS OF BREATH: 0
WHEEZING: 0

## 2025-04-03 DIAGNOSIS — K21.9 GASTROESOPHAGEAL REFLUX DISEASE, UNSPECIFIED WHETHER ESOPHAGITIS PRESENT: ICD-10-CM

## 2025-04-03 DIAGNOSIS — I10 ESSENTIAL HYPERTENSION: ICD-10-CM

## 2025-04-03 DIAGNOSIS — I25.10 ASCVD (ARTERIOSCLEROTIC CARDIOVASCULAR DISEASE): ICD-10-CM

## 2025-04-03 RX ORDER — BENAZEPRIL HYDROCHLORIDE 5 MG/1
5 TABLET ORAL DAILY
Qty: 180 TABLET | Refills: 3 | Status: SHIPPED | OUTPATIENT
Start: 2025-04-03

## 2025-04-03 RX ORDER — ATORVASTATIN CALCIUM 40 MG/1
40 TABLET, FILM COATED ORAL DAILY
Qty: 90 TABLET | Refills: 3 | Status: SHIPPED | OUTPATIENT
Start: 2025-04-03

## 2025-04-03 RX ORDER — CLOPIDOGREL BISULFATE 75 MG/1
75 TABLET ORAL DAILY
Qty: 90 TABLET | Refills: 3 | Status: SHIPPED | OUTPATIENT
Start: 2025-04-03

## 2025-04-03 RX ORDER — FAMOTIDINE 40 MG/1
40 TABLET, FILM COATED ORAL DAILY
Qty: 90 TABLET | Refills: 3 | Status: SHIPPED | OUTPATIENT
Start: 2025-04-03

## 2025-04-03 NOTE — TELEPHONE ENCOUNTER
Celia Perkins called requesting a refill on the following medications:  Requested Prescriptions     Pending Prescriptions Disp Refills    famotidine (PEPCID) 40 MG tablet [Pharmacy Med Name: FAMOTIDINE 40MG TAB]  0    empagliflozin (JARDIANCE) 25 MG tablet [Pharmacy Med Name: JARDIANCE 25MG TAB]  0    atorvastatin (LIPITOR) 40 MG tablet [Pharmacy Med Name: ATORVASTATIN 40MG TAB]  0    benazepril (LOTENSIN) 5 MG tablet [Pharmacy Med Name: BENAZEPRIL  5MG TAB]  0    clopidogrel (PLAVIX) 75 MG tablet [Pharmacy Med Name: CLOPIDOGREL  75MG TAB]  0       Date of last visit: 3/7/2025  Date of next visit (if applicable):6/10/2025  Date of last refill: Meryl on 3/7  Pharmacy Name: Patient now needs them sent to [facility].       Thanks,  Amee Mahoney MA

## 2025-09-06 ENCOUNTER — HOSPITAL ENCOUNTER (OUTPATIENT)
Dept: GENERAL RADIOLOGY | Age: 75
Discharge: HOME | End: 2025-09-06
Payer: MEDICARE

## 2025-09-06 DIAGNOSIS — E11.40 TYPE 2 DIABETES MELLITUS WITH DIABETIC NEUROPATHY, WITH LONG-TERM CURRENT USE OF INSULIN (HCC): ICD-10-CM

## 2025-09-06 DIAGNOSIS — Z79.4 TYPE 2 DIABETES MELLITUS WITH DIABETIC NEUROPATHY, WITH LONG-TERM CURRENT USE OF INSULIN (HCC): ICD-10-CM

## 2025-09-06 DIAGNOSIS — I25.10 ASCVD (ARTERIOSCLEROTIC CARDIOVASCULAR DISEASE): ICD-10-CM

## 2025-09-06 LAB
ALBUMIN SERPL BCG-MCNC: 4.4 G/DL (ref 3.4–4.9)
ALP SERPL-CCNC: 63 U/L (ref 38–126)
ALT SERPL W/O P-5'-P-CCNC: 27 U/L (ref 10–35)
ANION GAP SERPL CALC-SCNC: 13 MEQ/L (ref 8–16)
AST SERPL-CCNC: 20 U/L (ref 10–35)
BASOPHILS ABSOLUTE: 0 THOU/MM3 (ref 0–0.1)
BASOPHILS NFR BLD AUTO: 0.4 %
BILIRUB SERPL-MCNC: 2.5 MG/DL (ref 0.3–1.2)
BUN SERPL-MCNC: 23 MG/DL (ref 8–23)
CALCIUM SERPL-MCNC: 9.6 MG/DL (ref 8.5–10.5)
CHLORIDE SERPL-SCNC: 100 MEQ/L (ref 98–111)
CHOLEST SERPL-MCNC: 124 MG/DL (ref 100–199)
CO2 SERPL-SCNC: 26 MEQ/L (ref 22–29)
CREAT SERPL-MCNC: 0.6 MG/DL (ref 0.5–0.9)
CREAT UR-MCNC: 57.8 MG/DL
DEPRECATED RDW RBC AUTO: 43.8 FL (ref 35–45)
EOSINOPHIL NFR BLD AUTO: 2.9 %
EOSINOPHILS ABSOLUTE: 0.1 THOU/MM3 (ref 0–0.4)
ERYTHROCYTE [DISTWIDTH] IN BLOOD BY AUTOMATED COUNT: 12.7 % (ref 11.5–14.5)
GFR SERPL CREATININE-BSD FRML MDRD: > 90 ML/MIN/1.73M2
GLUCOSE SERPL-MCNC: 179 MG/DL (ref 74–109)
HCT VFR BLD AUTO: 45.3 % (ref 37–47)
HDLC SERPL-MCNC: 46 MG/DL
HGB BLD-MCNC: 15.1 GM/DL (ref 12–16)
IMM GRANULOCYTES # BLD AUTO: 0.01 THOU/MM3 (ref 0–0.07)
IMM GRANULOCYTES NFR BLD AUTO: 0.2 %
LDLC SERPL CALC-MCNC: 53 MG/DL
LYMPHOCYTES ABSOLUTE: 1.3 THOU/MM3 (ref 1–4.8)
LYMPHOCYTES NFR BLD AUTO: 27.1 %
MCH RBC QN AUTO: 31.4 PG (ref 26–33)
MCHC RBC AUTO-ENTMCNC: 33.3 GM/DL (ref 32.2–35.5)
MCV RBC AUTO: 94.2 FL (ref 81–99)
MICROALBUMIN UR-MCNC: 4.75 MG/DL
MICROALBUMIN/CREAT RATIO PNL UR: 82 MG/G (ref 0–30)
MONOCYTES ABSOLUTE: 0.4 THOU/MM3 (ref 0.4–1.3)
MONOCYTES NFR BLD AUTO: 8.7 %
NEUTROPHILS ABSOLUTE: 2.9 THOU/MM3 (ref 1.8–7.7)
NEUTROPHILS NFR BLD AUTO: 60.7 %
NRBC BLD AUTO-RTO: 0 /100 WBC
PLATELET # BLD AUTO: 160 THOU/MM3 (ref 130–400)
PMV BLD AUTO: 9.9 FL (ref 9.4–12.4)
POTASSIUM SERPL-SCNC: 4.3 MEQ/L (ref 3.5–5.2)
PROT SERPL-MCNC: 6.7 G/DL (ref 6.4–8.3)
RBC # BLD AUTO: 4.81 MILL/MM3 (ref 4.2–5.4)
SODIUM SERPL-SCNC: 139 MEQ/L (ref 135–145)
TRIGL SERPL-MCNC: 124 MG/DL (ref 0–199)
WBC # BLD AUTO: 4.8 THOU/MM3 (ref 4.8–10.8)

## 2025-09-06 PROCEDURE — 82570 ASSAY OF URINE CREATININE: CPT

## 2025-09-06 PROCEDURE — 36415 COLL VENOUS BLD VENIPUNCTURE: CPT

## 2025-09-06 PROCEDURE — 85025 COMPLETE CBC W/AUTO DIFF WBC: CPT

## 2025-09-06 PROCEDURE — 80061 LIPID PANEL: CPT

## 2025-09-06 PROCEDURE — 82043 UR ALBUMIN QUANTITATIVE: CPT

## 2025-09-06 PROCEDURE — 80053 COMPREHEN METABOLIC PANEL: CPT

## 2025-09-07 PROBLEM — R80.9 MICROALBUMINURIA: Status: ACTIVE | Noted: 2025-09-07

## (undated) DEVICE — TROCAR ENDOSCP SHFT L100MM DIA5MM DIL TIP ENDOPATH XCEL

## (undated) DEVICE — SUTURE VCRL SZ 3-0 L27IN ABSRB UD L26MM SH 1/2 CIR J416H

## (undated) DEVICE — ELECTROSURGICAL PENCIL BUTTON SWITCH E-Z CLEAN COATED BLADE ELECTRODE 10 FT (3 M) CORD HOLSTER: Brand: MEGADYNE

## (undated) DEVICE — COVER ARMBRD W13XL28.5IN IMPERV BLU FOR OP RM

## (undated) DEVICE — COVER US PRB W5XL96IN LTX W/ GEL

## (undated) DEVICE — GLOVE ORANGE PI 7   MSG9070

## (undated) DEVICE — SPONGE GZ W4XL4IN COT 12 PLY TYP VII WVN C FLD DSGN

## (undated) DEVICE — BINDER ABD 2XL H12XL60 75IN UNISX STD E 4 PNL DISPOSABLE

## (undated) DEVICE — TROCAR ENDOSCP L100MM DIA11MM STBL SL BLDELSS DISP ENDOPATH

## (undated) DEVICE — BLADE LARYNSCP SZ 3 ENH DIR INTUB GLIDESCOPE MCGRATH MAC

## (undated) DEVICE — 3M™ WARMING BLANKET, UPPER BODY, 10 PER CASE, 42268: Brand: BAIR HUGGER™

## (undated) DEVICE — Device

## (undated) DEVICE — SYSTEM FIX 30 ABSRB FAST OPTIFIX

## (undated) DEVICE — 4-PORT MANIFOLD: Brand: NEPTUNE 2

## (undated) DEVICE — YANKAUER,BULB TIP,W/O VENT,RIGID,STERILE: Brand: MEDLINE

## (undated) DEVICE — BINDER ABD UNISX 12IN 85IN 3XL UNIV

## (undated) DEVICE — PAD,ABDOMINAL,5"X9",ST,LF,25/BX: Brand: MEDLINE INDUSTRIES, INC.

## (undated) DEVICE — BREAST HERNIA PACK: Brand: MEDLINE INDUSTRIES, INC.

## (undated) DEVICE — GOWN,SIRUS,NONRNF,SETINSLV,XL,20/CS: Brand: MEDLINE

## (undated) DEVICE — LINER SUCT CANSTR 1500CC SEMI RIG W/ POR HYDROPHOBIC SHUT

## (undated) DEVICE — WARMER SCP 2 ANTIFOG LAP DISP

## (undated) DEVICE — TUBING, SUCTION, 1/4" X 20', STRAIGHT: Brand: MEDLINE INDUSTRIES, INC.

## (undated) DEVICE — SUTURE VCRL SZ 4-0 L27IN ABSRB UD L19MM FS-2 3/8 CIR REV J422H

## (undated) DEVICE — EXCEL 10FT (3.05 M) INSUFFLATION TUBING SET WITH 0.1 MICRON FILTER: Brand: EXCEL

## (undated) DEVICE — GLOVE ORANGE PI 8   MSG9080

## (undated) DEVICE — TROCAR ENDOSCP L100MM DIA15MM BLDELSS STBL SL ENDOPATH XCEL

## (undated) DEVICE — PADDING CAST W6INXL4YD COT LO LINTING WYTEX

## (undated) DEVICE — BANDAGE ADH W1XL3IN NAT FAB WVN FLX DURABLE N ADH PD SEAL

## (undated) DEVICE — GLOVE SURG SZ 6 THK91MIL LTX FREE SYN POLYISOPRENE ANTI

## (undated) DEVICE — APPLIER LIG CLP M L11IN TI STR RNG HNDL FOR 20 CLP DISP

## (undated) DEVICE — UNIVERSAL MONOPOLAR LAPAROSCOPIC CABLE 10FT, 4MM PIN CONNECTOR: Brand: CONMED

## (undated) DEVICE — DRAIN,WOUND,15FR,3/16,FULL-FLUTED: Brand: MEDLINE

## (undated) DEVICE — BLADE LARYNSCP SZ 4 ENH DIR INTUB GLIDESCOPE MCGRATH MAC

## (undated) DEVICE — SUTURE VCRL SZ 0 L18IN ABSRB VLT SUTUPAK PRECUT W/O NDL J106T

## (undated) DEVICE — INTENDED FOR TISSUE SEPARATION, AND OTHER PROCEDURES THAT REQUIRE A SHARP SURGICAL BLADE TO PUNCTURE OR CUT.: Brand: BARD-PARKER ® CARBON RIB-BACK BLADES

## (undated) DEVICE — KIT,ANTI FOG,W/SPONGE & FLUID,SOFT PACK: Brand: MEDLINE

## (undated) DEVICE — SOLUTION IV 1000ML 0.9% SOD CHL PH 5 INJ USP VIAFLX PLAS

## (undated) DEVICE — CONTAINER,SPECIMEN,PNEU TUBE,4OZ,OR STRL: Brand: MEDLINE

## (undated) DEVICE — SUTURE VCRL SZ 2-0 L27IN ABSRB UD L26MM SH 1/2 CIR J417H

## (undated) DEVICE — NEEDLE INSUF L120MM DIA2MM DISP FOR PNEUMOPERI ENDOPATH

## (undated) DEVICE — AGENT HEMSTAT 3GM PURIFIED PLNT STARCH PWD ABSRB ARISTA AH

## (undated) DEVICE — SOLUTION IV 1000ML LAC RINGERS PH 6.5 INJ USP VIAFLX PLAS

## (undated) DEVICE — GLOVE SURG SZ 65 THK91MIL LTX FREE SYN POLYISOPRENE

## (undated) DEVICE — GENERAL LAPAROSCOPY PACK-LF: Brand: MEDLINE INDUSTRIES, INC.

## (undated) DEVICE — SPONGE LAP W18XL18IN WHT COT 4 PLY FLD STRUNG RADPQ DISP ST

## (undated) DEVICE — SPECIMEN ORIENTATION CHARMS, SIX DISTINCTLY SHAPED STERILE 10MM CHARMS: Brand: MARGINMAP

## (undated) DEVICE — SOLUTION IV IRRIG WATER 1000ML POUR BRL 2F7114